# Patient Record
Sex: FEMALE | Race: OTHER | HISPANIC OR LATINO | Employment: OTHER | URBAN - METROPOLITAN AREA
[De-identification: names, ages, dates, MRNs, and addresses within clinical notes are randomized per-mention and may not be internally consistent; named-entity substitution may affect disease eponyms.]

---

## 2018-06-11 LAB
ALBUMIN SERPL BCP-MCNC: 4.2 G/DL (ref 3.5–5.7)
ALP SERPL-CCNC: 114 IU/L (ref 55–165)
ALT SERPL W P-5'-P-CCNC: 29 IU/L (ref 10–30)
ANION GAP SERPL CALCULATED.3IONS-SCNC: 13 MM/L
APTT PPP: 37.4 SEC (ref 24.4–37.6)
AST SERPL W P-5'-P-CCNC: 21 U/L (ref 7–26)
BASOPHILS # BLD AUTO: 0 X3/UL (ref 0–0.3)
BASOPHILS # BLD AUTO: 0.4 % (ref 0–2)
BILIRUB SERPL-MCNC: 0.3 MG/DL (ref 0.3–1)
BILIRUB UR QL STRIP: NEGATIVE
BUN SERPL-MCNC: 20 MG/DL (ref 7–25)
CALCIUM SERPL-MCNC: 9.5 MG/DL (ref 8.6–10.5)
CHLORIDE SERPL-SCNC: 107 MM/L (ref 98–107)
CLARITY UR: CLEAR
CO2 SERPL-SCNC: 23 MM/L (ref 21–31)
COLOR UR: YELLOW
CREAT SERPL-MCNC: 0.72 MG/DL (ref 0.6–1.2)
DEPRECATED RDW RBC AUTO: 15.2 %
EGFR (HISTORICAL): > 60 GFR
EGFR AFRICAN AMERICAN (HISTORICAL): > 60 GFR
EOSINOPHIL # BLD AUTO: 0.1 X3/UL (ref 0–0.5)
EOSINOPHIL NFR BLD AUTO: 1.6 % (ref 0–5)
GLUCOSE (HISTORICAL): 111 MG/DL (ref 65–99)
GLUCOSE UR STRIP-MCNC: NEGATIVE MG/DL
HCT VFR BLD AUTO: 38 % (ref 37–47)
HGB BLD-MCNC: 12.9 G/DL (ref 12–16)
HGB UR QL STRIP.AUTO: NEGATIVE
INR PPP: 1.06 (ref 0.9–1.5)
KETONES UR STRIP-MCNC: NEGATIVE MG/DL
LEUKOCYTE ESTERASE UR QL STRIP: NEGATIVE
LYMPHOCYTES # BLD AUTO: 0.8 X3/UL (ref 1.2–4.2)
LYMPHOCYTES NFR BLD AUTO: 16.5 % (ref 20.5–51.1)
MCH RBC QN AUTO: 30.8 PG (ref 26–34)
MCHC RBC AUTO-ENTMCNC: 33.9 G/DL (ref 31–37)
MCV RBC AUTO: 90.8 FL (ref 81–99)
MONOCYTES # BLD AUTO: 0.5 X3/UL (ref 0–1)
MONOCYTES NFR BLD AUTO: 9.9 % (ref 1.7–12)
NEUTROPHILS # BLD AUTO: 3.3 X3/UL (ref 1.4–6.5)
NEUTS SEG NFR BLD AUTO: 71.6 % (ref 42.2–75.2)
NITRITE UR QL STRIP: NEGATIVE
OSMOLALITY, SERUM (HISTORICAL): 281 MOSM (ref 262–291)
PH UR STRIP.AUTO: 5.5 [PH] (ref 4.5–8)
PLATELET # BLD AUTO: 209 X3/UL (ref 130–400)
PMV BLD AUTO: 9 FL
POTASSIUM SERPL-SCNC: 4 MM/L (ref 3.5–5.5)
PROT UR STRIP-MCNC: NEGATIVE MG/DL
PROTHROMBIN TIME (HISTORICAL): 12.2 SEC (ref 10.1–12.9)
RBC # BLD AUTO: 4.19 X6/UL (ref 3.9–5.2)
SODIUM SERPL-SCNC: 139 MM/L (ref 134–143)
SP GR UR STRIP.AUTO: >= 1.03 (ref 1–1.03)
TOTAL PROTEIN (HISTORICAL): 7 G/DL (ref 6.4–8.9)
UROBILINOGEN UR QL STRIP.AUTO: 0.2 EU/DL (ref 0.2–8)
WBC # BLD AUTO: 4.7 X3/UL (ref 4.8–10.8)

## 2018-07-11 RX ORDER — OMEPRAZOLE 20 MG/1
20 CAPSULE, DELAYED RELEASE ORAL DAILY
COMMUNITY
End: 2021-01-05

## 2018-07-11 RX ORDER — DOXYCYCLINE HYCLATE 100 MG/1
100 CAPSULE ORAL EVERY 12 HOURS SCHEDULED
COMMUNITY
End: 2019-10-02

## 2018-07-11 RX ORDER — ALPRAZOLAM 0.5 MG/1
TABLET ORAL
Status: ON HOLD | COMMUNITY
End: 2018-07-12

## 2018-07-11 RX ORDER — DICYCLOMINE HCL 20 MG
20 TABLET ORAL EVERY 6 HOURS
COMMUNITY
End: 2021-01-05

## 2018-07-11 RX ORDER — VENLAFAXINE 25 MG/1
25 TABLET ORAL 2 TIMES DAILY
Status: ON HOLD | COMMUNITY
End: 2018-07-12

## 2018-07-12 ENCOUNTER — HOSPITAL ENCOUNTER (OUTPATIENT)
Facility: HOSPITAL | Age: 63
Setting detail: OUTPATIENT SURGERY
Discharge: HOME | End: 2018-07-12
Attending: PODIATRIST | Admitting: PODIATRIST
Payer: COMMERCIAL

## 2018-07-12 ENCOUNTER — ANESTHESIA (OUTPATIENT)
Dept: PERIOP | Facility: HOSPITAL | Age: 63
End: 2018-07-12
Payer: COMMERCIAL

## 2018-07-12 ENCOUNTER — ANESTHESIA EVENT (OUTPATIENT)
Dept: PERIOP | Facility: HOSPITAL | Age: 63
End: 2018-07-12
Payer: COMMERCIAL

## 2018-07-12 VITALS
DIASTOLIC BLOOD PRESSURE: 67 MMHG | TEMPERATURE: 96.8 F | OXYGEN SATURATION: 98 % | WEIGHT: 172 LBS | HEART RATE: 87 BPM | BODY MASS INDEX: 34.68 KG/M2 | HEIGHT: 59 IN | RESPIRATION RATE: 13 BRPM | SYSTOLIC BLOOD PRESSURE: 131 MMHG

## 2018-07-12 DIAGNOSIS — G57.62 LESION OF LEFT PLANTAR NERVE: ICD-10-CM

## 2018-07-12 PROCEDURE — 88304 TISSUE EXAM BY PATHOLOGIST: CPT | Performed by: PATHOLOGY

## 2018-07-12 RX ORDER — LORATADINE 10 MG/1
10 TABLET ORAL DAILY
Status: DISCONTINUED | OUTPATIENT
Start: 2018-07-12 | End: 2018-07-12 | Stop reason: HOSPADM

## 2018-07-12 RX ORDER — BUPIVACAINE HYDROCHLORIDE 5 MG/ML
INJECTION, SOLUTION PERINEURAL AS NEEDED
Status: DISCONTINUED | OUTPATIENT
Start: 2018-07-12 | End: 2018-07-12 | Stop reason: HOSPADM

## 2018-07-12 RX ORDER — FENTANYL CITRATE 50 UG/ML
INJECTION, SOLUTION INTRAMUSCULAR; INTRAVENOUS AS NEEDED
Status: DISCONTINUED | OUTPATIENT
Start: 2018-07-12 | End: 2018-07-12 | Stop reason: SURG

## 2018-07-12 RX ORDER — DEXAMETHASONE SODIUM PHOSPHATE 4 MG/ML
INJECTION, SOLUTION INTRA-ARTICULAR; INTRALESIONAL; INTRAMUSCULAR; INTRAVENOUS; SOFT TISSUE AS NEEDED
Status: DISCONTINUED | OUTPATIENT
Start: 2018-07-12 | End: 2018-07-12 | Stop reason: HOSPADM

## 2018-07-12 RX ORDER — OXYCODONE HYDROCHLORIDE AND ACETAMINOPHEN 5; 325 MG/1; MG/1
2 TABLET ORAL EVERY 6 HOURS PRN
Status: DISCONTINUED | OUTPATIENT
Start: 2018-07-12 | End: 2018-07-12 | Stop reason: HOSPADM

## 2018-07-12 RX ORDER — PROPOFOL 10 MG/ML
INJECTION, EMULSION INTRAVENOUS AS NEEDED
Status: DISCONTINUED | OUTPATIENT
Start: 2018-07-12 | End: 2018-07-12 | Stop reason: SURG

## 2018-07-12 RX ORDER — SIMETHICONE 80 MG
TABLET,CHEWABLE ORAL
COMMUNITY
End: 2019-10-02

## 2018-07-12 RX ORDER — DICYCLOMINE HCL 20 MG
20 TABLET ORAL EVERY 6 HOURS
Status: DISCONTINUED | OUTPATIENT
Start: 2018-07-12 | End: 2018-07-12 | Stop reason: HOSPADM

## 2018-07-12 RX ORDER — FLUTICASONE PROPIONATE 50 MCG
1 SPRAY, SUSPENSION (ML) NASAL DAILY
COMMUNITY
End: 2019-10-02

## 2018-07-12 RX ORDER — LIDOCAINE HYDROCHLORIDE 10 MG/ML
INJECTION, SOLUTION INFILTRATION; PERINEURAL AS NEEDED
Status: DISCONTINUED | OUTPATIENT
Start: 2018-07-12 | End: 2018-07-12 | Stop reason: HOSPADM

## 2018-07-12 RX ORDER — DOXYCYCLINE 100 MG/1
100 CAPSULE ORAL
COMMUNITY
End: 2019-10-02

## 2018-07-12 RX ORDER — OMEPRAZOLE 20 MG/1
20 CAPSULE, DELAYED RELEASE ORAL DAILY
Status: DISCONTINUED | OUTPATIENT
Start: 2018-07-12 | End: 2018-07-12 | Stop reason: HOSPADM

## 2018-07-12 RX ORDER — KETOROLAC TROMETHAMINE 30 MG/ML
INJECTION, SOLUTION INTRAMUSCULAR; INTRAVENOUS AS NEEDED
Status: DISCONTINUED | OUTPATIENT
Start: 2018-07-12 | End: 2018-07-12 | Stop reason: SURG

## 2018-07-12 RX ORDER — SIMETHICONE 80 MG
80 TABLET,CHEWABLE ORAL ONCE
Status: DISCONTINUED | OUTPATIENT
Start: 2018-07-12 | End: 2018-07-12 | Stop reason: HOSPADM

## 2018-07-12 RX ORDER — FLUTICASONE PROPIONATE 50 MCG
1 SPRAY, SUSPENSION (ML) NASAL DAILY
Status: DISCONTINUED | OUTPATIENT
Start: 2018-07-12 | End: 2018-07-12 | Stop reason: HOSPADM

## 2018-07-12 RX ORDER — LORATADINE 10 MG/1
10 TABLET ORAL
COMMUNITY
Start: 2016-09-28 | End: 2018-08-27 | Stop reason: SDUPTHER

## 2018-07-12 RX ORDER — MIDAZOLAM HYDROCHLORIDE 1 MG/ML
INJECTION INTRAMUSCULAR; INTRAVENOUS AS NEEDED
Status: DISCONTINUED | OUTPATIENT
Start: 2018-07-12 | End: 2018-07-12 | Stop reason: SURG

## 2018-07-12 RX ORDER — DOXYCYCLINE HYCLATE 100 MG/1
100 CAPSULE ORAL EVERY 12 HOURS SCHEDULED
Status: DISCONTINUED | OUTPATIENT
Start: 2018-07-12 | End: 2018-07-12 | Stop reason: HOSPADM

## 2018-07-12 RX ORDER — SODIUM CHLORIDE, SODIUM LACTATE, POTASSIUM CHLORIDE, CALCIUM CHLORIDE 600; 310; 30; 20 MG/100ML; MG/100ML; MG/100ML; MG/100ML
125 INJECTION, SOLUTION INTRAVENOUS CONTINUOUS
Status: DISCONTINUED | OUTPATIENT
Start: 2018-07-12 | End: 2018-07-12

## 2018-07-12 RX ORDER — DOXYCYCLINE 100 MG/1
100 CAPSULE ORAL EVERY 12 HOURS SCHEDULED
Status: DISCONTINUED | OUTPATIENT
Start: 2018-07-12 | End: 2018-07-12 | Stop reason: HOSPADM

## 2018-07-12 RX ADMIN — SODIUM CHLORIDE, POTASSIUM CHLORIDE, SODIUM LACTATE AND CALCIUM CHLORIDE 125 ML/HR: 600; 310; 30; 20 INJECTION, SOLUTION INTRAVENOUS at 12:25

## 2018-07-12 RX ADMIN — MIDAZOLAM HYDROCHLORIDE 1 MG: 1 INJECTION, SOLUTION INTRAMUSCULAR; INTRAVENOUS at 13:20

## 2018-07-12 RX ADMIN — FENTANYL CITRATE 25 MCG: 50 INJECTION INTRAMUSCULAR; INTRAVENOUS at 13:40

## 2018-07-12 RX ADMIN — KETOROLAC TROMETHAMINE 30 MG: 30 INJECTION, SOLUTION INTRAMUSCULAR at 14:09

## 2018-07-12 RX ADMIN — MIDAZOLAM HYDROCHLORIDE 1 MG: 1 INJECTION, SOLUTION INTRAMUSCULAR; INTRAVENOUS at 13:17

## 2018-07-12 RX ADMIN — PROPOFOL 50 MG: 10 INJECTION, EMULSION INTRAVENOUS at 13:25

## 2018-07-12 RX ADMIN — LIDOCAINE HYDROCHLORIDE 100 MG: 20 INJECTION, SOLUTION INTRAVENOUS at 13:20

## 2018-07-12 RX ADMIN — FENTANYL CITRATE 50 MCG: 50 INJECTION INTRAMUSCULAR; INTRAVENOUS at 13:24

## 2018-07-12 NOTE — ANESTHESIA POSTPROCEDURE EVALUATION
Post-Op Assessment Note      CV Status:  Stable    Mental Status:  Alert    Hydration Status:  Stable    PONV Controlled:  None    Airway Patency:  Patent    Post Op Vitals Reviewed: Yes          Staff: Anesthesiologist           BP (P) 136/65 (07/12/18 1415)    Temp (!) (P) 96 8 °F (36 °C) (07/12/18 1415)    Pulse (P) 80 (07/12/18 1415)   Resp (P) 12 (07/12/18 1415)    SpO2 (P) 100 % (07/12/18 1415)

## 2018-07-12 NOTE — OP NOTE
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]    Procedure(s) (LRB):  FOOT NEUROMA EXCISION (Left)    Specimen(s):  ID Type Source Tests Collected by Time Destination   1 : soft tissue neuroma Tissue Foot, Left TISSUE EXAM Lucinda Colbert [facility] 2018 1343        Estimated Blood Loss:   Minimal    Drains:       Anesthesia Type:   IV Sedation with Anesthesia    Operative Indications:  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]      Operative Findings:  Tight tissue in interspaces, some scar tissue noted around the nerve  Complications: Third toe with very slow vascular return  Pale on patient taken to Recovery  Ordered foot to hang down and warm blankets to lower leg  Procedure and Technique:    Patient taken the operative eye car placed on table in supine position well padded pneumatic ankle tourniquet was placed about the patient's left ankle  Patient was positively identified in the planned operative, site planned procedure, & patient identity  Patient given IV sedation with the patient to be sufficiently sedated a approximately 13-14 cc of a 50 50 mix of lidocaine and Marcaine were injected about the planned operative site  A well-padded pneumatichad been placed prior to this, the foot was prepped and draped draped in the usual aseptic manner tourniquet was inflated to 250 mm of mercury after exsanguination using an Esmarch  Attention was directed to the dorsal 3rd MPJ region where a curvilinear incision was made carefully deepened using care tract vital and neurovascular structures      In the deeper tissues no other significant scar tissue in the area the intermetatarsal ligament was transected into the 2nd interspace region some neuroma tissue was removed from this area was not have any gross sizes had been anticipated from the MRI findings  Attention was then directed to the 3rd interspace through the same incision where identical findings were made and completed  A arisen flushed well with copious amounts of saline mixed bacitracin closure was made using 3 0 nylon on the skin  After closure 4mgs per ML Decadron   1cc total was injected into the area  Wound was then dressed with Betadine-soaked Adaptic gauze roll gauze Coban tourniquet was deflated is noted slow of capillary refill was noted to the 3rd and 4th digits after hanging the patient's foot the 4th came back to a nice pink color  The 3rd toe is still fairly pale upon discharge to the PACU with this will be monitored and ordered with patient having her foot down and a warm blanket placed about the lower extremity     I was present for the entire procedure    Patient Disposition:  PACU     SIGNATURE: Deepak Stuart DPM  DATE: July 12, 2018  TIME: 2:16 PM

## 2018-07-12 NOTE — DISCHARGE INSTRUCTIONS
Zaldivar Neuroma   WHAT YOU NEED TO KNOW:   Zaldivar neuroma is when tissue thickens around one of the nerves in your foot  It usually occurs in the ball of your foot, between your third and fourth toes  DISCHARGE INSTRUCTIONS:   Medicines: You may need any of the following:  · NSAIDs  help decrease swelling and pain  This medicine is available with or without a doctor's order  NSAIDs can cause stomach bleeding or kidney problems in certain people  If you take blood thinner medicine, always ask your healthcare provider if NSAIDs are safe for you  Always read the medicine label and follow directions  · Take your medicine as directed  Contact your healthcare provider if you think your medicine is not helping or if you have side effects  Tell him or her if you are allergic to any medicine  Keep a list of the medicines, vitamins, and herbs you take  Include the amounts, and when and why you take them  Bring the list or the pill bottles to follow-up visits  Carry your medicine list with you in case of an emergency  Wear flat shoes with a wide toe box: This will decrease the pressure on the front of your foot  Wear orthotics, arch supports, or foot pads: These help relieve pressure and cushion the ball of your foot  You may need a medical shoe insert ordered by your healthcare provider  Do an ice massage:  Do an ice massage for 20 minutes, twice a day to decrease pain and swelling  Freeze a paper or foam cup filled with water and roll it under your foot  Follow up with your healthcare provider as directed:  Write down your questions so you remember to ask them during your visits  Contact your healthcare provider if:   · Your symptoms spread to your toes  · Your symptoms do not improve after treatment  · You have questions or concerns about your condition or care    © 2017 [employer] Information is for End User's use only and may not be sold, redistributed or otherwise used for commercial purposes  All illustrations and images included in CareNotes® are the copyrighted property of A [employer] M [employer] Inc  or [employer]  The above information is an  only  It is not intended as medical advice for individual conditions or treatments  Talk to your doctor, nurse or pharmacist before following any medical regimen to see if it is safe and effective for you  Hydrocodone/Acetaminophen (By mouth)   Acetaminophen (x-nglk-s-MIN-oh-fen), Hydrocodone Bitartrate (sag-nhol-FMD-done bye-TAR-trate)  Treats pain  This medicine contains a narcotic pain reliever  Brand Name(s): Hycet, Lorcet, Lorcet HD, Lorcet Plus, Lortab 10/325, Lortab 5/325, Lortab 7 5/325, Lortab Elixir, Norco, Verdrocet, Vicodin, Vicodin ES, Vicodin HP, Xodol, Xodol 5/300   There may be other brand names for this medicine  When This Medicine Should Not Be Used: This medicine is not right for everyone  Do not use it if you had an allergic reaction to acetaminophen, hydrocodone, or other narcotic medicines, or stomach or bowel blockage (including paralytic ileus)  How to Use This Medicine:   Capsule, Liquid, Tablet  · Your doctor will tell you how much medicine to use  Do not use more than directed  · An overdose can be dangerous  Follow directions carefully so you do not get too much medicine at one time  · Oral liquid: Measure the oral liquid medicine with a marked measuring spoon, oral syringe, or medicine cup  · Drink plenty of liquids to help avoid constipation  · This medicine should come with a Medication Guide  Ask your pharmacist for a copy if you do not have one  · Missed dose: Take a dose as soon as you remember  If it is almost time for your next dose, wait until then and take a regular dose  Do not take extra medicine to make up for a missed dose  · Store the medicine in a closed container at room temperature, away from heat, moisture, and direct light   Flush any unused Norco® tablets down the toilet  Drugs and Foods to Avoid:   Ask your doctor or pharmacist before using any other medicine, including over-the-counter medicines, vitamins, and herbal products  · Do not use this medicine if you are using or have used an MAO inhibitor within the past 14 days  · Some medicines can affect how hydrocodone/acetaminophen works  Tell your doctor if you are using any of the following:   ¨ Carbamazepine, erythromycin, ketoconazole, mirtazapine, phenytoin, rifampin, ritonavir, tramadol, trazodone  ¨ Diuretic (water pill)  ¨ Medicine to treat depression or mental health problems  ¨ Medicine to treat migraine headaches  ¨ Phenothiazine medicine  · Tell your doctor if you use anything else that makes you sleepy  Some examples are allergy medicine, narcotic pain medicine, and alcohol  Tell your doctor if you are using buprenorphine, butorphanol, nalbuphine, pentazocine, or a muscle relaxer  · Do not drink alcohol while you are using this medicine  Acetaminophen can damage your liver, and your risk is higher if you also drink alcohol  Warnings While Using This Medicine:   · Tell your doctor if you are pregnant or breastfeeding, or if you have kidney disease, liver disease, lung or breathing problems, gallbladder or pancreas problems, an underactive thyroid, Troup disease, prostate problems, trouble urinating, stomach problems, or a history of head injury or brain tumor, seizures, alcohol or drug addiction  · This medicine may cause the following problems:   ¨ High risk of overdose, which can lead to death  ¨ Respiratory depression (serious breathing problem that can be life-threatening)  ¨ Liver problems  ¨ Serious skin reactions  ¨ Serotonin syndrome (when used with certain medicines)  · This medicine can be habit-forming  Do not use more than your prescribed dose  Call your doctor if you think your medicine is not working  · This medicine may make you dizzy or drowsy   Do not drive or doing anything else that could be dangerous until you know how this medicine affects you  · This medicine contains acetaminophen  Read the labels of all other medicines you are using to see if they also contain acetaminophen, or ask your doctor or pharmacist  Mitra Gruber not use more than 4 grams (4,000 milligrams) total of acetaminophen in one day  · Tell any doctor or dentist who treats you that you are using this medicine  This medicine may affect certain medical test results  · This medicine may cause constipation, especially with long-term use  Ask your doctor if you should use a laxative to prevent and treat constipation  · This medicine could cause infertility  Talk with your doctor before using this medicine if you plan to have children  · Keep all medicine out of the reach of children  Never share your medicine with anyone  Possible Side Effects While Using This Medicine:   Call your doctor right away if you notice any of these side effects:  · Allergic reaction: Itching or hives, swelling in your face or hands, swelling or tingling in your mouth or throat, chest tightness, trouble breathing  · Anxiety, restlessness, fast heartbeat, fever, sweating, muscle spasms, twitching, diarrhea, seeing or hearing things that are not there  · Blistering, peeling, red skin rash  · Blue lips, fingernails, or skin  · Dark urine or pale stools, loss of appetite, nausea or vomiting, stomach pain, yellow skin or eyes  · Extreme weakness, shallow breathing, slow heartbeat, sweating, seizures, cold or clammy skin  · Lightheadedness, dizziness, fainting  If you notice these less serious side effects, talk with your doctor:   · Constipation, nausea, vomiting  · Tiredness or sleepiness  If you notice other side effects that you think are caused by this medicine, tell your doctor  Call your doctor for medical advice about side effects   You may report side effects to FDA at 8-346-FDA-5929  © 2017 [employer] Information is for End User's use only and may not be sold, redistributed or otherwise used for commercial purposes  The above information is an  only  It is not intended as medical advice for individual conditions or treatments  Talk to your doctor, nurse or pharmacist before following any medical regimen to see if it is safe and effective for you

## 2018-07-12 NOTE — H&P
H&P Exam - [facility] Room 61 y o  female MRN: [id number]    Unit/Bed#:  Encounter: [id number]    Assessment:  Neuroma L second and third interspaces     Plan:  Outpatient Surgery to remove Neuroma    History of Present Illness   Pain with swollen feeling in her Left foot x 2 years refractory to conservative treatment    Review of Systems   Constitutional: Negative for appetite change, chills and fever  Respiratory: Positive for shortness of breath  Genitourinary: Positive for urgency  Musculoskeletal: Positive for arthralgias and neck pain  Allergic/Immunologic: Negative  Neurological: Positive for headaches  Hematological: Negative  Psychiatric/Behavioral: Negative  Historical Information   Past Medical History:   Diagnosis Date    Anxiety     Breathing difficulty     Cancer (HCC)     cervical    GERD (gastroesophageal reflux disease)     Irritable bowel syndrome     Shortness of breath      Past Surgical History:   Procedure Laterality Date    HYSTERECTOMY      TUBAL LIGATION       Social History   History   Alcohol Use No     History   Drug Use No     History   Smoking Status    Former Smoker    Packs/day: 3 00    Years: 25 00    Quit date: 7/11/2003   Smokeless Tobacco    Never Used     Family History: non-contributory    Meds/Allergies   all medications and allergies reviewed  No Known Allergies    Objective   First Vitals:        Current Vitals:        No intake or output data in the 24 hours ending 07/12/18 0658    Invasive Devices          No matching active lines, drains, or airways          Physical Exam   Constitutional: She is oriented to person, place, and time  She appears well-developed  HENT:   Head: Normocephalic  Eyes: Pupils are equal, round, and reactive to light  Cardiovascular: Intact distal pulses  Musculoskeletal: Normal range of motion  She exhibits tenderness  Feet:    Neurological: She is alert and oriented to person, place, and time  Skin: Skin is warm and dry  Lab Results: Reviewed all pertinant  Imaging: No clear Neuroma on MRI  EKG, Pathology, and Other Studies: N/A    Code Status: No Order  Advance Directive and Living Will:      Power of :    POLST:      Counseling / Coordination of Care:   None and Total floor / unit time spent today 30 minutes

## 2018-07-12 NOTE — ANESTHESIA PREPROCEDURE EVALUATION
Review of Systems/Medical History  Patient summary reviewed  Chart reviewed  No history of anesthetic complications     Cardiovascular  Negative cardio ROS    Pulmonary  Smoker ex-smoker  , Shortness of breath,   Comment: No SOB now     GI/Hepatic    GERD ,             Endo/Other    Obesity    GYN       Hematology   Musculoskeletal       Neurology   Psychology   Anxiety,              Physical Exam    Airway    Mallampati score: IV  TM Distance: >3 FB  Neck ROM: full     Dental       Cardiovascular  Comment: Negative ROS, Rhythm: regular, Rate: normal, Cardiovascular exam normal    Pulmonary  Pulmonary exam normal Breath sounds clear to auscultation,     Other Findings        Anesthesia Plan  ASA Score- 2     Anesthesia Type- IV sedation with anesthesia with ASA Monitors  Additional Monitors:   Airway Plan:         Plan Factors-    Induction- intravenous  Postoperative Plan-     Informed Consent- Anesthetic plan and risks discussed with patient  I personally reviewed this patient with the CRNA  Discussed and agreed on the Anesthesia Plan with the CRNA  Georgette Ormond

## 2018-08-14 DIAGNOSIS — C53.0 MALIGNANT NEOPLASM OF ENDOCERVIX (HCC): Primary | ICD-10-CM

## 2018-08-14 DIAGNOSIS — R32 URINARY INCONTINENCE, UNSPECIFIED TYPE: ICD-10-CM

## 2018-08-27 DIAGNOSIS — T78.40XS ALLERGIC STATE, SEQUELA: Primary | ICD-10-CM

## 2018-08-27 RX ORDER — LORATADINE 10 MG/1
10 TABLET ORAL DAILY
Qty: 30 TABLET | Refills: 3 | Status: SHIPPED | OUTPATIENT
Start: 2018-08-27 | End: 2019-01-25 | Stop reason: SDUPTHER

## 2018-10-23 ENCOUNTER — IMMUNIZATION (OUTPATIENT)
Dept: FAMILY MEDICINE CLINIC | Facility: CLINIC | Age: 63
End: 2018-10-23
Payer: COMMERCIAL

## 2018-10-23 DIAGNOSIS — Z23 NEEDS FLU SHOT: Primary | ICD-10-CM

## 2018-10-23 PROCEDURE — 90471 IMMUNIZATION ADMIN: CPT

## 2018-10-23 PROCEDURE — 90682 RIV4 VACC RECOMBINANT DNA IM: CPT

## 2019-01-25 DIAGNOSIS — T78.40XS ALLERGIC STATE, SEQUELA: ICD-10-CM

## 2019-01-25 NOTE — TELEPHONE ENCOUNTER
Patient called requesting a refill of medication  Can you please send to pharmacy on file?  Thank you      Requesting:patient called moved and updated info and requested a refill loratadine 10 mg 1 tab daily called to new pharmacy [facility], Thank You

## 2019-01-28 RX ORDER — LORATADINE 10 MG/1
TABLET ORAL
Qty: 30 TABLET | Refills: 1 | Status: SHIPPED | OUTPATIENT
Start: 2019-01-28 | End: 2019-03-24 | Stop reason: SDUPTHER

## 2019-03-24 DIAGNOSIS — T78.40XS ALLERGIC STATE, SEQUELA: ICD-10-CM

## 2019-03-25 RX ORDER — LORATADINE 10 MG/1
TABLET ORAL
Qty: 30 TABLET | Refills: 1 | Status: SHIPPED | OUTPATIENT
Start: 2019-03-25 | End: 2019-05-18 | Stop reason: SDUPTHER

## 2019-05-18 DIAGNOSIS — T78.40XS ALLERGIC STATE, SEQUELA: ICD-10-CM

## 2019-05-20 RX ORDER — LORATADINE 10 MG/1
TABLET ORAL
Qty: 30 TABLET | Refills: 1 | Status: SHIPPED | OUTPATIENT
Start: 2019-05-20 | End: 2019-07-14 | Stop reason: SDUPTHER

## 2019-07-14 DIAGNOSIS — T78.40XS ALLERGIC STATE, SEQUELA: ICD-10-CM

## 2019-07-16 RX ORDER — LORATADINE 10 MG/1
TABLET ORAL
Qty: 30 TABLET | Refills: 2 | Status: SHIPPED | OUTPATIENT
Start: 2019-07-16 | End: 2019-11-01 | Stop reason: SDUPTHER

## 2019-10-02 ENCOUNTER — OFFICE VISIT (OUTPATIENT)
Dept: INTERNAL MEDICINE CLINIC | Facility: CLINIC | Age: 64
End: 2019-10-02
Payer: COMMERCIAL

## 2019-10-02 VITALS
OXYGEN SATURATION: 96 % | HEART RATE: 101 BPM | SYSTOLIC BLOOD PRESSURE: 122 MMHG | BODY MASS INDEX: 36.53 KG/M2 | TEMPERATURE: 98.4 F | HEIGHT: 59 IN | DIASTOLIC BLOOD PRESSURE: 84 MMHG | WEIGHT: 181.2 LBS | RESPIRATION RATE: 18 BRPM

## 2019-10-02 DIAGNOSIS — Z13.6 SCREENING FOR CARDIOVASCULAR CONDITION: ICD-10-CM

## 2019-10-02 DIAGNOSIS — M79.644 PAIN OF RIGHT THUMB: ICD-10-CM

## 2019-10-02 DIAGNOSIS — Z78.0 POSTMENOPAUSAL: ICD-10-CM

## 2019-10-02 DIAGNOSIS — C53.0 MALIGNANT NEOPLASM OF ENDOCERVIX (HCC): Primary | ICD-10-CM

## 2019-10-02 DIAGNOSIS — F32.A ANXIETY AND DEPRESSION: ICD-10-CM

## 2019-10-02 DIAGNOSIS — Z12.39 SCREENING FOR BREAST CANCER: ICD-10-CM

## 2019-10-02 DIAGNOSIS — Z13.29 SCREENING FOR THYROID DISORDER: ICD-10-CM

## 2019-10-02 DIAGNOSIS — F41.9 ANXIETY AND DEPRESSION: ICD-10-CM

## 2019-10-02 DIAGNOSIS — Z11.59 NEED FOR HEPATITIS C SCREENING TEST: ICD-10-CM

## 2019-10-02 DIAGNOSIS — G57.62 MORTON'S NEUROMA, LEFT: ICD-10-CM

## 2019-10-02 PROBLEM — Z12.11 SCREENING FOR COLON CANCER: Status: ACTIVE | Noted: 2019-10-02

## 2019-10-02 PROCEDURE — 99204 OFFICE O/P NEW MOD 45 MIN: CPT | Performed by: NURSE PRACTITIONER

## 2019-10-02 RX ORDER — ESCITALOPRAM OXALATE 10 MG/1
10 TABLET ORAL DAILY
Qty: 30 TABLET | Refills: 5 | Status: SHIPPED | OUTPATIENT
Start: 2019-10-02 | End: 2021-01-05

## 2019-10-02 NOTE — PATIENT INSTRUCTIONS

## 2019-10-02 NOTE — PROGRESS NOTES
Assessment/Plan: Will give script for fasting labs to have done  Will give script for mammogram and bone density  Will start on Lexapro 10 mg daily for her anxiety and depression  She does contract for safety today  Will give referral to Podiatry and GYN  She has Stage IB1 Squamous Cell Cervical cancer  External radiation began on 5/16 and ended 6/16  She underwent original surgery on 3/29/17  She was seeing [facility]  Last CT impression 2/18 did showNo significant change since previous PET/CT given differences in technique, with no evidence of recurrent or metastatic disease within the chest abdomen or pelvis  Stable subcentimeter pulmonary nodules bilaterally as described  Right hepatic lobe hemangiomas unchanged  Left colonic diverticulosis without acute diverticulitis  Other findings as above  She was given a referral to GYN closer due to her ride issues  Will obtain imaging of her right thumb and will possibly send to Ortho  She is taking Aleve for the pain  Will obtain colonoscopy report  Will bring her back in one month to see how she is tolerating her Lexapro she was advised if any issues or concerns to call the office  No problem-specific Assessment & Plan notes found for this encounter           Problem List Items Addressed This Visit        Nervous and Auditory    Zaldivar's neuroma, left    Relevant Orders    Ambulatory referral to Podiatry    Comprehensive metabolic panel    CBC and differential       Genitourinary    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[facility] [address] ) - Primary    Relevant Orders    Comprehensive metabolic panel    CBC and differential    Ambulatory referral to Obstetrics / Gynecology       Other    Screening for breast cancer    Relevant Orders    Mammo screening bilateral w 3d & cad    BMI 36 0-36 9,adult    Relevant Orders    Comprehensive metabolic panel    CBC and differential    Screening for thyroid disorder    Relevant Orders    TSH, 3rd generation with Free T4 reflex    Screening for cardiovascular condition    Relevant Orders    Lipid panel    Anxiety and depression    Relevant Medications    escitalopram (LEXAPRO) 10 mg tablet    Need for hepatitis C screening test    Relevant Orders    Hepatitis C antibody    Pain of right thumb    Relevant Orders    XR thumb right first digit-min 2v      Other Visit Diagnoses     Postmenopausal        Relevant Orders    DXA bone density spine hip and pelvis            Subjective:      Patient ID: Nelli Holly is a 59 y o  female  Larayne Breath is here today to establish care as a new patient  She was a previous patient of Dr Tri Velasquez  She was living in [place] and did loose her job and all her belongings  She was staying with her sister but she did kick her out due to an argument and now is living in an apartment in [place] [address]  She states the apartment is very outdated and dirty and is trying to find somewhere else to live  She states she is looking for a job and the nursing home did not want to take her due to her bad credit  She does have past history of cervical cancer and did have a hyster done  She did receive chemo and radiation for this  She was to follow up with them in 3 months but does not have a vehicle to get to appointments and is using the bus  She states she did have surgery on her left foot for mortons neuroma and states her symptoms are much worse  She would like a new referral to a different Podiatrist  She also is having right thumb pain and is not sure if she injured this opening and closing her windows in her apartment  She is having some anxiety and depression  She would like to start on medication for this  She denies any chest pain, SOB, or palpitations  She did have a colonoscopy done and will need a bone density and mammogram  She was a former smoker  She will get the flu vaccine at the clinic due to it being free  She also is having excess sweating and is not sure what this is from  This has happened her whole life but is getting worse  She also does have brown discoloration on her face and this did happen after chemo and radiation  She states she did see Derm and they did give her cream but it did not help  She offers no other issues  The following portions of the patient's history were reviewed and updated as appropriate:   She  has a past medical history of Anxiety, Anxiety and depression (10/2/2019), Breathing difficulty, Cancer ([address] ), GERD (gastroesophageal reflux disease), Irritable bowel syndrome, and Shortness of breath  She   Patient Active Problem List    Diagnosis Date Noted    Screening for breast cancer 10/02/2019    BMI 36 0-36 9,adult 10/02/2019    Screening for colon cancer 10/02/2019    Screening for thyroid disorder 10/02/2019    Screening for cardiovascular condition 10/02/2019    Anxiety and depression 10/02/2019    Need for hepatitis C screening test 10/02/2019    Pain of right thumb 10/02/2019    Zaldivar's neuroma, left 07/12/2018    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[facility] [address] ) 04/11/2017    Atypical glandular cells of undetermined significance of cervix 12/07/2016    Menopausal and perimenopausal disorder 10/28/2016     She  has a past surgical history that includes Hysterectomy; Tubal ligation; and pr excis interdigital neuroma,ea (Left, 7/12/2018)  Her family history includes Alcohol abuse in her father; Cancer in her mother  She  reports that she quit smoking about 16 years ago  She has a 75 00 pack-year smoking history  She has never used smokeless tobacco  She reports that she does not drink alcohol or use drugs    Current Outpatient Medications   Medication Sig Dispense Refill    loratadine (CLARITIN) 10 mg tablet TAKE 1 TABLET DAILY 30 tablet 2    omeprazole (PriLOSEC) 20 mg delayed release capsule Take 20 mg by mouth daily      dicyclomine (BENTYL) 20 mg tablet Take 20 mg by mouth every 6 (six) hours      escitalopram (LEXAPRO) 10 mg tablet Take 1 tablet (10 mg total) by mouth daily 30 tablet 5     No current facility-administered medications for this visit  Current Outpatient Medications on File Prior to Visit   Medication Sig    loratadine (CLARITIN) 10 mg tablet TAKE 1 TABLET DAILY    omeprazole (PriLOSEC) 20 mg delayed release capsule Take 20 mg by mouth daily    dicyclomine (BENTYL) 20 mg tablet Take 20 mg by mouth every 6 (six) hours    [DISCONTINUED] doxycycline hyclate (VIBRAMYCIN) 100 mg capsule Take 100 mg by mouth every 12 (twelve) hours    [DISCONTINUED] doxycycline monohydrate (MONODOX) 100 mg capsule Take 100 mg by mouth    [DISCONTINUED] fluticasone (FLONASE) 50 mcg/act nasal spray 1 spray into each nostril daily    [DISCONTINUED] simethicone (GAS-X) 80 mg chewable tablet Chew     No current facility-administered medications on file prior to visit  She is allergic to dust mite extract and pollen extract       Review of Systems   Constitutional: Negative  HENT: Negative  Eyes: Negative  Respiratory: Negative  Cardiovascular: Negative  Gastrointestinal: Negative  Endocrine: Negative  Genitourinary: Negative  Musculoskeletal: Positive for arthralgias and myalgias  Skin: Negative  Allergic/Immunologic: Negative  Neurological: Negative  Hematological: Negative  Psychiatric/Behavioral: Negative  Objective:      /84 (BP Location: Left arm, Patient Position: Sitting, Cuff Size: Adult)   Pulse 101   Temp 98 4 °F (36 9 °C) (Temporal)   Resp 18   Ht 4' 11" (1 499 m)   Wt 82 2 kg (181 lb 3 2 oz)   LMP  (LMP Unknown)   SpO2 96%   BMI 36 60 kg/m²          Physical Exam   Constitutional: She is oriented to person, place, and time  She appears well-developed and well-nourished  HENT:   Head: Normocephalic and atraumatic  Right Ear: External ear normal    Left Ear: External ear normal    Nose: Nose normal    Mouth/Throat: Oropharynx is clear and moist    Eyes: Pupils are equal, round, and reactive to light   Conjunctivae and EOM are normal    Neck: Normal range of motion  Neck supple  Cardiovascular: Normal rate, regular rhythm, normal heart sounds and intact distal pulses  Pulmonary/Chest: Effort normal and breath sounds normal    Abdominal: Soft  Bowel sounds are normal    Musculoskeletal: Normal range of motion  She exhibits tenderness  To right thumb with flexion and extension   Neurological: She is alert and oriented to person, place, and time  Skin: Skin is warm and dry  Capillary refill takes less than 2 seconds  Brown discoloration noted to forehead and cheeks   Psychiatric: She has a normal mood and affect  Her behavior is normal  Judgment and thought content normal    Vitals reviewed  BMI Counseling: Body mass index is 36 6 kg/m²  The BMI is above normal  Nutrition recommendations include reducing portion sizes, decreasing overall calorie intake, 3-5 servings of fruits/vegetables daily, reducing fast food intake, consuming healthier snacks, decreasing soda and/or juice intake, moderation in carbohydrate intake, increasing intake of lean protein, reducing intake of saturated fat and trans fat and reducing intake of cholesterol

## 2019-10-03 ENCOUNTER — APPOINTMENT (OUTPATIENT)
Dept: RADIOLOGY | Facility: CLINIC | Age: 64
End: 2019-10-03
Payer: COMMERCIAL

## 2019-10-03 DIAGNOSIS — M79.644 PAIN OF RIGHT THUMB: ICD-10-CM

## 2019-10-03 PROCEDURE — 73140 X-RAY EXAM OF FINGER(S): CPT

## 2019-10-07 ENCOUNTER — TELEPHONE (OUTPATIENT)
Dept: INTERNAL MEDICINE CLINIC | Facility: CLINIC | Age: 64
End: 2019-10-07

## 2019-10-07 NOTE — TELEPHONE ENCOUNTER
Just called and left message for patient to return call regarding xray results  (Fractures, refer to otho  )

## 2019-10-08 ENCOUNTER — OFFICE VISIT (OUTPATIENT)
Dept: OBGYN CLINIC | Facility: CLINIC | Age: 64
End: 2019-10-08
Payer: COMMERCIAL

## 2019-10-08 VITALS
BODY MASS INDEX: 40.04 KG/M2 | WEIGHT: 178 LBS | HEART RATE: 95 BPM | DIASTOLIC BLOOD PRESSURE: 70 MMHG | HEIGHT: 56 IN | SYSTOLIC BLOOD PRESSURE: 120 MMHG

## 2019-10-08 DIAGNOSIS — M65.311 TRIGGER THUMB OF RIGHT HAND: Primary | ICD-10-CM

## 2019-10-08 PROCEDURE — 20600 DRAIN/INJ JOINT/BURSA W/O US: CPT | Performed by: ORTHOPAEDIC SURGERY

## 2019-10-08 PROCEDURE — 99203 OFFICE O/P NEW LOW 30 MIN: CPT | Performed by: ORTHOPAEDIC SURGERY

## 2019-10-08 RX ORDER — LIDOCAINE HYDROCHLORIDE 10 MG/ML
0.5 INJECTION, SOLUTION INFILTRATION; PERINEURAL
Status: COMPLETED | OUTPATIENT
Start: 2019-10-08 | End: 2019-10-08

## 2019-10-08 RX ORDER — BETAMETHASONE SODIUM PHOSPHATE AND BETAMETHASONE ACETATE 3; 3 MG/ML; MG/ML
6 INJECTION, SUSPENSION INTRA-ARTICULAR; INTRALESIONAL; INTRAMUSCULAR; SOFT TISSUE
Status: COMPLETED | OUTPATIENT
Start: 2019-10-08 | End: 2019-10-08

## 2019-10-08 RX ADMIN — BETAMETHASONE SODIUM PHOSPHATE AND BETAMETHASONE ACETATE 6 MG: 3; 3 INJECTION, SUSPENSION INTRA-ARTICULAR; INTRALESIONAL; INTRAMUSCULAR; SOFT TISSUE at 14:11

## 2019-10-08 RX ADMIN — LIDOCAINE HYDROCHLORIDE 0.5 ML: 10 INJECTION, SOLUTION INFILTRATION; PERINEURAL at 14:11

## 2019-10-08 NOTE — ASSESSMENT & PLAN NOTE
42-year-old female with a right trigger thumb and some IP joint arthritis  I think a trigger thumb is the main issue  I recommended a cortisone injection today both for diagnostic as well as therapeutic reasons  She will follow up in the future if she fails to have good relief with this  She was given a brace to wear to help minimize aggravation

## 2019-10-08 NOTE — PROGRESS NOTES
Assessment:     1  Trigger thumb of right hand          Plan:     Problem List Items Addressed This Visit        Musculoskeletal and Integument    Trigger thumb of right hand - Primary     27-year-old female with a right trigger thumb and some IP joint arthritis  I think a trigger thumb is the main issue  I recommended a cortisone injection today both for diagnostic as well as therapeutic reasons  She will follow up in the future if she fails to have good relief with this  She was given a brace to wear to help minimize aggravation  Relevant Orders    Brace           Patient ID: Boo Rutherford is a 59 y o  female  Chief Complaint:  Right thumb pain    HPI:  27-year-old female with a month history of pain in her right thumb  She is right-hand dominant  She has no specific injury  She states windows in her apartment a very hard open and wonders if this is what was aggravating it  She knew she noticed it, a month ago, click with bending of the thumb  Now she is unable to bend it all the way down  She notes a lump on the dorsal aspect of her IP joint but complains of a deep bruised feeling in the thenar area  She denies any numbness or tingling  She has tried taking Aleve with no help  She is not currently working  She is wondering what can be done for her thumb        Allergy:  Allergies   Allergen Reactions    Dust Mite Extract     Pollen Extract Sneezing       Medications:  all current active meds have been reviewed    Past Medical History:  Past Medical History:   Diagnosis Date    Anxiety     Anxiety and depression 10/2/2019    Breathing difficulty     Cancer ([address] )     cervical    GERD (gastroesophageal reflux disease)     Irritable bowel syndrome     Shortness of breath     Thumb injury 10/2019       Past Surgical History:  Past Surgical History:   Procedure Laterality Date    HYSTERECTOMY      [place] EXCIS INTERDIGITAL NEUROMA,EA Left 7/12/2018    Procedure: FOOT NEUROMA EXCISION; Surgeon: Aldair Shabazz DPM;  Location: [facility] MAIN OR;  Service: Podiatry    TUBAL LIGATION         Family History:  Family History   Problem Relation Age of Onset    Cancer Mother     Alcohol abuse Father        Social History:  Social History     Substance and Sexual Activity   Alcohol Use No     Social History     Substance and Sexual Activity   Drug Use No     Social History     Tobacco Use   Smoking Status Former Smoker    Packs/day: 3 00    Years: 25 00    Pack years: 75 00    Last attempt to quit: 2003    Years since quittin 2   Smokeless Tobacco Never Used           ROS:  Review of Systems   Constitutional: Negative  HENT: Negative  Eyes: Negative  Respiratory: Negative  Cardiovascular: Negative  Gastrointestinal: Negative  Endocrine: Negative  Genitourinary: Negative  Musculoskeletal: Positive for arthralgias  Skin: Negative  Allergic/Immunologic: Negative  Neurological: Negative  Hematological: Negative  Psychiatric/Behavioral: Negative  Objective:  BP Readings from Last 1 Encounters:   10/08/19 120/70      Wt Readings from Last 1 Encounters:   10/08/19 80 7 kg (178 lb)        BMI:   Estimated body mass index is 39 91 kg/m² as calculated from the following:    Height as of this encounter: 4' 8" (1 422 m)  Weight as of this encounter: 80 7 kg (178 lb)  EXAM:   Physical Exam   Constitutional: She appears well-developed and well-nourished  No distress  HENT:   Head: Normocephalic and atraumatic  Eyes: Right eye exhibits no discharge  Left eye exhibits no discharge  Neck: Normal range of motion  Neck supple  No tracheal deviation present  Cardiovascular: Normal rate and regular rhythm  Pulmonary/Chest: Effort normal and breath sounds normal  No respiratory distress  She exhibits no tenderness  Abdominal: Soft  She exhibits no distension  There is no tenderness  Neurological: She is alert  Skin: Skin is warm and dry   She is not diaphoretic  No erythema  Psychiatric: She has a normal mood and affect  Her behavior is normal    Vitals reviewed  Right Hand Exam     Other   Sensation: normal  Pulse: present    Comments:  Patient has full range of motion of her fingers with no swelling aside from the thumb  The thumb she has tenderness and a nodule over the A1 pulley  She also has a lesser degree of tenderness and a bony bump on the dorsal aspect of her IP joint  IP flexion actively is 10° and passively is to 30° and is exquisitely painful  MCP has good range of motion  Stable to varus and valgus stress  Brisk cap refill of the thumb  No erythema or drainage  Left Hand Exam   Left hand exam is normal     Tenderness   The patient is experiencing no tenderness  Range of Motion   The patient has normal left wrist ROM  Muscle Strength   The patient has normal left wrist strength  Tests   Phalens Sign: negative  Tinel's sign (median nerve): negative    Other   Erythema: absent  Sensation: normal  Pulse: present              Radiographs:  I have personally reviewed pertinent films in PACS and my interpretation is Old volar avulsion fracture off the distal phalanx of the thumb, dorsal osteophytes of the IP joint      Small joint arthrocentesis  Date/Time: 10/8/2019 2:11 PM  Consent given by: patient  Timeout: Immediately prior to procedure a time out was called to verify the correct patient, procedure, equipment, support staff and site/side marked as required   Supporting Documentation  Indications: pain and joint swelling   Procedure Details  Location: thumb (flexor tendon) -   Preparation: Patient was prepped and draped in the usual sterile fashion  Needle size: 22 G  Approach: volar  Medications administered: 6 mg betamethasone acetate-betamethasone sodium phosphate 6 (3-3) mg/mL; 0 5 mL lidocaine 1 %    Patient tolerance: patient tolerated the procedure well with no immediate complications  Dressing:  Sterile dressing applied

## 2019-10-08 NOTE — TELEPHONE ENCOUNTER
Patient returned my call late yesterday and I told her about the xray results and told her that she must call the orthopedics  Patient discussed how she didn't think that it was anything so I stressed that she should definitely call orthopedics  I called patient again this morning to check if she called ortho but had to leave a message  I checked patient's appointments and it shows that she has an appointment with ortho today at 2p m

## 2019-11-01 DIAGNOSIS — T78.40XS ALLERGIC STATE, SEQUELA: ICD-10-CM

## 2019-11-01 RX ORDER — LORATADINE 10 MG/1
10 TABLET ORAL DAILY
Qty: 30 TABLET | Refills: 2 | Status: SHIPPED | OUTPATIENT
Start: 2019-11-01 | End: 2020-01-27

## 2019-11-01 NOTE — TELEPHONE ENCOUNTER
Received a call from [facility] requesting a refill on claritin  Verified sig and pharmacy  Aware of 48 hr fill policy  Please advise

## 2019-11-25 ENCOUNTER — APPOINTMENT (EMERGENCY)
Dept: RADIOLOGY | Facility: HOSPITAL | Age: 64
End: 2019-11-25
Payer: COMMERCIAL

## 2019-11-25 ENCOUNTER — HOSPITAL ENCOUNTER (EMERGENCY)
Facility: HOSPITAL | Age: 64
Discharge: HOME | End: 2019-11-25
Attending: EMERGENCY MEDICINE | Admitting: EMERGENCY MEDICINE
Payer: COMMERCIAL

## 2019-11-25 VITALS
WEIGHT: 180 LBS | HEART RATE: 88 BPM | SYSTOLIC BLOOD PRESSURE: 138 MMHG | HEIGHT: 59 IN | RESPIRATION RATE: 20 BRPM | DIASTOLIC BLOOD PRESSURE: 72 MMHG | TEMPERATURE: 97.5 F | OXYGEN SATURATION: 98 % | BODY MASS INDEX: 36.29 KG/M2
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
## 2019-11-25 DIAGNOSIS — M62.838 MUSCLE SPASM: ICD-10-CM

## 2019-11-25 PROCEDURE — 72040 X-RAY EXAM NECK SPINE 2-3 VW: CPT

## 2019-11-25 PROCEDURE — 73030 X-RAY EXAM OF SHOULDER: CPT

## 2019-11-25 PROCEDURE — 71101 X-RAY EXAM UNILAT RIBS/CHEST: CPT

## 2019-11-25 PROCEDURE — 73080 X-RAY EXAM OF ELBOW: CPT

## 2019-11-25 PROCEDURE — 73110 X-RAY EXAM OF WRIST: CPT

## 2019-11-25 PROCEDURE — 99283 EMERGENCY DEPT VISIT LOW MDM: CPT

## 2019-11-25 PROCEDURE — 99284 EMERGENCY DEPT VISIT MOD MDM: CPT | Performed by: EMERGENCY MEDICINE

## 2019-11-25 NOTE — ED NOTES
Pt states she tripped over the uneven sidewalk and put her right arm out to prevent hitting her face off the ground  Presents today with c/o pain in right neck (muscle area), right shoulder area, right elbow, right wrist and right ribs since fall  No respiratory distress noted at time of triage        Keiko Kathleen RN  11/25/19 4522

## 2019-11-25 NOTE — DISCHARGE INSTRUCTIONS
Return to er with uncontrolled pain, weakness, new or changing symptoms  See pcp this week for follow up  Use motrin OTC for pain control

## 2019-11-25 NOTE — ED PROVIDER NOTES
History  Chief Complaint   Patient presents with    Rib Pain     right side; fell forward yesterday and threw arm out to stop herself from hitting her face on the ground    Neck Pain     right side    Arm Pain     right side     Sp fall, she states she tripped over curb, landed forward with right arm breaking fall  Co wrist pain, lateral neck pain  She did not hit head  Fall occurred several hours ago  She has no focal neuro complaints  She is not anticoagulated  No loc, cp, sob, abd pain before or after event  Prior to Admission Medications   Prescriptions Last Dose Informant Patient Reported? Taking?   dicyclomine (BENTYL) 20 mg tablet Not Taking at Unknown time  Yes No   Sig: Take 20 mg by mouth every 6 (six) hours   escitalopram (LEXAPRO) 10 mg tablet Not Taking at Unknown time  No No   Sig: Take 1 tablet (10 mg total) by mouth daily   Patient not taking: Reported on 11/25/2019   loratadine (CLARITIN) 10 mg tablet 11/25/2019 at Unknown time  No Yes   Sig: Take 1 tablet (10 mg total) by mouth daily   omeprazole (PriLOSEC) 20 mg delayed release capsule 11/25/2019 at Unknown time  Yes Yes   Sig: Take 20 mg by mouth daily      Facility-Administered Medications: None       Past Medical History:   Diagnosis Date    Anxiety     Anxiety and depression 10/2/2019    Breathing difficulty     Cancer ([address] )     cervical    GERD (gastroesophageal reflux disease)     Irritable bowel syndrome     Shortness of breath     Thumb injury 10/2019       Past Surgical History:   Procedure Laterality Date    HYSTERECTOMY      [place] EXCIS INTERDIGITAL NEUROMA,EA Left 7/12/2018    Procedure: FOOT NEUROMA EXCISION;  Surgeon: Orville Hicks DPM;  Location: [address] [place] [address] OR;  Service: Podiatry    TUBAL LIGATION         Family History   Problem Relation Age of Onset    Cancer Mother     Alcohol abuse Father      I have reviewed and agree with the history as documented      Social History     Tobacco Use    Smoking status: Former Smoker     Packs/day: 3 00     Years: 25 00     Pack years: 75 00     Last attempt to quit: 2003     Years since quittin 3    Smokeless tobacco: Never Used   Substance Use Topics    Alcohol use: Yes     Comment: rarely    Drug use: No        Review of Systems   All other systems reviewed and are negative  Physical Exam  Physical Exam   Constitutional: She is oriented to person, place, and time  She appears well-developed and well-nourished  No distress  HENT:   Right Ear: External ear normal    Left Ear: External ear normal    Eyes: Pupils are equal, round, and reactive to light  Conjunctivae and EOM are normal  Right eye exhibits no discharge  Left eye exhibits no discharge  Neck: Normal range of motion  Neck supple  No JVD present  No tracheal deviation present  No thyromegaly present  Cardiovascular: Normal rate, regular rhythm, normal heart sounds and intact distal pulses  Exam reveals no gallop and no friction rub  No murmur heard  Pulmonary/Chest: Effort normal  No stridor  No respiratory distress  She has no wheezes  She has no rales  She exhibits no tenderness  Abdominal: She exhibits no distension and no mass  There is no tenderness  There is no rebound and no guarding  No hernia  Musculoskeletal: Normal range of motion  She exhibits tenderness  She exhibits no edema or deformity  The wrist wrist is tender throughout, no swelling or open wounds  The elbow is not tender to palp, I can supinate or pronate wo pain  Shoulder with FROM  Well perfused ext  No spinal tenderness  She does have some spasm and tenderness to the right trap which is in spasm but she has no true spinal tenderness  Lymphadenopathy:     She has no cervical adenopathy  Neurological: She is alert and oriented to person, place, and time  Skin: Skin is warm  Capillary refill takes less than 2 seconds  She is not diaphoretic  Psychiatric: She has a normal mood and affect         Vital Signs  ED Triage Vitals [11/25/19 1449]   Temperature Pulse Respirations Blood Pressure SpO2   97 5 °F (36 4 °C) 100 16 142/79 98 %      Temp Source Heart Rate Source Patient Position - Orthostatic VS BP Location FiO2 (%)   Temporal Monitor Sitting Left arm --      Pain Score       8           Vitals:    11/25/19 1449 11/25/19 1701   BP: 142/79 138/72   Pulse: 100 88   Patient Position - Orthostatic VS: Sitting          Visual Acuity      ED Medications  Medications - No data to display    Diagnostic Studies  Results Reviewed     None                 XR spine cervical 2 or 3 vw injury   Final Result by Ilya Shipley MD (11/25 1618)   Degenerative change as noted without acute osseous abnormality identified  Workstation performed: [id number]         XR shoulder 2+ views RIGHT   Final Result by Ilya Shipley MD (11/25 1617)      No acute osseous abnormality  Workstation performed: [id number]         XR elbow 3+ vw RIGHT   Final Result by Ilya Shipley MD (11/25 1617)      No acute osseous abnormality  Workstation performed: [id number]         XR wrist 3+ views RIGHT   Final Result by Paty Monique MD (11/25 1622)      No acute osseous abnormality  Workstation performed: [id number]         XR ribs with pa chest min 3 views RIGHT   Final Result by Esther Hodge MD (11/25 1622)      No active cardiopulmonary disease  No evidence of rib fractures  Workstation performed: [id number]                    Procedures  Procedures       ED Course                               MDM    Disposition  Final diagnoses:   Fall, initial encounter   Muscle spasm     Time reflects when diagnosis was documented in both MDM as applicable and the Disposition within this note     Time User Action Codes Description Comment    11/25/2019  4:56 PM Sari Ricci Add [W19  [id number]] NUTN, initial encounter     11/25/2019  4:56 PM Sari Ricci Add [U88 [id number]] Muscle spasm       ED Disposition ED Disposition Condition Date/Time Comment    Discharge Stable Mon Nov 25, 2019  4:56 PM Sandra Baldwin discharge to home/self care  Follow-up Information     Follow up With Specialties Details Why Agueda George [address], [phone]los Nurse Practitioner Schedule an appointment as soon as possible for a visit in 3 days  [address]  [phone]            Discharge Medication List as of 11/25/2019  4:57 PM      CONTINUE these medications which have NOT CHANGED    Details   loratadine (CLARITIN) 10 mg tablet Take 1 tablet (10 mg total) by mouth daily, Starting Fri 11/1/2019, Normal      omeprazole (PriLOSEC) 20 mg delayed release capsule Take 20 mg by mouth daily, Historical Med      dicyclomine (BENTYL) 20 mg tablet Take 20 mg by mouth every 6 (six) hours, Historical Med      escitalopram (LEXAPRO) 10 mg tablet Take 1 tablet (10 mg total) by mouth daily, Starting Wed 10/2/2019, Normal           No discharge procedures on file      ED Provider  Electronically Signed by           Jake Nogueira MD  11/26/19 1114

## 2020-01-26 DIAGNOSIS — T78.40XS ALLERGIC STATE, SEQUELA: ICD-10-CM

## 2020-01-27 RX ORDER — LORATADINE 10 MG/1
TABLET ORAL
Qty: 30 TABLET | Refills: 0 | Status: SHIPPED | OUTPATIENT
Start: 2020-01-27 | End: 2020-02-20

## 2020-02-19 ENCOUNTER — APPOINTMENT (OUTPATIENT)
Dept: LAB | Facility: CLINIC | Age: 65
End: 2020-02-19
Payer: COMMERCIAL

## 2020-02-19 ENCOUNTER — TELEPHONE (OUTPATIENT)
Dept: SLEEP CENTER | Facility: CLINIC | Age: 65
End: 2020-02-19

## 2020-02-19 ENCOUNTER — OFFICE VISIT (OUTPATIENT)
Dept: INTERNAL MEDICINE CLINIC | Facility: CLINIC | Age: 65
End: 2020-02-19
Payer: COMMERCIAL

## 2020-02-19 VITALS
BODY MASS INDEX: 36.49 KG/M2 | SYSTOLIC BLOOD PRESSURE: 124 MMHG | WEIGHT: 181 LBS | HEIGHT: 59 IN | HEART RATE: 118 BPM | TEMPERATURE: 97 F | DIASTOLIC BLOOD PRESSURE: 80 MMHG | OXYGEN SATURATION: 97 %

## 2020-02-19 DIAGNOSIS — Z12.39 SCREENING FOR BREAST CANCER: ICD-10-CM

## 2020-02-19 DIAGNOSIS — Z13.29 SCREENING FOR THYROID DISORDER: ICD-10-CM

## 2020-02-19 DIAGNOSIS — C53.0 MALIGNANT NEOPLASM OF ENDOCERVIX (HCC): ICD-10-CM

## 2020-02-19 DIAGNOSIS — Z13.6 SCREENING FOR CARDIOVASCULAR CONDITION: ICD-10-CM

## 2020-02-19 DIAGNOSIS — Z11.59 NEED FOR HEPATITIS C SCREENING TEST: ICD-10-CM

## 2020-02-19 DIAGNOSIS — G57.62 MORTON'S NEUROMA, LEFT: ICD-10-CM

## 2020-02-19 DIAGNOSIS — H93.12 TINNITUS OF LEFT EAR: Primary | ICD-10-CM

## 2020-02-19 DIAGNOSIS — H92.02 PAIN IN LEFT EAR: ICD-10-CM

## 2020-02-19 PROBLEM — Z12.11 SCREENING FOR COLON CANCER: Status: RESOLVED | Noted: 2019-10-02 | Resolved: 2020-02-19

## 2020-02-19 LAB
ALBUMIN SERPL BCP-MCNC: 3.5 G/DL (ref 3.5–5)
ALP SERPL-CCNC: 166 U/L (ref 46–116)
ALT SERPL W P-5'-P-CCNC: 29 U/L (ref 12–78)
ANION GAP SERPL CALCULATED.3IONS-SCNC: 7 MMOL/L (ref 4–13)
AST SERPL W P-5'-P-CCNC: 18 U/L (ref 5–45)
BASOPHILS # BLD AUTO: 0.03 THOUSANDS/ΜL (ref 0–0.1)
BASOPHILS NFR BLD AUTO: 1 % (ref 0–1)
BILIRUB SERPL-MCNC: 0.26 MG/DL (ref 0.2–1)
BUN SERPL-MCNC: 11 MG/DL (ref 5–25)
CALCIUM SERPL-MCNC: 9.4 MG/DL (ref 8.3–10.1)
CHLORIDE SERPL-SCNC: 109 MMOL/L (ref 100–108)
CHOLEST SERPL-MCNC: 207 MG/DL (ref 50–200)
CO2 SERPL-SCNC: 24 MMOL/L (ref 21–32)
CREAT SERPL-MCNC: 0.8 MG/DL (ref 0.6–1.3)
EOSINOPHIL # BLD AUTO: 0.13 THOUSAND/ΜL (ref 0–0.61)
EOSINOPHIL NFR BLD AUTO: 2 % (ref 0–6)
ERYTHROCYTE [DISTWIDTH] IN BLOOD BY AUTOMATED COUNT: 13.3 % (ref 11.6–15.1)
GFR SERPL CREATININE-BSD FRML MDRD: 78 ML/MIN/1.73SQ M
GLUCOSE P FAST SERPL-MCNC: 124 MG/DL (ref 65–99)
HCT VFR BLD AUTO: 40.7 % (ref 34.8–46.1)
HCV AB SER QL: NORMAL
HDLC SERPL-MCNC: 37 MG/DL
HGB BLD-MCNC: 13 G/DL (ref 11.5–15.4)
IMM GRANULOCYTES # BLD AUTO: 0.01 THOUSAND/UL (ref 0–0.2)
IMM GRANULOCYTES NFR BLD AUTO: 0 % (ref 0–2)
LDLC SERPL CALC-MCNC: 137 MG/DL (ref 0–100)
LYMPHOCYTES # BLD AUTO: 0.96 THOUSANDS/ΜL (ref 0.6–4.47)
LYMPHOCYTES NFR BLD AUTO: 17 % (ref 14–44)
MCH RBC QN AUTO: 29 PG (ref 26.8–34.3)
MCHC RBC AUTO-ENTMCNC: 31.9 G/DL (ref 31.4–37.4)
MCV RBC AUTO: 91 FL (ref 82–98)
MONOCYTES # BLD AUTO: 0.55 THOUSAND/ΜL (ref 0.17–1.22)
MONOCYTES NFR BLD AUTO: 10 % (ref 4–12)
NEUTROPHILS # BLD AUTO: 3.92 THOUSANDS/ΜL (ref 1.85–7.62)
NEUTS SEG NFR BLD AUTO: 70 % (ref 43–75)
NONHDLC SERPL-MCNC: 170 MG/DL
NRBC BLD AUTO-RTO: 0 /100 WBCS
PLATELET # BLD AUTO: 233 THOUSANDS/UL (ref 149–390)
PMV BLD AUTO: 10.6 FL (ref 8.9–12.7)
POTASSIUM SERPL-SCNC: 3.9 MMOL/L (ref 3.5–5.3)
PROT SERPL-MCNC: 7.8 G/DL (ref 6.4–8.2)
RBC # BLD AUTO: 4.48 MILLION/UL (ref 3.81–5.12)
SODIUM SERPL-SCNC: 140 MMOL/L (ref 136–145)
TRIGL SERPL-MCNC: 164 MG/DL
TSH SERPL DL<=0.05 MIU/L-ACNC: 1.46 UIU/ML (ref 0.36–3.74)
WBC # BLD AUTO: 5.6 THOUSAND/UL (ref 4.31–10.16)

## 2020-02-19 PROCEDURE — 36415 COLL VENOUS BLD VENIPUNCTURE: CPT

## 2020-02-19 PROCEDURE — 99213 OFFICE O/P EST LOW 20 MIN: CPT | Performed by: NURSE PRACTITIONER

## 2020-02-19 PROCEDURE — 85025 COMPLETE CBC W/AUTO DIFF WBC: CPT

## 2020-02-19 PROCEDURE — 86803 HEPATITIS C AB TEST: CPT

## 2020-02-19 PROCEDURE — 84443 ASSAY THYROID STIM HORMONE: CPT

## 2020-02-19 PROCEDURE — 3008F BODY MASS INDEX DOCD: CPT | Performed by: NURSE PRACTITIONER

## 2020-02-19 PROCEDURE — 80053 COMPREHEN METABOLIC PANEL: CPT

## 2020-02-19 PROCEDURE — 80061 LIPID PANEL: CPT

## 2020-02-19 PROCEDURE — 1036F TOBACCO NON-USER: CPT | Performed by: NURSE PRACTITIONER

## 2020-02-19 RX ORDER — PREDNISONE 10 MG/1
TABLET ORAL
Qty: 18 TABLET | Refills: 0 | Status: SHIPPED | OUTPATIENT
Start: 2020-02-19 | End: 2021-01-05

## 2020-02-19 NOTE — TELEPHONE ENCOUNTER
I called and left message for the patient to call the office back regarding an appointment with ENT

## 2020-02-19 NOTE — PROGRESS NOTES
Assessment/Plan: Will refer to ENT regarding her continue tinnitus and ear pain  Will give her a short course of Prednisone for her pain  Will give script for mammogram  She is following back up with Ortho for her left thumb  She did have lab work done will notify her once this is back  No problem-specific Assessment & Plan notes found for this encounter  Problem List Items Addressed This Visit        Other    Tinnitus of left ear - Primary    Relevant Medications    predniSONE 10 mg tablet    Other Relevant Orders    Ambulatory Referral to Otolaryngology    Pain in left ear    Relevant Medications    predniSONE 10 mg tablet      Other Visit Diagnoses     Screening for breast cancer        Relevant Orders    Mammo screening bilateral w 3d & cad            Subjective:      Patient ID: Kayy Berger is a 59 y o  female  Reatha Round is here today for an acute visit  She states for months her left ear has been bothering her on and off  She states the pain comes and goes and thinks the issue started when she was working and someone slammed the phone on her  She does have ringing on and off  She does have TMJ and does work a nigh guard at night  She offers no other issues  The following portions of the patient's history were reviewed and updated as appropriate:   She  has a past medical history of Anxiety, Anxiety and depression (10/2/2019), Breathing difficulty, Cancer ([address] ), GERD (gastroesophageal reflux disease), Irritable bowel syndrome, Shortness of breath, Thumb injury (10/2019), and Tinnitus of left ear (2/19/2020)    She   Patient Active Problem List    Diagnosis Date Noted    Tinnitus of left ear 02/19/2020    Pain in left ear 02/19/2020    Trigger thumb of right hand 10/08/2019    BMI 36 0-36 9,adult 10/02/2019    Anxiety and depression 10/02/2019    Pain of right thumb 10/02/2019    Zaldivar's neuroma, left 07/12/2018    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[address] ) 04/11/2017    Atypical glandular cells of undetermined significance of cervix 12/07/2016    Menopausal and perimenopausal disorder 10/28/2016     She  has a past surgical history that includes Hysterectomy; Tubal ligation; and pr excis interdigital neuroma,ea (Left, 7/12/2018)  Her family history includes Alcohol abuse in her father; Cancer in her mother  She  reports that she quit smoking about 16 years ago  She has a 75 00 pack-year smoking history  She has never used smokeless tobacco  She reports that she drinks alcohol  She reports that she does not use drugs  Current Outpatient Medications   Medication Sig Dispense Refill    dicyclomine (BENTYL) 20 mg tablet Take 20 mg by mouth every 6 (six) hours      escitalopram (LEXAPRO) 10 mg tablet Take 1 tablet (10 mg total) by mouth daily (Patient not taking: Reported on 11/25/2019) 30 tablet 5    loratadine (CLARITIN) 10 mg tablet TAKE 1 TABLET BY MOUTH EVERY DAY 30 tablet 0    omeprazole (PriLOSEC) 20 mg delayed release capsule Take 20 mg by mouth daily      predniSONE 10 mg tablet 30 mg by mouth daily for 3 days, then 20 mg by mouth daily for 3 days, then 10 mg by mouth daily for 3 days, then stop 18 tablet 0     No current facility-administered medications for this visit  Current Outpatient Medications on File Prior to Visit   Medication Sig    dicyclomine (BENTYL) 20 mg tablet Take 20 mg by mouth every 6 (six) hours    escitalopram (LEXAPRO) 10 mg tablet Take 1 tablet (10 mg total) by mouth daily (Patient not taking: Reported on 11/25/2019)    loratadine (CLARITIN) 10 mg tablet TAKE 1 TABLET BY MOUTH EVERY DAY    omeprazole (PriLOSEC) 20 mg delayed release capsule Take 20 mg by mouth daily     No current facility-administered medications on file prior to visit  She is allergic to dust mite extract and pollen extract       Review of Systems   Constitutional: Negative  HENT: Positive for ear pain and tinnitus  Eyes: Negative  Respiratory: Negative      Cardiovascular: Negative  Gastrointestinal: Negative  Endocrine: Negative  Genitourinary: Negative  Musculoskeletal: Negative  Skin: Negative  Allergic/Immunologic: Negative  Neurological: Negative  Hematological: Negative  Psychiatric/Behavioral: Negative  Objective:      /80 (BP Location: Left arm, Patient Position: Sitting, Cuff Size: Large)   Pulse (!) 118   Temp (!) 97 °F (36 1 °C)   Ht 4' 11" (1 499 m)   Wt 82 1 kg (181 lb)   LMP  (LMP Unknown)   SpO2 97%   BMI 36 56 kg/m²          Physical Exam   Constitutional: She is oriented to person, place, and time  She appears well-developed and well-nourished  HENT:   Head: Normocephalic and atraumatic  Right Ear: External ear normal    Nose: Nose normal    Mouth/Throat: Oropharynx is clear and moist    Mild effusion noted to left ear   Eyes: Pupils are equal, round, and reactive to light  Conjunctivae and EOM are normal    Neck: Normal range of motion  Neck supple  Cardiovascular: Normal rate, regular rhythm, normal heart sounds and intact distal pulses  Pulmonary/Chest: Effort normal and breath sounds normal    Abdominal: Soft  Bowel sounds are normal    Musculoskeletal: Normal range of motion  Neurological: She is alert and oriented to person, place, and time  Skin: Skin is warm and dry  Capillary refill takes less than 2 seconds  Psychiatric: She has a normal mood and affect  Her behavior is normal  Judgment and thought content normal    Vitals reviewed

## 2020-02-19 NOTE — PATIENT INSTRUCTIONS
Tinnitus   WHAT YOU NEED TO KNOW:   What is tinnitus? Tinnitus is when you hear ringing, clicking, buzzing, or hissing in one or both ears  You may also hear whistling, chirping, or pulsing  It may be soft or loud, and at a low or high pitch  Tinnitus that lasts longer than 6 months is considered chronic  What causes or increases my risk for tinnitus? Tinnitus may be caused by problems with your hearing system, including the parts of your brain that sort out sounds  Tinnitus may also be caused by a health condition, such as Ménière disease  The following may increase your risk:  · Age older than 60 years    · Exposure to loud noise    · Hearing loss or abnormal bone structure in the ear    · Ear and sinus infections, or wax buildup    · Hormone changes in women    · Diseases of the heart and blood vessels, or brain tumors    · Certain medicines, such as aspirin, NSAIDs, methotrexate, and erythromycin    · Anxiety, sleep problems, or depression  How is tinnitus diagnosed? Your healthcare provider will ask about your symptoms and examine your ears, jaw, and neck  Tell him if you have tinnitus all the time or if it comes and goes  He may ask if anything makes it worse, such as stress or anxiety  You may need any of the following tests:  · A hearing test  may show problems with your ear  Your eardrum and middle ear may also be examined and tested  · An ultrasound, CT, MRI, or MRA  may show the cause of your tinnitus  You may be given contrast liquid to help the parts of your ear show up better in the pictures  Tell the healthcare provider if you have ever had an allergic reaction to contrast liquid  Do not enter the MRI room with anything metal  Metal can cause serious injury  Tell the healthcare provider if you have any metal in or on your body  How is tinnitus treated? You may not need treatment  Your symptoms may only appear when you are anxious or stressed   Your healthcare provider may stop certain medicines that may be causing your tinnitus  You may also need medicines to help decrease your symptoms  The following can help treat or manage tinnitus:  · Counseling  can help you learn ways to relax, decrease stress, and make your tinnitus less noticeable  · Cognitive behavioral therapy  helps you understand your condition  Your therapist will help you learn to cope with tinnitus  You may also learn new ways to relax and retrain your behavior to decrease your symptoms  · Sound therapy,  such as white noise machines, may help cover your tinnitus with a pleasant sound  Sound therapy devices can help you fall asleep or help you relax  These devices can be worn in your ear or placed next to your bed at night  · Hearing aids or cochlear implants  may help if you have hearing loss  · Surgery  may be needed if your tinnitus is caused by abnormal blood vessels or a mass  · Do not smoke  Nicotine decreases blood flow to your ear and can make your tinnitus worse  Do not use e-cigarettes or smokeless tobacco in place of cigarettes or to help you quit  They still contain nicotine  Ask your healthcare provider for information if you currently smoke and need help quitting  · Decrease how much alcohol and caffeine you drink  Alcohol and caffeine can make your tinnitus worse  How can I help prevent tinnitus? · Avoid exposure to loud noise, such as loud music or power tools  Occasional exposure can still cause tinnitus  Move away from the noise or turn down the volume  · Wear ear protection  when you are exposed to loud noises  Good ear protection includes ear plugs or headphones that reduce noise  Call 911 if:   · You feel like hurting yourself or others because of the constant noise  When should I contact my healthcare provider? · You have headaches  · You are tired and have trouble concentrating or remembering things      · You have more anxiety or stress than usual     · You have deep sadness or depression  · You have trouble falling asleep or staying asleep  · Your symptoms do not go away or they get worse  · You have questions or concerns about your condition or care  CARE AGREEMENT:   You have the right to help plan your care  Learn about your health condition and how it may be treated  Discuss treatment options with your caregivers to decide what care you want to receive  You always have the right to refuse treatment  The above information is an  only  It is not intended as medical advice for individual conditions or treatments  Talk to your doctor, nurse or pharmacist before following any medical regimen to see if it is safe and effective for you  © 2017 [facility] Information is for End User's use only and may not be sold, redistributed or otherwise used for commercial purposes  All illustrations and images included in CareNotes® are the copyrighted property of [employer] D [employer]  or [employer]

## 2020-02-20 DIAGNOSIS — T78.40XS ALLERGIC STATE, SEQUELA: ICD-10-CM

## 2020-02-20 RX ORDER — LORATADINE 10 MG/1
TABLET ORAL
Qty: 30 TABLET | Refills: 0 | Status: SHIPPED | OUTPATIENT
Start: 2020-02-20 | End: 2020-03-20

## 2020-02-21 DIAGNOSIS — E78.2 MIXED HYPERLIPIDEMIA: Primary | ICD-10-CM

## 2020-02-21 RX ORDER — ATORVASTATIN CALCIUM 10 MG/1
10 TABLET, FILM COATED ORAL DAILY
Qty: 30 TABLET | Refills: 3 | Status: SHIPPED | OUTPATIENT
Start: 2020-02-21 | End: 2021-01-05

## 2020-02-25 ENCOUNTER — HOSPITAL ENCOUNTER (OUTPATIENT)
Dept: MAMMOGRAPHY | Facility: HOSPITAL | Age: 65
Discharge: HOME | End: 2020-02-25
Payer: COMMERCIAL

## 2020-02-25 ENCOUNTER — HOSPITAL ENCOUNTER (OUTPATIENT)
Dept: BONE DENSITY | Facility: HOSPITAL | Age: 65
Discharge: HOME | End: 2020-02-25
Payer: COMMERCIAL

## 2020-02-25 VITALS — WEIGHT: 181 LBS | HEIGHT: 59 IN | BODY MASS INDEX: 36.49 KG/M2

## 2020-02-25 DIAGNOSIS — Z12.39 SCREENING FOR BREAST CANCER: ICD-10-CM

## 2020-02-25 DIAGNOSIS — Z78.0 POSTMENOPAUSAL: ICD-10-CM

## 2020-02-25 PROCEDURE — 77080 DXA BONE DENSITY AXIAL: CPT

## 2020-02-25 PROCEDURE — 77063 BREAST TOMOSYNTHESIS BI: CPT

## 2020-02-25 PROCEDURE — 77067 SCR MAMMO BI INCL CAD: CPT

## 2020-03-17 ENCOUNTER — OFFICE VISIT (OUTPATIENT)
Dept: OBGYN CLINIC | Facility: CLINIC | Age: 65
End: 2020-03-17
Payer: COMMERCIAL

## 2020-03-17 VITALS
TEMPERATURE: 97.7 F | HEIGHT: 59 IN | WEIGHT: 179.4 LBS | BODY MASS INDEX: 36.16 KG/M2 | SYSTOLIC BLOOD PRESSURE: 124 MMHG | DIASTOLIC BLOOD PRESSURE: 70 MMHG

## 2020-03-17 DIAGNOSIS — M65.311 TRIGGER THUMB OF RIGHT HAND: Primary | ICD-10-CM

## 2020-03-17 DIAGNOSIS — M19.039 WRIST ARTHRITIS: ICD-10-CM

## 2020-03-17 PROCEDURE — 20600 DRAIN/INJ JOINT/BURSA W/O US: CPT | Performed by: ORTHOPAEDIC SURGERY

## 2020-03-17 PROCEDURE — 1036F TOBACCO NON-USER: CPT | Performed by: ORTHOPAEDIC SURGERY

## 2020-03-17 PROCEDURE — 3008F BODY MASS INDEX DOCD: CPT | Performed by: ORTHOPAEDIC SURGERY

## 2020-03-17 PROCEDURE — 99213 OFFICE O/P EST LOW 20 MIN: CPT | Performed by: ORTHOPAEDIC SURGERY

## 2020-03-17 RX ORDER — BETAMETHASONE SODIUM PHOSPHATE AND BETAMETHASONE ACETATE 3; 3 MG/ML; MG/ML
6 INJECTION, SUSPENSION INTRA-ARTICULAR; INTRALESIONAL; INTRAMUSCULAR; SOFT TISSUE
Status: COMPLETED | OUTPATIENT
Start: 2020-03-17 | End: 2020-03-17

## 2020-03-17 RX ORDER — LIDOCAINE HYDROCHLORIDE 10 MG/ML
0.5 INJECTION, SOLUTION INFILTRATION; PERINEURAL
Status: COMPLETED | OUTPATIENT
Start: 2020-03-17 | End: 2020-03-17

## 2020-03-17 RX ADMIN — LIDOCAINE HYDROCHLORIDE 0.5 ML: 10 INJECTION, SOLUTION INFILTRATION; PERINEURAL at 10:52

## 2020-03-17 RX ADMIN — BETAMETHASONE SODIUM PHOSPHATE AND BETAMETHASONE ACETATE 6 MG: 3; 3 INJECTION, SUSPENSION INTRA-ARTICULAR; INTRALESIONAL; INTRAMUSCULAR; SOFT TISSUE at 10:52

## 2020-03-17 NOTE — PROGRESS NOTES
Assessment:     1  Trigger thumb of right hand    2  Wrist arthritis          Plan:     Problem List Items Addressed This Visit        Musculoskeletal and Integument    Trigger thumb of right hand - Primary     59-year-old female with right trigger thumb  We discussed the diagnosis  We discussed surgical intervention  She elected to proceed with another injection today  She will follow up in the future as needed  She was given a brace to help with her wrist arthritis  Wrist arthritis    Relevant Orders    Cock Up Wrist Splint           Patient ID: Joan Hinojosa is a 59 y o  female  Chief Complaint:  Right thumb pain    HPI:  59-year-old female with a right trigger thumb  Five months ago she received an injection  After about six weeks she had relief of the triggering but then it wore off several weeks later  She is also complaining of pain in her wrist in her hand  The wrist is achy and radiates  She denies any numbness or tingling          Allergy:  Allergies   Allergen Reactions    Dust Mite Extract     Pollen Extract Sneezing       Medications:  all current active meds have been reviewed    Past Medical History:  Past Medical History:   Diagnosis Date    Anxiety     Anxiety and depression 10/2/2019    Breathing difficulty     Cancer ([address] )     cervical    GERD (gastroesophageal reflux disease)     Irritable bowel syndrome     Shortness of breath     Thumb injury 10/2019    Tinnitus of left ear 2/19/2020    Wrist arthritis 3/17/2020       Past Surgical History:  Past Surgical History:   Procedure Laterality Date    HYSTERECTOMY      MA EXCIS INTERDIGITAL Dominic Russellil Left 7/12/2018    Procedure: FOOT NEUROMA EXCISION;  Surgeon: Tobi Rueda DPM;  Location: [facility] MAIN OR;  Service: Podiatry    TUBAL LIGATION         Family History:  Family History   Problem Relation Age of Onset    Stomach cancer Mother     Alcohol abuse Father     No Known Problems Sister     No Known Problems Daughter     No Known Problems Maternal Grandmother     No Known Problems Maternal Grandfather     No Known Problems Paternal Grandmother     No Known Problems Paternal Grandfather     No Known Problems Sister     No Known Problems Sister     No Known Problems Sister     No Known Problems Sister     No Known Problems Daughter     No Known Problems Daughter     No Known Problems Maternal Aunt     No Known Problems Maternal Aunt     No Known Problems Paternal Aunt     No Known Problems Paternal Aunt     No Known Problems Paternal Aunt     No Known Problems Paternal Aunt     No Known Problems Paternal Aunt     No Known Problems Paternal Aunt     No Known Problems Paternal Aunt     No Known Problems Paternal Aunt     No Known Problems Paternal Aunt     No Known Problems Paternal Aunt        Social History:  Social History     Substance and Sexual Activity   Alcohol Use Yes    Comment: rarely     Social History     Substance and Sexual Activity   Drug Use No     Social History     Tobacco Use   Smoking Status Former Smoker    Packs/day: 3 00    Years: 25 00    Pack years: 75 00    Last attempt to quit: 2003    Years since quittin 6   Smokeless Tobacco Never Used           ROS:  Review of Systems   Constitutional: Negative  HENT: Negative  Eyes: Negative  Respiratory: Negative  Cardiovascular: Negative  Gastrointestinal: Positive for abdominal pain  Endocrine: Positive for polydipsia and polyuria  Genitourinary: Negative  Musculoskeletal: Positive for arthralgias  Skin: Negative  Allergic/Immunologic: Negative  Neurological: Negative  Hematological: Negative  Psychiatric/Behavioral: The patient is nervous/anxious          Objective:  BP Readings from Last 1 Encounters:   20 124/70      Wt Readings from Last 1 Encounters:   20 81 4 kg (179 lb 6 4 oz)        BMI:   Estimated body mass index is 36 23 kg/m² as calculated from the following:    Height as of this encounter: 4' 11" (1 499 m)  Weight as of this encounter: 81 4 kg (179 lb 6 4 oz)  EXAM:   Physical Exam       Right Hand Exam     Other   Sensation: normal  Pulse: present    Comments:  Patient has full range of motion of her fingers with no swelling aside from the thumb  The thumb she has tenderness and a nodule over the A1 pulley  Stable to varus and valgus stress  Brisk cap refill of the thumb  No erythema or drainage  Painful with thumb flexion  Unable to flex the thumb enough for it to actually trigger  Full active range of motion of the wrist              Radiographs:  I have personally reviewed pertinent films in PACS and my interpretation is Old volar avulsion fracture off the distal phalanx of the thumb, dorsal osteophytes of the IP joint      Small joint arthrocentesis  Date/Time: 3/17/2020 10:52 AM  Consent given by: patient  Timeout: Immediately prior to procedure a time out was called to verify the correct patient, procedure, equipment, support staff and site/side marked as required   Supporting Documentation  Indications: pain and joint swelling   Procedure Details  Location: thumb -   Preparation: Patient was prepped and draped in the usual sterile fashion  Needle size: 25 G  Approach: volar  Medications administered: 6 mg betamethasone acetate-betamethasone sodium phosphate 6 (3-3) mg/mL; 0 5 mL lidocaine 1 %    Patient tolerance: patient tolerated the procedure well with no immediate complications  Dressing:  Sterile dressing applied

## 2020-03-17 NOTE — ASSESSMENT & PLAN NOTE
77-year-old female with right trigger thumb  We discussed the diagnosis  We discussed surgical intervention  She elected to proceed with another injection today  She will follow up in the future as needed  She was given a brace to help with her wrist arthritis

## 2020-03-20 DIAGNOSIS — T78.40XS ALLERGIC STATE, SEQUELA: ICD-10-CM

## 2020-03-20 RX ORDER — LORATADINE 10 MG/1
TABLET ORAL
Qty: 30 TABLET | Refills: 0 | Status: SHIPPED | OUTPATIENT
Start: 2020-03-20 | End: 2021-01-05

## 2021-01-05 ENCOUNTER — OFFICE VISIT (OUTPATIENT)
Dept: INTERNAL MEDICINE CLINIC | Facility: CLINIC | Age: 66
End: 2021-01-05
Payer: MEDICARE

## 2021-01-05 VITALS
OXYGEN SATURATION: 100 % | HEART RATE: 100 BPM | WEIGHT: 144.1 LBS | TEMPERATURE: 96.2 F | HEIGHT: 59 IN | SYSTOLIC BLOOD PRESSURE: 124 MMHG | DIASTOLIC BLOOD PRESSURE: 80 MMHG | BODY MASS INDEX: 29.05 KG/M2

## 2021-01-05 DIAGNOSIS — Z00.00 MEDICARE ANNUAL WELLNESS VISIT, INITIAL: Primary | ICD-10-CM

## 2021-01-05 DIAGNOSIS — R10.84 GENERALIZED ABDOMINAL PAIN: ICD-10-CM

## 2021-01-05 DIAGNOSIS — M25.551 RIGHT HIP PAIN: ICD-10-CM

## 2021-01-05 DIAGNOSIS — R11.0 NAUSEA: ICD-10-CM

## 2021-01-05 PROBLEM — M79.644 PAIN OF RIGHT THUMB: Status: RESOLVED | Noted: 2019-10-02 | Resolved: 2021-01-05

## 2021-01-05 PROBLEM — H92.02 PAIN IN LEFT EAR: Status: RESOLVED | Noted: 2020-02-19 | Resolved: 2021-01-05

## 2021-01-05 PROCEDURE — G0402 INITIAL PREVENTIVE EXAM: HCPCS | Performed by: NURSE PRACTITIONER

## 2021-01-05 PROCEDURE — 99213 OFFICE O/P EST LOW 20 MIN: CPT | Performed by: NURSE PRACTITIONER

## 2021-01-05 RX ORDER — MULTIVITAMIN
1 CAPSULE ORAL DAILY
COMMUNITY
End: 2021-05-28

## 2021-01-05 RX ORDER — BIOTIN 10000 MCG
CAPSULE ORAL
COMMUNITY
End: 2021-05-28

## 2021-01-05 NOTE — PATIENT INSTRUCTIONS
Medicare Preventive Visit Patient Instructions  Thank you for completing your Welcome to Medicare Visit or Medicare Annual Wellness Visit today  Your next wellness visit will be due in one year (1/5/2022)  The screening/preventive services that you may require over the next 5-10 years are detailed below  Some tests may not apply to you based off risk factors and/or age  Screening tests ordered at today's visit but not completed yet may show as past due  Also, please note that scanned in results may not display below  Preventive Screenings:  Service Recommendations Previous Testing/Comments   Colorectal Cancer Screening  * Colonoscopy    * Fecal Occult Blood Test (FOBT)/Fecal Immunochemical Test (FIT)  * Fecal DNA/Cologuard Test  * Flexible Sigmoidoscopy Age: 54-65 years old   Colonoscopy: every 10 years (may be performed more frequently if at higher risk)  OR  FOBT/FIT: every 1 year  OR  Cologuard: every 3 years  OR  Sigmoidoscopy: every 5 years  Screening may be recommended earlier than age 48 if at higher risk for colorectal cancer  Also, an individualized decision between you and your healthcare provider will decide whether screening between the ages of 74-80 would be appropriate  Colonoscopy: 05/11/2018  FOBT/FIT: Not on file  Cologuard: Not on file  Sigmoidoscopy: Not on file    Screening Current     Breast Cancer Screening Age: 36 years old  Frequency: every 1-2 years  Not required if history of left and right mastectomy Mammogram: 02/25/2020    Screening Current   Cervical Cancer Screening Between the ages of 21-29, pap smear recommended once every 3 years  Between the ages of 33-67, can perform pap smear with HPV co-testing every 5 years     Recommendations may differ for women with a history of total hysterectomy, cervical cancer, or abnormal pap smears in past  Pap Smear: 05/21/2018    History Cervical Cancer   Hepatitis C Screening Once for adults born between 1945 and 1965  More frequently in patients at high risk for Hepatitis C Hep C Antibody: 02/19/2020    Screening Current   Diabetes Screening 1-2 times per year if you're at risk for diabetes or have pre-diabetes Fasting glucose: 124 mg/dL   A1C: No results in last 5 years    Screening Current   Cholesterol Screening Once every 5 years if you don't have a lipid disorder  May order more often based on risk factors  Lipid panel: 02/19/2020    Screening Not Indicated  History Lipid Disorder     Other Preventive Screenings Covered by Medicare:  1  Abdominal Aortic Aneurysm (AAA) Screening: covered once if your at risk  You're considered to be at risk if you have a family history of AAA  2  Lung Cancer Screening: covers low dose CT scan once per year if you meet all of the following conditions: (1) Age 50-69; (2) No signs or symptoms of lung cancer; (3) Current smoker or have quit smoking within the last 15 years; (4) You have a tobacco smoking history of at least 30 pack years (packs per day multiplied by number of years you smoked); (5) You get a written order from a healthcare provider  3  Glaucoma Screening: covered annually if you're considered high risk: (1) You have diabetes OR (2) Family history of glaucoma OR (3)  aged 48 and older OR (3)  American aged 72 and older  3  Osteoporosis Screening: covered every 2 years if you meet one of the following conditions: (1) You're estrogen deficient and at risk for osteoporosis based off medical history and other findings; (2) Have a vertebral abnormality; (3) On glucocorticoid therapy for more than 3 months; (4) Have primary hyperparathyroidism; (5) On osteoporosis medications and need to assess response to drug therapy  · Last bone density test (DXA Scan): 02/25/2020   5  HIV Screening: covered annually if you're between the age of 15-65  Also covered annually if you are younger than 13 and older than 72 with risk factors for HIV infection   For pregnant patients, it is covered up to 3 times per pregnancy  Immunizations:  Immunization Recommendations   Influenza Vaccine Annual influenza vaccination during flu season is recommended for all persons aged >= 6 months who do not have contraindications   Pneumococcal Vaccine (Prevnar and Pneumovax)  * Prevnar = PCV13  * Pneumovax = PPSV23   Adults 25-60 years old: 1-3 doses may be recommended based on certain risk factors  Adults 72 years old: Prevnar (PCV13) vaccine recommended followed by Pneumovax (PPSV23) vaccine  If already received PPSV23 since turning 65, then PCV13 recommended at least one year after PPSV23 dose  Hepatitis B Vaccine 3 dose series if at intermediate or high risk (ex: diabetes, end stage renal disease, liver disease)   Tetanus (Td) Vaccine - COST NOT COVERED BY MEDICARE PART B Following completion of primary series, a booster dose should be given every 10 years to maintain immunity against tetanus  Td may also be given as tetanus wound prophylaxis  Tdap Vaccine - COST NOT COVERED BY MEDICARE PART B Recommended at least once for all adults  For pregnant patients, recommended with each pregnancy  Shingles Vaccine (Shingrix) - COST NOT COVERED BY MEDICARE PART B  2 shot series recommended in those aged 48 and above     Health Maintenance Due:      Topic Date Due    HIV Screening  02/05/2021 (Originally 4/26/1970)    Cervical Cancer Screening  05/21/2021    MAMMOGRAM  02/25/2022    Colorectal Cancer Screening  05/11/2028    Hepatitis C Screening  Completed     Immunizations Due:      Topic Date Due    DTaP,Tdap,and Td Vaccines (1 - Tdap) 04/26/1976    Pneumococcal Vaccine: 65+ Years (1 of 1 - PPSV23) 04/26/2020    Influenza Vaccine (1) 09/01/2020     Advance Directives   What are advance directives? Advance directives are legal documents that state your wishes and plans for medical care  These plans are made ahead of time in case you lose your ability to make decisions for yourself   Advance directives can apply to any medical decision, such as the treatments you want, and if you want to donate organs  What are the types of advance directives? There are many types of advance directives, and each state has rules about how to use them  You may choose a combination of any of the following:  · Living will: This is a written record of the treatment you want  You can also choose which treatments you do not want, which to limit, and which to stop at a certain time  This includes surgery, medicine, IV fluid, and tube feedings  · Durable power of  for healthcare [facility]): This is a written record that states who you want to make healthcare choices for you when you are unable to make them for yourself  This person, called a proxy, is usually a family member or a friend  You may choose more than 1 proxy  · Do not resuscitate (DNR) order:  A DNR order is used in case your heart stops beating or you stop breathing  It is a request not to have certain forms of treatment, such as CPR  A DNR order may be included in other types of advance directives  · Medical directive: This covers the care that you want if you are in a coma, near death, or unable to make decisions for yourself  You can list the treatments you want for each condition  Treatment may include pain medicine, surgery, blood transfusions, dialysis, IV or tube feedings, and a ventilator (breathing machine)  · Values history: This document has questions about your views, beliefs, and how you feel and think about life  This information can help others choose the care that you would choose  Why are advance directives important? An advance directive helps you control your care  Although spoken wishes may be used, it is better to have your wishes written down  Spoken wishes can be misunderstood, or not followed  Treatments may be given even if you do not want them   An advance directive may make it easier for your family to make difficult choices about your care    Weight Management   Why it is important to manage your weight:  Being overweight increases your risk of health conditions such as heart disease, high blood pressure, type 2 diabetes, and certain types of cancer  It can also increase your risk for osteoarthritis, sleep apnea, and other respiratory problems  Aim for a slow, steady weight loss  Even a small amount of weight loss can lower your risk of health problems  How to lose weight safely:  A safe and healthy way to lose weight is to eat fewer calories and get regular exercise  You can lose up about 1 pound a week by decreasing the number of calories you eat by 500 calories each day  Healthy meal plan for weight management:  A healthy meal plan includes a variety of foods, contains fewer calories, and helps you stay healthy  A healthy meal plan includes the following:  · Eat whole-grain foods more often  A healthy meal plan should contain fiber  Fiber is the part of grains, fruits, and vegetables that is not broken down by your body  Whole-grain foods are healthy and provide extra fiber in your diet  Some examples of whole-grain foods are whole-wheat breads and pastas, oatmeal, brown rice, and bulgur  · Eat a variety of vegetables every day  Include dark, leafy greens such as spinach, kale, sheree greens, and mustard greens  Eat yellow and orange vegetables such as carrots, sweet potatoes, and winter squash  · Eat a variety of fruits every day  Choose fresh or canned fruit (canned in its own juice or light syrup) instead of juice  Fruit juice has very little or no fiber  · Eat low-fat dairy foods  Drink fat-free (skim) milk or 1% milk  Eat fat-free yogurt and low-fat cottage cheese  Try low-fat cheeses such as mozzarella and other reduced-fat cheeses  · Choose meat and other protein foods that are low in fat  Choose beans or other legumes such as split peas or lentils   Choose fish, skinless poultry (chicken or turkey), or lean cuts of red meat (beef or pork)  Before you cook meat or poultry, cut off any visible fat  · Use less fat and oil  Try baking foods instead of frying them  Add less fat, such as margarine, sour cream, regular salad dressing and mayonnaise to foods  Eat fewer high-fat foods  Some examples of high-fat foods include french fries, doughnuts, ice cream, and cakes  · Eat fewer sweets  Limit foods and drinks that are high in sugar  This includes candy, cookies, regular soda, and sweetened drinks  Exercise:  Exercise at least 30 minutes per day on most days of the week  Some examples of exercise include walking, biking, dancing, and swimming  You can also fit in more physical activity by taking the stairs instead of the elevator or parking farther away from stores  Ask your healthcare provider about the best exercise plan for you  © Copyright [employer] 2018 Information is for End User's use only and may not be sold, redistributed or otherwise used for commercial purposes  All illustrations and images included in CareNotes® are the copyrighted property of A D A M , Inc  or [address] [place] [address]    Low Fat Diet   AMBULATORY CARE:   A low-fat diet  is an eating plan that is low in total fat, unhealthy fat, and cholesterol  You may need to follow a low-fat diet if you have trouble digesting or absorbing fat  You may also need to follow this diet if you have high cholesterol  You can also lower your cholesterol by increasing the amount of fiber in your diet  Soluble fiber is a type of fiber that helps to decrease cholesterol levels  Different types of fat in food:   · Limit unhealthy fats  A diet that is high in cholesterol, saturated fat, and trans fat may cause unhealthy cholesterol levels  Unhealthy cholesterol levels increase your risk of heart disease  ? Cholesterol:  Limit intake of cholesterol to less than 200 mg per day  Cholesterol is found in meat, eggs, and dairy      ? Saturated fat:  Limit saturated fat to less than 7% of your total daily calories  Ask your dietitian how many calories you need each day  Saturated fat is found in butter, cheese, ice cream, whole milk, and palm oil  Saturated fat is also found in meat, such as beef, pork, chicken skin, and processed meats  Processed meats include sausage, hot dogs, and bologna  ? Trans fat:  Avoid trans fat as much as possible  Trans fat is used in fried and baked foods  Foods that say trans fat free on the label may still have up to 0 5 grams of trans fat per serving  · Include healthy fats  Replace foods that are high in saturated and trans fat with foods high in healthy fats  This may help to decrease high cholesterol levels  ? Monounsaturated fats: These are found in avocados, nuts, and vegetable oils, such as olive, canola, and sunflower oil  ? Polyunsaturated fats: These can be found in vegetable oils, such as soybean or corn oil  Omega-3 fats can help to decrease the risk of heart disease  Omega-3 fats are found in fish, such as salmon, herring, trout, and tuna  Omega-3 fats can also be found in plant foods, such as walnuts, flaxseed, soybeans, and canola oil  Foods to limit or avoid:   · Grains:      ? Snacks that are made with partially hydrogenated oils, such as chips, regular crackers, and butter-flavored popcorn    ? High-fat baked goods, such as biscuits, croissants, doughnuts, pies, cookies, and pastries    · Dairy:      ? Whole milk, 2% milk, and yogurt and ice cream made with whole milk    ? Half and half creamer, heavy cream, and whipping cream    ? Cheese, cream cheese, and sour cream    · Meats and proteins:      ? High-fat cuts of meat (T-bone steak, regular hamburger, and ribs)    ? Fried meat, poultry (turkey and chicken), and fish    ? Poultry (chicken and turkey) with skin    ? Cold cuts (salami or bologna), hot dogs, carver, and sausage    ?  Whole eggs and egg yolks    · Vegetables and fruits with added fat:      ? Heloise Dates vegetables or vegetables in butter or high-fat sauces, such as cream or cheese sauces    ? Fried fruit or fruit served with butter or cream    · Fats:      ? Butter, stick margarine, and shortening    ? Coconut, palm oil, and palm kernel oil    Foods to include:   · Grains:      ? Whole-grain breads, cereals, pasta, and brown rice    ? Low-fat crackers and pretzels    · Vegetables and fruits:      ? Fresh, frozen, or canned vegetables (no salt or low-sodium)    ? Fresh, frozen, dried, or canned fruit (canned in light syrup or fruit juice)    ? Avocado    · Low-fat dairy products:      ? Nonfat (skim) or 1% milk    ? Nonfat or low-fat cheese, yogurt, and cottage cheese    · Meats and proteins:      ? Chicken or turkey with no skin    ? Baked or broiled fish    ? Lean beef and pork (loin, round, extra lean hamburger)    ? Beans and peas, unsalted nuts, soy products    ? Egg whites and substitutes    ? Seeds and nuts    · Fats:      ? Unsaturated oil, such as canola, olive, peanut, soybean, or sunflower oil    ? Soft or liquid margarine and vegetable oil spread    ? Low-fat salad dressing    Other ways to decrease fat:   · Read food labels before you buy foods  Choose foods that have less than 30% of calories from fat  Choose low-fat or fat-free dairy products  Remember that fat free does not mean calorie free  These foods still contain calories, and too many calories can lead to weight gain  · Trim fat from meat and avoid fried food  Trim all visible fat from meat before you cook it  Remove the skin from poultry  Do not fonseca meat, fish, or poultry  Bake, roast, boil, or broil these foods instead  Avoid fried foods  Eat a baked potato instead of [place] fries  Steam vegetables instead of sautéing them in butter  · Add less fat to foods  Use imitation carver bits on salads and baked potatoes instead of regular carver bits  Use fat-free or low-fat salad dressings instead of regular dressings   Use low-fat or nonfat butter-flavored topping instead of regular butter or margarine on popcorn and other foods  Ways to decrease fat in recipes:  Replace high-fat ingredients with low-fat or nonfat ones  This may cause baked goods to be drier than usual  You may need to use nonfat cooking spray on pans to prevent food from sticking  You also may need to change the amount of other ingredients, such as water, in the recipe  Try the following:  · Use low-fat or light margarine instead of regular margarine or shortening  · Use lean ground turkey breast or chicken, or lean ground beef (less than 5% fat) instead of hamburger  · Add 1 teaspoon of canola oil to 8 ounces of skim milk instead of using cream or half and half  · Use grated zucchini, carrots, or apples in breads instead of coconut  · Use blenderized, low-fat cottage cheese, plain tofu, or low-fat ricotta cheese instead of cream cheese  · Use 1 egg white and 1 teaspoon of canola oil, or use ¼ cup (2 ounces) of fat-free egg substitute instead of a whole egg  · Replace half of the oil that is called for in a recipe with applesauce when you bake  Use 3 tablespoons of cocoa powder and 1 tablespoon of canola oil instead of a square of baking chocolate  How to increase fiber:  Eat enough high-fiber foods to get 20 to 30 grams of fiber every day  Slowly increase your fiber intake to avoid stomach cramps, gas, and other problems  · Eat 3 ounces of whole-grain foods each day  An ounce is about 1 slice of bread  Eat whole-grain breads, such as whole-wheat bread  Whole wheat, whole-wheat flour, or other whole grains should be listed as the first ingredient on the food label  Replace white flour with whole-grain flour or use half of each in recipes  Whole-grain flour is heavier than white flour, so you may have to add more yeast or baking powder  · Eat a high-fiber cereal for breakfast   Oatmeal is a good source of soluble fiber   Look for cereals that have bran or fiber in the name  Choose whole-grain products, such as brown rice, barley, and whole-wheat pasta  · Eat more beans, peas, and lentils  For example, add beans to soups or salads  Eat at least 5 cups of fruits and vegetables each day  Eat fruits and vegetables with the peel because the peel is high in fiber  © Copyright [address] Information is for End User's use only and may not be sold, redistributed or otherwise used for commercial purposes  All illustrations and images included in CareNotes® are the copyrighted property of [employer]  or [address] [place] [address]  The above information is an  only  It is not intended as medical advice for individual conditions or treatments  Talk to your doctor, nurse or pharmacist before following any medical regimen to see if it is safe and effective for you  Heart Healthy Diet   AMBULATORY CARE:   A heart healthy diet  is an eating plan low in unhealthy fats and sodium (salt)  The plan is high in healthy fats and fiber  A heart healthy diet helps improve your cholesterol levels and lowers your risk for heart disease and stroke  A dietitian will teach you how to read and understand food labels  Heart healthy diet guidelines to follow:   · Choose foods that contain healthy fats  ? Unsaturated fats  include monounsaturated and polyunsaturated fats  Unsaturated fat is found in foods such as soybean, canola, olive, corn, and safflower oils  It is also found in soft tub margarine that is made with liquid vegetable oil  ? Omega-3 fat  is found in certain fish, such as salmon, tuna, and trout, and in walnuts and flaxseed  Eat fish high in omega-3 fats at least 2 times a week  · Get 20 to 30 grams of fiber each day  Fruits, vegetables, whole-grain foods, and legumes (cooked beans) are good sources of fiber  · Limit or do not have unhealthy fats  ?  Cholesterol  is found in animal foods, such as eggs and lobster, and in dairy products made from whole milk  Limit cholesterol to less than 200 mg each day  ? Saturated fat  is found in meats, such as carver and hamburger  It is also found in chicken or turkey skin, whole milk, and butter  Limit saturated fat to less than 7% of your total daily calories  ? Trans fat  is found in packaged foods, such as potato chips and cookies  It is also in hard margarine, some fried foods, and shortening  Do not eat foods that contain trans fats  · Limit sodium as directed  You may be told to limit sodium to 2,000 to 2,300 mg each day  Choose low-sodium or no-salt-added foods  Add little or no salt to food you prepare  Use herbs and spices in place of salt  Include the following in your heart healthy plan:  Ask your dietitian or healthcare provider how many servings to have from each of the following food groups:  · Grains:      ? Whole-wheat breads, cereals, and pastas, and brown rice    ? Low-fat, low-sodium crackers and chips    · Vegetables:      ? Broccoli, green beans, green peas, and spinach    ? Collards, kale, and lima beans    ? Carrots, sweet potatoes, tomatoes, and peppers    ? Canned vegetables with no salt added    · Fruits:      ? Bananas, peaches, pears, and pineapple    ? Grapes, raisins, and dates    ? Oranges, tangerines, grapefruit, orange juice, and grapefruit juice    ? Apricots, mangoes, melons, and papaya    ? Raspberries and strawberries    ? Canned fruit with no added sugar    · Low-fat dairy:      ? Nonfat (skim) milk, 1% milk, and low-fat almond, cashew, or soy milks fortified with calcium    ? Low-fat cheese, regular or frozen yogurt, and cottage cheese    · Meats and proteins:      ? Lean cuts of beef and pork (loin, leg, round), skinless chicken and turkey    ? Legumes, soy products, egg whites, or nuts    Limit or do not include the following in your heart healthy plan:   · Unhealthy fats and oils:      ?  Whole or 2% milk, cream cheese, sour cream, or cheese    ? High-fat cuts of beef (T-bone steaks, ribs), chicken or turkey with skin, and organ meats such as liver    ? Butter, stick margarine, shortening, and cooking oils such as coconut or palm oil    · Foods and liquids high in sodium:      ? Packaged foods, such as frozen dinners, cookies, macaroni and cheese, and cereals with more than 300 mg of sodium per serving    ? Vegetables with added sodium, such as instant potatoes, vegetables with added sauces, or regular canned vegetables    ? Cured or smoked meats, such as hot dogs, carver, and sausage    ? High-sodium ketchup, barbecue sauce, salad dressing, pickles, olives, soy sauce, or miso    · Foods and liquids high in sugar:      ? Candy, cake, cookies, pies, or doughnuts    ? Soft drinks (soda), sports drinks, or sweetened tea    ? Canned or dry mixes for cakes, soups, sauces, or gravies    Other healthy heart guidelines:   · Do not smoke  Nicotine and other chemicals in cigarettes and cigars can cause lung and heart damage  Ask your healthcare provider for information if you currently smoke and need help to quit  E-cigarettes or smokeless tobacco still contain nicotine  Talk to your healthcare provider before you use these products  · Limit or do not drink alcohol as directed  Alcohol can damage your heart and raise your blood pressure  Your healthcare provider may give you specific daily and weekly limits  The general recommended limit is 1 drink a day for women 21 or older and for men 72 or older  Do not have more than 3 drinks in a day or 7 in a week  The recommended limit is 2 drinks a day for men 24to 59years of age  Do not have more than 4 drinks in a day or 14 in a week  A drink of alcohol is 12 ounces of beer, 5 ounces of wine, or 1½ ounces of liquor  · Exercise regularly  Exercise can help you maintain a healthy weight and improve your blood pressure and cholesterol levels   Regular exercise can also decrease your risk for heart problems  Ask your healthcare provider about the best exercise plan for you  Do not start an exercise program without asking your healthcare provider  Follow up with your doctor or cardiologist as directed:  Write down your questions so you remember to ask them during your visits  © Copyright [address] Information is for End User's use only and may not be sold, redistributed or otherwise used for commercial purposes  All illustrations and images included in CareNotes® are the copyrighted property of [employer] M [employer] Inc  or [facility] Bartholomew   The above information is an  only  It is not intended as medical advice for individual conditions or treatments  Talk to your doctor, nurse or pharmacist before following any medical regimen to see if it is safe and effective for you

## 2021-01-05 NOTE — PROGRESS NOTES
Assessment and Plan:     Problem List Items Addressed This Visit        Other    Medicare annual wellness visit, initial - Primary    Relevant Orders    Comprehensive metabolic panel    CBC and differential    BMI 29 0-29 9,adult    Relevant Orders    Comprehensive metabolic panel    CBC and differential    Generalized abdominal pain    Relevant Orders    CT abdomen pelvis wo contrast    Comprehensive metabolic panel    CBC and differential    Nausea    Relevant Orders    CT abdomen pelvis wo contrast    Comprehensive metabolic panel    CBC and differential    Right hip pain    Relevant Orders    XR hip/pelv 2-3 vws right if performed    Comprehensive metabolic panel    CBC and differential           Preventive health issues were discussed with patient, and age appropriate screening tests were ordered as noted in patient's After Visit Summary  Personalized health advice and appropriate referrals for health education or preventive services given if needed, as noted in patient's After Visit Summary       History of Present Illness:     Patient presents for Medicare Annual Wellness visit    Patient Care Team:  Michi Lubni as PCP - General (Family Medicine)  Anand Todd MD as PCP - [address] [place] [address] (RTE)     Problem List:     Patient Active Problem List   Diagnosis    Zaldivar's neuroma, left    Atypical glandular cells of undetermined significance of cervix    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[facility] [place] [address] )    Menopausal and perimenopausal disorder    BMI 36 0-36 9,adult    Anxiety and depression    Trigger thumb of right hand    Tinnitus of left ear    Wrist arthritis    Medicare annual wellness visit, initial    BMI 29 0-29 9,adult    Generalized abdominal pain    Nausea    Right hip pain      Past Medical and Surgical History:     Past Medical History:   Diagnosis Date    Anxiety     Anxiety and depression 10/2/2019    Breathing difficulty     Cancer ([facility] [place] [address] )     cervical    GERD (gastroesophageal reflux disease)     Irritable bowel syndrome     Shortness of breath     Thumb injury 10/2019    Tinnitus of left ear 2/19/2020    Wrist arthritis 3/17/2020     Past Surgical History:   Procedure Laterality Date    HYSTERECTOMY      ID EXCIS INTERDIGITAL NEUROMA,EA Left 7/12/2018    Procedure: FOOT NEUROMA EXCISION;  Surgeon: Lisa Palomares DPM;  Location: [address] [place] [address] MAIN OR;  Service: Podiatry    TUBAL LIGATION        Family History:     Family History   Problem Relation Age of Onset    Stomach cancer Mother     Alcohol abuse Father     No Known Problems Sister     No Known Problems Daughter     No Known Problems Maternal Grandmother     No Known Problems Maternal Grandfather     No Known Problems Paternal Grandmother     No Known Problems Paternal Grandfather     No Known Problems Sister     No Known Problems Sister     No Known Problems Sister     No Known Problems Sister     No Known Problems Daughter     No Known Problems Daughter     No Known Problems Maternal Aunt     No Known Problems Maternal Aunt     No Known Problems Paternal Aunt     No Known Problems Paternal Aunt     No Known Problems Paternal Aunt     No Known Problems Paternal Aunt     No Known Problems Paternal Aunt     No Known Problems Paternal Aunt     No Known Problems Paternal Aunt     No Known Problems Paternal Aunt     No Known Problems Paternal Aunt     No Known Problems Paternal Aunt       Social History:        Social History     Socioeconomic History    Marital status:      Spouse name: None    Number of children: None    Years of education: None    Highest education level: None   Occupational History    None   Social Needs    Financial resource strain: None    Food insecurity     Worry: None     Inability: None    Transportation needs     Medical: None     Non-medical: None   Tobacco Use    Smoking status: Former Smoker     Packs/day: 3 00     Years: 25 00     Pack years: 75 00     Quit date: 2003     Years since quittin 5    Smokeless tobacco: Never Used   Substance and Sexual Activity    Alcohol use: Yes     Comment: rarely    Drug use: No    Sexual activity: Yes     Partners: Male   Lifestyle    Physical activity     Days per week: None     Minutes per session: None    Stress: None   Relationships    Social connections     Talks on phone: None     Gets together: None     Attends Mandaen service: None     Active member of club or organization: None     Attends meetings of clubs or organizations: None     Relationship status: None    Intimate partner violence     Fear of current or ex partner: None     Emotionally abused: None     Physically abused: None     Forced sexual activity: None   Other Topics Concern    None   Social History Narrative    None      Medications and Allergies:     Current Outpatient Medications   Medication Sig Dispense Refill    Biotin 10 MG CAPS Take by mouth      Multiple Vitamin (multivitamin) capsule Take 1 capsule by mouth daily       No current facility-administered medications for this visit        Allergies   Allergen Reactions    Dust Mite Extract     Pollen Extract Sneezing      Immunizations:     Immunization History   Administered Date(s) Administered    INFLUENZA 10/11/2019    Influenza, recombinant, quadrivalent,injectable, preservative free 10/23/2018    Zoster 03/10/2017      Health Maintenance:         Topic Date Due    HIV Screening  2021 (Originally 1970)    Cervical Cancer Screening  2021    MAMMOGRAM  2022    Colorectal Cancer Screening  2028    Hepatitis C Screening  Completed         Topic Date Due    DTaP,Tdap,and Td Vaccines (1 - Tdap) 1976    Pneumococcal Vaccine: 65+ Years (1 of 1 - PPSV23) 2020    Influenza Vaccine (1) 2020      Medicare Health Risk Assessment:     /80 (BP Location: Left arm, Patient Position: Sitting, Cuff Size: Adult) Pulse 100   Temp (!) 96 2 °F (35 7 °C) (Temporal)   Ht 4' 11" (1 499 m)   Wt 65 4 kg (144 lb 1 6 oz)   LMP  (LMP Unknown)   SpO2 100%   BMI 29 10 kg/m²      Fran Weeks is here for her Welcome to Medicare visit  Health Risk Assessment:   Patient rates overall health as good  Patient feels that their physical health rating is same  Eyesight was rated as same  Hearing was rated as same  Patient feels that their emotional and mental health rating is same  Pain experienced in the last 7 days has been some  Patient's pain rating has been 3/10  Patient states that she has experienced no weight loss or gain in last 6 months  Depression Screening:   PHQ-2 Score: 2  PHQ-9 Score: 6      Fall Risk Screening: In the past year, patient has experienced: no history of falling in past year      Urinary Incontinence Screening:   Patient has not leaked urine accidently in the last six months  Home Safety:  Patient does not have trouble with stairs inside or outside of their home  Patient has working smoke alarms and has working carbon monoxide detector  Home safety hazards include: none  Nutrition:   Current diet is Regular  Medications:   Patient is currently taking over-the-counter supplements  OTC medications include: see medication list  Patient is able to manage medications  Activities of Daily Living (ADLs)/Instrumental Activities of Daily Living (IADLs):   Walk and transfer into and out of bed and chair?: Yes  Dress and groom yourself?: Yes    Bathe or shower yourself?: Yes    Feed yourself? Yes  Do your laundry/housekeeping?: Yes  Manage your money, pay your bills and track your expenses?: Yes  Make your own meals?: Yes    Do your own shopping?: Yes    Previous Hospitalizations:   Any hospitalizations or ED visits within the last 12 months?: No      Advance Care Planning:   Living will: No    Durable POA for healthcare:  Yes    Advanced directive: No    Advanced directive counseling given: Yes    Five wishes given: No    Patient declined ACP directive: No    End of Life Decisions reviewed with patient: Yes    Provider agrees with end of life decisions: Yes      PREVENTIVE SCREENINGS      Cardiovascular Screening:    General: History Lipid Disorder, Risks and Benefits Discussed and Screening Current      Diabetes Screening:     General: Screening Current      Colorectal Cancer Screening:     General: Screening Current      Breast Cancer Screening:     General: Screening Current      Cervical Cancer Screening:    General: History Cervical Cancer      Osteoporosis Screening:    General: Risks and Benefits Discussed and Screening Current      Abdominal Aortic Aneurysm (AAA) Screening:        General: Screening Not Indicated      Lung Cancer Screening:     General: Screening Not Indicated      Hepatitis C Screening:    General: Screening Current      [address] [facility]

## 2021-01-05 NOTE — PROGRESS NOTES
Assessment/Plan: Will order labs on patient and will obtain Imaging of the abdomen and XR of the right hip  I did not feel any abnormality on exam  Did have colonoscopy done in 2018  Did advise if symptoms worsen to go to ER  Will notify once imaging is back and labs  No problem-specific Assessment & Plan notes found for this encounter  Problem List Items Addressed This Visit        Other    Medicare annual wellness visit, initial - Primary    Relevant Orders    Comprehensive metabolic panel    CBC and differential    BMI 29 0-29 9,adult    Relevant Orders    Comprehensive metabolic panel    CBC and differential    Generalized abdominal pain    Relevant Orders    CT abdomen pelvis wo contrast    Comprehensive metabolic panel    CBC and differential    Nausea    Relevant Orders    CT abdomen pelvis wo contrast    Comprehensive metabolic panel    CBC and differential    Right hip pain    Relevant Orders    XR hip/pelv 2-3 vws right if performed    Comprehensive metabolic panel    CBC and differential            Subjective:      Patient ID: Kiko Cobb is a 72 y o  female  Jessica Tolentino is for an acute visit and medicare wellness  She states starting around Thanksgiving she started with abdominal pain around her belly button radiating to her right hip and groin  She is having nausea and at times indigestion  She states she does have some constipation at times  She states she has been exercising and doing aerobics  She states she feels hungry but then feels full  She states she is concerned due to her mother passing away from stomach cancer  She states she does have some indigestion but does not like to take anything for it  She states the pain comes and goes and is not constant  She offers no other issues        The following portions of the patient's history were reviewed and updated as appropriate:   She  has a past medical history of Anxiety, Anxiety and depression (10/2/2019), Breathing difficulty, Cancer [facility]), GERD (gastroesophageal reflux disease), Irritable bowel syndrome, Shortness of breath, Thumb injury (10/2019), Tinnitus of left ear (2/19/2020), and 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)  She   Patient Active Problem List    Diagnosis Date Noted    Medicare annual wellness visit, initial 01/05/2021    BMI 29 0-29 9,adult 01/05/2021    Generalized abdominal pain 01/05/2021    Nausea 01/05/2021    Right hip pain 01/05/2021    Wrist arthritis 03/17/2020    Tinnitus of left ear 02/19/2020    Trigger thumb of right hand 10/08/2019    BMI 36 0-36 9,adult 10/02/2019    Anxiety and depression 10/02/2019    Zaldivar's neuroma, left 07/12/2018    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[facility] [address] ) 04/11/2017    Atypical glandular cells of undetermined significance of cervix 12/07/2016    Menopausal and perimenopausal disorder 10/28/2016     She  has a past surgical history that includes Hysterectomy; Tubal ligation; and pr excis interdigital neuroma,ea (Left, 7/12/2018)  Her family history includes Alcohol abuse in her father; No Known Problems in her daughter, daughter, daughter, maternal aunt, maternal aunt, maternal grandfather, maternal grandmother, paternal aunt, paternal aunt, paternal aunt, paternal aunt, paternal aunt, paternal aunt, paternal aunt, paternal aunt, paternal aunt, paternal aunt, paternal grandfather, paternal grandmother, sister, sister, sister, sister, and sister; Stomach cancer in her mother  She  reports that she quit smoking about 17 years ago  She has a 75 00 pack-year smoking history  She has never used smokeless tobacco  She reports current alcohol use  She reports that she does not use drugs  Current Outpatient Medications   Medication Sig Dispense Refill    Biotin 10 MG CAPS Take by mouth      Multiple Vitamin (multivitamin) capsule Take 1 capsule by mouth daily       No current facility-administered medications for this visit        Current Outpatient Medications on File Prior to Visit Medication Sig    Biotin 10 MG CAPS Take by mouth    Multiple Vitamin (multivitamin) capsule Take 1 capsule by mouth daily    [DISCONTINUED] atorvastatin (LIPITOR) 10 mg tablet Take 1 tablet (10 mg total) by mouth daily (Patient not taking: Reported on 1/5/2021)    [DISCONTINUED] dicyclomine (BENTYL) 20 mg tablet Take 20 mg by mouth every 6 (six) hours    [DISCONTINUED] escitalopram (LEXAPRO) 10 mg tablet Take 1 tablet (10 mg total) by mouth daily (Patient not taking: Reported on 11/25/2019)    [DISCONTINUED] loratadine (CLARITIN) 10 mg tablet TAKE 1 TABLET BY MOUTH EVERY DAY (Patient not taking: Reported on 1/5/2021)    [DISCONTINUED] omeprazole (PriLOSEC) 20 mg delayed release capsule Take 20 mg by mouth daily    [DISCONTINUED] predniSONE 10 mg tablet 30 mg by mouth daily for 3 days, then 20 mg by mouth daily for 3 days, then 10 mg by mouth daily for 3 days, then stop (Patient not taking: Reported on 3/17/2020)     No current facility-administered medications on file prior to visit  She is allergic to dust mite extract and pollen extract       Review of Systems   Constitutional: Negative  HENT: Negative  Eyes: Negative  Respiratory: Negative  Cardiovascular: Negative  Gastrointestinal: Positive for abdominal pain, constipation and nausea  Endocrine: Negative  Genitourinary: Negative  Musculoskeletal: Positive for arthralgias and myalgias  Skin: Negative  Allergic/Immunologic: Negative  Neurological: Negative  Hematological: Negative  Psychiatric/Behavioral: Negative  Objective:      /80 (BP Location: Left arm, Patient Position: Sitting, Cuff Size: Adult)   Pulse 100   Temp (!) 96 2 °F (35 7 °C) (Temporal)   Ht 4' 11" (1 499 m)   Wt 65 4 kg (144 lb 1 6 oz)   LMP  (LMP Unknown)   SpO2 100%   BMI 29 10 kg/m²          Physical Exam  Vitals signs reviewed  Constitutional:       Appearance: She is well-developed     Cardiovascular:      Rate and Rhythm: Normal rate and regular rhythm  Pulmonary:      Effort: Pulmonary effort is normal       Breath sounds: Normal breath sounds  Abdominal:      General: Abdomen is flat  Bowel sounds are normal       Palpations: Abdomen is soft  Tenderness: There is abdominal tenderness  Skin:     General: Skin is warm and dry  Capillary Refill: Capillary refill takes less than 2 seconds  Neurological:      General: No focal deficit present  Mental Status: She is alert and oriented to person, place, and time  Mental status is at baseline  Psychiatric:         Mood and Affect: Mood normal          Behavior: Behavior normal          Thought Content: Thought content normal          Judgment: Judgment normal          BMI Counseling: Body mass index is 29 1 kg/m²  The BMI is above normal  Nutrition recommendations include reducing portion sizes, decreasing overall calorie intake, 3-5 servings of fruits/vegetables daily, reducing fast food intake, consuming healthier snacks, decreasing soda and/or juice intake, moderation in carbohydrate intake, increasing intake of lean protein, reducing intake of saturated fat and trans fat and reducing intake of cholesterol

## 2021-01-13 ENCOUNTER — APPOINTMENT (OUTPATIENT)
Dept: LAB | Facility: HOSPITAL | Age: 66
End: 2021-01-13
Payer: MEDICARE

## 2021-01-13 ENCOUNTER — TRANSCRIBE ORDERS (OUTPATIENT)
Dept: LAB | Facility: HOSPITAL | Age: 66
End: 2021-01-13

## 2021-01-13 ENCOUNTER — HOSPITAL ENCOUNTER (OUTPATIENT)
Dept: RADIOLOGY | Facility: HOSPITAL | Age: 66
Discharge: HOME | End: 2021-01-13
Payer: MEDICARE

## 2021-01-13 ENCOUNTER — HOSPITAL ENCOUNTER (OUTPATIENT)
Dept: CT IMAGING | Facility: HOSPITAL | Age: 66
Discharge: HOME | End: 2021-01-13
Payer: MEDICARE

## 2021-01-13 DIAGNOSIS — R10.84 GENERALIZED ABDOMINAL PAIN: ICD-10-CM

## 2021-01-13 DIAGNOSIS — Z00.00 MEDICARE ANNUAL WELLNESS VISIT, INITIAL: ICD-10-CM

## 2021-01-13 DIAGNOSIS — R11.0 NAUSEA: ICD-10-CM

## 2021-01-13 DIAGNOSIS — M25.551 RIGHT HIP PAIN: ICD-10-CM

## 2021-01-13 LAB
ALBUMIN SERPL BCP-MCNC: 3.9 G/DL (ref 3.5–5.7)
ALP SERPL-CCNC: 107 U/L (ref 55–165)
ALT SERPL W P-5'-P-CCNC: 14 U/L (ref 7–52)
ANION GAP SERPL CALCULATED.3IONS-SCNC: 8 MMOL/L (ref 4–13)
AST SERPL W P-5'-P-CCNC: 17 U/L (ref 13–39)
BASOPHILS # BLD AUTO: 0 THOUSANDS/ΜL (ref 0–0.1)
BASOPHILS NFR BLD AUTO: 0 % (ref 0–2)
BILIRUB SERPL-MCNC: 0.2 MG/DL (ref 0.2–1)
BUN SERPL-MCNC: 16 MG/DL (ref 7–25)
CALCIUM SERPL-MCNC: 9.9 MG/DL (ref 8.6–10.5)
CHLORIDE SERPL-SCNC: 101 MMOL/L (ref 98–107)
CO2 SERPL-SCNC: 28 MMOL/L (ref 21–31)
CREAT SERPL-MCNC: 0.92 MG/DL (ref 0.6–1.2)
EOSINOPHIL # BLD AUTO: 0.1 THOUSAND/ΜL (ref 0–0.61)
EOSINOPHIL NFR BLD AUTO: 2 % (ref 0–5)
ERYTHROCYTE [DISTWIDTH] IN BLOOD BY AUTOMATED COUNT: 14.4 % (ref 11.5–14.5)
GFR SERPL CREATININE-BSD FRML MDRD: 66 ML/MIN/1.73SQ M
GLUCOSE P FAST SERPL-MCNC: 97 MG/DL (ref 65–99)
HCT VFR BLD AUTO: 35.5 % (ref 42–47)
HGB BLD-MCNC: 11.7 G/DL (ref 12–16)
LYMPHOCYTES # BLD AUTO: 1.1 THOUSANDS/ΜL (ref 0.6–4.47)
LYMPHOCYTES NFR BLD AUTO: 15 % (ref 21–51)
MCH RBC QN AUTO: 28.2 PG (ref 26–34)
MCHC RBC AUTO-ENTMCNC: 33.1 G/DL (ref 31–37)
MCV RBC AUTO: 85 FL (ref 81–99)
MONOCYTES # BLD AUTO: 0.5 THOUSAND/ΜL (ref 0.17–1.22)
MONOCYTES NFR BLD AUTO: 7 % (ref 2–12)
NEUTROPHILS # BLD AUTO: 5.4 THOUSANDS/ΜL (ref 1.4–6.5)
NEUTS SEG NFR BLD AUTO: 76 % (ref 42–75)
PLATELET # BLD AUTO: 247 THOUSANDS/UL (ref 149–390)
PMV BLD AUTO: 8.4 FL (ref 8.6–11.7)
POTASSIUM SERPL-SCNC: 3.9 MMOL/L (ref 3.5–5.5)
PROT SERPL-MCNC: 7.9 G/DL (ref 6.4–8.9)
RBC # BLD AUTO: 4.16 MILLION/UL (ref 3.9–5.2)
SODIUM SERPL-SCNC: 137 MMOL/L (ref 134–143)
WBC # BLD AUTO: 7.2 THOUSAND/UL (ref 4.8–10.8)

## 2021-01-13 PROCEDURE — 73502 X-RAY EXAM HIP UNI 2-3 VIEWS: CPT

## 2021-01-13 PROCEDURE — 36415 COLL VENOUS BLD VENIPUNCTURE: CPT

## 2021-01-13 PROCEDURE — 74176 CT ABD & PELVIS W/O CONTRAST: CPT

## 2021-01-13 PROCEDURE — 85025 COMPLETE CBC W/AUTO DIFF WBC: CPT

## 2021-01-13 PROCEDURE — G1004 CDSM NDSC: HCPCS

## 2021-01-13 PROCEDURE — 80053 COMPREHEN METABOLIC PANEL: CPT

## 2021-01-18 ENCOUNTER — TELEPHONE (OUTPATIENT)
Dept: SURGICAL ONCOLOGY | Facility: CLINIC | Age: 66
End: 2021-01-18

## 2021-01-18 ENCOUNTER — TELEPHONE (OUTPATIENT)
Dept: UROLOGY | Facility: MEDICAL CENTER | Age: 66
End: 2021-01-18

## 2021-01-18 ENCOUNTER — TELEPHONE (OUTPATIENT)
Dept: INTERNAL MEDICINE CLINIC | Facility: CLINIC | Age: 66
End: 2021-01-18

## 2021-01-18 DIAGNOSIS — N13.30 HYDRONEPHROSIS, UNSPECIFIED HYDRONEPHROSIS TYPE: Primary | ICD-10-CM

## 2021-01-18 DIAGNOSIS — R59.0 ABDOMINAL LYMPHADENOPATHY: ICD-10-CM

## 2021-01-18 NOTE — TELEPHONE ENCOUNTER
Did call urology, and they stated that it has to go to their clinical review, and they will get her in sooner than the normal apt  Did also call [facility] and they will call her to schedule because they are scheduling out as well  They will call her  Did call Daisy Jc and let her know  She was very tearful and concerned  Did let her know that we would be with her to help where we can

## 2021-01-18 NOTE — TELEPHONE ENCOUNTER
New Patient Encounter    New Patient Intake Form   Patient Details:  Sandra Baldwin  1955  [id number]    Background Information:  [address] starts by opening a telephone encounter and gathering the following information   Who is calling to schedule? If not self, relationship to patient? provider   Referring Provider Kalpesh Moya   What is the diagnosis? lymphadenopathy within the retroperitoneum     History of cervical ca   Is this diagnosis confirmed? Yes   When was the diagnosis? 1/18   Is there a confirmed diagnosis from a biopsy/tissue reviewed by pathology? na   Were outside slides requested? No   Is patient aware of diagnosis? Yes   Is there a personal history and what kind? na   Is there a family history and what kind? na   Reason for visit? New Diagnosis   Have you had any imaging or labs done? If so: when, where? yes  [facility]   Are records in [facility]? yes   If patient has a prior history of breast cancer were old records obtained? NA   Was the patient told to bring a disk? no   Does the patient smoke or Vape? no   If yes, how many packs or cartridges per day? na   Scheduling Information:   Preferred [place]:  [place]     Are there any dates/time the patient cannot be seen? Pt has transportation issues- takes bus or has friend drive  Will need few days notice of appt  Miscellaneous: Pt is a prior patient of Dr Tony Ford while at [facility]  After completing the above information, please route to Financial Counselor and the appropriate Nurse Navigator for review

## 2021-01-18 NOTE — TELEPHONE ENCOUNTER
Complaint/Diagnosis:Hydronephrosis    Insurance:[facility]    History of Cancer:no    Previous urologist:no    Outside testing[facility]where[facility]    If yes,what kind:[facility]    Records requested/where:[facility]    Preferred location:[place]

## 2021-01-18 NOTE — TELEPHONE ENCOUNTER
Pt's pcp office called to schedule npt sooner than later,please review records/CT Scan and contact pt directly   [phone]

## 2021-01-19 NOTE — TELEPHONE ENCOUNTER
Pt returned call  Discussed with patient the reason she needs to see gyn onc  Discussed scheduling appointment, pt does have transportation issues and does not know if she will be able to get a ride to [place] or [place]  Will discuss with MASON Rodriguez  Cervical ca diagnosis was 3/4 years ago at Dr Isaiah Hayden office in [facility] pass  Phone [phone]      1/22 unavailable

## 2021-01-19 NOTE — TELEPHONE ENCOUNTER
Called and left VM for pt to call back and confirm appointment tentatively scheduled for 1/22/21 in [place] with beka

## 2021-01-19 NOTE — PROGRESS NOTES
Assessment and plan:       1  Moderate right hydronephrosis  -right ureter mildly dilated long-term down and difficult to see more distally, cause obstruction was uncertain  -creatinine 0 92 (01/13/2021)  -will schedule for cytso/retrograde pyelogram/right stent replacement  -discussed with patient if we are unable to insert a stent we would recommend a percutaneous nephrostomy tube to help drain her kidney    2  History of cervical cancer 2017 status post hysterectomy  -hemoglobin 11 7/hematocrit 33 5  -scheduled to see gyn Onc on 01/26/2021     3  Urinary incontinence  -referral to uro Gyn 05/2018, did not go because she lost her living arrangements with her sister  -stress urinary incontinence  -recommended kegel exercises  -can refer to pelvic floor physical therapy in the future     JACE Aparicio    History of Present Illness     Tutu Chen is a 72 y o  new patient with a history of cervical cancer status post hysterectomy  Who recently presented to her PCP with a complaint of generalized abdominal pain with nausea at that time a CT of the abdomen and pelvis without contrast, CBC, CMP was ordered as well as an x-ray of the right hip and pelvis due to right hip pain  The CT scan of the abdomen and pelvis completed on 01/13/2021 revealed right-sided hydronephrosis which is at least moderate in severity  The ureter was mildly dilated is long-term down  It was somewhat difficult to see more distally  And the precise cause of the obstruction was uncertain  The CT scan also revealed  lymphadenopathy within the retroperitoneum which was new compared to a prior study  Was also noted that there was ill-defined hazy opacity in the retroperitoneum surrounding the enlarged lymph nodes  It was indeterminate if the result was infectious, inflammatory or metastatic in nature  There lymph nodes measuring > 2 cm near the aorta  Estefania was referred to Urology and Medical Oncology    Her intake was completed by medical oncology and she was subsequently referred to gyn Oncology  Patient does have transportation issues and Oncology is setting her up with [facility]  She had CT scan from 2018 through [facility] which revealed no evidence of recurrent or metastatic disease within the chest abdomen or pelvis  She had stable subcentimeter pulmonary nodules bilaterally and right hepatic lobe hemangioma is unchanged  No retroperitoneal or mesenteric adenopathy at that time  Her kidneys were unremarkable her bladder was unremarkable  PET scan 2017 at [facility] with no evidence of metabolically active tumor  She c/o discomfort in the right flank area  She was walking up the steps before  and had sudden onset of right hip pain and was unable to walk up the steps  She reports that the pain is now intermittent  Lives alone and does not drive, she has no car, lives out of boxes in "a dump in [place]"  Reports her children live in [place]  And she does not talk to her sister who she formally lived with  Very tearful in the office today  She has a history of a 40 lb intentional weight loss due to exercise and dietary changes  She reports being a former heavy smoker at 3 packs per day but she was unable to quantify for how many years  She quit approximately  10-15 years ago  She states her mother  of stomach cancer and does not want to suffer the way she did but she is not ready to die    Laboratory     Lab Results   Component Value Date    BUN 16 2021    CREATININE 0 92 2021       No components found for: GFR    Lab Results   Component Value Date    CALCIUM 9 9 2021     2018    K 3 9 2021    CO2 28 2021     2021       Lab Results   Component Value Date    WBC 7 20 2021    HGB 11 7 (L) 2021    HCT 35 5 (L) 2021    MCV 85 2021     2021       No results found for: PSA    No results found for this or any previous visit (from the past 1 hour(s))  @RESULT(URINEMICROSCOPIC)@    @RESULT(URINECULTURE)@    Radiology     CT ABDOMEN AND PELVIS WITHOUT IV CONTRAST 1/13/21     INDICATION:   R10 84: Generalized abdominal pain  R11 0: Nausea  Right-sided abdominal pain  Constipation  History of cervical cancer 2-4 years ago with hysterectomy and chemotherapy and radiation therapy     COMPARISON:  4/10/2017     TECHNIQUE:  CT examination of the abdomen and pelvis was performed without intravenous contrast   Axial, sagittal, and coronal 2D reformatted images were created from the source data and submitted for interpretation       Radiation dose length product (DLP) for this visit:  304 1 mGy-cm   This examination, like all CT scans performed in the [facility], was performed utilizing techniques to minimize radiation dose exposure, including the use of iterative   reconstruction and automated exposure control       Enteric contrast was not administered       FINDINGS:     ABDOMEN     LOWER CHEST:  No clinically significant abnormality identified in the visualized lower chest      LIVER/BILIARY TREE:  There is faint visualization of a low-density lesion in the lateral aspect of the right hepatic lobe which corresponds to a previously seen peripherally enhanced lesion  Again, likely a benign cavernous hemangioma  The low-density   lesion today measures smaller than on the prior study although they may not be able to see the full extent of the lesion without contrast   A tiny hypodense focus in the right lobe on image 30 is stable from prior and is probably a cyst     GALLBLADDER:  I suspect there is a gallstone  No evidence of cholecystitis      SPLEEN:  Unremarkable      PANCREAS:  Unremarkable      ADRENAL GLANDS:  Unremarkable      KIDNEYS/URETERS:  Left kidney is unremarkable  There is right-sided hydronephrosis which is at least moderate in severity    The ureter is mildly dilated at least FPC down  It is somewhat difficult to see more distally  The precise cause for the   obstruction is uncertain      STOMACH AND BOWEL:  There is mild colonic diverticulosis without diverticulitis      APPENDIX:  A normal appendix was visualized      ABDOMINOPELVIC CAVITY:  There is lymphadenopathy within the retroperitoneum which is new compared with the prior study  Surrounding the enlarged lymph nodes there is ill-defined hazy opacity in the retroperitoneum  The findings are nonspecific and   could potentially be infectious or inflammatory in etiology although I would be concerned about metastatic disease in a patient with a prior history of cervical cancer  The lack of contrast somewhat limits assessment  There are probably lymph nodes   measuring greater than 2 cm diameter near the aorta      VESSELS:  Limited assessment without contrast   At least mild atherosclerotic disease of aorta is noted      PELVIS     REPRODUCTIVE ORGANS:  Surgical changes of prior hysterectomy  No definitive evidence of localized pelvic mass      URINARY BLADDER:  The bladder is mostly empty  No stones are seen      ABDOMINAL WALL/INGUINAL REGIONS:  Unremarkable      OSSEOUS STRUCTURES:  No acute fracture or destructive osseous lesion      IMPRESSION:     There is moderate amount of ill-defined retroperitoneal lymphadenopathy which is concerning for possible metastatic disease in a patient with a prior history of cervical cancer  Other etiologies are not excluded    Infectious or inflammatory etiologies   are potentially a consideration as would be lymphoma      There is moderate right hydronephrosis and proximal hydroureter without any obvious cause for obstruction based on this unenhanced exam      A repeat study with intravenous contrast during nephrographic and delayed phases may be helpful for further workup of findings      Pre-existing liver lesion seems smaller than on the prior study, probably benign cavernous hemangioma      Mild colonic diverticulosis without diverticulitis     Hysterectomy  No pelvic mass identified  Review of Systems     Review of Systems   Constitutional: Negative for activity change, appetite change, chills, fatigue, fever and unexpected weight change  Hair loss x 1 year, started taking iron but became constipated   HENT: Negative for facial swelling  Eyes: Negative for discharge  Respiratory: Negative  Negative for cough and shortness of breath  Cardiovascular: Negative for chest pain and leg swelling  Gastrointestinal: Positive for constipation  Negative for abdominal distention, abdominal pain, diarrhea, nausea and vomiting  Endocrine: Negative  Genitourinary: Positive for flank pain, frequency and urgency  Negative for decreased urine volume, difficulty urinating, dysuria, enuresis and hematuria  Urinates small frequent amounts, feels like she does not empty her bladder when she voids  Has right flank and groin pain  Reports stress incontinence since hysterectomy  Has some urgency   Musculoskeletal: Positive for back pain  Negative for myalgias  Chronic low back pain, right hip pain that radiates down right leg   Skin: Negative for pallor and rash  Allergic/Immunologic: Negative  Negative for immunocompromised state  Neurological: Negative for facial asymmetry and speech difficulty  Hematological: Bruises/bleeds easily  Psychiatric/Behavioral: Negative for agitation and confusion  The patient is nervous/anxious  Allergies     Allergies   Allergen Reactions    Dust Mite Extract     Pollen Extract Sneezing       Physical Exam     Physical Exam  Constitutional:       General: She is not in acute distress  Appearance: Normal appearance  She is not ill-appearing, toxic-appearing or diaphoretic  HENT:      Head: Normocephalic and atraumatic  Nose: No congestion        Mouth/Throat:      Mouth: Mucous membranes are moist       Pharynx: Oropharynx is clear  Eyes:      General: No scleral icterus  Pupils: Pupils are equal, round, and reactive to light  Neck:      Musculoskeletal: Normal range of motion and neck supple  Cardiovascular:      Rate and Rhythm: Normal rate and regular rhythm  Pulses: Normal pulses  Heart sounds: Normal heart sounds  Pulmonary:      Effort: Pulmonary effort is normal  No respiratory distress  Breath sounds: Normal breath sounds  No wheezing, rhonchi or rales  Abdominal:      General: Abdomen is flat  Bowel sounds are normal  There is no distension  Palpations: Abdomen is soft  Tenderness: There is no abdominal tenderness  There is right CVA tenderness  There is no left CVA tenderness, guarding or rebound  Musculoskeletal:         General: No swelling or tenderness  Right lower leg: No edema  Left lower leg: No edema  Skin:     General: Skin is warm and dry  Coloration: Skin is not jaundiced or pale  Findings: No rash  Neurological:      General: No focal deficit present  Mental Status: She is alert and oriented to person, place, and time  Motor: No weakness  Gait: Gait normal    Psychiatric:         Mood and Affect: Mood normal          Behavior: Behavior normal       Comments: Tearful  Has no family support  Children live in [address] [place] [address]   She no longer talks to her sister who is local         Vital Signs     Vitals:    01/22/21 1348   BP: 128/80   Pulse: 80   Weight: 64 3 kg (141 lb 12 1 oz)       Current Medications       Current Outpatient Medications:     Biotin 10 MG CAPS, Take by mouth, Disp: , Rfl:     Multiple Vitamin (multivitamin) capsule, Take 1 capsule by mouth daily, Disp: , Rfl:     Active Problems     Patient Active Problem List   Diagnosis    Zaldivar's neuroma, left    Atypical glandular cells of undetermined significance of cervix    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[facility] [address] )    Menopausal and perimenopausal disorder    BMI 36 0-36 9,adult    Anxiety and depression    Trigger thumb of right hand    Tinnitus of left ear    Wrist arthritis    Medicare annual wellness visit, initial    BMI 29 0-29 9,adult    Generalized abdominal pain    Nausea    Right hip pain       Past Medical History     Past Medical History:   Diagnosis Date    Anxiety     Anxiety and depression 10/2/2019    Breathing difficulty     Cancer (HCC)     cervical    GERD (gastroesophageal reflux disease)     Irritable bowel syndrome     Shortness of breath     Thumb injury 10/2019    Tinnitus of left ear 2/19/2020    Wrist arthritis 3/17/2020       Surgical History     Past Surgical History:   Procedure Laterality Date    HYSTERECTOMY      VA EXCIS INTERDIGITAL NEUROMA,EA Left 7/12/2018    Procedure: FOOT NEUROMA EXCISION;  Surgeon: Allan Mayen DPM;  Location: [address] [place] [address] MAIN OR;  Service: Podiatry    TUBAL LIGATION         Family History     Family History   Problem Relation Age of Onset   Marbin Shafer Stomach cancer Mother     Alcohol abuse Father     No Known Problems Sister     No Known Problems Daughter     No Known Problems Maternal Grandmother     No Known Problems Maternal Grandfather     No Known Problems Paternal Grandmother     No Known Problems Paternal Grandfather     No Known Problems Sister     No Known Problems Sister     No Known Problems Sister     No Known Problems Sister     No Known Problems Daughter     No Known Problems Daughter     No Known Problems Maternal Aunt     No Known Problems Maternal Aunt     No Known Problems Paternal Aunt     No Known Problems Paternal Aunt     No Known Problems Paternal Aunt     No Known Problems Paternal Aunt     No Known Problems Paternal Aunt     No Known Problems Paternal Aunt     No Known Problems Paternal Aunt     No Known Problems Paternal Aunt     No Known Problems Paternal Aunt     No Known Problems Paternal Aunt        Social History Social History     Social History     Tobacco Use   Smoking Status Former Smoker    Packs/day: 3 00    Years: 25 00    Pack years: 75 00    Quit date: 2003    Years since quittin 5   Smokeless Tobacco Never Used       Past Surgical History:   Procedure Laterality Date    HYSTERECTOMY      AL EXCIS INTERDIGITAL NEUROMA,EA Left 2018    Procedure: FOOT NEUROMA EXCISION;  Surgeon: Sol Pruett DPM;  Location: [facility] MAIN OR;  Service: Podiatry    TUBAL LIGATION         Please note :  Voice dictation software has been used to create this document  There may be inadvertent transcription errors      [id number] [address]

## 2021-01-22 ENCOUNTER — OFFICE VISIT (OUTPATIENT)
Dept: UROLOGY | Facility: CLINIC | Age: 66
End: 2021-01-22
Payer: MEDICARE

## 2021-01-22 VITALS
WEIGHT: 141.76 LBS | HEART RATE: 80 BPM | BODY MASS INDEX: 28.63 KG/M2 | SYSTOLIC BLOOD PRESSURE: 128 MMHG | DIASTOLIC BLOOD PRESSURE: 80 MMHG

## 2021-01-22 DIAGNOSIS — N13.30 HYDRONEPHROSIS, UNSPECIFIED HYDRONEPHROSIS TYPE: Primary | ICD-10-CM

## 2021-01-22 PROCEDURE — 99204 OFFICE O/P NEW MOD 45 MIN: CPT | Performed by: NURSE PRACTITIONER

## 2021-01-23 NOTE — H&P
Assessment and plan:       1  Moderate right hydronephrosis  -right ureter mildly dilated prison down and difficult to see more distally, cause obstruction was uncertain  -creatinine 0 92 (01/13/2021)  -will schedule for cytso/retrograde pyelogram/right stent replacement    2  History of cervical cancer 2017 status post hysterectomy  -hemoglobin 11 7/hematocrit 33 5  -scheduled to see gyn Onc on 01/26/2021     3  Urinary incontinence  -referral to uro Gyn 05/2018, did not go because she lost her living arrangements with her sister  -stress urinary incontinence  -recommended kegel exercises  -can refer to pelvic floor physical therapy in the future     JACE Tuttle    History of Present Illness     Angela Land is a 72 y o  new patient with a history of cervical cancer status post hysterectomy  Who recently presented to her PCP with a complaint of generalized abdominal pain with nausea at that time a CT of the abdomen and pelvis without contrast, CBC, CMP was ordered as well as an x-ray of the right hip and pelvis due to right hip pain  The CT scan of the abdomen and pelvis that revealed right-sided hydronephrosis which is at least moderate in severity  The ureter was mildly dilated is prison down  It was somewhat difficult to see more distally  And the precise cause of the obstruction was uncertain  The CT scan also revealed  lymphadenopathy within the retroperitoneum which was new compared to a prior study  Was also noted that there was ill-defined hazy opacity in the retroperitoneum surrounding the enlarged lymph nodes  It was indeterminate if the result was infectious, inflammatory or metastatic in nature  There lymph nodes measuring > 2 cm near the aorta  Aram Schrader was referred to Urology and Medical Oncology  Her intake was completed by medical oncology and she was subsequently referred to gyn Oncology    Patient does have transportation issues and Oncology is setting her up with [facility]  She had CT scan from 2018 through [facility] which revealed no evidence of recurrent or metastatic disease within the chest abdomen or pelvis  She had stable subcentimeter pulmonary nodules bilaterally and right hepatic lobe hemangioma is unchanged  No retroperitoneal or mesenteric adenopathy at that time  Her kidneys were unremarkable her bladder was unremarkable  PET scan 2017 at [facility] with no evidence of metabolically active tumor  She c/o discomfort in the right flank area  She was walking up the steps before thanksgi and had sudden onset of right hip pain and was unable to walk up the steps  She reports that the pain is now intermittent  Lives alone and does not drive, she has no car, lives out of boxes in "a dump in [place]"  Reports her children live in [place]  And she does not talk to her sister who she formally lived with  Very tearful in the office today  She has a history of a 40 lb intentional weight loss due to exercise and dietary changes  She reports being a former heavy smoker at 3 packs per day but she was unable to quantify for how many years  She quit approximately  10-15 years ago  She states her mother  of stomach cancer and does not want to suffer the way she did but she is not ready to die  Laboratory     Lab Results   Component Value Date    BUN 16 2021    CREATININE 0 92 2021       No components found for: GFR    Lab Results   Component Value Date    CALCIUM 9 9 2021     2018    K 3 9 2021    CO2 28 2021     2021       Lab Results   Component Value Date    WBC 7 20 2021    HGB 11 7 (L) 2021    HCT 35 5 (L) 2021    MCV 85 2021     2021       No results found for: PSA    No results found for this or any previous visit (from the past 1 hour(s))      @RESULT(URINEMICROSCOPIC)@    @RESULT(URINECULTURE)@    Radiology CT ABDOMEN AND PELVIS WITHOUT IV CONTRAST 1/13/21     INDICATION:   R10 84: Generalized abdominal pain  R11 0: Nausea  Right-sided abdominal pain  Constipation  History of cervical cancer 2-4 years ago with hysterectomy and chemotherapy and radiation therapy     COMPARISON:  4/10/2017     TECHNIQUE:  CT examination of the abdomen and pelvis was performed without intravenous contrast   Axial, sagittal, and coronal 2D reformatted images were created from the source data and submitted for interpretation       Radiation dose length product (DLP) for this visit:  304 1 mGy-cm   This examination, like all CT scans performed in the [facility], was performed utilizing techniques to minimize radiation dose exposure, including the use of iterative   reconstruction and automated exposure control       Enteric contrast was not administered       FINDINGS:     ABDOMEN     LOWER CHEST:  No clinically significant abnormality identified in the visualized lower chest      LIVER/BILIARY TREE:  There is faint visualization of a low-density lesion in the lateral aspect of the right hepatic lobe which corresponds to a previously seen peripherally enhanced lesion  Again, likely a benign cavernous hemangioma  The low-density   lesion today measures smaller than on the prior study although they may not be able to see the full extent of the lesion without contrast   A tiny hypodense focus in the right lobe on image 30 is stable from prior and is probably a cyst     GALLBLADDER:  I suspect there is a gallstone  No evidence of cholecystitis      SPLEEN:  Unremarkable      PANCREAS:  Unremarkable      ADRENAL GLANDS:  Unremarkable      KIDNEYS/URETERS:  Left kidney is unremarkable  There is right-sided hydronephrosis which is at least moderate in severity  The ureter is mildly dilated at least prison down  It is somewhat difficult to see more distally    The precise cause for the   obstruction is uncertain      STOMACH AND BOWEL:  There is mild colonic diverticulosis without diverticulitis      APPENDIX:  A normal appendix was visualized      ABDOMINOPELVIC CAVITY:  There is lymphadenopathy within the retroperitoneum which is new compared with the prior study  Surrounding the enlarged lymph nodes there is ill-defined hazy opacity in the retroperitoneum  The findings are nonspecific and   could potentially be infectious or inflammatory in etiology although I would be concerned about metastatic disease in a patient with a prior history of cervical cancer  The lack of contrast somewhat limits assessment  There are probably lymph nodes   measuring greater than 2 cm diameter near the aorta      VESSELS:  Limited assessment without contrast   At least mild atherosclerotic disease of aorta is noted      PELVIS     REPRODUCTIVE ORGANS:  Surgical changes of prior hysterectomy  No definitive evidence of localized pelvic mass      URINARY BLADDER:  The bladder is mostly empty  No stones are seen      ABDOMINAL WALL/INGUINAL REGIONS:  Unremarkable      OSSEOUS STRUCTURES:  No acute fracture or destructive osseous lesion      IMPRESSION:     There is moderate amount of ill-defined retroperitoneal lymphadenopathy which is concerning for possible metastatic disease in a patient with a prior history of cervical cancer  Other etiologies are not excluded  Infectious or inflammatory etiologies   are potentially a consideration as would be lymphoma      There is moderate right hydronephrosis and proximal hydroureter without any obvious cause for obstruction based on this unenhanced exam      A repeat study with intravenous contrast during nephrographic and delayed phases may be helpful for further workup of findings      Pre-existing liver lesion seems smaller than on the prior study, probably benign cavernous hemangioma      Mild colonic diverticulosis without diverticulitis     Hysterectomy    No pelvic mass identified  Review of Systems     Review of Systems   Constitutional: Negative for activity change, appetite change, chills, fatigue, fever and unexpected weight change  Hair loss x 1 year, started taking iron but became constipated   HENT: Negative for facial swelling  Eyes: Negative for discharge  Respiratory: Negative  Negative for cough and shortness of breath  Cardiovascular: Negative for chest pain and leg swelling  Gastrointestinal: Positive for constipation  Negative for abdominal distention, abdominal pain, diarrhea, nausea and vomiting  Endocrine: Negative  Genitourinary: Positive for flank pain, frequency and urgency  Negative for decreased urine volume, difficulty urinating, dysuria, enuresis and hematuria  Urinates small frequent amounts, feels like she does not empty her bladder when she voids  Has right flank and groin pain  Reports stress incontinence since hysterectomy  Has some urgency   Musculoskeletal: Positive for back pain  Negative for myalgias  Chronic low back pain, right hip pain that radiates down right leg   Skin: Negative for pallor and rash  Allergic/Immunologic: Negative  Negative for immunocompromised state  Neurological: Negative for facial asymmetry and speech difficulty  Hematological: Bruises/bleeds easily  Psychiatric/Behavioral: Negative for agitation and confusion  The patient is nervous/anxious  Allergies     Allergies   Allergen Reactions    Dust Mite Extract     Pollen Extract Sneezing       Physical Exam     Physical Exam  Constitutional:       General: She is not in acute distress  Appearance: Normal appearance  She is not ill-appearing, toxic-appearing or diaphoretic  HENT:      Head: Normocephalic and atraumatic  Nose: No congestion  Mouth/Throat:      Mouth: Mucous membranes are moist       Pharynx: Oropharynx is clear  Eyes:      General: No scleral icterus       Pupils: Pupils are equal, round, and reactive to light  Neck:      Musculoskeletal: Normal range of motion and neck supple  Cardiovascular:      Rate and Rhythm: Normal rate and regular rhythm  Pulses: Normal pulses  Heart sounds: Normal heart sounds  Pulmonary:      Effort: Pulmonary effort is normal  No respiratory distress  Breath sounds: Normal breath sounds  No wheezing, rhonchi or rales  Abdominal:      General: Abdomen is flat  Bowel sounds are normal  There is no distension  Palpations: Abdomen is soft  Tenderness: There is no abdominal tenderness  There is right CVA tenderness  There is no left CVA tenderness, guarding or rebound  Musculoskeletal:         General: No swelling or tenderness  Right lower leg: No edema  Left lower leg: No edema  Skin:     General: Skin is warm and dry  Coloration: Skin is not jaundiced or pale  Findings: No rash  Neurological:      General: No focal deficit present  Mental Status: She is alert and oriented to person, place, and time  Motor: No weakness  Gait: Gait normal    Psychiatric:         Mood and Affect: Mood normal          Behavior: Behavior normal       Comments: Tearful  Has no family support  Children live in [place]   She no longer talks to her sister who is local         Vital Signs     Vitals:    01/22/21 1348   BP: 128/80   Pulse: 80   Weight: 64 3 kg (141 lb 12 1 oz)       Current Medications       Current Outpatient Medications:     Biotin 10 MG CAPS, Take by mouth, Disp: , Rfl:     Multiple Vitamin (multivitamin) capsule, Take 1 capsule by mouth daily, Disp: , Rfl:     Active Problems     Patient Active Problem List   Diagnosis    Zaldivar's neuroma, left    Atypical glandular cells of undetermined significance of cervix    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[facility] [address] )    Menopausal and perimenopausal disorder    BMI 36 0-36 9,adult    Anxiety and depression    Trigger thumb of right hand    Tinnitus of left ear    Wrist arthritis    Medicare annual wellness visit, initial    BMI 29 0-29 9,adult    Generalized abdominal pain    Nausea    Right hip pain       Past Medical History     Past Medical History:   Diagnosis Date    Anxiety     Anxiety and depression 10/2/2019    Breathing difficulty     Cancer ([address] )     cervical    GERD (gastroesophageal reflux disease)     Irritable bowel syndrome     Shortness of breath     Thumb injury 10/2019    Tinnitus of left ear 2/19/2020    Wrist arthritis 3/17/2020       Surgical History     Past Surgical History:   Procedure Laterality Date    HYSTERECTOMY      OH EXCIS INTERDIGITAL NEUROMA,EA Left 7/12/2018    Procedure: FOOT NEUROMA EXCISION;  Surgeon: Tobi Rueda DPM;  Location: [address] [place] [address] MAIN OR;  Service: Podiatry    TUBAL LIGATION         Family History     Family History   Problem Relation Age of Onset   James Gerardo Stomach cancer Mother     Alcohol abuse Father     No Known Problems Sister     No Known Problems Daughter     No Known Problems Maternal Grandmother     No Known Problems Maternal Grandfather     No Known Problems Paternal Grandmother     No Known Problems Paternal Grandfather     No Known Problems Sister     No Known Problems Sister     No Known Problems Sister     No Known Problems Sister     No Known Problems Daughter     No Known Problems Daughter     No Known Problems Maternal Aunt     No Known Problems Maternal Aunt     No Known Problems Paternal Aunt     No Known Problems Paternal Aunt     No Known Problems Paternal Aunt     No Known Problems Paternal Aunt     No Known Problems Paternal Aunt     No Known Problems Paternal Aunt     No Known Problems Paternal Aunt     No Known Problems Paternal Aunt     No Known Problems Paternal Aunt     No Known Problems Paternal Aunt        Social History     Social History     Social History     Tobacco Use   Smoking Status Former Smoker    Packs/day: 3 00    Years: 25 00    Pack years: 75 00    Quit date: 2003    Years since quittin 5   Smokeless Tobacco Never Used       Past Surgical History:   Procedure Laterality Date    HYSTERECTOMY      CO EXCIS INTERDIGITAL NEUROMA,EA Left 2018    Procedure: FOOT NEUROMA EXCISION;  Surgeon: Lisa Palomares DPM;  Location: [address] [place] [address];  Service: Podiatry    TUBAL LIGATION         Please note :  Voice dictation software has been used to create this document  There may be inadvertent transcription errors      [id number] [id number] Wendy Castelan

## 2021-01-29 ENCOUNTER — TELEPHONE (OUTPATIENT)
Dept: UROLOGY | Facility: MEDICAL CENTER | Age: 66
End: 2021-01-29

## 2021-02-01 ENCOUNTER — TELEPHONE (OUTPATIENT)
Dept: GYNECOLOGIC ONCOLOGY | Facility: CLINIC | Age: 66
End: 2021-02-01

## 2021-02-04 ENCOUNTER — TELEPHONE (OUTPATIENT)
Dept: UROLOGY | Facility: AMBULATORY SURGERY CENTER | Age: 66
End: 2021-02-04

## 2021-02-04 ENCOUNTER — PREP FOR PROCEDURE (OUTPATIENT)
Dept: INTERVENTIONAL RADIOLOGY/VASCULAR | Facility: CLINIC | Age: 66
End: 2021-02-04

## 2021-02-04 ENCOUNTER — TELEPHONE (OUTPATIENT)
Dept: UROLOGY | Facility: CLINIC | Age: 66
End: 2021-02-04

## 2021-02-04 ENCOUNTER — CONSULT (OUTPATIENT)
Dept: GYNECOLOGIC ONCOLOGY | Facility: CLINIC | Age: 66
End: 2021-02-04
Payer: MEDICARE

## 2021-02-04 VITALS
RESPIRATION RATE: 18 BRPM | HEART RATE: 104 BPM | HEIGHT: 59 IN | WEIGHT: 139 LBS | DIASTOLIC BLOOD PRESSURE: 72 MMHG | SYSTOLIC BLOOD PRESSURE: 128 MMHG | BODY MASS INDEX: 28.02 KG/M2 | TEMPERATURE: 97.8 F

## 2021-02-04 DIAGNOSIS — R59.1 LYMPHADENOPATHY: ICD-10-CM

## 2021-02-04 DIAGNOSIS — R59.1 LYMPHADENOPATHY: Primary | ICD-10-CM

## 2021-02-04 DIAGNOSIS — R59.0 ABDOMINAL LYMPHADENOPATHY: ICD-10-CM

## 2021-02-04 DIAGNOSIS — C53.0 MALIGNANT NEOPLASM OF ENDOCERVIX (HCC): Primary | ICD-10-CM

## 2021-02-04 DIAGNOSIS — N13.39 OTHER HYDRONEPHROSIS: ICD-10-CM

## 2021-02-04 PROCEDURE — 99205 OFFICE O/P NEW HI 60 MIN: CPT | Performed by: OBSTETRICS & GYNECOLOGY

## 2021-02-04 RX ORDER — SODIUM CHLORIDE 9 MG/ML
75 INJECTION, SOLUTION INTRAVENOUS CONTINUOUS
Status: CANCELLED | OUTPATIENT
Start: 2021-02-04

## 2021-02-04 NOTE — LETTER
February 4, 2021     [address]  [facility], [place] [address]    Patient: Ami Cordero   YOB: 1955   Date of Visit: 2/4/2021       Dear Dr Alana Reed: Thank you for referring Ami Cordero to me for evaluation  Below are my notes for this consultation  If you have questions, please do not hesitate to call me  I look forward to following your patient along with you  Sincerely,        Isis Jane MD        CC: DO Isis Cueto MD  2/4/2021 12:06 PM  Sign when Signing Visit  Assessment/Plan:    Problem List Items Addressed This Visit        Immune and Lymphatic    Lymphadenopathy    Relevant Orders    Ambulatory referral to Urology    NM pet ct tumor imaging whole body    Ambulatory referral to Interventional Radiology       Genitourinary    Malignant neoplasm of endocervix [facility]) - Primary       Patient with history of known stage I B2 cervical cancer identified incidentally at the time of simple hysterectomy for high-grade cervical dysplasia  She did receive postoperative adjuvant chemo radiotherapy at [facility] treatment completed in June of 2017  Unfortunately, she has had issues with transportation which has precluded her from benefiting from subsequent adequate surveillance  Now, evaluation of right pelvic/abdominal pain demonstrates retroperitoneal periaortic lymphadenopathy with right hydronephrosis all concerning for the possibility of tumor recurrence  I have discussed findings with patient and shared my concern for tumor recurrence  I have recommended immediate urologic intervention by means of retrograde ureteral stent versus percutaneous nephrostomy tube to preserve renal function  Additionally, she would also benefit from PET-CT and attempt at interventional radiology percutaneous biopsy of retroperitoneal lymph nodes for diagnostic confirmation    Will request pathology from [facility] to be reviewed and then submit sample from either that biopsy or IR biopsy for molecular tumor testing  I will connect with patient via telemedicine visit shortly after IR biopsy to discuss results and plan of care  Patient has verbalized understanding  All questions were answered to her satisfaction  Relevant Orders    Ambulatory referral to Urology    NM pet ct tumor imaging whole body    Ambulatory referral to Interventional Radiology    Other hydronephrosis    Relevant Orders    Ambulatory referral to Urology      Other Visit Diagnoses     Abdominal lymphadenopathy        Relevant Orders    Ambulatory referral to Urology    NM pet ct tumor imaging whole body    Ambulatory referral to Interventional Radiology              CHIEF COMPLAINT:     Here for consultation for newly diagnosed retroperitoneal lymphadenopathy, hydronephrosis, known history of cervical cancer, concern for recurrence  Subjective:     Problem:  Cancer Staging  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[facility] [address] )  Staging form: Cervix Uteri, AJCC 7th Edition  - Clinical stage from 3/29/2017: FIGO Stage IB2, calculated as Stage Unknown (T1b2, NX, M0) - Signed by Derick Ramachandran MD on 2/4/2021      Previous therapy:  Oncology History   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[facility] [address] )   12/2016 Initial Diagnosis    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 ([facility])       3/29/2017 -  Cancer Staged    Staging form: Cervix Uteri, AJCC 7th Edition  - Clinical stage from 3/29/2017: FIGO Stage IB2, calculated as Stage Unknown (T1b2, NX, M0) - Signed by Derick Ramachandran MD on 2/4/2021  Staged by: Managing physician  Specimen type: Excision  Histopathologic type: Squamous cell carcinoma, NOS  Histologic grade (G): G3  Lymph-vascular invasion (LVI): LVI present/identified, NOS  Residual tumor (R): R0 - None  Pelvic jace status: Negative  Pelvic jace method of assessment: PET  Para-aortic status: Negative  Para-aortic jace method of assessment: PET  Stage used in treatment planning: Yes  National guidelines used in treatment planning: Yes  Type of national guideline used in treatment planning: NCCN       3/29/2017 Surgery    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)  FInal path with incidental 5 cm grade 3 invasive cervical SCCA, +LVI, invasion 9/10 mm cervical stroma, no lymphadenectomy, margins negative ([facility])  Patient ID: Danitza Martin is a 72 y o  female  HPI    70-year-old healthy postmenopausal  female with known history of cervical dysplasia  In December of 2016 evaluation of abnormal Pap smear included endometrial biopsy and endocervical curettage which demonstrated high-grade dysplasia/ RADHA 3  On those grounds, she was taken to the operating room for simple robotic hysterectomy bilateral salpingo-oophorectomy  This was performed at [facility]   Unfortunately, surgical specimen demonstrated a 5 cm endocervical squamous cell carcinoma, grade 3, invading 9/10 mm of the cervical stroma with evidence of lymphovascular invasion  Margins of resection were close but negative  Patient went on to receive radiotherapy with adjuvant radiosensitizing cisplatin from made through June of 2017  Her treatment course was somewhat affected by personal [place] issues which precluded her from benefiting from last 2 cycles of planned cisplatin  Patient does not recall receiving vaginal brachytherapy at that time  She was in her usual state of health until approximately 2 months ago  At that time, started experiencing right hip/pelvic pain  Due to persistence, this was evaluated by CT scan which unfortunately has demonstrated retroperitoneal periaortic lymphadenopathy and right hydronephrosis  Given her history, she was referred to us for evaluation and management  Review of Systems    As per HPI  Some constipation  Denies vaginal bleeding, drainage or discharge     Reports decreased appetite and mixed weight loss  Twelve point review of systems otherwise noncontributory  Current Outpatient Medications   Medication Sig Dispense Refill    Biotin 10 MG CAPS Take by mouth      Multiple Vitamin (multivitamin) capsule Take 1 capsule by mouth daily       No current facility-administered medications for this visit          Allergies   Allergen Reactions    Dust Mite Extract     Pollen Extract Sneezing       Past Medical History:   Diagnosis Date    Anxiety     Anxiety and depression 10/2/2019    Breathing difficulty     Cancer ([address] )     cervical    GERD (gastroesophageal reflux disease)     Irritable bowel syndrome     Shortness of breath     Thumb injury 10/2019    Tinnitus of left ear 2020    Wrist arthritis 3/17/2020       Past Surgical History:   Procedure Laterality Date    HYSTERECTOMY      DC EXCIS INTERDIGITAL NEUROMA,EA Left 2018    Procedure: FOOT NEUROMA EXCISION;  Surgeon: Evens Campo DPM;  Location: [address] [place] [address] MAIN OR;  Service: Podiatry    TUBAL LIGATION         OB History        6    Para   4    Term   4            AB   2    Living   4       SAB   2    TAB        Ectopic        Multiple        Live Births   4                 Family History   Problem Relation Age of Onset   [facility] Stomach cancer Mother     Alcohol abuse Father     No Known Problems Sister     No Known Problems Daughter     No Known Problems Maternal Grandmother     No Known Problems Maternal Grandfather     No Known Problems Paternal Grandmother     No Known Problems Paternal Grandfather     No Known Problems Sister     No Known Problems Sister     No Known Problems Sister     No Known Problems Sister     No Known Problems Daughter     No Known Problems Daughter     No Known Problems Maternal Aunt     No Known Problems Maternal Aunt     No Known Problems Paternal Aunt     No Known Problems Paternal Aunt     No Known Problems Paternal Aunt     No Known Problems Paternal Aunt     No Known Problems Paternal Aunt     No Known Problems Paternal Aunt     No Known Problems Paternal Aunt     No Known Problems Paternal Aunt     No Known Problems Paternal Aunt     No Known Problems Paternal Aunt        {Common ambulatory SmartLinks:[id number]}      Objective:    Blood pressure 128/72, pulse 104, temperature 97 8 °F (36 6 °C), temperature source Temporal, resp  rate 18, height 4' 11" (1 499 m), weight 63 kg (139 lb), not currently breastfeeding  Body mass index is 28 07 kg/m²  Physical Exam  Vitals signs reviewed  Exam conducted with a chaperone present  Constitutional:       General: She is not in acute distress  Appearance: Normal appearance  Eyes:      General: No scleral icterus  Right eye: No discharge  Left eye: No discharge  Conjunctiva/sclera: Conjunctivae normal    Neck:      Musculoskeletal: Neck supple  No neck rigidity  Cardiovascular:      Rate and Rhythm: Normal rate and regular rhythm  Heart sounds: Normal heart sounds  No murmur  Pulmonary:      Effort: Pulmonary effort is normal  No respiratory distress  Breath sounds: Normal breath sounds  No stridor  No wheezing  Abdominal:      General: There is no distension  Palpations: Abdomen is soft  Tenderness: There is no abdominal tenderness  There is no right CVA tenderness  Genitourinary:     Comments: Normal external female genitalia  Normal Bartholin's and Marcellus's glands  Normal urethral meatus and no evidence of urethral discharge or masses  Bladder without fullness mass or tenderness  Vagina without lesion or discharge  No significant pelvic organ prolapse noted  Cervix and uterus are surgically absent  Bimanual exam Smooth induration of the right pelvic sidewall which is quite tender to palpation  No discrete rectovaginal septum or parametrial nodularity appreciated on rectovaginal examination  Anus without fissure of lesion      Lymphadenopathy:      Cervical: Cervical adenopathy (  Questionable left supraclavicular fullness suggestive of possible lymphadenopathy?) present  1/13/2021 Study Result    CT ABDOMEN AND PELVIS WITHOUT IV CONTRAST     INDICATION:   R10 84: Generalized abdominal pain  R11 0: Nausea  Right-sided abdominal pain  Constipation  History of cervical cancer 2-4 years ago with hysterectomy and chemotherapy and radiation therapy     COMPARISON:  4/10/2017     TECHNIQUE:  CT examination of the abdomen and pelvis was performed without intravenous contrast   Axial, sagittal, and coronal 2D reformatted images were created from the source data and submitted for interpretation       Radiation dose length product (DLP) for this visit:  304 1 mGy-cm   This examination, like all CT scans performed in the [facility], was performed utilizing techniques to minimize radiation dose exposure, including the use of iterative   reconstruction and automated exposure control       Enteric contrast was not administered       FINDINGS:     ABDOMEN     LOWER CHEST:  No clinically significant abnormality identified in the visualized lower chest      LIVER/BILIARY TREE:  There is faint visualization of a low-density lesion in the lateral aspect of the right hepatic lobe which corresponds to a previously seen peripherally enhanced lesion  Again, likely a benign cavernous hemangioma  The low-density   lesion today measures smaller than on the prior study although they may not be able to see the full extent of the lesion without contrast   A tiny hypodense focus in the right lobe on image 30 is stable from prior and is probably a cyst     GALLBLADDER:  I suspect there is a gallstone  No evidence of cholecystitis      SPLEEN:  Unremarkable      PANCREAS:  Unremarkable      ADRENAL GLANDS:  Unremarkable      KIDNEYS/URETERS:  Left kidney is unremarkable  There is right-sided hydronephrosis which is at least moderate in severity    The ureter is mildly dilated at least USP down  It is somewhat difficult to see more distally  The precise cause for the   obstruction is uncertain      STOMACH AND BOWEL:  There is mild colonic diverticulosis without diverticulitis      APPENDIX:  A normal appendix was visualized      ABDOMINOPELVIC CAVITY:  There is lymphadenopathy within the retroperitoneum which is new compared with the prior study  Surrounding the enlarged lymph nodes there is ill-defined hazy opacity in the retroperitoneum  The findings are nonspecific and   could potentially be infectious or inflammatory in etiology although I would be concerned about metastatic disease in a patient with a prior history of cervical cancer  The lack of contrast somewhat limits assessment  There are probably lymph nodes   measuring greater than 2 cm diameter near the aorta      VESSELS:  Limited assessment without contrast   At least mild atherosclerotic disease of aorta is noted      PELVIS     REPRODUCTIVE ORGANS:  Surgical changes of prior hysterectomy  No definitive evidence of localized pelvic mass      URINARY BLADDER:  The bladder is mostly empty  No stones are seen      ABDOMINAL WALL/INGUINAL REGIONS:  Unremarkable      OSSEOUS STRUCTURES:  No acute fracture or destructive osseous lesion      IMPRESSION:     There is moderate amount of ill-defined retroperitoneal lymphadenopathy which is concerning for possible metastatic disease in a patient with a prior history of cervical cancer  Other etiologies are not excluded    Infectious or inflammatory etiologies   are potentially a consideration as would be lymphoma      There is moderate right hydronephrosis and proximal hydroureter without any obvious cause for obstruction based on this unenhanced exam      A repeat study with intravenous contrast during nephrographic and delayed phases may be helpful for further workup of findings      Pre-existing liver lesion seems smaller than on the prior study, probably benign cavernous hemangioma      Mild colonic diverticulosis without diverticulitis     Hysterectomy  No pelvic mass identified            I personally discussed this study with MORIAH Elias on 1/18/2021 at 3:13 PM                     Workstation performed: [id number]      I have personally reviewed CT scan images  There is evidence of periaortic retroperitoneal fullness consistent with lymphadenopathy as described by radiologist's as well  There is evidence of right hydronephrosis with proximal hydroureter  No discrete peritoneal nodularity  No ascites  No omental caking noted      No results found for:   Lab Results   Component Value Date    WBC 7 20 01/13/2021    HGB 11 7 (L) 01/13/2021    HCT 35 5 (L) 01/13/2021    MCV 85 01/13/2021     01/13/2021     Lab Results   Component Value Date     06/11/2018    K 3 9 01/13/2021     01/13/2021    CO2 28 01/13/2021    ANIONGAP 13 0 06/11/2018    BUN 16 01/13/2021    CREATININE 0 92 01/13/2021    GLUF 97 01/13/2021    CALCIUM 9 9 01/13/2021    AST 17 01/13/2021    ALT 14 01/13/2021    ALKPHOS 107 01/13/2021    PROT 7 0 06/11/2018    BILITOT 0 3 06/11/2018    EGFR 66 01/13/2021     MD Anam, [address] [facility]  2/4/2021  12:06 PM

## 2021-02-04 NOTE — LETTER
February 4, 2021     [address], [address]  [facility] [address]    Patient: Joan Hinojosa   YOB: 1955   Date of Visit: 2/4/2021       Dear Dr Priya Cox: Thank you for referring Joan Hinojosa to me for evaluation  Below are my notes for this consultation  If you have questions, please do not hesitate to call me  I look forward to following your patient along with you  Sincerely,        She Gaviria MD        CC: DO She Miller MD  2/4/2021 12:08 PM  Incomplete  Assessment/Plan:    Problem List Items Addressed This Visit        Immune and Lymphatic    Lymphadenopathy    Relevant Orders    Ambulatory referral to Urology    NM pet ct tumor imaging whole body    Ambulatory referral to Interventional Radiology       Genitourinary    Malignant neoplasm of endocervix [facility]) - Primary       Patient with history of known stage I B2 cervical cancer identified incidentally at the time of simple hysterectomy for high-grade cervical dysplasia  She did receive postoperative adjuvant chemo radiotherapy at [facility] [address] treatment completed in June of 2017  Unfortunately, she has had issues with transportation which has precluded her from benefiting from subsequent adequate surveillance  Now, evaluation of right pelvic/abdominal pain demonstrates retroperitoneal periaortic lymphadenopathy with right hydronephrosis all concerning for the possibility of tumor recurrence  I have discussed findings with patient and shared my concern for tumor recurrence  I have recommended immediate urologic intervention by means of retrograde ureteral stent versus percutaneous nephrostomy tube to preserve renal function  Additionally, she would also benefit from PET-CT and attempt at interventional radiology percutaneous biopsy of retroperitoneal lymph nodes for diagnostic confirmation    Will request pathology from [facility] to be reviewed and then submit sample from either that biopsy or IR biopsy for molecular tumor testing  I will connect with patient via telemedicine visit shortly after IR biopsy to discuss results and plan of care  Patient has verbalized understanding  All questions were answered to her satisfaction  Relevant Orders    Ambulatory referral to Urology    NM pet ct tumor imaging whole body    Ambulatory referral to Interventional Radiology    Other hydronephrosis    Relevant Orders    Ambulatory referral to Urology      Other Visit Diagnoses     Abdominal lymphadenopathy        Relevant Orders    Ambulatory referral to Urology    NM pet ct tumor imaging whole body    Ambulatory referral to Interventional Radiology              CHIEF COMPLAINT:     Here for consultation for newly diagnosed retroperitoneal lymphadenopathy, hydronephrosis, known history of cervical cancer, concern for recurrence  Subjective:     Problem:  Cancer Staging  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[facility] [address] )  Staging form: Cervix Uteri, AJCC 7th Edition  - Clinical stage from 3/29/2017: FIGO Stage IB2, calculated as Stage Unknown (T1b2, NX, M0) - Signed by Narayan Palomares MD on 2/4/2021      Previous therapy:  Oncology History   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[facility] [address] )   12/2016 Initial Diagnosis    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 ([facility])       3/29/2017 -  Cancer Staged    Staging form: Cervix Uteri, AJCC 7th Edition  - Clinical stage from 3/29/2017: FIGO Stage IB2, calculated as Stage Unknown (T1b2, NX, M0) - Signed by Narayan Palomares MD on 2/4/2021  Staged by: Managing physician  Specimen type: Excision  Histopathologic type: Squamous cell carcinoma, NOS  Histologic grade (G): G3  Lymph-vascular invasion (LVI): LVI present/identified, NOS  Residual tumor (R): R0 - None  Pelvic jace status: Negative  Pelvic jace method of assessment: PET  Para-aortic status: Negative  Para-aortic jace method of assessment: PET  Stage used in treatment planning: Yes  National guidelines used in treatment planning: Yes  Type of national guideline used in treatment planning: NCCN       3/29/2017 Surgery    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)  FInal path with incidental 5 cm grade 3 invasive cervical SCCA, +LVI, invasion 9/10 mm cervical stroma, no lymphadenectomy, margins negative ([facility])  Patient ID: Michelle Benítez is a 72 y o  female  HPI    68-year-old healthy postmenopausal  female with known history of cervical dysplasia  In December of 2016 evaluation of abnormal Pap smear included endometrial biopsy and endocervical curettage which demonstrated high-grade dysplasia/ RADHA 3  On those grounds, she was taken to the operating room for simple robotic hysterectomy bilateral salpingo-oophorectomy  This was performed at [facility]   Unfortunately, surgical specimen demonstrated a 5 cm endocervical squamous cell carcinoma, grade 3, invading 9/10 mm of the cervical stroma with evidence of lymphovascular invasion  Margins of resection were close but negative  Patient went on to receive radiotherapy with adjuvant radiosensitizing cisplatin from made through June of 2017  Her treatment course was somewhat affected by personal Duayne Mantle issues which precluded her from benefiting from last 2 cycles of planned cisplatin  Patient does not recall receiving vaginal brachytherapy at that time  She was in her usual state of health until approximately 2 months ago  At that time, started experiencing right hip/pelvic pain  Due to persistence, this was evaluated by CT scan which unfortunately has demonstrated retroperitoneal periaortic lymphadenopathy and right hydronephrosis  Given her history, she was referred to us for evaluation and management  Review of Systems    As per HPI  Some constipation  Denies vaginal bleeding, drainage or discharge     Reports decreased appetite and mixed weight loss   Twelve point review of systems otherwise noncontributory  Current Outpatient Medications   Medication Sig Dispense Refill    Biotin 10 MG CAPS Take by mouth      Multiple Vitamin (multivitamin) capsule Take 1 capsule by mouth daily       No current facility-administered medications for this visit          Allergies   Allergen Reactions    Dust Mite Extract     Pollen Extract Sneezing       Past Medical History:   Diagnosis Date    Anxiety     Anxiety and depression 10/2/2019    Breathing difficulty     Cancer ([address] )     cervical    GERD (gastroesophageal reflux disease)     Irritable bowel syndrome     Shortness of breath     Thumb injury 10/2019    Tinnitus of left ear 2020    Wrist arthritis 3/17/2020       Past Surgical History:   Procedure Laterality Date    HYSTERECTOMY      PA EXCIS INTERDIGITAL NEUROMA,EA Left 2018    Procedure: FOOT NEUROMA EXCISION;  Surgeon: Glenn Weeks DPM;  Location: [address] [place] [address] MAIN OR;  Service: Podiatry    TUBAL LIGATION         OB History        6    Para   4    Term   4            AB   2    Living   4       SAB   2    TAB        Ectopic        Multiple        Live Births   4                 Family History   Problem Relation Age of Onset   Rhonda Laureano Stomach cancer Mother     Alcohol abuse Father     No Known Problems Sister     No Known Problems Daughter     No Known Problems Maternal Grandmother     No Known Problems Maternal Grandfather     No Known Problems Paternal Grandmother     No Known Problems Paternal Grandfather     No Known Problems Sister     No Known Problems Sister     No Known Problems Sister     No Known Problems Sister     No Known Problems Daughter     No Known Problems Daughter     No Known Problems Maternal Aunt     No Known Problems Maternal Aunt     No Known Problems Paternal Aunt     No Known Problems Paternal Aunt     No Known Problems Paternal Aunt     No Known Problems Paternal Aunt     No Known Problems Paternal Aunt     No Known Problems Paternal Aunt     No Known Problems Paternal Aunt     No Known Problems Paternal Aunt     No Known Problems Paternal Aunt     No Known Problems Paternal Aunt        The following portions of the patient's history were reviewed and updated as appropriate: allergies, current medications, past family history, past medical history, past social history, past surgical history and problem list       Objective:    Blood pressure 128/72, pulse 104, temperature 97 8 °F (36 6 °C), temperature source Temporal, resp  rate 18, height 4' 11" (1 499 m), weight 63 kg (139 lb), not currently breastfeeding  Body mass index is 28 07 kg/m²  Physical Exam  Vitals signs reviewed  Exam conducted with a chaperone present  Constitutional:       General: She is not in acute distress  Appearance: Normal appearance  Eyes:      General: No scleral icterus  Right eye: No discharge  Left eye: No discharge  Conjunctiva/sclera: Conjunctivae normal    Neck:      Musculoskeletal: Neck supple  No neck rigidity  Cardiovascular:      Rate and Rhythm: Normal rate and regular rhythm  Heart sounds: Normal heart sounds  No murmur  Pulmonary:      Effort: Pulmonary effort is normal  No respiratory distress  Breath sounds: Normal breath sounds  No stridor  No wheezing  Abdominal:      General: There is no distension  Palpations: Abdomen is soft  Tenderness: There is no abdominal tenderness  There is no right CVA tenderness  Genitourinary:     Comments: Normal external female genitalia  Normal Bartholin's and [place]'s glands  Normal urethral meatus and no evidence of urethral discharge or masses  Bladder without fullness mass or tenderness  Vagina without lesion or discharge  No significant pelvic organ prolapse noted  Cervix and uterus are surgically absent    Bimanual exam Smooth induration of the right pelvic sidewall which is quite tender to palpation  No discrete rectovaginal septum or parametrial nodularity appreciated on rectovaginal examination  Anus without fissure of lesion  Lymphadenopathy:      Cervical: Cervical adenopathy (  Questionable left supraclavicular fullness suggestive of possible lymphadenopathy?) present  1/13/2021 Study Result    CT ABDOMEN AND PELVIS WITHOUT IV CONTRAST     INDICATION:   R10 84: Generalized abdominal pain  R11 0: Nausea  Right-sided abdominal pain  Constipation  History of cervical cancer 2-4 years ago with hysterectomy and chemotherapy and radiation therapy     COMPARISON:  4/10/2017     TECHNIQUE:  CT examination of the abdomen and pelvis was performed without intravenous contrast   Axial, sagittal, and coronal 2D reformatted images were created from the source data and submitted for interpretation       Radiation dose length product (DLP) for this visit:  304 1 mGy-cm   This examination, like all CT scans performed in the [facility], was performed utilizing techniques to minimize radiation dose exposure, including the use of iterative   reconstruction and automated exposure control       Enteric contrast was not administered       FINDINGS:     ABDOMEN     LOWER CHEST:  No clinically significant abnormality identified in the visualized lower chest      LIVER/BILIARY TREE:  There is faint visualization of a low-density lesion in the lateral aspect of the right hepatic lobe which corresponds to a previously seen peripherally enhanced lesion  Again, likely a benign cavernous hemangioma  The low-density   lesion today measures smaller than on the prior study although they may not be able to see the full extent of the lesion without contrast   A tiny hypodense focus in the right lobe on image 30 is stable from prior and is probably a cyst     GALLBLADDER:  I suspect there is a gallstone    No evidence of cholecystitis      SPLEEN:  Unremarkable      PANCREAS: Unremarkable      ADRENAL GLANDS:  Unremarkable      KIDNEYS/URETERS:  Left kidney is unremarkable  There is right-sided hydronephrosis which is at least moderate in severity  The ureter is mildly dilated at least snf down  It is somewhat difficult to see more distally  The precise cause for the   obstruction is uncertain      STOMACH AND BOWEL:  There is mild colonic diverticulosis without diverticulitis      APPENDIX:  A normal appendix was visualized      ABDOMINOPELVIC CAVITY:  There is lymphadenopathy within the retroperitoneum which is new compared with the prior study  Surrounding the enlarged lymph nodes there is ill-defined hazy opacity in the retroperitoneum  The findings are nonspecific and   could potentially be infectious or inflammatory in etiology although I would be concerned about metastatic disease in a patient with a prior history of cervical cancer  The lack of contrast somewhat limits assessment  There are probably lymph nodes   measuring greater than 2 cm diameter near the aorta      VESSELS:  Limited assessment without contrast   At least mild atherosclerotic disease of aorta is noted      PELVIS     REPRODUCTIVE ORGANS:  Surgical changes of prior hysterectomy  No definitive evidence of localized pelvic mass      URINARY BLADDER:  The bladder is mostly empty  No stones are seen      ABDOMINAL WALL/INGUINAL REGIONS:  Unremarkable      OSSEOUS STRUCTURES:  No acute fracture or destructive osseous lesion      IMPRESSION:     There is moderate amount of ill-defined retroperitoneal lymphadenopathy which is concerning for possible metastatic disease in a patient with a prior history of cervical cancer  Other etiologies are not excluded    Infectious or inflammatory etiologies   are potentially a consideration as would be lymphoma      There is moderate right hydronephrosis and proximal hydroureter without any obvious cause for obstruction based on this unenhanced exam      A repeat study with intravenous contrast during nephrographic and delayed phases may be helpful for further workup of findings      Pre-existing liver lesion seems smaller than on the prior study, probably benign cavernous hemangioma      Mild colonic diverticulosis without diverticulitis     Hysterectomy  No pelvic mass identified            I personally discussed this study with SATHYA/ Avtarnikia Curt on 1/18/2021 at 3:13 PM                     Workstation performed: [id number]      I have personally reviewed CT scan images  There is evidence of periaortic retroperitoneal fullness consistent with lymphadenopathy as described by radiologist's as well  There is evidence of right hydronephrosis with proximal hydroureter  No discrete peritoneal nodularity  No ascites  No omental caking noted      No results found for:   Lab Results   Component Value Date    WBC 7 20 01/13/2021    HGB 11 7 (L) 01/13/2021    HCT 35 5 (L) 01/13/2021    MCV 85 01/13/2021     01/13/2021     Lab Results   Component Value Date     06/11/2018    K 3 9 01/13/2021     01/13/2021    CO2 28 01/13/2021    ANIONGAP 13 0 06/11/2018    BUN 16 01/13/2021    CREATININE 0 92 01/13/2021    GLUF 97 01/13/2021    CALCIUM 9 9 01/13/2021    AST 17 01/13/2021    ALT 14 01/13/2021    ALKPHOS 107 01/13/2021    PROT 7 0 06/11/2018    BILITOT 0 3 06/11/2018    EGFR 66 01/13/2021     Jen Ferguson MD, ROSS Cobian  2/4/2021  12:06 PM          Jen Ferguson MD  2/4/2021 12:06 PM  Sign when Signing Visit  Assessment/Plan:    Problem List Items Addressed This Visit        Immune and Lymphatic    Lymphadenopathy    Relevant Orders    Ambulatory referral to Urology    NM pet ct tumor imaging whole body    Ambulatory referral to Interventional Radiology       Genitourinary    Malignant neoplasm of endocervix [facility]) - Primary       Patient with history of known stage I B2 cervical cancer identified incidentally at the time of simple hysterectomy for high-grade cervical dysplasia  She did receive postoperative adjuvant chemo radiotherapy at [facility] treatment completed in June of 2017  Unfortunately, she has had issues with transportation which has precluded her from benefiting from subsequent adequate surveillance  Now, evaluation of right pelvic/abdominal pain demonstrates retroperitoneal periaortic lymphadenopathy with right hydronephrosis all concerning for the possibility of tumor recurrence  I have discussed findings with patient and shared my concern for tumor recurrence  I have recommended immediate urologic intervention by means of retrograde ureteral stent versus percutaneous nephrostomy tube to preserve renal function  Additionally, she would also benefit from PET-CT and attempt at interventional radiology percutaneous biopsy of retroperitoneal lymph nodes for diagnostic confirmation  Will request pathology from [facility] to be reviewed and then submit sample from either that biopsy or IR biopsy for molecular tumor testing  I will connect with patient via telemedicine visit shortly after IR biopsy to discuss results and plan of care  Patient has verbalized understanding  All questions were answered to her satisfaction  Relevant Orders    Ambulatory referral to Urology    NM pet ct tumor imaging whole body    Ambulatory referral to Interventional Radiology    Other hydronephrosis    Relevant Orders    Ambulatory referral to Urology      Other Visit Diagnoses     Abdominal lymphadenopathy        Relevant Orders    Ambulatory referral to Urology    NM pet ct tumor imaging whole body    Ambulatory referral to Interventional Radiology              CHIEF COMPLAINT:     Here for consultation for newly diagnosed retroperitoneal lymphadenopathy, hydronephrosis, known history of cervical cancer, concern for recurrence      Subjective:     Problem:  Cancer Staging  Malignant neoplasm of endocervix [facility])  Staging form: Cervix Uteri, AJCC 7th Edition  - Clinical stage from 3/29/2017: FIGO Stage IB2, calculated as Stage Unknown (T1b2, NX, M0) - Signed by Wild Philip MD on 2/4/2021      Previous therapy:  Oncology History   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[address] )   12/2016 Initial Diagnosis    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)  3/29/2017 -  Cancer Staged    Staging form: Cervix Uteri, AJCC 7th Edition  - Clinical stage from 3/29/2017: FIGO Stage IB2, calculated as Stage Unknown (T1b2, NX, M0) - Signed by Wild Philip MD on 2/4/2021  Staged by: Managing physician  Specimen type: Excision  Histopathologic type: Squamous cell carcinoma, NOS  Histologic grade (G): G3  Lymph-vascular invasion (LVI): LVI present/identified, NOS  Residual tumor (R): R0 - None  Pelvic jace status: Negative  Pelvic jace method of assessment: PET  Para-aortic status: Negative  Para-aortic jace method of assessment: PET  Stage used in treatment planning: Yes  National guidelines used in treatment planning: Yes  Type of national guideline used in treatment planning: NCCN       3/29/2017 Surgery    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)  FInal path with incidental 5 cm grade 3 invasive cervical SCCA, +LVI, invasion 9/10 mm cervical stroma, no lymphadenectomy, margins negative ([facility])  Patient ID: Brandy Blanca is a 72 y o  female  HPI    40-year-old healthy postmenopausal  female with known history of cervical dysplasia  In December of 2016 evaluation of abnormal Pap smear included endometrial biopsy and endocervical curettage which demonstrated high-grade dysplasia/ RADHA 3  On those grounds, she was taken to the operating room for simple robotic hysterectomy bilateral salpingo-oophorectomy    This was performed at [facility]   Unfortunately, surgical specimen demonstrated a 5 cm endocervical squamous cell carcinoma, grade 3, invading 9/10 mm of the cervical stroma with evidence of lymphovascular invasion  Margins of resection were close but negative  Patient went on to receive radiotherapy with adjuvant radiosensitizing cisplatin from made through June of 2017  Her treatment course was somewhat affected by personal Bonner Bernheim issues which precluded her from benefiting from last 2 cycles of planned cisplatin  Patient does not recall receiving vaginal brachytherapy at that time  She was in her usual state of health until approximately 2 months ago  At that time, started experiencing right hip/pelvic pain  Due to persistence, this was evaluated by CT scan which unfortunately has demonstrated retroperitoneal periaortic lymphadenopathy and right hydronephrosis  Given her history, she was referred to us for evaluation and management  Review of Systems    As per HPI  Some constipation  Denies vaginal bleeding, drainage or discharge  Reports decreased appetite and mixed weight loss  Twelve point review of systems otherwise noncontributory  Current Outpatient Medications   Medication Sig Dispense Refill    Biotin 10 MG CAPS Take by mouth      Multiple Vitamin (multivitamin) capsule Take 1 capsule by mouth daily       No current facility-administered medications for this visit          Allergies   Allergen Reactions    Dust Mite Extract     Pollen Extract Sneezing       Past Medical History:   Diagnosis Date    Anxiety     Anxiety and depression 10/2/2019    Breathing difficulty     Cancer ([address] )     cervical    GERD (gastroesophageal reflux disease)     Irritable bowel syndrome     Shortness of breath     Thumb injury 10/2019    Tinnitus of left ear 2/19/2020    Wrist arthritis 3/17/2020       Past Surgical History:   Procedure Laterality Date    HYSTERECTOMY      MS EXCIS INTERDIGITAL NEUROMA,EA Left 7/12/2018    Procedure: FOOT NEUROMA EXCISION;  Surgeon: Tia Curtis DPM;  Location: [address] [place] [address] OR;  Service: Podiatry    TUBAL LIGATION         OB History        6    Para   4    Term   4            AB   2    Living   4       SAB   2    TAB        Ectopic        Multiple        Live Births   4                 Family History   Problem Relation Age of Onset   Karenjerald [place] Stomach cancer Mother     Alcohol abuse Father     No Known Problems Sister     No Known Problems Daughter     No Known Problems Maternal Grandmother     No Known Problems Maternal Grandfather     No Known Problems Paternal Grandmother     No Known Problems Paternal Grandfather     No Known Problems Sister     No Known Problems Sister     No Known Problems Sister     No Known Problems Sister     No Known Problems Daughter     No Known Problems Daughter     No Known Problems Maternal Aunt     No Known Problems Maternal Aunt     No Known Problems Paternal Aunt     No Known Problems Paternal Aunt     No Known Problems Paternal Aunt     No Known Problems Paternal Aunt     No Known Problems Paternal Aunt     No Known Problems Paternal Aunt     No Known Problems Paternal Aunt     No Known Problems Paternal Aunt     No Known Problems Paternal Aunt     No Known Problems Paternal Aunt        {Common ambulatory SmartLinks:44379}      Objective:    Blood pressure 128/72, pulse 104, temperature 97 8 °F (36 6 °C), temperature source Temporal, resp  rate 18, height 4' 11" (1 499 m), weight 63 kg (139 lb), not currently breastfeeding  Body mass index is 28 07 kg/m²  Physical Exam  Vitals signs reviewed  Exam conducted with a chaperone present  Constitutional:       General: She is not in acute distress  Appearance: Normal appearance  Eyes:      General: No scleral icterus  Right eye: No discharge  Left eye: No discharge  Conjunctiva/sclera: Conjunctivae normal    Neck:      Musculoskeletal: Neck supple  No neck rigidity  Cardiovascular:      Rate and Rhythm: Normal rate and regular rhythm  Heart sounds: Normal heart sounds  No murmur  Pulmonary:      Effort: Pulmonary effort is normal  No respiratory distress  Breath sounds: Normal breath sounds  No stridor  No wheezing  Abdominal:      General: There is no distension  Palpations: Abdomen is soft  Tenderness: There is no abdominal tenderness  There is no right CVA tenderness  Genitourinary:     Comments: Normal external female genitalia  Normal Bartholin's and [place]'s glands  Normal urethral meatus and no evidence of urethral discharge or masses  Bladder without fullness mass or tenderness  Vagina without lesion or discharge  No significant pelvic organ prolapse noted  Cervix and uterus are surgically absent  Bimanual exam Smooth induration of the right pelvic sidewall which is quite tender to palpation  No discrete rectovaginal septum or parametrial nodularity appreciated on rectovaginal examination  Anus without fissure of lesion  Lymphadenopathy:      Cervical: Cervical adenopathy (  Questionable left supraclavicular fullness suggestive of possible lymphadenopathy?) present  1/13/2021 Study Result    CT ABDOMEN AND PELVIS WITHOUT IV CONTRAST     INDICATION:   R10 84: Generalized abdominal pain  R11 0: Nausea  Right-sided abdominal pain  Constipation  History of cervical cancer 2-4 years ago with hysterectomy and chemotherapy and radiation therapy     COMPARISON:  4/10/2017     TECHNIQUE:  CT examination of the abdomen and pelvis was performed without intravenous contrast   Axial, sagittal, and coronal 2D reformatted images were created from the source data and submitted for interpretation       Radiation dose length product (DLP) for this visit:  304 1 mGy-cm   This examination, like all CT scans performed in the [facility], was performed utilizing techniques to minimize radiation dose exposure, including the use of iterative   reconstruction and automated exposure control       Enteric contrast was not administered     FINDINGS:     ABDOMEN     LOWER CHEST:  No clinically significant abnormality identified in the visualized lower chest      LIVER/BILIARY TREE:  There is faint visualization of a low-density lesion in the lateral aspect of the right hepatic lobe which corresponds to a previously seen peripherally enhanced lesion  Again, likely a benign cavernous hemangioma  The low-density   lesion today measures smaller than on the prior study although they may not be able to see the full extent of the lesion without contrast   A tiny hypodense focus in the right lobe on image 30 is stable from prior and is probably a cyst     GALLBLADDER:  I suspect there is a gallstone  No evidence of cholecystitis      SPLEEN:  Unremarkable      PANCREAS:  Unremarkable      ADRENAL GLANDS:  Unremarkable      KIDNEYS/URETERS:  Left kidney is unremarkable  There is right-sided hydronephrosis which is at least moderate in severity  The ureter is mildly dilated at least MCC down  It is somewhat difficult to see more distally  The precise cause for the   obstruction is uncertain      STOMACH AND BOWEL:  There is mild colonic diverticulosis without diverticulitis      APPENDIX:  A normal appendix was visualized      ABDOMINOPELVIC CAVITY:  There is lymphadenopathy within the retroperitoneum which is new compared with the prior study  Surrounding the enlarged lymph nodes there is ill-defined hazy opacity in the retroperitoneum  The findings are nonspecific and   could potentially be infectious or inflammatory in etiology although I would be concerned about metastatic disease in a patient with a prior history of cervical cancer  The lack of contrast somewhat limits assessment    There are probably lymph nodes   measuring greater than 2 cm diameter near the aorta      VESSELS:  Limited assessment without contrast   At least mild atherosclerotic disease of aorta is noted      PELVIS     REPRODUCTIVE ORGANS:  Surgical changes of prior hysterectomy  No definitive evidence of localized pelvic mass      URINARY BLADDER:  The bladder is mostly empty  No stones are seen      ABDOMINAL WALL/INGUINAL REGIONS:  Unremarkable      OSSEOUS STRUCTURES:  No acute fracture or destructive osseous lesion      IMPRESSION:     There is moderate amount of ill-defined retroperitoneal lymphadenopathy which is concerning for possible metastatic disease in a patient with a prior history of cervical cancer  Other etiologies are not excluded  Infectious or inflammatory etiologies   are potentially a consideration as would be lymphoma      There is moderate right hydronephrosis and proximal hydroureter without any obvious cause for obstruction based on this unenhanced exam      A repeat study with intravenous contrast during nephrographic and delayed phases may be helpful for further workup of findings      Pre-existing liver lesion seems smaller than on the prior study, probably benign cavernous hemangioma      Mild colonic diverticulosis without diverticulitis     Hysterectomy  No pelvic mass identified            I personally discussed this study with MORIAH Elias on 1/18/2021 at 3:13 PM                     Workstation performed: [id number]      I have personally reviewed CT scan images  There is evidence of periaortic retroperitoneal fullness consistent with lymphadenopathy as described by radiologist's as well  There is evidence of right hydronephrosis with proximal hydroureter  No discrete peritoneal nodularity  No ascites  No omental caking noted      No results found for:   Lab Results   Component Value Date    WBC 7 20 01/13/2021    HGB 11 7 (L) 01/13/2021    HCT 35 5 (L) 01/13/2021    MCV 85 01/13/2021     01/13/2021     Lab Results   Component Value Date     06/11/2018    K 3 9 01/13/2021     01/13/2021    CO2 28 01/13/2021    ANIONGAP 13 0 06/11/2018    BUN 16 01/13/2021    CREATININE 0 92 01/13/2021    GLUF 97 01/13/2021 CALCIUM 9 9 01/13/2021    AST 17 01/13/2021    ALT 14 01/13/2021    ALKPHOS 107 01/13/2021    PROT 7 0 06/11/2018    BILITOT 0 3 06/11/2018    EGFR 66 01/13/2021     Adán Cerna MD, [facility], FACS  2/4/2021  12:06 PM

## 2021-02-04 NOTE — ASSESSMENT & PLAN NOTE
Patient with history of known stage I B2 cervical cancer identified incidentally at the time of simple hysterectomy for high-grade cervical dysplasia  She did receive postoperative adjuvant chemo radiotherapy at [facility] treatment completed in June of 2017  Unfortunately, she has had issues with transportation which has precluded her from benefiting from subsequent adequate surveillance  Now, evaluation of right pelvic/abdominal pain demonstrates retroperitoneal periaortic lymphadenopathy with right hydronephrosis all concerning for the possibility of tumor recurrence  I have discussed findings with patient and shared my concern for tumor recurrence  I have recommended immediate urologic intervention by means of retrograde ureteral stent versus percutaneous nephrostomy tube to preserve renal function  Additionally, she would also benefit from PET-CT and attempt at interventional radiology percutaneous biopsy of retroperitoneal lymph nodes for diagnostic confirmation  Will request pathology from [facility] to be reviewed and then submit sample from either that biopsy or IR biopsy for molecular tumor testing  I will connect with patient via telemedicine visit shortly after IR biopsy to discuss results and plan of care  Patient has verbalized understanding  All questions were answered to her satisfaction

## 2021-02-04 NOTE — LETTER
February 4, 2021     [address], [address]  [facility] [address]    Patient: Sun Owen   YOB: 1955   Date of Visit: 2/4/2021       Dear Dr Tani Durant: Thank you for referring Sun Owen to me for evaluation  Below are my notes for this consultation  If you have questions, please do not hesitate to call me  I look forward to following your patient along with you  Sincerely,        Jazmin Wong MD        CC: DO Jazmin Fuentes MD  2/4/2021 12:06 PM  Sign when Signing Visit  Assessment/Plan:    Problem List Items Addressed This Visit        Immune and Lymphatic    Lymphadenopathy    Relevant Orders    Ambulatory referral to Urology    NM pet ct tumor imaging whole body    Ambulatory referral to Interventional Radiology       Genitourinary    Malignant neoplasm of endocervix [facility]) - Primary       Patient with history of known stage I B2 cervical cancer identified incidentally at the time of simple hysterectomy for high-grade cervical dysplasia  She did receive postoperative adjuvant chemo radiotherapy at [facility] treatment completed in June of 2017  Unfortunately, she has had issues with transportation which has precluded her from benefiting from subsequent adequate surveillance  Now, evaluation of right pelvic/abdominal pain demonstrates retroperitoneal periaortic lymphadenopathy with right hydronephrosis all concerning for the possibility of tumor recurrence  I have discussed findings with patient and shared my concern for tumor recurrence  I have recommended immediate urologic intervention by means of retrograde ureteral stent versus percutaneous nephrostomy tube to preserve renal function  Additionally, she would also benefit from PET-CT and attempt at interventional radiology percutaneous biopsy of retroperitoneal lymph nodes for diagnostic confirmation    Will request pathology from [facility] to be reviewed and then submit sample from either that biopsy or IR biopsy for molecular tumor testing  I will connect with patient via telemedicine visit shortly after IR biopsy to discuss results and plan of care  Patient has verbalized understanding  All questions were answered to her satisfaction  Relevant Orders    Ambulatory referral to Urology    NM pet ct tumor imaging whole body    Ambulatory referral to Interventional Radiology    Other hydronephrosis    Relevant Orders    Ambulatory referral to Urology      Other Visit Diagnoses     Abdominal lymphadenopathy        Relevant Orders    Ambulatory referral to Urology    NM pet ct tumor imaging whole body    Ambulatory referral to Interventional Radiology              CHIEF COMPLAINT:     Here for consultation for newly diagnosed retroperitoneal lymphadenopathy, hydronephrosis, known history of cervical cancer, concern for recurrence  Subjective:     Problem:  Cancer Staging  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[facility] [address] )  Staging form: Cervix Uteri, AJCC 7th Edition  - Clinical stage from 3/29/2017: FIGO Stage IB2, calculated as Stage Unknown (T1b2, NX, M0) - Signed by Susan Hopper MD on 2/4/2021      Previous therapy:  Oncology History   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 )   12/2016 Initial Diagnosis    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 ([facility])       3/29/2017 -  Cancer Staged    Staging form: Cervix Uteri, AJCC 7th Edition  - Clinical stage from 3/29/2017: FIGO Stage IB2, calculated as Stage Unknown (T1b2, NX, M0) - Signed by Susan Hopper MD on 2/4/2021  Staged by: Managing physician  Specimen type: Excision  Histopathologic type: Squamous cell carcinoma, NOS  Histologic grade (G): G3  Lymph-vascular invasion (LVI): LVI present/identified, NOS  Residual tumor (R): R0 - None  Pelvic jace status: Negative  Pelvic jace method of assessment: PET  Para-aortic status: Negative  Para-aortic jaec method of assessment: PET  Stage used in treatment planning: Yes  National guidelines used in treatment planning: Yes  Type of national guideline used in treatment planning: NCCN       3/29/2017 Surgery    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)  FInal path with incidental 5 cm grade 3 invasive cervical SCCA, +LVI, invasion 9/10 mm cervical stroma, no lymphadenectomy, margins negative ([facility])  Patient ID: Ann-Marie Butcher is a 72 y o  female  HPI    80-year-old healthy postmenopausal  female with known history of cervical dysplasia  In December of 2016 evaluation of abnormal Pap smear included endometrial biopsy and endocervical curettage which demonstrated high-grade dysplasia/ RADHA 3  On those grounds, she was taken to the operating room for simple robotic hysterectomy bilateral salpingo-oophorectomy  This was performed at [facility]   Unfortunately, surgical specimen demonstrated a 5 cm endocervical squamous cell carcinoma, grade 3, invading 9/10 mm of the cervical stroma with evidence of lymphovascular invasion  Margins of resection were close but negative  Patient went on to receive radiotherapy with adjuvant radiosensitizing cisplatin from made through June of 2017  Her treatment course was somewhat affected by personal Krish Nailer issues which precluded her from benefiting from last 2 cycles of planned cisplatin  Patient does not recall receiving vaginal brachytherapy at that time  She was in her usual state of health until approximately 2 months ago  At that time, started experiencing right hip/pelvic pain  Due to persistence, this was evaluated by CT scan which unfortunately has demonstrated retroperitoneal periaortic lymphadenopathy and right hydronephrosis  Given her history, she was referred to us for evaluation and management  Review of Systems    As per HPI  Some constipation  Denies vaginal bleeding, drainage or discharge     Reports decreased appetite and mixed weight loss  Twelve point review of systems otherwise noncontributory  Current Outpatient Medications   Medication Sig Dispense Refill    Biotin 10 MG CAPS Take by mouth      Multiple Vitamin (multivitamin) capsule Take 1 capsule by mouth daily       No current facility-administered medications for this visit          Allergies   Allergen Reactions    Dust Mite Extract     Pollen Extract Sneezing       Past Medical History:   Diagnosis Date    Anxiety     Anxiety and depression 10/2/2019    Breathing difficulty     Cancer ([address] )     cervical    GERD (gastroesophageal reflux disease)     Irritable bowel syndrome     Shortness of breath     Thumb injury 10/2019    Tinnitus of left ear 2020    Wrist arthritis 3/17/2020       Past Surgical History:   Procedure Laterality Date    HYSTERECTOMY      NE EXCIS INTERDIGITAL NEUROMA,EA Left 2018    Procedure: FOOT NEUROMA EXCISION;  Surgeon: Kathy Garcia DPM;  Location: [address] [place] [address] MAIN OR;  Service: Podiatry    TUBAL LIGATION         OB History        6    Para   4    Term   4            AB   2    Living   4       SAB   2    TAB        Ectopic        Multiple        Live Births   4                 Family History   Problem Relation Age of Onset   [facility] Stomach cancer Mother     Alcohol abuse Father     No Known Problems Sister     No Known Problems Daughter     No Known Problems Maternal Grandmother     No Known Problems Maternal Grandfather     No Known Problems Paternal Grandmother     No Known Problems Paternal Grandfather     No Known Problems Sister     No Known Problems Sister     No Known Problems Sister     No Known Problems Sister     No Known Problems Daughter     No Known Problems Daughter     No Known Problems Maternal Aunt     No Known Problems Maternal Aunt     No Known Problems Paternal Aunt     No Known Problems Paternal Aunt     No Known Problems Paternal Aunt     No Known Problems Paternal Aunt     No Known Problems Paternal Aunt     No Known Problems Paternal Aunt     No Known Problems Paternal Aunt     No Known Problems Paternal Aunt     No Known Problems Paternal Aunt     No Known Problems Paternal Aunt        {Common ambulatory SmartLinks:56363}      Objective:    Blood pressure 128/72, pulse 104, temperature 97 8 °F (36 6 °C), temperature source Temporal, resp  rate 18, height 4' 11" (1 499 m), weight 63 kg (139 lb), not currently breastfeeding  Body mass index is 28 07 kg/m²  Physical Exam  Vitals signs reviewed  Exam conducted with a chaperone present  Constitutional:       General: She is not in acute distress  Appearance: Normal appearance  Eyes:      General: No scleral icterus  Right eye: No discharge  Left eye: No discharge  Conjunctiva/sclera: Conjunctivae normal    Neck:      Musculoskeletal: Neck supple  No neck rigidity  Cardiovascular:      Rate and Rhythm: Normal rate and regular rhythm  Heart sounds: Normal heart sounds  No murmur  Pulmonary:      Effort: Pulmonary effort is normal  No respiratory distress  Breath sounds: Normal breath sounds  No stridor  No wheezing  Abdominal:      General: There is no distension  Palpations: Abdomen is soft  Tenderness: There is no abdominal tenderness  There is no right CVA tenderness  Genitourinary:     Comments: Normal external female genitalia  Normal Bartholin's and Gillespie's glands  Normal urethral meatus and no evidence of urethral discharge or masses  Bladder without fullness mass or tenderness  Vagina without lesion or discharge  No significant pelvic organ prolapse noted  Cervix and uterus are surgically absent  Bimanual exam Smooth induration of the right pelvic sidewall which is quite tender to palpation  No discrete rectovaginal septum or parametrial nodularity appreciated on rectovaginal examination  Anus without fissure of lesion      Lymphadenopathy:      Cervical: Cervical adenopathy (  Questionable left supraclavicular fullness suggestive of possible lymphadenopathy?) present  1/13/2021 Study Result    CT ABDOMEN AND PELVIS WITHOUT IV CONTRAST     INDICATION:   R10 84: Generalized abdominal pain  R11 0: Nausea  Right-sided abdominal pain  Constipation  History of cervical cancer 2-4 years ago with hysterectomy and chemotherapy and radiation therapy     COMPARISON:  4/10/2017     TECHNIQUE:  CT examination of the abdomen and pelvis was performed without intravenous contrast   Axial, sagittal, and coronal 2D reformatted images were created from the source data and submitted for interpretation       Radiation dose length product (DLP) for this visit:  304 1 mGy-cm   This examination, like all CT scans performed in the [facility], was performed utilizing techniques to minimize radiation dose exposure, including the use of iterative   reconstruction and automated exposure control       Enteric contrast was not administered       FINDINGS:     ABDOMEN     LOWER CHEST:  No clinically significant abnormality identified in the visualized lower chest      LIVER/BILIARY TREE:  There is faint visualization of a low-density lesion in the lateral aspect of the right hepatic lobe which corresponds to a previously seen peripherally enhanced lesion  Again, likely a benign cavernous hemangioma  The low-density   lesion today measures smaller than on the prior study although they may not be able to see the full extent of the lesion without contrast   A tiny hypodense focus in the right lobe on image 30 is stable from prior and is probably a cyst     GALLBLADDER:  I suspect there is a gallstone  No evidence of cholecystitis      SPLEEN:  Unremarkable      PANCREAS:  Unremarkable      ADRENAL GLANDS:  Unremarkable      KIDNEYS/URETERS:  Left kidney is unremarkable  There is right-sided hydronephrosis which is at least moderate in severity    The ureter is mildly dilated at least prison down  It is somewhat difficult to see more distally  The precise cause for the   obstruction is uncertain      STOMACH AND BOWEL:  There is mild colonic diverticulosis without diverticulitis      APPENDIX:  A normal appendix was visualized      ABDOMINOPELVIC CAVITY:  There is lymphadenopathy within the retroperitoneum which is new compared with the prior study  Surrounding the enlarged lymph nodes there is ill-defined hazy opacity in the retroperitoneum  The findings are nonspecific and   could potentially be infectious or inflammatory in etiology although I would be concerned about metastatic disease in a patient with a prior history of cervical cancer  The lack of contrast somewhat limits assessment  There are probably lymph nodes   measuring greater than 2 cm diameter near the aorta      VESSELS:  Limited assessment without contrast   At least mild atherosclerotic disease of aorta is noted      PELVIS     REPRODUCTIVE ORGANS:  Surgical changes of prior hysterectomy  No definitive evidence of localized pelvic mass      URINARY BLADDER:  The bladder is mostly empty  No stones are seen      ABDOMINAL WALL/INGUINAL REGIONS:  Unremarkable      OSSEOUS STRUCTURES:  No acute fracture or destructive osseous lesion      IMPRESSION:     There is moderate amount of ill-defined retroperitoneal lymphadenopathy which is concerning for possible metastatic disease in a patient with a prior history of cervical cancer  Other etiologies are not excluded    Infectious or inflammatory etiologies   are potentially a consideration as would be lymphoma      There is moderate right hydronephrosis and proximal hydroureter without any obvious cause for obstruction based on this unenhanced exam      A repeat study with intravenous contrast during nephrographic and delayed phases may be helpful for further workup of findings      Pre-existing liver lesion seems smaller than on the prior study, probably benign cavernous hemangioma      Mild colonic diverticulosis without diverticulitis     Hysterectomy  No pelvic mass identified            I personally discussed this study with MORIAH Elias on 1/18/2021 at 3:13 PM                     Workstation performed: [id number]      I have personally reviewed CT scan images  There is evidence of periaortic retroperitoneal fullness consistent with lymphadenopathy as described by radiologist's as well  There is evidence of right hydronephrosis with proximal hydroureter  No discrete peritoneal nodularity  No ascites  No omental caking noted      No results found for:   Lab Results   Component Value Date    WBC 7 20 01/13/2021    HGB 11 7 (L) 01/13/2021    HCT 35 5 (L) 01/13/2021    MCV 85 01/13/2021     01/13/2021     Lab Results   Component Value Date     06/11/2018    K 3 9 01/13/2021     01/13/2021    CO2 28 01/13/2021    ANIONGAP 13 0 06/11/2018    BUN 16 01/13/2021    CREATININE 0 92 01/13/2021    GLUF 97 01/13/2021    CALCIUM 9 9 01/13/2021    AST 17 01/13/2021    ALT 14 01/13/2021    ALKPHOS 107 01/13/2021    PROT 7 0 06/11/2018    BILITOT 0 3 06/11/2018    EGFR 66 01/13/2021     Derick Ramachandran MD, [place], FACS  2/4/2021  12:06 PM

## 2021-02-04 NOTE — TELEPHONE ENCOUNTER
Blayne Fernandez called back because she would like to schedule the surgery  She asked that you please give her a call back

## 2021-02-04 NOTE — TELEPHONE ENCOUNTER
[employer] from [facility] [employer] called on behalf of patient  She said that patient has a ton of questions about the stent surgery and would like an appointment with Dr Lou Bhakta to discuss   Please advise patient

## 2021-02-04 NOTE — PROGRESS NOTES
Assessment/Plan:    Problem List Items Addressed This Visit        Immune and Lymphatic    Lymphadenopathy    Relevant Orders    Ambulatory referral to Urology    NM pet ct tumor imaging whole body    Ambulatory referral to Interventional Radiology       Genitourinary    Malignant neoplasm of endocervix [facility]) - Primary       Patient with history of known stage I B2 cervical cancer identified incidentally at the time of simple hysterectomy for high-grade cervical dysplasia  She did receive postoperative adjuvant chemo radiotherapy at [address] treatment completed in June of 2017  Unfortunately, she has had issues with transportation which has precluded her from benefiting from subsequent adequate surveillance  Now, evaluation of right pelvic/abdominal pain demonstrates retroperitoneal periaortic lymphadenopathy with right hydronephrosis all concerning for the possibility of tumor recurrence  I have discussed findings with patient and shared my concern for tumor recurrence  I have recommended immediate urologic intervention by means of retrograde ureteral stent versus percutaneous nephrostomy tube to preserve renal function  Additionally, she would also benefit from PET-CT and attempt at interventional radiology percutaneous biopsy of retroperitoneal lymph nodes for diagnostic confirmation  Will request pathology from [facility] to be reviewed and then submit sample from either that biopsy or IR biopsy for molecular tumor testing  I will connect with patient via telemedicine visit shortly after IR biopsy to discuss results and plan of care  Patient has verbalized understanding  All questions were answered to her satisfaction           Relevant Orders    Ambulatory referral to Urology    NM pet ct tumor imaging whole body    Ambulatory referral to Interventional Radiology    Other hydronephrosis    Relevant Orders    Ambulatory referral to Urology      Other Visit Diagnoses Abdominal lymphadenopathy        Relevant Orders    Ambulatory referral to Urology    NM pet ct tumor imaging whole body    Ambulatory referral to Interventional Radiology              CHIEF COMPLAINT:     Here for consultation for newly diagnosed retroperitoneal lymphadenopathy, hydronephrosis, known history of cervical cancer, concern for recurrence  Subjective:     Problem:  Cancer Staging  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[facility] )  Staging form: Cervix Uteri, AJCC 7th Edition  - Clinical stage from 3/29/2017: FIGO Stage IB2, calculated as Stage Unknown (T1b2, NX, M0) - Signed by Emelyn Moya MD on 2/4/2021      Previous therapy:  Oncology History   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[facility] )   12/2016 Initial Diagnosis    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 ([facility])  3/29/2017 -  Cancer Staged    Staging form: Cervix Uteri, AJCC 7th Edition  - Clinical stage from 3/29/2017: FIGO Stage IB2, calculated as Stage Unknown (T1b2, NX, M0) - Signed by Emelyn Moya MD on 2/4/2021  Staged by: Managing physician  Specimen type: Excision  Histopathologic type: Squamous cell carcinoma, NOS  Histologic grade (G): G3  Lymph-vascular invasion (LVI): LVI present/identified, NOS  Residual tumor (R): R0 - None  Pelvic jace status: Negative  Pelvic jace method of assessment: PET  Para-aortic status: Negative  Para-aortic jace method of assessment: PET  Stage used in treatment planning: Yes  National guidelines used in treatment planning: Yes  Type of national guideline used in treatment planning: NCCN       3/29/2017 Surgery    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)  FInal path with incidental 5 cm grade 3 invasive cervical SCCA, +LVI, invasion 9/10 mm cervical stroma, no lymphadenectomy, margins negative ([facility])  Patient ID: Ann-Marie Butcher is a 72 y o  female  HPI    27-year-old healthy postmenopausal  female with known history of cervical dysplasia    In December of 2016 evaluation of abnormal Pap smear included endometrial biopsy and endocervical curettage which demonstrated high-grade dysplasia/ RADHA 3  On those grounds, she was taken to the operating room for simple robotic hysterectomy bilateral salpingo-oophorectomy  This was performed at [facility]]   Unfortunately, surgical specimen demonstrated a 5 cm endocervical squamous cell carcinoma, grade 3, invading 9/10 mm of the cervical stroma with evidence of lymphovascular invasion  Margins of resection were close but negative  Patient went on to receive radiotherapy with adjuvant radiosensitizing cisplatin from made through June of 2017  Her treatment course was somewhat affected by personal Shayna Bolus issues which precluded her from benefiting from last 2 cycles of planned cisplatin  Patient does not recall receiving vaginal brachytherapy at that time  She was in her usual state of health until approximately 2 months ago  At that time, started experiencing right hip/pelvic pain  Due to persistence, this was evaluated by CT scan which unfortunately has demonstrated retroperitoneal periaortic lymphadenopathy and right hydronephrosis  Given her history, she was referred to us for evaluation and management  Review of Systems    As per HPI  Some constipation  Denies vaginal bleeding, drainage or discharge  Reports decreased appetite and mixed weight loss  Twelve point review of systems otherwise noncontributory  Current Outpatient Medications   Medication Sig Dispense Refill    Biotin 10 MG CAPS Take by mouth      Multiple Vitamin (multivitamin) capsule Take 1 capsule by mouth daily       No current facility-administered medications for this visit          Allergies   Allergen Reactions    Dust Mite Extract     Pollen Extract Sneezing       Past Medical History:   Diagnosis Date    Anxiety     Anxiety and depression 10/2/2019    Breathing difficulty     Cancer ([facility] )     cervical    GERD (gastroesophageal reflux disease)     Irritable bowel syndrome     Shortness of breath     Thumb injury 10/2019    Tinnitus of left ear 2020    Wrist arthritis 3/17/2020       Past Surgical History:   Procedure Laterality Date    HYSTERECTOMY      GA EXCIS INTERDIGITAL NEUROMA,EA Left 2018    Procedure: FOOT NEUROMA EXCISION;  Surgeon: Omid Medina DPM;  Location: [address] [place], [place] [address] MAIN OR;  Service: Podiatry    TUBAL LIGATION         OB History        6    Para   4    Term   4            AB   2    Living   4       SAB   2    TAB        Ectopic        Multiple        Live Births   4                 Family History   Problem Relation Age of Onset    Stomach cancer Mother     Alcohol abuse Father     No Known Problems Sister     No Known Problems Daughter     No Known Problems Maternal Grandmother     No Known Problems Maternal Grandfather     No Known Problems Paternal Grandmother     No Known Problems Paternal Grandfather     No Known Problems Sister     No Known Problems Sister     No Known Problems Sister     No Known Problems Sister     No Known Problems Daughter     No Known Problems Daughter     No Known Problems Maternal Aunt     No Known Problems Maternal Aunt     No Known Problems Paternal Aunt     No Known Problems Paternal Aunt     No Known Problems Paternal Aunt     No Known Problems Paternal Aunt     No Known Problems Paternal Aunt     No Known Problems Paternal Aunt     No Known Problems Paternal Aunt     No Known Problems Paternal Aunt     No Known Problems Paternal Aunt     No Known Problems Paternal Aunt        The following portions of the patient's history were reviewed and updated as appropriate: allergies, current medications, past family history, past medical history, past social history, past surgical history and problem list       Objective:    Blood pressure 128/72, pulse 104, temperature 97 8 °F (36 6 °C), temperature source Temporal, resp  rate 18, height 4' 11" (1 499 m), weight 63 kg (139 lb), not currently breastfeeding  Body mass index is 28 07 kg/m²  Physical Exam  Vitals signs reviewed  Exam conducted with a chaperone present  Constitutional:       General: She is not in acute distress  Appearance: Normal appearance  Eyes:      General: No scleral icterus  Right eye: No discharge  Left eye: No discharge  Conjunctiva/sclera: Conjunctivae normal    Neck:      Musculoskeletal: Neck supple  No neck rigidity  Cardiovascular:      Rate and Rhythm: Normal rate and regular rhythm  Heart sounds: Normal heart sounds  No murmur  Pulmonary:      Effort: Pulmonary effort is normal  No respiratory distress  Breath sounds: Normal breath sounds  No stridor  No wheezing  Abdominal:      General: There is no distension  Palpations: Abdomen is soft  Tenderness: There is no abdominal tenderness  There is no right CVA tenderness  Genitourinary:     Comments: Normal external female genitalia  Normal Bartholin's and Colchester's glands  Normal urethral meatus and no evidence of urethral discharge or masses  Bladder without fullness mass or tenderness  Vagina without lesion or discharge  No significant pelvic organ prolapse noted  Cervix and uterus are surgically absent  Bimanual exam Smooth induration of the right pelvic sidewall which is quite tender to palpation  No discrete rectovaginal septum or parametrial nodularity appreciated on rectovaginal examination  Anus without fissure of lesion  Lymphadenopathy:      Cervical: Cervical adenopathy (  Questionable left supraclavicular fullness suggestive of possible lymphadenopathy?) present  1/13/2021 Study Result    CT ABDOMEN AND PELVIS WITHOUT IV CONTRAST     INDICATION:   R10 84: Generalized abdominal pain  R11 0: Nausea  Right-sided abdominal pain  Constipation    History of cervical cancer 2-4 years ago with hysterectomy and chemotherapy and radiation therapy     COMPARISON:  4/10/2017     TECHNIQUE:  CT examination of the abdomen and pelvis was performed without intravenous contrast   Axial, sagittal, and coronal 2D reformatted images were created from the source data and submitted for interpretation       Radiation dose length product (DLP) for this visit:  304 1 mGy-cm   This examination, like all CT scans performed in the [facility], was performed utilizing techniques to minimize radiation dose exposure, including the use of iterative   reconstruction and automated exposure control       Enteric contrast was not administered       FINDINGS:     ABDOMEN     LOWER CHEST:  No clinically significant abnormality identified in the visualized lower chest      LIVER/BILIARY TREE:  There is faint visualization of a low-density lesion in the lateral aspect of the right hepatic lobe which corresponds to a previously seen peripherally enhanced lesion  Again, likely a benign cavernous hemangioma  The low-density   lesion today measures smaller than on the prior study although they may not be able to see the full extent of the lesion without contrast   A tiny hypodense focus in the right lobe on image 30 is stable from prior and is probably a cyst     GALLBLADDER:  I suspect there is a gallstone  No evidence of cholecystitis      SPLEEN:  Unremarkable      PANCREAS:  Unremarkable      ADRENAL GLANDS:  Unremarkable      KIDNEYS/URETERS:  Left kidney is unremarkable  There is right-sided hydronephrosis which is at least moderate in severity  The ureter is mildly dilated at least skilled nursing down  It is somewhat difficult to see more distally    The precise cause for the   obstruction is uncertain      STOMACH AND BOWEL:  There is mild colonic diverticulosis without diverticulitis      APPENDIX:  A normal appendix was visualized      ABDOMINOPELVIC CAVITY:  There is lymphadenopathy within the retroperitoneum which is new compared with the prior study  Surrounding the enlarged lymph nodes there is ill-defined hazy opacity in the retroperitoneum  The findings are nonspecific and   could potentially be infectious or inflammatory in etiology although I would be concerned about metastatic disease in a patient with a prior history of cervical cancer  The lack of contrast somewhat limits assessment  There are probably lymph nodes   measuring greater than 2 cm diameter near the aorta      VESSELS:  Limited assessment without contrast   At least mild atherosclerotic disease of aorta is noted      PELVIS     REPRODUCTIVE ORGANS:  Surgical changes of prior hysterectomy  No definitive evidence of localized pelvic mass      URINARY BLADDER:  The bladder is mostly empty  No stones are seen      ABDOMINAL WALL/INGUINAL REGIONS:  Unremarkable      OSSEOUS STRUCTURES:  No acute fracture or destructive osseous lesion      IMPRESSION:     There is moderate amount of ill-defined retroperitoneal lymphadenopathy which is concerning for possible metastatic disease in a patient with a prior history of cervical cancer  Other etiologies are not excluded  Infectious or inflammatory etiologies   are potentially a consideration as would be lymphoma      There is moderate right hydronephrosis and proximal hydroureter without any obvious cause for obstruction based on this unenhanced exam      A repeat study with intravenous contrast during nephrographic and delayed phases may be helpful for further workup of findings      Pre-existing liver lesion seems smaller than on the prior study, probably benign cavernous hemangioma      Mild colonic diverticulosis without diverticulitis     Hysterectomy  No pelvic mass identified            I personally discussed this study with MORIAH Elias on 1/18/2021 at 3:13 PM                     Workstation performed: [id number]      I have personally reviewed CT scan images    There is evidence of periaortic retroperitoneal fullness consistent with lymphadenopathy as described by radiologist's as well  There is evidence of right hydronephrosis with proximal hydroureter  No discrete peritoneal nodularity  No ascites  No omental caking noted      No results found for:   Lab Results   Component Value Date    WBC 7 20 01/13/2021    HGB 11 7 (L) 01/13/2021    HCT 35 5 (L) 01/13/2021    MCV 85 01/13/2021     01/13/2021     Lab Results   Component Value Date     06/11/2018    K 3 9 01/13/2021     01/13/2021    CO2 28 01/13/2021    ANIONGAP 13 0 06/11/2018    BUN 16 01/13/2021    CREATININE 0 92 01/13/2021    GLUF 97 01/13/2021    CALCIUM 9 9 01/13/2021    AST 17 01/13/2021    ALT 14 01/13/2021    ALKPHOS 107 01/13/2021    PROT 7 0 06/11/2018    BILITOT 0 3 06/11/2018    EGFR 66 01/13/2021     Ta Demarco MD, [address] [facility], FACS  2/4/2021  12:06 PM

## 2021-02-05 ENCOUNTER — TELEPHONE (OUTPATIENT)
Dept: INTERVENTIONAL RADIOLOGY/VASCULAR | Facility: HOSPITAL | Age: 66
End: 2021-02-05

## 2021-02-05 NOTE — PROGRESS NOTES
Spoke with patient to set up lymph node biopsy  Appointment was made for 2/12/21 @ the [facility]  Plan to arrive for 0800, have a ride to and from, and NPO after midnight the night prior  Answered all questions, consult complete

## 2021-02-05 NOTE — TELEPHONE ENCOUNTER
I called patient back with surgery date for 3/17 at [place]  Patient declined  She said she needs to speak with her oncologist to see is she can wait till then for her surgery

## 2021-02-05 NOTE — TELEPHONE ENCOUNTER
I called patient and scheduled her with Dr Sultana Biggs for 2/11 at [facility] as a fast track per email  She is aware the hospital will call her the day prior  NPO after midnight  Patient does not have transportation  I am emailing [facility] [employer]  for her and I will call her back on Monday once I find out details for her transportation

## 2021-02-09 NOTE — PRE-PROCEDURE INSTRUCTIONS
Pre-Surgery Instructions:   Medication Instructions    Biotin 10 MG CAPS Instructed patient per Anesthesia Guidelines   Multiple Vitamin (multivitamin) capsule Instructed patient per Anesthesia Guidelines  Pre procedure instructions reviewed verbalizes understanding  NPO after MN  Bathing reviewed

## 2021-02-10 ENCOUNTER — ANESTHESIA EVENT (OUTPATIENT)
Dept: PERIOP | Facility: HOSPITAL | Age: 66
End: 2021-02-10
Payer: MEDICARE

## 2021-02-10 ENCOUNTER — TELEPHONE (OUTPATIENT)
Dept: GYNECOLOGIC ONCOLOGY | Facility: CLINIC | Age: 66
End: 2021-02-10

## 2021-02-10 NOTE — TELEPHONE ENCOUNTER
Phone call to patient to schedule her for a virtual visit with Dr Kathleene Meckel for around 2/18  Left message to return my call

## 2021-02-11 ENCOUNTER — HOSPITAL ENCOUNTER (OUTPATIENT)
Facility: HOSPITAL | Age: 66
Setting detail: OUTPATIENT SURGERY
Discharge: HOME | End: 2021-02-11
Attending: UROLOGY | Admitting: UROLOGY
Payer: MEDICARE

## 2021-02-11 ENCOUNTER — ANESTHESIA (OUTPATIENT)
Dept: PERIOP | Facility: HOSPITAL | Age: 66
End: 2021-02-11
Payer: MEDICARE

## 2021-02-11 ENCOUNTER — APPOINTMENT (OUTPATIENT)
Dept: RADIOLOGY | Facility: HOSPITAL | Age: 66
End: 2021-02-11
Payer: MEDICARE

## 2021-02-11 VITALS
BODY MASS INDEX: 27.42 KG/M2 | HEIGHT: 59 IN | WEIGHT: 136 LBS | HEART RATE: 90 BPM | OXYGEN SATURATION: 100 % | TEMPERATURE: 97.8 F | SYSTOLIC BLOOD PRESSURE: 126 MMHG | DIASTOLIC BLOOD PRESSURE: 63 MMHG | RESPIRATION RATE: 16 BRPM

## 2021-02-11 VITALS — HEART RATE: 96 BPM

## 2021-02-11 DIAGNOSIS — N13.39 OTHER HYDRONEPHROSIS: ICD-10-CM

## 2021-02-11 DIAGNOSIS — N13.5 URETERAL STRICTURE, RIGHT: Primary | ICD-10-CM

## 2021-02-11 DIAGNOSIS — N13.30 HYDRONEPHROSIS, UNSPECIFIED HYDRONEPHROSIS TYPE: ICD-10-CM

## 2021-02-11 PROCEDURE — 52332 CYSTOSCOPY AND TREATMENT: CPT | Performed by: UROLOGY

## 2021-02-11 PROCEDURE — 74420 UROGRAPHY RTRGR +-KUB: CPT

## 2021-02-11 PROCEDURE — NC001 PR NO CHARGE: Performed by: UROLOGY

## 2021-02-11 PROCEDURE — C2617 STENT, NON-COR, TEM W/O DEL: HCPCS | Performed by: UROLOGY

## 2021-02-11 PROCEDURE — C1769 GUIDE WIRE: HCPCS | Performed by: UROLOGY

## 2021-02-11 DEVICE — INLAY OPTIMA URETERAL STENT W/O GUIDEWIRE
Type: IMPLANTABLE DEVICE | Site: URETER | Status: NON-FUNCTIONAL
Brand: BARD® INLAY OPTIMA® URETERAL STENT
Removed: 2021-06-03

## 2021-02-11 RX ORDER — PROPOFOL 10 MG/ML
INJECTION, EMULSION INTRAVENOUS AS NEEDED
Status: DISCONTINUED | OUTPATIENT
Start: 2021-02-11 | End: 2021-02-11

## 2021-02-11 RX ORDER — DEXAMETHASONE SODIUM PHOSPHATE 10 MG/ML
INJECTION, SOLUTION INTRAMUSCULAR; INTRAVENOUS AS NEEDED
Status: DISCONTINUED | OUTPATIENT
Start: 2021-02-11 | End: 2021-02-11

## 2021-02-11 RX ORDER — SODIUM CHLORIDE, SODIUM LACTATE, POTASSIUM CHLORIDE, CALCIUM CHLORIDE 600; 310; 30; 20 MG/100ML; MG/100ML; MG/100ML; MG/100ML
INJECTION, SOLUTION INTRAVENOUS CONTINUOUS PRN
Status: DISCONTINUED | OUTPATIENT
Start: 2021-02-11 | End: 2021-02-11

## 2021-02-11 RX ORDER — FENTANYL CITRATE 50 UG/ML
INJECTION, SOLUTION INTRAMUSCULAR; INTRAVENOUS AS NEEDED
Status: DISCONTINUED | OUTPATIENT
Start: 2021-02-11 | End: 2021-02-11

## 2021-02-11 RX ORDER — MAGNESIUM HYDROXIDE 1200 MG/15ML
LIQUID ORAL AS NEEDED
Status: DISCONTINUED | OUTPATIENT
Start: 2021-02-11 | End: 2021-02-11 | Stop reason: HOSPADM

## 2021-02-11 RX ORDER — HYDROCODONE BITARTRATE AND ACETAMINOPHEN 5; 325 MG/1; MG/1
1 TABLET ORAL EVERY 6 HOURS PRN
Status: DISCONTINUED | OUTPATIENT
Start: 2021-02-11 | End: 2021-02-11 | Stop reason: HOSPADM

## 2021-02-11 RX ORDER — OXYBUTYNIN CHLORIDE 5 MG/1
5 TABLET ORAL 3 TIMES DAILY PRN
Qty: 20 TABLET | Refills: 0 | Status: SHIPPED | OUTPATIENT
Start: 2021-02-11 | End: 2021-05-20

## 2021-02-11 RX ORDER — FENTANYL CITRATE/PF 50 MCG/ML
50 SYRINGE (ML) INJECTION
Status: DISCONTINUED | OUTPATIENT
Start: 2021-02-11 | End: 2021-02-11 | Stop reason: HOSPADM

## 2021-02-11 RX ORDER — SODIUM CHLORIDE, SODIUM LACTATE, POTASSIUM CHLORIDE, CALCIUM CHLORIDE 600; 310; 30; 20 MG/100ML; MG/100ML; MG/100ML; MG/100ML
20 INJECTION, SOLUTION INTRAVENOUS CONTINUOUS
Status: DISCONTINUED | OUTPATIENT
Start: 2021-02-11 | End: 2021-02-11 | Stop reason: HOSPADM

## 2021-02-11 RX ORDER — ONDANSETRON 2 MG/ML
INJECTION INTRAMUSCULAR; INTRAVENOUS AS NEEDED
Status: DISCONTINUED | OUTPATIENT
Start: 2021-02-11 | End: 2021-02-11

## 2021-02-11 RX ORDER — PHENAZOPYRIDINE HYDROCHLORIDE 100 MG/1
200 TABLET, FILM COATED ORAL ONCE
Status: DISCONTINUED | OUTPATIENT
Start: 2021-02-11 | End: 2021-02-11 | Stop reason: HOSPADM

## 2021-02-11 RX ORDER — OXYBUTYNIN CHLORIDE 5 MG/1
5 TABLET, EXTENDED RELEASE ORAL DAILY
Status: DISCONTINUED | OUTPATIENT
Start: 2021-02-11 | End: 2021-02-11 | Stop reason: HOSPADM

## 2021-02-11 RX ORDER — HYDROCODONE BITARTRATE AND ACETAMINOPHEN 5; 325 MG/1; MG/1
1-2 TABLET ORAL EVERY 4 HOURS PRN
Qty: 20 TABLET | Refills: 0 | Status: SHIPPED | OUTPATIENT
Start: 2021-02-11 | End: 2021-02-21

## 2021-02-11 RX ORDER — MIDAZOLAM HYDROCHLORIDE 2 MG/2ML
INJECTION, SOLUTION INTRAMUSCULAR; INTRAVENOUS AS NEEDED
Status: DISCONTINUED | OUTPATIENT
Start: 2021-02-11 | End: 2021-02-11

## 2021-02-11 RX ORDER — CEFAZOLIN SODIUM 1 G/50ML
SOLUTION INTRAVENOUS AS NEEDED
Status: DISCONTINUED | OUTPATIENT
Start: 2021-02-11 | End: 2021-02-11

## 2021-02-11 RX ORDER — LIDOCAINE HYDROCHLORIDE 10 MG/ML
INJECTION, SOLUTION EPIDURAL; INFILTRATION; INTRACAUDAL; PERINEURAL AS NEEDED
Status: DISCONTINUED | OUTPATIENT
Start: 2021-02-11 | End: 2021-02-11

## 2021-02-11 RX ORDER — DIPHENHYDRAMINE HYDROCHLORIDE 50 MG/ML
12.5 INJECTION INTRAMUSCULAR; INTRAVENOUS ONCE AS NEEDED
Status: DISCONTINUED | OUTPATIENT
Start: 2021-02-11 | End: 2021-02-11 | Stop reason: HOSPADM

## 2021-02-11 RX ORDER — CEPHALEXIN 500 MG/1
500 CAPSULE ORAL EVERY 6 HOURS SCHEDULED
Qty: 12 CAPSULE | Refills: 0 | Status: SHIPPED | OUTPATIENT
Start: 2021-02-11 | End: 2021-02-14

## 2021-02-11 RX ORDER — PHENAZOPYRIDINE HYDROCHLORIDE 200 MG/1
200 TABLET, FILM COATED ORAL 3 TIMES DAILY PRN
Qty: 30 TABLET | Refills: 0 | Status: SHIPPED | OUTPATIENT
Start: 2021-02-11 | End: 2021-06-03 | Stop reason: HOSPADM

## 2021-02-11 RX ORDER — ONDANSETRON 2 MG/ML
4 INJECTION INTRAMUSCULAR; INTRAVENOUS ONCE AS NEEDED
Status: DISCONTINUED | OUTPATIENT
Start: 2021-02-11 | End: 2021-02-11 | Stop reason: HOSPADM

## 2021-02-11 RX ADMIN — SODIUM CHLORIDE, SODIUM LACTATE, POTASSIUM CHLORIDE, AND CALCIUM CHLORIDE: .6; .31; .03; .02 INJECTION, SOLUTION INTRAVENOUS at 08:40

## 2021-02-11 RX ADMIN — LIDOCAINE HYDROCHLORIDE 50 MG: 10 INJECTION, SOLUTION EPIDURAL; INFILTRATION; INTRACAUDAL; PERINEURAL at 08:47

## 2021-02-11 RX ADMIN — PHENYLEPHRINE HYDROCHLORIDE 200 MCG: 10 INJECTION INTRAVENOUS at 08:48

## 2021-02-11 RX ADMIN — CEFAZOLIN SODIUM 1000 MG: 1 SOLUTION INTRAVENOUS at 08:51

## 2021-02-11 RX ADMIN — PROPOFOL 190 MG: 10 INJECTION, EMULSION INTRAVENOUS at 08:47

## 2021-02-11 RX ADMIN — MIDAZOLAM 2 MG: 1 INJECTION INTRAMUSCULAR; INTRAVENOUS at 08:37

## 2021-02-11 RX ADMIN — FENTANYL CITRATE 50 MCG: 50 INJECTION INTRAMUSCULAR; INTRAVENOUS at 08:47

## 2021-02-11 RX ADMIN — ONDANSETRON 4 MG: 2 INJECTION INTRAMUSCULAR; INTRAVENOUS at 08:54

## 2021-02-11 RX ADMIN — PHENYLEPHRINE HYDROCHLORIDE 100 MCG: 10 INJECTION INTRAVENOUS at 08:55

## 2021-02-11 RX ADMIN — DEXAMETHASONE SODIUM PHOSPHATE 4 MG: 10 INJECTION, SOLUTION INTRAMUSCULAR; INTRAVENOUS at 08:54

## 2021-02-11 RX ADMIN — FENTANYL CITRATE 50 MCG: 50 INJECTION INTRAMUSCULAR; INTRAVENOUS at 08:54

## 2021-02-11 NOTE — ANESTHESIA POSTPROCEDURE EVALUATION
Post-Op Assessment Note    CV Status:  Stable  Pain Score: 0    Pain management: adequate     Mental Status:  Sleepy   Hydration Status:  Euvolemic   PONV Controlled:  Controlled   Airway Patency:  Patent      Post Op Vitals Reviewed: Yes      Staff: CRNA         No complications documented      BP   138/74   Temp   97 3   Pulse  91   Resp   18   SpO2   99

## 2021-02-11 NOTE — INTERVAL H&P NOTE
H&P reviewed  After examining the patient I find no changes in the patients condition since the H&P had been written      Vitals:    02/11/21 0800   BP: 108/70   Pulse: 103   Resp: 18   Temp: 97 9 °F (36 6 °C)   SpO2: 98%

## 2021-02-11 NOTE — OP NOTE
OPERATIVE REPORT  PATIENT NAME: Lisa Cespedes    :  1955  MRN: [id number]  Pt Location: [facility] CYSTO ROOM 01    SURGERY DATE: 2021    Surgeon(s) and Role:     * Mac Fowler MD - Primary    Preop Diagnosis:  Right hydronephrosis due to ureteral stricture from extrinsic compression from lymphadenopathy from recurrent cervical cancer    Post-Op Diagnosis Codes:  Same    Procedure(s) (LRB):  CYSTOSCOPY RETROGRADE PYELOGRAM WITH INSERTION STENT URETERAL (Right)    Specimen(s):  * No specimens in log *    Estimated Blood Loss:   Minimal    Drains:  * No LDAs found *    Anesthesia Type:   Choice    Operative Indications:  Right hydronephrosis in the presence of lymphadenopathy and right flank and hip pain    Operative Findings:  4 cm stricture mid ureter, 1 cm stricture more proximal with hydronephrosis and hydronephrotic drip after stent placed  Complications:   None    Procedure and Technique:  45-year-old woman with recurrent cervical cancer with right hydronephrosis and right hip pain mainly but some flank pain  She has been seeing Dr Sary Bai of Gynecologic Oncology who requested evaluation of right hydronephrosis  She has some flank pain, but mostly hip pain and has been having some problems with right femoral nerve issues to include the general femoral nerve with weakness of proximal leg flexion  I reviewed her CT scan which shows no stones but right hydronephrosis that has a dilated ureter that terminates in a cluster of lymph nodes in the mid to proximal ureter  I explained the need to drain the kidney because of the obstruction from the lymph node with the stent and I explained the procedure and the risks of bleeding infection and damage to the ureter  She gives informed consent  I told her I am unsure of the length of time she will need the stent  The patient was brought to the operating room and identified properly    LMA was induced, the patient was prepped and draped in dorsal lithotomy position usual fashion  A time-out was performed  Cystoscopy was carried out with a 22 Gambian cystoscopy sheath and 30 degree lens  Cursory vaginal exam revealed usual atrophic changes  Urethra was normal without stricture  The bladder was smooth not trabeculated there are no stones tumors or other lesions  The orifices were orthotopic and intact  I first performed right retrograde pyelography with a tiger tail catheter and 50% Conray  The ureter was normal up until the mid ureter where the ureter narrowed for approximately 3-4 cm, became dilated again and then narrowed for 1 cm until it opened up into the UPJ  Proximally to this there was hydronephrosis  The open-ended tiger tail catheter was advanced all the way up without difficulty into the kidney where I noticed rapid drainage of urine  I placed a wire through the tiger tail catheter then removed the tiger tail catheter and placed a 6 Gambian by 26 cm stent over the wire without difficulty  Coils were established in the renal pelvis and bladder  The contrast was noted to be rushing out of the distal end of the stent  Bladder was drained     I was present for the entire procedure and A qualified resident physician was not available    Patient Disposition:  PACU  and extubated and stable    SIGNATURE: Andre Feliciano MD  DATE: February 11, 2021  TIME: 9:19 AM

## 2021-02-11 NOTE — ANESTHESIA PREPROCEDURE EVALUATION
Procedure:  CYSTOSCOPY RETROGRADE PYELOGRAM WITH INSERTION STENT URETERAL (Right Ureter)    Relevant Problems   /RENAL   (+) Other hydronephrosis      GYN   (+) 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HCC)      MUSCULOSKELETAL   (+) Wrist arthritis      NEURO/PSYCH   (+) Anxiety and depression        Physical Exam    Airway    Mallampati score: I         Dental   No notable dental hx     Cardiovascular  Rhythm: regular, Rate: normal,     Pulmonary  Pulmonary exam normal     Other Findings        Anesthesia Plan  ASA Score- 2     Anesthesia Type- general with ASA Monitors  Additional Monitors:   Airway Plan: ETT  Plan Factors-Exercise tolerance (METS): >4 METS  Chart reviewed  Patient summary reviewed  Patient is not a current smoker  Patient not instructed to abstain from smoking on day of procedure  Patient did not smoke on day of surgery  Induction- intravenous and rapid sequence induction  Postoperative Plan-   Planned trial extubation    Informed Consent- Anesthetic plan and risks discussed with patient  I personally reviewed this patient with the CRNA  Discussed and agreed on the Anesthesia Plan with the CRNA  Mark Peguero

## 2021-02-11 NOTE — DISCHARGE INSTRUCTIONS
The operation went well  Injecting contrast I saw the ureter narrow at the midlevel of the ureter and closer to the kidney  There were therefore 2 areas of narrowing of the ureter, most likely due lymph nodes outside of the ureter  The kidney was obstructed and when I placed the catheter up there I obtained a rush of urine  Therefore the stent is necessary to drain your kidney properly  I am not sure of how long you will need the stent  We will see how you to treatment from Gynecologic Oncology and hopefully we can remove the stent at some point in the next 3-6 months if the lymph node enlargement decreases and see if the ureter drains properly  If it does not, a stent may be a permanent necessity  In these situations there is no easy operation to relieve the stricture  Your stent is your friend because it is draining your kidney  However it may cause some aggravation at least at first   You can feel pain in the kidney when you urinate and you can feel a stabbing sensation in your side  Many people forget the stent is in there  We need to change it at least every 6 months and I have made an appointment for you to see us in 3 months to see how things are going  You can expect to have burning urgency frequency and blood in the urine  The stent will cause bleeding periodically so to see blood in the urine is fairly normal   Call for fever greater than 101 5°, severe pain not relieved by pain medications, or inability to urinate  Expect to have some discomfort as this is normal   Take over-the-counter remedies to avoid constipation  No driving or operating machinery if taking narcotics  I have prescribed hydrocodone-acetaminophen for pain for the postoperative time  After that, if you persistent having pain from the stent we will have to discuss other options  Hopefully that will not be the case

## 2021-02-11 NOTE — H&P
Office Visit    1/22/2021  [facility] Urology [address] [facility], [address] [place] [address]  Urology  Hydronephrosis, unspecified hydronephrosis type  Dx  Hydronephrosis ; Referred by David Lara, [facility]  Reason for Visit   H&P-updated by me 2/11/2021  Explained procedure, risks, and reason for right ureteral stent- has right hydro due to cervical CA lymphadenopathy  Length of time stent needed to be determined, patient understands and wishes to proceed, risk explained consent obtained  Cosigned by: Honorio Valdez MD at 1/25/2021 10:25 AM   Attestation signed by Honorio Valdez MD at 1/25/2021 10:25 AM   I was the supervising/collaborating physician for this visit     I agree with the provider's documentation and plan              Assessment and plan:         1  Moderate right hydronephrosis  -right ureter mildly dilated assisted down and difficult to see more distally, cause obstruction was uncertain  -creatinine 0 92 (01/13/2021)  -will schedule for cytso/retrograde pyelogram/right stent replacement     2  History of cervical cancer 2017 status post hysterectomy  -hemoglobin 11 7/hematocrit 33 5  -scheduled to see gyn Onc on 01/26/2021      3  Urinary incontinence  -referral to uro Gyn 05/2018, did not go because she lost her living arrangements with her sister  -stress urinary incontinence  -recommended kegel exercises  -can refer to pelvic floor physical therapy in the future     JACE Mathis     History of Present Illness      Hiram Khalil is a 72 y o  new patient with a history of cervical cancer status post hysterectomy  Who recently presented to her PCP with a complaint of generalized abdominal pain with nausea at that time a CT of the abdomen and pelvis without contrast, CBC, CMP was ordered as well as an x-ray of the right hip and pelvis due to right hip pain  The CT scan of the abdomen and pelvis that revealed right-sided hydronephrosis which is at least moderate in severity    The ureter was mildly dilated is intermediate down  It was somewhat difficult to see more distally  And the precise cause of the obstruction was uncertain  The CT scan also revealed  lymphadenopathy within the retroperitoneum which was new compared to a prior study  Was also noted that there was ill-defined hazy opacity in the retroperitoneum surrounding the enlarged lymph nodes  It was indeterminate if the result was infectious, inflammatory or metastatic in nature  There lymph nodes measuring > 2 cm near the aorta  Marvin Concepcion was referred to Urology and Medical Oncology  Her intake was completed by medical oncology and she was subsequently referred to gyn Oncology  Patient does have transportation issues and Oncology is setting her up with [facility]  She had CT scan from 02/12/2018 through [facility] which revealed no evidence of recurrent or metastatic disease within the chest abdomen or pelvis  She had stable subcentimeter pulmonary nodules bilaterally and right hepatic lobe hemangioma is unchanged  No retroperitoneal or mesenteric adenopathy at that time  Her kidneys were unremarkable her bladder was unremarkable  PET scan 05/2017 at [facility] with no evidence of metabolically active tumor  She c/o discomfort in the right flank area  She was walking up the steps before thanksgiving and had sudden onset of right hip pain and was unable to walk up the steps  She reports that the pain is now intermittent  Lives alone and does not drive, she has no car, lives out of boxes in "a dump in [place]"  Reports her children live in [place]  And she does not talk to her sister who she formally lived with  Very tearful in the office today  She has a history of a 40 lb intentional weight loss due to exercise and dietary changes  She reports being a former heavy smoker at 3 packs per day but she was unable to quantify for how many years  She quit approximately  10-15 years ago    She states her mother  of stomach cancer and does not want to suffer the way she did but she is not ready to die  Laboratory            Lab Results   Component Value Date     BUN 16 2021     CREATININE 0 92 2021         No components found for: GFR           Lab Results   Component Value Date     CALCIUM 9 9 2021      2018     K 3 9 2021     CO2 28 2021      2021               Lab Results   Component Value Date     WBC 7 20 2021     HGB 11 7 (L) 2021     HCT 35 5 (L) 2021     MCV 85 2021      2021         No results found for: PSA     No results found for this or any previous visit (from the past 1 hour(s))      @RESULT(URINEMICROSCOPIC)@     @RESULT(URINECULTURE)@     Radiology      CT ABDOMEN AND PELVIS WITHOUT IV CONTRAST 21     INDICATION:   Z06 84: Generalized abdominal pain  R11 0: Nausea   Right-sided abdominal pain   Constipation   History of cervical cancer 2-4 years ago with hysterectomy and chemotherapy and radiation therapy     COMPARISON:  4/10/2017     TECHNIQUE:  CT examination of the abdomen and pelvis was performed without intravenous contrast   Axial, sagittal, and coronal 2D reformatted images were created from the source data and submitted for interpretation       Radiation dose length product (DLP) for this visit:  304 1 mGy-cm    This examination, like all CT scans performed in the [facility], was performed utilizing techniques to minimize radiation dose exposure, including the use of iterative   reconstruction and automated exposure control       Enteric contrast was not administered       FINDINGS:     ABDOMEN     LOWER CHEST:  No clinically significant abnormality identified in the visualized lower chest      LIVER/BILIARY TREE:  There is faint visualization of a low-density lesion in the lateral aspect of the right hepatic lobe which corresponds to a previously seen peripherally enhanced lesion   Again, likely a benign cavernous hemangioma   The low-density   lesion today measures smaller than on the prior study although they may not be able to see the full extent of the lesion without contrast   A tiny hypodense focus in the right lobe on image 30 is stable from prior and is probably a cyst     GALLBLADDER:  I suspect there is a gallstone   No evidence of cholecystitis      SPLEEN:  Unremarkable      PANCREAS:  Unremarkable      ADRENAL GLANDS:  Unremarkable      KIDNEYS/URETERS:  Left kidney is unremarkable   There is right-sided hydronephrosis which is at least moderate in severity   The ureter is mildly dilated at least alf down   It is somewhat difficult to see more distally   The precise cause for the   obstruction is uncertain      STOMACH AND BOWEL:  There is mild colonic diverticulosis without diverticulitis      APPENDIX:  A normal appendix was visualized      ABDOMINOPELVIC CAVITY:  There is lymphadenopathy within the retroperitoneum which is new compared with the prior study   Surrounding the enlarged lymph nodes there is ill-defined hazy opacity in the retroperitoneum   The findings are nonspecific and   could potentially be infectious or inflammatory in etiology although I would be concerned about metastatic disease in a patient with a prior history of cervical cancer   The lack of contrast somewhat limits assessment   There are probably lymph nodes   measuring greater than 2 cm diameter near the aorta      VESSELS:  Limited assessment without contrast   At least mild atherosclerotic disease of aorta is noted      PELVIS     REPRODUCTIVE ORGANS:  Surgical changes of prior hysterectomy   No definitive evidence of localized pelvic mass      URINARY BLADDER:  The bladder is mostly empty   No stones are seen      ABDOMINAL WALL/INGUINAL REGIONS:  Unremarkable      OSSEOUS STRUCTURES:  No acute fracture or destructive osseous lesion      IMPRESSION:     There is moderate amount of ill-defined retroperitoneal lymphadenopathy which is concerning for possible metastatic disease in a patient with a prior history of cervical cancer   Other etiologies are not excluded   Infectious or inflammatory etiologies   are potentially a consideration as would be lymphoma      There is moderate right hydronephrosis and proximal hydroureter without any obvious cause for obstruction based on this unenhanced exam      A repeat study with intravenous contrast during nephrographic and delayed phases may be helpful for further workup of findings      Pre-existing liver lesion seems smaller than on the prior study, probably benign cavernous hemangioma      Mild colonic diverticulosis without diverticulitis     Hysterectomy   No pelvic mass identified      Review of Systems      Review of Systems   Constitutional: Negative for activity change, appetite change, chills, fatigue, fever and unexpected weight change  Hair loss x 1 year, started taking iron but became constipated   HENT: Negative for facial swelling  Eyes: Negative for discharge  Respiratory: Negative  Negative for cough and shortness of breath  Cardiovascular: Negative for chest pain and leg swelling  Gastrointestinal: Positive for constipation  Negative for abdominal distention, abdominal pain, diarrhea, nausea and vomiting  Endocrine: Negative  Genitourinary: Positive for flank pain, frequency and urgency  Negative for decreased urine volume, difficulty urinating, dysuria, enuresis and hematuria  Urinates small frequent amounts, feels like she does not empty her bladder when she voids  Has right flank and groin pain  Reports stress incontinence since hysterectomy  Has some urgency   Musculoskeletal: Positive for back pain  Negative for myalgias  Chronic low back pain, right hip pain that radiates down right leg   Skin: Negative for pallor and rash  Allergic/Immunologic: Negative    Negative for immunocompromised state  Neurological: Negative for facial asymmetry and speech difficulty  Hematological: Bruises/bleeds easily  Psychiatric/Behavioral: Negative for agitation and confusion  The patient is nervous/anxious        Allergies           Allergies   Allergen Reactions    Dust Mite Extract      Pollen Extract Sneezing         Physical Exam      Physical Exam  Constitutional:       General: She is not in acute distress  Appearance: Normal appearance  She is not ill-appearing, toxic-appearing or diaphoretic  HENT:      Head: Normocephalic and atraumatic  Nose: No congestion  Mouth/Throat:      Mouth: Mucous membranes are moist       Pharynx: Oropharynx is clear  Eyes:      General: No scleral icterus  Pupils: Pupils are equal, round, and reactive to light  Neck:      Musculoskeletal: Normal range of motion and neck supple  Cardiovascular:      Rate and Rhythm: Normal rate and regular rhythm  Pulses: Normal pulses  Heart sounds: Normal heart sounds  Pulmonary:      Effort: Pulmonary effort is normal  No respiratory distress  Breath sounds: Normal breath sounds  No wheezing, rhonchi or rales  Abdominal:      General: Abdomen is flat  Bowel sounds are normal  There is no distension  Palpations: Abdomen is soft  Tenderness: There is no abdominal tenderness  There is right CVA tenderness  There is no left CVA tenderness, guarding or rebound  Musculoskeletal:         General: No swelling or tenderness  Right lower leg: No edema  Left lower leg: No edema  Skin:     General: Skin is warm and dry  Coloration: Skin is not jaundiced or pale  Findings: No rash  Neurological:      General: No focal deficit present  Mental Status: She is alert and oriented to person, place, and time  Motor: No weakness        Gait: Gait normal    Psychiatric:         Mood and Affect: Mood normal          Behavior: Behavior normal  Comments: Shay  Has no family support  Children live in [place]   She no longer talks to her sister who is local            Vital Signs          Vitals:     01/22/21 1348   BP: 128/80   Pulse: 80   Weight: 64 3 kg (141 lb 12 1 oz)         Current Medications         Current Outpatient Medications:     Biotin 10 MG CAPS, Take by mouth, Disp: , Rfl:     Multiple Vitamin (multivitamin) capsule, Take 1 capsule by mouth daily, Disp: , Rfl:      Active Problems      Patient Active Problem List   Diagnosis    Zaldivar's neuroma, left    Atypical glandular cells of undetermined significance of cervix    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HCC)    Menopausal and perimenopausal disorder    BMI 36 0-36 9,adult    Anxiety and depression    Trigger thumb of right hand    Tinnitus of left ear    Wrist arthritis    Medicare annual wellness visit, initial    BMI 29 0-29 9,adult    Generalized abdominal pain    Nausea    Right hip pain         Past Medical History           Past Medical History:   Diagnosis Date    Anxiety      Anxiety and depression 10/2/2019    Breathing difficulty      Cancer (HCC)       cervical    GERD (gastroesophageal reflux disease)      Irritable bowel syndrome      Shortness of breath      Thumb injury 10/2019    Tinnitus of left ear 2/19/2020    Wrist arthritis 3/17/2020         Surgical History            Past Surgical History:   Procedure Laterality Date    HYSTERECTOMY        [place] EXCIS INTERDIGITAL NEUROMA,EA Left 7/12/2018     Procedure: FOOT NEUROMA EXCISION;  Surgeon: Lisa Palomares DPM;  Location: [facility] MAIN OR;  Service: Podiatry    TUBAL LIGATION             Family History            Family History   Problem Relation Age of Onset    Stomach cancer Mother      Alcohol abuse Father      No Known Problems Sister      No Known Problems Daughter      No Known Problems Maternal Grandmother      No Known Problems Maternal Grandfather      No Known Problems Paternal Grandmother      No Known Problems Paternal Grandfather      No Known Problems Sister      No Known Problems Sister      No Known Problems Sister      No Known Problems Sister      No Known Problems Daughter      No Known Problems Daughter      No Known Problems Maternal Aunt      No Known Problems Maternal Aunt      No Known Problems Paternal Aunt      No Known Problems Paternal Aunt      No Known Problems Paternal Aunt      No Known Problems Paternal Aunt      No Known Problems Paternal Aunt      No Known Problems Paternal Aunt      No Known Problems Paternal Aunt      No Known Problems Paternal Aunt      No Known Problems Paternal Aunt      No Known Problems Paternal Aunt           Social History      Social History      Social History           Tobacco Use   Smoking Status Former Smoker    Packs/day: 3 00    Years: 25 00    Pack years: 75 00    Quit date: 2003    Years since quittin 5   Smokeless Tobacco Never Used         Past Surgical History:   Procedure Laterality Date    HYSTERECTOMY        [place] EXCIS INTERDIGITAL NEUROMA,EA Left 2018     Procedure: FOOT NEUROMA EXCISION;  Surgeon: Marylu Garza DPM;  Location: [address] [place] [address] OR;  Service: Podiatry    TUBAL LIGATION             Please note :  Voice dictation software has been used to create this document   There may be inadvertent transcription errors      JACE Dejesus         Progress Notes    Cosigned by: Yuli Currie MD at 2021 10:25 AM   Attestation signed by Yuli Currie MD at 2021 10:25 AM   I was the supervising/collaborating physician for this visit     I agree with the provider's documentation and plan  Expand All Collapse All       Assessment and plan:         1   Moderate right hydronephrosis  -right ureter mildly dilated jail down and difficult to see more distally, cause obstruction was uncertain  -creatinine 0 92 (2021)  -will schedule for cytso/retrograde pyelogram/right stent replacement  -discussed with patient if we are unable to insert a stent we would recommend a percutaneous nephrostomy tube to help drain her kidney     2  History of cervical cancer 2017 status post hysterectomy  -hemoglobin 11 7/hematocrit 33 5  -scheduled to see gyn Onc on 01/26/2021      3  Urinary incontinence  -referral to uro Gyn 05/2018, did not go because she lost her living arrangements with her sister  -stress urinary incontinence  -recommended kegel exercises  -can refer to pelvic floor physical therapy in the future     Christine JACE Delcid     History of Present Illness      Jessika Alcala is a 72 y o  new patient with a history of cervical cancer status post hysterectomy  Who recently presented to her PCP with a complaint of generalized abdominal pain with nausea at that time a CT of the abdomen and pelvis without contrast, CBC, CMP was ordered as well as an x-ray of the right hip and pelvis due to right hip pain  The CT scan of the abdomen and pelvis completed on 01/13/2021 revealed right-sided hydronephrosis which is at least moderate in severity  The ureter was mildly dilated is jail down  It was somewhat difficult to see more distally  And the precise cause of the obstruction was uncertain  The CT scan also revealed  lymphadenopathy within the retroperitoneum which was new compared to a prior study  Was also noted that there was ill-defined hazy opacity in the retroperitoneum surrounding the enlarged lymph nodes  It was indeterminate if the result was infectious, inflammatory or metastatic in nature  There lymph nodes measuring > 2 cm near the aorta  Estefania was referred to Urology and Medical Oncology  Her intake was completed by medical oncology and she was subsequently referred to gyn Oncology  Patient does have transportation issues and Oncology is setting her up with [facility]      She had CT scan from 02/12/2018 through [facility] which revealed no evidence of recurrent or metastatic disease within the chest abdomen or pelvis  She had stable subcentimeter pulmonary nodules bilaterally and right hepatic lobe hemangioma is unchanged  No retroperitoneal or mesenteric adenopathy at that time  Her kidneys were unremarkable her bladder was unremarkable  PET scan 2017 at [facility] with no evidence of metabolically active tumor  She c/o discomfort in the right flank area  She was walking up the steps before  and had sudden onset of right hip pain and was unable to walk up the steps  She reports that the pain is now intermittent  Lives alone and does not drive, she has no car, lives out of boxes in "a dump in [place]"  Reports her children live in [place]  And she does not talk to her sister who she formally lived with  Very tearful in the office today  She has a history of a 40 lb intentional weight loss due to exercise and dietary changes  She reports being a former heavy smoker at 3 packs per day but she was unable to quantify for how many years  She quit approximately  10-15 years ago  She states her mother  of stomach cancer and does not want to suffer the way she did but she is not ready to die    Laboratory            Lab Results   Component Value Date     BUN 16 2021     CREATININE 0 92 2021         No components found for: GFR           Lab Results   Component Value Date     CALCIUM 9 9 2021      2018     K 3 9 2021     CO2 28 2021      2021               Lab Results   Component Value Date     WBC 7 20 2021     HGB 11 7 (L) 2021     HCT 35 5 (L) 2021     MCV 85 2021      2021         No results found for: PSA     Recent Results   No results found for this or any previous visit (from the past 1 hour(s))         @RESULT(URINEMICROSCOPIC)@     @RESULT(URINECULTURE)@     Radiology      CT ABDOMEN AND PELVIS WITHOUT IV CONTRAST 1/13/21     INDICATION:   Y44 99: Generalized abdominal pain  R11 0: Nausea   Right-sided abdominal pain   Constipation   History of cervical cancer 2-4 years ago with hysterectomy and chemotherapy and radiation therapy     COMPARISON:  4/10/2017     TECHNIQUE:  CT examination of the abdomen and pelvis was performed without intravenous contrast   Axial, sagittal, and coronal 2D reformatted images were created from the source data and submitted for interpretation       Radiation dose length product (DLP) for this visit:  304 1 mGy-cm    This examination, like all CT scans performed in the [facility], was performed utilizing techniques to minimize radiation dose exposure, including the use of iterative   reconstruction and automated exposure control       Enteric contrast was not administered       FINDINGS:     ABDOMEN     LOWER CHEST:  No clinically significant abnormality identified in the visualized lower chest      LIVER/BILIARY TREE:  There is faint visualization of a low-density lesion in the lateral aspect of the right hepatic lobe which corresponds to a previously seen peripherally enhanced lesion   Again, likely a benign cavernous hemangioma   The low-density   lesion today measures smaller than on the prior study although they may not be able to see the full extent of the lesion without contrast   A tiny hypodense focus in the right lobe on image 30 is stable from prior and is probably a cyst     GALLBLADDER:  I suspect there is a gallstone   No evidence of cholecystitis      SPLEEN:  Unremarkable      PANCREAS:  Unremarkable      ADRENAL GLANDS:  Unremarkable      KIDNEYS/URETERS:  Left kidney is unremarkable  Waqas Charm is right-sided hydronephrosis which is at least moderate in severity   The ureter is mildly dilated at least FCI down   It is somewhat difficult to see more distally   The precise cause for the   obstruction is uncertain      STOMACH AND BOWEL: Waqas Charm is mild colonic diverticulosis without diverticulitis      APPENDIX:  A normal appendix was visualized      ABDOMINOPELVIC CAVITY:  There is lymphadenopathy within the retroperitoneum which is new compared with the prior study   Surrounding the enlarged lymph nodes there is ill-defined hazy opacity in the retroperitoneum   The findings are nonspecific and   could potentially be infectious or inflammatory in etiology although I would be concerned about metastatic disease in a patient with a prior history of cervical cancer   The lack of contrast somewhat limits assessment   There are probably lymph nodes   measuring greater than 2 cm diameter near the aorta      VESSELS:  Limited assessment without contrast   At least mild atherosclerotic disease of aorta is noted      PELVIS     REPRODUCTIVE ORGANS:  Surgical changes of prior hysterectomy   No definitive evidence of localized pelvic mass      URINARY BLADDER:  The bladder is mostly empty   No stones are seen      ABDOMINAL WALL/INGUINAL REGIONS:  Unremarkable      OSSEOUS STRUCTURES:  No acute fracture or destructive osseous lesion      IMPRESSION:     There is moderate amount of ill-defined retroperitoneal lymphadenopathy which is concerning for possible metastatic disease in a patient with a prior history of cervical cancer   Other etiologies are not excluded   Infectious or inflammatory etiologies   are potentially a consideration as would be lymphoma      There is moderate right hydronephrosis and proximal hydroureter without any obvious cause for obstruction based on this unenhanced exam      A repeat study with intravenous contrast during nephrographic and delayed phases may be helpful for further workup of findings      Pre-existing liver lesion seems smaller than on the prior study, probably benign cavernous hemangioma      Mild colonic diverticulosis without diverticulitis     Hysterectomy   No pelvic mass identified      Review of Systems      Review of Systems Constitutional: Negative for activity change, appetite change, chills, fatigue, fever and unexpected weight change  Hair loss x 1 year, started taking iron but became constipated   HENT: Negative for facial swelling  Eyes: Negative for discharge  Respiratory: Negative  Negative for cough and shortness of breath  Cardiovascular: Negative for chest pain and leg swelling  Gastrointestinal: Positive for constipation  Negative for abdominal distention, abdominal pain, diarrhea, nausea and vomiting  Endocrine: Negative  Genitourinary: Positive for flank pain, frequency and urgency  Negative for decreased urine volume, difficulty urinating, dysuria, enuresis and hematuria  Urinates small frequent amounts, feels like she does not empty her bladder when she voids  Has right flank and groin pain  Reports stress incontinence since hysterectomy  Has some urgency   Musculoskeletal: Positive for back pain  Negative for myalgias  Chronic low back pain, right hip pain that radiates down right leg   Skin: Negative for pallor and rash  Allergic/Immunologic: Negative  Negative for immunocompromised state  Neurological: Negative for facial asymmetry and speech difficulty  Hematological: Bruises/bleeds easily  Psychiatric/Behavioral: Negative for agitation and confusion  The patient is nervous/anxious        Allergies           Allergies   Allergen Reactions    Dust Mite Extract      Pollen Extract Sneezing         Physical Exam      Physical Exam  Constitutional:       General: She is not in acute distress  Appearance: Normal appearance  She is not ill-appearing, toxic-appearing or diaphoretic  HENT:      Head: Normocephalic and atraumatic  Nose: No congestion  Mouth/Throat:      Mouth: Mucous membranes are moist       Pharynx: Oropharynx is clear  Eyes:      General: No scleral icterus  Pupils: Pupils are equal, round, and reactive to light     Neck: Musculoskeletal: Normal range of motion and neck supple  Cardiovascular:      Rate and Rhythm: Normal rate and regular rhythm  Pulses: Normal pulses  Heart sounds: Normal heart sounds  Pulmonary:      Effort: Pulmonary effort is normal  No respiratory distress  Breath sounds: Normal breath sounds  No wheezing, rhonchi or rales  Abdominal:      General: Abdomen is flat  Bowel sounds are normal  There is no distension  Palpations: Abdomen is soft  Tenderness: There is no abdominal tenderness  There is right CVA tenderness  There is no left CVA tenderness, guarding or rebound  Musculoskeletal:         General: No swelling or tenderness  Right lower leg: No edema  Left lower leg: No edema  Skin:     General: Skin is warm and dry  Coloration: Skin is not jaundiced or pale  Findings: No rash  Neurological:      General: No focal deficit present  Mental Status: She is alert and oriented to person, place, and time  Motor: No weakness  Gait: Gait normal    Psychiatric:         Mood and Affect: Mood normal          Behavior: Behavior normal       Comments: Tearful  Has no family support  Children live in [place]   She no longer talks to her sister who is local            Vital Signs      Vitals       Vitals:     01/22/21 1348   BP: 128/80   Pulse: 80   Weight: 64 3 kg (141 lb 12 1 oz)           Current Medications         Current Outpatient Medications:     Biotin 10 MG CAPS, Take by mouth, Disp: , Rfl:     Multiple Vitamin (multivitamin) capsule, Take 1 capsule by mouth daily, Disp: , Rfl:      Active Problems          Patient Active Problem List   Diagnosis    Zaldivar's neuroma, left    Atypical glandular cells of undetermined significance of cervix    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HCC)    Menopausal and perimenopausal disorder    BMI 36 0-36 9,adult    Anxiety and depression    Trigger thumb of right hand    Tinnitus of left ear    Wrist arthritis    Medicare annual wellness visit, initial    BMI 29 0-29 9,adult    Generalized abdominal pain    Nausea    Right hip pain         Past Medical History      Medical History        Past Medical History:   Diagnosis Date    Anxiety      Anxiety and depression 10/2/2019    Breathing difficulty      Cancer ([address] )       cervical    GERD (gastroesophageal reflux disease)      Irritable bowel syndrome      Shortness of breath      Thumb injury 10/2019    Tinnitus of left ear 2/19/2020    Wrist arthritis 3/17/2020           Surgical History      Surgical History         Past Surgical History:   Procedure Laterality Date    HYSTERECTOMY        DC EXCIS INTERDIGITAL NEUROMA,EA Left 7/12/2018     Procedure: FOOT NEUROMA EXCISION;  Surgeon: Lilian Bailey DPM;  Location: [facility] MAIN OR;  Service: Podiatry    TUBAL LIGATION               Family History            Family History   Problem Relation Age of Onset    Stomach cancer Mother      Alcohol abuse Father      No Known Problems Sister      No Known Problems Daughter      No Known Problems Maternal Grandmother      No Known Problems Maternal Grandfather      No Known Problems Paternal Grandmother      No Known Problems Paternal Grandfather      No Known Problems Sister      No Known Problems Sister      No Known Problems Sister      No Known Problems Sister      No Known Problems Daughter      No Known Problems Daughter      No Known Problems Maternal Aunt      No Known Problems Maternal Aunt      No Known Problems Paternal Aunt      No Known Problems Paternal Aunt      No Known Problems Paternal Aunt      No Known Problems Paternal Aunt      No Known Problems Paternal Aunt      No Known Problems Paternal Aunt      No Known Problems Paternal Aunt      No Known Problems Paternal Aunt      No Known Problems Paternal Aunt      No Known Problems Paternal Aunt           Social History      Social History    Social History           Tobacco Use   Smoking Status Former Smoker    Packs/day: 3 00    Years: 25 00    Pack years: 75 00    Quit date: 2003    Years since quittin 5   Smokeless Tobacco Never Used         Surgical History         Past Surgical History:   Procedure Laterality Date    HYSTERECTOMY        MO EXCIS INTERDIGITAL NEUROMA,EA Left 2018     Procedure: FOOT NEUROMA EXCISION;  Surgeon: Saran Black DPM;  Location: [address] [place] [address] OR;  Service: Podiatry    TUBAL LIGATION               Please note :  Voice dictation software has been used to create this document  Vince Gavel may be inadvertent transcription errors      JACE Aparicio      Instructions     After Visit Summary (Automatic SnapShot taken 2021)  Additional Documentation    Vitals:    /80   Pulse 80   Wt 64 3 kg (141 lb 12 1 oz)   LMP  (LMP Unknown)   BMI 28 63 kg/m²   BSA 1 59 m²      More Vitals   Flowsheets:    Covid Symptom Screening      Encounter Info:    Billing Info,   History,   Allergies,   Detailed Report      Communications       Provider Notes sent to [facility], [address]  Orders Placed     Case request operating room: [address] [place] [address] Once  Medication Changes       None     Medication List   Visit Diagnoses       Hydronephrosis, unspecified hydronephrosis type     Problem List

## 2021-02-12 ENCOUNTER — TELEPHONE (OUTPATIENT)
Dept: INTERVENTIONAL RADIOLOGY/VASCULAR | Facility: HOSPITAL | Age: 66
End: 2021-02-12

## 2021-02-12 NOTE — PROGRESS NOTES
Spoke with patient to set up lymph node biopsy  Appointment was made for 2/19/21 @ the [facility]  Plan to arrive for 0800, have a ride to and from, and NPO after midnight the night prior  Answered all questions, consult complete

## 2021-02-17 ENCOUNTER — HOSPITAL ENCOUNTER (OUTPATIENT)
Dept: RADIOLOGY | Age: 66
Discharge: HOME | End: 2021-02-17
Payer: MEDICARE

## 2021-02-17 DIAGNOSIS — D48.7 NEOPLASM OF UNCERTAIN BEHAVIOR OF LYMPH NODE: ICD-10-CM

## 2021-02-17 DIAGNOSIS — C53.0 MALIGNANT NEOPLASM OF ENDOCERVIX (HCC): ICD-10-CM

## 2021-02-17 LAB — GLUCOSE SERPL-MCNC: 101 MG/DL (ref 65–140)

## 2021-02-17 PROCEDURE — A9552 F18 FDG: HCPCS

## 2021-02-17 PROCEDURE — G1004 CDSM NDSC: HCPCS

## 2021-02-17 PROCEDURE — 78815 PET IMAGE W/CT SKULL-THIGH: CPT

## 2021-02-17 PROCEDURE — 82948 REAGENT STRIP/BLOOD GLUCOSE: CPT

## 2021-02-19 ENCOUNTER — HOSPITAL ENCOUNTER (OUTPATIENT)
Dept: INTERVENTIONAL RADIOLOGY/VASCULAR | Facility: HOSPITAL | Age: 66
Discharge: HOME | End: 2021-02-19
Admitting: RADIOLOGY
Payer: MEDICARE

## 2021-02-19 VITALS
HEART RATE: 92 BPM | OXYGEN SATURATION: 94 % | DIASTOLIC BLOOD PRESSURE: 62 MMHG | RESPIRATION RATE: 18 BRPM | TEMPERATURE: 97.6 F | SYSTOLIC BLOOD PRESSURE: 118 MMHG

## 2021-02-19 DIAGNOSIS — R59.1 LYMPHADENOPATHY: ICD-10-CM

## 2021-02-19 LAB
ANION GAP SERPL CALCULATED.3IONS-SCNC: 8 MMOL/L (ref 4–13)
BUN SERPL-MCNC: 20 MG/DL (ref 5–25)
CALCIUM SERPL-MCNC: 9.6 MG/DL (ref 8.3–10.1)
CHLORIDE SERPL-SCNC: 103 MMOL/L (ref 100–108)
CO2 SERPL-SCNC: 26 MMOL/L (ref 21–32)
CREAT SERPL-MCNC: 1.25 MG/DL (ref 0.6–1.3)
ERYTHROCYTE [DISTWIDTH] IN BLOOD BY AUTOMATED COUNT: 13.6 % (ref 11.6–15.1)
GFR SERPL CREATININE-BSD FRML MDRD: 45 ML/MIN/1.73SQ M
GLUCOSE P FAST SERPL-MCNC: 117 MG/DL (ref 65–99)
GLUCOSE SERPL-MCNC: 117 MG/DL (ref 65–140)
HCT VFR BLD AUTO: 33 % (ref 34.8–46.1)
HGB BLD-MCNC: 10.2 G/DL (ref 11.5–15.4)
INR PPP: 1.11 (ref 0.84–1.19)
MCH RBC QN AUTO: 27.2 PG (ref 26.8–34.3)
MCHC RBC AUTO-ENTMCNC: 30.9 G/DL (ref 31.4–37.4)
MCV RBC AUTO: 88 FL (ref 82–98)
PLATELET # BLD AUTO: 247 THOUSANDS/UL (ref 149–390)
PMV BLD AUTO: 9.6 FL (ref 8.9–12.7)
POTASSIUM SERPL-SCNC: 4 MMOL/L (ref 3.5–5.3)
PROTHROMBIN TIME: 14.1 SECONDS (ref 11.6–14.5)
RBC # BLD AUTO: 3.75 MILLION/UL (ref 3.81–5.12)
SODIUM SERPL-SCNC: 137 MMOL/L (ref 136–145)
WBC # BLD AUTO: 7.45 THOUSAND/UL (ref 4.31–10.16)

## 2021-02-19 PROCEDURE — 88341 IMHCHEM/IMCYTCHM EA ADD ANTB: CPT | Performed by: PATHOLOGY

## 2021-02-19 PROCEDURE — 76942 ECHO GUIDE FOR BIOPSY: CPT | Performed by: RADIOLOGY

## 2021-02-19 PROCEDURE — 88342 IMHCHEM/IMCYTCHM 1ST ANTB: CPT | Performed by: PATHOLOGY

## 2021-02-19 PROCEDURE — 38505 NEEDLE BIOPSY LYMPH NODES: CPT

## 2021-02-19 PROCEDURE — 88305 TISSUE EXAM BY PATHOLOGIST: CPT | Performed by: PATHOLOGY

## 2021-02-19 PROCEDURE — 99152 MOD SED SAME PHYS/QHP 5/>YRS: CPT

## 2021-02-19 PROCEDURE — 80048 BASIC METABOLIC PNL TOTAL CA: CPT | Performed by: RADIOLOGY

## 2021-02-19 PROCEDURE — 85610 PROTHROMBIN TIME: CPT | Performed by: RADIOLOGY

## 2021-02-19 PROCEDURE — 99152 MOD SED SAME PHYS/QHP 5/>YRS: CPT | Performed by: RADIOLOGY

## 2021-02-19 PROCEDURE — 76942 ECHO GUIDE FOR BIOPSY: CPT

## 2021-02-19 PROCEDURE — 38505 NEEDLE BIOPSY LYMPH NODES: CPT | Performed by: RADIOLOGY

## 2021-02-19 PROCEDURE — 85027 COMPLETE CBC AUTOMATED: CPT | Performed by: RADIOLOGY

## 2021-02-19 RX ORDER — FENTANYL CITRATE 50 UG/ML
INJECTION, SOLUTION INTRAMUSCULAR; INTRAVENOUS CODE/TRAUMA/SEDATION MEDICATION
Status: COMPLETED | OUTPATIENT
Start: 2021-02-19 | End: 2021-02-19

## 2021-02-19 RX ORDER — LIDOCAINE HYDROCHLORIDE 10 MG/ML
INJECTION, SOLUTION EPIDURAL; INFILTRATION; INTRACAUDAL; PERINEURAL CODE/TRAUMA/SEDATION MEDICATION
Status: COMPLETED | OUTPATIENT
Start: 2021-02-19 | End: 2021-02-19

## 2021-02-19 RX ORDER — MIDAZOLAM HYDROCHLORIDE 2 MG/2ML
INJECTION, SOLUTION INTRAMUSCULAR; INTRAVENOUS CODE/TRAUMA/SEDATION MEDICATION
Status: COMPLETED | OUTPATIENT
Start: 2021-02-19 | End: 2021-02-19

## 2021-02-19 RX ORDER — SODIUM CHLORIDE 9 MG/ML
75 INJECTION, SOLUTION INTRAVENOUS CONTINUOUS
Status: DISCONTINUED | OUTPATIENT
Start: 2021-02-19 | End: 2021-02-23 | Stop reason: HOSPADM

## 2021-02-19 RX ADMIN — FENTANYL CITRATE 50 MCG: 50 INJECTION, SOLUTION INTRAMUSCULAR; INTRAVENOUS at 09:00

## 2021-02-19 RX ADMIN — MIDAZOLAM HYDROCHLORIDE 1 MG: 1 INJECTION, SOLUTION INTRAMUSCULAR; INTRAVENOUS at 09:00

## 2021-02-19 RX ADMIN — LIDOCAINE HYDROCHLORIDE 10 ML: 10 INJECTION, SOLUTION EPIDURAL; INFILTRATION; INTRACAUDAL; PERINEURAL at 09:07

## 2021-02-19 RX ADMIN — FENTANYL CITRATE 50 MCG: 50 INJECTION, SOLUTION INTRAMUSCULAR; INTRAVENOUS at 09:14

## 2021-02-19 RX ADMIN — SODIUM CHLORIDE 75 ML/HR: 0.9 INJECTION, SOLUTION INTRAVENOUS at 08:41

## 2021-02-19 NOTE — PROCEDURES
Interventional Radiology Procedure Note    PATIENT NAME: Lulu Higuera  : 1955  MRN: [id number]     Pre-op Diagnosis:   1  Lymphadenopathy      2  Cervical cancer      Post-op Diagnosis:   1  Lymphadenopathy      2  Same    Procedure:  Ultrasound-guided biopsy of left axillary lymph nodes    Surgeon:   Lynne Harman MD  Assistants:     No qualified resident was available, Resident is only observing    Estimated Blood Loss: Minimal     Findings:  2 mobile lymph nodes  Core biopsy obtained in sent formalin      Specimens:  Core biopsy x2    Complications:  None     Anesthesia: conscious sedation and local    Lynne Harman MD     Date: 2021  Time: 9:26 AM

## 2021-02-19 NOTE — DISCHARGE INSTRUCTIONS
[address]  Interventional Radiology  Dr Chaudhry[address]: [phone] (M-F 7:30am - 4:00pm)  Off hours or no answer: [phone] (Ask for IR on call)           [address] after your procedure:    1  Limit your activities for 24 hours after your biopsy  2  No driving day of biopsy  3  Return to your normal diet  Small sips of flat soda will help with mild nausea  4  Remove band-aid or dressing 24 hours after procedure  Contact Interventional Radiology at [phone] [place] PATIENTS: Contact Interventional Radiology at [phone]) Richellewendi Alvarado PATIENTS: Contact Interventional Radiology at [phone]) if:    1  Difficulty breathing, nausea or vomiting  2  Chills or fever above 101 degrees F      3  Pain at biopsy site not relieved by medication  4  Develop any redness, swelling, heat, unusual drainage, heavy bruising or bleeding from biopsy site

## 2021-02-19 NOTE — PROGRESS NOTES
Interventional Radiology  History and Physical 2021     Lulu Higuera   1955   [id number]    Assessment/Plan:  Percutaneous biopsy of axillary nodes    Problem List Items Addressed This Visit        Immune and Lymphatic    Lymphadenopathy    Relevant Orders    IR biopsy lymph node             Subjective:     Patient ID: Lulu Higuera is a 72 y o  female  History of Present Illness  Cervical cancer  PET scan shows lymphadenopathy throughout    Easiest target is probably the axillary nodes    Review of Systems      Past Medical History:   Diagnosis Date    Anxiety     Anxiety and depression 10/2/2019    Breathing difficulty     Cancer ([address] )     cervical    GERD (gastroesophageal reflux disease)     Irritable bowel syndrome     Shortness of breath     Thumb injury 10/2019    Tinnitus of left ear 2020    Wrist arthritis 3/17/2020        Past Surgical History:   Procedure Laterality Date    FL RETROGRADE PYELOGRAM  2021    FOOT SURGERY      HYSTERECTOMY      IA CYSTOURETHROSCOPY,URETER CATHETER Right 2021    Procedure: CYSTOSCOPY RETROGRADE PYELOGRAM WITH INSERTION STENT URETERAL;  Surgeon: Andre Feliciano MD;  Location:  MAIN OR;  Service: Urology    IA EXCIS INTERDIGITAL [place] Left 2018    Procedure: FOOT NEUROMA EXCISION;  Surgeon: Aldair Shabazz DPM;  Location: [address] [place] [address] MAIN OR;  Service: Podiatry    TUBAL LIGATION          Social History     Tobacco Use   Smoking Status Former Smoker    Packs/day: 3 00    Years: 25 00    Pack years: 75 00    Quit date: 2003    Years since quittin 6   Smokeless Tobacco Never Used        Social History     Substance and Sexual Activity   Alcohol Use Yes    Comment:   holidays        Social History     Substance and Sexual Activity   Drug Use No        Allergies   Allergen Reactions    Dust Mite Extract     Pollen Extract Sneezing       Current Outpatient Medications   Medication Sig Dispense Refill    Biotin 10 MG CAPS Take by mouth      HYDROcodone-acetaminophen (NORCO) 5-325 mg per tablet Take 1-2 tablets by mouth every 4 (four) hours as needed for pain for up to 10 daysMax Daily Amount: 12 tablets 20 tablet 0    Multiple Vitamin (multivitamin) capsule Take 1 capsule by mouth daily      oxybutynin (DITROPAN) 5 mg tablet Take 1 tablet (5 mg total) by mouth 3 (three) times a day as needed (bladder spasm) 20 tablet 0    phenazopyridine (PYRIDIUM) 200 mg tablet Take 1 tablet (200 mg total) by mouth 3 (three) times a day as needed for bladder spasms 30 tablet 0     Current Facility-Administered Medications   Medication Dose Route Frequency Provider Last Rate Last Admin    sodium chloride 0 9 % infusion  75 mL/hr Intravenous Continuous Merlin Krueger MD 75 mL/hr at 02/19/21 0841 75 mL/hr at 02/19/21 0841          Objective:    Vitals:    02/19/21 0900 02/19/21 0905 02/19/21 0910 02/19/21 0915   BP: 127/60 126/62 112/58 114/56   Pulse: 88 103 98 100   Resp: 18 18 18 18   Temp:       TempSrc:       SpO2: 97% 91% 92% 93%        Physical Exam      Regular rate and rhythm  Normal respiratory excursion  Axillary nodes are not palpable  No results found for: BNP   Lab Results   Component Value Date    WBC 7 45 02/19/2021    HGB 10 2 (L) 02/19/2021    HCT 33 0 (L) 02/19/2021    MCV 88 02/19/2021     02/19/2021     Lab Results   Component Value Date    INR 1 11 02/19/2021    INR 1 06 06/11/2018    PROTIME 14 1 02/19/2021     Lab Results   Component Value Date    PTT 37 4 06/11/2018         I have personally reviewed pertinent imaging and laboratory results  Code Status: No Order  Advance Directive and Living Will:      Power of :    POLST:      This text is generated with voice recognition software  There may be translation, syntax,  or grammatical errors  If you have any questions, please contact the dictating provider

## 2021-02-23 ENCOUNTER — TELEPHONE (OUTPATIENT)
Dept: GYNECOLOGIC ONCOLOGY | Facility: CLINIC | Age: 66
End: 2021-02-23

## 2021-02-23 NOTE — TELEPHONE ENCOUNTER
Application submitted to [facility] for genetic testing on patient's tumor specimen from 2/19/21

## 2021-02-24 ENCOUNTER — TELEPHONE (OUTPATIENT)
Dept: INTERNAL MEDICINE CLINIC | Facility: CLINIC | Age: 66
End: 2021-02-24

## 2021-02-24 DIAGNOSIS — E87.6 HYPOKALEMIA: ICD-10-CM

## 2021-02-24 DIAGNOSIS — C53.0 MALIGNANT NEOPLASM OF ENDOCERVIX (HCC): Primary | ICD-10-CM

## 2021-02-24 PROBLEM — R11.0 NAUSEA: Status: RESOLVED | Noted: 2021-01-05 | Resolved: 2021-02-24

## 2021-02-24 PROBLEM — R59.1 LYMPHADENOPATHY: Status: RESOLVED | Noted: 2021-02-04 | Resolved: 2021-02-24

## 2021-02-24 PROBLEM — Z13.9 ENCOUNTER FOR SCREENING INVOLVING SOCIAL DETERMINANTS OF HEALTH (SDOH): Status: ACTIVE | Noted: 2021-02-24

## 2021-02-24 PROBLEM — R10.84 GENERALIZED ABDOMINAL PAIN: Status: RESOLVED | Noted: 2021-01-05 | Resolved: 2021-02-24

## 2021-02-24 PROBLEM — Z00.00 MEDICARE ANNUAL WELLNESS VISIT, INITIAL: Status: RESOLVED | Noted: 2021-01-05 | Resolved: 2021-02-24

## 2021-02-24 NOTE — TELEPHONE ENCOUNTER
Isidro Arnold asked me to call patient and ask how she is doing  I did call and speak to the patient  She was extremely upset crying and I tried to console her but just listened to her  She had doubts about going to [facility] for chemo because of how she was treated day of biopsy  I told her that my  goes to [facility] infusion for his chemo and that the ladies there are wonderful  That made patient feel at ease about going there

## 2021-02-25 ENCOUNTER — DOCUMENTATION (OUTPATIENT)
Dept: HEMATOLOGY ONCOLOGY | Facility: CLINIC | Age: 66
End: 2021-02-25

## 2021-02-25 ENCOUNTER — TELEPHONE (OUTPATIENT)
Dept: GYNECOLOGIC ONCOLOGY | Facility: CLINIC | Age: 66
End: 2021-02-25

## 2021-02-25 DIAGNOSIS — C53.0 MALIGNANT NEOPLASM OF ENDOCERVIX (HCC): Primary | ICD-10-CM

## 2021-02-25 DIAGNOSIS — Z78.9 NEED FOR FOLLOW-UP BY SOCIAL WORKER: Primary | ICD-10-CM

## 2021-02-25 RX ORDER — LORAZEPAM 1 MG/1
1 TABLET ORAL EVERY 6 HOURS PRN
Qty: 36 TABLET | Refills: 1 | Status: SHIPPED | OUTPATIENT
Start: 2021-02-25 | End: 2021-03-03 | Stop reason: SDUPTHER

## 2021-02-25 RX ORDER — SODIUM CHLORIDE 9 MG/ML
20 INJECTION, SOLUTION INTRAVENOUS ONCE
Status: CANCELLED | OUTPATIENT
Start: 2021-03-02

## 2021-02-25 RX ORDER — ONDANSETRON HYDROCHLORIDE 8 MG/1
8 TABLET, FILM COATED ORAL EVERY 8 HOURS PRN
Qty: 30 TABLET | Refills: 1 | Status: SHIPPED | OUTPATIENT
Start: 2021-02-25 | End: 2021-03-23 | Stop reason: SDUPTHER

## 2021-02-25 RX ORDER — PALONOSETRON 0.05 MG/ML
0.25 INJECTION, SOLUTION INTRAVENOUS ONCE
Status: CANCELLED | OUTPATIENT
Start: 2021-03-02

## 2021-02-25 NOTE — PROGRESS NOTES
Pt called me back & she sd that she only has ssi  & medicare only she doesn't qualify for the BCCPT program due to being over 65   she gave me her income info & I told her I will look around for funding & I will call her back  She sd that she gets minutes on 03/01/21  Clare Trent emailed minesh

## 2021-02-25 NOTE — PROGRESS NOTES
recvd message from minesh that pt is at her neighbor paul Clarence & I  can call [phone] to talk to the pt the pt has no more minutes for her phone & has no way to talk to me   Called that # bt got her voicemail left a message for pt to jim me back

## 2021-02-25 NOTE — PROGRESS NOTES
recvd email that pt has some concerns about her bills  Called pt to see aout the concerns but the phone clicks & then we get disconnected    tried it 3 times & each time the same thing happened

## 2021-02-25 NOTE — TELEPHONE ENCOUNTER
Difficulty in reaching patient by phone  Attempts made x4 to disucss scheduling chemo  Line disconnects  Emails sent x2, including [employer] paperwork  Will f/u with emergency contact at the end of the day if no response

## 2021-02-26 ENCOUNTER — HOSPITAL ENCOUNTER (OUTPATIENT)
Dept: INFUSION CENTER | Facility: HOSPITAL | Age: 66
Discharge: HOME | End: 2021-02-26

## 2021-02-26 DIAGNOSIS — E86.0 DEHYDRATION: Primary | ICD-10-CM

## 2021-03-01 ENCOUNTER — PATIENT OUTREACH (OUTPATIENT)
Dept: CASE MANAGEMENT | Facility: HOSPITAL | Age: 66
End: 2021-03-01

## 2021-03-01 DIAGNOSIS — E86.0 DEHYDRATION: Primary | ICD-10-CM

## 2021-03-01 NOTE — PROGRESS NOTES
Oncology LSW reached out to PT after receiving an email from her gyn onc provider  LSW proceeded to introduce herself and explained her role within the [facility]  PT explained that, when COVID hit, she had it in her mind that she would become more active and lose weight  However, she knew something was wrong when she started experiencing significant pain and started losing her hair  PT tearfully explained that she was aware of her prognosis and was doing her best to understand that treatment was to help alleviate some of her pain, but not to cure her cancer  LSW engaged in active listening and provided PT with emotional support  LSW explained that she had spoken with infusion nursing staff this AM and there was question as to when PT would be getting her labs completed prior to treatment  PT explained that she was told that she could get her labs completed same day as her treatment  LSW explained that she would relay this information to infusion nursing staff so that they were aware  PT further stated that she had no means of transportation to get her labs completed today  PT then expressed her regret for getting testy with her gyn onc provider, as she was frustrated with the scheduling process and stated that she would like to apologize to her if she could  LSW normalized PT's feeling and explained that the changing of appointments could make anyone frustrated  PT explains that she was most frustrated with the fact that she could have "started treatment two weeks sooner" if it was resolved from the beginning  PT explained that she was also frustrated with herself for running out of minutes on her phone, as this has never happened to her before  PT reported that she did not have to pay for her minutes, they were allotted to her on a monthly basis  PT explained that she would be more mindful of utilizing her minutes moving forward       LSW explained that she would be there to emotionally support PT throughout the duration of her treatment  LSW further stated that she would frequently check in with PT to address any needs that she may have and to provide emotional support  PT was agreeable with this plan and thanked MEENA for her time

## 2021-03-02 ENCOUNTER — HOSPITAL ENCOUNTER (OUTPATIENT)
Dept: INFUSION CENTER | Facility: HOSPITAL | Age: 66
Discharge: HOME | End: 2021-03-02
Payer: MEDICARE

## 2021-03-02 ENCOUNTER — TELEPHONE (OUTPATIENT)
Dept: INTERVENTIONAL RADIOLOGY/VASCULAR | Facility: HOSPITAL | Age: 66
End: 2021-03-02

## 2021-03-02 VITALS
SYSTOLIC BLOOD PRESSURE: 144 MMHG | BODY MASS INDEX: 27.56 KG/M2 | TEMPERATURE: 97.9 F | DIASTOLIC BLOOD PRESSURE: 66 MMHG | WEIGHT: 136.69 LBS | HEART RATE: 97 BPM | OXYGEN SATURATION: 98 % | HEIGHT: 59 IN

## 2021-03-02 DIAGNOSIS — C53.0 MALIGNANT NEOPLASM OF ENDOCERVIX (HCC): Primary | ICD-10-CM

## 2021-03-02 PROBLEM — E87.6 HYPOKALEMIA: Status: ACTIVE | Noted: 2021-03-02

## 2021-03-02 LAB
ALBUMIN SERPL BCP-MCNC: 3.7 G/DL (ref 3.5–5.7)
ALP SERPL-CCNC: 91 U/L (ref 55–165)
ALT SERPL W P-5'-P-CCNC: 10 U/L (ref 7–52)
ANION GAP SERPL CALCULATED.3IONS-SCNC: 13 MMOL/L (ref 4–13)
AST SERPL W P-5'-P-CCNC: 13 U/L (ref 13–39)
BASOPHILS # BLD AUTO: 0 THOUSANDS/ΜL (ref 0–0.1)
BASOPHILS NFR BLD AUTO: 0 % (ref 0–2)
BILIRUB SERPL-MCNC: 0.3 MG/DL (ref 0.2–1)
BUN SERPL-MCNC: 20 MG/DL (ref 7–25)
CALCIUM SERPL-MCNC: 9.4 MG/DL (ref 8.6–10.5)
CHLORIDE SERPL-SCNC: 102 MMOL/L (ref 98–107)
CO2 SERPL-SCNC: 23 MMOL/L (ref 21–31)
CREAT SERPL-MCNC: 1.16 MG/DL (ref 0.6–1.2)
EOSINOPHIL # BLD AUTO: 0.1 THOUSAND/ΜL (ref 0–0.61)
EOSINOPHIL NFR BLD AUTO: 2 % (ref 0–5)
ERYTHROCYTE [DISTWIDTH] IN BLOOD BY AUTOMATED COUNT: 15.6 % (ref 11.5–14.5)
GFR SERPL CREATININE-BSD FRML MDRD: 50 ML/MIN/1.73SQ M
GLUCOSE SERPL-MCNC: 125 MG/DL (ref 65–99)
HCT VFR BLD AUTO: 29.2 % (ref 42–47)
HGB BLD-MCNC: 9.4 G/DL (ref 12–16)
LYMPHOCYTES # BLD AUTO: 0.9 THOUSANDS/ΜL (ref 0.6–4.47)
LYMPHOCYTES NFR BLD AUTO: 13 % (ref 21–51)
MAGNESIUM SERPL-MCNC: 1.7 MG/DL (ref 1.9–2.7)
MCH RBC QN AUTO: 27 PG (ref 26–34)
MCHC RBC AUTO-ENTMCNC: 32.3 G/DL (ref 31–37)
MCV RBC AUTO: 84 FL (ref 81–99)
MONOCYTES # BLD AUTO: 0.6 THOUSAND/ΜL (ref 0.17–1.22)
MONOCYTES NFR BLD AUTO: 9 % (ref 2–12)
NEUTROPHILS # BLD AUTO: 5.3 THOUSANDS/ΜL (ref 1.4–6.5)
NEUTS SEG NFR BLD AUTO: 76 % (ref 42–75)
PLATELET # BLD AUTO: 265 THOUSANDS/UL (ref 149–390)
PMV BLD AUTO: 8.4 FL (ref 8.6–11.7)
POTASSIUM SERPL-SCNC: 3.1 MMOL/L (ref 3.5–5.5)
PROT SERPL-MCNC: 7.5 G/DL (ref 6.4–8.9)
RBC # BLD AUTO: 3.5 MILLION/UL (ref 3.9–5.2)
SODIUM SERPL-SCNC: 138 MMOL/L (ref 134–143)
WBC # BLD AUTO: 7 THOUSAND/UL (ref 4.8–10.8)

## 2021-03-02 PROCEDURE — 96413 CHEMO IV INFUSION 1 HR: CPT

## 2021-03-02 PROCEDURE — 80053 COMPREHEN METABOLIC PANEL: CPT | Performed by: OBSTETRICS & GYNECOLOGY

## 2021-03-02 PROCEDURE — 85025 COMPLETE CBC W/AUTO DIFF WBC: CPT | Performed by: OBSTETRICS & GYNECOLOGY

## 2021-03-02 PROCEDURE — 96415 CHEMO IV INFUSION ADDL HR: CPT

## 2021-03-02 PROCEDURE — 96417 CHEMO IV INFUS EACH ADDL SEQ: CPT

## 2021-03-02 PROCEDURE — 96367 TX/PROPH/DG ADDL SEQ IV INF: CPT

## 2021-03-02 PROCEDURE — 84156 ASSAY OF PROTEIN URINE: CPT

## 2021-03-02 PROCEDURE — 82570 ASSAY OF URINE CREATININE: CPT

## 2021-03-02 PROCEDURE — 96375 TX/PRO/DX INJ NEW DRUG ADDON: CPT

## 2021-03-02 PROCEDURE — 83735 ASSAY OF MAGNESIUM: CPT | Performed by: OBSTETRICS & GYNECOLOGY

## 2021-03-02 RX ORDER — PALONOSETRON 0.05 MG/ML
0.25 INJECTION, SOLUTION INTRAVENOUS ONCE
Status: COMPLETED | OUTPATIENT
Start: 2021-03-02 | End: 2021-03-02

## 2021-03-02 RX ORDER — SODIUM CHLORIDE 9 MG/ML
20 INJECTION, SOLUTION INTRAVENOUS ONCE
Status: COMPLETED | OUTPATIENT
Start: 2021-03-02 | End: 2021-03-02

## 2021-03-02 RX ORDER — POTASSIUM CHLORIDE 20 MEQ/1
40 TABLET, EXTENDED RELEASE ORAL ONCE
Status: CANCELLED
Start: 2021-03-02

## 2021-03-02 RX ADMIN — DIPHENHYDRAMINE HYDROCHLORIDE 25 MG: 50 INJECTION INTRAMUSCULAR; INTRAVENOUS at 10:50

## 2021-03-02 RX ADMIN — PACLITAXEL 211.8 MG: 6 INJECTION, SOLUTION INTRAVENOUS at 12:14

## 2021-03-02 RX ADMIN — DEXAMETHASONE SODIUM PHOSPHATE 20 MG: 10 INJECTION, SOLUTION INTRAMUSCULAR; INTRAVENOUS at 10:30

## 2021-03-02 RX ADMIN — SODIUM CHLORIDE 20 ML/HR: 0.9 INJECTION, SOLUTION INTRAVENOUS at 10:20

## 2021-03-02 RX ADMIN — CARBOPLATIN 331 MG: 10 INJECTION, SOLUTION INTRAVENOUS at 15:05

## 2021-03-02 RX ADMIN — PALONOSETRON HYDROCHLORIDE 0.25 MG: 0.05 INJECTION, SOLUTION INTRAVENOUS at 11:34

## 2021-03-02 RX ADMIN — FAMOTIDINE 20 MG: 10 INJECTION INTRAVENOUS at 11:11

## 2021-03-02 RX ADMIN — FOSAPREPITANT 150 MG: 150 INJECTION, POWDER, LYOPHILIZED, FOR SOLUTION INTRAVENOUS at 11:36

## 2021-03-02 NOTE — PROGRESS NOTES
Taxol/carbo given without incident  No S/S of adverse reaction  Pt offers no complaints on D/C  AVS given  Upcoming appts reviewed

## 2021-03-02 NOTE — PROGRESS NOTES
pts labs were drawn on arrival for her first chemo treatment  K 3 1 Mag 1 7  Shayna Snow made aware  OK to proceed with chemo today

## 2021-03-03 DIAGNOSIS — C53.0 MALIGNANT NEOPLASM OF ENDOCERVIX (HCC): ICD-10-CM

## 2021-03-03 LAB
CREAT UR-MCNC: 65.3 MG/DL
PROT UR-MCNC: 19 MG/DL
PROT/CREAT UR: 0.29 MG/G{CREAT} (ref 0–0.1)

## 2021-03-03 RX ORDER — LORAZEPAM 1 MG/1
1 TABLET ORAL EVERY 6 HOURS PRN
Qty: 36 TABLET | Refills: 1 | Status: SHIPPED | OUTPATIENT
Start: 2021-03-03 | End: 2021-04-08 | Stop reason: SDUPTHER

## 2021-03-03 NOTE — TELEPHONE ENCOUNTER
Patient called wanting to know if its ok to take ensure supplements  She states she spoke to her pcp and they wanted for her to check with urology due to her cancer treatment        Patient can be reached at [phone]

## 2021-03-03 NOTE — TELEPHONE ENCOUNTER
Called and spoke with patient  Informed patient that it is fine from a urology standpoint to take the ensure supplements  Told patient to reach out to gyn onc to get there input  Patient verbalized understanding

## 2021-03-04 ENCOUNTER — TELEPHONE (OUTPATIENT)
Dept: INTERVENTIONAL RADIOLOGY/VASCULAR | Facility: HOSPITAL | Age: 66
End: 2021-03-04

## 2021-03-04 NOTE — PROGRESS NOTES
Spoke with patient to set up port placement  Appointment was made for 3/9/21 @ the [facility]  Plan to arrive for 0830, have a ride to and from, and NPO after midnight  Will set up [facility] for transport, answered all questions, consult complete

## 2021-03-05 ENCOUNTER — HOSPITAL ENCOUNTER (OUTPATIENT)
Dept: INFUSION CENTER | Facility: HOSPITAL | Age: 66
Discharge: HOME | End: 2021-03-05
Payer: MEDICARE

## 2021-03-05 ENCOUNTER — DOCUMENTATION (OUTPATIENT)
Dept: HEMATOLOGY ONCOLOGY | Facility: CLINIC | Age: 66
End: 2021-03-05

## 2021-03-05 DIAGNOSIS — E86.0 DEHYDRATION: Primary | ICD-10-CM

## 2021-03-05 LAB
ALBUMIN SERPL BCP-MCNC: 4 G/DL (ref 3.5–5.7)
ALP SERPL-CCNC: 97 U/L (ref 55–165)
ALT SERPL W P-5'-P-CCNC: 13 U/L (ref 7–52)
ANION GAP SERPL CALCULATED.3IONS-SCNC: 10 MMOL/L (ref 4–13)
AST SERPL W P-5'-P-CCNC: 21 U/L (ref 13–39)
BASOPHILS # BLD AUTO: 0 THOUSANDS/ΜL (ref 0–0.1)
BASOPHILS NFR BLD AUTO: 0 % (ref 0–2)
BILIRUB SERPL-MCNC: 0.4 MG/DL (ref 0.2–1)
BUN SERPL-MCNC: 22 MG/DL (ref 7–25)
CALCIUM SERPL-MCNC: 9.9 MG/DL (ref 8.6–10.5)
CHLORIDE SERPL-SCNC: 100 MMOL/L (ref 98–107)
CO2 SERPL-SCNC: 29 MMOL/L (ref 21–31)
CREAT SERPL-MCNC: 1.23 MG/DL (ref 0.6–1.2)
EOSINOPHIL # BLD AUTO: 0.1 THOUSAND/ΜL (ref 0–0.61)
EOSINOPHIL NFR BLD AUTO: 1 % (ref 0–5)
ERYTHROCYTE [DISTWIDTH] IN BLOOD BY AUTOMATED COUNT: 15.2 % (ref 11.5–14.5)
GFR SERPL CREATININE-BSD FRML MDRD: 46 ML/MIN/1.73SQ M
GLUCOSE SERPL-MCNC: 117 MG/DL (ref 65–99)
HCT VFR BLD AUTO: 34.7 % (ref 42–47)
HGB BLD-MCNC: 11.2 G/DL (ref 12–16)
LYMPHOCYTES # BLD AUTO: 0.7 THOUSANDS/ΜL (ref 0.6–4.47)
LYMPHOCYTES NFR BLD AUTO: 9 % (ref 21–51)
MCH RBC QN AUTO: 26.8 PG (ref 26–34)
MCHC RBC AUTO-ENTMCNC: 32.2 G/DL (ref 31–37)
MCV RBC AUTO: 83 FL (ref 81–99)
MONOCYTES # BLD AUTO: 0.1 THOUSAND/ΜL (ref 0.17–1.22)
MONOCYTES NFR BLD AUTO: 1 % (ref 2–12)
NEUTROPHILS # BLD AUTO: 6.5 THOUSANDS/ΜL (ref 1.4–6.5)
NEUTS SEG NFR BLD AUTO: 88 % (ref 42–75)
PLATELET # BLD AUTO: 249 THOUSANDS/UL (ref 149–390)
PMV BLD AUTO: 8.5 FL (ref 8.6–11.7)
POTASSIUM SERPL-SCNC: 3.4 MMOL/L (ref 3.5–5.5)
PROT SERPL-MCNC: 7.9 G/DL (ref 6.4–8.9)
RBC # BLD AUTO: 4.17 MILLION/UL (ref 3.9–5.2)
SODIUM SERPL-SCNC: 139 MMOL/L (ref 134–143)
WBC # BLD AUTO: 7.4 THOUSAND/UL (ref 4.8–10.8)

## 2021-03-05 PROCEDURE — 85025 COMPLETE CBC W/AUTO DIFF WBC: CPT | Performed by: OBSTETRICS & GYNECOLOGY

## 2021-03-05 PROCEDURE — 80053 COMPREHEN METABOLIC PANEL: CPT | Performed by: OBSTETRICS & GYNECOLOGY

## 2021-03-05 PROCEDURE — 96361 HYDRATE IV INFUSION ADD-ON: CPT

## 2021-03-05 PROCEDURE — 96360 HYDRATION IV INFUSION INIT: CPT

## 2021-03-05 RX ADMIN — SODIUM CHLORIDE 1000 ML: 0.9 INJECTION, SOLUTION INTRAVENOUS at 12:32

## 2021-03-05 NOTE — PROGRESS NOTES
Pt had 1 liter of fluid over 90 minutes  C/o nausea, pain, and is anxious  Repeat labs noted from today  Tc to [facility]  confirm no oral K supplements needed  Peripheral iv discontinued jelco intact  Discharged ambulatory with Ashley ortega

## 2021-03-05 NOTE — PROGRESS NOTES
Called [facility] & spoke to anupama she sd that pt is approved for $2500 effective 03/05/21-03/05/22 w/lookback to 09/06/20  sd once the giovanna exhausts then the pt has to wait until 03/05/22  MV#[id number]  She will fax the juan miguel for the claims along w/the eob & ub[employer]   Called pt back to tell her this info but got her voicemail left her a message to call me back

## 2021-03-05 NOTE — PLAN OF CARE
Problem: INFECTION - ADULT  Goal: Absence or prevention of progression during hospitalization  Description: INTERVENTIONS:  - Assess and monitor for signs and symptoms of infection  - Monitor lab/diagnostic results  - Monitor all insertion sites, i e  indwelling lines, tubes, and drains  - Monitor endotracheal if appropriate and nasal secretions for changes in amount and color  - [place] appropriate cooling/warming therapies per order  - Administer medications as ordered  - Instruct and encourage patient and family to use good hand hygiene technique  - Identify and instruct in appropriate isolation precautions for identified infection/condition  Outcome: Progressing  Goal: Absence of fever/infection during neutropenic period  Description: INTERVENTIONS:  - Monitor WBC    Outcome: Progressing

## 2021-03-09 ENCOUNTER — HOSPITAL ENCOUNTER (OUTPATIENT)
Dept: INTERVENTIONAL RADIOLOGY/VASCULAR | Facility: HOSPITAL | Age: 66
Discharge: HOME | End: 2021-03-09
Admitting: RADIOLOGY
Payer: MEDICARE

## 2021-03-09 VITALS
TEMPERATURE: 96.7 F | RESPIRATION RATE: 16 BRPM | HEIGHT: 59 IN | SYSTOLIC BLOOD PRESSURE: 120 MMHG | DIASTOLIC BLOOD PRESSURE: 80 MMHG | WEIGHT: 136 LBS | OXYGEN SATURATION: 99 % | HEART RATE: 77 BPM | BODY MASS INDEX: 27.42 KG/M2

## 2021-03-09 DIAGNOSIS — C53.0 MALIGNANT NEOPLASM OF ENDOCERVIX (HCC): ICD-10-CM

## 2021-03-09 DIAGNOSIS — C53.0 MALIGNANT NEOPLASM OF ENDOCERVIX (HCC): Primary | ICD-10-CM

## 2021-03-09 PROCEDURE — 36561 INSERT TUNNELED CV CATH: CPT

## 2021-03-09 PROCEDURE — 99152 MOD SED SAME PHYS/QHP 5/>YRS: CPT

## 2021-03-09 PROCEDURE — 99153 MOD SED SAME PHYS/QHP EA: CPT

## 2021-03-09 PROCEDURE — 99152 MOD SED SAME PHYS/QHP 5/>YRS: CPT | Performed by: RADIOLOGY

## 2021-03-09 PROCEDURE — C1788 PORT, INDWELLING, IMP: HCPCS

## 2021-03-09 PROCEDURE — 36561 INSERT TUNNELED CV CATH: CPT | Performed by: RADIOLOGY

## 2021-03-09 PROCEDURE — 76937 US GUIDE VASCULAR ACCESS: CPT

## 2021-03-09 PROCEDURE — 76937 US GUIDE VASCULAR ACCESS: CPT | Performed by: RADIOLOGY

## 2021-03-09 PROCEDURE — 77001 FLUOROGUIDE FOR VEIN DEVICE: CPT | Performed by: RADIOLOGY

## 2021-03-09 PROCEDURE — 77001 FLUOROGUIDE FOR VEIN DEVICE: CPT

## 2021-03-09 RX ORDER — LIDOCAINE HYDROCHLORIDE 10 MG/ML
INJECTION, SOLUTION EPIDURAL; INFILTRATION; INTRACAUDAL; PERINEURAL CODE/TRAUMA/SEDATION MEDICATION
Status: COMPLETED | OUTPATIENT
Start: 2021-03-09 | End: 2021-03-09

## 2021-03-09 RX ORDER — HEPARIN SODIUM (PORCINE) LOCK FLUSH IV SOLN 100 UNIT/ML 100 UNIT/ML
SOLUTION INTRAVENOUS CODE/TRAUMA/SEDATION MEDICATION
Status: COMPLETED | OUTPATIENT
Start: 2021-03-09 | End: 2021-03-09

## 2021-03-09 RX ORDER — LIDOCAINE HYDROCHLORIDE AND EPINEPHRINE 10; 10 MG/ML; UG/ML
INJECTION, SOLUTION INFILTRATION; PERINEURAL CODE/TRAUMA/SEDATION MEDICATION
Status: COMPLETED | OUTPATIENT
Start: 2021-03-09 | End: 2021-03-09

## 2021-03-09 RX ORDER — FENTANYL CITRATE 50 UG/ML
INJECTION, SOLUTION INTRAMUSCULAR; INTRAVENOUS CODE/TRAUMA/SEDATION MEDICATION
Status: COMPLETED | OUTPATIENT
Start: 2021-03-09 | End: 2021-03-09

## 2021-03-09 RX ORDER — CEFAZOLIN SODIUM 1 G/50ML
1000 SOLUTION INTRAVENOUS ONCE
Status: COMPLETED | OUTPATIENT
Start: 2021-03-09 | End: 2021-03-09

## 2021-03-09 RX ORDER — MIDAZOLAM HYDROCHLORIDE 2 MG/2ML
INJECTION, SOLUTION INTRAMUSCULAR; INTRAVENOUS CODE/TRAUMA/SEDATION MEDICATION
Status: COMPLETED | OUTPATIENT
Start: 2021-03-09 | End: 2021-03-09

## 2021-03-09 RX ORDER — ONDANSETRON 2 MG/ML
INJECTION INTRAMUSCULAR; INTRAVENOUS CODE/TRAUMA/SEDATION MEDICATION
Status: COMPLETED | OUTPATIENT
Start: 2021-03-09 | End: 2021-03-09

## 2021-03-09 RX ORDER — SODIUM CHLORIDE, SODIUM LACTATE, POTASSIUM CHLORIDE, CALCIUM CHLORIDE 600; 310; 30; 20 MG/100ML; MG/100ML; MG/100ML; MG/100ML
75 INJECTION, SOLUTION INTRAVENOUS CONTINUOUS
Status: DISCONTINUED | OUTPATIENT
Start: 2021-03-09 | End: 2021-03-13 | Stop reason: HOSPADM

## 2021-03-09 RX ADMIN — Medication 500 UNITS: at 10:19

## 2021-03-09 RX ADMIN — LIDOCAINE HYDROCHLORIDE 10 ML: 10 INJECTION, SOLUTION EPIDURAL; INFILTRATION; INTRACAUDAL; PERINEURAL at 09:45

## 2021-03-09 RX ADMIN — MIDAZOLAM HYDROCHLORIDE 1 MG: 1 INJECTION, SOLUTION INTRAMUSCULAR; INTRAVENOUS at 09:22

## 2021-03-09 RX ADMIN — FENTANYL CITRATE 50 MCG: 50 INJECTION, SOLUTION INTRAMUSCULAR; INTRAVENOUS at 09:22

## 2021-03-09 RX ADMIN — FENTANYL CITRATE 50 MCG: 50 INJECTION, SOLUTION INTRAMUSCULAR; INTRAVENOUS at 09:31

## 2021-03-09 RX ADMIN — LIDOCAINE HYDROCHLORIDE,EPINEPHRINE BITARTRATE 10 ML: 10; .01 INJECTION, SOLUTION INFILTRATION; PERINEURAL at 09:47

## 2021-03-09 RX ADMIN — CEFAZOLIN SODIUM 1000 MG: 1 SOLUTION INTRAVENOUS at 08:43

## 2021-03-09 RX ADMIN — SODIUM CHLORIDE, SODIUM LACTATE, POTASSIUM CHLORIDE, AND CALCIUM CHLORIDE 75 ML/HR: .6; .31; .03; .02 INJECTION, SOLUTION INTRAVENOUS at 08:34

## 2021-03-09 RX ADMIN — MIDAZOLAM HYDROCHLORIDE 1 MG: 1 INJECTION, SOLUTION INTRAMUSCULAR; INTRAVENOUS at 09:31

## 2021-03-09 RX ADMIN — MIDAZOLAM HYDROCHLORIDE 1 MG: 1 INJECTION, SOLUTION INTRAMUSCULAR; INTRAVENOUS at 09:44

## 2021-03-09 RX ADMIN — ONDANSETRON HYDROCHLORIDE 4 MG: 2 INJECTION, SOLUTION INTRAVENOUS at 09:17

## 2021-03-09 RX ADMIN — FENTANYL CITRATE 50 MCG: 50 INJECTION, SOLUTION INTRAMUSCULAR; INTRAVENOUS at 09:44

## 2021-03-09 NOTE — DISCHARGE INSTRUCTIONS
[facility]  Interventional Radiology    [address]: [phone] (M-F 7:30am - 4:00pm)  Off hours or no answer: [phone] (Ask for IR on call)        Implanted Venous Access Port     WHAT YOU NEED TO KNOW:   An implanted venous access port is a device used to give treatments and take blood  It may also be called a central venous access device (CVAD)  The port is a small container that is placed under your skin, usually in your upper chest  The port is attached to a catheter that enters a large vein  DISCHARGE INSTRUCTIONS:   Resume your normal diet  Small sips of flat soda will help with mild nausea  Prevent an infection:   · Wash your hands often  Use soap and water  Clean your hands before and after you care for your port  Remind everyone who cares for your port to wash their hands  · Check your skin for infection every day  Look for redness, swelling, or fluid oozing from the port site  Care for your port:   1  You may shower beginning 48 hours after procedure  2  Change dressing if it becomes wet  3  Remove dressing after 24 hours  Leave glue in place  4  It is normal for some bruising to occur  5  Use Tylenol for pain  6  Limit use of arm on the side that your port was placed  Lift nothing heavier than 5 pounds for 1 week, and then gradually increase activity as tolerated  7  DO NOT apply ointment, lotion or cream to port site until incision is healed  Allow glue to fall off  DO NOT attempt to peel glue from skin even it it begins to flake  8, After the port incision is healed you may swim, bathe  Notify the Interventional Radiologist if you have any of the followin  Fever above 101 F    2  Increased redness or swelling after 1st day  3  Increased pain after 1st day  4  Any sign of infection (drainage from port site, skin separation, hot to touch)  5  Persistent nausea or vomiting      Contact Interventional Radiology at [phone] [facility] PATIENTS: Contact Interventional Radiology at [phone]) ([address]: Contact Interventional Radiology at [phone])

## 2021-03-09 NOTE — PROCEDURES
Interventional Radiology Procedure Note    PATIENT NAME: Joan Hinojosa  : 1955  MRN: [id number]     Pre-op Diagnosis:   1  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[address] )      2  To begin chemotherapy    Post-op Diagnosis:   1  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[address] )      2  Same    Procedure: Port placement using sonographic and fluoroscopic guidance    Surgeon:   Brigid Hernandez MD  Assistants:     No qualified resident was available, Resident is only observing    Estimated Blood Loss: < 5cc    Findings: Tip location right atrium   Ready to use    Specimens: None     Complications:  None     Anesthesia: Local and monitored intravenous conscious sedation      Brigid Hernandez MD     Date: 3/9/2021  Time: 11:38 AM

## 2021-03-09 NOTE — PROGRESS NOTES
Interventional Radiology  History and Physical 3/9/2021     India Abbott   1955   [id number]    Assessment/Plan:  Ultrasound guidance for venous access  Fluoroscopic guidance for tip placement  Right chest wall port    Problem List Items Addressed This Visit        Genitourinary    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[address] )    Relevant Orders    IR port placement             Subjective:     Patient ID: India Abbott is a 72 y o  female      History of Present Illness  Metastatic cervical cancer    Review of Systems      Past Medical History:   Diagnosis Date    Anxiety     Anxiety and depression 10/2/2019    Breathing difficulty     Cancer (HCC)     cervical    GERD (gastroesophageal reflux disease)     Irritable bowel syndrome     Shortness of breath     Thumb injury 10/2019    Tinnitus of left ear 2020    Wrist arthritis 3/17/2020        Past Surgical History:   Procedure Laterality Date    FL RETROGRADE PYELOGRAM  2021    FOOT SURGERY      HYSTERECTOMY      IR BIOPSY LYMPH NODE  2021    [place] CYSTOURETHROSCOPY,URETER CATHETER Right 2021    Procedure: CYSTOSCOPY RETROGRADE PYELOGRAM WITH INSERTION STENT URETERAL;  Surgeon: Lluvia Lagunas MD;  [facility];  Service: Urology    [place] EXCIS INTERDIGITAL [place] Left 2018    Procedure: FOOT NEUROMA EXCISION;  Surgeon: Viji Grajeda DPM;  Location: [facility] MAIN OR;  Service: Podiatry    TUBAL LIGATION          Social History     Tobacco Use   Smoking Status Former Smoker    Packs/day: 3 00    Years: 25 00    Pack years: 75 00    Quit date: 2003    Years since quittin 6   Smokeless Tobacco Never Used        Social History     Substance and Sexual Activity   Alcohol Use Yes    Comment:   holidays        Social History     Substance and Sexual Activity   Drug Use No        Allergies   Allergen Reactions    Dust Mite Extract     Pollen Extract Sneezing       Current Outpatient Medications   Medication Sig Dispense Refill    Biotin 10 MG CAPS Take by mouth      LORazepam (ATIVAN) 1 mg tablet Take 1 tablet (1 mg total) by mouth every 6 (six) hours as needed for anxiety (or nausea) 36 tablet 1    Multiple Vitamin (multivitamin) capsule Take 1 capsule by mouth daily      ondansetron (ZOFRAN) 8 mg tablet Take 1 tablet (8 mg total) by mouth every 8 (eight) hours as needed for nausea or vomiting 30 tablet 1    oxybutynin (DITROPAN) 5 mg tablet Take 1 tablet (5 mg total) by mouth 3 (three) times a day as needed (bladder spasm) 20 tablet 0    phenazopyridine (PYRIDIUM) 200 mg tablet Take 1 tablet (200 mg total) by mouth 3 (three) times a day as needed for bladder spasms 30 tablet 0     Current Facility-Administered Medications   Medication Dose Route Frequency Provider Last Rate Last Admin    lactated ringers infusion  75 mL/hr Intravenous Continuous Yanira Salinas MD 75 mL/hr at 03/09/21 0834 75 mL/hr at 03/09/21 0834    ondansetron (ZOFRAN) injection   Intravenous Code/Trauma/Sedation [facility] Yanira Salinas MD   4 mg at 03/09/21 0917          Objective:    Vitals:    03/09/21 0820   BP: 112/73   Resp: 16   Temp: (!) 96 7 °F (35 9 °C)   TempSrc: Tympanic   SpO2: 99%   Weight: 61 7 kg (136 lb)   Height: 4' 11" (1 499 m)        Physical Exam      The skin over the right chest is clean, dry, and intact  No jugular venous distension, venous collaterals, pacers or dialysis catheters  Regular rate and rhythm  Normal respiratory excursion  No results found for: BNP   Lab Results   Component Value Date    WBC 7 40 03/05/2021    HGB 11 2 (L) 03/05/2021    HCT 34 7 (L) 03/05/2021    MCV 83 03/05/2021     03/05/2021     Lab Results   Component Value Date    INR 1 11 02/19/2021    INR 1 06 06/11/2018    PROTIME 14 1 02/19/2021     Lab Results   Component Value Date    PTT 37 4 06/11/2018         I have personally reviewed pertinent imaging and laboratory results       Code Status: No Order  Advance Directive and Living Will:      Power of :    POLST:      This text is generated with voice recognition software  There may be translation, syntax,  or grammatical errors  If you have any questions, please contact the dictating provider

## 2021-03-10 ENCOUNTER — TELEPHONE (OUTPATIENT)
Dept: NUTRITION | Facility: CLINIC | Age: 66
End: 2021-03-10

## 2021-03-10 ENCOUNTER — PATIENT OUTREACH (OUTPATIENT)
Dept: CASE MANAGEMENT | Facility: HOSPITAL | Age: 66
End: 2021-03-10

## 2021-03-10 NOTE — TELEPHONE ENCOUNTER
Contacted Cristina to set up nutrition consultation after receiving notification by Go Alas  on 3/10/21 that pt has triggered for oncology nutrition care (reason for referral: pt struggling to eat)  No answer  Left voice message with the reason for today's call and this RDs contact information asking that PHYSICIAN'S [facility] call back as able/desired  Oncology Diagnosis & Treatments:  Oncology History   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[address] )   12/2016 Initial Diagnosis    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 [facility])  3/29/2017 -  Cancer Staged    Staging form: Cervix Uteri, AJCC 7th Edition  - Clinical stage from 3/29/2017: FIGO Stage IB2, calculated as Stage Unknown (T1b2, NX, M0) - Signed by Ta Demarco MD on 2/4/2021  Staged by: Managing physician  Specimen type: Excision  Histopathologic type: Squamous cell carcinoma, NOS  Histologic grade (G): G3  Lymph-vascular invasion (LVI): LVI present/identified, NOS  Residual tumor (R): R0 - None  Pelvic jace status: Negative  Pelvic jace method of assessment: PET  Para-aortic status: Negative  Para-aortic jace method of assessment: PET  Stage used in treatment planning: Yes  National guidelines used in treatment planning: Yes  Type of national guideline used in treatment planning: NCCN       3/29/2017 Surgery    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)  FInal path with incidental 5 cm grade 3 invasive cervical SCCA, +LVI, invasion 9/10 mm cervical stroma, no lymphadenectomy, margins negative ([facility])       3/2/2021 -  Chemotherapy    palonosetron (ALOXI) injection 0 25 mg, 0 25 mg, Intravenous, Once, 1 of 4 cycles  Administration: 0 25 mg (3/2/2021)  fosaprepitant (EMEND) 150 mg in sodium chloride 0 9 % 250 mL IVPB, 150 mg, Intravenous, Once, 1 of 4 cycles  Administration: 150 mg (3/2/2021)  CARBOplatin (PARAPLATIN) 331 mg in sodium chloride 0 9 % 250 mL IVPB, 331 mg, Intravenous, Once, 1 of 4 cycles  Administration: 331 mg (3/2/2021)  bevacizumab (AVASTIN) 925 mg in sodium chloride 0 9 % 100 mL IVPB, 15 mg/kg, Intravenous, Once, 0 of 3 cycles  PACLitaxel (TAXOL) 211 8 mg in sodium chloride 0 9 % 500 mL chemo IVPB, 135 mg/m2 = 211 8 mg (77 1 % of original dose 175 mg/m2), Intravenous, Once, 1 of 4 cycles  Dose modification: 135 mg/m2 (original dose 175 mg/m2, Cycle 1, Reason: Other (See Comments))  Administration: 211 8 mg (3/2/2021)

## 2021-03-10 NOTE — PROGRESS NOTES
Oncology LSW outreached PT to see how she was progressing through treatment  PT explained that she has her "good days and bad days " PT reported that last night was the first time that she ate a decent meal in about three weeks  PT reported that she had requested a higher dosage of nausea medication from her doctor but was denied this requested  PT explained that she was frustrated because she had no appetite and could not find many things that she could eat without feeling nauseated  LSW explained that she could reach out to the oncology dietician to see if they would be able to complete a consult for PT  PT stated that this would be "much appreciated " PT continued, reporting that she was doing her best to make the most of her her "good days " PT reported that her bathtub had a pretty significant leak that was fixed and that she was hoping that she felt well enough to do some cleaning around the house  LSW and PT then discussed the importance of listening to our bodies and responding to them accordingly (ie if you feel tired, rest)  PT was receptive to this idea  Emotional support provided  LSW explained that she would reach out to PT in the near future to see how treatment was going and to address any needs that may arise  PT was agreeable with this plan  LSW sent in-basket to Gracie Irizarry (oncology dietician for [facility]) requesting that PT be outreached for consultation   Kale Winters reported that she would be reaching out to PT

## 2021-03-12 ENCOUNTER — HOSPITAL ENCOUNTER (OUTPATIENT)
Dept: INFUSION CENTER | Facility: HOSPITAL | Age: 66
Discharge: HOME | End: 2021-03-12
Payer: MEDICARE

## 2021-03-12 ENCOUNTER — DOCUMENTATION (OUTPATIENT)
Dept: GYNECOLOGIC ONCOLOGY | Facility: CLINIC | Age: 66
End: 2021-03-12

## 2021-03-12 VITALS
OXYGEN SATURATION: 99 % | TEMPERATURE: 98.7 F | DIASTOLIC BLOOD PRESSURE: 65 MMHG | SYSTOLIC BLOOD PRESSURE: 140 MMHG | HEART RATE: 108 BPM | RESPIRATION RATE: 16 BRPM

## 2021-03-12 DIAGNOSIS — C53.0 MALIGNANT NEOPLASM OF ENDOCERVIX (HCC): Primary | ICD-10-CM

## 2021-03-12 DIAGNOSIS — E86.0 DEHYDRATION: Primary | ICD-10-CM

## 2021-03-12 PROCEDURE — 96360 HYDRATION IV INFUSION INIT: CPT

## 2021-03-12 RX ORDER — LIDOCAINE AND PRILOCAINE 25; 25 MG/G; MG/G
CREAM TOPICAL AS NEEDED
Qty: 30 G | Refills: 0 | Status: SHIPPED | OUTPATIENT
Start: 2021-03-12 | End: 2021-07-07

## 2021-03-12 RX ADMIN — SODIUM CHLORIDE 1000 ML: 0.9 INJECTION, SOLUTION INTRAVENOUS at 14:24

## 2021-03-12 NOTE — PROGRESS NOTES
1517 Patient states her allergies are starting and questioning what she can take and states she has used claritin in the past  Right chest portacath site tender to touch from recent insertion  Started peripheral IV today  Will ask for emla prescription  Patient states she feels like she has to burp but can't and other times feels like a ball in the stomach  Call placed to [address] and instructed patient can take claritin, emla script will be sent to pharmacy, and patient can try pepcid or gas-x  Information given to patient and she verbalized understanding of same

## 2021-03-12 NOTE — PATIENT INSTRUCTIONS
Per Noemy Bradford, JACE  May take claritin for allergies  Emla prescription will be sent to pharmacy  May take pepcid or gas-x

## 2021-03-12 NOTE — PROGRESS NOTES
Case was presented at the multidisciplinary Gynecologic Tumor Conference on 3/12/2021 and the consensus recommendation was: systemic chemotherapy with carbo/taxol/avastin

## 2021-03-17 ENCOUNTER — TELEPHONE (OUTPATIENT)
Dept: NUTRITION | Facility: CLINIC | Age: 66
End: 2021-03-17

## 2021-03-17 NOTE — TELEPHONE ENCOUNTER
Second attempt to contact Antonio Woodward to establish oncology nutrition care, no answer, left voice message with reason for call and RD contact info asking that Antonio Woodward call back as able/desired

## 2021-03-19 ENCOUNTER — HOSPITAL ENCOUNTER (OUTPATIENT)
Dept: INFUSION CENTER | Facility: HOSPITAL | Age: 66
Discharge: HOME | End: 2021-03-19
Payer: MEDICARE

## 2021-03-19 ENCOUNTER — PATIENT OUTREACH (OUTPATIENT)
Dept: CASE MANAGEMENT | Facility: HOSPITAL | Age: 66
End: 2021-03-19

## 2021-03-19 VITALS
RESPIRATION RATE: 16 BRPM | SYSTOLIC BLOOD PRESSURE: 120 MMHG | HEART RATE: 99 BPM | TEMPERATURE: 98.3 F | DIASTOLIC BLOOD PRESSURE: 56 MMHG

## 2021-03-19 DIAGNOSIS — C53.0 MALIGNANT NEOPLASM OF ENDOCERVIX (HCC): ICD-10-CM

## 2021-03-19 DIAGNOSIS — E86.0 DEHYDRATION: Primary | ICD-10-CM

## 2021-03-19 LAB
ALBUMIN SERPL BCP-MCNC: 3.1 G/DL (ref 3.5–5)
ALP SERPL-CCNC: 139 U/L (ref 46–116)
ALT SERPL W P-5'-P-CCNC: 19 U/L (ref 12–78)
ANION GAP SERPL CALCULATED.3IONS-SCNC: 9 MMOL/L (ref 4–13)
AST SERPL W P-5'-P-CCNC: 16 U/L (ref 5–45)
BASOPHILS # BLD AUTO: 0.01 THOUSANDS/ΜL (ref 0–0.1)
BASOPHILS NFR BLD AUTO: 0 % (ref 0–1)
BILIRUB SERPL-MCNC: 0.2 MG/DL (ref 0.2–1)
BUN SERPL-MCNC: 21 MG/DL (ref 5–25)
CALCIUM ALBUM COR SERPL-MCNC: 9.9 MG/DL (ref 8.3–10.1)
CALCIUM SERPL-MCNC: 9.2 MG/DL (ref 8.3–10.1)
CHLORIDE SERPL-SCNC: 105 MMOL/L (ref 100–108)
CO2 SERPL-SCNC: 27 MMOL/L (ref 21–32)
CREAT SERPL-MCNC: 1.19 MG/DL (ref 0.6–1.3)
CREAT UR-MCNC: 45.4 MG/DL
EOSINOPHIL # BLD AUTO: 0.07 THOUSAND/ΜL (ref 0–0.61)
EOSINOPHIL NFR BLD AUTO: 2 % (ref 0–6)
ERYTHROCYTE [DISTWIDTH] IN BLOOD BY AUTOMATED COUNT: 14.6 % (ref 11.6–15.1)
GFR SERPL CREATININE-BSD FRML MDRD: 48 ML/MIN/1.73SQ M
GLUCOSE SERPL-MCNC: 121 MG/DL (ref 65–140)
HCT VFR BLD AUTO: 31.7 % (ref 34.8–46.1)
HGB BLD-MCNC: 9.8 G/DL (ref 11.5–15.4)
IMM GRANULOCYTES # BLD AUTO: 0.01 THOUSAND/UL (ref 0–0.2)
IMM GRANULOCYTES NFR BLD AUTO: 0 % (ref 0–2)
LYMPHOCYTES # BLD AUTO: 1.03 THOUSANDS/ΜL (ref 0.6–4.47)
LYMPHOCYTES NFR BLD AUTO: 23 % (ref 14–44)
MAGNESIUM SERPL-MCNC: 1.8 MG/DL (ref 1.6–2.6)
MCH RBC QN AUTO: 27 PG (ref 26.8–34.3)
MCHC RBC AUTO-ENTMCNC: 30.9 G/DL (ref 31.4–37.4)
MCV RBC AUTO: 87 FL (ref 82–98)
MONOCYTES # BLD AUTO: 0.44 THOUSAND/ΜL (ref 0.17–1.22)
MONOCYTES NFR BLD AUTO: 10 % (ref 4–12)
NEUTROPHILS # BLD AUTO: 2.92 THOUSANDS/ΜL (ref 1.85–7.62)
NEUTS SEG NFR BLD AUTO: 65 % (ref 43–75)
NRBC BLD AUTO-RTO: 0 /100 WBCS
PLATELET # BLD AUTO: 204 THOUSANDS/UL (ref 149–390)
PMV BLD AUTO: 8.9 FL (ref 8.9–12.7)
POTASSIUM SERPL-SCNC: 4 MMOL/L (ref 3.5–5.3)
PROT SERPL-MCNC: 7.7 G/DL (ref 6.4–8.2)
PROT UR-MCNC: 17 MG/DL
PROT/CREAT UR: 0.37 MG/G{CREAT} (ref 0–0.1)
RBC # BLD AUTO: 3.63 MILLION/UL (ref 3.81–5.12)
SODIUM SERPL-SCNC: 141 MMOL/L (ref 136–145)
WBC # BLD AUTO: 4.48 THOUSAND/UL (ref 4.31–10.16)

## 2021-03-19 PROCEDURE — 80053 COMPREHEN METABOLIC PANEL: CPT | Performed by: OBSTETRICS & GYNECOLOGY

## 2021-03-19 PROCEDURE — 83735 ASSAY OF MAGNESIUM: CPT | Performed by: OBSTETRICS & GYNECOLOGY

## 2021-03-19 PROCEDURE — 96360 HYDRATION IV INFUSION INIT: CPT

## 2021-03-19 PROCEDURE — 85025 COMPLETE CBC W/AUTO DIFF WBC: CPT | Performed by: OBSTETRICS & GYNECOLOGY

## 2021-03-19 PROCEDURE — 82570 ASSAY OF URINE CREATININE: CPT

## 2021-03-19 PROCEDURE — 84156 ASSAY OF PROTEIN URINE: CPT

## 2021-03-19 RX ADMIN — SODIUM CHLORIDE 1000 ML: 0.9 INJECTION, SOLUTION INTRAVENOUS at 11:25

## 2021-03-19 NOTE — PROGRESS NOTES
Oncology LSW attempted to outreach PT to provide support and to assess for areas of need  LSW left detailed voicemail requesting return call at PT's earliest convenience

## 2021-03-22 RX ORDER — PALONOSETRON 0.05 MG/ML
0.25 INJECTION, SOLUTION INTRAVENOUS ONCE
Status: CANCELLED | OUTPATIENT
Start: 2021-03-24

## 2021-03-22 RX ORDER — SODIUM CHLORIDE 9 MG/ML
20 INJECTION, SOLUTION INTRAVENOUS ONCE
Status: CANCELLED | OUTPATIENT
Start: 2021-03-24

## 2021-03-23 ENCOUNTER — TELEMEDICINE (OUTPATIENT)
Dept: GYNECOLOGIC ONCOLOGY | Facility: CLINIC | Age: 66
End: 2021-03-23
Payer: MEDICARE

## 2021-03-23 DIAGNOSIS — N13.39 OTHER HYDRONEPHROSIS: ICD-10-CM

## 2021-03-23 DIAGNOSIS — C53.0 MALIGNANT NEOPLASM OF ENDOCERVIX (HCC): ICD-10-CM

## 2021-03-23 DIAGNOSIS — C53.0 MALIGNANT NEOPLASM OF ENDOCERVIX (HCC): Primary | ICD-10-CM

## 2021-03-23 DIAGNOSIS — E86.0 DEHYDRATION: ICD-10-CM

## 2021-03-23 PROCEDURE — 99442 PR PHYS/QHP TELEPHONE EVALUATION 11-20 MIN: CPT | Performed by: NURSE PRACTITIONER

## 2021-03-23 PROCEDURE — 1124F ACP DISCUSS-NO DSCNMKR DOCD: CPT | Performed by: NURSE PRACTITIONER

## 2021-03-23 NOTE — ASSESSMENT & PLAN NOTE
49-year-old with recurrent stage IB2 cervical cancer, now with widespread metastatic disease  She received one cycle of Taxol/Carboplatin/Avastin with good tolerance  She is feeling well and has no acute concerns  Her PS is 0  Patient will return to the office as per chemotherapy calendar  Labs reviewed and orders signed for treatment tomorrow

## 2021-03-23 NOTE — PROGRESS NOTES
Virtual Brief Visit    Assessment/Plan:    Problem List Items Addressed This Visit        Genitourinary    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[facility] [address] ) - Primary     70-year-old with recurrent stage IB2 cervical cancer, now with widespread metastatic disease  She received one cycle of Taxol/Carboplatin/Avastin with good tolerance  She is feeling well and has no acute concerns  Her PS is 0  Patient will return to the office as per chemotherapy calendar  Labs reviewed and orders signed for treatment tomorrow  Other hydronephrosis     Kidney function currently appropriate, Cr 1 19  Other    Dehydration     Receiving weekly hydration  Tolerating this well  Reason for visit is   Chief Complaint   Patient presents with    Virtual Brief Visit        Encounter provider JACE Liriano    Provider located at [address]  [phone]    Recent Visits  No visits were found meeting these conditions  Showing recent visits within past 7 days and meeting all other requirements     Today's Visits  Date Type Provider Dept   03/23/21 Telemedicine Eldon Lennon, [address]  today's visits and meeting all other requirements     Future Appointments  No visits were found meeting these conditions  Showing future appointments within next 150 days and meeting all other requirements       Oncology History   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[facility] [address] )   12/2016 Initial Diagnosis    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 [facility])       3/29/2017 -  Cancer Staged    Staging form: Cervix Uteri, AJCC 7th Edition  - Clinical stage from 3/29/2017: FIGO Stage IB2, calculated as Stage Unknown (T1b2, NX, M0) - Signed by Nicko Becker MD on 2/4/2021  Staged by: Managing physician  Specimen type: Excision  Histopathologic type: Squamous cell carcinoma, NOS  Histologic grade (G): G3  Lymph-vascular invasion (LVI): LVI present/identified, NOS  Residual tumor (R): R0 - None  Pelvic jace status: Negative  Pelvic jace method of assessment: PET  Para-aortic status: Negative  Para-aortic jace method of assessment: PET  Stage used in treatment planning: Yes  National guidelines used in treatment planning: Yes  Type of national guideline used in treatment planning: NCCN       3/29/2017 Surgery    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)  FInal path with incidental 5 cm grade 3 invasive cervical SCCA, +LVI, invasion 9/10 mm cervical stroma, no lymphadenectomy, margins negative ([facility])  2/19/2021 Progression    A  Lymph Node, Left axillary, Biopsy:  - Metastatic squamous cell carcinoma (p16 positive) involving lymph node, compatible with patient's cervical primary  3/2/2021 -  Chemotherapy    Carboplatin AUC 5, Taxol 135 milligram/m2 and Avastin at 15 milligram/kilogram all to be administer on day 1 of a 21 day cycle            After connecting through telephone, the patient was identified by name and date of birth  Marisa Hampton was informed that this is a telemedicine visit and that the visit is being conducted through telephone  Patient has no video capabilities  My office door was closed  No one else was in the room  She acknowledged consent and understanding of privacy and security of the platform  The patient has agreed to participate and understands she can discontinue the visit at any time  Patient is aware this is a billable service  Subjective    Marisa Hampton is a 72 y o  female     Patient tolerated her first cycle of treatment without difficulty  Denies nausea/vomiting, constipation/diarrhea, abdominal pain, pelvic pain, changes in bladder function, and vaginal bleeding/discharge  Endorses a normal appetite and is without SOB, CP, and bloating  She is ambulatory and independent at home          Past Medical History:   Diagnosis Date    Anxiety s/w nurse- pt on multiple hemmorhoidal cream regimens and prep h is not formulary- recommended to d/c and use the order set  Anxiety and depression 10/2/2019    Breathing difficulty     Cancer ([facility] [address] )     cervical    GERD (gastroesophageal reflux disease)     Irritable bowel syndrome     Shortness of breath     Thumb injury 10/2019    Tinnitus of left ear 2/19/2020    Wrist arthritis 3/17/2020       Past Surgical History:   Procedure Laterality Date    FL RETROGRADE PYELOGRAM  2/11/2021    FOOT SURGERY      HYSTERECTOMY      IR BIOPSY LYMPH NODE  2/19/2021    IR PORT PLACEMENT  3/9/2021    [place] CYSTOURETHROSCOPY,URETER CATHETER Right 2/11/2021    Procedure: CYSTOSCOPY RETROGRADE PYELOGRAM WITH INSERTION STENT URETERAL;  Surgeon: Jayesh Strickland MD;  Location:  MAIN OR;  Service: Urology    [place] EXCIS INTERDIGITAL [place] Left 7/12/2018    Procedure: FOOT NEUROMA EXCISION;  Surgeon: Lindsey Edwards DPM;  Location: [facility] MAIN OR;  Service: Podiatry    TUBAL LIGATION         Current Outpatient Medications   Medication Sig Dispense Refill    Biotin 10 MG CAPS Take by mouth      lidocaine-prilocaine (EMLA) cream Apply topically as needed for mild pain 30 g 0    LORazepam (ATIVAN) 1 mg tablet Take 1 tablet (1 mg total) by mouth every 6 (six) hours as needed for anxiety (or nausea) 36 tablet 1    Multiple Vitamin (multivitamin) capsule Take 1 capsule by mouth daily      ondansetron (ZOFRAN) 8 mg tablet Take 1 tablet (8 mg total) by mouth every 8 (eight) hours as needed for nausea or vomiting 30 tablet 1    oxybutynin (DITROPAN) 5 mg tablet Take 1 tablet (5 mg total) by mouth 3 (three) times a day as needed (bladder spasm) 20 tablet 0    phenazopyridine (PYRIDIUM) 200 mg tablet Take 1 tablet (200 mg total) by mouth 3 (three) times a day as needed for bladder spasms 30 tablet 0     No current facility-administered medications for this visit           Allergies   Allergen Reactions    Dust Mite Extract     Pollen Extract Sneezing       Review of Systems   Constitutional: Negative for activity change, appetite change, chills, fatigue, fever and unexpected weight change  HENT: Negative for mouth sores  Eyes: Negative  Respiratory: Negative for cough, chest tightness, shortness of breath and wheezing  Cardiovascular: Negative for chest pain, palpitations and leg swelling  Gastrointestinal: Negative for abdominal distention, abdominal pain, anal bleeding, blood in stool, constipation, diarrhea, nausea and vomiting  Endocrine: Negative  Genitourinary: Negative for difficulty urinating, dysuria, flank pain, frequency, hematuria, pelvic pain, urgency, vaginal bleeding, vaginal discharge and vaginal pain  Musculoskeletal: Negative for arthralgias and myalgias  Skin: Negative for color change, pallor and rash  Neurological: Negative for dizziness, weakness, numbness and headaches  Hematological: Negative  Psychiatric/Behavioral: The patient is not nervous/anxious  There were no vitals filed for this visit  I spent 15 minutes directly with the patient during this visit    VIRTUAL VISIT DISCLAIMER    Donovan Almaraz acknowledges that she has consented to an online visit or consultation  She understands that the online visit is based solely on information provided by her, and that, in the absence of a face-to-face physical evaluation by the physician, the diagnosis she receives is both limited and provisional in terms of accuracy and completeness  This is not intended to replace a full medical face-to-face evaluation by the physician  Donovan Almaraz understands and accepts these terms

## 2021-03-24 ENCOUNTER — PATIENT OUTREACH (OUTPATIENT)
Dept: CASE MANAGEMENT | Facility: HOSPITAL | Age: 66
End: 2021-03-24

## 2021-03-24 ENCOUNTER — HOSPITAL ENCOUNTER (OUTPATIENT)
Dept: INFUSION CENTER | Facility: HOSPITAL | Age: 66
Discharge: HOME | End: 2021-03-24
Payer: MEDICARE

## 2021-03-24 VITALS
OXYGEN SATURATION: 95 % | BODY MASS INDEX: 27.16 KG/M2 | TEMPERATURE: 97 F | DIASTOLIC BLOOD PRESSURE: 75 MMHG | WEIGHT: 134.7 LBS | HEIGHT: 59 IN | RESPIRATION RATE: 16 BRPM | SYSTOLIC BLOOD PRESSURE: 149 MMHG | HEART RATE: 97 BPM

## 2021-03-24 DIAGNOSIS — R11.0 CHEMOTHERAPY-INDUCED NAUSEA: Primary | ICD-10-CM

## 2021-03-24 DIAGNOSIS — E87.6 HYPOKALEMIA: ICD-10-CM

## 2021-03-24 DIAGNOSIS — C53.0 MALIGNANT NEOPLASM OF ENDOCERVIX (HCC): Primary | ICD-10-CM

## 2021-03-24 DIAGNOSIS — T45.1X5A CHEMOTHERAPY-INDUCED NAUSEA: Primary | ICD-10-CM

## 2021-03-24 PROCEDURE — 96375 TX/PRO/DX INJ NEW DRUG ADDON: CPT

## 2021-03-24 PROCEDURE — 96367 TX/PROPH/DG ADDL SEQ IV INF: CPT

## 2021-03-24 PROCEDURE — 96413 CHEMO IV INFUSION 1 HR: CPT

## 2021-03-24 PROCEDURE — 96415 CHEMO IV INFUSION ADDL HR: CPT

## 2021-03-24 PROCEDURE — 96417 CHEMO IV INFUS EACH ADDL SEQ: CPT

## 2021-03-24 RX ORDER — PROMETHAZINE HYDROCHLORIDE 12.5 MG/1
12.5 TABLET ORAL EVERY 6 HOURS PRN
Qty: 30 TABLET | Refills: 0 | Status: SHIPPED | OUTPATIENT
Start: 2021-03-24 | End: 2021-04-13

## 2021-03-24 RX ORDER — PALONOSETRON 0.05 MG/ML
0.25 INJECTION, SOLUTION INTRAVENOUS ONCE
Status: COMPLETED | OUTPATIENT
Start: 2021-03-24 | End: 2021-03-24

## 2021-03-24 RX ORDER — ONDANSETRON HYDROCHLORIDE 8 MG/1
8 TABLET, FILM COATED ORAL EVERY 8 HOURS PRN
Qty: 30 TABLET | Refills: 0 | Status: SHIPPED | OUTPATIENT
Start: 2021-03-24 | End: 2021-04-19 | Stop reason: SDUPTHER

## 2021-03-24 RX ORDER — SODIUM CHLORIDE 9 MG/ML
20 INJECTION, SOLUTION INTRAVENOUS ONCE
Status: COMPLETED | OUTPATIENT
Start: 2021-03-24 | End: 2021-03-24

## 2021-03-24 RX ADMIN — SODIUM CHLORIDE 150 MG: 0.9 INJECTION, SOLUTION INTRAVENOUS at 09:52

## 2021-03-24 RX ADMIN — PALONOSETRON 0.25 MG: 0.05 INJECTION, SOLUTION INTRAVENOUS at 08:52

## 2021-03-24 RX ADMIN — DIPHENHYDRAMINE HYDROCHLORIDE 25 MG: 50 INJECTION, SOLUTION INTRAMUSCULAR; INTRAVENOUS at 08:54

## 2021-03-24 RX ADMIN — CARBOPLATIN 326 MG: 10 INJECTION, SOLUTION INTRAVENOUS at 13:48

## 2021-03-24 RX ADMIN — FAMOTIDINE 20 MG: 10 INJECTION INTRAVENOUS at 09:24

## 2021-03-24 RX ADMIN — SODIUM CHLORIDE 20 ML/HR: 0.9 INJECTION, SOLUTION INTRAVENOUS at 08:25

## 2021-03-24 RX ADMIN — DEXAMETHASONE SODIUM PHOSPHATE 20 MG: 10 INJECTION, SOLUTION INTRAMUSCULAR; INTRAVENOUS at 08:22

## 2021-03-24 RX ADMIN — BEVACIZUMAB 900 MG: 400 INJECTION, SOLUTION INTRAVENOUS at 14:59

## 2021-03-24 RX ADMIN — PACLITAXEL 211.8 MG: 6 INJECTION, SOLUTION, CONCENTRATE INTRAVENOUS at 10:37

## 2021-03-24 NOTE — PROGRESS NOTES
Patient complaining of being very nauseated, per patient she wishes to have something else added to her home regime for nausea, reached out to [facility] via teams  Awaiting msg back at this time

## 2021-03-24 NOTE — PROGRESS NOTES
Oncology LSW met with PT chairside in the infusion center to provide support and assess for areas of need  PT reported that she was "doing alright," as compared to to "a few weeks ago " PT reported that she felt so weak that she was unable to get up off of the couch and "open a can of soup " When asked about having food in the home, PT reported that she gets a ride to her local [employer] from her neighbor who regularly checks in on her  PT reported that she has 4 children, all of whom live in [place]  PT explained that only one of her children was aware of her diagnosis  PT explained that she had not spoken with her other children in some time, reporting that they had only spoken with her previously due to her supporting them financially  PT tearfully reported that she was upset by the fact that her children that she doesn't speak to would live with the guilt of not getting along with her towards the end of her life, as PT had done with her own mother at the time of her passing  PT explained that she was leaving everything to her daughter that she is on speaking terms with  When asked if PT had this formally in writing, PT reported that she did not  LSW explained that she could reach out to the [facility] [employer] to make a referral for legal assistance, as they help with both POA and will services  PT was agreeable with LSW's outreach  LSW and PT then engaged in light conversation, discussing PT's pet bird and her recent bathroom renovation  LSW reiterated that she would reach out to AAA to make a  referral  LSW reported that once the referral was placed, AAA would reach out to PT directly moving forward  PT was receptive to and understanding of this idea  LSW outreached the [facility] [employer] to make a referral for PT for legal assistance  LSW left detailed voicemail requesting return call  LSW spoken with Adrianne Edmonds at the [facility] [employer]  LSW completed referral for legal services   PT will be receiving call from [address] [place] [address] office to coordinate completing POA, living will, and will documentation

## 2021-03-26 ENCOUNTER — DOCUMENTATION (OUTPATIENT)
Dept: HEMATOLOGY ONCOLOGY | Facility: CLINIC | Age: 66
End: 2021-03-26

## 2021-03-26 ENCOUNTER — HOSPITAL ENCOUNTER (OUTPATIENT)
Dept: INFUSION CENTER | Facility: HOSPITAL | Age: 66
Discharge: HOME | End: 2021-03-26
Payer: MEDICARE

## 2021-03-26 VITALS
OXYGEN SATURATION: 97 % | SYSTOLIC BLOOD PRESSURE: 137 MMHG | TEMPERATURE: 96.6 F | DIASTOLIC BLOOD PRESSURE: 65 MMHG | HEART RATE: 81 BPM | RESPIRATION RATE: 16 BRPM

## 2021-03-26 DIAGNOSIS — E86.0 DEHYDRATION: Primary | ICD-10-CM

## 2021-03-26 PROCEDURE — 96360 HYDRATION IV INFUSION INIT: CPT

## 2021-03-26 RX ADMIN — SODIUM CHLORIDE 1000 ML: 0.9 INJECTION, SOLUTION INTRAVENOUS at 12:14

## 2021-03-26 NOTE — PLAN OF CARE
Problem: INFECTION - ADULT  Goal: Absence or prevention of progression during hospitalization  Description: INTERVENTIONS:  - Assess and monitor for signs and symptoms of infection  - Monitor lab/diagnostic results  - Monitor all insertion sites, i e  indwelling lines, tubes, and drains  - Monitor endotracheal if appropriate and nasal secretions for changes in amount and color  - Preston appropriate cooling/warming therapies per order  - Administer medications as ordered  - Instruct and encourage patient and family to use good hand hygiene technique  - Identify and instruct in appropriate isolation precautions for identified infection/condition  Outcome: Progressing  Goal: Absence of fever/infection during neutropenic period  Description: INTERVENTIONS:  - Monitor WBC    Outcome: Progressing     Problem: DISCHARGE PLANNING  Goal: Discharge to home or other facility with appropriate resources  Description: INTERVENTIONS:  - Identify barriers to discharge w/patient and caregiver  - Arrange for needed discharge resources and transportation as appropriate  - Identify discharge learning needs (meds, wound care, etc )  - Arrange for interpretive services to assist at discharge as needed  - Refer to Case Management Department for coordinating discharge planning if the patient needs post-hospital services based on physician/advanced practitioner order or complex needs related to functional status, cognitive ability, or social support system  Outcome: Progressing     Problem: Knowledge Deficit  Goal: Patient/family/caregiver demonstrates understanding of disease process, treatment plan, medications, and discharge instructions  Description: Complete learning assessment and assess knowledge base    Interventions:  - Provide teaching at level of understanding  - Provide teaching via preferred learning methods  Outcome: Progressing

## 2021-03-26 NOTE — PROGRESS NOTES
Faxed the following to [facility] for payment  Acct# [de-identified] [facility] 03/02/21 [phone] [id number]

## 2021-03-30 ENCOUNTER — PATIENT OUTREACH (OUTPATIENT)
Dept: CASE MANAGEMENT | Facility: HOSPITAL | Age: 66
End: 2021-03-30

## 2021-03-30 NOTE — PROGRESS NOTES
Oncology LSW outreached PT to provide support and assess for areas of need  PT reported that she was feeling "so weak" for the past few days  PT further explained that she was having a hard time standing/walking because her legs were so weak in addition to a "shaky feeling " LSW explained that she would reach out to PT's provider to make them aware of these symptoms  PT was agreeable with this plan  LSW further engaged in active listening at this time and provided emotional support  PT explained that she had reached out to the  working with the [facility] and was scheduled to meet with him next Tuesday, 4/6, at 1130 to complete POA, living will, and will documentation  PT also explained that she had been sent giovanna documentation to complete and return via fax for treatment funding, but had been unable to locate a fax machine to send back said paperwork  LSW explained that she could bring the paperwork with her to her appointment at the infusion center this week and request that it be faxed for her  PT was agreeable with and appreciative of this plan

## 2021-03-31 DIAGNOSIS — R30.0 DYSURIA: Primary | ICD-10-CM

## 2021-04-01 ENCOUNTER — HOSPITAL ENCOUNTER (OUTPATIENT)
Dept: INFUSION CENTER | Facility: HOSPITAL | Age: 66
Discharge: HOME | End: 2021-04-01

## 2021-04-02 ENCOUNTER — HOSPITAL ENCOUNTER (OUTPATIENT)
Dept: INFUSION CENTER | Facility: HOSPITAL | Age: 66
Discharge: HOME | End: 2021-04-02

## 2021-04-02 ENCOUNTER — HOSPITAL ENCOUNTER (OUTPATIENT)
Dept: INFUSION CENTER | Facility: HOSPITAL | Age: 66
Discharge: HOME | End: 2021-04-02
Payer: MEDICARE

## 2021-04-02 VITALS
OXYGEN SATURATION: 99 % | SYSTOLIC BLOOD PRESSURE: 130 MMHG | HEART RATE: 97 BPM | TEMPERATURE: 97.5 F | RESPIRATION RATE: 18 BRPM | DIASTOLIC BLOOD PRESSURE: 61 MMHG

## 2021-04-02 DIAGNOSIS — E86.0 DEHYDRATION: Primary | ICD-10-CM

## 2021-04-02 LAB
BACTERIA UR QL AUTO: ABNORMAL /HPF
BILIRUB UR QL STRIP: NEGATIVE
CLARITY UR: ABNORMAL
COLOR UR: YELLOW
GLUCOSE UR STRIP-MCNC: NEGATIVE MG/DL
HGB UR QL STRIP.AUTO: ABNORMAL
KETONES UR STRIP-MCNC: NEGATIVE MG/DL
LEUKOCYTE ESTERASE UR QL STRIP: ABNORMAL
MUCOUS THREADS UR QL AUTO: ABNORMAL
NITRITE UR QL STRIP: NEGATIVE
NON-SQ EPI CELLS URNS QL MICRO: ABNORMAL /HPF
PH UR STRIP.AUTO: 5.5 [PH]
PROT UR STRIP-MCNC: ABNORMAL MG/DL
RBC #/AREA URNS AUTO: ABNORMAL /HPF
SP GR UR STRIP.AUTO: 1.02 (ref 1–1.03)
UROBILINOGEN UR QL STRIP.AUTO: 0.2 E.U./DL
WBC #/AREA URNS AUTO: ABNORMAL /HPF

## 2021-04-02 PROCEDURE — 81001 URINALYSIS AUTO W/SCOPE: CPT | Performed by: NURSE PRACTITIONER

## 2021-04-02 PROCEDURE — 96360 HYDRATION IV INFUSION INIT: CPT

## 2021-04-02 RX ADMIN — SODIUM CHLORIDE 1000 ML: 0.9 INJECTION, SOLUTION INTRAVENOUS at 10:10

## 2021-04-02 NOTE — PROGRESS NOTES
Tolerated hydration well  Discharged in stable condition  Instructions from Kathya Leavitt placed on patient's after summary visit and reviewed with patient

## 2021-04-02 NOTE — PLAN OF CARE
Problem: INFECTION - ADULT  Goal: Absence or prevention of progression during hospitalization  Description: INTERVENTIONS:  - Assess and monitor for signs and symptoms of infection  - Monitor lab/diagnostic results  - Monitor all insertion sites, i e  indwelling lines, tubes, and drains  - Monitor endotracheal if appropriate and nasal secretions for changes in amount and color  - [place] appropriate cooling/warming therapies per order  - Administer medications as ordered  - Instruct and encourage patient and family to use good hand hygiene technique  - Identify and instruct in appropriate isolation precautions for identified infection/condition  Outcome: Progressing  Goal: Absence of fever/infection during neutropenic period  Description: INTERVENTIONS:  - Monitor WBC    Outcome: Progressing     Problem: DISCHARGE PLANNING  Goal: Discharge to home or other facility with appropriate resources  Description: INTERVENTIONS:  - Identify barriers to discharge w/patient and caregiver  - Arrange for needed discharge resources and transportation as appropriate  - Identify discharge learning needs (meds, wound care, etc )  - Arrange for interpretive services to assist at discharge as needed  - Refer to Case Management Department for coordinating discharge planning if the patient needs post-hospital services based on physician/advanced practitioner order or complex needs related to functional status, cognitive ability, or social support system  Outcome: Progressing     Problem: Knowledge Deficit  Goal: Patient/family/caregiver demonstrates understanding of disease process, treatment plan, medications, and discharge instructions  Description: Complete learning assessment and assess knowledge base    Interventions:  - Provide teaching at level of understanding  - Provide teaching via preferred learning methods  Outcome: Progressing

## 2021-04-08 ENCOUNTER — TELEMEDICINE (OUTPATIENT)
Dept: GYNECOLOGIC ONCOLOGY | Facility: CLINIC | Age: 66
End: 2021-04-08
Payer: MEDICARE

## 2021-04-08 DIAGNOSIS — Z00.00 HEALTHCARE MAINTENANCE: ICD-10-CM

## 2021-04-08 DIAGNOSIS — C53.0 MALIGNANT NEOPLASM OF ENDOCERVIX (HCC): Primary | ICD-10-CM

## 2021-04-08 DIAGNOSIS — C77.3 METASTATIC CANCER TO AXILLARY LYMPH NODES (HCC): ICD-10-CM

## 2021-04-08 PROCEDURE — 99442 PR PHYS/QHP TELEPHONE EVALUATION 11-20 MIN: CPT | Performed by: NURSE PRACTITIONER

## 2021-04-08 RX ORDER — LORAZEPAM 1 MG/1
1 TABLET ORAL EVERY 6 HOURS PRN
Qty: 36 TABLET | Refills: 1 | Status: SHIPPED | OUTPATIENT
Start: 2021-04-08 | End: 2021-05-04 | Stop reason: SDUPTHER

## 2021-04-08 NOTE — PROGRESS NOTES
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 ([facility] [address] )    Relevant Orders    CT chest abdomen pelvis w contrast       Genitourinary    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[facility] [address] ) - Primary     51-year-old with recurrent stage IB2 cervical cancer, now with widespread metastatic disease  She is tolerating treatment well  She is getting weekly hydration and her kidney function has been adequate  She is fatigue and has passing nausea but otherwise has no acute concerns  Her PS is 0  Patient will return to the office as per chemotherapy calendar  Labs to be collected on Friday and will be reviewed prior to treatment next week  CT to be performed prior to next OV  Relevant Medications    LORazepam (ATIVAN) 1 mg tablet    Other Relevant Orders    CT chest abdomen pelvis w contrast       Other    Healthcare maintenance     D/w patient her eligibility for the COVID vaccine given age and co-morbidities  She declines, stating, "I don't want to go through any more pain or suffering " Reminded the patient that javid the COVID virus would likely have severe consequences of "pain and suffering" given current medical issues, which she understands  She will think about vaccination for the future  Reason for visit is pre-chemotherapy evaluation  Chief Complaint   Patient presents with    Virtual Brief Visit        Encounter provider JACE Hammonds    Provider located at [address] [address] [phone]    Recent Visits  No visits were found meeting these conditions     Showing recent visits within past 7 days and meeting all other requirements     Today's Visits  Date Type Provider Dept   04/08/21 Telemedicine Ji Dawkins, [address] [place] [address] today's visits and meeting all other requirements     Future Appointments  No visits were found meeting these conditions  Showing future appointments within next 150 days and meeting all other requirements        Oncology History   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[address] )   12/2016 Initial Diagnosis    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)  3/29/2017 -  Cancer Staged    Staging form: Cervix Uteri, AJCC 7th Edition  - Clinical stage from 3/29/2017: FIGO Stage IB2, calculated as Stage Unknown (T1b2, NX, M0) - Signed by Jerrell Jimenez MD on 2/4/2021  Staged by: Managing physician  Specimen type: Excision  Histopathologic type: Squamous cell carcinoma, NOS  Histologic grade (G): G3  Lymph-vascular invasion (LVI): LVI present/identified, NOS  Residual tumor (R): R0 - None  Pelvic jace status: Negative  Pelvic jace method of assessment: PET  Para-aortic status: Negative  Para-aortic jace method of assessment: PET  Stage used in treatment planning: Yes  National guidelines used in treatment planning: Yes  Type of national guideline used in treatment planning: NCCN       3/29/2017 Surgery    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)  FInal path with incidental 5 cm grade 3 invasive cervical SCCA, +LVI, invasion 9/10 mm cervical stroma, no lymphadenectomy, margins negative ([facility])  2/19/2021 Progression    A  Lymph Node, Left axillary, Biopsy:  - Metastatic squamous cell carcinoma (p16 positive) involving lymph node, compatible with patient's cervical primary  3/2/2021 -  Chemotherapy    Carboplatin AUC 5, Taxol 135 milligram/m2 and Avastin at 15 milligram/kilogram all to be administer on day 1 of a 21 day cycle           After connecting through telephone, the patient was identified by name and date of birth  Wesley Alvarenga was informed that this is a telemedicine visit and that the visit is being conducted through telephone   Patient does not have video visit capabilities (no computer or smart phone)  My office door was closed  No one else was in the room  She acknowledged consent and understanding of privacy and security of the platform  The patient has agreed to participate and understands she can discontinue the visit at any time  Patient is aware this is a billable service  Subjective    Omer Stapleton is a 72 y o  female     Morral Round has no new concerns since her last visit  She denies persistent nausea or vomiting  Her appetite is appropriate  Normal bowel and bladder function  She denies abdominal or pelvic pain  She is without vaginal bleeding or discharge  She is ambulatory            Past Medical History:   Diagnosis Date    Anxiety     Anxiety and depression 10/2/2019    Breathing difficulty     Cancer ([address] )     cervical    GERD (gastroesophageal reflux disease)     Irritable bowel syndrome     Shortness of breath     Thumb injury 10/2019    Tinnitus of left ear 2/19/2020    Wrist arthritis 3/17/2020       Past Surgical History:   Procedure Laterality Date    FL RETROGRADE PYELOGRAM  2/11/2021    FOOT SURGERY      HYSTERECTOMY      IR BIOPSY LYMPH NODE  2/19/2021    IR PORT PLACEMENT  3/9/2021    NV CYSTOURETHROSCOPY,URETER CATHETER Right 2/11/2021    Procedure: CYSTOSCOPY RETROGRADE PYELOGRAM WITH INSERTION STENT URETERAL;  Surgeon: Chun Montalvo MD;  Location:  MAIN OR;  Service: Urology    NV EXCIS INTERDIGITAL [place] Left 7/12/2018    Procedure: FOOT NEUROMA EXCISION;  Surgeon: Maame Harrington DPM;  Location: [facility] MAIN OR;  Service: Podiatry    TUBAL LIGATION         Current Outpatient Medications   Medication Sig Dispense Refill    Biotin 10 MG CAPS Take by mouth      lidocaine-prilocaine (EMLA) cream Apply topically as needed for mild pain 30 g 0    LORazepam (ATIVAN) 1 mg tablet Take 1 tablet (1 mg total) by mouth every 6 (six) hours as needed for anxiety (or nausea) 36 tablet 1    Multiple Vitamin (multivitamin) capsule Take 1 capsule by mouth daily      ondansetron (ZOFRAN) 8 mg tablet Take 1 tablet (8 mg total) by mouth every 8 (eight) hours as needed for nausea or vomiting 30 tablet 0    oxybutynin (DITROPAN) 5 mg tablet Take 1 tablet (5 mg total) by mouth 3 (three) times a day as needed (bladder spasm) 20 tablet 0    phenazopyridine (PYRIDIUM) 200 mg tablet Take 1 tablet (200 mg total) by mouth 3 (three) times a day as needed for bladder spasms 30 tablet 0    promethazine (PHENERGAN) 12 5 MG tablet Take 1 tablet (12 5 mg total) by mouth every 6 (six) hours as needed for nausea or vomiting 30 tablet 0     No current facility-administered medications for this visit  Allergies   Allergen Reactions    Dust Mite Extract     Pollen Extract Sneezing       Review of Systems   Constitutional: Positive for fatigue  Negative for activity change, appetite change, chills, fever and unexpected weight change  HENT: Negative for mouth sores  Eyes: Negative  Respiratory: Negative for cough, chest tightness, shortness of breath and wheezing  Cardiovascular: Negative for chest pain, palpitations and leg swelling  Gastrointestinal: Negative for abdominal distention, abdominal pain, anal bleeding, blood in stool, constipation, diarrhea, nausea and vomiting  Endocrine: Negative  Genitourinary: Negative for difficulty urinating, dysuria, flank pain, frequency, hematuria, pelvic pain, urgency, vaginal bleeding, vaginal discharge and vaginal pain  Musculoskeletal: Negative for arthralgias and myalgias  Skin: Negative for color change, pallor and rash  Neurological: Negative for dizziness, weakness, numbness and headaches  Hematological: Negative  Psychiatric/Behavioral: The patient is not nervous/anxious  There were no vitals filed for this visit        I spent 15 minutes directly with the patient during this visit    VIRTUAL VISIT DISCLAIMER    Yolanda Robbins acknowledges that she has consented to an online visit or consultation  She understands that the online visit is based solely on information provided by her, and that, in the absence of a face-to-face physical evaluation by the physician, the diagnosis she receives is both limited and provisional in terms of accuracy and completeness  This is not intended to replace a full medical face-to-face evaluation by the physician  Lori Lerma understands and accepts these terms

## 2021-04-08 NOTE — ASSESSMENT & PLAN NOTE
D/w patient her eligibility for the COVID vaccine given age and co-morbidities  She declines, stating, "I don't want to go through any more pain or suffering " Reminded the patient that javid the COVID virus would likely have severe consequences of "pain and suffering" given current medical issues, which she understands  She will think about vaccination for the future

## 2021-04-08 NOTE — ASSESSMENT & PLAN NOTE
71-year-old with recurrent stage IB2 cervical cancer, now with widespread metastatic disease  She is tolerating treatment well  She is getting weekly hydration and her kidney function has been adequate  She is fatigue and has passing nausea but otherwise has no acute concerns  Her PS is 0  Patient will return to the office as per chemotherapy calendar  Labs to be collected on Friday and will be reviewed prior to treatment next week  CT to be performed prior to next OV

## 2021-04-09 ENCOUNTER — PATIENT OUTREACH (OUTPATIENT)
Dept: CASE MANAGEMENT | Facility: HOSPITAL | Age: 66
End: 2021-04-09

## 2021-04-09 ENCOUNTER — HOSPITAL ENCOUNTER (OUTPATIENT)
Dept: INFUSION CENTER | Facility: HOSPITAL | Age: 66
Discharge: HOME | End: 2021-04-09
Payer: MEDICARE

## 2021-04-09 ENCOUNTER — TELEPHONE (OUTPATIENT)
Dept: NUTRITION | Facility: CLINIC | Age: 66
End: 2021-04-09

## 2021-04-09 VITALS
RESPIRATION RATE: 18 BRPM | TEMPERATURE: 97.9 F | DIASTOLIC BLOOD PRESSURE: 70 MMHG | HEART RATE: 98 BPM | SYSTOLIC BLOOD PRESSURE: 147 MMHG | OXYGEN SATURATION: 100 %

## 2021-04-09 DIAGNOSIS — E86.0 DEHYDRATION: Primary | ICD-10-CM

## 2021-04-09 PROCEDURE — 96360 HYDRATION IV INFUSION INIT: CPT

## 2021-04-09 RX ADMIN — SODIUM CHLORIDE 1000 ML: 0.9 INJECTION, SOLUTION INTRAVENOUS at 10:40

## 2021-04-09 NOTE — PROGRESS NOTES
Oncology LSW outreached PT to provide support and assess for areas of need  PT reported that she was doing "a bit better" than the last time that the pair had spoken  PT reported that, at the time of LSW's call, she was waiting for her ride to pick her up and take her to her hydration appointment a [facility] [employer]  When asked if she was feeling shaky like she had been when they last spoke, PT explained that those feelings have seemed to subside and that Bernard Tian, PT's oncology provider, had reached out and explained that those feelings were typical with oncology treatment  PT further reported that she had met with the  appointed to her through the [address] [place] [address] on [address] and that her will documents were just about in order  PT explained that she was waiting for a call back from the  to go back into his office for the final signatures needed on said documents  PT reported that she was "getting things together little by little "     PT proceeded to explain to LSW that she had received a bill in the mail for $2000 that she was expected to pay in 10 days  PT reported that she had no way of paying this bill within that timeframe and asked if there was anything that could be done  LSW reported that she would elevate PT's concern to the [employer] to see if they would be able to address the issue  PT was receptive and agreeable  PT also asked that she be again connected with Kirill Vargas, oncology dietician  PT explained that she had received two messages from her, but had seemed to have lost her number  LSW explained that she would let Huy Dues know that PT was again interested in speaking with her  LSW explained that she would follow up with PT in the near future, but, as always, if anything were to pop up prior to her next outreach to not hesitate to reach out  PT was agreeable with this plan and thanked LSW for her time  Emotional support provided

## 2021-04-09 NOTE — PLAN OF CARE
Problem: INFECTION - ADULT  Goal: Absence or prevention of progression during hospitalization  Description: INTERVENTIONS:  - Assess and monitor for signs and symptoms of infection  - Monitor lab/diagnostic results  - Monitor all insertion sites, i e  indwelling lines, tubes, and drains  - Monitor endotracheal if appropriate and nasal secretions for changes in amount and color  - [place] appropriate cooling/warming therapies per order  - Administer medications as ordered  - Instruct and encourage patient and family to use good hand hygiene technique  - Identify and instruct in appropriate isolation precautions for identified infection/condition  Outcome: Progressing  Goal: Absence of fever/infection during neutropenic period  Description: INTERVENTIONS:  - Monitor WBC    Outcome: Progressing     Problem: DISCHARGE PLANNING  Goal: Discharge to home or other facility with appropriate resources  Description: INTERVENTIONS:  - Identify barriers to discharge w/patient and caregiver  - Arrange for needed discharge resources and transportation as appropriate  - Identify discharge learning needs (meds, wound care, etc )  - Arrange for interpretive services to assist at discharge as needed  - Refer to Case Management Department for coordinating discharge planning if the patient needs post-hospital services based on physician/advanced practitioner order or complex needs related to functional status, cognitive ability, or social support system  Outcome: Progressing     Problem: Knowledge Deficit  Goal: Patient/family/caregiver demonstrates understanding of disease process, treatment plan, medications, and discharge instructions  Description: Complete learning assessment and assess knowledge base    Interventions:  - Provide teaching at level of understanding  - Provide teaching via preferred learning methods  Outcome: Progressing

## 2021-04-09 NOTE — TELEPHONE ENCOUNTER
Renee Cook to discuss her nutrition after receiving notification by Dotty Gates on 4/9/21 that pt is appropriate for oncology nutrition care (reason for referral: pt interested in getting in touch with dietitian)  No answer, LVM with RD contact info asking that she call back as able/desired

## 2021-04-13 DIAGNOSIS — T45.1X5A CHEMOTHERAPY-INDUCED NAUSEA: ICD-10-CM

## 2021-04-13 DIAGNOSIS — R11.0 CHEMOTHERAPY-INDUCED NAUSEA: ICD-10-CM

## 2021-04-13 RX ORDER — PALONOSETRON 0.05 MG/ML
0.25 INJECTION, SOLUTION INTRAVENOUS ONCE
Status: CANCELLED | OUTPATIENT
Start: 2021-04-14

## 2021-04-13 RX ORDER — SODIUM CHLORIDE 9 MG/ML
20 INJECTION, SOLUTION INTRAVENOUS ONCE
Status: CANCELLED | OUTPATIENT
Start: 2021-04-14

## 2021-04-13 RX ORDER — PROMETHAZINE HYDROCHLORIDE 12.5 MG/1
12.5 TABLET ORAL EVERY 6 HOURS PRN
Qty: 30 TABLET | Refills: 0 | Status: SHIPPED | OUTPATIENT
Start: 2021-04-13 | End: 2021-10-11 | Stop reason: ALTCHOICE

## 2021-04-14 ENCOUNTER — HOSPITAL ENCOUNTER (OUTPATIENT)
Dept: INFUSION CENTER | Facility: HOSPITAL | Age: 66
Discharge: HOME | End: 2021-04-14
Payer: MEDICARE

## 2021-04-14 VITALS
BODY MASS INDEX: 27.87 KG/M2 | TEMPERATURE: 97.6 F | WEIGHT: 138.23 LBS | DIASTOLIC BLOOD PRESSURE: 79 MMHG | RESPIRATION RATE: 16 BRPM | HEART RATE: 85 BPM | HEIGHT: 59 IN | OXYGEN SATURATION: 97 % | SYSTOLIC BLOOD PRESSURE: 178 MMHG

## 2021-04-14 DIAGNOSIS — E87.6 HYPOKALEMIA: ICD-10-CM

## 2021-04-14 DIAGNOSIS — C53.0 MALIGNANT NEOPLASM OF ENDOCERVIX (HCC): Primary | ICD-10-CM

## 2021-04-14 LAB
CREAT UR-MCNC: 29.4 MG/DL
PROT UR-MCNC: 8 MG/DL
PROT/CREAT UR: 0.27 MG/G{CREAT} (ref 0–0.1)

## 2021-04-14 PROCEDURE — 96375 TX/PRO/DX INJ NEW DRUG ADDON: CPT

## 2021-04-14 PROCEDURE — 96417 CHEMO IV INFUS EACH ADDL SEQ: CPT

## 2021-04-14 PROCEDURE — 96415 CHEMO IV INFUSION ADDL HR: CPT

## 2021-04-14 PROCEDURE — 82570 ASSAY OF URINE CREATININE: CPT

## 2021-04-14 PROCEDURE — 84156 ASSAY OF PROTEIN URINE: CPT

## 2021-04-14 PROCEDURE — 96413 CHEMO IV INFUSION 1 HR: CPT

## 2021-04-14 PROCEDURE — 96367 TX/PROPH/DG ADDL SEQ IV INF: CPT

## 2021-04-14 RX ORDER — SODIUM CHLORIDE 9 MG/ML
20 INJECTION, SOLUTION INTRAVENOUS ONCE
Status: COMPLETED | OUTPATIENT
Start: 2021-04-14 | End: 2021-04-14

## 2021-04-14 RX ORDER — PALONOSETRON 0.05 MG/ML
0.25 INJECTION, SOLUTION INTRAVENOUS ONCE
Status: COMPLETED | OUTPATIENT
Start: 2021-04-14 | End: 2021-04-14

## 2021-04-14 RX ADMIN — PACLITAXEL 210.6 MG: 6 INJECTION, SOLUTION, CONCENTRATE INTRAVENOUS at 10:35

## 2021-04-14 RX ADMIN — PALONOSETRON 0.25 MG: 0.05 INJECTION, SOLUTION INTRAVENOUS at 09:19

## 2021-04-14 RX ADMIN — BEVACIZUMAB 900 MG: 400 INJECTION, SOLUTION INTRAVENOUS at 14:43

## 2021-04-14 RX ADMIN — SODIUM CHLORIDE 20 ML/HR: 0.9 INJECTION, SOLUTION INTRAVENOUS at 08:20

## 2021-04-14 RX ADMIN — DIPHENHYDRAMINE HYDROCHLORIDE 25 MG: 50 INJECTION, SOLUTION INTRAMUSCULAR; INTRAVENOUS at 08:52

## 2021-04-14 RX ADMIN — FOSAPREPITANT 150 MG: 150 INJECTION, POWDER, LYOPHILIZED, FOR SOLUTION INTRAVENOUS at 09:51

## 2021-04-14 RX ADMIN — FAMOTIDINE 20 MG: 10 INJECTION INTRAVENOUS at 09:22

## 2021-04-14 RX ADMIN — DEXAMETHASONE SODIUM PHOSPHATE 20 MG: 10 INJECTION, SOLUTION INTRAMUSCULAR; INTRAVENOUS at 08:22

## 2021-04-14 RX ADMIN — CARBOPLATIN 350 MG: 10 INJECTION, SOLUTION INTRAVENOUS at 13:36

## 2021-04-14 NOTE — PLAN OF CARE
Problem: INFECTION - ADULT  Goal: Absence or prevention of progression during hospitalization  Description: INTERVENTIONS:  - Assess and monitor for signs and symptoms of infection  - Monitor lab/diagnostic results  - Monitor all insertion sites, i e  indwelling lines, tubes, and drains  - Monitor endotracheal if appropriate and nasal secretions for changes in amount and color  - [place] appropriate cooling/warming therapies per order  - Administer medications as ordered  - Instruct and encourage patient and family to use good hand hygiene technique  - Identify and instruct in appropriate isolation precautions for identified infection/condition  Outcome: Progressing     Problem: Knowledge Deficit  Goal: Patient/family/caregiver demonstrates understanding of disease process, treatment plan, medications, and discharge instructions  Description: Complete learning assessment and assess knowledge base    Interventions:  - Provide teaching at level of understanding  - Provide teaching via preferred learning methods  Outcome: Progressing

## 2021-04-16 ENCOUNTER — HOSPITAL ENCOUNTER (OUTPATIENT)
Dept: INFUSION CENTER | Facility: HOSPITAL | Age: 66
Discharge: HOME | End: 2021-04-16
Payer: MEDICARE

## 2021-04-16 VITALS
SYSTOLIC BLOOD PRESSURE: 166 MMHG | HEART RATE: 75 BPM | RESPIRATION RATE: 16 BRPM | DIASTOLIC BLOOD PRESSURE: 73 MMHG | TEMPERATURE: 97.1 F

## 2021-04-16 DIAGNOSIS — E86.0 DEHYDRATION: Primary | ICD-10-CM

## 2021-04-16 PROCEDURE — 96360 HYDRATION IV INFUSION INIT: CPT

## 2021-04-16 RX ADMIN — SODIUM CHLORIDE 1000 ML: 0.9 INJECTION, SOLUTION INTRAVENOUS at 12:28

## 2021-04-16 NOTE — PLAN OF CARE
Problem: INFECTION - ADULT  Goal: Absence or prevention of progression during hospitalization  Description: INTERVENTIONS:  - Assess and monitor for signs and symptoms of infection  - Monitor lab/diagnostic results  - Monitor all insertion sites, i e  indwelling lines, tubes, and drains  - Monitor endotracheal if appropriate and nasal secretions for changes in amount and color  - [place] appropriate cooling/warming therapies per order  - Administer medications as ordered  - Instruct and encourage patient and family to use good hand hygiene technique  - Identify and instruct in appropriate isolation precautions for identified infection/condition  Outcome: Progressing     Problem: Knowledge Deficit  Goal: Patient/family/caregiver demonstrates understanding of disease process, treatment plan, medications, and discharge instructions  Description: Complete learning assessment and assess knowledge base    Interventions:  - Provide teaching at level of understanding  - Provide teaching via preferred learning methods  Outcome: Progressing     Problem: DISCHARGE PLANNING  Goal: Discharge to home or other facility with appropriate resources  Description: INTERVENTIONS:  - Identify barriers to discharge w/patient and caregiver  - Arrange for needed discharge resources and transportation as appropriate  - Identify discharge learning needs (meds, wound care, etc )  - Arrange for interpretive services to assist at discharge as needed  - Refer to Case Management Department for coordinating discharge planning if the patient needs post-hospital services based on physician/advanced practitioner order or complex needs related to functional status, cognitive ability, or social support system  Outcome: Progressing

## 2021-04-19 DIAGNOSIS — C53.0 MALIGNANT NEOPLASM OF ENDOCERVIX (HCC): ICD-10-CM

## 2021-04-20 RX ORDER — ONDANSETRON HYDROCHLORIDE 8 MG/1
8 TABLET, FILM COATED ORAL EVERY 8 HOURS PRN
Qty: 30 TABLET | Refills: 0 | Status: SHIPPED | OUTPATIENT
Start: 2021-04-20 | End: 2021-05-04 | Stop reason: SDUPTHER

## 2021-04-23 ENCOUNTER — PATIENT OUTREACH (OUTPATIENT)
Dept: CASE MANAGEMENT | Facility: HOSPITAL | Age: 66
End: 2021-04-23

## 2021-04-23 ENCOUNTER — HOSPITAL ENCOUNTER (OUTPATIENT)
Dept: INFUSION CENTER | Facility: HOSPITAL | Age: 66
Discharge: HOME | End: 2021-04-23
Payer: MEDICARE

## 2021-04-23 ENCOUNTER — DOCUMENTATION (OUTPATIENT)
Dept: HEMATOLOGY ONCOLOGY | Facility: CLINIC | Age: 66
End: 2021-04-23

## 2021-04-23 VITALS
SYSTOLIC BLOOD PRESSURE: 124 MMHG | OXYGEN SATURATION: 100 % | HEART RATE: 103 BPM | TEMPERATURE: 97.5 F | DIASTOLIC BLOOD PRESSURE: 71 MMHG | RESPIRATION RATE: 18 BRPM

## 2021-04-23 DIAGNOSIS — E86.0 DEHYDRATION: Primary | ICD-10-CM

## 2021-04-23 PROCEDURE — 96360 HYDRATION IV INFUSION INIT: CPT

## 2021-04-23 PROCEDURE — 96361 HYDRATE IV INFUSION ADD-ON: CPT

## 2021-04-23 RX ADMIN — SODIUM CHLORIDE 1000 ML: 0.9 INJECTION, SOLUTION INTRAVENOUS at 08:51

## 2021-04-23 NOTE — PROGRESS NOTES
moise email from [facility] that pt has some questions about her bills called the pt & she sd she will get the bills together   Told her that I will get back to her Monday & go  Over what claims will be submitted to the foundation & she sd she ill do that emailed [facility]

## 2021-04-23 NOTE — PROGRESS NOTES
Oncology LSW outreached PT to provide support and assess for areas of need  PT reported that she was "feeling much better" than the last time she and LSW spoke  PT reported that she did have one area of concern that she was hoping that LSW could help address  PT explained that she was continuing to incur bills for treatment and was unsure what of that amount was covered by the giovanna she was allotted  PT explained that he total bills, up to today, were roughly $2000  PT reported that she found the billing aspect of treatment "so confusing " LSW explained that she would reach out to the [employer] and request that PT be outreach for more clarity on the matter  PT was appreciative and agreeable  PT then reported that she was feeling "rather down" because she had just rehomed her  [address]  PT reported that she knew this was the best thing for him, as she was having a hard time caring for him now  PT stated, "I know that he is going to outlive me, so this is the best thing for him " PT explained that her daughter had taken him to a non-profit rescue in [place] where he will be with other rescued and rehomed birds  PT reported that she would also be able to continue to check on her bird while he was still there, as her daughter's boyfriend was friends with one of the employees  PT then stated she was looking forward to May when her daughter was going to come pick her up and take her back to [facility] in [place]  PT explained that she loved the beach and hopes that she can get back there again before she passes  LSW engaged in active listening at this time and provided emotional support

## 2021-04-23 NOTE — PLAN OF CARE
Problem: DISCHARGE PLANNING  Goal: Discharge to home or other facility with appropriate resources  Description: INTERVENTIONS:  - Identify barriers to discharge w/patient and caregiver  - Arrange for needed discharge resources and transportation as appropriate  - Identify discharge learning needs (meds, wound care, etc )  - Arrange for interpretive services to assist at discharge as needed  - Refer to Case Management Department for coordinating discharge planning if the patient needs post-hospital services based on physician/advanced practitioner order or complex needs related to functional status, cognitive ability, or social support system  Outcome: Progressing     Problem: INFECTION - ADULT  Goal: Absence or prevention of progression during hospitalization  Description: INTERVENTIONS:  - Assess and monitor for signs and symptoms of infection  - Monitor lab/diagnostic results  - Monitor all insertion sites, i e  indwelling lines, tubes, and drains  - Monitor endotracheal if appropriate and nasal secretions for changes in amount and color  - [place] appropriate cooling/warming therapies per order  - Administer medications as ordered  - Instruct and encourage patient and family to use good hand hygiene technique  - Identify and instruct in appropriate isolation precautions for identified infection/condition  Outcome: Progressing     Problem: Knowledge Deficit  Goal: Patient/family/caregiver demonstrates understanding of disease process, treatment plan, medications, and discharge instructions  Description: Complete learning assessment and assess knowledge base    Interventions:  - Provide teaching at level of understanding  - Provide teaching via preferred learning methods  Outcome: Progressing

## 2021-04-28 ENCOUNTER — HOSPITAL ENCOUNTER (OUTPATIENT)
Dept: CT IMAGING | Facility: HOSPITAL | Age: 66
Discharge: HOME | End: 2021-04-28
Payer: MEDICARE

## 2021-04-28 DIAGNOSIS — C77.3 METASTATIC CANCER TO AXILLARY LYMPH NODES (HCC): ICD-10-CM

## 2021-04-28 DIAGNOSIS — C53.0 MALIGNANT NEOPLASM OF ENDOCERVIX (HCC): ICD-10-CM

## 2021-04-28 PROCEDURE — 74177 CT ABD & PELVIS W/CONTRAST: CPT

## 2021-04-28 PROCEDURE — G1004 CDSM NDSC: HCPCS

## 2021-04-28 PROCEDURE — 71260 CT THORAX DX C+: CPT

## 2021-04-28 RX ADMIN — IOHEXOL 100 ML: 350 INJECTION, SOLUTION INTRAVENOUS at 09:14

## 2021-04-30 ENCOUNTER — TELEPHONE (OUTPATIENT)
Dept: GYNECOLOGIC ONCOLOGY | Facility: CLINIC | Age: 66
End: 2021-04-30

## 2021-04-30 ENCOUNTER — HOSPITAL ENCOUNTER (OUTPATIENT)
Dept: INFUSION CENTER | Facility: HOSPITAL | Age: 66
Discharge: HOME | End: 2021-04-30
Payer: MEDICARE

## 2021-04-30 VITALS
RESPIRATION RATE: 16 BRPM | SYSTOLIC BLOOD PRESSURE: 142 MMHG | OXYGEN SATURATION: 99 % | DIASTOLIC BLOOD PRESSURE: 73 MMHG | TEMPERATURE: 97.7 F | HEART RATE: 84 BPM

## 2021-04-30 DIAGNOSIS — E86.0 DEHYDRATION: Primary | ICD-10-CM

## 2021-04-30 LAB
CREAT UR-MCNC: 35.5 MG/DL
PROT UR-MCNC: 21 MG/DL
PROT/CREAT UR: 0.59 MG/G{CREAT} (ref 0–0.1)

## 2021-04-30 PROCEDURE — 82570 ASSAY OF URINE CREATININE: CPT

## 2021-04-30 PROCEDURE — 96361 HYDRATE IV INFUSION ADD-ON: CPT

## 2021-04-30 PROCEDURE — 96360 HYDRATION IV INFUSION INIT: CPT

## 2021-04-30 PROCEDURE — 84156 ASSAY OF PROTEIN URINE: CPT

## 2021-04-30 RX ADMIN — SODIUM CHLORIDE 1000 ML: 0.9 INJECTION, SOLUTION INTRAVENOUS at 10:53

## 2021-04-30 NOTE — TELEPHONE ENCOUNTER
[facility] Radiology department called stating there are significant findings of patient's CT scan of the chest, abdomen and pelvis that was on 4/28/21

## 2021-04-30 NOTE — PLAN OF CARE
Problem: DISCHARGE PLANNING  Goal: Discharge to home or other facility with appropriate resources  Description: INTERVENTIONS:  - Identify barriers to discharge w/patient and caregiver  - Arrange for needed discharge resources and transportation as appropriate  - Identify discharge learning needs (meds, wound care, etc )  - Arrange for interpretive services to assist at discharge as needed  - Refer to Case Management Department for coordinating discharge planning if the patient needs post-hospital services based on physician/advanced practitioner order or complex needs related to functional status, cognitive ability, or social support system  Outcome: Progressing     Problem: INFECTION - ADULT  Goal: Absence or prevention of progression during hospitalization  Description: INTERVENTIONS:  - Assess and monitor for signs and symptoms of infection  - Monitor lab/diagnostic results  - Monitor all insertion sites, i e  indwelling lines, tubes, and drains  - Monitor endotracheal if appropriate and nasal secretions for changes in amount and color  - [place] appropriate cooling/warming therapies per order  - Administer medications as ordered  - Instruct and encourage patient and family to use good hand hygiene technique  - Identify and instruct in appropriate isolation precautions for identified infection/condition  Outcome: Progressing     Problem: Knowledge Deficit  Goal: Patient/family/caregiver demonstrates understanding of disease process, treatment plan, medications, and discharge instructions  Description: Complete learning assessment and assess knowledge base    Interventions:  - Provide teaching at level of understanding  - Provide teaching via preferred learning methods  Outcome: Progressing

## 2021-05-04 ENCOUNTER — TELEPHONE (OUTPATIENT)
Dept: UROLOGY | Facility: MEDICAL CENTER | Age: 66
End: 2021-05-04

## 2021-05-04 ENCOUNTER — OFFICE VISIT (OUTPATIENT)
Dept: GYNECOLOGIC ONCOLOGY | Facility: CLINIC | Age: 66
End: 2021-05-04
Payer: MEDICARE

## 2021-05-04 VITALS
HEART RATE: 78 BPM | TEMPERATURE: 97.8 F | DIASTOLIC BLOOD PRESSURE: 76 MMHG | SYSTOLIC BLOOD PRESSURE: 122 MMHG | RESPIRATION RATE: 18 BRPM | WEIGHT: 140 LBS | BODY MASS INDEX: 28.22 KG/M2 | HEIGHT: 59 IN

## 2021-05-04 DIAGNOSIS — N13.39 OTHER HYDRONEPHROSIS: ICD-10-CM

## 2021-05-04 DIAGNOSIS — C53.0 MALIGNANT NEOPLASM OF ENDOCERVIX (HCC): Primary | ICD-10-CM

## 2021-05-04 DIAGNOSIS — C53.0 MALIGNANT NEOPLASM OF ENDOCERVIX (HCC): ICD-10-CM

## 2021-05-04 DIAGNOSIS — C77.3 METASTATIC CANCER TO AXILLARY LYMPH NODES (HCC): Primary | ICD-10-CM

## 2021-05-04 DIAGNOSIS — T45.1X5A CHEMOTHERAPY-INDUCED NAUSEA: ICD-10-CM

## 2021-05-04 DIAGNOSIS — E87.6 HYPOKALEMIA: ICD-10-CM

## 2021-05-04 DIAGNOSIS — R11.0 CHEMOTHERAPY-INDUCED NAUSEA: ICD-10-CM

## 2021-05-04 PROCEDURE — 99215 OFFICE O/P EST HI 40 MIN: CPT | Performed by: OBSTETRICS & GYNECOLOGY

## 2021-05-04 RX ORDER — LORAZEPAM 1 MG/1
1 TABLET ORAL EVERY 6 HOURS PRN
Qty: 36 TABLET | Refills: 1 | Status: SHIPPED | OUTPATIENT
Start: 2021-05-04 | End: 2021-08-18

## 2021-05-04 RX ORDER — PALONOSETRON 0.05 MG/ML
0.25 INJECTION, SOLUTION INTRAVENOUS ONCE
Status: CANCELLED | OUTPATIENT
Start: 2021-05-05

## 2021-05-04 RX ORDER — ONDANSETRON HYDROCHLORIDE 8 MG/1
8 TABLET, FILM COATED ORAL EVERY 8 HOURS PRN
Qty: 30 TABLET | Refills: 1 | Status: SHIPPED | OUTPATIENT
Start: 2021-05-04 | End: 2021-11-05

## 2021-05-04 RX ORDER — SODIUM CHLORIDE 9 MG/ML
20 INJECTION, SOLUTION INTRAVENOUS ONCE
Status: CANCELLED | OUTPATIENT
Start: 2021-05-05

## 2021-05-04 NOTE — TELEPHONE ENCOUNTER
----- Message from Dayo Prakash MD sent at 5/4/2021 11:14 AM EDT -----   Please make sure she sees 1 of us to set her up for cystoscopy and right ureteral stent exchange in the operating room  ----- Message -----  From: Mateus Sol MD  Sent: 5/4/2021  10:48 AM EDT  To: Dayo Prakash MD, JACE Cabrales

## 2021-05-04 NOTE — LETTER
May 4, 2021     [address], [address]  [facility] [address]    Patient: Royer Aguilar   YOB: 1955   Date of Visit: 5/4/2021       Dear Dr Fidel Garcia: Thank you for referring Royer Aguilar to me for evaluation  Below are my notes for this consultation  If you have questions, please do not hesitate to call me  I look forward to following your patient along with you  Sincerely,        Genny Espinal MD        CC: MD Genny Gabriel MD  5/4/2021 10:46 AM  Sign when Signing Visit  Assessment/Plan:    Problem List Items Addressed This Visit        Immune and Lymphatic    RESOLVED: 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 ([address] ) - Primary       Genitourinary    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[address] )       After 3 cycles of chemotherapy with carboplatin, Taxol and Avastin CT scan demonstrates near complete resolution of all measurable disease  Patient is tolerating treatment well  I have recommended to proceed with 3 additional cycles of chemotherapy then, will repeat CT scan of the chest, abdomen and pelvis to assess response  In the absence of measurable disease, she will be transition to maintenance therapy with single agent Avastin  Will continue to check her laboratory parameters and clinical status prior to each infusion  Relevant Medications    ondansetron (ZOFRAN) 8 mg tablet    LORazepam (ATIVAN) 1 mg tablet    Other hydronephrosis       Stent in place  No residual hydronephrosis and most recent CT scan  Right kidney is smaller than the left with delayed nephrogram / decreased uptake suggesting some degree of chronic injury from prior obstruction  Patient had her stent placed in February  She has no follow-up scheduled with Urology  Will refer her back to Dr Sharon Cutler for scheduling of cystoscopy, retrograde pyelogram and stent exchange           Relevant Orders    Ambulatory referral to Urology            CHIEF COMPLAINT:     Evaluation of response after 3 cycles of chemotherapy for recurrent metastatic cervical cancer  Problem:  Cancer Staging  Malignant neoplasm of endocervix [facility])  Staging form: Cervix Uteri, AJCC 7th Edition  - Clinical stage from 3/29/2017: FIGO Stage IB2, calculated as Stage Unknown (T1b2, NX, M0) - Signed by Maud Carrel, MD on 2/4/2021        Previous therapy:  Oncology History   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[address] )   12/2016 Initial Diagnosis    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 ([facility])  3/29/2017 -  Cancer Staged    Staging form: Cervix Uteri, AJCC 7th Edition  - Clinical stage from 3/29/2017: FIGO Stage IB2, calculated as Stage Unknown (T1b2, NX, M0) - Signed by Maud Carrel, MD on 2/4/2021  Staged by: Managing physician  Specimen type: Excision  Histopathologic type: Squamous cell carcinoma, NOS  Histologic grade (G): G3  Lymph-vascular invasion (LVI): LVI present/identified, NOS  Residual tumor (R): R0 - None  Pelvic jace status: Negative  Pelvic jace method of assessment: PET  Para-aortic status: Negative  Para-aortic jace method of assessment: PET  Stage used in treatment planning: Yes  National guidelines used in treatment planning: Yes  Type of national guideline used in treatment planning: NCCN       3/29/2017 Surgery    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)  FInal path with incidental 5 cm grade 3 invasive cervical SCCA, +LVI, invasion 9/10 mm cervical stroma, no lymphadenectomy, margins negative ([facility])  2/19/2021 Progression    A  Lymph Node, Left axillary, Biopsy:  - Metastatic squamous cell carcinoma (p16 positive) involving lymph node, compatible with patient's cervical primary         3/2/2021 -  Chemotherapy    Carboplatin AUC 5, Taxol 135 milligram/m2 and Avastin at 15 milligram/kilogram all to be administer on day 1 of a 21 day cycle           Patient ID: Kortney Langford is a 77 y o  female  HPI    Patient with stage I B2 cervical cancer originally diagnosed in 2017  Now with metastatic disease with retroperitoneal lymphadenopathy, obstructive uropathy, etc   She has now indwelling right ureteral stent in place  She is now status post 3 cycles of carboplatin, Taxol and Avastin which she has tolerated well  She states her oval or performance status has improved  Other than 1st week after chemotherapy she is able to carry out all activities of daily living with minimal to no limitations  Her laboratory parameters have been monitored during treatment and she is overall doing well  She denies vaginal bleeding, drainage or discharge  Ongoing alopecia  No neuropathy  CT scan prior to today's visit shows near complete response  The following portions of the patient's history were reviewed and updated as appropriate: allergies, current medications, past family history, past medical history, past social history, past surgical history and problem list     Review of Systems    As per HPI  Twelve point review of systems otherwise noncontributory    Current Outpatient Medications   Medication Sig Dispense Refill    Biotin 10 MG CAPS Take by mouth      lidocaine-prilocaine (EMLA) cream Apply topically as needed for mild pain 30 g 0    LORazepam (ATIVAN) 1 mg tablet Take 1 tablet (1 mg total) by mouth every 6 (six) hours as needed for anxiety (or nausea) 36 tablet 1    Multiple Vitamin (multivitamin) capsule Take 1 capsule by mouth daily      ondansetron (ZOFRAN) 8 mg tablet Take 1 tablet (8 mg total) by mouth every 8 (eight) hours as needed for nausea or vomiting 30 tablet 1    oxybutynin (DITROPAN) 5 mg tablet Take 1 tablet (5 mg total) by mouth 3 (three) times a day as needed (bladder spasm) 20 tablet 0    phenazopyridine (PYRIDIUM) 200 mg tablet Take 1 tablet (200 mg total) by mouth 3 (three) times a day as needed for bladder spasms 30 tablet 0    promethazine (PHENERGAN) 12 5 MG tablet TAKE 1 TABLET (12 5 MG TOTAL) BY MOUTH EVERY 6 (SIX) HOURS AS NEEDED FOR NAUSEA OR VOMITING 30 tablet 0     No current facility-administered medications for this visit  Objective:    Blood pressure 122/76, pulse 78, temperature 97 8 °F (36 6 °C), temperature source Tympanic, resp  rate 18, height 4' 11" (1 499 m), weight 63 5 kg (140 lb), not currently breastfeeding  Body mass index is 28 28 kg/m²  Body surface area is 1 58 meters squared  Physical Exam  Vitals signs reviewed  Constitutional:       General: She is not in acute distress  Appearance: Normal appearance  She is normal weight  She is not ill-appearing  Comments: Universal alopecia   Eyes:      General: No scleral icterus  Right eye: No discharge  Left eye: No discharge  Conjunctiva/sclera: Conjunctivae normal    Neck:      Musculoskeletal: Neck supple  No neck rigidity or muscular tenderness  Comments: Right-sided Port-A-Cath in place, healthy  Cardiovascular:      Rate and Rhythm: Normal rate and regular rhythm  Heart sounds: Normal heart sounds  No murmur  Pulmonary:      Effort: Pulmonary effort is normal  No respiratory distress  Breath sounds: Normal breath sounds  No stridor  No wheezing  Abdominal:      General: Abdomen is flat  There is no distension  Palpations: Abdomen is soft  There is no mass  Tenderness: There is no abdominal tenderness  There is no guarding  Musculoskeletal:      Right lower leg: No edema  Left lower leg: No edema  Lymphadenopathy:      Cervical: No cervical adenopathy  Neurological:      Mental Status: She is alert           No results found for:   Lab Results   Component Value Date     06/11/2018    K 3 9 04/30/2021     (H) 04/30/2021    CO2 25 04/30/2021    ANIONGAP 13 0 06/11/2018    BUN 11 04/30/2021    CREATININE 1 01 04/30/2021    GLUF 117 (H) 02/19/2021    CALCIUM 8 9 04/30/2021    CORRECTEDCA 9 7 04/30/2021    AST 12 04/30/2021    ALT 17 04/30/2021    ALKPHOS 119 (H) 04/30/2021    PROT 7 0 06/11/2018    BILITOT 0 3 06/11/2018    EGFR 58 04/30/2021     Lab Results   Component Value Date    WBC 2 61 (L) 04/30/2021    HGB 8 6 (L) 04/30/2021    HCT 28 1 (L) 04/30/2021    MCV 94 04/30/2021     04/30/2021     Lab Results   Component Value Date    NEUTROABS 1 47 (L) 04/30/2021           CT scan chest, abdomen and pelvis April 28, 2021: I have personally reviewed interpreted images  There tiny indeterminate bilateral pulmonary nodules of questionable significance  No pleural effusions  No mediastinal lymphadenopathy  Axillary lymphadenopathy has resolved  Port-A-Cath in place in good position  Abdomen and pelvis demonstrated incidental right hepatic lobe hypervascular lesion suggestive of a hemangioma  No evidence of lesions concerning for hepatic metastatic disease  No significant retroperitoneal lymphadenopathy  No ascites  No peritoneal carcinomatosis  Indwelling right ureteral stent noted  Right kidney appears smaller it and somewhat atrophic  No hydronephrosis  No pelvic or abdominal masses noted  No groin lymphadenopathy      Deni Giron MD, [address] [facility] [address]  5/4/2021  10:46 AM

## 2021-05-04 NOTE — PROGRESS NOTES
Assessment/Plan:    Problem List Items Addressed This Visit        Immune and Lymphatic    RESOLVED: 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 ([facility] [address] ) - Primary       Genitourinary    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[facility] [address] )       After 3 cycles of chemotherapy with carboplatin, Taxol and Avastin CT scan demonstrates near complete resolution of all measurable disease  Patient is tolerating treatment well  I have recommended to proceed with 3 additional cycles of chemotherapy then, will repeat CT scan of the chest, abdomen and pelvis to assess response  In the absence of measurable disease, she will be transition to maintenance therapy with single agent Avastin  Will continue to check her laboratory parameters and clinical status prior to each infusion  Relevant Medications    ondansetron (ZOFRAN) 8 mg tablet    LORazepam (ATIVAN) 1 mg tablet    Other hydronephrosis       Stent in place  No residual hydronephrosis and most recent CT scan  Right kidney is smaller than the left with delayed nephrogram / decreased uptake suggesting some degree of chronic injury from prior obstruction  Patient had her stent placed in February  She has no follow-up scheduled with Urology  Will refer her back to Dr Clay Maldonado for scheduling of cystoscopy, retrograde pyelogram and stent exchange  Relevant Orders    Ambulatory referral to Urology       Other    Chemotherapy-induced nausea      This responds well to Zofran  Ondansetron will be refilled  Patient encouraged to take around the clock 1st few days after chemotherapy  CHIEF COMPLAINT:     Evaluation of response after 3 cycles of chemotherapy for recurrent metastatic cervical cancer      Problem:  Cancer Staging  Malignant neoplasm of endocervix [facility])  Staging form: Cervix Uteri, AJCC 7th Edition  - Clinical stage from 3/29/2017: FIGO Stage IB2, calculated as Stage Unknown (T1b2, NX, M0) - Signed by Ariel Galvin MD on 2/4/2021        Previous therapy:  Oncology History   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[address] )   12/2016 Initial Diagnosis    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 [facility])  3/29/2017 -  Cancer Staged    Staging form: Cervix Uteri, AJCC 7th Edition  - Clinical stage from 3/29/2017: FIGO Stage IB2, calculated as Stage Unknown (T1b2, NX, M0) - Signed by Markie Faria MD on 2/4/2021  Staged by: Managing physician  Specimen type: Excision  Histopathologic type: Squamous cell carcinoma, NOS  Histologic grade (G): G3  Lymph-vascular invasion (LVI): LVI present/identified, NOS  Residual tumor (R): R0 - None  Pelvic jace status: Negative  Pelvic jace method of assessment: PET  Para-aortic status: Negative  Para-aortic jace method of assessment: PET  Stage used in treatment planning: Yes  National guidelines used in treatment planning: Yes  Type of national guideline used in treatment planning: NCCN       3/29/2017 Surgery    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)  FInal path with incidental 5 cm grade 3 invasive cervical SCCA, +LVI, invasion 9/10 mm cervical stroma, no lymphadenectomy, margins negative ([facility])  2/19/2021 Progression    A  Lymph Node, Left axillary, Biopsy:  - Metastatic squamous cell carcinoma (p16 positive) involving lymph node, compatible with patient's cervical primary  3/2/2021 -  Chemotherapy    Carboplatin AUC 5, Taxol 135 milligram/m2 and Avastin at 15 milligram/kilogram all to be administer on day 1 of a 21 day cycle           Patient ID: Aram Schmitt is a 77 y o  female  HPI    Patient with stage I B2 cervical cancer originally diagnosed in 2017  Now with metastatic disease with retroperitoneal lymphadenopathy, obstructive uropathy, etc   She has now indwelling right ureteral stent in place  She is now status post 3 cycles of carboplatin, Taxol and Avastin which she has tolerated well  She states her oval or performance status has improved  Other than 1st week after chemotherapy she is able to carry out all activities of daily living with minimal to no limitations  Her laboratory parameters have been monitored during treatment and she is overall doing well  She denies vaginal bleeding, drainage or discharge  Ongoing alopecia  No neuropathy  CT scan prior to today's visit shows near complete response  Complaints of chemotherapy associated nausea which is responsive to ondansetron  The following portions of the patient's history were reviewed and updated as appropriate: allergies, current medications, past family history, past medical history, past social history, past surgical history and problem list     Review of Systems    As per HPI  Twelve point review of systems otherwise noncontributory  Current Outpatient Medications   Medication Sig Dispense Refill    Biotin 10 MG CAPS Take by mouth      lidocaine-prilocaine (EMLA) cream Apply topically as needed for mild pain 30 g 0    LORazepam (ATIVAN) 1 mg tablet Take 1 tablet (1 mg total) by mouth every 6 (six) hours as needed for anxiety (or nausea) 36 tablet 1    Multiple Vitamin (multivitamin) capsule Take 1 capsule by mouth daily      ondansetron (ZOFRAN) 8 mg tablet Take 1 tablet (8 mg total) by mouth every 8 (eight) hours as needed for nausea or vomiting 30 tablet 1    oxybutynin (DITROPAN) 5 mg tablet Take 1 tablet (5 mg total) by mouth 3 (three) times a day as needed (bladder spasm) 20 tablet 0    phenazopyridine (PYRIDIUM) 200 mg tablet Take 1 tablet (200 mg total) by mouth 3 (three) times a day as needed for bladder spasms 30 tablet 0    promethazine (PHENERGAN) 12 5 MG tablet TAKE 1 TABLET (12 5 MG TOTAL) BY MOUTH EVERY 6 (SIX) HOURS AS NEEDED FOR NAUSEA OR VOMITING 30 tablet 0     No current facility-administered medications for this visit  Objective:    Blood pressure 122/76, pulse 78, temperature 97 8 °F (36 6 °C), temperature source Tympanic, resp   rate 18, height 4' 11" (1 499 m), weight 63 5 kg (140 lb), not currently breastfeeding  Body mass index is 28 28 kg/m²  Body surface area is 1 58 meters squared  Physical Exam  Vitals signs reviewed  Constitutional:       General: She is not in acute distress  Appearance: Normal appearance  She is normal weight  She is not ill-appearing  Comments: Universal alopecia   Eyes:      General: No scleral icterus  Right eye: No discharge  Left eye: No discharge  Conjunctiva/sclera: Conjunctivae normal    Neck:      Musculoskeletal: Neck supple  No neck rigidity or muscular tenderness  Comments: Right-sided Port-A-Cath in place, healthy  Cardiovascular:      Rate and Rhythm: Normal rate and regular rhythm  Heart sounds: Normal heart sounds  No murmur  Pulmonary:      Effort: Pulmonary effort is normal  No respiratory distress  Breath sounds: Normal breath sounds  No stridor  No wheezing  Abdominal:      General: Abdomen is flat  There is no distension  Palpations: Abdomen is soft  There is no mass  Tenderness: There is no abdominal tenderness  There is no guarding  Musculoskeletal:      Right lower leg: No edema  Left lower leg: No edema  Lymphadenopathy:      Cervical: No cervical adenopathy  Neurological:      Mental Status: She is alert           No results found for:   Lab Results   Component Value Date     06/11/2018    K 3 9 04/30/2021     (H) 04/30/2021    CO2 25 04/30/2021    ANIONGAP 13 0 06/11/2018    BUN 11 04/30/2021    CREATININE 1 01 04/30/2021    GLUF 117 (H) 02/19/2021    CALCIUM 8 9 04/30/2021    CORRECTEDCA 9 7 04/30/2021    AST 12 04/30/2021    ALT 17 04/30/2021    ALKPHOS 119 (H) 04/30/2021    PROT 7 0 06/11/2018    BILITOT 0 3 06/11/2018    EGFR 58 04/30/2021     Lab Results   Component Value Date    WBC 2 61 (L) 04/30/2021    HGB 8 6 (L) 04/30/2021    HCT 28 1 (L) 04/30/2021    MCV 94 04/30/2021     04/30/2021     Lab Results   Component Value Date    NEUTROABS 1 47 (L) 04/30/2021           CT scan chest, abdomen and pelvis April 28, 2021: I have personally reviewed interpreted images  There tiny indeterminate bilateral pulmonary nodules of questionable significance  No pleural effusions  No mediastinal lymphadenopathy  Axillary lymphadenopathy has resolved  Port-A-Cath in place in good position  Abdomen and pelvis demonstrated incidental right hepatic lobe hypervascular lesion suggestive of a hemangioma  No evidence of lesions concerning for hepatic metastatic disease  No significant retroperitoneal lymphadenopathy  No ascites  No peritoneal carcinomatosis  Indwelling right ureteral stent noted  Right kidney appears smaller it and somewhat atrophic  No hydronephrosis  No pelvic or abdominal masses noted  No groin lymphadenopathy      Murtaza Holland MD, [address] [address]  5/4/2021  10:47 AM

## 2021-05-04 NOTE — PATIENT INSTRUCTIONS
Follow-up per calendar  Will schedule you with follow-up for urology/stent exchange  Contact us if you have any new symptoms

## 2021-05-04 NOTE — ASSESSMENT & PLAN NOTE
Stent in place  No residual hydronephrosis and most recent CT scan  Right kidney is smaller than the left with delayed nephrogram / decreased uptake suggesting some degree of chronic injury from prior obstruction  Patient had her stent placed in February  She has no follow-up scheduled with Urology  Will refer her back to Dr Stephania Vela for scheduling of cystoscopy, retrograde pyelogram and stent exchange

## 2021-05-04 NOTE — ASSESSMENT & PLAN NOTE
After 3 cycles of chemotherapy with carboplatin, Taxol and Avastin CT scan demonstrates near complete resolution of all measurable disease  Patient is tolerating treatment well  I have recommended to proceed with 3 additional cycles of chemotherapy then, will repeat CT scan of the chest, abdomen and pelvis to assess response  In the absence of measurable disease, she will be transition to maintenance therapy with single agent Avastin  Will continue to check her laboratory parameters and clinical status prior to each infusion

## 2021-05-04 NOTE — ASSESSMENT & PLAN NOTE
This responds well to Zofran  Ondansetron will be refilled  Patient encouraged to take around the clock 1st few days after chemotherapy

## 2021-05-05 ENCOUNTER — PATIENT OUTREACH (OUTPATIENT)
Dept: CASE MANAGEMENT | Facility: HOSPITAL | Age: 66
End: 2021-05-05

## 2021-05-05 ENCOUNTER — HOSPITAL ENCOUNTER (OUTPATIENT)
Dept: INFUSION CENTER | Facility: HOSPITAL | Age: 66
Discharge: HOME | End: 2021-05-05
Payer: MEDICARE

## 2021-05-05 VITALS
HEIGHT: 59 IN | HEART RATE: 87 BPM | SYSTOLIC BLOOD PRESSURE: 150 MMHG | TEMPERATURE: 97.8 F | RESPIRATION RATE: 16 BRPM | DIASTOLIC BLOOD PRESSURE: 71 MMHG | WEIGHT: 138.67 LBS | OXYGEN SATURATION: 98 % | BODY MASS INDEX: 27.96 KG/M2

## 2021-05-05 DIAGNOSIS — E86.0 DEHYDRATION: Primary | ICD-10-CM

## 2021-05-05 DIAGNOSIS — C53.0 MALIGNANT NEOPLASM OF ENDOCERVIX (HCC): Primary | ICD-10-CM

## 2021-05-05 DIAGNOSIS — E87.6 HYPOKALEMIA: ICD-10-CM

## 2021-05-05 PROCEDURE — 96417 CHEMO IV INFUS EACH ADDL SEQ: CPT

## 2021-05-05 PROCEDURE — 96375 TX/PRO/DX INJ NEW DRUG ADDON: CPT

## 2021-05-05 PROCEDURE — 96415 CHEMO IV INFUSION ADDL HR: CPT

## 2021-05-05 PROCEDURE — 96367 TX/PROPH/DG ADDL SEQ IV INF: CPT

## 2021-05-05 PROCEDURE — 96413 CHEMO IV INFUSION 1 HR: CPT

## 2021-05-05 RX ORDER — PALONOSETRON 0.05 MG/ML
0.25 INJECTION, SOLUTION INTRAVENOUS ONCE
Status: COMPLETED | OUTPATIENT
Start: 2021-05-05 | End: 2021-05-05

## 2021-05-05 RX ORDER — SODIUM CHLORIDE 9 MG/ML
20 INJECTION, SOLUTION INTRAVENOUS ONCE
Status: COMPLETED | OUTPATIENT
Start: 2021-05-05 | End: 2021-05-05

## 2021-05-05 RX ADMIN — DEXAMETHASONE SODIUM PHOSPHATE 20 MG: 10 INJECTION, SOLUTION INTRAMUSCULAR; INTRAVENOUS at 08:19

## 2021-05-05 RX ADMIN — PALONOSETRON 0.25 MG: 0.05 INJECTION, SOLUTION INTRAVENOUS at 08:15

## 2021-05-05 RX ADMIN — FAMOTIDINE 20 MG: 10 INJECTION INTRAVENOUS at 09:19

## 2021-05-05 RX ADMIN — DIPHENHYDRAMINE HYDROCHLORIDE 25 MG: 50 INJECTION, SOLUTION INTRAMUSCULAR; INTRAVENOUS at 08:51

## 2021-05-05 RX ADMIN — SODIUM CHLORIDE 20 ML/HR: 0.9 INJECTION, SOLUTION INTRAVENOUS at 08:15

## 2021-05-05 RX ADMIN — CARBOPLATIN 350 MG: 10 INJECTION, SOLUTION INTRAVENOUS at 13:38

## 2021-05-05 RX ADMIN — PACLITAXEL 210.6 MG: 6 INJECTION, SOLUTION, CONCENTRATE INTRAVENOUS at 10:34

## 2021-05-05 RX ADMIN — FOSAPREPITANT 150 MG: 150 INJECTION, POWDER, LYOPHILIZED, FOR SOLUTION INTRAVENOUS at 09:52

## 2021-05-05 RX ADMIN — BEVACIZUMAB 900 MG: 400 INJECTION, SOLUTION INTRAVENOUS at 14:43

## 2021-05-05 NOTE — PROGRESS NOTES
Oncology LSW met with PT chairside in the [facility] center to provide support and assess for areas of need  PT reported that she had received "positive news" at her follow up oncology appointment yesterday and was feeling "hopeful, but not too hopeful " PT proceeded to ask LSW if she would be able to look into a particular bill that she had with the [employer] team, as she was still unsure as to what part of the $2000 giovanna was allotted to said bill  LSW explained that she would reach out and request clarity on the matter for PT  PT was agreeable with this plan  PT further explained that she was having a difficult time managing her hospital bills as the currently stand, to which LSW explained that she would reach out to the [address] and request that they mail her a hardship application  PT agreeable and appreciative  PT also asked if it would be possible for [facility] to transport PT to an upcoming urology appointment that was recommended by her oncologist  Wilberto Jacob explained that she would reach out to [place] to see if this would be a possibility, especially because the appointment was recommended by her oncologist  PT agreeable  LSW proceeded to provide PT with short-term chairside counseling services, during which PT and LSW discussed a variety of topics that were guided by the direction of PT  PT receptive to the emotional support provided  LSW spoke with Wai with [employer]  Wai reported that, due to the time of PT's urology appointment and it being out of network, [facility] would be unable to transport  LSW shared this information with PT  PT reported that she would reach out to her neighbors to see if she could secure transportation       LSW emailed the [address] and requested that a hardship application be mailed to PT      LSW emailed Veronica Chacon with the [employer] and requested more information pertaining to PT's current treatment bills and their relation to the gran that PT had received

## 2021-05-06 ENCOUNTER — TELEPHONE (OUTPATIENT)
Dept: NUTRITION | Facility: CLINIC | Age: 66
End: 2021-05-06

## 2021-05-06 ENCOUNTER — PATIENT OUTREACH (OUTPATIENT)
Dept: CASE MANAGEMENT | Facility: HOSPITAL | Age: 66
End: 2021-05-06

## 2021-05-06 NOTE — TELEPHONE ENCOUNTER
Outpatient Oncology Nutrition Education  Type of Consult:  Nutrition Education  Care Location: Telephone Call    Reason for referral: Michellenataliya Sanford on 4/9/21 (reason for referral: pt interested in getting in touch with dietitian)  Oncology Diagnosis & Treatments:   Oncology History   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[facility] [address] )   12/2016 Initial Diagnosis    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 [facility])  3/29/2017 -  Cancer Staged    Staging form: Cervix Uteri, AJCC 7th Edition  - Clinical stage from 3/29/2017: FIGO Stage IB2, calculated as Stage Unknown (T1b2, NX, M0) - Signed by Mahsa Slater MD on 2/4/2021  Staged by: Managing physician  Specimen type: Excision  Histopathologic type: Squamous cell carcinoma, NOS  Histologic grade (G): G3  Lymph-vascular invasion (LVI): LVI present/identified, NOS  Residual tumor (R): R0 - None  Pelvic jace status: Negative  Pelvic jace method of assessment: PET  Para-aortic status: Negative  Para-aortic jace method of assessment: PET  Stage used in treatment planning: Yes  National guidelines used in treatment planning: Yes  Type of national guideline used in treatment planning: NCCN       3/29/2017 Surgery    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)  FInal path with incidental 5 cm grade 3 invasive cervical SCCA, +LVI, invasion 9/10 mm cervical stroma, no lymphadenectomy, margins negative ([facility])  2/19/2021 Progression    A  Lymph Node, Left axillary, Biopsy:  - Metastatic squamous cell carcinoma (p16 positive) involving lymph node, compatible with patient's cervical primary         3/2/2021 -  Chemotherapy    Carboplatin AUC 5, Taxol 135 milligram/m2 and Avastin at 15 milligram/kilogram all to be administer on day 1 of a 21 day cycle       Past Medical & Surgical Hx:   Patient Active Problem List   Diagnosis    Zaldivar's neuroma, left    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[facility] [address] )    Anxiety and depression    Trigger thumb of right hand    Tinnitus of left ear    Wrist arthritis    BMI 29 0-29 9,adult    Right hip pain    Other hydronephrosis    Encounter for screening involving social determinants of health (SDoH)    Dehydration    Hypokalemia    Healthcare maintenance    Chemotherapy-induced nausea     Past Medical History:   Diagnosis Date    Anxiety     Anxiety and depression 10/2/2019    Breathing difficulty     Cancer ([address] )     cervical    GERD (gastroesophageal reflux disease)     Irritable bowel syndrome     Shortness of breath     Thumb injury 10/2019    Tinnitus of left ear 2/19/2020    Wrist arthritis 3/17/2020     Past Surgical History:   Procedure Laterality Date    FL RETROGRADE PYELOGRAM  2/11/2021    FOOT SURGERY      HYSTERECTOMY      IR BIOPSY LYMPH NODE  2/19/2021    IR PORT PLACEMENT  3/9/2021    MS CYSTOURETHROSCOPY,URETER CATHETER Right 2/11/2021    Procedure: CYSTOSCOPY RETROGRADE PYELOGRAM WITH INSERTION STENT URETERAL;  Surgeon: Fco Junior MD;  Location:  MAIN OR;  Service: Urology    MS EXCIS INTERDIGITAL [place] Left 7/12/2018    Procedure: FOOT NEUROMA EXCISION;  Surgeon: Lowanda Hodgkin, DPM;  Location: [address] [place] [address] MAIN OR;  Service: Podiatry    TUBAL LIGATION         Review of Medications:     Current Outpatient Medications:     Biotin 10 MG CAPS, Take by mouth, Disp: , Rfl:     lidocaine-prilocaine (EMLA) cream, Apply topically as needed for mild pain, Disp: 30 g, Rfl: 0    LORazepam (ATIVAN) 1 mg tablet, Take 1 tablet (1 mg total) by mouth every 6 (six) hours as needed for anxiety (or nausea), Disp: 36 tablet, Rfl: 1    Multiple Vitamin (multivitamin) capsule, Take 1 capsule by mouth daily, Disp: , Rfl:     ondansetron (ZOFRAN) 8 mg tablet, Take 1 tablet (8 mg total) by mouth every 8 (eight) hours as needed for nausea or vomiting, Disp: 30 tablet, Rfl: 1    oxybutynin (DITROPAN) 5 mg tablet, Take 1 tablet (5 mg total) by mouth 3 (three) times a day as needed (bladder spasm), Disp: 20 tablet, Rfl: 0    phenazopyridine (PYRIDIUM) 200 mg tablet, Take 1 tablet (200 mg total) by mouth 3 (three) times a day as needed for bladder spasms, Disp: 30 tablet, Rfl: 0    promethazine (PHENERGAN) 12 5 MG tablet, TAKE 1 TABLET (12 5 MG TOTAL) BY MOUTH EVERY 6 (SIX) HOURS AS NEEDED FOR NAUSEA OR VOMITING, Disp: 30 tablet, Rfl: 0  No current facility-administered medications for this visit  Most Recent Lab Results:   Lab Results   Component Value Date    WBC 2 61 (L) 04/30/2021    TRIG 164 (H) 02/19/2020    HDL 37 (L) 02/19/2020    LDLCALC 137 (H) 02/19/2020    ALT 17 04/30/2021    AST 12 04/30/2021     06/11/2018    K 3 9 04/30/2021    K 3 9 04/23/2021    BUN 11 04/30/2021    BUN 16 04/23/2021    CREATININE 1 01 04/30/2021    CREATININE 0 93 04/23/2021    CALCIUM 8 9 04/30/2021    MG 1 7 04/30/2021       Anthropometric Measurements:   Ht Readings from Last 1 Encounters:   05/05/21 4' 11 02" (1 499 m)     -Weight History: Wt Readings from Last 15 Encounters:   05/05/21 62 9 kg (138 lb 10 7 oz)   05/04/21 63 5 kg (140 lb)   04/14/21 62 7 kg (138 lb 3 7 oz)   03/24/21 61 1 kg (134 lb 11 2 oz)   03/09/21 61 7 kg (136 lb)   03/02/21 62 kg (136 lb 11 oz)   02/11/21 61 7 kg (136 lb)   02/04/21 63 kg (139 lb)   01/22/21 64 3 kg (141 lb 12 1 oz)   01/05/21 65 4 kg (144 lb 1 6 oz)   03/17/20 81 4 kg (179 lb 6 4 oz)   02/25/20 82 1 kg (181 lb)   02/19/20 82 1 kg (181 lb)   11/25/19 81 6 kg (180 lb)   10/08/19 80 7 kg (178 lb)     Estimated body mass index is 27 99 kg/m² as calculated from the following:    Height as of 5/5/21: 4' 11 02" (1 499 m)  Weight as of 5/5/21: 62 9 kg (138 lb 10 7 oz)  Discussion/Summary:  Received call from Dee today for nutrition education regarding poor po intake  Dee is undergoing cancer treatment for metastatic cervical cancer  She reports a decreased appetite in the few days following her chemo treatments  She also has been experiencing taste changes  Reviewed the importance of wt management throughout the tx process and the role of a high kcal/ high protein diet in managing wt and overall health  Discussed ways to increase calorie and protein intake, suggestions include: eating smaller/more frequent meals, including high calorie foods in diet (cheese, whole milk, peanut butter), including high protein foods in diet (eggs, chicken, fish, beans/legumes, nuts/nut butters), eating when feeling most hungry, sipping on calorie containing beverages, utilizing oral nutrition supplements, and keeping non perishable foods nearby to snack on  Reviewed nutrition therapy for taste changes, suggestions include: focus on foods that you like and avoid foods that are unappealing, use plastic utensils, paulo foods that taste salty, add salt/lemon/vinegar to overly sweet foods, marinate meats in sweet or tart fruit juices, try meat alternatives such as beans/lentils/soy based foods if meats taste bad, season foods with herbs and spices, eat cold or room temperature foods as hot foods tend to have stronger smell/taste, prepare foods ahead of time so fewer odors a present while eating, keep mouth clean  Suggested that she experiment with the F A S S  Concept - add extra Fat, Acidity (as long as you do not have mouth or throat pain), Salt, and Sweetness to foods to help improve flavor  Raul Rehman is appreciative of suggestions, she also states that she has Eating Hints Book which has been helpful for navigating some of her sx  Materials Provided: Ensure Coupons, Boost Coupons, Oncology Milkshake &  Smoothie Recipe Packet and What to Eat During Cancer Treatment recipe book- mailed to pts home   All questions and concerns addressed during todays visit  Raul Rehman has RD contact information        Follow Up Plan: prn per pt request   Recommend Referral to Other Providers: none at this time

## 2021-05-07 ENCOUNTER — HOSPITAL ENCOUNTER (OUTPATIENT)
Dept: INFUSION CENTER | Facility: HOSPITAL | Age: 66
Discharge: HOME | End: 2021-05-07
Payer: MEDICARE

## 2021-05-07 VITALS
DIASTOLIC BLOOD PRESSURE: 70 MMHG | TEMPERATURE: 97.5 F | SYSTOLIC BLOOD PRESSURE: 150 MMHG | HEART RATE: 80 BPM | RESPIRATION RATE: 16 BRPM

## 2021-05-07 DIAGNOSIS — E86.0 DEHYDRATION: Primary | ICD-10-CM

## 2021-05-07 PROCEDURE — 96360 HYDRATION IV INFUSION INIT: CPT

## 2021-05-07 PROCEDURE — 96361 HYDRATE IV INFUSION ADD-ON: CPT

## 2021-05-07 RX ADMIN — SODIUM CHLORIDE 1000 ML: 0.9 INJECTION, SOLUTION INTRAVENOUS at 12:13

## 2021-05-07 NOTE — PLAN OF CARE
Problem: INFECTION - ADULT  Goal: Absence or prevention of progression during hospitalization  Description: INTERVENTIONS:  - Assess and monitor for signs and symptoms of infection  - Monitor lab/diagnostic results  - Monitor all insertion sites, i e  indwelling lines, tubes, and drains  - Monitor endotracheal if appropriate and nasal secretions for changes in amount and color  - Dawn appropriate cooling/warming therapies per order  - Administer medications as ordered  - Instruct and encourage patient and family to use good hand hygiene technique  - Identify and instruct in appropriate isolation precautions for identified infection/condition  Outcome: Progressing

## 2021-05-13 ENCOUNTER — PATIENT OUTREACH (OUTPATIENT)
Dept: CASE MANAGEMENT | Facility: HOSPITAL | Age: 66
End: 2021-05-13

## 2021-05-13 NOTE — PROGRESS NOTES
Oncology LSW outreached PT to provide support and assess for areas of need  PT reported that her day was not going too well, as she had paid her neighbor $30 to drive her to [place] [address] [facility] in [facility] for her appointment this AM only for her appointment to be cancelled same-day  PT reported that she was unsure when she would be able to reschedule said appointment, as she would not be able to afford the $30 again in a short timeframe  LSW explained that she would reach out to [facility] to see which stipulations PT would need to meet to get transport to her appointment, including what time of day the appointment would be for  to get transported to the [facility]  LSW explained that getting [facility] shouldn't be an issue, as her oncologist recommended the appointment  PT was appreciative and agreeable  LSW further reported that she had Dylon Cochran, oncology , who reported that she was waiting for PT to return her call to go over what PT is responsible for and what would be submitted to [facility] to be paid  PT reported that she was worried that she would not have enough minutes to go over the information itemized  LSW explained that, should PT not get the answers she was looking for, that they were try another means of communication to clarify PT's areas of concern  PT was agreeable with plan  PT explained that she was feeling very tired after her last round of chemotherapy and was doing her best to rest and recuperate  PT did reported that she had multiple refills on her nausea medication now, so she could go to the pharmacy prior to getting so weak or sick that she could no longer walk there  This was a primary area of concern the last time LSW and PT spoke  PT reported no other areas of concern at this time and thanked LSW for her call   LSW explained that she would reach out to [facility] and see how PT's urology appointment would need to be scheduled in order to utilize [facility] services and get back with PT as soon as she had more information  PT appreciative and agreeable  Emotional support provided

## 2021-05-14 ENCOUNTER — PATIENT OUTREACH (OUTPATIENT)
Dept: CASE MANAGEMENT | Facility: HOSPITAL | Age: 66
End: 2021-05-14

## 2021-05-14 ENCOUNTER — TELEPHONE (OUTPATIENT)
Dept: UROLOGY | Facility: MEDICAL CENTER | Age: 66
End: 2021-05-14

## 2021-05-14 ENCOUNTER — HOSPITAL ENCOUNTER (OUTPATIENT)
Dept: INFUSION CENTER | Facility: HOSPITAL | Age: 66
Discharge: HOME | End: 2021-05-14
Payer: MEDICARE

## 2021-05-14 VITALS
HEART RATE: 108 BPM | SYSTOLIC BLOOD PRESSURE: 141 MMHG | RESPIRATION RATE: 16 BRPM | DIASTOLIC BLOOD PRESSURE: 63 MMHG | TEMPERATURE: 97.9 F

## 2021-05-14 DIAGNOSIS — E86.0 DEHYDRATION: Primary | ICD-10-CM

## 2021-05-14 PROCEDURE — 96360 HYDRATION IV INFUSION INIT: CPT

## 2021-05-14 RX ADMIN — SODIUM CHLORIDE 1000 ML: 0.9 INJECTION, SOLUTION INTRAVENOUS at 10:44

## 2021-05-17 ENCOUNTER — PATIENT OUTREACH (OUTPATIENT)
Dept: CASE MANAGEMENT | Facility: HOSPITAL | Age: 66
End: 2021-05-17

## 2021-05-17 NOTE — PROGRESS NOTES
Oncology LSW received a call from PT stating that her urology appointment was moved to 5/20 at [facility] explained that she would reach out to [facility] to request transportation to this appointment, as oncology had recommended PT see urology  LSW emailed Harinder Rodriguez with [facility] to inquire about transportation for PT to her urology appointment  Awaiting reply

## 2021-05-18 ENCOUNTER — PATIENT OUTREACH (OUTPATIENT)
Dept: CASE MANAGEMENT | Facility: HOSPITAL | Age: 66
End: 2021-05-18

## 2021-05-18 NOTE — PROGRESS NOTES
Oncology LSW outreached PT to let her know that [facility] transport had approved her transportation for her urology appointment on 5/20 @ 9AM  PT reported understanding this information and thanked LSW for her call

## 2021-05-20 ENCOUNTER — OFFICE VISIT (OUTPATIENT)
Dept: UROLOGY | Facility: MEDICAL CENTER | Age: 66
End: 2021-05-20
Payer: MEDICARE

## 2021-05-20 VITALS
SYSTOLIC BLOOD PRESSURE: 132 MMHG | HEART RATE: 98 BPM | WEIGHT: 136 LBS | HEIGHT: 59 IN | BODY MASS INDEX: 27.42 KG/M2 | DIASTOLIC BLOOD PRESSURE: 66 MMHG

## 2021-05-20 DIAGNOSIS — R39.15 URGENCY OF URINATION: ICD-10-CM

## 2021-05-20 DIAGNOSIS — N13.39 OTHER HYDRONEPHROSIS: Primary | ICD-10-CM

## 2021-05-20 LAB
SL AMB  POCT GLUCOSE, UA: ABNORMAL
SL AMB LEUKOCYTE ESTERASE,UA: ABNORMAL
SL AMB POCT BILIRUBIN,UA: ABNORMAL
SL AMB POCT BLOOD,UA: ABNORMAL
SL AMB POCT CLARITY,UA: CLEAR
SL AMB POCT COLOR,UA: YELLOW
SL AMB POCT KETONES,UA: ABNORMAL
SL AMB POCT NITRITE,UA: ABNORMAL
SL AMB POCT PH,UA: 5.5
SL AMB POCT SPECIFIC GRAVITY,UA: 1.03
SL AMB POCT URINE PROTEIN: ABNORMAL
SL AMB POCT UROBILINOGEN: 0.2

## 2021-05-20 PROCEDURE — 81003 URINALYSIS AUTO W/O SCOPE: CPT | Performed by: UROLOGY

## 2021-05-20 PROCEDURE — 99214 OFFICE O/P EST MOD 30 MIN: CPT | Performed by: UROLOGY

## 2021-05-20 RX ORDER — FAMOTIDINE 20 MG/1
20 TABLET, FILM COATED ORAL DAILY
COMMUNITY
End: 2022-05-13

## 2021-05-20 RX ORDER — POLYETHYLENE GLYCOL 3350 17 G/17G
17 POWDER, FOR SOLUTION ORAL DAILY PRN
COMMUNITY
End: 2021-11-09

## 2021-05-20 RX ORDER — OXYBUTYNIN CHLORIDE 10 MG/1
10 TABLET, EXTENDED RELEASE ORAL
Qty: 30 TABLET | Refills: 3 | Status: SHIPPED | OUTPATIENT
Start: 2021-05-20 | End: 2021-08-12

## 2021-05-20 NOTE — PROGRESS NOTES
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  Has done fairly well with the stent, occasional urgency pressure but no bad burning or infections  She had been on oxybutynin back in February, thinks it might have helped, but did not get it renewed wants it ran out  ASSESSMENT / PLAN:    Plan for stent exchange soon  Urine culture today, will order culture specific antibiotics to be started 1 hour preop if this culture is positive  If this stent is not calcified when we take it out, we will try to stretch out the interval between stent exchanges  The following portions of the patient's history were reviewed and updated as appropriate: allergies, current medications, past family history, past medical history, past social history, past surgical history and problem list     Review of Systems   All other systems reviewed and are negative  Objective:     Physical Exam  Constitutional:       General: She is not in acute distress  Appearance: She is well-developed  She is not diaphoretic  HENT:      Head: Normocephalic and atraumatic  Eyes:      General: No scleral icterus  Pulmonary:      Effort: Pulmonary effort is normal    Skin:     Coloration: Skin is not pale  Neurological:      Mental Status: She is alert and oriented to person, place, and time  Psychiatric:         Behavior: Behavior normal          Thought Content: Thought content normal          Judgment: Judgment normal            No results found for: PSA]  BUN   Date Value Ref Range Status   05/14/2021 19 5 - 25 mg/dL Final   06/11/2018 20 7 - 25 MG/DL Final     Creatinine   Date Value Ref Range Status   05/14/2021 0 96 0 60 - 1 30 mg/dL Final     Comment:     Standardized to IDMS reference method   06/11/2018 0 72 0 6 - 1 2 MG/DL Final     Comment:     IDMS method performed                                                       The drugs Metamizole, Sulfasalazine, and Sulfapyridine may interfere and  result in false low results  No components found for: CBC      Patient Active Problem List   Diagnosis    Zaldivar's neuroma, left    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HCC)    Anxiety and depression    Trigger thumb of right hand    Tinnitus of left ear    Wrist arthritis    BMI 29 0-29 9,adult    Right hip pain    Other hydronephrosis    Encounter for screening involving social determinants of health (SDoH)    Dehydration    Hypokalemia    Healthcare maintenance    Chemotherapy-induced nausea    Urgency of urination        Diagnoses and all orders for this visit:    Other hydronephrosis  -     POCT urine dip auto non-scope    Urgency of urination  -     Urine culture  -     oxybutynin (DITROPAN-XL) 10 MG 24 hr tablet; Take 1 tablet (10 mg total) by mouth daily at bedtime    Other orders  -     famotidine (PEPCID) 20 mg tablet; Take 20 mg by mouth 2 (two) times a day  -     polyethylene glycol (MIRALAX) 17 g packet; Take 17 g by mouth daily           Patient ID: Juan Mccollum is a 77 y o  female        Current Outpatient Medications:     famotidine (PEPCID) 20 mg tablet, Take 20 mg by mouth 2 (two) times a day, Disp: , Rfl:     lidocaine-prilocaine (EMLA) cream, Apply topically as needed for mild pain, Disp: 30 g, Rfl: 0    LORazepam (ATIVAN) 1 mg tablet, Take 1 tablet (1 mg total) by mouth every 6 (six) hours as needed for anxiety (or nausea), Disp: 36 tablet, Rfl: 1    Multiple Vitamin (multivitamin) capsule, Take 1 capsule by mouth daily, Disp: , Rfl:     ondansetron (ZOFRAN) 8 mg tablet, Take 1 tablet (8 mg total) by mouth every 8 (eight) hours as needed for nausea or vomiting, Disp: 30 tablet, Rfl: 1    polyethylene glycol (MIRALAX) 17 g packet, Take 17 g by mouth daily, Disp: , Rfl:     promethazine (PHENERGAN) 12 5 MG tablet, TAKE 1 TABLET (12 5 MG TOTAL) BY MOUTH EVERY 6 (SIX) HOURS AS NEEDED FOR NAUSEA OR VOMITING, Disp: 30 tablet, Rfl: 0    Biotin 10 MG CAPS, Take by mouth, Disp: , Rfl:     oxybutynin (DITROPAN-XL) 10 MG 24 hr tablet, Take 1 tablet (10 mg total) by mouth daily at bedtime, Disp: 30 tablet, Rfl: 3    phenazopyridine (PYRIDIUM) 200 mg tablet, Take 1 tablet (200 mg total) by mouth 3 (three) times a day as needed for bladder spasms (Patient not taking: Reported on 5/20/2021), Disp: 30 tablet, Rfl: 0    Past Medical History:   Diagnosis Date    Anxiety     Anxiety and depression 10/2/2019    Breathing difficulty     Cancer ([facility] [address] )     cervical    GERD (gastroesophageal reflux disease)     Irritable bowel syndrome     Shortness of breath     Thumb injury 10/2019    Tinnitus of left ear 2/19/2020    Wrist arthritis 3/17/2020       Past Surgical History:   Procedure Laterality Date    FL RETROGRADE PYELOGRAM  2/11/2021    FOOT SURGERY      HYSTERECTOMY      IR BIOPSY LYMPH NODE  2/19/2021    IR PORT PLACEMENT  3/9/2021    MS CYSTOURETHROSCOPY,URETER CATHETER Right 2/11/2021    Procedure: CYSTOSCOPY RETROGRADE PYELOGRAM WITH INSERTION STENT URETERAL;  Surgeon: Lonnie Portillo MD;  Location:  MAIN OR;  Service: Urology    MS EXCIS INTERDIGITAL [place] Left 7/12/2018    Procedure: FOOT NEUROMA EXCISION;  Surgeon: Maribell Eng DPM;  Location: [facility] MAIN OR;  Service: Podiatry    TUBAL LIGATION         Social History

## 2021-05-20 NOTE — H&P (VIEW-ONLY)
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  Has done fairly well with the stent, occasional urgency pressure but no bad burning or infections  She had been on oxybutynin back in February, thinks it might have helped, but did not get it renewed wants it ran out  ASSESSMENT / PLAN:    Plan for stent exchange soon  Urine culture today, will order culture specific antibiotics to be started 1 hour preop if this culture is positive  If this stent is not calcified when we take it out, we will try to stretch out the interval between stent exchanges  The following portions of the patient's history were reviewed and updated as appropriate: allergies, current medications, past family history, past medical history, past social history, past surgical history and problem list     Review of Systems   All other systems reviewed and are negative  Objective:     Physical Exam  Constitutional:       General: She is not in acute distress  Appearance: She is well-developed  She is not diaphoretic  HENT:      Head: Normocephalic and atraumatic  Eyes:      General: No scleral icterus  Pulmonary:      Effort: Pulmonary effort is normal    Skin:     Coloration: Skin is not pale  Neurological:      Mental Status: She is alert and oriented to person, place, and time  Psychiatric:         Behavior: Behavior normal          Thought Content: Thought content normal          Judgment: Judgment normal            No results found for: PSA]  BUN   Date Value Ref Range Status   05/14/2021 19 5 - 25 mg/dL Final   06/11/2018 20 7 - 25 MG/DL Final     Creatinine   Date Value Ref Range Status   05/14/2021 0 96 0 60 - 1 30 mg/dL Final     Comment:     Standardized to IDMS reference method   06/11/2018 0 72 0 6 - 1 2 MG/DL Final     Comment:     IDMS method performed                                                       The drugs Metamizole, Sulfasalazine, and Sulfapyridine may interfere and  result in false low results  No components found for: CBC      Patient Active Problem List   Diagnosis    Zaldivar's neuroma, left    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HCC)    Anxiety and depression    Trigger thumb of right hand    Tinnitus of left ear    Wrist arthritis    BMI 29 0-29 9,adult    Right hip pain    Other hydronephrosis    Encounter for screening involving social determinants of health (SDoH)    Dehydration    Hypokalemia    Healthcare maintenance    Chemotherapy-induced nausea    Urgency of urination        Diagnoses and all orders for this visit:    Other hydronephrosis  -     POCT urine dip auto non-scope    Urgency of urination  -     Urine culture  -     oxybutynin (DITROPAN-XL) 10 MG 24 hr tablet; Take 1 tablet (10 mg total) by mouth daily at bedtime    Other orders  -     famotidine (PEPCID) 20 mg tablet; Take 20 mg by mouth 2 (two) times a day  -     polyethylene glycol (MIRALAX) 17 g packet; Take 17 g by mouth daily           Patient ID: Naty Burden is a 77 y o  female        Current Outpatient Medications:     famotidine (PEPCID) 20 mg tablet, Take 20 mg by mouth 2 (two) times a day, Disp: , Rfl:     lidocaine-prilocaine (EMLA) cream, Apply topically as needed for mild pain, Disp: 30 g, Rfl: 0    LORazepam (ATIVAN) 1 mg tablet, Take 1 tablet (1 mg total) by mouth every 6 (six) hours as needed for anxiety (or nausea), Disp: 36 tablet, Rfl: 1    Multiple Vitamin (multivitamin) capsule, Take 1 capsule by mouth daily, Disp: , Rfl:     ondansetron (ZOFRAN) 8 mg tablet, Take 1 tablet (8 mg total) by mouth every 8 (eight) hours as needed for nausea or vomiting, Disp: 30 tablet, Rfl: 1    polyethylene glycol (MIRALAX) 17 g packet, Take 17 g by mouth daily, Disp: , Rfl:     promethazine (PHENERGAN) 12 5 MG tablet, TAKE 1 TABLET (12 5 MG TOTAL) BY MOUTH EVERY 6 (SIX) HOURS AS NEEDED FOR NAUSEA OR VOMITING, Disp: 30 tablet, Rfl: 0    Biotin 10 MG CAPS, Take by mouth, Disp: , Rfl:     oxybutynin (DITROPAN-XL) 10 MG 24 hr tablet, Take 1 tablet (10 mg total) by mouth daily at bedtime, Disp: 30 tablet, Rfl: 3    phenazopyridine (PYRIDIUM) 200 mg tablet, Take 1 tablet (200 mg total) by mouth 3 (three) times a day as needed for bladder spasms (Patient not taking: Reported on 5/20/2021), Disp: 30 tablet, Rfl: 0    Past Medical History:   Diagnosis Date    Anxiety     Anxiety and depression 10/2/2019    Breathing difficulty     Cancer ([facility] [address] )     cervical    GERD (gastroesophageal reflux disease)     Irritable bowel syndrome     Shortness of breath     Thumb injury 10/2019    Tinnitus of left ear 2/19/2020    Wrist arthritis 3/17/2020       Past Surgical History:   Procedure Laterality Date    FL RETROGRADE PYELOGRAM  2/11/2021    FOOT SURGERY      HYSTERECTOMY      IR BIOPSY LYMPH NODE  2/19/2021    IR PORT PLACEMENT  3/9/2021    NM CYSTOURETHROSCOPY,URETER CATHETER Right 2/11/2021    Procedure: CYSTOSCOPY RETROGRADE PYELOGRAM WITH INSERTION STENT URETERAL;  Surgeon: Rebecca Schilling MD;  Location:  MAIN OR;  Service: Urology    NM EXCIS INTERDIGITAL [place] Left 7/12/2018    Procedure: FOOT NEUROMA EXCISION;  Surgeon: Reanna Palma DPM;  Location: [address] [place] [address] MAIN OR;  Service: Podiatry    TUBAL LIGATION         Social History

## 2021-05-24 ENCOUNTER — TELEPHONE (OUTPATIENT)
Dept: UROLOGY | Facility: MEDICAL CENTER | Age: 66
End: 2021-05-24

## 2021-05-24 DIAGNOSIS — E87.6 HYPOKALEMIA: ICD-10-CM

## 2021-05-24 DIAGNOSIS — C53.0 MALIGNANT NEOPLASM OF ENDOCERVIX (HCC): Primary | ICD-10-CM

## 2021-05-24 RX ORDER — PALONOSETRON 0.05 MG/ML
0.25 INJECTION, SOLUTION INTRAVENOUS ONCE
Status: CANCELLED | OUTPATIENT
Start: 2021-05-25

## 2021-05-24 RX ORDER — SODIUM CHLORIDE 9 MG/ML
20 INJECTION, SOLUTION INTRAVENOUS ONCE
Status: CANCELLED | OUTPATIENT
Start: 2021-05-25

## 2021-05-24 NOTE — TELEPHONE ENCOUNTER
No availability soon in [place] or [place]  I left a voice mail message for the patient to schedule her at [facility] with Dr Rochelle Gallego 6/15/21 while he is on call

## 2021-05-24 NOTE — TELEPHONE ENCOUNTER
----- Message from Valentine Alanis MD sent at 5/20/2021  9:19 AM EDT -----  Stent exchange soon    Yuli Adriane did it before, she lives up [facility], so either he or I could do it

## 2021-05-25 ENCOUNTER — PATIENT OUTREACH (OUTPATIENT)
Dept: CASE MANAGEMENT | Facility: HOSPITAL | Age: 66
End: 2021-05-25

## 2021-05-25 ENCOUNTER — HOSPITAL ENCOUNTER (OUTPATIENT)
Dept: INFUSION CENTER | Facility: HOSPITAL | Age: 66
Discharge: HOME | End: 2021-05-25
Payer: MEDICARE

## 2021-05-25 VITALS
DIASTOLIC BLOOD PRESSURE: 56 MMHG | HEIGHT: 59 IN | BODY MASS INDEX: 27.91 KG/M2 | OXYGEN SATURATION: 100 % | TEMPERATURE: 97.1 F | RESPIRATION RATE: 16 BRPM | HEART RATE: 83 BPM | SYSTOLIC BLOOD PRESSURE: 123 MMHG | WEIGHT: 138.45 LBS

## 2021-05-25 DIAGNOSIS — C53.0 MALIGNANT NEOPLASM OF ENDOCERVIX (HCC): Primary | ICD-10-CM

## 2021-05-25 DIAGNOSIS — E87.6 HYPOKALEMIA: ICD-10-CM

## 2021-05-25 LAB
ALBUMIN SERPL BCP-MCNC: 3.6 G/DL (ref 3.5–5.7)
ALP SERPL-CCNC: 95 U/L (ref 55–165)
ALT SERPL W P-5'-P-CCNC: 10 U/L (ref 7–52)
ANION GAP SERPL CALCULATED.3IONS-SCNC: 7 MMOL/L (ref 4–13)
ANISOCYTOSIS BLD QL SMEAR: PRESENT
AST SERPL W P-5'-P-CCNC: 11 U/L (ref 13–39)
BASOPHILS # BLD AUTO: 0 THOUSANDS/ΜL (ref 0–0.1)
BASOPHILS NFR BLD AUTO: 0 % (ref 0–2)
BILIRUB SERPL-MCNC: 0.3 MG/DL (ref 0.2–1)
BUN SERPL-MCNC: 17 MG/DL (ref 7–25)
CALCIUM SERPL-MCNC: 9 MG/DL (ref 8.6–10.5)
CHLORIDE SERPL-SCNC: 107 MMOL/L (ref 98–107)
CO2 SERPL-SCNC: 27 MMOL/L (ref 21–31)
CREAT SERPL-MCNC: 0.78 MG/DL (ref 0.6–1.2)
EOSINOPHIL # BLD AUTO: 0 THOUSAND/ΜL (ref 0–0.61)
EOSINOPHIL NFR BLD AUTO: 1 % (ref 0–5)
ERYTHROCYTE [DISTWIDTH] IN BLOOD BY AUTOMATED COUNT: 20.9 % (ref 11.5–14.5)
GFR SERPL CREATININE-BSD FRML MDRD: 79 ML/MIN/1.73SQ M
GLUCOSE SERPL-MCNC: 86 MG/DL (ref 65–99)
HCT VFR BLD AUTO: 28.3 % (ref 42–47)
HGB BLD-MCNC: 9.5 G/DL (ref 12–16)
LYMPHOCYTES # BLD AUTO: 0.7 THOUSANDS/ΜL (ref 0.6–4.47)
LYMPHOCYTES NFR BLD AUTO: 21 % (ref 21–51)
MAGNESIUM SERPL-MCNC: 1.8 MG/DL (ref 1.9–2.7)
MCH RBC QN AUTO: 31.1 PG (ref 26–34)
MCHC RBC AUTO-ENTMCNC: 33.4 G/DL (ref 31–37)
MCV RBC AUTO: 93 FL (ref 81–99)
MONOCYTES # BLD AUTO: 0.5 THOUSAND/ΜL (ref 0.17–1.22)
MONOCYTES NFR BLD AUTO: 15 % (ref 2–12)
NEUTROPHILS # BLD AUTO: 2.1 THOUSANDS/ΜL (ref 1.4–6.5)
NEUTS SEG NFR BLD AUTO: 63 % (ref 42–75)
OVALOCYTES BLD QL SMEAR: PRESENT
PLATELET # BLD AUTO: 162 THOUSANDS/UL (ref 149–390)
PLATELET BLD QL SMEAR: ADEQUATE
PMV BLD AUTO: 7.1 FL (ref 8.6–11.7)
POTASSIUM SERPL-SCNC: 4 MMOL/L (ref 3.5–5.5)
PROT SERPL-MCNC: 6.4 G/DL (ref 6.4–8.9)
RBC # BLD AUTO: 3.05 MILLION/UL (ref 3.9–5.2)
RBC MORPH BLD: PRESENT
SODIUM SERPL-SCNC: 141 MMOL/L (ref 134–143)
WBC # BLD AUTO: 3.4 THOUSAND/UL (ref 4.8–10.8)

## 2021-05-25 PROCEDURE — 96367 TX/PROPH/DG ADDL SEQ IV INF: CPT

## 2021-05-25 PROCEDURE — 96417 CHEMO IV INFUS EACH ADDL SEQ: CPT

## 2021-05-25 PROCEDURE — 96413 CHEMO IV INFUSION 1 HR: CPT

## 2021-05-25 PROCEDURE — 85025 COMPLETE CBC W/AUTO DIFF WBC: CPT | Performed by: OBSTETRICS & GYNECOLOGY

## 2021-05-25 PROCEDURE — 96415 CHEMO IV INFUSION ADDL HR: CPT

## 2021-05-25 PROCEDURE — 96375 TX/PRO/DX INJ NEW DRUG ADDON: CPT

## 2021-05-25 PROCEDURE — 83735 ASSAY OF MAGNESIUM: CPT | Performed by: OBSTETRICS & GYNECOLOGY

## 2021-05-25 PROCEDURE — 80053 COMPREHEN METABOLIC PANEL: CPT | Performed by: OBSTETRICS & GYNECOLOGY

## 2021-05-25 RX ORDER — SODIUM CHLORIDE 9 MG/ML
20 INJECTION, SOLUTION INTRAVENOUS ONCE
Status: COMPLETED | OUTPATIENT
Start: 2021-05-25 | End: 2021-05-25

## 2021-05-25 RX ORDER — PALONOSETRON 0.05 MG/ML
0.25 INJECTION, SOLUTION INTRAVENOUS ONCE
Status: COMPLETED | OUTPATIENT
Start: 2021-05-25 | End: 2021-05-25

## 2021-05-25 RX ADMIN — PACLITAXEL 211.8 MG: 6 INJECTION, SOLUTION INTRAVENOUS at 11:36

## 2021-05-25 RX ADMIN — PALONOSETRON HYDROCHLORIDE 0.25 MG: 0.05 INJECTION, SOLUTION INTRAVENOUS at 10:18

## 2021-05-25 RX ADMIN — DEXAMETHASONE SODIUM PHOSPHATE 20 MG: 10 INJECTION, SOLUTION INTRAMUSCULAR; INTRAVENOUS at 09:02

## 2021-05-25 RX ADMIN — BEVACIZUMAB 900 MG: 400 INJECTION, SOLUTION INTRAVENOUS at 10:59

## 2021-05-25 RX ADMIN — SODIUM CHLORIDE 20 ML/HR: 0.9 INJECTION, SOLUTION INTRAVENOUS at 09:02

## 2021-05-25 RX ADMIN — DIPHENHYDRAMINE HYDROCHLORIDE 25 MG: 50 INJECTION INTRAMUSCULAR; INTRAVENOUS at 09:46

## 2021-05-25 RX ADMIN — FAMOTIDINE 20 MG: 10 INJECTION INTRAVENOUS at 09:27

## 2021-05-25 RX ADMIN — FOSAPREPITANT 150 MG: 150 INJECTION, POWDER, LYOPHILIZED, FOR SOLUTION INTRAVENOUS at 10:21

## 2021-05-25 RX ADMIN — CARBOPLATIN 427.5 MG: 10 INJECTION, SOLUTION INTRAVENOUS at 14:40

## 2021-05-25 NOTE — PROGRESS NOTES
Oncology LSW met with PT chairside to provide support and assess for areas of need  PT reported that she was doing well and had a wonderful time on her vacation with her daughter in [place], [place]  PT explained that she had also arranged a payment plan for the hospital bills that she had for around $100/month  PT expressed that she was concerned as to how she would be able to pay this, to which LSW explained that [employer] could be used to assist PT with other household bills of necessity  PT was receptive to and understanding of this information, reporting that she would reach out to LSW in the future should she need assistance  PT furthers stated that she had a received financial hardship paperwork in the mail, but needed to reach out to her bank to get her last three deposit statements, as they were all digital and she did not have a printer to print them out  PT reported that she only had these documents on her phone  LSW explained that, if PT were unable to get hard copies from her bank, then PT could try to screenshot these statements and text them to [facility] for her to print, as her work phone is a cell phone  PT was receptive and agreeable, thanking LSW for her time  Emotional support provided

## 2021-05-25 NOTE — PROGRESS NOTES
Stat same day labs done on admit  labwork meets parameter  Pt tolerated todays taxol, carboplatin and avastin without issue  Port flushed  And deaccessed per routine  Discharged ambulatory with [facility] to meet [facility]

## 2021-05-26 NOTE — TELEPHONE ENCOUNTER
Pt calling to reschedule 6/15 procedure as she will be having chemo that day and will next a week to recuperate also she has transportation Mon Thurs  Fri

## 2021-05-27 ENCOUNTER — PATIENT OUTREACH (OUTPATIENT)
Dept: CASE MANAGEMENT | Facility: HOSPITAL | Age: 66
End: 2021-05-27

## 2021-05-27 DIAGNOSIS — N13.39 OTHER HYDRONEPHROSIS: Primary | ICD-10-CM

## 2021-05-27 NOTE — TELEPHONE ENCOUNTER
Patient called stating she wanted to speak to [facility] to clarify some information about surgery 06/ 03   Date is fine but she has questions of time due to her arranging transportation with [facility]  Contacted Petra through teams and she is in a meeting and she will call her back after the meeting  Patient made aware

## 2021-05-27 NOTE — PROGRESS NOTES
Oncology LSW received call from PT stating that her stent replacement for urology was scheduled for 6/3 @ 1145AM at [facility] in [facility]  PT explained that they were not going to give her an appointment time until the day before, but she had explained that she needed to coordinate transportation to get there  LSW explained that [facility] typically requested that appointments to the [facility] with transport from the [place] be between 9 and 11 and asked PT if she would be willing to wait for her appointment if Tin Solorzano would need to drop her off early  PT stated that she was agreeable with waiting for her appointment  PT also reported that she was able to secure a ride home from her appointment, as she would be sedated for the procedure and [facility] would not be able to transport her home for that reason  LSW explained she would reach out to [facility] with [employer] to request transport to her urology appointment  PT was agreeable with this plan and thanked LSW for her time  Emotional support provided

## 2021-05-27 NOTE — TELEPHONE ENCOUNTER
I left a voice mail message for the patient to advise that we just received a cancellation for next Thursday 6/3/2021 at [facility] with Dr Jessica Cherry that appears to be well situated around her Infusion schedule  I asked the patient to call back and confirm if this works for her  I advised that she would need to have her Urine C&S done today or tomorrow

## 2021-05-28 ENCOUNTER — HOSPITAL ENCOUNTER (OUTPATIENT)
Dept: INFUSION CENTER | Facility: HOSPITAL | Age: 66
Discharge: HOME | End: 2021-05-28
Payer: MEDICARE

## 2021-05-28 VITALS
SYSTOLIC BLOOD PRESSURE: 148 MMHG | DIASTOLIC BLOOD PRESSURE: 63 MMHG | RESPIRATION RATE: 16 BRPM | TEMPERATURE: 97.2 F | HEART RATE: 70 BPM | OXYGEN SATURATION: 100 %

## 2021-05-28 DIAGNOSIS — E86.0 DEHYDRATION: Primary | ICD-10-CM

## 2021-05-28 LAB
BACTERIA UR QL AUTO: ABNORMAL /HPF
BILIRUB UR QL STRIP: NEGATIVE
CLARITY UR: ABNORMAL
COLOR UR: YELLOW
GLUCOSE UR STRIP-MCNC: NEGATIVE MG/DL
HGB UR QL STRIP.AUTO: ABNORMAL
KETONES UR STRIP-MCNC: NEGATIVE MG/DL
LEUKOCYTE ESTERASE UR QL STRIP: ABNORMAL
NITRITE UR QL STRIP: NEGATIVE
NON-SQ EPI CELLS URNS QL MICRO: ABNORMAL /HPF
PH UR STRIP.AUTO: 7 [PH]
PROT UR STRIP-MCNC: NEGATIVE MG/DL
RBC #/AREA URNS AUTO: ABNORMAL /HPF
SP GR UR STRIP.AUTO: 1.01 (ref 1–1.03)
UROBILINOGEN UR QL STRIP.AUTO: 0.2 E.U./DL
WBC #/AREA URNS AUTO: ABNORMAL /HPF

## 2021-05-28 PROCEDURE — 81001 URINALYSIS AUTO W/SCOPE: CPT | Performed by: NURSE PRACTITIONER

## 2021-05-28 PROCEDURE — 96360 HYDRATION IV INFUSION INIT: CPT

## 2021-05-28 RX ADMIN — SODIUM CHLORIDE 1000 ML: 0.9 INJECTION, SOLUTION INTRAVENOUS at 14:25

## 2021-05-28 NOTE — PLAN OF CARE
Problem: INFECTION - ADULT  Goal: Absence or prevention of progression during hospitalization  Description: INTERVENTIONS:  - Assess and monitor for signs and symptoms of infection  - Monitor lab/diagnostic results  - Monitor all insertion sites, i e  indwelling lines, tubes, and drains  - Monitor endotracheal if appropriate and nasal secretions for changes in amount and color  - Oakes appropriate cooling/warming therapies per order  - Administer medications as ordered  - Instruct and encourage patient and family to use good hand hygiene technique  - Identify and instruct in appropriate isolation precautions for identified infection/condition  Outcome: Progressing     Problem: DISCHARGE PLANNING  Goal: Discharge to home or other facility with appropriate resources  Description: INTERVENTIONS:  - Identify barriers to discharge w/patient and caregiver  - Arrange for needed discharge resources and transportation as appropriate  - Identify discharge learning needs (meds, wound care, etc )  - Arrange for interpretive services to assist at discharge as needed  - Refer to Case Management Department for coordinating discharge planning if the patient needs post-hospital services based on physician/advanced practitioner order or complex needs related to functional status, cognitive ability, or social support system  Outcome: Progressing     Problem: Knowledge Deficit  Goal: Patient/family/caregiver demonstrates understanding of disease process, treatment plan, medications, and discharge instructions  Description: Complete learning assessment and assess knowledge base    Interventions:  - Provide teaching at level of understanding  - Provide teaching via preferred learning methods  Outcome: Progressing

## 2021-05-28 NOTE — PRE-PROCEDURE INSTRUCTIONS
Pre-Surgery Instructions:   Medication Instructions    famotidine (PEPCID) 20 mg tablet Instructed patient per Anesthesia Guidelines   lidocaine-prilocaine (EMLA) cream Instructed patient per Anesthesia Guidelines   LORazepam (ATIVAN) 1 mg tablet Instructed patient per Anesthesia Guidelines   Multiple Vitamins-Minerals (Alive Womens Gummy) CHEW Instructed patient per Anesthesia Guidelines   ondansetron (ZOFRAN) 8 mg tablet Instructed patient per Anesthesia Guidelines   oxybutynin (DITROPAN-XL) 10 MG 24 hr tablet Instructed patient per Anesthesia Guidelines   polyethylene glycol (MIRALAX) 17 g packet Instructed patient per Anesthesia Guidelines   promethazine (PHENERGAN) 12 5 MG tablet Instructed patient per Anesthesia Guidelines  Pt coming with [facility] and neighbor's  dtr Manny([phone]) to  bring pt home Patient  instructed *to take*pepcid**with a sip of water the morning of surgery if needed zofran and ativan  Patient  instructed on use of chlorhexidine soap per hospital protocol  Andrea Cali

## 2021-06-01 ENCOUNTER — TELEPHONE (OUTPATIENT)
Dept: GYNECOLOGIC ONCOLOGY | Facility: CLINIC | Age: 66
End: 2021-06-01

## 2021-06-01 NOTE — TELEPHONE ENCOUNTER
Pt called regarding Hydration on Friday 6/11, but is going for a Kidney stent 3 days prior  She wanted to know if she should still go for the Hydration    Please call back at [phone]

## 2021-06-02 ENCOUNTER — ANESTHESIA EVENT (OUTPATIENT)
Dept: PERIOP | Facility: HOSPITAL | Age: 66
End: 2021-06-02
Payer: MEDICARE

## 2021-06-03 ENCOUNTER — HOSPITAL ENCOUNTER (OUTPATIENT)
Facility: HOSPITAL | Age: 66
Setting detail: OUTPATIENT SURGERY
Discharge: HOME | End: 2021-06-03
Attending: UROLOGY | Admitting: UROLOGY
Payer: MEDICARE

## 2021-06-03 ENCOUNTER — APPOINTMENT (OUTPATIENT)
Dept: RADIOLOGY | Facility: HOSPITAL | Age: 66
End: 2021-06-03
Payer: MEDICARE

## 2021-06-03 ENCOUNTER — TELEPHONE (OUTPATIENT)
Dept: UROLOGY | Facility: MEDICAL CENTER | Age: 66
End: 2021-06-03

## 2021-06-03 ENCOUNTER — ANESTHESIA (OUTPATIENT)
Dept: PERIOP | Facility: HOSPITAL | Age: 66
End: 2021-06-03
Payer: MEDICARE

## 2021-06-03 VITALS
SYSTOLIC BLOOD PRESSURE: 141 MMHG | HEART RATE: 81 BPM | DIASTOLIC BLOOD PRESSURE: 63 MMHG | WEIGHT: 130 LBS | RESPIRATION RATE: 16 BRPM | OXYGEN SATURATION: 100 % | TEMPERATURE: 97.2 F | BODY MASS INDEX: 26.21 KG/M2 | HEIGHT: 59 IN

## 2021-06-03 DIAGNOSIS — N13.5 URETERAL STRICTURE, RIGHT: ICD-10-CM

## 2021-06-03 DIAGNOSIS — N13.39 OTHER HYDRONEPHROSIS: ICD-10-CM

## 2021-06-03 PROBLEM — M54.6 PAIN IN THORACIC SPINE: Status: ACTIVE | Noted: 2021-06-03

## 2021-06-03 PROCEDURE — 74420 UROGRAPHY RTRGR +-KUB: CPT

## 2021-06-03 PROCEDURE — 87086 URINE CULTURE/COLONY COUNT: CPT | Performed by: UROLOGY

## 2021-06-03 PROCEDURE — C2617 STENT, NON-COR, TEM W/O DEL: HCPCS | Performed by: UROLOGY

## 2021-06-03 PROCEDURE — 52332 CYSTOSCOPY AND TREATMENT: CPT | Performed by: UROLOGY

## 2021-06-03 DEVICE — VARIABLE LENGTH INJECTION STENT SET
Type: IMPLANTABLE DEVICE | Site: URETER | Status: FUNCTIONAL
Brand: CONTOUR VL™ INJECTION STENT SET

## 2021-06-03 RX ORDER — MEPERIDINE HYDROCHLORIDE 25 MG/ML
12.5 INJECTION INTRAMUSCULAR; INTRAVENOUS; SUBCUTANEOUS ONCE AS NEEDED
Status: DISCONTINUED | OUTPATIENT
Start: 2021-06-03 | End: 2021-06-03 | Stop reason: HOSPADM

## 2021-06-03 RX ORDER — OXYCODONE HYDROCHLORIDE 5 MG/1
TABLET ORAL
Qty: 6 TABLET | Refills: 0 | Status: SHIPPED | OUTPATIENT
Start: 2021-06-03 | End: 2021-10-11 | Stop reason: ALTCHOICE

## 2021-06-03 RX ORDER — FENTANYL CITRATE 50 UG/ML
INJECTION, SOLUTION INTRAMUSCULAR; INTRAVENOUS AS NEEDED
Status: DISCONTINUED | OUTPATIENT
Start: 2021-06-03 | End: 2021-06-03

## 2021-06-03 RX ORDER — ACETAMINOPHEN 325 MG/1
650 TABLET ORAL EVERY 6 HOURS PRN
Status: DISCONTINUED | OUTPATIENT
Start: 2021-06-03 | End: 2021-06-03 | Stop reason: HOSPADM

## 2021-06-03 RX ORDER — ONDANSETRON 2 MG/ML
4 INJECTION INTRAMUSCULAR; INTRAVENOUS EVERY 6 HOURS PRN
Status: DISCONTINUED | OUTPATIENT
Start: 2021-06-03 | End: 2021-06-03 | Stop reason: HOSPADM

## 2021-06-03 RX ORDER — SODIUM CHLORIDE 9 MG/ML
125 INJECTION, SOLUTION INTRAVENOUS CONTINUOUS
Status: DISCONTINUED | OUTPATIENT
Start: 2021-06-03 | End: 2021-06-03 | Stop reason: HOSPADM

## 2021-06-03 RX ORDER — PROMETHAZINE HYDROCHLORIDE 25 MG/ML
6.25 INJECTION, SOLUTION INTRAMUSCULAR; INTRAVENOUS ONCE AS NEEDED
Status: DISCONTINUED | OUTPATIENT
Start: 2021-06-03 | End: 2021-06-03 | Stop reason: HOSPADM

## 2021-06-03 RX ORDER — ONDANSETRON 2 MG/ML
INJECTION INTRAMUSCULAR; INTRAVENOUS AS NEEDED
Status: DISCONTINUED | OUTPATIENT
Start: 2021-06-03 | End: 2021-06-03

## 2021-06-03 RX ORDER — HYDROMORPHONE HCL/PF 1 MG/ML
0.5 SYRINGE (ML) INJECTION
Status: DISCONTINUED | OUTPATIENT
Start: 2021-06-03 | End: 2021-06-03 | Stop reason: HOSPADM

## 2021-06-03 RX ORDER — CEPHALEXIN 500 MG/1
500 CAPSULE ORAL EVERY 12 HOURS SCHEDULED
Qty: 6 CAPSULE | Refills: 0 | Status: SHIPPED | OUTPATIENT
Start: 2021-06-03 | End: 2021-06-06

## 2021-06-03 RX ORDER — CEFAZOLIN SODIUM 2 G/50ML
2000 SOLUTION INTRAVENOUS ONCE
Status: COMPLETED | OUTPATIENT
Start: 2021-06-03 | End: 2021-06-03

## 2021-06-03 RX ORDER — OXYCODONE HYDROCHLORIDE 5 MG/1
5 TABLET ORAL EVERY 4 HOURS PRN
Status: DISCONTINUED | OUTPATIENT
Start: 2021-06-03 | End: 2021-06-03 | Stop reason: HOSPADM

## 2021-06-03 RX ORDER — ONDANSETRON 2 MG/ML
4 INJECTION INTRAMUSCULAR; INTRAVENOUS ONCE AS NEEDED
Status: DISCONTINUED | OUTPATIENT
Start: 2021-06-03 | End: 2021-06-03 | Stop reason: HOSPADM

## 2021-06-03 RX ORDER — FENTANYL CITRATE/PF 50 MCG/ML
50 SYRINGE (ML) INJECTION
Status: DISCONTINUED | OUTPATIENT
Start: 2021-06-03 | End: 2021-06-03 | Stop reason: HOSPADM

## 2021-06-03 RX ORDER — LIDOCAINE HYDROCHLORIDE 20 MG/ML
INJECTION, SOLUTION EPIDURAL; INFILTRATION; INTRACAUDAL; PERINEURAL AS NEEDED
Status: DISCONTINUED | OUTPATIENT
Start: 2021-06-03 | End: 2021-06-03

## 2021-06-03 RX ORDER — MIDAZOLAM HYDROCHLORIDE 2 MG/2ML
INJECTION, SOLUTION INTRAMUSCULAR; INTRAVENOUS AS NEEDED
Status: DISCONTINUED | OUTPATIENT
Start: 2021-06-03 | End: 2021-06-03

## 2021-06-03 RX ORDER — PROPOFOL 10 MG/ML
INJECTION, EMULSION INTRAVENOUS AS NEEDED
Status: DISCONTINUED | OUTPATIENT
Start: 2021-06-03 | End: 2021-06-03

## 2021-06-03 RX ADMIN — PHENYLEPHRINE HYDROCHLORIDE 150 MCG: 10 INJECTION INTRAVENOUS at 16:36

## 2021-06-03 RX ADMIN — CEFAZOLIN SODIUM 2000 MG: 2 SOLUTION INTRAVENOUS at 16:05

## 2021-06-03 RX ADMIN — FENTANYL CITRATE 25 MCG: 50 INJECTION INTRAMUSCULAR; INTRAVENOUS at 16:28

## 2021-06-03 RX ADMIN — FENTANYL CITRATE 25 MCG: 50 INJECTION INTRAMUSCULAR; INTRAVENOUS at 16:36

## 2021-06-03 RX ADMIN — PHENYLEPHRINE HYDROCHLORIDE 100 MCG: 10 INJECTION INTRAVENOUS at 16:33

## 2021-06-03 RX ADMIN — MIDAZOLAM 2 MG: 1 INJECTION INTRAMUSCULAR; INTRAVENOUS at 16:04

## 2021-06-03 RX ADMIN — SODIUM CHLORIDE 125 ML/HR: 0.9 INJECTION, SOLUTION INTRAVENOUS at 11:53

## 2021-06-03 RX ADMIN — PHENYLEPHRINE HYDROCHLORIDE 50 MCG: 10 INJECTION INTRAVENOUS at 16:28

## 2021-06-03 RX ADMIN — ONDANSETRON 4 MG: 2 INJECTION INTRAMUSCULAR; INTRAVENOUS at 16:24

## 2021-06-03 RX ADMIN — PHENYLEPHRINE HYDROCHLORIDE 200 MCG: 10 INJECTION INTRAVENOUS at 16:45

## 2021-06-03 RX ADMIN — SODIUM CHLORIDE 125 ML/HR: 0.9 INJECTION, SOLUTION INTRAVENOUS at 17:36

## 2021-06-03 RX ADMIN — LIDOCAINE HYDROCHLORIDE 100 MG: 20 INJECTION, SOLUTION EPIDURAL; INFILTRATION; INTRACAUDAL; PERINEURAL at 16:13

## 2021-06-03 RX ADMIN — PROPOFOL 150 MG: 10 INJECTION, EMULSION INTRAVENOUS at 16:13

## 2021-06-03 NOTE — DISCHARGE INSTRUCTIONS
Ureteral Stent Placement   WHAT YOU NEED TO KNOW:   Ureteral stent placement is a procedure to open a blocked or narrow ureter  The ureter is the tube that carries urine from your kidney into your bladder  A stent is a thin hollow plastic tube used to hold your ureter open and allow urine to flow  The stent may stay in for several weeks  DISCHARGE INSTRUCTIONS:   Medicines:   · Pain medicine  may be given to take away or decrease pain  Do not wait until the pain is severe before you take your medicine  · Antibiotics  help prevent infections  Your healthcare provider may prescribe these for you while your stent remains in  · Take your medicine as directed  Contact your healthcare provider if you think your medicine is not helping or if you have side effects  Tell him or her if you are allergic to any medicine  Keep a list of the medicines, vitamins, and herbs you take  Include the amounts, and when and why you take them  Bring the list or the pill bottles to follow-up visits  Carry your medicine list with you in case of an emergency  Follow up with your urologist as directed: You will need regular follow-up visits with your urologist as long as the stent remains in  He will check to make sure the stent is working properly  He may do urine cultures to check for infection  Write down your questions so you remember to ask them during your visits  Self-care:   · Drink liquids  as directed  Ask your healthcare provider how much liquid to drink each day and which liquids are best for you  Fluids such as cranberry or apple juice may be especially helpful to prevent urinary infections  · Return to normal activities  the day after your stent placement or as directed by your healthcare provider  · You may take a shower  the day after your stent placement if your healthcare provider says it is okay  Contact your healthcare provider or urologist if:   · You have a fever or chills      · You feel like you need to urinate often  · You have pain when you urinate or pain around your bladder or kidney  · You see blood clots in your urine or it looks cloudy  · You have questions or concerns about your condition or care  Seek care immediately or call 911 if:   · You urinate little or not at all  · You have severe pain in your abdomen  © 2017 [employer] Information is for End User's use only and may not be sold, redistributed or otherwise used for commercial purposes  All illustrations and images included in CareNotes® are the copyrighted property of [employer]  or [employer] Lu[employer]  The above information is an  only  It is not intended as medical advice for individual conditions or treatments  Talk to your doctor, nurse or pharmacist before following any medical regimen to see if it is safe and effective for you

## 2021-06-03 NOTE — ANESTHESIA POSTPROCEDURE EVALUATION
Post-Op Assessment Note    CV Status:  Stable  Pain Score: 0    Pain management: adequate     Mental Status:  Alert and awake   Hydration Status:  Euvolemic   PONV Controlled:  Controlled   Airway Patency:  Patent   Two or more mitigation strategies used for obstructive sleep apnea   Post Op Vitals Reviewed: Yes      Staff: Anesthesiologist, CRNA   Reason for prolonged intubation > 24 hours:  Na      No complications documented      BP      Temp     Pulse     Resp      SpO2

## 2021-06-03 NOTE — ANESTHESIA PREPROCEDURE EVALUATION
Procedure:  EXCHANGE STENT (Right Ureter)  CYSTO W/RETROGRADE (Right Bladder)    Relevant Problems   CARDIO   (+) Pain in thoracic spine      /RENAL   (+) Other hydronephrosis      GYN   (+) 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HCC)      MUSCULOSKELETAL   (+) Pain in thoracic spine   (+) Wrist arthritis      NEURO/PSYCH   (+) Anxiety and depression        Physical Exam    Airway    Mallampati score: I  TM Distance: >3 FB  Neck ROM: full     Dental   No notable dental hx     Cardiovascular  Rhythm: regular, Rate: normal, Cardiovascular exam normal    Pulmonary  Pulmonary exam normal Breath sounds clear to auscultation,     Other Findings        Anesthesia Plan  ASA Score- 2     Anesthesia Type- general with ASA Monitors  Additional Monitors:   Airway Plan:     Comment: Had upper thoracic/neck pain post-last  procedure  Hx of "fused T2-T3", per patient          Plan Factors-Exercise tolerance (METS): >4 METS  Chart reviewed  Patient summary reviewed  Patient is not a current smoker  Patient not instructed to abstain from smoking on day of procedure  Patient did not smoke on day of surgery  Induction- intravenous and rapid sequence induction  Postoperative Plan-   Planned trial extubation    Informed Consent- Anesthetic plan and risks discussed with patient

## 2021-06-03 NOTE — TELEPHONE ENCOUNTER
Patient underwent stent change today June 3rd  I think her stent can be changed in approximately 4-6 months  Please keep her on the stent list call to arrange a follow-up appointment in approximately 4 months with the physician assistant

## 2021-06-03 NOTE — OP NOTE
OPERATIVE REPORT  PATIENT NAME: Murtaza Gonzalez    :  1955  MRN: [id number]  Pt Location: [facility] OR ROOM 03    SURGERY DATE: 6/3/2021    Surgeon(s) and Role:     * Alfonzo Gagnon MD - Primary    Preop Diagnosis:  Other hydronephrosis [N13 39]    Post-Op Diagnosis Codes:     * Other hydronephrosis [N13 39]    Procedure(s) (LRB):  EXCHANGE STENT (Right)  CYSTO W/RETROGRADE (Right)    Specimen(s):  ID Type Source Tests Collected by Time Destination   A :  Urine Urine, Cystoscopic URINE CULTURE Alfonzo Gagnon MD 6/3/2021 1635        Estimated Blood Loss:   Minimal    Drains:  Ureteral Drain/Stent Right ureter 6 Fr  (Active)   Number of days: 0       Anesthesia Type:   General/LMA    Operative Indications: Other hydronephrosis [N13 39]      Operative Findings:  Normal appearing bladder and orifices  The patient's stent was not encrusted  Successful exchange of the right ureteral stent  A 6 Ecuadorean contour stent was employed and seen to be well coiled in the renal pelvis and in the bladder at the conclusion the procedure  Complications:   None    Procedure and Technique:  The patient was brought to the operating room properly identified  General anesthesia was administered  She was placed in lithotomy position prepped draped usual sterile fashion  Compression boots were employed  Intravenous antibiotic was administered  An appropriate time-out was performed  Cystourethroscopy was performed with 20 Ecuadorean cystoscope with findings as above  The patient's indwelling stent was grasped with a grasper and externalized  The guidewire was passed up the stent and into the area of the kidney under fluoroscopic guidance  The stent was removed  The guidewire was then backloaded through the cystoscope  A 6 Ecuadorean contour stent was then passed over the guidewire and pushed into the area the renal pelvis with the pusher    ithe renal pelvis  With withdrawal the guidewire nice coils developed in the area the renal pelvis  The guidewire was completely withdrawn and a retrograde  injection performed which revealed good stent positioning and no significant hydronephrosis  The stent was detached from the pusher leaving a nice coil in the bladder  The bladder was drained and cystoscope removed  Patient tolerated procedure well  She was awakened from anesthesia and taken to the recovery in satisfactory condition    I was present for the entire procedure    Patient Disposition:  PACU     SIGNATURE: Jenna Bates MD  DATE: Candace 3, 2021  TIME: 4:44 PM

## 2021-06-03 NOTE — INTERVAL H&P NOTE
H&P reviewed  After examining the patient I find no changes in the patients condition since the H&P had been written  Vitals:    06/03/21 1108   BP: 139/64   Pulse: 90   Resp: 20   Temp: (!) 97 4 °F (36 3 °C)   SpO2: 100%   Procedure reviewed with patient in the holding unit  Risks discussed  Consent signed  Laterality marked- Right

## 2021-06-04 ENCOUNTER — TELEPHONE (OUTPATIENT)
Dept: HEMATOLOGY ONCOLOGY | Facility: CLINIC | Age: 66
End: 2021-06-04

## 2021-06-04 ENCOUNTER — HOSPITAL ENCOUNTER (OUTPATIENT)
Dept: INFUSION CENTER | Facility: HOSPITAL | Age: 66
Discharge: HOME | End: 2021-06-04
Payer: MEDICARE

## 2021-06-04 DIAGNOSIS — E86.0 DEHYDRATION: Primary | ICD-10-CM

## 2021-06-04 PROCEDURE — 96360 HYDRATION IV INFUSION INIT: CPT

## 2021-06-04 RX ADMIN — SODIUM CHLORIDE 1000 ML: 0.9 INJECTION, SOLUTION INTRAVENOUS at 12:20

## 2021-06-04 NOTE — TELEPHONE ENCOUNTER
Post Op Note    Tayler Khoury is a 77 y o  female s/p EXCHANGE STENT (Right Ureter)       CYSTO W/RETROGRADE (Right Bladder) performed 6-3-2021  Tayler Khoury is a patient of Dr Veronica Villaseñor and is seen at the [facility] office  Call placed to patient  LMOM for her to contact the office for follow up appointment and to assess her symptoms post-op  Office number was provided for patient to call back

## 2021-06-04 NOTE — PLAN OF CARE
Problem: Potential for Falls  Goal: Patient will remain free of falls  Description: INTERVENTIONS:  - Assess patient frequently for physical needs  -  Identify cognitive and physical deficits and behaviors that affect risk of falls    -  [place] fall precautions as indicated by assessment   - Educate patient/family on patient safety including physical limitations  - Instruct patient to call for assistance with activity based on assessment  - Modify environment to reduce risk of injury  - Consider OT/PT consult to assist with strengthening/mobility  Outcome: Progressing

## 2021-06-05 LAB — BACTERIA UR CULT: NORMAL

## 2021-06-07 ENCOUNTER — TELEPHONE (OUTPATIENT)
Dept: SURGICAL ONCOLOGY | Facility: CLINIC | Age: 66
End: 2021-06-07

## 2021-06-07 ENCOUNTER — PATIENT OUTREACH (OUTPATIENT)
Dept: CASE MANAGEMENT | Facility: HOSPITAL | Age: 66
End: 2021-06-07

## 2021-06-07 NOTE — TELEPHONE ENCOUNTER
Called patient - she is scheduled to see AP to do H&P and schedule stent exchange on 10/08/21 @ 11:00

## 2021-06-07 NOTE — TELEPHONE ENCOUNTER
Patient received a call on 6/4 regarding her WBC being low  She was told if she noticed any changes or had any concerns to call our office  Patient recently noticed a rash on the right side of her face  She said it was shortly followed by blisters, itchiness, and a Headache

## 2021-06-07 NOTE — PROGRESS NOTES
Oncology LSW outreached PT to provide support and assess for areas of need  PT reported that she had gotten rash that was similar to shingles on her face and had reached out to [facility] for guidance on the matter  PT reported that she was awaiting a return call for further instruction  PT further reported that she had had "quite the time" with her stent replacement last week  PT reported that she had gotten there around 3 hours early, as that is when [facility] was able to transport her  PT reported that she had no issues with this  PT then explained that it was told to her that her procedure would be around 1145AM  PT stated that she was not taken back for the procedure until 330PM  PT reported, however, that everyone was "very nice" throughout her time at that appointment  PT reported that she had received a call from urology to schedule a stent replancement for October  PT reported that she was told that the replacement would be every six months, not every four months  PT explained that she was frustrated with this and was going to speak with urology about the possibility of changing the stent every six months as previously discussed  PT reported she was unsure if she could financial handle the change being every four months  LSW engaged in active listening at this time and provided PT with emotional support  LSW stated that she was familiar with a giovanna through the [employer] that could be assistance to PT that she could apply for  PT was appreciative and agreeable  PT then stated that her daughter and main support person was moving to [facility]  PT reported that she was sad that her daughter was moving from [place], but knew that her being sick should not hold her daughter back from living her life  LSW again engaged in active listening and provided emotional support  LSW reiterated that she would reach out to the Oncology  to apply for compassion funding on behalf of PT   PT understanding and agreeable  LSW submitted proposal for compassion funding on behalf of PT that was approved by the [employer]  LSW relayed this information to PT and asked that she bring invoices for monthly bills with her to her infusion appointment on Friday so that she and LSW could go through them together  PT was agreeable with this plan and thanked LSW for her time  Emotional support provided

## 2021-06-10 ENCOUNTER — TELEMEDICINE (OUTPATIENT)
Dept: GYNECOLOGIC ONCOLOGY | Facility: CLINIC | Age: 66
End: 2021-06-10
Payer: MEDICARE

## 2021-06-10 DIAGNOSIS — C77.3 METASTATIC CANCER TO AXILLARY LYMPH NODES (HCC): ICD-10-CM

## 2021-06-10 DIAGNOSIS — C53.0 MALIGNANT NEOPLASM OF ENDOCERVIX (HCC): Primary | ICD-10-CM

## 2021-06-10 DIAGNOSIS — T45.1X5A CHEMOTHERAPY INDUCED NEUTROPENIA (HCC): ICD-10-CM

## 2021-06-10 DIAGNOSIS — N13.39 OTHER HYDRONEPHROSIS: ICD-10-CM

## 2021-06-10 DIAGNOSIS — D70.1 CHEMOTHERAPY INDUCED NEUTROPENIA (HCC): ICD-10-CM

## 2021-06-10 DIAGNOSIS — D69.6 THROMBOCYTOPENIA (HCC): ICD-10-CM

## 2021-06-10 PROCEDURE — 99442 PR PHYS/QHP TELEPHONE EVALUATION 11-20 MIN: CPT | Performed by: NURSE PRACTITIONER

## 2021-06-10 NOTE — PROGRESS NOTES
Virtual Brief Visit    Assessment/Plan:    Problem List Items Addressed This Visit        Genitourinary    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[facility] [address] ) - Primary     70-year-old with recurrent stage IB2 cervical cancer, originally diagnosed in 2017, now with retroperitoneal lymphadenopathy, obstructive uropathy, and axillary mets  She is tolerating treatment well with the exception of fatigue  She is getting weekly hydration and her kidney function has been adequate  Her PS is 0  Patient will proceed with chemotherapy next week provided her metabolic and hematologic parameters are met  She will RTO with a pre-visit CT scan after cycle 6  Relevant Orders    CT chest abdomen pelvis w contrast    Other hydronephrosis     Right-sided ureteral stent in place  Last exchange 6/3/2021  Cr normal at 0 83  Relevant Orders    CT chest abdomen pelvis w contrast       Other    Chemotherapy induced neutropenia (HCC)     ANC 0 47 last week  Infection precautions were reviewed at the time with the patient  She has remained afebrile  Fatigued, but no other concerns  Will repeat labs tomorrow  Thrombocytopenia (HCC)     Plt 80,000 on 6/4  Will re-evaluate on tomorrow's labs             Other Visit Diagnoses     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 ([facility] [address] )        Relevant Orders    CT chest abdomen pelvis w contrast                Reason for visit is pre-chemotherapy visit  Chief Complaint   Patient presents with    Virtual Brief Visit        Encounter provider JACE Rain    Provider located at [address] [address] [phone]    Recent Visits  Date Type Provider Dept   06/07/21 Telephone Yaa Rain recent visits within past 7 days and meeting all other requirements     Today's Visits  Date Type Provider Dept   06/10/21 Telemedicine JACE Rain [facility] today's visits and meeting all other requirements     Future Appointments  No visits were found meeting these conditions  Showing future appointments within next 150 days and meeting all other requirements       Oncology History   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[address] )   12/2016 Initial Diagnosis    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 [facility])  3/29/2017 -  Cancer Staged    Staging form: Cervix Uteri, AJCC 7th Edition  - Clinical stage from 3/29/2017: FIGO Stage IB2, calculated as Stage Unknown (T1b2, NX, M0) - Signed by Gucci Bautista MD on 2/4/2021  Staged by: Managing physician  Specimen type: Excision  Histopathologic type: Squamous cell carcinoma, NOS  Histologic grade (G): G3  Lymph-vascular invasion (LVI): LVI present/identified, NOS  Residual tumor (R): R0 - None  Pelvic jace status: Negative  Pelvic jace method of assessment: PET  Para-aortic status: Negative  Para-aortic jace method of assessment: PET  Stage used in treatment planning: Yes  National guidelines used in treatment planning: Yes  Type of national guideline used in treatment planning: NCCN       3/29/2017 Surgery    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)  FInal path with incidental 5 cm grade 3 invasive cervical SCCA, +LVI, invasion 9/10 mm cervical stroma, no lymphadenectomy, margins negative ([facility])  2/19/2021 Progression    A  Lymph Node, Left axillary, Biopsy:  - Metastatic squamous cell carcinoma (p16 positive) involving lymph node, compatible with patient's cervical primary  3/2/2021 -  Chemotherapy    Carboplatin AUC 5, Taxol 135 milligram/m2 and Avastin at 15 milligram/kilogram all to be administer on day 1 of a 21 day cycle           After connecting through telephone, the patient was identified by name and date of birth   Herbieus Hopkins was informed that this is a telemedicine visit and that the visit is being conducted through telephone  Patient does not have a smart phone, computer, or other video capabilities  My office door was closed  No one else was in the room  She acknowledged consent and understanding of privacy and security of the platform  The patient has agreed to participate and understands she can discontinue the visit at any time  Patient is aware this is a billable service  Subjective    Jacey Cole is a 77 y o  female     Patient is feeling fatigued  She has no new concerns since her last visit  She has remained afebrile  No bleeding  No discharge  She has a low appetite but is eating and drinking  No abdominal or pelvic pain  Her bowels and bladder are functioning normally  No n/t  She is ambulatory         Past Medical History:   Diagnosis Date    Acne     Anxiety     Anxiety and depression 10/2/2019    Arthritis     Breathing difficulty     pt does not recall any breathing problem but did have neck pain after procedure"    Cancer [facility])     cervical/ and "lymph nodes"    Colon polyp     GERD (gastroesophageal reflux disease)     not now    History of radiation therapy     Irritable bowel syndrome     Joint pain following chemotherapy     and stiffness    Liver disease     "cyst on liver"    Lung nodule     "left"    Muscle weakness     after chemo"    Neck pain     "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"has had neck pain since childhood"    Port-A-Cath in place     right chest//for chemotherapy q 3 weeks and also receives IV hydration    Shortness of breath     "started with cancer, when too much exertion like cleaning/guera doing stairs"    Thumb injury 10/2019    right    Thyroid nodule     Tinnitus of left ear 2/19/2020    Urinary problem     Wrist arthritis 3/17/2020       Past Surgical History:   Procedure Laterality Date    COLONOSCOPY      CYSTOSCOPY W/ RETROGRADES Right 6/3/2021    Procedure: Rosey Shadi W/RETROGRADE;  Surgeon: Mariela Estrada MD;  Location: [facility];  Service: Urology    FL RETROGRADE PYELOGRAM  2/11/2021    FL RETROGRADE PYELOGRAM  6/3/2021    FOOT SURGERY      HYSTERECTOMY      IR BIOPSY LYMPH NODE  2/19/2021    IR PORT PLACEMENT  3/9/2021    OH CYSTOURETHROSCOPY,URETER CATHETER Right 2/11/2021    Procedure: CYSTOSCOPY RETROGRADE PYELOGRAM WITH INSERTION STENT URETERAL;  Surgeon: Toni Coleman MD;  [facility];  Service: Urology    OH EXCIS INTERDIGITAL Jairo Bird Left 7/12/2018    Procedure: FOOT NEUROMA EXCISION;  Surgeon: Zac Gamino DPM;  Location: [address] MAIN OR;  Service: [address] Right 6/3/2021    Procedure: EXCHANGE STENT;  Surgeon: Betina Christine MD;  [facility]: [facility];  Service: Urology    TUBAL LIGATION         Current Outpatient Medications   Medication Sig Dispense Refill    famotidine (PEPCID) 20 mg tablet Take 20 mg by mouth daily       lidocaine-prilocaine (EMLA) cream Apply topically as needed for mild pain 30 g 0    LORazepam (ATIVAN) 1 mg tablet Take 1 tablet (1 mg total) by mouth every 6 (six) hours as needed for anxiety (or nausea) 36 tablet 1    Multiple Vitamins-Minerals (Alive Womens Gummy) CHEW Chew      ondansetron (ZOFRAN) 8 mg tablet Take 1 tablet (8 mg total) by mouth every 8 (eight) hours as needed for nausea or vomiting 30 tablet 1    oxybutynin (DITROPAN-XL) 10 MG 24 hr tablet Take 1 tablet (10 mg total) by mouth daily at bedtime 30 tablet 3    oxyCODONE (Roxicodone) 5 mg immediate release tablet Take 1-2 tablets every 6 hours prn severe pain 6 tablet 0    polyethylene glycol (MIRALAX) 17 g packet Take 17 g by mouth daily as needed       promethazine (PHENERGAN) 12 5 MG tablet TAKE 1 TABLET (12 5 MG TOTAL) BY MOUTH EVERY 6 (SIX) HOURS AS NEEDED FOR NAUSEA OR VOMITING 30 tablet 0     No current facility-administered medications for this visit           Allergies   Allergen Reactions    Medical Tape Itching     And redness from adhesive    And "skin raised up" especially longer on skin    Aspirin GI Intolerance    Codeine GI Intolerance    Dust Mite Extract     Pollen Extract Sneezing       Review of Systems   Constitutional: Positive for fatigue  Negative for activity change, appetite change, chills, fever and unexpected weight change  HENT: Negative for mouth sores  Eyes: Negative  Respiratory: Negative for cough, chest tightness, shortness of breath and wheezing  Cardiovascular: Negative for chest pain, palpitations and leg swelling  Gastrointestinal: Negative for abdominal distention, abdominal pain, anal bleeding, blood in stool, constipation, diarrhea, nausea and vomiting  Endocrine: Negative  Genitourinary: Negative for difficulty urinating, dysuria, flank pain, frequency, hematuria, pelvic pain, urgency, vaginal bleeding, vaginal discharge and vaginal pain  Musculoskeletal: Negative for arthralgias and myalgias  Skin: Negative for color change, pallor and rash  Neurological: Negative for dizziness, weakness, numbness and headaches  Hematological: Negative  Psychiatric/Behavioral: The patient is not nervous/anxious  There were no vitals filed for this visit  I spent 15 minutes directly with the patient during this visit    VIRTUAL VISIT DISCLAIMER    Jaspal Gasca acknowledges that she has consented to an online visit or consultation  She understands that the online visit is based solely on information provided by her, and that, in the absence of a face-to-face physical evaluation by the physician, the diagnosis she receives is both limited and provisional in terms of accuracy and completeness  This is not intended to replace a full medical face-to-face evaluation by the physician  Jaspal Gasca understands and accepts these terms

## 2021-06-10 NOTE — ASSESSMENT & PLAN NOTE
60-year-old with recurrent stage IB2 cervical cancer, originally diagnosed in 2017, now with retroperitoneal lymphadenopathy, obstructive uropathy, and axillary mets  She is tolerating treatment well with the exception of fatigue  She is getting weekly hydration and her kidney function has been adequate  Her PS is 0  Patient will proceed with chemotherapy next week provided her metabolic and hematologic parameters are met  She will RTO with a pre-visit CT scan after cycle 6

## 2021-06-10 NOTE — ASSESSMENT & PLAN NOTE
ANC 0 47 last week  Infection precautions were reviewed at the time with the patient  She has remained afebrile  Fatigued, but no other concerns  Will repeat labs tomorrow

## 2021-06-11 ENCOUNTER — HOSPITAL ENCOUNTER (OUTPATIENT)
Dept: INFUSION CENTER | Facility: HOSPITAL | Age: 66
Discharge: HOME | End: 2021-06-11
Payer: MEDICARE

## 2021-06-11 ENCOUNTER — PATIENT OUTREACH (OUTPATIENT)
Dept: CASE MANAGEMENT | Facility: HOSPITAL | Age: 66
End: 2021-06-11

## 2021-06-11 VITALS
DIASTOLIC BLOOD PRESSURE: 61 MMHG | OXYGEN SATURATION: 98 % | SYSTOLIC BLOOD PRESSURE: 128 MMHG | RESPIRATION RATE: 17 BRPM | TEMPERATURE: 97.6 F | HEART RATE: 89 BPM

## 2021-06-11 DIAGNOSIS — E86.0 DEHYDRATION: Primary | ICD-10-CM

## 2021-06-11 DIAGNOSIS — R53.83 LOW ENERGY: Primary | ICD-10-CM

## 2021-06-11 LAB
CREAT UR-MCNC: 93.1 MG/DL
PROT UR-MCNC: 19 MG/DL
PROT/CREAT UR: 0.2 MG/G{CREAT} (ref 0–0.1)

## 2021-06-11 PROCEDURE — 96360 HYDRATION IV INFUSION INIT: CPT

## 2021-06-11 PROCEDURE — 82570 ASSAY OF URINE CREATININE: CPT

## 2021-06-11 PROCEDURE — 84156 ASSAY OF PROTEIN URINE: CPT

## 2021-06-11 RX ORDER — DEXAMETHASONE 2 MG/1
2 TABLET ORAL 2 TIMES DAILY WITH MEALS
Qty: 10 TABLET | Refills: 0 | Status: SHIPPED | OUTPATIENT
Start: 2021-06-11 | End: 2021-06-16

## 2021-06-11 RX ADMIN — SODIUM CHLORIDE 1000 ML: 0.9 INJECTION, SOLUTION INTRAVENOUS at 11:50

## 2021-06-11 NOTE — PROGRESS NOTES
Oncology LSW met with PT to go over bills to submit for funding through the   PT reported that she had paid most of her bills for the month, but did have an outstanding eye doctor's appointment bill and a [facility] bill for over $3500  LSW explained that she could not guarantee that these bills would be approved, but she would submit them for review regardless  LSW also provided PT with an application for [employer] [address] suggested that PT complete that as well for further assistance  PT was agreeable with that plan and thanked LSW for her time  Emotional support provided  LSW submitted copies of PT's bills to Donis Marie for review for funding  Ho Lagunas reported that neither of the bills submitted were eligible for funding, but encouraged LSW to keep PT in mind for funding in the future  Ho Lagunas also suggested that PT complete a félix care application to help with her outstanding [address]  LSW spoke with PT who reported that she does not recall completing a félix care application for [facility]   LSW outreached [employer], [employer], and requested that an application be sent to PT

## 2021-06-14 ENCOUNTER — TELEPHONE (OUTPATIENT)
Dept: SURGICAL ONCOLOGY | Facility: CLINIC | Age: 66
End: 2021-06-14

## 2021-06-14 RX ORDER — SODIUM CHLORIDE 9 MG/ML
20 INJECTION, SOLUTION INTRAVENOUS ONCE
Status: CANCELLED | OUTPATIENT
Start: 2021-06-16

## 2021-06-14 RX ORDER — PALONOSETRON 0.05 MG/ML
0.25 INJECTION, SOLUTION INTRAVENOUS ONCE
Status: CANCELLED | OUTPATIENT
Start: 2021-06-16

## 2021-06-14 NOTE — TELEPHONE ENCOUNTER
Patient called to have [address] [place] [address] Dr CRAWFORD set up for CT appt on 7/1/21, called and confirmed with [facility] transportation and verified with [facility] patient will need to be at CT appt for 9AM due to the omnipaque  [facility] is set for patient and confirmed with patient

## 2021-06-14 NOTE — TELEPHONE ENCOUNTER
Call placed to Pt and LM on VM that an Appointment was scheduled for her in our [facility] as she requested  Appointment is schedule the same day she had before

## 2021-06-14 NOTE — TELEPHONE ENCOUNTER
Patient called in asking if her H&P can be at the [facility] office because of transportation  Patient is currently schedule 10/8 at [facility]

## 2021-06-16 ENCOUNTER — HOSPITAL ENCOUNTER (OUTPATIENT)
Dept: INFUSION CENTER | Facility: HOSPITAL | Age: 66
Discharge: HOME | End: 2021-06-16
Payer: MEDICARE

## 2021-06-16 VITALS
BODY MASS INDEX: 27.11 KG/M2 | OXYGEN SATURATION: 97 % | DIASTOLIC BLOOD PRESSURE: 54 MMHG | HEART RATE: 65 BPM | RESPIRATION RATE: 16 BRPM | SYSTOLIC BLOOD PRESSURE: 111 MMHG | TEMPERATURE: 97.6 F | WEIGHT: 134.48 LBS | HEIGHT: 59 IN

## 2021-06-16 DIAGNOSIS — C53.0 MALIGNANT NEOPLASM OF ENDOCERVIX (HCC): Primary | ICD-10-CM

## 2021-06-16 DIAGNOSIS — E87.6 HYPOKALEMIA: ICD-10-CM

## 2021-06-16 PROCEDURE — 96415 CHEMO IV INFUSION ADDL HR: CPT

## 2021-06-16 PROCEDURE — 96367 TX/PROPH/DG ADDL SEQ IV INF: CPT

## 2021-06-16 PROCEDURE — 96413 CHEMO IV INFUSION 1 HR: CPT

## 2021-06-16 PROCEDURE — 96417 CHEMO IV INFUS EACH ADDL SEQ: CPT

## 2021-06-16 PROCEDURE — 96375 TX/PRO/DX INJ NEW DRUG ADDON: CPT

## 2021-06-16 RX ORDER — SODIUM CHLORIDE 9 MG/ML
20 INJECTION, SOLUTION INTRAVENOUS ONCE
Status: COMPLETED | OUTPATIENT
Start: 2021-06-16 | End: 2021-06-16

## 2021-06-16 RX ORDER — PALONOSETRON 0.05 MG/ML
0.25 INJECTION, SOLUTION INTRAVENOUS ONCE
Status: COMPLETED | OUTPATIENT
Start: 2021-06-16 | End: 2021-06-16

## 2021-06-16 RX ADMIN — BEVACIZUMAB 900 MG: 400 INJECTION, SOLUTION INTRAVENOUS at 15:29

## 2021-06-16 RX ADMIN — FOSAPREPITANT 150 MG: 150 INJECTION, POWDER, LYOPHILIZED, FOR SOLUTION INTRAVENOUS at 10:20

## 2021-06-16 RX ADMIN — SODIUM CHLORIDE 20 ML/HR: 0.9 INJECTION, SOLUTION INTRAVENOUS at 08:36

## 2021-06-16 RX ADMIN — DEXAMETHASONE SODIUM PHOSPHATE 20 MG: 10 INJECTION, SOLUTION INTRAMUSCULAR; INTRAVENOUS at 08:38

## 2021-06-16 RX ADMIN — CARBOPLATIN 400 MG: 10 INJECTION, SOLUTION INTRAVENOUS at 14:18

## 2021-06-16 RX ADMIN — DIPHENHYDRAMINE HYDROCHLORIDE 25 MG: 50 INJECTION, SOLUTION INTRAMUSCULAR; INTRAVENOUS at 09:16

## 2021-06-16 RX ADMIN — PALONOSETRON 0.25 MG: 0.05 INJECTION, SOLUTION INTRAVENOUS at 09:10

## 2021-06-16 RX ADMIN — FAMOTIDINE 20 MG: 10 INJECTION INTRAVENOUS at 09:50

## 2021-06-16 RX ADMIN — PACLITAXEL 211.8 MG: 6 INJECTION, SOLUTION, CONCENTRATE INTRAVENOUS at 11:05

## 2021-06-16 NOTE — PLAN OF CARE
Problem: INFECTION - ADULT  Goal: Absence of fever/infection during neutropenic period  Description: INTERVENTIONS:  - Monitor WBC    Outcome: Progressing     Problem: DISCHARGE PLANNING  Goal: Discharge to home or other facility with appropriate resources  Description: INTERVENTIONS:  - Identify barriers to discharge w/patient and caregiver  - Arrange for needed discharge resources and transportation as appropriate  - Identify discharge learning needs (meds, wound care, etc )  - Arrange for interpretive services to assist at discharge as needed  - Refer to Case Management Department for coordinating discharge planning if the patient needs post-hospital services based on physician/advanced practitioner order or complex needs related to functional status, cognitive ability, or social support system  Outcome: Progressing     Problem: Knowledge Deficit  Goal: Patient/family/caregiver demonstrates understanding of disease process, treatment plan, medications, and discharge instructions  Description: Complete learning assessment and assess knowledge base    Interventions:  - Provide teaching at level of understanding  - Provide teaching via preferred learning methods  Outcome: Progressing

## 2021-06-16 NOTE — PROGRESS NOTES
Reached out to [place] for patient about voice horseness  [place] does not feel it is chemo related  Patient instructed to reach out to PCP

## 2021-06-18 ENCOUNTER — HOSPITAL ENCOUNTER (OUTPATIENT)
Dept: INFUSION CENTER | Facility: HOSPITAL | Age: 66
Discharge: HOME | End: 2021-06-18
Payer: MEDICARE

## 2021-06-18 VITALS — TEMPERATURE: 97.5 F | HEART RATE: 58 BPM | OXYGEN SATURATION: 99 % | RESPIRATION RATE: 16 BRPM

## 2021-06-18 DIAGNOSIS — E86.0 DEHYDRATION: Primary | ICD-10-CM

## 2021-06-18 LAB
ALBUMIN SERPL BCP-MCNC: 3.1 G/DL (ref 3.5–5)
ALP SERPL-CCNC: 100 U/L (ref 46–116)
ALT SERPL W P-5'-P-CCNC: 18 U/L (ref 12–78)
ANION GAP SERPL CALCULATED.3IONS-SCNC: 11 MMOL/L (ref 4–13)
AST SERPL W P-5'-P-CCNC: 14 U/L (ref 5–45)
BASOPHILS # BLD AUTO: 0 THOUSANDS/ΜL (ref 0–0.1)
BASOPHILS NFR BLD AUTO: 0 % (ref 0–1)
BILIRUB SERPL-MCNC: 0.22 MG/DL (ref 0.2–1)
BUN SERPL-MCNC: 27 MG/DL (ref 5–25)
CALCIUM ALBUM COR SERPL-MCNC: 9.6 MG/DL (ref 8.3–10.1)
CALCIUM SERPL-MCNC: 8.9 MG/DL (ref 8.3–10.1)
CHLORIDE SERPL-SCNC: 105 MMOL/L (ref 100–108)
CO2 SERPL-SCNC: 26 MMOL/L (ref 21–32)
CREAT SERPL-MCNC: 0.91 MG/DL (ref 0.6–1.3)
EOSINOPHIL # BLD AUTO: 0 THOUSAND/ΜL (ref 0–0.61)
EOSINOPHIL NFR BLD AUTO: 0 % (ref 0–6)
ERYTHROCYTE [DISTWIDTH] IN BLOOD BY AUTOMATED COUNT: 17.1 % (ref 11.6–15.1)
GFR SERPL CREATININE-BSD FRML MDRD: 66 ML/MIN/1.73SQ M
GLUCOSE SERPL-MCNC: 185 MG/DL (ref 65–140)
HCT VFR BLD AUTO: 28.9 % (ref 34.8–46.1)
HGB BLD-MCNC: 9.3 G/DL (ref 11.5–15.4)
IMM GRANULOCYTES # BLD AUTO: 0.02 THOUSAND/UL (ref 0–0.2)
IMM GRANULOCYTES NFR BLD AUTO: 0 % (ref 0–2)
LYMPHOCYTES # BLD AUTO: 0.58 THOUSANDS/ΜL (ref 0.6–4.47)
LYMPHOCYTES NFR BLD AUTO: 10 % (ref 14–44)
MCH RBC QN AUTO: 31.7 PG (ref 26.8–34.3)
MCHC RBC AUTO-ENTMCNC: 32.2 G/DL (ref 31.4–37.4)
MCV RBC AUTO: 99 FL (ref 82–98)
MONOCYTES # BLD AUTO: 0.26 THOUSAND/ΜL (ref 0.17–1.22)
MONOCYTES NFR BLD AUTO: 5 % (ref 4–12)
NEUTROPHILS # BLD AUTO: 4.83 THOUSANDS/ΜL (ref 1.85–7.62)
NEUTS SEG NFR BLD AUTO: 85 % (ref 43–75)
NRBC BLD AUTO-RTO: 0 /100 WBCS
PLATELET # BLD AUTO: 118 THOUSANDS/UL (ref 149–390)
PMV BLD AUTO: 10.1 FL (ref 8.9–12.7)
POTASSIUM SERPL-SCNC: 3.7 MMOL/L (ref 3.5–5.3)
PROT SERPL-MCNC: 6.8 G/DL (ref 6.4–8.2)
RBC # BLD AUTO: 2.93 MILLION/UL (ref 3.81–5.12)
SODIUM SERPL-SCNC: 142 MMOL/L (ref 136–145)
WBC # BLD AUTO: 5.69 THOUSAND/UL (ref 4.31–10.16)

## 2021-06-18 PROCEDURE — 96360 HYDRATION IV INFUSION INIT: CPT

## 2021-06-18 PROCEDURE — 85025 COMPLETE CBC W/AUTO DIFF WBC: CPT | Performed by: OBSTETRICS & GYNECOLOGY

## 2021-06-18 PROCEDURE — 80053 COMPREHEN METABOLIC PANEL: CPT | Performed by: OBSTETRICS & GYNECOLOGY

## 2021-06-18 RX ADMIN — SODIUM CHLORIDE 1000 ML: 0.9 INJECTION, SOLUTION INTRAVENOUS at 11:55

## 2021-06-18 NOTE — PLAN OF CARE
Problem: DISCHARGE PLANNING  Goal: Discharge to home or other facility with appropriate resources  Description: INTERVENTIONS:  - Identify barriers to discharge w/patient and caregiver  - Arrange for needed discharge resources and transportation as appropriate  - Identify discharge learning needs (meds, wound care, etc )  - Arrange for interpretive services to assist at discharge as needed  - Refer to Case Management Department for coordinating discharge planning if the patient needs post-hospital services based on physician/advanced practitioner order or complex needs related to functional status, cognitive ability, or social support system  Outcome: Progressing     Problem: Knowledge Deficit  Goal: Patient/family/caregiver demonstrates understanding of disease process, treatment plan, medications, and discharge instructions  Description: Complete learning assessment and assess knowledge base    Interventions:  - Provide teaching at level of understanding  - Provide teaching via preferred learning methods  Outcome: Progressing     Problem: INFECTION - ADULT  Goal: Absence of fever/infection during neutropenic period  Description: INTERVENTIONS:  - Monitor WBC    Outcome: Progressing

## 2021-06-21 ENCOUNTER — PATIENT OUTREACH (OUTPATIENT)
Dept: CASE MANAGEMENT | Facility: HOSPITAL | Age: 66
End: 2021-06-21

## 2021-06-21 NOTE — PROGRESS NOTES
Oncology LSW outreached PT to provide support and assess for areas of need  LSW left PT detailed voicemail requesting return call at PT's earliest convenience

## 2021-06-23 ENCOUNTER — TELEMEDICINE (OUTPATIENT)
Dept: INTERNAL MEDICINE CLINIC | Facility: CLINIC | Age: 66
End: 2021-06-23
Payer: MEDICARE

## 2021-06-23 DIAGNOSIS — C53.0 MALIGNANT NEOPLASM OF ENDOCERVIX (HCC): Primary | ICD-10-CM

## 2021-06-23 DIAGNOSIS — E07.9 THYROID LESION: ICD-10-CM

## 2021-06-23 DIAGNOSIS — K59.00 CONSTIPATION, UNSPECIFIED CONSTIPATION TYPE: ICD-10-CM

## 2021-06-23 PROCEDURE — 99442 PR PHYS/QHP TELEPHONE EVALUATION 11-20 MIN: CPT | Performed by: NURSE PRACTITIONER

## 2021-06-23 RX ORDER — GLYCERIN PEDIATRIC
1 SUPPOSITORY, RECTAL RECTAL DAILY PRN
Qty: 25 SUPPOSITORY | Refills: 0 | Status: SHIPPED | OUTPATIENT
Start: 2021-06-23 | End: 2021-11-05

## 2021-06-23 NOTE — PROGRESS NOTES
Virtual Brief Visit    Assessment/Plan: Did recommend going to ER due to fecal impaction but patient is deferring  Will call Glycerin SUPP in for her  Will refer to ENT regarding CT imaging showing Indeterminate left thyroid lesion for which thyroid ultrasound and possible FNA is suggested  (Unless this has been worked up elsewhere and is a known finding which is benign)  Will follow back up with patient later in week  Problem List Items Addressed This Visit        Endocrine    Thyroid lesion    Relevant Orders    Ambulatory Referral to Otolaryngology       Genitourinary    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[facility] [address] ) - Primary       Other    Constipation    Relevant Medications    Glycerin, Laxative, (Glycerin, Adult,) 2 1 g SUPP                Reason for visit is   Chief Complaint   Patient presents with    Virtual Regular Visit     constipation bad and losing voice    Virtual Brief Visit        Encounter provider Beverly Alexander, [facility]    Provider located at [address]  [address] [facility] [address] [address]    Recent Visits  No visits were found meeting these conditions  Showing recent visits within past 7 days and meeting all other requirements  Today's Visits  Date Type Provider Dept   06/23/21 Telemedicine JACE [facility]   Showing today's visits and meeting all other requirements  Future Appointments  No visits were found meeting these conditions  Showing future appointments within next 150 days and meeting all other requirements       After connecting through telephone, the patient was identified by name and date of birth  Tani Pickering was informed that this is a telemedicine visit and that the visit is being conducted through telephone  My office door was closed  No one else was in the room  She acknowledged consent and understanding of privacy and security of the platform   The patient has agreed to participate and understands she can discontinue the visit at any time  Patient is aware this is a billable service  Subjective    Rachna Walker is a 77 y o  female patient  Cherry Haque is for an acute visit  She has a history with recurrent stage IB2 cervical cancer, originally diagnosed in 2017, now with retroperitoneal lymphadenopathy, obstructive uropathy, and axillary mets  She is having loss of her voice and was told to get this checked  She states she is having horrible constipation and is having some rectal bleeding  She states she is hurting as well  She has not moved her bowels in one week and was having vomiting  She states she is not eating much and states her emesis was mainly clear  She states she is very weak and is not eating much  She states she can not get to the ER today and is having issues with paying medical bills  She also states she has a rash to her face since going to [facility] and is treating it with fungal cream  She offers no other issues         Past Medical History:   Diagnosis Date    Acne     Anxiety     Anxiety and depression 10/2/2019    Arthritis     Breathing difficulty     pt does not recall any breathing problem but did have neck pain after procedure"    Cancer [facility])     cervical/ and "lymph nodes"    Colon polyp     GERD (gastroesophageal reflux disease)     not now    History of radiation therapy     Irritable bowel syndrome     Joint pain following chemotherapy     and stiffness    Liver disease     "cyst on liver"    Lung nodule     "left"    Muscle weakness     after chemo"    Neck pain     "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"has had neck pain since childhood"    Port-A-Cath in place     right chest//for chemotherapy q 3 weeks and also receives IV hydration    Shortness of breath     "started with cancer, when too much exertion like cleaning/guera doing stairs"    Thumb injury 10/2019    right    Thyroid nodule     Tinnitus of left ear 2/19/2020    Urinary problem     Wrist arthritis 3/17/2020       Past Surgical History:   Procedure Laterality Date    COLONOSCOPY      CYSTOSCOPY W/ RETROGRADES Right 6/3/2021    Procedure: Delonte Fredis W/RETROGRADE;  Surgeon: Annmarie Dia MD;  [facility];  Service: Urology    [address]  2/11/2021    FL RETROGRADE PYELOGRAM  6/3/2021    FOOT SURGERY      HYSTERECTOMY      IR BIOPSY LYMPH NODE  2/19/2021    IR PORT PLACEMENT  3/9/2021    ME CYSTOURETHROSCOPY,URETER CATHETER Right 2/11/2021    Procedure: CYSTOSCOPY RETROGRADE PYELOGRAM WITH INSERTION STENT URETERAL;  Surgeon: Shanda Griffiths MD;  Location:  MAIN OR;  Service: Urology    ME EXCIS INTERDIGITAL Yanelis Bruno Left 7/12/2018    Procedure: FOOT NEUROMA EXCISION;  Surgeon: Norberto Le DPM;  Location: [facility] MAIN OR;  Service: [place] [id number] Right 6/3/2021    Procedure: EXCHANGE STENT;  Surgeon: Annmarie Dia MD;  [facility];  Service: Urology    TUBAL LIGATION         Current Outpatient Medications   Medication Sig Dispense Refill    famotidine (PEPCID) 20 mg tablet Take 20 mg by mouth daily       lidocaine-prilocaine (EMLA) cream Apply topically as needed for mild pain 30 g 0    LORazepam (ATIVAN) 1 mg tablet Take 1 tablet (1 mg total) by mouth every 6 (six) hours as needed for anxiety (or nausea) 36 tablet 1    Multiple Vitamins-Minerals (Alive Womens Gummy) CHEW Chew      ondansetron (ZOFRAN) 8 mg tablet Take 1 tablet (8 mg total) by mouth every 8 (eight) hours as needed for nausea or vomiting 30 tablet 1    oxybutynin (DITROPAN-XL) 10 MG 24 hr tablet Take 1 tablet (10 mg total) by mouth daily at bedtime 30 tablet 3    oxyCODONE (Roxicodone) 5 mg immediate release tablet Take 1-2 tablets every 6 hours prn severe pain 6 tablet 0    polyethylene glycol (MIRALAX) 17 g packet Take 17 g by mouth daily as needed       promethazine (PHENERGAN) 12 5 MG tablet TAKE 1 TABLET (12 5 MG TOTAL) BY MOUTH EVERY 6 (SIX) HOURS AS NEEDED FOR NAUSEA OR VOMITING 30 tablet 0    Glycerin, Laxative, (Glycerin, Adult,) 2 1 g SUPP Insert 1 suppository into the rectum daily as needed (constipation) 25 suppository 0     No current facility-administered medications for this visit  Allergies   Allergen Reactions    Medical Tape Itching     And redness from adhesive    And "skin raised up" especially longer on skin    Aspirin GI Intolerance    Codeine GI Intolerance    Dust Mite Extract     Pollen Extract Sneezing       Review of Systems   Constitutional: Positive for fatigue  Gastrointestinal: Positive for constipation and vomiting  Neurological: Positive for weakness  All other systems reviewed and are negative  There were no vitals filed for this visit  I spent 15 minutes directly with the patient during this visit    VIRTUAL VISIT DISCLAIMER    Yuni Garrett acknowledges that she has consented to an online visit or consultation  She understands that the online visit is based solely on information provided by her, and that, in the absence of a face-to-face physical evaluation by the physician, the diagnosis she receives is both limited and provisional in terms of accuracy and completeness  This is not intended to replace a full medical face-to-face evaluation by the physician  Yuni Garrett understands and accepts these terms

## 2021-06-24 ENCOUNTER — TELEPHONE (OUTPATIENT)
Dept: INTERNAL MEDICINE CLINIC | Facility: CLINIC | Age: 66
End: 2021-06-24

## 2021-06-24 NOTE — TELEPHONE ENCOUNTER
Received a call from [facility] stating that she was able to have a bm this morning

## 2021-06-25 ENCOUNTER — HOSPITAL ENCOUNTER (OUTPATIENT)
Dept: INFUSION CENTER | Facility: HOSPITAL | Age: 66
Discharge: HOME | End: 2021-06-25
Payer: MEDICARE

## 2021-06-25 VITALS
SYSTOLIC BLOOD PRESSURE: 134 MMHG | OXYGEN SATURATION: 97 % | HEART RATE: 92 BPM | DIASTOLIC BLOOD PRESSURE: 67 MMHG | RESPIRATION RATE: 16 BRPM | TEMPERATURE: 98.5 F

## 2021-06-25 DIAGNOSIS — E86.0 DEHYDRATION: Primary | ICD-10-CM

## 2021-06-25 PROCEDURE — 96360 HYDRATION IV INFUSION INIT: CPT

## 2021-06-25 RX ADMIN — SODIUM CHLORIDE 1000 ML: 0.9 INJECTION, SOLUTION INTRAVENOUS at 11:36

## 2021-07-01 ENCOUNTER — HOSPITAL ENCOUNTER (OUTPATIENT)
Dept: CT IMAGING | Facility: HOSPITAL | Age: 66
Discharge: HOME | End: 2021-07-01
Payer: MEDICARE

## 2021-07-01 ENCOUNTER — PATIENT OUTREACH (OUTPATIENT)
Dept: CASE MANAGEMENT | Facility: HOSPITAL | Age: 66
End: 2021-07-01

## 2021-07-01 DIAGNOSIS — C53.0 MALIGNANT NEOPLASM OF ENDOCERVIX (HCC): ICD-10-CM

## 2021-07-01 DIAGNOSIS — N13.39 OTHER HYDRONEPHROSIS: ICD-10-CM

## 2021-07-01 DIAGNOSIS — C77.3 METASTATIC CANCER TO AXILLARY LYMPH NODES (HCC): ICD-10-CM

## 2021-07-01 PROCEDURE — 71260 CT THORAX DX C+: CPT

## 2021-07-01 PROCEDURE — 74177 CT ABD & PELVIS W/CONTRAST: CPT

## 2021-07-01 PROCEDURE — G1004 CDSM NDSC: HCPCS

## 2021-07-01 RX ADMIN — IOHEXOL 100 ML: 350 INJECTION, SOLUTION INTRAVENOUS at 10:56

## 2021-07-01 NOTE — PROGRESS NOTES
Oncology LSW attempted to outreach PT to provide support and assess for areas of need  LSW left PT detailed voicemail requesting return call at PT's earliest convenience

## 2021-07-02 ENCOUNTER — HOSPITAL ENCOUNTER (OUTPATIENT)
Dept: INFUSION CENTER | Facility: HOSPITAL | Age: 66
Discharge: HOME | End: 2021-07-02
Payer: MEDICARE

## 2021-07-02 VITALS
SYSTOLIC BLOOD PRESSURE: 138 MMHG | HEART RATE: 66 BPM | OXYGEN SATURATION: 100 % | RESPIRATION RATE: 16 BRPM | DIASTOLIC BLOOD PRESSURE: 65 MMHG

## 2021-07-02 DIAGNOSIS — E86.0 DEHYDRATION: Primary | ICD-10-CM

## 2021-07-02 DIAGNOSIS — C53.0 MALIGNANT NEOPLASM OF ENDOCERVIX (HCC): ICD-10-CM

## 2021-07-02 LAB
ALBUMIN SERPL BCP-MCNC: 3.2 G/DL (ref 3.5–5)
ALP SERPL-CCNC: 103 U/L (ref 46–116)
ALT SERPL W P-5'-P-CCNC: 26 U/L (ref 12–78)
ANION GAP SERPL CALCULATED.3IONS-SCNC: 5 MMOL/L (ref 4–13)
AST SERPL W P-5'-P-CCNC: 14 U/L (ref 5–45)
BASOPHILS # BLD AUTO: 0.01 THOUSANDS/ΜL (ref 0–0.1)
BASOPHILS NFR BLD AUTO: 1 % (ref 0–1)
BILIRUB SERPL-MCNC: 0.15 MG/DL (ref 0.2–1)
BUN SERPL-MCNC: 17 MG/DL (ref 5–25)
CALCIUM ALBUM COR SERPL-MCNC: 9.9 MG/DL (ref 8.3–10.1)
CALCIUM SERPL-MCNC: 9.3 MG/DL (ref 8.3–10.1)
CHLORIDE SERPL-SCNC: 110 MMOL/L (ref 100–108)
CO2 SERPL-SCNC: 27 MMOL/L (ref 21–32)
CREAT SERPL-MCNC: 0.89 MG/DL (ref 0.6–1.3)
CREAT UR-MCNC: 24.9 MG/DL
EOSINOPHIL # BLD AUTO: 0.02 THOUSAND/ΜL (ref 0–0.61)
EOSINOPHIL NFR BLD AUTO: 1 % (ref 0–6)
ERYTHROCYTE [DISTWIDTH] IN BLOOD BY AUTOMATED COUNT: 16.2 % (ref 11.6–15.1)
GFR SERPL CREATININE-BSD FRML MDRD: 68 ML/MIN/1.73SQ M
GLUCOSE SERPL-MCNC: 99 MG/DL (ref 65–140)
HCT VFR BLD AUTO: 28.1 % (ref 34.8–46.1)
HGB BLD-MCNC: 9 G/DL (ref 11.5–15.4)
IMM GRANULOCYTES # BLD AUTO: 0.01 THOUSAND/UL (ref 0–0.2)
IMM GRANULOCYTES NFR BLD AUTO: 1 % (ref 0–2)
LYMPHOCYTES # BLD AUTO: 0.76 THOUSANDS/ΜL (ref 0.6–4.47)
LYMPHOCYTES NFR BLD AUTO: 34 % (ref 14–44)
MAGNESIUM SERPL-MCNC: 2 MG/DL (ref 1.6–2.6)
MCH RBC QN AUTO: 32.7 PG (ref 26.8–34.3)
MCHC RBC AUTO-ENTMCNC: 32 G/DL (ref 31.4–37.4)
MCV RBC AUTO: 102 FL (ref 82–98)
MONOCYTES # BLD AUTO: 0.26 THOUSAND/ΜL (ref 0.17–1.22)
MONOCYTES NFR BLD AUTO: 12 % (ref 4–12)
NEUTROPHILS # BLD AUTO: 1.16 THOUSANDS/ΜL (ref 1.85–7.62)
NEUTS SEG NFR BLD AUTO: 51 % (ref 43–75)
NRBC BLD AUTO-RTO: 0 /100 WBCS
PLATELET # BLD AUTO: 99 THOUSANDS/UL (ref 149–390)
PMV BLD AUTO: 9.8 FL (ref 8.9–12.7)
POTASSIUM SERPL-SCNC: 4.4 MMOL/L (ref 3.5–5.3)
PROT SERPL-MCNC: 6.8 G/DL (ref 6.4–8.2)
PROT UR-MCNC: 9 MG/DL
PROT/CREAT UR: 0.36 MG/G{CREAT} (ref 0–0.1)
RBC # BLD AUTO: 2.75 MILLION/UL (ref 3.81–5.12)
SODIUM SERPL-SCNC: 142 MMOL/L (ref 136–145)
WBC # BLD AUTO: 2.22 THOUSAND/UL (ref 4.31–10.16)

## 2021-07-02 PROCEDURE — 83735 ASSAY OF MAGNESIUM: CPT | Performed by: OBSTETRICS & GYNECOLOGY

## 2021-07-02 PROCEDURE — 82570 ASSAY OF URINE CREATININE: CPT

## 2021-07-02 PROCEDURE — 84156 ASSAY OF PROTEIN URINE: CPT

## 2021-07-02 PROCEDURE — 85025 COMPLETE CBC W/AUTO DIFF WBC: CPT | Performed by: OBSTETRICS & GYNECOLOGY

## 2021-07-02 PROCEDURE — 80053 COMPREHEN METABOLIC PANEL: CPT | Performed by: OBSTETRICS & GYNECOLOGY

## 2021-07-02 PROCEDURE — 96360 HYDRATION IV INFUSION INIT: CPT

## 2021-07-02 RX ADMIN — SODIUM CHLORIDE 1000 ML: 0.9 INJECTION, SOLUTION INTRAVENOUS at 12:15

## 2021-07-06 ENCOUNTER — OFFICE VISIT (OUTPATIENT)
Dept: GYNECOLOGIC ONCOLOGY | Facility: CLINIC | Age: 66
End: 2021-07-06
Payer: MEDICARE

## 2021-07-06 VITALS
HEIGHT: 59 IN | HEART RATE: 66 BPM | SYSTOLIC BLOOD PRESSURE: 116 MMHG | RESPIRATION RATE: 16 BRPM | BODY MASS INDEX: 26.97 KG/M2 | WEIGHT: 133.8 LBS | DIASTOLIC BLOOD PRESSURE: 72 MMHG

## 2021-07-06 DIAGNOSIS — C53.0 MALIGNANT NEOPLASM OF ENDOCERVIX (HCC): Primary | ICD-10-CM

## 2021-07-06 DIAGNOSIS — R06.00 DYSPNEA ON EXERTION: ICD-10-CM

## 2021-07-06 DIAGNOSIS — N13.39 OTHER HYDRONEPHROSIS: ICD-10-CM

## 2021-07-06 PROBLEM — R06.09 DYSPNEA ON EXERTION: Status: ACTIVE | Noted: 2021-07-06

## 2021-07-06 PROCEDURE — 99214 OFFICE O/P EST MOD 30 MIN: CPT | Performed by: OBSTETRICS & GYNECOLOGY

## 2021-07-06 NOTE — PROGRESS NOTES
Assessment/Plan:    Problem List Items Addressed This Visit        Genitourinary    Malignant neoplasm of endocervix [facility]) - Primary       Patient with recurrent widely metastatic cervical cancer  Now after 6 cycles of carboplatin, Taxol and Avastin she has had a dramatic objective response  Overall she feels well  She is somewhat fatigued and complains of dyspnea for which I will request Pulmonary evaluation as there was no evidence of significant tumor burden and lungs, pulmonary embolism or other contributing factors  There are no stigmata of heart failure  Currently, she has minimal if any measurable disease  Indeterminate pulmonary nodules will be followed  At this time, I have recommended to discontinue carboplatin and Taxol  Will continue with maintenance Avastin  She will receive single agent Avastin every 3 weeks  We plan to repeat CT scan in 3-4 months to evaluate response  Patient understands incurable nature of her disease  Once /when her disease progresses which we hope will be in the distant future she would be an excellent candidate for pembrolizumab therapy as she has a PD-L1 score of 20%  Other    Dyspnea on exertion      Patient has dyspnea on exertion which before she attributed to her anemia and cancer  Now, there are no identifiable contributing factors from the oncologic standpoint  I will request Pulmonary evaluation  Relevant Orders    Ambulatory referral to Pulmonology            CHIEF COMPLAINT:    evaluation after 6 cycles of chemotherapy for recurrent cervical cancer      Problem:  Cancer Staging  Malignant neoplasm of endocervix [facility])  Staging form: Cervix Uteri, AJCC 7th Edition  - Clinical stage from 3/29/2017: FIGO Stage IB2, calculated as Stage Unknown (T1b2, NX, M0) - Signed by Mohini Christopher MD on 2/4/2021        Previous therapy:  Oncology History   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[facility] [address] )   12/2016 Initial Diagnosis    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 [facility])  3/29/2017 -  Cancer Staged    Staging form: Cervix Uteri, AJCC 7th Edition  - Clinical stage from 3/29/2017: FIGO Stage IB2, calculated as Stage Unknown (T1b2, NX, M0) - Signed by Raheel Sutton MD on 2/4/2021  Staged by: Managing physician  Specimen type: Excision  Histopathologic type: Squamous cell carcinoma, NOS  Histologic grade (G): G3  Lymph-vascular invasion (LVI): LVI present/identified, NOS  Residual tumor (R): R0 - None  Pelvic jace status: Negative  Pelvic jace method of assessment: PET  Para-aortic status: Negative  Para-aortic jace method of assessment: PET  Stage used in treatment planning: Yes  National guidelines used in treatment planning: Yes  Type of national guideline used in treatment planning: NCCN       3/29/2017 Surgery    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)  FInal path with incidental 5 cm grade 3 invasive cervical SCCA, +LVI, invasion 9/10 mm cervical stroma, no lymphadenectomy, margins negative ([facility])  2/19/2021 Progression    A  Lymph Node, Left axillary, Biopsy:  - Metastatic squamous cell carcinoma (p16 positive) involving lymph node, compatible with patient's cervical primary  3/2/2021 -  Chemotherapy    Carboplatin AUC 5, Taxol 135 milligram/m2 and Avastin at 15 milligram/kilogram all to be administer on day 1 of a 21 day cycle           Patient ID: William Chung is a 77 y o  female  HPI    Patient with recurrent cervical cancer causing hydronephrosis, pelvic pain, etc   This was managed initially with retrograde ureteral stent which remains in place  She then started in March chemotherapy with carboplatin, Taxol and Avastin  She has completed 6 cycles  CT scan demonstrates excellent objective response  Overall, she feels well but reports worsening dyspnea on exertion  She has fatigue  Denies vaginal bleeding, drainage or discharge  Normal appetite    She has appointment to follow up with Urology in the near future  Alopecia is present as expected  Denies neuropathy  The following portions of the patient's history were reviewed and updated as appropriate: allergies, current medications, past family history, past medical history, past social history, past surgical history and problem list     Review of Systems    As per HPI  Twelve point review of systems otherwise unremarkable  Current Outpatient Medications   Medication Sig Dispense Refill    famotidine (PEPCID) 20 mg tablet Take 20 mg by mouth daily       Glycerin, Laxative, (Glycerin, Adult,) 2 1 g SUPP Insert 1 suppository into the rectum daily as needed (constipation) 25 suppository 0    lidocaine-prilocaine (EMLA) cream Apply topically as needed for mild pain 30 g 0    LORazepam (ATIVAN) 1 mg tablet Take 1 tablet (1 mg total) by mouth every 6 (six) hours as needed for anxiety (or nausea) 36 tablet 1    Multiple Vitamins-Minerals (Alive Womens Gummy) CHEW Chew      ondansetron (ZOFRAN) 8 mg tablet Take 1 tablet (8 mg total) by mouth every 8 (eight) hours as needed for nausea or vomiting 30 tablet 1    oxybutynin (DITROPAN-XL) 10 MG 24 hr tablet Take 1 tablet (10 mg total) by mouth daily at bedtime 30 tablet 3    polyethylene glycol (MIRALAX) 17 g packet Take 17 g by mouth daily as needed       promethazine (PHENERGAN) 12 5 MG tablet TAKE 1 TABLET (12 5 MG TOTAL) BY MOUTH EVERY 6 (SIX) HOURS AS NEEDED FOR NAUSEA OR VOMITING 30 tablet 0    oxyCODONE (Roxicodone) 5 mg immediate release tablet Take 1-2 tablets every 6 hours prn severe pain (Patient not taking: Reported on 7/6/2021) 6 tablet 0     No current facility-administered medications for this visit  Objective:    Blood pressure 116/72, pulse 66, resp  rate 16, height 4' 11" (1 499 m), weight 60 7 kg (133 lb 12 8 oz), not currently breastfeeding  Body mass index is 27 02 kg/m²  Body surface area is 1 55 meters squared  Physical Exam  Vitals reviewed   Exam conducted with a chaperone present  Constitutional:       Appearance: Normal appearance  She is not toxic-appearing or diaphoretic  Eyes:      General: No scleral icterus  Right eye: No discharge  Left eye: No discharge  Conjunctiva/sclera: Conjunctivae normal    Cardiovascular:      Rate and Rhythm: Normal rate and regular rhythm  Heart sounds: Normal heart sounds  No murmur heard  Pulmonary:      Effort: Pulmonary effort is normal  No respiratory distress  Breath sounds: Normal breath sounds  No wheezing  Abdominal:      General: Abdomen is flat  There is no distension  Palpations: There is no mass  Tenderness: There is no abdominal tenderness  Musculoskeletal:      Right lower leg: No edema  Left lower leg: No edema  Neurological:      Mental Status: She is alert          Gutierrez Conrad MD, Loreta Cobian  7/6/2021  9:52 AM

## 2021-07-06 NOTE — ASSESSMENT & PLAN NOTE
Patient has dyspnea on exertion which before she attributed to her anemia and cancer  Now, there are no identifiable contributing factors from the oncologic standpoint  I will request Pulmonary evaluation

## 2021-07-06 NOTE — PATIENT INSTRUCTIONS
Give follow-up with Urology  Keep appointment with Pulmonary Medicine  Contact us if you have any new symptoms  Will contact you and provide you with new calendar for single agent Avastin treatment

## 2021-07-06 NOTE — ASSESSMENT & PLAN NOTE
Due to extrinsic compression by pelvic lymphadenopathy  Patient has indwelling ureteral stent in place  Kidney is partially atrophic  Patient has follow-up scheduled with Urology  Defer to them stent exchange with retrograde pyelogram and consideration for stent removal/ observation    This would require close follow-up as she is at risk for needing permanent stent due to prior radiotherapy, fibrosis, etc

## 2021-07-06 NOTE — ASSESSMENT & PLAN NOTE
Patient with recurrent widely metastatic cervical cancer  Now after 6 cycles of carboplatin, Taxol and Avastin she has had a dramatic objective response  Overall she feels well  She is somewhat fatigued and complains of dyspnea for which I will request Pulmonary evaluation as there was no evidence of significant tumor burden and lungs, pulmonary embolism or other contributing factors  There are no stigmata of heart failure  Currently, she has minimal if any measurable disease  Indeterminate pulmonary nodules will be followed  At this time, I have recommended to discontinue carboplatin and Taxol  Will continue with maintenance Avastin  She will receive single agent Avastin every 3 weeks  We plan to repeat CT scan in 3-4 months to evaluate response  Patient understands incurable nature of her disease  Once /when her disease progresses which we hope will be in the distant future she would be an excellent candidate for pembrolizumab therapy as she has a PD-L1 score of 20%

## 2021-07-07 ENCOUNTER — HOSPITAL ENCOUNTER (OUTPATIENT)
Dept: INFUSION CENTER | Facility: HOSPITAL | Age: 66
Discharge: HOME | End: 2021-07-07
Payer: MEDICARE

## 2021-07-07 VITALS
DIASTOLIC BLOOD PRESSURE: 66 MMHG | TEMPERATURE: 97 F | OXYGEN SATURATION: 98 % | SYSTOLIC BLOOD PRESSURE: 139 MMHG | WEIGHT: 132.72 LBS | BODY MASS INDEX: 26.76 KG/M2 | RESPIRATION RATE: 16 BRPM | HEIGHT: 59 IN | HEART RATE: 72 BPM

## 2021-07-07 DIAGNOSIS — E87.6 HYPOKALEMIA: ICD-10-CM

## 2021-07-07 DIAGNOSIS — C53.0 MALIGNANT NEOPLASM OF ENDOCERVIX (HCC): Primary | ICD-10-CM

## 2021-07-07 DIAGNOSIS — C53.0 MALIGNANT NEOPLASM OF ENDOCERVIX (HCC): ICD-10-CM

## 2021-07-07 PROCEDURE — 96413 CHEMO IV INFUSION 1 HR: CPT

## 2021-07-07 RX ORDER — LIDOCAINE AND PRILOCAINE 25; 25 MG/G; MG/G
CREAM TOPICAL AS NEEDED
Qty: 30 G | Refills: 0 | Status: SHIPPED | OUTPATIENT
Start: 2021-07-07

## 2021-07-07 RX ORDER — SODIUM CHLORIDE 9 MG/ML
20 INJECTION, SOLUTION INTRAVENOUS ONCE
Status: COMPLETED | OUTPATIENT
Start: 2021-07-07 | End: 2021-07-07

## 2021-07-07 RX ADMIN — SODIUM CHLORIDE 20 ML/HR: 0.9 INJECTION, SOLUTION INTRAVENOUS at 08:33

## 2021-07-07 RX ADMIN — BEVACIZUMAB 900 MG: 400 INJECTION, SOLUTION INTRAVENOUS at 08:58

## 2021-07-13 NOTE — TELEPHONE ENCOUNTER
I agree with the previous recommendations to hydrate well with water and monitor her urine  She should take the oxybutynin for possible stent colic  Her symptoms are improving over the next couple of days she should give the office a call back  She should continue to hydrate well with water  If she has any fevers or discomfort with urinating we can do a urinalysis and culture for her     For symptoms continue we can do a KUB to ensure the stent is in his place

## 2021-07-13 NOTE — TELEPHONE ENCOUNTER
Returning a call to Patient   Seen in our([place]) office   Diag:Other hydronephrosis / urination urgency  S/p /ED visit: 6/3/21EXCHANGE STENT (Right Ureter) CYSTO W/RETROGRADE (Right Bladder  Last office visit: 5/20/19  Next appt:10/08/21 [facility]      Symptoms as verbalized by patient  Since tuesday evening  Patient was walking a lot yesterday  Has to lift water buckets due to plumbing issues  She noticed pain increase  When she had blood in urine it was fruit punch in color  With rest and hydration the urine cleared this morning  Right leg pain which started after the procedure  Pain: in pelvis 10/10 yesterday  4/10  Nausea/Vomiting: + nausea slight/ also on Chemo   Still on Avastin infusion  Fever/Chills: none  Bowel Movements: + today  Urine color: clear yellow  Frequency/Urgencey: yes   Stream: it is okay sometime good and sometime reduced  Previous urologic issues:  Sexually active:  Medication: Oxybutynin 10mg         Encouraged hydration 40-60oz of water daily  Avoiding Bladder irritants such as coffee,tea,soda,carbonated beverages  Limiting your alcohol intake  Avoiding constipation  Reducing cigarette smoking  Requested patient monitor/contact our office with fever above 101 0, chills, decreased urination or unable to urinate, blood or clots in the Urine  Health Call after hours protocol reviewed  Ed precautions reviewed   Patient verbilized understanding  Instructed patient to contact her pcp to evaluate the leg pain  Will send to our providers for further recommendations

## 2021-07-13 NOTE — TELEPHONE ENCOUNTER
Called and reviewed NP note with pt she had no additional questions and will call back if symptoms do not improve

## 2021-07-13 NOTE — TELEPHONE ENCOUNTER
Patient called in stating she started having blood in her urine for about a week   Patient can be reached at [phone]

## 2021-07-15 ENCOUNTER — PATIENT OUTREACH (OUTPATIENT)
Dept: CASE MANAGEMENT | Facility: HOSPITAL | Age: 66
End: 2021-07-15

## 2021-07-15 NOTE — PROGRESS NOTES
Oncology LSW attempted to outreach PT to provide support and assess for areas of need  LSW left PT detailed voicemail requesting return call at PT's earliest convenience  Oncology LSW received return call from PT  PT reported that she had been feeling "very down" following her last infusion treatment  PT explained that she had spoken with her oncology provider who reported to her that it was not unusual for her to feel progressively worse  PT reported that her next treatments were much shorter than the ones she had been given previously  PT further explained that she was given "good news" at her most recent doctors appointment, which she was extremely grateful for  PT then stated that she had found blood in her urine not too long after this appointment  PT explained that she did follow up with urology and was told to continue to monitor her urine and get back to them if it continued  PT reported that she was frustrated with her landlord and the way that she addressed PT and her neighbors' needs around the homes  LSW engaged in active listening at this time and provided PT with emotional support  PT receptive to support provided  LSW and PT then engaged in light conversation through the duration of their conversation  LSW encouraged to to outreach her in the future should any questions or areas of need arise  PT was agreeable with this plan and thanked LSW for her call

## 2021-07-28 ENCOUNTER — HOSPITAL ENCOUNTER (OUTPATIENT)
Dept: INFUSION CENTER | Facility: HOSPITAL | Age: 66
Discharge: HOME | End: 2021-07-28

## 2021-07-29 ENCOUNTER — PATIENT OUTREACH (OUTPATIENT)
Dept: CASE MANAGEMENT | Facility: HOSPITAL | Age: 66
End: 2021-07-29

## 2021-07-29 ENCOUNTER — APPOINTMENT (OUTPATIENT)
Dept: LAB | Facility: CLINIC | Age: 66
End: 2021-07-29
Payer: MEDICARE

## 2021-07-29 ENCOUNTER — OFFICE VISIT (OUTPATIENT)
Dept: INTERNAL MEDICINE CLINIC | Facility: CLINIC | Age: 66
End: 2021-07-29
Payer: MEDICARE

## 2021-07-29 DIAGNOSIS — L01.00 IMPETIGO: Primary | ICD-10-CM

## 2021-07-29 DIAGNOSIS — E86.0 DEHYDRATION: ICD-10-CM

## 2021-07-29 LAB
ALBUMIN SERPL BCP-MCNC: 3.6 G/DL (ref 3.5–5)
ALP SERPL-CCNC: 104 U/L (ref 46–116)
ALT SERPL W P-5'-P-CCNC: 19 U/L (ref 12–78)
ANION GAP SERPL CALCULATED.3IONS-SCNC: 10 MMOL/L (ref 4–13)
AST SERPL W P-5'-P-CCNC: 15 U/L (ref 5–45)
BASOPHILS # BLD AUTO: 0.02 THOUSANDS/ΜL (ref 0–0.1)
BASOPHILS NFR BLD AUTO: 1 % (ref 0–1)
BILIRUB SERPL-MCNC: 0.27 MG/DL (ref 0.2–1)
BUN SERPL-MCNC: 17 MG/DL (ref 5–25)
CALCIUM SERPL-MCNC: 9.3 MG/DL (ref 8.3–10.1)
CHLORIDE SERPL-SCNC: 109 MMOL/L (ref 100–108)
CO2 SERPL-SCNC: 23 MMOL/L (ref 21–32)
CREAT SERPL-MCNC: 0.89 MG/DL (ref 0.6–1.3)
EOSINOPHIL # BLD AUTO: 0.05 THOUSAND/ΜL (ref 0–0.61)
EOSINOPHIL NFR BLD AUTO: 1 % (ref 0–6)
ERYTHROCYTE [DISTWIDTH] IN BLOOD BY AUTOMATED COUNT: 16.1 % (ref 11.6–15.1)
GFR SERPL CREATININE-BSD FRML MDRD: 68 ML/MIN/1.73SQ M
GLUCOSE P FAST SERPL-MCNC: 92 MG/DL (ref 65–99)
HCT VFR BLD AUTO: 36.1 % (ref 34.8–46.1)
HGB BLD-MCNC: 11.4 G/DL (ref 11.5–15.4)
IMM GRANULOCYTES # BLD AUTO: 0.01 THOUSAND/UL (ref 0–0.2)
IMM GRANULOCYTES NFR BLD AUTO: 0 % (ref 0–2)
LYMPHOCYTES # BLD AUTO: 0.9 THOUSANDS/ΜL (ref 0.6–4.47)
LYMPHOCYTES NFR BLD AUTO: 25 % (ref 14–44)
MAGNESIUM SERPL-MCNC: 1.9 MG/DL (ref 1.6–2.6)
MCH RBC QN AUTO: 33 PG (ref 26.8–34.3)
MCHC RBC AUTO-ENTMCNC: 31.6 G/DL (ref 31.4–37.4)
MCV RBC AUTO: 105 FL (ref 82–98)
MONOCYTES # BLD AUTO: 0.51 THOUSAND/ΜL (ref 0.17–1.22)
MONOCYTES NFR BLD AUTO: 14 % (ref 4–12)
NEUTROPHILS # BLD AUTO: 2.19 THOUSANDS/ΜL (ref 1.85–7.62)
NEUTS SEG NFR BLD AUTO: 59 % (ref 43–75)
NRBC BLD AUTO-RTO: 0 /100 WBCS
PLATELET # BLD AUTO: 192 THOUSANDS/UL (ref 149–390)
PMV BLD AUTO: 11.1 FL (ref 8.9–12.7)
POTASSIUM SERPL-SCNC: 3.8 MMOL/L (ref 3.5–5.3)
PROT SERPL-MCNC: 7.4 G/DL (ref 6.4–8.2)
RBC # BLD AUTO: 3.45 MILLION/UL (ref 3.81–5.12)
SODIUM SERPL-SCNC: 142 MMOL/L (ref 136–145)
WBC # BLD AUTO: 3.68 THOUSAND/UL (ref 4.31–10.16)

## 2021-07-29 PROCEDURE — 99213 OFFICE O/P EST LOW 20 MIN: CPT | Performed by: FAMILY MEDICINE

## 2021-07-29 PROCEDURE — 83735 ASSAY OF MAGNESIUM: CPT

## 2021-07-29 PROCEDURE — 80053 COMPREHEN METABOLIC PANEL: CPT

## 2021-07-29 PROCEDURE — 36415 COLL VENOUS BLD VENIPUNCTURE: CPT

## 2021-07-29 PROCEDURE — 85025 COMPLETE CBC W/AUTO DIFF WBC: CPT

## 2021-07-29 RX ORDER — TRIAMCINOLONE ACETONIDE 0.25 MG/G
CREAM TOPICAL 2 TIMES DAILY
Qty: 30 G | Refills: 0 | Status: SHIPPED | OUTPATIENT
Start: 2021-07-29 | End: 2021-11-05

## 2021-07-29 RX ORDER — AMOXICILLIN 500 MG/1
500 TABLET, FILM COATED ORAL 3 TIMES DAILY
Qty: 21 TABLET | Refills: 0 | Status: SHIPPED | OUTPATIENT
Start: 2021-07-29 | End: 2021-08-05

## 2021-07-30 ENCOUNTER — HOSPITAL ENCOUNTER (OUTPATIENT)
Dept: INFUSION CENTER | Facility: HOSPITAL | Age: 66
Discharge: HOME | End: 2021-07-30
Payer: MEDICARE

## 2021-07-30 VITALS
HEART RATE: 67 BPM | RESPIRATION RATE: 16 BRPM | TEMPERATURE: 95.9 F | SYSTOLIC BLOOD PRESSURE: 132 MMHG | DIASTOLIC BLOOD PRESSURE: 61 MMHG | HEIGHT: 59 IN | OXYGEN SATURATION: 97 % | WEIGHT: 133.6 LBS | BODY MASS INDEX: 26.93 KG/M2

## 2021-07-30 DIAGNOSIS — C53.0 MALIGNANT NEOPLASM OF ENDOCERVIX (HCC): Primary | ICD-10-CM

## 2021-07-30 DIAGNOSIS — E87.6 HYPOKALEMIA: ICD-10-CM

## 2021-07-30 LAB
CREAT UR-MCNC: 42.5 MG/DL
PROT UR-MCNC: 49 MG/DL
PROT/CREAT UR: 1.15 MG/G{CREAT} (ref 0–0.1)

## 2021-07-30 PROCEDURE — 84156 ASSAY OF PROTEIN URINE: CPT

## 2021-07-30 PROCEDURE — 96413 CHEMO IV INFUSION 1 HR: CPT

## 2021-07-30 PROCEDURE — 82570 ASSAY OF URINE CREATININE: CPT

## 2021-07-30 RX ORDER — SODIUM CHLORIDE 9 MG/ML
20 INJECTION, SOLUTION INTRAVENOUS ONCE
Status: COMPLETED | OUTPATIENT
Start: 2021-07-30 | End: 2021-07-30

## 2021-07-30 RX ADMIN — BEVACIZUMAB 900 MG: 400 INJECTION, SOLUTION INTRAVENOUS at 09:13

## 2021-07-30 RX ADMIN — SODIUM CHLORIDE 20 ML/HR: 0.9 INJECTION, SOLUTION INTRAVENOUS at 08:41

## 2021-07-30 NOTE — PROGRESS NOTES
Assessment/Plan:    No problem-specific Assessment & Plan notes found for this encounter  Diagnoses and all orders for this visit:    Impetigo  -     amoxicillin (AMOXIL) 500 MG tablet; Take 1 tablet (500 mg total) by mouth 3 (three) times a day for 7 days  -     triamcinolone (KENALOG) 0 025 % cream; Apply topically 2 (two) times a day         orders recommendations as noted above  Reviewed previous pictures of the rash at the beginning  There was some vesicles and some scabbing noted  Doubt this was shingles but it is still possible  Amoxicillin as noted above orally and Bactroban topically  Watch for any worsening  Advised her to contact the office next week to give us an update on how she is doing  Subjective:      Patient ID: Sarahy Villela is a 77 y o  female  She presents for acute visit  She developed a rash on her right cheek at the time of of procedure at the hospital   She had to wear mask for over 8 hours  She subsequently developed an area of scabbing and vesicles  Painful and itchy  Only 1 significant patch developed  She was too weak from the chemotherapy at that point to come in to be checked  She is concerned because the area has not resolved completely  Still is sore and slightly itchy  Still with some scabbing noted at times  Denies any fevers or chills  The following portions of the patient's history were reviewed and updated as appropriate:   She  has a past medical history of Acne, Anxiety, Anxiety and depression (10/2/2019), Arthritis, Breathing difficulty, Cancer ([facility] [address] ), Colon polyp, GERD (gastroesophageal reflux disease), History of radiation therapy, Irritable bowel syndrome, Joint pain following chemotherapy, Liver disease, Lung nodule, Muscle weakness, Neck pain, Port-A-Cath in place, Shortness of breath, Thumb injury (10/2019), Thyroid nodule, Tinnitus of left ear (2/19/2020), Urinary problem, and 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)    She   Patient Active Problem List    Diagnosis Date Noted    Impetigo 07/29/2021    Dyspnea on exertion 07/06/2021    Thyroid lesion 06/23/2021    Constipation 06/23/2021    Chemotherapy induced neutropenia (HCC) 06/10/2021    Thrombocytopenia ([facility] [address] ) 06/10/2021    Pain in thoracic spine 06/03/2021    Urgency of urination 05/20/2021    Chemotherapy-induced nausea 05/04/2021    Healthcare maintenance 04/08/2021    Hypokalemia 03/02/2021    Dehydration 02/26/2021    Encounter for screening involving social determinants of health (SDoH) 02/24/2021    Other hydronephrosis 02/04/2021    BMI 29 0-29 9,adult 01/05/2021    Right hip pain 01/05/2021    Wrist arthritis 03/17/2020    Tinnitus of left ear 02/19/2020    Trigger thumb of right hand 10/08/2019    Anxiety and depression 10/02/2019    Zaldivar's neuroma, left 07/12/2018    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 ) 04/11/2017     She  has a past surgical history that includes Hysterectomy; Tubal ligation; pr excis interdigital neuroma,ea (Left, 7/12/2018); Foot surgery; [place] retrograde pyelogram (2/11/2021); pr cystourethroscopy,ureter catheter (Right, 2/11/2021); IR biopsy lymph node (2/19/2021); IR port placement (3/9/2021); Colonoscopy; pr remove & replace indwell ureteral stent trurthrl (Right, 6/3/2021); Cystoscopy (Right, 6/3/2021); and FL retrograde pyelogram (6/3/2021)  Her family history includes Alcohol abuse in her father; No Known Problems in her daughter, daughter, daughter, maternal aunt, maternal aunt, maternal grandfather, maternal grandmother, paternal aunt, paternal aunt, paternal aunt, paternal aunt, paternal aunt, paternal aunt, paternal aunt, paternal aunt, paternal aunt, paternal aunt, paternal grandfather, paternal grandmother, sister, sister, sister, sister, and sister; Stomach cancer in her mother  She  reports that she quit smoking about 18 years ago  She has a 75 00 pack-year smoking history   She has never used smokeless tobacco  She reports current alcohol use  She reports that she does not use drugs  Current Outpatient Medications   Medication Sig Dispense Refill    amoxicillin (AMOXIL) 500 MG tablet Take 1 tablet (500 mg total) by mouth 3 (three) times a day for 7 days 21 tablet 0    famotidine (PEPCID) 20 mg tablet Take 20 mg by mouth daily       Glycerin, Laxative, (Glycerin, Adult,) 2 1 g SUPP Insert 1 suppository into the rectum daily as needed (constipation) 25 suppository 0    lidocaine-prilocaine (EMLA) cream APPLY TOPICALLY AS NEEDED FOR MILD PAIN 30 g 0    LORazepam (ATIVAN) 1 mg tablet Take 1 tablet (1 mg total) by mouth every 6 (six) hours as needed for anxiety (or nausea) 36 tablet 1    Multiple Vitamins-Minerals (Alive Womens Gummy) CHEW Chew      ondansetron (ZOFRAN) 8 mg tablet Take 1 tablet (8 mg total) by mouth every 8 (eight) hours as needed for nausea or vomiting 30 tablet 1    oxybutynin (DITROPAN-XL) 10 MG 24 hr tablet Take 1 tablet (10 mg total) by mouth daily at bedtime 30 tablet 3    oxyCODONE (Roxicodone) 5 mg immediate release tablet Take 1-2 tablets every 6 hours prn severe pain (Patient not taking: Reported on 7/6/2021) 6 tablet 0    polyethylene glycol (MIRALAX) 17 g packet Take 17 g by mouth daily as needed       promethazine (PHENERGAN) 12 5 MG tablet TAKE 1 TABLET (12 5 MG TOTAL) BY MOUTH EVERY 6 (SIX) HOURS AS NEEDED FOR NAUSEA OR VOMITING 30 tablet 0    psyllium (METAMUCIL) 0 52 g capsule Take 750 mg by mouth daily      triamcinolone (KENALOG) 0 025 % cream Apply topically 2 (two) times a day 30 g 0     No current facility-administered medications for this visit       Current Outpatient Medications on File Prior to Visit   Medication Sig    famotidine (PEPCID) 20 mg tablet Take 20 mg by mouth daily     Glycerin, Laxative, (Glycerin, Adult,) 2 1 g SUPP Insert 1 suppository into the rectum daily as needed (constipation)    lidocaine-prilocaine (EMLA) cream APPLY TOPICALLY AS NEEDED FOR MILD PAIN    LORazepam (ATIVAN) 1 mg tablet Take 1 tablet (1 mg total) by mouth every 6 (six) hours as needed for anxiety (or nausea)    Multiple Vitamins-Minerals (Alive Womens Gummy) CHEW Chew    ondansetron (ZOFRAN) 8 mg tablet Take 1 tablet (8 mg total) by mouth every 8 (eight) hours as needed for nausea or vomiting    oxybutynin (DITROPAN-XL) 10 MG 24 hr tablet Take 1 tablet (10 mg total) by mouth daily at bedtime    oxyCODONE (Roxicodone) 5 mg immediate release tablet Take 1-2 tablets every 6 hours prn severe pain (Patient not taking: Reported on 7/6/2021)    polyethylene glycol (MIRALAX) 17 g packet Take 17 g by mouth daily as needed     promethazine (PHENERGAN) 12 5 MG tablet TAKE 1 TABLET (12 5 MG TOTAL) BY MOUTH EVERY 6 (SIX) HOURS AS NEEDED FOR NAUSEA OR VOMITING    psyllium (METAMUCIL) 0 52 g capsule Take 750 mg by mouth daily     No current facility-administered medications on file prior to visit  She is allergic to medical tape, aspirin, codeine, dust mite extract, and pollen extract       Review of Systems   Constitutional: Positive for activity change and appetite change  Negative for chills, fatigue and fever  Skin: Positive for rash  Objective:      LMP  (LMP Unknown)          Physical Exam  Vitals and nursing note reviewed  Constitutional:       Appearance: She is well-developed and well-groomed  Skin:     Coloration: Skin is pale  Comments:   Slightly erythematous rash right cheek with some scarring noted; slight scabbing   Neurological:      Mental Status: She is alert  Psychiatric:         Behavior: Behavior is cooperative

## 2021-08-04 ENCOUNTER — DOCUMENTATION (OUTPATIENT)
Dept: HEMATOLOGY ONCOLOGY | Facility: CLINIC | Age: 66
End: 2021-08-04

## 2021-08-04 NOTE — PROGRESS NOTES
Faxed the following to [facility] for payment  Acct# [de-identified] [facility] 03/24/21 $[id number]  Acct# [de-identified] dos 04/14/21 $[id number]  Acct# [de-identified] dos 05/05/21 $[id number] 02  Acct# [de-identified] dos 05/28/21 $1129 [id number]  Acct# [de-identified] dos 06/16/21 $[id number]  Acct# [de-identified] dos 07/07/21 $1331 58

## 2021-08-12 ENCOUNTER — PATIENT OUTREACH (OUTPATIENT)
Dept: CASE MANAGEMENT | Facility: HOSPITAL | Age: 66
End: 2021-08-12

## 2021-08-12 DIAGNOSIS — R39.15 URGENCY OF URINATION: ICD-10-CM

## 2021-08-12 RX ORDER — OXYBUTYNIN CHLORIDE 10 MG/1
TABLET, EXTENDED RELEASE ORAL
Qty: 90 TABLET | Refills: 1 | Status: SHIPPED | OUTPATIENT
Start: 2021-08-12 | End: 2022-02-08

## 2021-08-12 NOTE — PROGRESS NOTES
Oncology LSW outreached PT to provide support and assess for areas of need  PT reported that she was "doing pretty good " PT explained that she was in the process of getting ivelisse of urology in [place] to transfer care from [facility] to there, as she needs to have her stent replaced near the end of the year  PT reported that she has placed several calls to this office, but has yet received a return call  PT reported that there was a prompt for an emergency call, which she was going to select should she not hear back from anyone by the beginning of next week  PT further explained that she was frustrated that she was sent to [employer] for a bill for $28  PT reported that she was most frustrated with this because she had not been sent a notice prior to being sent to [employer]  PT reported that she had sent the payment in for this bill with a strongly worded letter  LSW engaged in active listening at this time and provided PT with emotional support  PT receptive to support provided  PT stated that she had spoken with Jameson Valdivia from [employer] had signed up for their [facility] open house event at the end of August  PT reported that she typically was not the type of person to engage in group settings, but felt that this would be a good opportunity to put herself out there  LSW explained that she would follow up again with PT following the open house to see how it went and to provide PT with further support  PT was agreeable with this plan and thanked LSW for her call  Emotional support provided

## 2021-08-13 ENCOUNTER — TELEPHONE (OUTPATIENT)
Dept: UROLOGY | Facility: CLINIC | Age: 66
End: 2021-08-13

## 2021-08-13 NOTE — TELEPHONE ENCOUNTER
Pt called and wanted to pick a date for her stent exchange in November  She has an appt for H &P on 10/8/21 with Elkin Vail  She is requesting Dr Suzanna Benites and doesn't want to go to [facility]  She wanted to know if she could schedule at [facility] or [facility]  Forwarding to surgery scheduler

## 2021-08-13 NOTE — TELEPHONE ENCOUNTER
Pt l/m on v/m that she needs an appt for stent removal on Tuesday - states this is her second call in  For appt and  Would like a c/b

## 2021-08-17 ENCOUNTER — APPOINTMENT (OUTPATIENT)
Dept: LAB | Facility: CLINIC | Age: 66
End: 2021-08-17
Payer: MEDICARE

## 2021-08-17 DIAGNOSIS — E86.0 DEHYDRATION: ICD-10-CM

## 2021-08-17 LAB
ALBUMIN SERPL BCP-MCNC: 3.5 G/DL (ref 3.5–5)
ALP SERPL-CCNC: 108 U/L (ref 46–116)
ALT SERPL W P-5'-P-CCNC: 20 U/L (ref 12–78)
ANION GAP SERPL CALCULATED.3IONS-SCNC: 8 MMOL/L (ref 4–13)
AST SERPL W P-5'-P-CCNC: 15 U/L (ref 5–45)
BASOPHILS # BLD AUTO: 0.01 THOUSANDS/ΜL (ref 0–0.1)
BASOPHILS NFR BLD AUTO: 0 % (ref 0–1)
BILIRUB SERPL-MCNC: 0.31 MG/DL (ref 0.2–1)
BUN SERPL-MCNC: 16 MG/DL (ref 5–25)
CALCIUM SERPL-MCNC: 9.5 MG/DL (ref 8.3–10.1)
CHLORIDE SERPL-SCNC: 110 MMOL/L (ref 100–108)
CO2 SERPL-SCNC: 24 MMOL/L (ref 21–32)
CREAT SERPL-MCNC: 0.86 MG/DL (ref 0.6–1.3)
EOSINOPHIL # BLD AUTO: 0.13 THOUSAND/ΜL (ref 0–0.61)
EOSINOPHIL NFR BLD AUTO: 3 % (ref 0–6)
ERYTHROCYTE [DISTWIDTH] IN BLOOD BY AUTOMATED COUNT: 14.7 % (ref 11.6–15.1)
GFR SERPL CREATININE-BSD FRML MDRD: 71 ML/MIN/1.73SQ M
GLUCOSE P FAST SERPL-MCNC: 103 MG/DL (ref 65–99)
HCT VFR BLD AUTO: 35.6 % (ref 34.8–46.1)
HGB BLD-MCNC: 11.3 G/DL (ref 11.5–15.4)
IMM GRANULOCYTES # BLD AUTO: 0.01 THOUSAND/UL (ref 0–0.2)
IMM GRANULOCYTES NFR BLD AUTO: 0 % (ref 0–2)
LYMPHOCYTES # BLD AUTO: 0.94 THOUSANDS/ΜL (ref 0.6–4.47)
LYMPHOCYTES NFR BLD AUTO: 23 % (ref 14–44)
MAGNESIUM SERPL-MCNC: 2.1 MG/DL (ref 1.6–2.6)
MCH RBC QN AUTO: 32.8 PG (ref 26.8–34.3)
MCHC RBC AUTO-ENTMCNC: 31.7 G/DL (ref 31.4–37.4)
MCV RBC AUTO: 104 FL (ref 82–98)
MONOCYTES # BLD AUTO: 0.49 THOUSAND/ΜL (ref 0.17–1.22)
MONOCYTES NFR BLD AUTO: 12 % (ref 4–12)
NEUTROPHILS # BLD AUTO: 2.6 THOUSANDS/ΜL (ref 1.85–7.62)
NEUTS SEG NFR BLD AUTO: 62 % (ref 43–75)
NRBC BLD AUTO-RTO: 0 /100 WBCS
PLATELET # BLD AUTO: 177 THOUSANDS/UL (ref 149–390)
PMV BLD AUTO: 11.5 FL (ref 8.9–12.7)
POTASSIUM SERPL-SCNC: 3.9 MMOL/L (ref 3.5–5.3)
PROT SERPL-MCNC: 7.4 G/DL (ref 6.4–8.2)
RBC # BLD AUTO: 3.44 MILLION/UL (ref 3.81–5.12)
SODIUM SERPL-SCNC: 142 MMOL/L (ref 136–145)
WBC # BLD AUTO: 4.18 THOUSAND/UL (ref 4.31–10.16)

## 2021-08-17 PROCEDURE — 85025 COMPLETE CBC W/AUTO DIFF WBC: CPT

## 2021-08-17 PROCEDURE — 83735 ASSAY OF MAGNESIUM: CPT

## 2021-08-17 PROCEDURE — 36415 COLL VENOUS BLD VENIPUNCTURE: CPT

## 2021-08-17 PROCEDURE — 80053 COMPREHEN METABOLIC PANEL: CPT

## 2021-08-18 ENCOUNTER — HOSPITAL ENCOUNTER (OUTPATIENT)
Dept: INFUSION CENTER | Facility: HOSPITAL | Age: 66
Discharge: HOME | End: 2021-08-18
Payer: MEDICARE

## 2021-08-18 VITALS
TEMPERATURE: 96.8 F | HEART RATE: 78 BPM | SYSTOLIC BLOOD PRESSURE: 120 MMHG | DIASTOLIC BLOOD PRESSURE: 82 MMHG | BODY MASS INDEX: 26.71 KG/M2 | HEIGHT: 59 IN | WEIGHT: 132.5 LBS | OXYGEN SATURATION: 97 % | RESPIRATION RATE: 16 BRPM

## 2021-08-18 DIAGNOSIS — C53.0 MALIGNANT NEOPLASM OF ENDOCERVIX (HCC): ICD-10-CM

## 2021-08-18 DIAGNOSIS — E87.6 HYPOKALEMIA: ICD-10-CM

## 2021-08-18 DIAGNOSIS — C53.0 MALIGNANT NEOPLASM OF ENDOCERVIX (HCC): Primary | ICD-10-CM

## 2021-08-18 PROCEDURE — 96413 CHEMO IV INFUSION 1 HR: CPT

## 2021-08-18 RX ORDER — SODIUM CHLORIDE 9 MG/ML
20 INJECTION, SOLUTION INTRAVENOUS ONCE
Status: COMPLETED | OUTPATIENT
Start: 2021-08-18 | End: 2021-08-18

## 2021-08-18 RX ORDER — LORAZEPAM 1 MG/1
1 TABLET ORAL EVERY 6 HOURS PRN
Qty: 36 TABLET | Refills: 1 | Status: SHIPPED | OUTPATIENT
Start: 2021-08-18 | End: 2021-10-26

## 2021-08-18 RX ADMIN — SODIUM CHLORIDE 20 ML/HR: 0.9 INJECTION, SOLUTION INTRAVENOUS at 11:28

## 2021-08-18 RX ADMIN — BEVACIZUMAB 900 MG: 400 INJECTION, SOLUTION INTRAVENOUS at 11:32

## 2021-08-19 ENCOUNTER — TELEPHONE (OUTPATIENT)
Dept: UROLOGY | Facility: CLINIC | Age: 66
End: 2021-08-19

## 2021-08-25 NOTE — TELEPHONE ENCOUNTER
I spoke to patient and schedule surgery for 10/18 at [facility] with Dr Cope Forget  I am mailing her a surgery packet  She will have a urine culture prior to surgery

## 2021-08-25 NOTE — TELEPHONE ENCOUNTER
I left message for patient to call me back to schedule next stent exchange with Dr Hyacinth Batista

## 2021-08-26 PROBLEM — Z45.2 ENCOUNTER FOR VENOUS ACCESS DEVICE CARE: Status: ACTIVE | Noted: 2021-08-26

## 2021-08-27 ENCOUNTER — PATIENT OUTREACH (OUTPATIENT)
Dept: CASE MANAGEMENT | Facility: HOSPITAL | Age: 66
End: 2021-08-27

## 2021-08-27 NOTE — PROGRESS NOTES
Oncology LSW outreached PT to provide support and assess for areas  PT reported that she had gone to the [facility] yesterday with Turn to [facility]  And the [address] of the [address]  PT reported that she had felt comfortable there  PT stated that she was going to do her best to make it to several groups/events that they will be having in the future  PT reported that she had started doing chair yoga at home  PT also explained that his grandson's mother would be picking her up after infusion on 9/8 to take her to her house in [place] for a few days so that she could spend time with her grandson, as she had not seen him for a years  PT reported that she doesn't get on too well with his grandson's mother, but is doing her best to "keep the peace" with her  PT reported that she was looking forward to getting a break from her apartment, as she does not feel overly happy there  PT reports that she does not want to complain about her living situation, but it just isn't ideal for her  PT explained that she finally had plumbing repairs completed in her apartment, which is also another frustration for PT  However, PT is making the most a less than ideal situation  PT reported that she is feeling better after her chemotherapy  PT stated that she was not trying to get "overly optimistic" with her remission diagnosis, but was hopeful that it would give her "a couple more years to find my purpose here " LSW engaged in active listening at this time and provided PT with emotional support  PT receptive to support provided  LSW explained that she would reach out to PT again in a few weeks to check in and encouraged PT to reach out to her before that time should any questions or areas of concern arise  PT was agreeable with this plan and thanked LSW for her call

## 2021-09-07 ENCOUNTER — APPOINTMENT (OUTPATIENT)
Dept: LAB | Facility: CLINIC | Age: 66
End: 2021-09-07
Payer: MEDICARE

## 2021-09-07 ENCOUNTER — TELEPHONE (OUTPATIENT)
Dept: HEMATOLOGY ONCOLOGY | Facility: CLINIC | Age: 66
End: 2021-09-07

## 2021-09-07 NOTE — TELEPHONE ENCOUNTER
The patient is at the lab to have her labs drawn prior to her infusion tomorrow  There are no labs in epic  The tech stated she will draw the CBC,CMP and Magnesium that is usually done and will wait for an order to be entered

## 2021-09-08 ENCOUNTER — HOSPITAL ENCOUNTER (OUTPATIENT)
Dept: INFUSION CENTER | Facility: HOSPITAL | Age: 66
Discharge: HOME | End: 2021-09-08
Payer: MEDICARE

## 2021-09-08 VITALS
SYSTOLIC BLOOD PRESSURE: 142 MMHG | OXYGEN SATURATION: 99 % | RESPIRATION RATE: 16 BRPM | HEIGHT: 59 IN | BODY MASS INDEX: 26.67 KG/M2 | DIASTOLIC BLOOD PRESSURE: 62 MMHG | HEART RATE: 66 BPM | WEIGHT: 132.28 LBS | TEMPERATURE: 96.8 F

## 2021-09-08 DIAGNOSIS — E87.6 HYPOKALEMIA: ICD-10-CM

## 2021-09-08 DIAGNOSIS — C53.0 MALIGNANT NEOPLASM OF ENDOCERVIX (HCC): Primary | ICD-10-CM

## 2021-09-08 LAB
CREAT UR-MCNC: 91.3 MG/DL
PROT UR-MCNC: 38 MG/DL
PROT/CREAT UR: 0.42 MG/G{CREAT} (ref 0–0.1)

## 2021-09-08 PROCEDURE — 96413 CHEMO IV INFUSION 1 HR: CPT

## 2021-09-08 PROCEDURE — 82570 ASSAY OF URINE CREATININE: CPT

## 2021-09-08 PROCEDURE — 84156 ASSAY OF PROTEIN URINE: CPT

## 2021-09-08 RX ORDER — SODIUM CHLORIDE 9 MG/ML
20 INJECTION, SOLUTION INTRAVENOUS ONCE
Status: COMPLETED | OUTPATIENT
Start: 2021-09-08 | End: 2021-09-08

## 2021-09-08 RX ADMIN — BEVACIZUMAB 900 MG: 400 INJECTION, SOLUTION INTRAVENOUS at 08:55

## 2021-09-08 RX ADMIN — SODIUM CHLORIDE 20 ML/HR: 0.9 INJECTION, SOLUTION INTRAVENOUS at 08:32

## 2021-09-08 NOTE — TELEPHONE ENCOUNTER
[facility]      Subjective:      Chief Complaint   Patient presents with    Follow-up     4 wk f/u left foot fx, pt says that he foot felt fine but she kicked a chair today at school and it hurts again       HPI: Rama Gudino is a 15 y.o. female who left 2-4 metatarsal base fracture as occurred back in June. She been walking with shoes without any difficulties. No pain to palpation over the fracture sites. No past medical history on file. No past surgical history on file. Social History     Socioeconomic History    Marital status: Single     Spouse name: Not on file    Number of children: Not on file    Years of education: Not on file    Highest education level: Not on file   Occupational History    Not on file   Tobacco Use    Smoking status: Never Smoker    Smokeless tobacco: Never Used   Vaping Use    Vaping Use: Never used   Substance and Sexual Activity    Alcohol use: Never    Drug use: Never    Sexual activity: Never   Other Topics Concern    Not on file   Social History Narrative    Not on file     Social Determinants of Health     Financial Resource Strain:     Difficulty of Paying Living Expenses:    Food Insecurity:     Worried About Running Out of Food in the Last Year:     [address] in the Last Year:    Transportation Needs:     Lack of Transportation (Medical):      Lack of Transportation (Non-Medical):    Physical Activity:     Days of Exercise per Week:     Minutes of Exercise per Session:    Stress:     Feeling of Stress :    Social Connections:     Frequency of Communication with Friends and Family:     Frequency of Social Gatherings with Friends and Family:     Attends [employer] Services:     Active Member of Clubs or Organizations:     Attends Club or Organization Meetings:     Marital Status:    Intimate Partner Violence:     Fear of Current or Ex-Partner:     Emotionally Abused:     Physically Abused:     Chart was reviewed by MASON Rivera  Pt should see gyn oncology instead of medically oncology  LMOM for pt to call back and discuss  Will need to obtain previous cervical ca records  Per Sahil Rivera pt needs to be scheduled ASAP, there are openings with Dr Timothy Merlos tomorrow 1/20  Sexually Abused:      No family history on file. No Known Allergies  Current Outpatient Medications on File Prior to Visit   Medication Sig Dispense Refill    ibuprofen (ADVIL;MOTRIN) 200 MG tablet Take 200 mg by mouth every 6 hours as needed for Pain (Patient not taking: Reported on 9/8/2021)       No current facility-administered medications on file prior to visit. Objective:   Temp 97.7 °F (36.5 °C) (Temporal)   Ht 5' 3\" (1.6 m)   Wt 144 lb (65.3 kg)   LMP 09/06/2021 (Exact Date)   BMI 25.51 kg/m²       Radiographs and Laboratory Studies:   Previous diagnostic imaging studies were reviewed. Laboratory Studies:   Lab Results   Component Value Date    WBC 6.8 12/04/2012    HGB 11.3 (L) 12/04/2012    HCT 33.6 (L) 12/04/2012    MCV 82.6 12/04/2012     12/04/2012     No results found for: [address]  No results found for: CRP    Assessment and Plan:      Diagnosis Orders   1. Nondisplaced fracture of first metatarsal bone, left foot, initial encounter for closed fracture     2. Nondisplaced fracture of second metatarsal bone, left foot, initial encounter for closed fracture     3. Displaced fracture of third metatarsal bone, left foot, initial encounter for closed fracture         Left 3 and half months since metatarsal fractures 2 through 4 at the left foot. She is wearing a shoe without any pain. She no preissues with ambulation. No pain to palpation. Is when she can return to gym without any restrictions. She have any pain while she participates in to stop playing and call our office but otherwise this fracture is going to heal without any difficulties or complications. Therefore she'll need to be seen in this office for     The above plan was discussed in thorough detail with Dr. Abelardo Garcia and the patient. No orders of the defined types were placed in this encounter. No orders of the defined types were placed in this encounter. No follow-ups on file.     Trev Dial PA-C  [id number] [facility]  [phone]

## 2021-09-15 ENCOUNTER — PATIENT OUTREACH (OUTPATIENT)
Dept: CASE MANAGEMENT | Facility: HOSPITAL | Age: 66
End: 2021-09-15

## 2021-09-28 ENCOUNTER — APPOINTMENT (OUTPATIENT)
Dept: LAB | Facility: CLINIC | Age: 66
End: 2021-09-28
Payer: MEDICARE

## 2021-09-28 DIAGNOSIS — R39.89 SUSPECTED UTI: ICD-10-CM

## 2021-09-28 DIAGNOSIS — N13.39 OTHER HYDRONEPHROSIS: ICD-10-CM

## 2021-09-28 DIAGNOSIS — C53.0 MALIGNANT NEOPLASM OF ENDOCERVIX (HCC): ICD-10-CM

## 2021-09-28 LAB
CREAT UR-MCNC: 176 MG/DL
PROT UR-MCNC: 231 MG/DL
PROT/CREAT UR: 1.31 MG/G{CREAT} (ref 0–0.1)

## 2021-09-28 PROCEDURE — 84156 ASSAY OF PROTEIN URINE: CPT

## 2021-09-28 PROCEDURE — 87086 URINE CULTURE/COLONY COUNT: CPT

## 2021-09-28 PROCEDURE — 82570 ASSAY OF URINE CREATININE: CPT

## 2021-09-29 ENCOUNTER — HOSPITAL ENCOUNTER (OUTPATIENT)
Dept: INFUSION CENTER | Facility: HOSPITAL | Age: 66
Discharge: HOME | End: 2021-09-29
Payer: MEDICARE

## 2021-09-29 ENCOUNTER — TELEPHONE (OUTPATIENT)
Dept: GYNECOLOGIC ONCOLOGY | Facility: CLINIC | Age: 66
End: 2021-09-29

## 2021-09-29 ENCOUNTER — PATIENT OUTREACH (OUTPATIENT)
Dept: CASE MANAGEMENT | Facility: HOSPITAL | Age: 66
End: 2021-09-29

## 2021-09-29 VITALS
HEART RATE: 68 BPM | RESPIRATION RATE: 16 BRPM | DIASTOLIC BLOOD PRESSURE: 65 MMHG | SYSTOLIC BLOOD PRESSURE: 142 MMHG | TEMPERATURE: 97.4 F | WEIGHT: 132.94 LBS | HEIGHT: 59 IN | OXYGEN SATURATION: 97 % | BODY MASS INDEX: 26.8 KG/M2

## 2021-09-29 DIAGNOSIS — E87.6 HYPOKALEMIA: ICD-10-CM

## 2021-09-29 DIAGNOSIS — C53.0 MALIGNANT NEOPLASM OF ENDOCERVIX (HCC): Primary | ICD-10-CM

## 2021-09-29 PROCEDURE — 96413 CHEMO IV INFUSION 1 HR: CPT

## 2021-09-29 RX ORDER — SODIUM CHLORIDE 9 MG/ML
20 INJECTION, SOLUTION INTRAVENOUS ONCE
Status: COMPLETED | OUTPATIENT
Start: 2021-09-29 | End: 2021-09-29

## 2021-09-29 RX ADMIN — SODIUM CHLORIDE 20 ML/HR: 0.9 INJECTION, SOLUTION INTRAVENOUS at 08:10

## 2021-09-29 RX ADMIN — BEVACIZUMAB 900 MG: 400 INJECTION, SOLUTION INTRAVENOUS at 09:03

## 2021-09-29 NOTE — TELEPHONE ENCOUNTER
Reached out to pt, scheduled CT scan  Called pulmonary and they said they would reach out to PT  Informed PT that she would be hearing from them

## 2021-09-29 NOTE — PROGRESS NOTES
Oncology LSW outreached PT in person at the [facility] to provide support and assess for areas of need  PT apologized for not returning LSW's previous call, as she had been very busy  PT reported that she had been "feeling so depressed" that she had deep cleaned her apartment and decorated for fall  PT reported that engaging in these activities "actually made me feel a lot better "     PT further stated that she had spoken again with [facility]  PT explained that they had reached out to her to see if she would be able to increase the monthly payment that PT was making for her medical bills  PT explained that she could make, at most, $150/month payments  PT stated she was then routed to speak with a person by the name of Adraina Rodriguez who took a look at all of the bills PT had incurred (PT sent them to her via email) and deemed it appropriate to have [facility] pay her medical bills moving forward in addition to paying off her current outstanding balance  LSW congratulated PT on this feat, to which PT reported, "A great weight has really been lifted off of my shoulders "     PT reported no other questions or concerns at this time and did not indicate any significant emotional distress through the duration of their conversation  LSW encouraged PT to outreach her at any point with any further questions or concerns, otherwise she would be in touch  PT was agreeable with this plan and thanked LSW for her time  Emotional support offered

## 2021-09-30 LAB — BACTERIA UR CULT: NORMAL

## 2021-10-08 ENCOUNTER — TELEPHONE (OUTPATIENT)
Dept: GYNECOLOGIC ONCOLOGY | Facility: CLINIC | Age: 66
End: 2021-10-08

## 2021-10-14 ENCOUNTER — OFFICE VISIT (OUTPATIENT)
Dept: UROLOGY | Facility: CLINIC | Age: 66
End: 2021-10-14
Payer: MEDICARE

## 2021-10-14 ENCOUNTER — PATIENT OUTREACH (OUTPATIENT)
Dept: CASE MANAGEMENT | Facility: HOSPITAL | Age: 66
End: 2021-10-14

## 2021-10-14 VITALS
SYSTOLIC BLOOD PRESSURE: 128 MMHG | DIASTOLIC BLOOD PRESSURE: 80 MMHG | HEART RATE: 68 BPM | WEIGHT: 135 LBS | BODY MASS INDEX: 27.25 KG/M2

## 2021-10-14 DIAGNOSIS — N13.30 HYDRONEPHROSIS, UNSPECIFIED HYDRONEPHROSIS TYPE: Primary | ICD-10-CM

## 2021-10-14 DIAGNOSIS — R31.0 GROSS HEMATURIA: ICD-10-CM

## 2021-10-14 LAB
SL AMB  POCT GLUCOSE, UA: NORMAL
SL AMB LEUKOCYTE ESTERASE,UA: NORMAL
SL AMB POCT BILIRUBIN,UA: NORMAL
SL AMB POCT BLOOD,UA: NORMAL
SL AMB POCT CLARITY,UA: CLEAR
SL AMB POCT COLOR,UA: YELLOW
SL AMB POCT KETONES,UA: NORMAL
SL AMB POCT NITRITE,UA: NORMAL
SL AMB POCT PH,UA: 2
SL AMB POCT SPECIFIC GRAVITY,UA: 1.01
SL AMB POCT URINE PROTEIN: NORMAL
SL AMB POCT UROBILINOGEN: 0.2

## 2021-10-14 PROCEDURE — 81002 URINALYSIS NONAUTO W/O SCOPE: CPT | Performed by: NURSE PRACTITIONER

## 2021-10-14 PROCEDURE — 99212 OFFICE O/P EST SF 10 MIN: CPT | Performed by: NURSE PRACTITIONER

## 2021-10-14 PROCEDURE — 87086 URINE CULTURE/COLONY COUNT: CPT | Performed by: NURSE PRACTITIONER

## 2021-10-15 LAB — BACTERIA UR CULT: NORMAL

## 2021-10-18 ENCOUNTER — HOSPITAL ENCOUNTER (OUTPATIENT)
Facility: HOSPITAL | Age: 66
Setting detail: OUTPATIENT SURGERY
Discharge: HOME | End: 2021-10-18
Attending: UROLOGY | Admitting: UROLOGY
Payer: MEDICARE

## 2021-10-18 ENCOUNTER — APPOINTMENT (OUTPATIENT)
Dept: LAB | Facility: HOSPITAL | Age: 66
End: 2021-10-18
Payer: MEDICARE

## 2021-10-18 ENCOUNTER — ANESTHESIA EVENT (OUTPATIENT)
Dept: PERIOP | Facility: HOSPITAL | Age: 66
End: 2021-10-18
Payer: MEDICARE

## 2021-10-18 ENCOUNTER — APPOINTMENT (OUTPATIENT)
Dept: RADIOLOGY | Facility: HOSPITAL | Age: 66
End: 2021-10-18
Payer: MEDICARE

## 2021-10-18 ENCOUNTER — ANESTHESIA (OUTPATIENT)
Dept: PERIOP | Facility: HOSPITAL | Age: 66
End: 2021-10-18
Payer: MEDICARE

## 2021-10-18 VITALS
WEIGHT: 135 LBS | BODY MASS INDEX: 27.21 KG/M2 | HEIGHT: 59 IN | DIASTOLIC BLOOD PRESSURE: 68 MMHG | SYSTOLIC BLOOD PRESSURE: 137 MMHG | RESPIRATION RATE: 18 BRPM | OXYGEN SATURATION: 97 % | HEART RATE: 63 BPM | TEMPERATURE: 96.9 F

## 2021-10-18 DIAGNOSIS — C53.0 MALIGNANT NEOPLASM OF ENDOCERVIX (HCC): ICD-10-CM

## 2021-10-18 DIAGNOSIS — N13.1 HYDRONEPHROSIS WITH URETERAL STRICTURE, NOT ELSEWHERE CLASSIFIED: Primary | ICD-10-CM

## 2021-10-18 LAB
CREAT UR-MCNC: 14.5 MG/DL
PROT UR-MCNC: 40 MG/DL
PROT/CREAT UR: 2.76 MG/G{CREAT} (ref 0–0.1)

## 2021-10-18 PROCEDURE — 82570 ASSAY OF URINE CREATININE: CPT

## 2021-10-18 PROCEDURE — C1769 GUIDE WIRE: HCPCS | Performed by: UROLOGY

## 2021-10-18 PROCEDURE — 74018 RADEX ABDOMEN 1 VIEW: CPT

## 2021-10-18 PROCEDURE — 52332 CYSTOSCOPY AND TREATMENT: CPT | Performed by: UROLOGY

## 2021-10-18 PROCEDURE — C2617 STENT, NON-COR, TEM W/O DEL: HCPCS | Performed by: UROLOGY

## 2021-10-18 PROCEDURE — 84156 ASSAY OF PROTEIN URINE: CPT

## 2021-10-18 DEVICE — INLAY OPTIMA URETERAL STENT W/O GUIDEWIRE
Type: IMPLANTABLE DEVICE | Site: URETER | Status: NON-FUNCTIONAL
Brand: BARD® INLAY OPTIMA® URETERAL STENT
Removed: 2022-04-04

## 2021-10-18 RX ORDER — ONDANSETRON 2 MG/ML
4 INJECTION INTRAMUSCULAR; INTRAVENOUS ONCE AS NEEDED
Status: DISCONTINUED | OUTPATIENT
Start: 2021-10-18 | End: 2021-10-18 | Stop reason: HOSPADM

## 2021-10-18 RX ORDER — LIDOCAINE HYDROCHLORIDE 10 MG/ML
0.5 INJECTION, SOLUTION EPIDURAL; INFILTRATION; INTRACAUDAL; PERINEURAL ONCE AS NEEDED
Status: DISCONTINUED | OUTPATIENT
Start: 2021-10-18 | End: 2021-10-18 | Stop reason: HOSPADM

## 2021-10-18 RX ORDER — HYDROMORPHONE HCL/PF 1 MG/ML
0.25 SYRINGE (ML) INJECTION
Status: DISCONTINUED | OUTPATIENT
Start: 2021-10-18 | End: 2021-10-18 | Stop reason: HOSPADM

## 2021-10-18 RX ORDER — DEXAMETHASONE SODIUM PHOSPHATE 4 MG/ML
INJECTION, SOLUTION INTRA-ARTICULAR; INTRALESIONAL; INTRAMUSCULAR; INTRAVENOUS; SOFT TISSUE AS NEEDED
Status: DISCONTINUED | OUTPATIENT
Start: 2021-10-18 | End: 2021-10-18

## 2021-10-18 RX ORDER — HYDROCODONE BITARTRATE AND ACETAMINOPHEN 5; 325 MG/1; MG/1
1 TABLET ORAL EVERY 6 HOURS PRN
Status: DISCONTINUED | OUTPATIENT
Start: 2021-10-18 | End: 2021-10-18 | Stop reason: HOSPADM

## 2021-10-18 RX ORDER — FENTANYL CITRATE 50 UG/ML
INJECTION, SOLUTION INTRAMUSCULAR; INTRAVENOUS AS NEEDED
Status: DISCONTINUED | OUTPATIENT
Start: 2021-10-18 | End: 2021-10-18

## 2021-10-18 RX ORDER — FENTANYL CITRATE/PF 50 MCG/ML
25 SYRINGE (ML) INJECTION
Status: DISCONTINUED | OUTPATIENT
Start: 2021-10-18 | End: 2021-10-18 | Stop reason: HOSPADM

## 2021-10-18 RX ORDER — SODIUM CHLORIDE, SODIUM LACTATE, POTASSIUM CHLORIDE, CALCIUM CHLORIDE 600; 310; 30; 20 MG/100ML; MG/100ML; MG/100ML; MG/100ML
100 INJECTION, SOLUTION INTRAVENOUS CONTINUOUS
Status: DISCONTINUED | OUTPATIENT
Start: 2021-10-18 | End: 2021-10-18 | Stop reason: HOSPADM

## 2021-10-18 RX ORDER — LIDOCAINE HYDROCHLORIDE 10 MG/ML
INJECTION, SOLUTION EPIDURAL; INFILTRATION; INTRACAUDAL; PERINEURAL AS NEEDED
Status: DISCONTINUED | OUTPATIENT
Start: 2021-10-18 | End: 2021-10-18

## 2021-10-18 RX ORDER — MAGNESIUM HYDROXIDE 1200 MG/15ML
LIQUID ORAL AS NEEDED
Status: DISCONTINUED | OUTPATIENT
Start: 2021-10-18 | End: 2021-10-18 | Stop reason: HOSPADM

## 2021-10-18 RX ORDER — PHENAZOPYRIDINE HYDROCHLORIDE 200 MG/1
200 TABLET, FILM COATED ORAL 3 TIMES DAILY PRN
Qty: 30 TABLET | Refills: 0 | Status: SHIPPED | OUTPATIENT
Start: 2021-10-18 | End: 2021-11-05

## 2021-10-18 RX ORDER — PHENAZOPYRIDINE HYDROCHLORIDE 100 MG/1
200 TABLET, FILM COATED ORAL ONCE
Status: COMPLETED | OUTPATIENT
Start: 2021-10-18 | End: 2021-10-18

## 2021-10-18 RX ORDER — METOCLOPRAMIDE HYDROCHLORIDE 5 MG/ML
10 INJECTION INTRAMUSCULAR; INTRAVENOUS ONCE AS NEEDED
Status: DISCONTINUED | OUTPATIENT
Start: 2021-10-18 | End: 2021-10-18 | Stop reason: HOSPADM

## 2021-10-18 RX ORDER — CEFAZOLIN SODIUM 1 G/50ML
1000 SOLUTION INTRAVENOUS ONCE
Status: COMPLETED | OUTPATIENT
Start: 2021-10-18 | End: 2021-10-18

## 2021-10-18 RX ORDER — ONDANSETRON 2 MG/ML
INJECTION INTRAMUSCULAR; INTRAVENOUS AS NEEDED
Status: DISCONTINUED | OUTPATIENT
Start: 2021-10-18 | End: 2021-10-18

## 2021-10-18 RX ORDER — PROPOFOL 10 MG/ML
INJECTION, EMULSION INTRAVENOUS AS NEEDED
Status: DISCONTINUED | OUTPATIENT
Start: 2021-10-18 | End: 2021-10-18

## 2021-10-18 RX ORDER — OXYBUTYNIN CHLORIDE 5 MG/1
5 TABLET ORAL 3 TIMES DAILY PRN
Status: DISCONTINUED | OUTPATIENT
Start: 2021-10-18 | End: 2021-10-18 | Stop reason: HOSPADM

## 2021-10-18 RX ADMIN — FENTANYL CITRATE 25 MCG: 50 INJECTION, SOLUTION INTRAMUSCULAR; INTRAVENOUS at 09:29

## 2021-10-18 RX ADMIN — LIDOCAINE HYDROCHLORIDE 50 MG: 10 INJECTION, SOLUTION EPIDURAL; INFILTRATION; INTRACAUDAL; PERINEURAL at 09:11

## 2021-10-18 RX ADMIN — CEFAZOLIN SODIUM 1000 MG: 1 SOLUTION INTRAVENOUS at 09:06

## 2021-10-18 RX ADMIN — FENTANYL CITRATE 25 MCG: 50 INJECTION, SOLUTION INTRAMUSCULAR; INTRAVENOUS at 09:26

## 2021-10-18 RX ADMIN — ONDANSETRON HYDROCHLORIDE 4 MG: 2 INJECTION, SOLUTION INTRAVENOUS at 09:11

## 2021-10-18 RX ADMIN — SODIUM CHLORIDE, SODIUM LACTATE, POTASSIUM CHLORIDE, AND CALCIUM CHLORIDE: .6; .31; .03; .02 INJECTION, SOLUTION INTRAVENOUS at 09:07

## 2021-10-18 RX ADMIN — PHENAZOPYRIDINE 200 MG: 100 TABLET ORAL at 09:56

## 2021-10-18 RX ADMIN — PROPOFOL 200 MG: 10 INJECTION, EMULSION INTRAVENOUS at 09:11

## 2021-10-18 RX ADMIN — DEXAMETHASONE SODIUM PHOSPHATE 4 MG: 4 INJECTION, SOLUTION INTRAMUSCULAR; INTRAVENOUS at 09:11

## 2021-10-19 ENCOUNTER — PREP FOR PROCEDURE (OUTPATIENT)
Dept: UROLOGY | Facility: CLINIC | Age: 66
End: 2021-10-19

## 2021-10-19 ENCOUNTER — TELEPHONE (OUTPATIENT)
Dept: UROLOGY | Facility: CLINIC | Age: 66
End: 2021-10-19

## 2021-10-19 DIAGNOSIS — R39.89 SUSPECTED UTI: ICD-10-CM

## 2021-10-19 DIAGNOSIS — N13.30 HYDRONEPHROSIS, UNSPECIFIED HYDRONEPHROSIS TYPE: Primary | ICD-10-CM

## 2021-10-19 DIAGNOSIS — Z01.810 PREOP CARDIOVASCULAR EXAM: ICD-10-CM

## 2021-10-19 RX ORDER — SODIUM CHLORIDE 9 MG/ML
20 INJECTION, SOLUTION INTRAVENOUS ONCE
Status: CANCELLED | OUTPATIENT
Start: 2021-10-20

## 2021-10-20 ENCOUNTER — HOSPITAL ENCOUNTER (OUTPATIENT)
Dept: INFUSION CENTER | Facility: HOSPITAL | Age: 66
Discharge: HOME | End: 2021-10-20
Payer: MEDICARE

## 2021-10-20 VITALS
HEART RATE: 90 BPM | BODY MASS INDEX: 27.47 KG/M2 | HEIGHT: 59 IN | WEIGHT: 136.24 LBS | SYSTOLIC BLOOD PRESSURE: 136 MMHG | DIASTOLIC BLOOD PRESSURE: 68 MMHG | TEMPERATURE: 96.5 F | RESPIRATION RATE: 18 BRPM

## 2021-10-20 DIAGNOSIS — E87.6 HYPOKALEMIA: ICD-10-CM

## 2021-10-20 DIAGNOSIS — C53.0 MALIGNANT NEOPLASM OF ENDOCERVIX (HCC): Primary | ICD-10-CM

## 2021-10-20 PROCEDURE — 96413 CHEMO IV INFUSION 1 HR: CPT

## 2021-10-20 RX ORDER — SODIUM CHLORIDE 9 MG/ML
20 INJECTION, SOLUTION INTRAVENOUS ONCE
Status: COMPLETED | OUTPATIENT
Start: 2021-10-20 | End: 2021-10-20

## 2021-10-20 RX ADMIN — BEVACIZUMAB 900 MG: 400 INJECTION, SOLUTION INTRAVENOUS at 09:51

## 2021-10-20 RX ADMIN — SODIUM CHLORIDE 20 ML/HR: 0.9 INJECTION, SOLUTION INTRAVENOUS at 08:55

## 2021-10-25 DIAGNOSIS — C53.0 MALIGNANT NEOPLASM OF ENDOCERVIX (HCC): ICD-10-CM

## 2021-10-26 RX ORDER — LORAZEPAM 1 MG/1
1 TABLET ORAL EVERY 6 HOURS PRN
Qty: 36 TABLET | Refills: 1 | Status: SHIPPED | OUTPATIENT
Start: 2021-10-26 | End: 2022-02-18

## 2021-10-27 ENCOUNTER — PATIENT OUTREACH (OUTPATIENT)
Dept: CASE MANAGEMENT | Facility: HOSPITAL | Age: 66
End: 2021-10-27

## 2021-10-29 ENCOUNTER — HOSPITAL ENCOUNTER (OUTPATIENT)
Dept: CT IMAGING | Facility: HOSPITAL | Age: 66
Discharge: HOME | End: 2021-10-29
Payer: MEDICARE

## 2021-10-29 DIAGNOSIS — C53.0 MALIGNANT NEOPLASM OF ENDOCERVIX (HCC): ICD-10-CM

## 2021-10-29 PROCEDURE — 74177 CT ABD & PELVIS W/CONTRAST: CPT

## 2021-10-29 PROCEDURE — G1004 CDSM NDSC: HCPCS

## 2021-10-29 PROCEDURE — 71260 CT THORAX DX C+: CPT

## 2021-10-29 RX ADMIN — IOHEXOL 50 ML: 240 INJECTION, SOLUTION INTRATHECAL; INTRAVASCULAR; INTRAVENOUS; ORAL at 10:17

## 2021-10-29 RX ADMIN — IOHEXOL 100 ML: 350 INJECTION, SOLUTION INTRAVENOUS at 10:17

## 2021-11-01 ENCOUNTER — OFFICE VISIT (OUTPATIENT)
Dept: URGENT CARE | Facility: CLINIC | Age: 66
End: 2021-11-01
Payer: MEDICARE

## 2021-11-01 ENCOUNTER — TELEPHONE (OUTPATIENT)
Dept: SURGICAL ONCOLOGY | Facility: CLINIC | Age: 66
End: 2021-11-01

## 2021-11-01 VITALS
SYSTOLIC BLOOD PRESSURE: 141 MMHG | HEART RATE: 104 BPM | OXYGEN SATURATION: 97 % | TEMPERATURE: 98.3 F | WEIGHT: 136 LBS | RESPIRATION RATE: 20 BRPM | DIASTOLIC BLOOD PRESSURE: 92 MMHG | BODY MASS INDEX: 27.45 KG/M2

## 2021-11-01 DIAGNOSIS — J02.9 SORE THROAT: ICD-10-CM

## 2021-11-01 DIAGNOSIS — J02.8 ACUTE PHARYNGITIS DUE TO OTHER SPECIFIED ORGANISMS: Primary | ICD-10-CM

## 2021-11-01 LAB — S PYO AG THROAT QL: NEGATIVE

## 2021-11-01 PROCEDURE — 87880 STREP A ASSAY W/OPTIC: CPT | Performed by: NURSE PRACTITIONER

## 2021-11-01 PROCEDURE — U0005 INFEC AGEN DETEC AMPLI PROBE: HCPCS | Performed by: NURSE PRACTITIONER

## 2021-11-01 PROCEDURE — 99213 OFFICE O/P EST LOW 20 MIN: CPT | Performed by: NURSE PRACTITIONER

## 2021-11-01 PROCEDURE — G0463 HOSPITAL OUTPT CLINIC VISIT: HCPCS | Performed by: NURSE PRACTITIONER

## 2021-11-01 PROCEDURE — 87070 CULTURE OTHR SPECIMN AEROBIC: CPT | Performed by: NURSE PRACTITIONER

## 2021-11-01 PROCEDURE — U0003 INFECTIOUS AGENT DETECTION BY NUCLEIC ACID (DNA OR RNA); SEVERE ACUTE RESPIRATORY SYNDROME CORONAVIRUS 2 (SARS-COV-2) (CORONAVIRUS DISEASE [COVID-19]), AMPLIFIED PROBE TECHNIQUE, MAKING USE OF HIGH THROUGHPUT TECHNOLOGIES AS DESCRIBED BY CMS-2020-01-R: HCPCS | Performed by: NURSE PRACTITIONER

## 2021-11-02 ENCOUNTER — OFFICE VISIT (OUTPATIENT)
Dept: GYNECOLOGIC ONCOLOGY | Facility: CLINIC | Age: 66
End: 2021-11-02
Payer: MEDICARE

## 2021-11-02 VITALS
WEIGHT: 134 LBS | HEIGHT: 59 IN | HEART RATE: 98 BPM | DIASTOLIC BLOOD PRESSURE: 84 MMHG | OXYGEN SATURATION: 98 % | RESPIRATION RATE: 16 BRPM | SYSTOLIC BLOOD PRESSURE: 136 MMHG | BODY MASS INDEX: 27.01 KG/M2 | TEMPERATURE: 97 F

## 2021-11-02 DIAGNOSIS — C53.0 MALIGNANT NEOPLASM OF ENDOCERVIX (HCC): Primary | ICD-10-CM

## 2021-11-02 DIAGNOSIS — R59.1 LYMPHADENOPATHY OF HEAD AND NECK: ICD-10-CM

## 2021-11-02 DIAGNOSIS — E07.9 THYROID LESION: ICD-10-CM

## 2021-11-02 DIAGNOSIS — N13.1 HYDRONEPHROSIS WITH URETERAL STRICTURE, NOT ELSEWHERE CLASSIFIED: ICD-10-CM

## 2021-11-02 PROBLEM — M19.039 WRIST ARTHRITIS: Status: RESOLVED | Noted: 2020-03-17 | Resolved: 2021-11-02

## 2021-11-02 PROBLEM — L01.00 IMPETIGO: Status: RESOLVED | Noted: 2021-07-29 | Resolved: 2021-11-02

## 2021-11-02 PROBLEM — M65.311 TRIGGER THUMB OF RIGHT HAND: Status: RESOLVED | Noted: 2019-10-08 | Resolved: 2021-11-02

## 2021-11-02 LAB — SARS-COV-2 RNA RESP QL NAA+PROBE: NEGATIVE

## 2021-11-02 PROCEDURE — 99214 OFFICE O/P EST MOD 30 MIN: CPT | Performed by: OBSTETRICS & GYNECOLOGY

## 2021-11-03 LAB — BACTERIA THROAT CULT: NORMAL

## 2021-11-05 ENCOUNTER — OFFICE VISIT (OUTPATIENT)
Dept: PULMONOLOGY | Facility: CLINIC | Age: 66
End: 2021-11-05
Payer: MEDICARE

## 2021-11-05 VITALS
TEMPERATURE: 98.2 F | SYSTOLIC BLOOD PRESSURE: 120 MMHG | OXYGEN SATURATION: 97 % | WEIGHT: 135.6 LBS | HEIGHT: 59 IN | BODY MASS INDEX: 27.34 KG/M2 | HEART RATE: 98 BPM | DIASTOLIC BLOOD PRESSURE: 78 MMHG

## 2021-11-05 DIAGNOSIS — C53.0 MALIGNANT NEOPLASM OF ENDOCERVIX (HCC): Primary | ICD-10-CM

## 2021-11-05 DIAGNOSIS — R06.00 DYSPNEA ON EXERTION: ICD-10-CM

## 2021-11-05 PROBLEM — R91.8 MULTIPLE LUNG NODULES: Status: ACTIVE | Noted: 2021-11-05

## 2021-11-05 PROCEDURE — 99205 OFFICE O/P NEW HI 60 MIN: CPT | Performed by: INTERNAL MEDICINE

## 2021-11-05 PROCEDURE — 94618 PULMONARY STRESS TESTING: CPT | Performed by: INTERNAL MEDICINE

## 2021-11-08 DIAGNOSIS — R06.00 DYSPNEA ON EXERTION: Primary | ICD-10-CM

## 2021-11-09 ENCOUNTER — APPOINTMENT (OUTPATIENT)
Dept: LAB | Facility: CLINIC | Age: 66
End: 2021-11-09
Payer: MEDICARE

## 2021-11-09 ENCOUNTER — OFFICE VISIT (OUTPATIENT)
Dept: FAMILY MEDICINE CLINIC | Facility: CLINIC | Age: 66
End: 2021-11-09
Payer: MEDICARE

## 2021-11-09 VITALS
BODY MASS INDEX: 27.17 KG/M2 | HEART RATE: 115 BPM | HEIGHT: 59 IN | WEIGHT: 134.8 LBS | DIASTOLIC BLOOD PRESSURE: 80 MMHG | OXYGEN SATURATION: 97 % | TEMPERATURE: 99.2 F | SYSTOLIC BLOOD PRESSURE: 120 MMHG

## 2021-11-09 DIAGNOSIS — T45.1X5A CHEMOTHERAPY INDUCED NEUTROPENIA (HCC): ICD-10-CM

## 2021-11-09 DIAGNOSIS — C53.0 MALIGNANT NEOPLASM OF ENDOCERVIX (HCC): ICD-10-CM

## 2021-11-09 DIAGNOSIS — Z23 ENCOUNTER FOR IMMUNIZATION: ICD-10-CM

## 2021-11-09 DIAGNOSIS — F33.9 DEPRESSION, RECURRENT (HCC): ICD-10-CM

## 2021-11-09 DIAGNOSIS — K11.7 XEROSTOMIA: ICD-10-CM

## 2021-11-09 DIAGNOSIS — D69.6 THROMBOCYTOPENIA (HCC): ICD-10-CM

## 2021-11-09 DIAGNOSIS — D70.1 CHEMOTHERAPY INDUCED NEUTROPENIA (HCC): ICD-10-CM

## 2021-11-09 DIAGNOSIS — H90.3 SENSORINEURAL HEARING LOSS (SNHL) OF BOTH EARS: ICD-10-CM

## 2021-11-09 DIAGNOSIS — M15.0 PRIMARY GENERALIZED (OSTEO)ARTHRITIS: Primary | ICD-10-CM

## 2021-11-09 LAB
CREAT UR-MCNC: 208 MG/DL
PROT UR-MCNC: 167 MG/DL
PROT/CREAT UR: 0.8 MG/G{CREAT} (ref 0–0.1)

## 2021-11-09 PROCEDURE — G0008 ADMIN INFLUENZA VIRUS VAC: HCPCS

## 2021-11-09 PROCEDURE — 99204 OFFICE O/P NEW MOD 45 MIN: CPT | Performed by: FAMILY MEDICINE

## 2021-11-09 PROCEDURE — 84156 ASSAY OF PROTEIN URINE: CPT

## 2021-11-09 PROCEDURE — 82570 ASSAY OF URINE CREATININE: CPT

## 2021-11-09 PROCEDURE — 90662 IIV NO PRSV INCREASED AG IM: CPT

## 2021-11-10 ENCOUNTER — HOSPITAL ENCOUNTER (OUTPATIENT)
Dept: INFUSION CENTER | Facility: HOSPITAL | Age: 66
Discharge: HOME | End: 2021-11-10
Payer: MEDICARE

## 2021-11-10 VITALS
HEART RATE: 87 BPM | HEIGHT: 59 IN | BODY MASS INDEX: 27.47 KG/M2 | DIASTOLIC BLOOD PRESSURE: 60 MMHG | RESPIRATION RATE: 16 BRPM | TEMPERATURE: 97.8 F | SYSTOLIC BLOOD PRESSURE: 140 MMHG | WEIGHT: 136.24 LBS

## 2021-11-10 DIAGNOSIS — C53.0 MALIGNANT NEOPLASM OF ENDOCERVIX (HCC): Primary | ICD-10-CM

## 2021-11-10 DIAGNOSIS — E87.6 HYPOKALEMIA: ICD-10-CM

## 2021-11-10 PROCEDURE — 96413 CHEMO IV INFUSION 1 HR: CPT

## 2021-11-10 RX ORDER — SODIUM CHLORIDE 9 MG/ML
20 INJECTION, SOLUTION INTRAVENOUS ONCE
Status: COMPLETED | OUTPATIENT
Start: 2021-11-10 | End: 2021-11-10

## 2021-11-10 RX ADMIN — BEVACIZUMAB 900 MG: 400 INJECTION, SOLUTION INTRAVENOUS at 10:35

## 2021-11-10 RX ADMIN — SODIUM CHLORIDE 20 ML/HR: 0.9 INJECTION, SOLUTION INTRAVENOUS at 10:33

## 2021-11-12 ENCOUNTER — PATIENT OUTREACH (OUTPATIENT)
Dept: CASE MANAGEMENT | Facility: HOSPITAL | Age: 66
End: 2021-11-12

## 2021-11-16 ENCOUNTER — TELEPHONE (OUTPATIENT)
Dept: PULMONOLOGY | Facility: CLINIC | Age: 66
End: 2021-11-16

## 2021-11-17 ENCOUNTER — HOSPITAL ENCOUNTER (OUTPATIENT)
Dept: RADIOLOGY | Facility: HOSPITAL | Age: 66
Discharge: HOME | End: 2021-11-17
Attending: INTERNAL MEDICINE
Payer: MEDICARE

## 2021-11-17 DIAGNOSIS — C53.0 MALIGNANT NEOPLASM OF ENDOCERVIX (HCC): ICD-10-CM

## 2021-11-17 DIAGNOSIS — R06.00 DYSPNEA ON EXERTION: ICD-10-CM

## 2021-11-17 PROCEDURE — 78580 LUNG PERFUSION IMAGING: CPT

## 2021-11-17 PROCEDURE — A9540 TC99M MAA: HCPCS

## 2021-11-17 PROCEDURE — G1004 CDSM NDSC: HCPCS

## 2021-11-29 ENCOUNTER — HOSPITAL ENCOUNTER (OUTPATIENT)
Dept: PULMONOLOGY | Facility: HOSPITAL | Age: 66
Discharge: HOME | End: 2021-11-29
Attending: INTERNAL MEDICINE
Payer: MEDICARE

## 2021-11-29 DIAGNOSIS — R06.00 DYSPNEA ON EXERTION: ICD-10-CM

## 2021-11-29 DIAGNOSIS — C53.0 MALIGNANT NEOPLASM OF ENDOCERVIX (HCC): ICD-10-CM

## 2021-11-29 PROCEDURE — 94010 BREATHING CAPACITY TEST: CPT | Performed by: INTERNAL MEDICINE

## 2021-11-29 PROCEDURE — 94010 BREATHING CAPACITY TEST: CPT

## 2021-11-29 PROCEDURE — 94727 GAS DIL/WSHOT DETER LNG VOL: CPT

## 2021-11-29 PROCEDURE — 94729 DIFFUSING CAPACITY: CPT | Performed by: INTERNAL MEDICINE

## 2021-11-29 PROCEDURE — 94726 PLETHYSMOGRAPHY LUNG VOLUMES: CPT | Performed by: INTERNAL MEDICINE

## 2021-11-29 PROCEDURE — 94729 DIFFUSING CAPACITY: CPT

## 2021-11-29 PROCEDURE — 94760 N-INVAS EAR/PLS OXIMETRY 1: CPT

## 2021-11-30 ENCOUNTER — CLINICAL SUPPORT (OUTPATIENT)
Dept: FAMILY MEDICINE CLINIC | Facility: CLINIC | Age: 66
End: 2021-11-30
Payer: MEDICARE

## 2021-11-30 ENCOUNTER — APPOINTMENT (OUTPATIENT)
Dept: LAB | Facility: CLINIC | Age: 66
End: 2021-11-30
Payer: MEDICARE

## 2021-11-30 DIAGNOSIS — C53.0 MALIGNANT NEOPLASM OF ENDOCERVIX (HCC): ICD-10-CM

## 2021-11-30 DIAGNOSIS — Z23 ENCOUNTER FOR IMMUNIZATION: Primary | ICD-10-CM

## 2021-11-30 LAB
ALBUMIN SERPL BCP-MCNC: 3.3 G/DL (ref 3.5–5)
ALP SERPL-CCNC: 124 U/L (ref 46–116)
ALT SERPL W P-5'-P-CCNC: 25 U/L (ref 12–78)
ANION GAP SERPL CALCULATED.3IONS-SCNC: 5 MMOL/L (ref 4–13)
AST SERPL W P-5'-P-CCNC: 20 U/L (ref 5–45)
BASOPHILS # BLD AUTO: 0.02 THOUSANDS/ΜL (ref 0–0.1)
BASOPHILS NFR BLD AUTO: 0 % (ref 0–1)
BILIRUB SERPL-MCNC: 0.39 MG/DL (ref 0.2–1)
BUN SERPL-MCNC: 19 MG/DL (ref 5–25)
CALCIUM ALBUM COR SERPL-MCNC: 10.2 MG/DL (ref 8.3–10.1)
CALCIUM SERPL-MCNC: 9.6 MG/DL (ref 8.3–10.1)
CHLORIDE SERPL-SCNC: 109 MMOL/L (ref 100–108)
CO2 SERPL-SCNC: 24 MMOL/L (ref 21–32)
CREAT SERPL-MCNC: 0.97 MG/DL (ref 0.6–1.3)
CREAT UR-MCNC: 130 MG/DL
EOSINOPHIL # BLD AUTO: 0.15 THOUSAND/ΜL (ref 0–0.61)
EOSINOPHIL NFR BLD AUTO: 3 % (ref 0–6)
ERYTHROCYTE [DISTWIDTH] IN BLOOD BY AUTOMATED COUNT: 14 % (ref 11.6–15.1)
GFR SERPL CREATININE-BSD FRML MDRD: 61 ML/MIN/1.73SQ M
GLUCOSE P FAST SERPL-MCNC: 83 MG/DL (ref 65–99)
HCT VFR BLD AUTO: 39.8 % (ref 34.8–46.1)
HGB BLD-MCNC: 12.3 G/DL (ref 11.5–15.4)
IMM GRANULOCYTES # BLD AUTO: 0.01 THOUSAND/UL (ref 0–0.2)
IMM GRANULOCYTES NFR BLD AUTO: 0 % (ref 0–2)
LYMPHOCYTES # BLD AUTO: 1.34 THOUSANDS/ΜL (ref 0.6–4.47)
LYMPHOCYTES NFR BLD AUTO: 29 % (ref 14–44)
MAGNESIUM SERPL-MCNC: 2 MG/DL (ref 1.6–2.6)
MCH RBC QN AUTO: 30.2 PG (ref 26.8–34.3)
MCHC RBC AUTO-ENTMCNC: 30.9 G/DL (ref 31.4–37.4)
MCV RBC AUTO: 98 FL (ref 82–98)
MONOCYTES # BLD AUTO: 0.6 THOUSAND/ΜL (ref 0.17–1.22)
MONOCYTES NFR BLD AUTO: 13 % (ref 4–12)
NEUTROPHILS # BLD AUTO: 2.56 THOUSANDS/ΜL (ref 1.85–7.62)
NEUTS SEG NFR BLD AUTO: 55 % (ref 43–75)
NRBC BLD AUTO-RTO: 0 /100 WBCS
PLATELET # BLD AUTO: 188 THOUSANDS/UL (ref 149–390)
PMV BLD AUTO: 10.6 FL (ref 8.9–12.7)
POTASSIUM SERPL-SCNC: 4.3 MMOL/L (ref 3.5–5.3)
PROT SERPL-MCNC: 7.8 G/DL (ref 6.4–8.2)
PROT UR-MCNC: 98 MG/DL
PROT/CREAT UR: 0.75 MG/G{CREAT} (ref 0–0.1)
RBC # BLD AUTO: 4.07 MILLION/UL (ref 3.81–5.12)
SODIUM SERPL-SCNC: 138 MMOL/L (ref 136–145)
WBC # BLD AUTO: 4.68 THOUSAND/UL (ref 4.31–10.16)

## 2021-11-30 PROCEDURE — 80053 COMPREHEN METABOLIC PANEL: CPT

## 2021-11-30 PROCEDURE — 83735 ASSAY OF MAGNESIUM: CPT

## 2021-11-30 PROCEDURE — G0009 ADMIN PNEUMOCOCCAL VACCINE: HCPCS

## 2021-11-30 PROCEDURE — 90670 PCV13 VACCINE IM: CPT

## 2021-11-30 PROCEDURE — 84156 ASSAY OF PROTEIN URINE: CPT

## 2021-11-30 PROCEDURE — 87086 URINE CULTURE/COLONY COUNT: CPT

## 2021-11-30 PROCEDURE — 82570 ASSAY OF URINE CREATININE: CPT

## 2021-11-30 PROCEDURE — 36415 COLL VENOUS BLD VENIPUNCTURE: CPT

## 2021-11-30 PROCEDURE — 85025 COMPLETE CBC W/AUTO DIFF WBC: CPT

## 2021-12-01 ENCOUNTER — PATIENT OUTREACH (OUTPATIENT)
Dept: CASE MANAGEMENT | Facility: HOSPITAL | Age: 66
End: 2021-12-01

## 2021-12-01 ENCOUNTER — HOSPITAL ENCOUNTER (OUTPATIENT)
Dept: INFUSION CENTER | Facility: HOSPITAL | Age: 66
Discharge: HOME | End: 2021-12-01
Payer: MEDICARE

## 2021-12-01 VITALS
TEMPERATURE: 96.6 F | RESPIRATION RATE: 16 BRPM | HEIGHT: 59 IN | BODY MASS INDEX: 27.82 KG/M2 | OXYGEN SATURATION: 99 % | SYSTOLIC BLOOD PRESSURE: 138 MMHG | WEIGHT: 138.01 LBS | HEART RATE: 71 BPM | DIASTOLIC BLOOD PRESSURE: 72 MMHG

## 2021-12-01 DIAGNOSIS — E87.6 HYPOKALEMIA: ICD-10-CM

## 2021-12-01 DIAGNOSIS — C53.0 MALIGNANT NEOPLASM OF ENDOCERVIX (HCC): Primary | ICD-10-CM

## 2021-12-01 LAB — BACTERIA UR CULT: NORMAL

## 2021-12-01 PROCEDURE — 96413 CHEMO IV INFUSION 1 HR: CPT

## 2021-12-01 RX ORDER — SODIUM CHLORIDE 9 MG/ML
20 INJECTION, SOLUTION INTRAVENOUS ONCE
Status: COMPLETED | OUTPATIENT
Start: 2021-12-01 | End: 2021-12-01

## 2021-12-01 RX ADMIN — SODIUM CHLORIDE 20 ML/HR: 0.9 INJECTION, SOLUTION INTRAVENOUS at 11:00

## 2021-12-01 RX ADMIN — BEVACIZUMAB 900 MG: 400 INJECTION, SOLUTION INTRAVENOUS at 12:35

## 2021-12-16 ENCOUNTER — PATIENT OUTREACH (OUTPATIENT)
Dept: CASE MANAGEMENT | Facility: HOSPITAL | Age: 66
End: 2021-12-16

## 2021-12-20 ENCOUNTER — TELEPHONE (OUTPATIENT)
Dept: GYNECOLOGIC ONCOLOGY | Facility: CLINIC | Age: 66
End: 2021-12-20

## 2021-12-20 ENCOUNTER — APPOINTMENT (OUTPATIENT)
Dept: LAB | Facility: CLINIC | Age: 66
End: 2021-12-20
Payer: MEDICARE

## 2021-12-20 DIAGNOSIS — C53.0 MALIGNANT NEOPLASM OF ENDOCERVIX (HCC): ICD-10-CM

## 2021-12-20 LAB
ALBUMIN SERPL BCP-MCNC: 3.7 G/DL (ref 3.5–5)
ALP SERPL-CCNC: 123 U/L (ref 46–116)
ALT SERPL W P-5'-P-CCNC: 19 U/L (ref 12–78)
ANION GAP SERPL CALCULATED.3IONS-SCNC: 7 MMOL/L (ref 4–13)
AST SERPL W P-5'-P-CCNC: 19 U/L (ref 5–45)
BASOPHILS # BLD AUTO: 0.01 THOUSANDS/ΜL (ref 0–0.1)
BASOPHILS NFR BLD AUTO: 0 % (ref 0–1)
BILIRUB SERPL-MCNC: 0.29 MG/DL (ref 0.2–1)
BUN SERPL-MCNC: 25 MG/DL (ref 5–25)
CALCIUM SERPL-MCNC: 10.4 MG/DL (ref 8.3–10.1)
CHLORIDE SERPL-SCNC: 109 MMOL/L (ref 100–108)
CO2 SERPL-SCNC: 21 MMOL/L (ref 21–32)
CREAT SERPL-MCNC: 0.9 MG/DL (ref 0.6–1.3)
CREAT UR-MCNC: 267 MG/DL
EOSINOPHIL # BLD AUTO: 0.08 THOUSAND/ΜL (ref 0–0.61)
EOSINOPHIL NFR BLD AUTO: 2 % (ref 0–6)
ERYTHROCYTE [DISTWIDTH] IN BLOOD BY AUTOMATED COUNT: 14.1 % (ref 11.6–15.1)
GFR SERPL CREATININE-BSD FRML MDRD: 66 ML/MIN/1.73SQ M
GLUCOSE P FAST SERPL-MCNC: 103 MG/DL (ref 65–99)
HCT VFR BLD AUTO: 39 % (ref 34.8–46.1)
HGB BLD-MCNC: 12.3 G/DL (ref 11.5–15.4)
IMM GRANULOCYTES # BLD AUTO: 0.01 THOUSAND/UL (ref 0–0.2)
IMM GRANULOCYTES NFR BLD AUTO: 0 % (ref 0–2)
LYMPHOCYTES # BLD AUTO: 1.14 THOUSANDS/ΜL (ref 0.6–4.47)
LYMPHOCYTES NFR BLD AUTO: 24 % (ref 14–44)
MAGNESIUM SERPL-MCNC: 1.9 MG/DL (ref 1.6–2.6)
MCH RBC QN AUTO: 29.6 PG (ref 26.8–34.3)
MCHC RBC AUTO-ENTMCNC: 31.5 G/DL (ref 31.4–37.4)
MCV RBC AUTO: 94 FL (ref 82–98)
MONOCYTES # BLD AUTO: 0.46 THOUSAND/ΜL (ref 0.17–1.22)
MONOCYTES NFR BLD AUTO: 10 % (ref 4–12)
NEUTROPHILS # BLD AUTO: 3.16 THOUSANDS/ΜL (ref 1.85–7.62)
NEUTS SEG NFR BLD AUTO: 64 % (ref 43–75)
NRBC BLD AUTO-RTO: 0 /100 WBCS
PLATELET # BLD AUTO: 182 THOUSANDS/UL (ref 149–390)
PMV BLD AUTO: 10.9 FL (ref 8.9–12.7)
POTASSIUM SERPL-SCNC: 4.1 MMOL/L (ref 3.5–5.3)
PROT SERPL-MCNC: 7.8 G/DL (ref 6.4–8.2)
PROT UR-MCNC: 265 MG/DL
PROT/CREAT UR: 0.99 MG/G{CREAT} (ref 0–0.1)
RBC # BLD AUTO: 4.15 MILLION/UL (ref 3.81–5.12)
SODIUM SERPL-SCNC: 137 MMOL/L (ref 136–145)
WBC # BLD AUTO: 4.86 THOUSAND/UL (ref 4.31–10.16)

## 2021-12-20 PROCEDURE — 82570 ASSAY OF URINE CREATININE: CPT

## 2021-12-20 PROCEDURE — 36415 COLL VENOUS BLD VENIPUNCTURE: CPT

## 2021-12-20 PROCEDURE — 83735 ASSAY OF MAGNESIUM: CPT

## 2021-12-20 PROCEDURE — 85025 COMPLETE CBC W/AUTO DIFF WBC: CPT

## 2021-12-20 PROCEDURE — 84156 ASSAY OF PROTEIN URINE: CPT

## 2021-12-20 PROCEDURE — 80053 COMPREHEN METABOLIC PANEL: CPT

## 2021-12-21 ENCOUNTER — PATIENT OUTREACH (OUTPATIENT)
Dept: CASE MANAGEMENT | Facility: HOSPITAL | Age: 66
End: 2021-12-21

## 2021-12-22 ENCOUNTER — HOSPITAL ENCOUNTER (OUTPATIENT)
Dept: INFUSION CENTER | Facility: HOSPITAL | Age: 66
Discharge: HOME | End: 2021-12-22
Payer: MEDICARE

## 2021-12-22 VITALS
SYSTOLIC BLOOD PRESSURE: 139 MMHG | RESPIRATION RATE: 16 BRPM | WEIGHT: 134.7 LBS | TEMPERATURE: 96.7 F | HEART RATE: 74 BPM | HEIGHT: 59 IN | DIASTOLIC BLOOD PRESSURE: 63 MMHG | BODY MASS INDEX: 27.16 KG/M2

## 2021-12-22 DIAGNOSIS — C53.0 MALIGNANT NEOPLASM OF ENDOCERVIX (HCC): Primary | ICD-10-CM

## 2021-12-22 DIAGNOSIS — E87.6 HYPOKALEMIA: ICD-10-CM

## 2021-12-22 PROCEDURE — 96413 CHEMO IV INFUSION 1 HR: CPT

## 2021-12-22 RX ORDER — SODIUM CHLORIDE 9 MG/ML
20 INJECTION, SOLUTION INTRAVENOUS ONCE
Status: COMPLETED | OUTPATIENT
Start: 2021-12-22 | End: 2021-12-22

## 2021-12-22 RX ADMIN — BEVACIZUMAB 900 MG: 400 INJECTION, SOLUTION INTRAVENOUS at 10:45

## 2021-12-22 RX ADMIN — SODIUM CHLORIDE 20 ML/HR: 0.9 INJECTION, SOLUTION INTRAVENOUS at 10:10

## 2021-12-30 ENCOUNTER — HOSPITAL ENCOUNTER (OUTPATIENT)
Dept: MAMMOGRAPHY | Facility: HOSPITAL | Age: 66
Discharge: HOME | End: 2021-12-30
Payer: MEDICARE

## 2021-12-30 ENCOUNTER — HOSPITAL ENCOUNTER (OUTPATIENT)
Dept: ULTRASOUND IMAGING | Facility: HOSPITAL | Age: 66
Discharge: HOME | End: 2021-12-30

## 2021-12-30 VITALS — WEIGHT: 134.7 LBS | HEIGHT: 59 IN | BODY MASS INDEX: 27.16 KG/M2

## 2021-12-30 DIAGNOSIS — N64.4 BREAST PAIN, RIGHT: ICD-10-CM

## 2021-12-30 PROCEDURE — G0279 TOMOSYNTHESIS, MAMMO: HCPCS

## 2021-12-30 PROCEDURE — 77066 DX MAMMO INCL CAD BI: CPT

## 2022-01-10 ENCOUNTER — HOSPITAL ENCOUNTER (OUTPATIENT)
Dept: NON INVASIVE DIAGNOSTICS | Facility: HOSPITAL | Age: 67
Discharge: HOME | End: 2022-01-10
Attending: INTERNAL MEDICINE
Payer: MEDICARE

## 2022-01-10 VITALS
DIASTOLIC BLOOD PRESSURE: 63 MMHG | SYSTOLIC BLOOD PRESSURE: 139 MMHG | HEIGHT: 59 IN | HEART RATE: 80 BPM | BODY MASS INDEX: 27.01 KG/M2 | WEIGHT: 134 LBS

## 2022-01-10 DIAGNOSIS — C53.0 MALIGNANT NEOPLASM OF ENDOCERVIX (HCC): ICD-10-CM

## 2022-01-10 DIAGNOSIS — R06.00 DYSPNEA ON EXERTION: ICD-10-CM

## 2022-01-10 LAB
AORTIC ROOT: 2.6 CM
APICAL FOUR CHAMBER EJECTION FRACTION: 49 %
E WAVE DECELERATION TIME: 209 MS
FRACTIONAL SHORTENING: 28 % (ref 28–44)
INTERVENTRICULAR SEPTUM IN DIASTOLE (PARASTERNAL SHORT AXIS VIEW): 0.9 CM
LEFT ATRIUM AREA SYSTOLE SINGLE PLANE A4C: 10.6 CM2
LEFT ATRIUM SIZE: 2.9 CM
LEFT INTERNAL DIMENSION IN SYSTOLE: 2.3 CM (ref 2.1–4)
LEFT VENTRICULAR INTERNAL DIMENSION IN DIASTOLE: 3.2 CM (ref 3.74–5.57)
LEFT VENTRICULAR POSTERIOR WALL IN END DIASTOLE: 1.1 CM
LEFT VENTRICULAR STROKE VOLUME: 23 ML
MV E'TISSUE VEL-SEP: 8 CM/S
MV PEAK A VEL: 1 M/S
MV PEAK E VEL: 73 CM/S
PA SYSTOLIC PRESSURE: 30 MMHG
RA PRESSURE ESTIMATED: 10 MMHG
RIGHT ATRIUM AREA SYSTOLE A4C: 9 CM2
RIGHT VENTRICLE ID DIMENSION: 2 CM
SL CV LV EF: 65
SL CV PED ECHO LEFT VENTRICLE DIASTOLIC VOLUME (MOD BIPLANE) 2D: 41 ML
SL CV PED ECHO LEFT VENTRICLE SYSTOLIC VOLUME (MOD BIPLANE) 2D: 18 ML
TRICUSPID VALVE PEAK REGURGITATION VELOCITY: 2.66 M/S
TV PEAK GRADIENT: 28 MMHG
Z-SCORE OF LEFT VENTRICULAR DIMENSION IN END SYSTOLE: -3.56

## 2022-01-10 PROCEDURE — 93306 TTE W/DOPPLER COMPLETE: CPT

## 2022-01-10 PROCEDURE — 93306 TTE W/DOPPLER COMPLETE: CPT | Performed by: INTERNAL MEDICINE

## 2022-01-11 ENCOUNTER — APPOINTMENT (OUTPATIENT)
Dept: LAB | Facility: CLINIC | Age: 67
End: 2022-01-11
Payer: MEDICARE

## 2022-01-11 DIAGNOSIS — C53.0 MALIGNANT NEOPLASM OF ENDOCERVIX (HCC): ICD-10-CM

## 2022-01-11 LAB
CREAT UR-MCNC: 259 MG/DL
PROT UR-MCNC: 221 MG/DL
PROT/CREAT UR: 0.85 MG/G{CREAT} (ref 0–0.1)

## 2022-01-11 PROCEDURE — 84156 ASSAY OF PROTEIN URINE: CPT

## 2022-01-11 PROCEDURE — 82570 ASSAY OF URINE CREATININE: CPT

## 2022-01-12 ENCOUNTER — HOSPITAL ENCOUNTER (OUTPATIENT)
Dept: INFUSION CENTER | Facility: HOSPITAL | Age: 67
Discharge: HOME | End: 2022-01-12
Payer: MEDICARE

## 2022-01-12 ENCOUNTER — PATIENT OUTREACH (OUTPATIENT)
Dept: CASE MANAGEMENT | Facility: HOSPITAL | Age: 67
End: 2022-01-12

## 2022-01-12 VITALS
OXYGEN SATURATION: 100 % | HEART RATE: 70 BPM | DIASTOLIC BLOOD PRESSURE: 62 MMHG | SYSTOLIC BLOOD PRESSURE: 138 MMHG | HEIGHT: 59 IN | BODY MASS INDEX: 26.93 KG/M2 | WEIGHT: 133.6 LBS | RESPIRATION RATE: 16 BRPM | TEMPERATURE: 96.9 F

## 2022-01-12 DIAGNOSIS — C53.0 MALIGNANT NEOPLASM OF ENDOCERVIX (HCC): Primary | ICD-10-CM

## 2022-01-12 DIAGNOSIS — E87.6 HYPOKALEMIA: ICD-10-CM

## 2022-01-12 PROCEDURE — 96413 CHEMO IV INFUSION 1 HR: CPT

## 2022-01-12 RX ORDER — SODIUM CHLORIDE 9 MG/ML
20 INJECTION, SOLUTION INTRAVENOUS ONCE
Status: COMPLETED | OUTPATIENT
Start: 2022-01-12 | End: 2022-01-12

## 2022-01-12 RX ADMIN — SODIUM CHLORIDE 20 ML/HR: 0.9 INJECTION, SOLUTION INTRAVENOUS at 10:20

## 2022-01-12 RX ADMIN — BEVACIZUMAB 900 MG: 400 INJECTION, SOLUTION INTRAVENOUS at 11:36

## 2022-01-12 NOTE — PLAN OF CARE
Problem: INFECTION - ADULT  Goal: Absence or prevention of progression during hospitalization  Description: INTERVENTIONS:  - Assess and monitor for signs and symptoms of infection  - Monitor lab/diagnostic results  - Monitor all insertion sites, i e  indwelling lines, tubes, and drains  - Monitor endotracheal if appropriate and nasal secretions for changes in amount and color  - [place] appropriate cooling/warming therapies per order  - Administer medications as ordered  - Instruct and encourage patient and family to use good hand hygiene technique  - Identify and instruct in appropriate isolation precautions for identified infection/condition  Outcome: Progressing     Problem: Knowledge Deficit  Goal: Patient/family/caregiver demonstrates understanding of disease process, treatment plan, medications, and discharge instructions  Description: Complete learning assessment and assess knowledge base    Interventions:  - Provide teaching at level of understanding  - Provide teaching via preferred learning methods  Outcome: Progressing

## 2022-01-12 NOTE — PROGRESS NOTES
Oncology LSW attempted to outreach PT to provide support and assess for areas of need  LSW left PT a detailed VM requesting return call at PT's earliest convenience

## 2022-01-24 ENCOUNTER — PATIENT OUTREACH (OUTPATIENT)
Dept: CASE MANAGEMENT | Facility: HOSPITAL | Age: 67
End: 2022-01-24

## 2022-01-24 NOTE — PROGRESS NOTES
Oncology LSW outreached PT on this day to provide support and assess for areas of need  PT stated that, at the time of LSW's call, she was in the process of taking down her [place] decorations and was putting up her [place] Day ones  PT reported that she and two of her children were not on the best terms right now, but having her decorations up brought her a small semblance of happiness  PT again thanked LSW and the oncology department for the Ricky gifts that she had received  PT stated, "It's never easy having to go through the Christmas season alone and it's even harder when you wake up with no presents under your tree, so I'm so thankful for the kindness of [employer] and the gifts that I received "  PT reported that she had purchased a reading lamp for her living room with some of the gift she had received  PT stated that she still was concerned that her "cancer had come back" due to various pains she was having in her body - in her armpit and breast specifically  PT reported that she had went in, for what she was told was going to be an MRI, and ended up getting a mammogram instead  PT reported that nothing of alarm was found with this test and that she was generally upset by the fact that she did not have the more comprehensive MRI completed  PT explained that she was scheduled to be seen by pulmonary this week and would bring up her concerns at this time  PT also noted that she was scheduled to see Dr Win Valverde the beginning of next week  PT stated that she thought it to be strange that she did not need to go in for scans or testing prior to meeting with Dr Constantino Torres stated her pulmonary appt note mentioned a CT scan, but no scan appointment was listed on her schedule  LSW also noted that PT's appt note for with Dr Sarah Tiwari indicated f/u after CT scan  LSW explained that she would reach out to [facility], JACE, for clarity  PT understanding and appreciative       PT reported no other questions or areas of concern at this time and thanked LSW for her call  LSW explained that she would again outreach PT once she had more info pertaining to pre-appt procedures  Emotional support offered  LSW messaged JACE Crocker to follow up on PT's CT scan questions  Await reply

## 2022-01-26 ENCOUNTER — TELEPHONE (OUTPATIENT)
Dept: GYNECOLOGIC ONCOLOGY | Facility: CLINIC | Age: 67
End: 2022-01-26

## 2022-01-26 NOTE — TELEPHONE ENCOUNTER
Left message with patient to inform that transportation had been arranged for her appts on 2/1 and 2/2

## 2022-01-28 ENCOUNTER — TELEPHONE (OUTPATIENT)
Dept: SURGICAL ONCOLOGY | Facility: CLINIC | Age: 67
End: 2022-01-28

## 2022-01-28 NOTE — TELEPHONE ENCOUNTER
Patient called to verify if she has [facility] set up for appts on 2/1/22 and 2/2/22 with Dr Mahsa Gray and infusion, confirmed [facility] appts are arranged and the one for today patient called to check to see if pulmonary appt is cancelled, saw the appt is cancelled and rescheduled  No

## 2022-01-31 ENCOUNTER — APPOINTMENT (OUTPATIENT)
Dept: LAB | Facility: CLINIC | Age: 67
End: 2022-01-31
Payer: MEDICARE

## 2022-01-31 DIAGNOSIS — C53.0 MALIGNANT NEOPLASM OF ENDOCERVIX (HCC): ICD-10-CM

## 2022-02-01 ENCOUNTER — PATIENT MESSAGE (OUTPATIENT)
Dept: GYNECOLOGIC ONCOLOGY | Facility: CLINIC | Age: 67
End: 2022-02-01

## 2022-02-01 ENCOUNTER — TELEPHONE (OUTPATIENT)
Dept: GYNECOLOGIC ONCOLOGY | Facility: CLINIC | Age: 67
End: 2022-02-01

## 2022-02-01 NOTE — TELEPHONE ENCOUNTER
Sent message to patient via [employer];   "Good morning, I set up a PET scan for you at [facility] 2/9/22 at 11am  Due to [facility] they will only transport from your area from 9am-11am appointment times  There is a 6 hour fast for for the PET scan  Nothing to eat or drink 6 hours before  No strenuous activity 36 hours prior  We also set up appointment with [facility] for Dr Corrina Saavedra at 2/15/22 at 9:30am in [facility]   Please call or reach out via [employer] with any questions [phone] "

## 2022-02-01 NOTE — PROGRESS NOTES
[address] to obtain urine protein/creatinine ratio tomorrow and proceed without results per JACE Siddiqui

## 2022-02-02 ENCOUNTER — HOSPITAL ENCOUNTER (OUTPATIENT)
Dept: INFUSION CENTER | Facility: HOSPITAL | Age: 67
Discharge: HOME | End: 2022-02-02
Payer: MEDICARE

## 2022-02-02 VITALS
HEIGHT: 59 IN | SYSTOLIC BLOOD PRESSURE: 140 MMHG | BODY MASS INDEX: 26.8 KG/M2 | WEIGHT: 132.94 LBS | HEART RATE: 73 BPM | DIASTOLIC BLOOD PRESSURE: 65 MMHG | OXYGEN SATURATION: 97 % | RESPIRATION RATE: 16 BRPM | TEMPERATURE: 96.8 F

## 2022-02-02 DIAGNOSIS — E87.6 HYPOKALEMIA: ICD-10-CM

## 2022-02-02 DIAGNOSIS — C53.0 MALIGNANT NEOPLASM OF ENDOCERVIX (HCC): Primary | ICD-10-CM

## 2022-02-02 LAB
CREAT UR-MCNC: 48.9 MG/DL
PROT UR-MCNC: 10 MG/DL
PROT/CREAT UR: 0.2 MG/G{CREAT} (ref 0–0.1)

## 2022-02-02 PROCEDURE — 82570 ASSAY OF URINE CREATININE: CPT

## 2022-02-02 PROCEDURE — 96413 CHEMO IV INFUSION 1 HR: CPT

## 2022-02-02 PROCEDURE — 84156 ASSAY OF PROTEIN URINE: CPT

## 2022-02-02 RX ORDER — SODIUM CHLORIDE 9 MG/ML
20 INJECTION, SOLUTION INTRAVENOUS ONCE
Status: COMPLETED | OUTPATIENT
Start: 2022-02-02 | End: 2022-02-02

## 2022-02-02 RX ADMIN — SODIUM CHLORIDE 20 ML/HR: 0.9 INJECTION, SOLUTION INTRAVENOUS at 09:45

## 2022-02-02 RX ADMIN — BEVACIZUMAB 900 MG: 400 INJECTION, SOLUTION INTRAVENOUS at 10:16

## 2022-02-02 NOTE — PLAN OF CARE
Problem: INFECTION - ADULT  Goal: Absence or prevention of progression during hospitalization  Description: INTERVENTIONS:  - Assess and monitor for signs and symptoms of infection  - Monitor lab/diagnostic results  - Monitor all insertion sites, i e  indwelling lines, tubes, and drains  - Monitor endotracheal if appropriate and nasal secretions for changes in amount and color  - [place] appropriate cooling/warming therapies per order  - Administer medications as ordered  - Instruct and encourage patient and family to use good hand hygiene technique  - Identify and instruct in appropriate isolation precautions for identified infection/condition  Outcome: Progressing     Problem: Knowledge Deficit  Goal: Patient/family/caregiver demonstrates understanding of disease process, treatment plan, medications, and discharge instructions  Description: Complete learning assessment and assess knowledge base    Interventions:  - Provide teaching at level of understanding  - Provide teaching via preferred learning methods  Outcome: Progressing

## 2022-02-04 ENCOUNTER — PATIENT OUTREACH (OUTPATIENT)
Dept: CASE MANAGEMENT | Facility: HOSPITAL | Age: 67
End: 2022-02-04

## 2022-02-07 ENCOUNTER — PATIENT OUTREACH (OUTPATIENT)
Dept: CASE MANAGEMENT | Facility: HOSPITAL | Age: 67
End: 2022-02-07

## 2022-02-07 NOTE — PROGRESS NOTES
Oncology LSW attempted to outreach PT to provide support and assess for areas of need after receiving her return call VM on Friday  LSW left PT a detailed VM requesting return call at PT's earliest convenience

## 2022-02-08 ENCOUNTER — PATIENT OUTREACH (OUTPATIENT)
Dept: CASE MANAGEMENT | Facility: HOSPITAL | Age: 67
End: 2022-02-08

## 2022-02-08 DIAGNOSIS — R39.15 URGENCY OF URINATION: ICD-10-CM

## 2022-02-08 RX ORDER — OXYBUTYNIN CHLORIDE 10 MG/1
TABLET, EXTENDED RELEASE ORAL
Qty: 90 TABLET | Refills: 1 | Status: SHIPPED | OUTPATIENT
Start: 2022-02-08 | End: 2022-03-01
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
## 2022-02-09 ENCOUNTER — TELEPHONE (OUTPATIENT)
Dept: UROLOGY | Facility: CLINIC | Age: 67
End: 2022-02-09

## 2022-02-09 ENCOUNTER — HOSPITAL ENCOUNTER (OUTPATIENT)
Dept: RADIOLOGY | Age: 67
Discharge: HOME | End: 2022-02-09
Payer: MEDICARE

## 2022-02-09 DIAGNOSIS — N13.30 HYDRONEPHROSIS, UNSPECIFIED HYDRONEPHROSIS TYPE: Primary | ICD-10-CM

## 2022-02-09 DIAGNOSIS — C77.3 METASTATIC CANCER TO AXILLARY LYMPH NODES (HCC): ICD-10-CM

## 2022-02-09 DIAGNOSIS — R91.8 LUNG NODULES: ICD-10-CM

## 2022-02-09 DIAGNOSIS — R10.9 FLANK PAIN: ICD-10-CM

## 2022-02-09 DIAGNOSIS — C53.0 MALIGNANT NEOPLASM OF ENDOCERVIX (HCC): ICD-10-CM

## 2022-02-09 LAB — GLUCOSE SERPL-MCNC: 89 MG/DL (ref 65–140)

## 2022-02-09 PROCEDURE — A9552 F18 FDG: HCPCS

## 2022-02-09 PROCEDURE — 82948 REAGENT STRIP/BLOOD GLUCOSE: CPT

## 2022-02-09 PROCEDURE — 78815 PET IMAGE W/CT SKULL-THIGH: CPT

## 2022-02-09 NOTE — TELEPHONE ENCOUNTER
Please have patient go for urine testing    Will also order an ultrasound of her kidney and bladder due to her worsening of her pain to evaluate her stent

## 2022-02-09 NOTE — TELEPHONE ENCOUNTER
Called and spoke with patient  She is aware urine testing and US kidney and bladder were ordered  Patient provided with central scheduling phone number and will call to schedule US  She will also get urine testing completed   States she will reach out to [facility] as they usually transport her to the hospital  Advised patient that if she needs assistance in scheduling transport to [address], let us know after it is scheduled and we can try to assist

## 2022-02-09 NOTE — TELEPHONE ENCOUNTER
Pt called and LVM calling Cee back, she scheduled her US for the 14th @ 10am  She needs [facility] though to get to [address]  Pt also has questions about a urine sample that was added

## 2022-02-09 NOTE — TELEPHONE ENCOUNTER
Contacted [facility] and spoke to Carissa Austin  Patient was scheduled for transport from her home to [id number] on 2/14/22 for ultrasound  Transport will pick her up at 9:00 am for her 10:00am appointment  Office states they do no schedule patients return ride ahead of time, as sometimes appointments can run longer than expected  Advised to have ultrasound department contact [facility] when ultrasound is complete and they will arrange  for patient  Spoke with Kortney from Radiology at [facility] to make her aware and she states they will call for patient on 2/14 to arrange a ride home  Spoke with patient and made her aware  She states she is fine waiting if she has to, she brings her Terence Yonathan and is just thankful to get a ride  Patient states she will attempt to get her own ride to the lab sooner than Monday to leave a urine sample, but if unable to she will leave urine sample on 2/14 while at the hospital  Patient aware office will contact her with results of urine testing and ultrasound when available

## 2022-02-10 ENCOUNTER — PATIENT OUTREACH (OUTPATIENT)
Dept: CASE MANAGEMENT | Facility: HOSPITAL | Age: 67
End: 2022-02-10

## 2022-02-10 NOTE — PROGRESS NOTES
Oncology LSW received call from PT on this day  PT presented the following request and question       1  PT asked LSW if she could outreach [facility] and request that they change her primary number back to her cell phone, as she had a bit of confusion with [employer] when the  came to pick her up on Monday  PT explained that the  thought her landline number, which was primary, was actually a cell phone  PT was sincerely apologetic for the incident and stated that she wanted to make [facility] aware of the change of numbers to avoid future moments of confusion  LSW agreeable with outreach  LSW outreached [facility] and requested that PT's number changed back to the [phone] number  Brice Balbuena transport supervisor, agreeable with change and reported its completion  2  PT stated that she had received the test results from her MyChart and was feeling rather anxious about the results that her PET scan showed  PT reported that she had not an increase in size in a "mass" that she already knew existed  PT asked LSW if anyone could read PT's PET scan results and explain them to her in laymen's terms, as it was all "very confusing" for her  LSW reported that she could outreach [facility] and request that she follow up with PT when she had a free moment  PT reported that LSW's outreach was not necessary and that she herself would contact the office to make contact  LSW agreeable with this plan and encouraged PT to reach out should areas of need arise  PT appreciative and agreeable  PT again outreached LSW  [facility], during which time it was reported to her that she was "stable " PT stated that she did not believe this to be true because of the growth that was discovered via PET  PT stated that she was scheduled to see Dr Javier Babin on 2/15 @ 9AM and was hopeful that she would receive more informative summaries of results with her   PT also noted that she had received a call form urology following LSW's outreach to her provider there  PT reported that they had scheduled PT for future testing to try and establish the route cause of her pain  PT reported she will keep LSW informed  PT reported no other thoughts or areas of concern at this time and thanked LSW for her help this AM  LSW encouraged PT to reach out should any areas of need arise  PT agreeable  Emotional support offered

## 2022-02-11 ENCOUNTER — APPOINTMENT (OUTPATIENT)
Dept: LAB | Facility: CLINIC | Age: 67
End: 2022-02-11
Payer: MEDICARE

## 2022-02-11 DIAGNOSIS — R10.9 FLANK PAIN: ICD-10-CM

## 2022-02-11 DIAGNOSIS — N13.30 HYDRONEPHROSIS, UNSPECIFIED HYDRONEPHROSIS TYPE: ICD-10-CM

## 2022-02-11 LAB

## 2022-02-11 PROCEDURE — 81001 URINALYSIS AUTO W/SCOPE: CPT | Performed by: NURSE PRACTITIONER

## 2022-02-11 PROCEDURE — 87086 URINE CULTURE/COLONY COUNT: CPT

## 2022-02-12 LAB — BACTERIA UR CULT: NORMAL

## 2022-02-14 ENCOUNTER — HOSPITAL ENCOUNTER (OUTPATIENT)
Dept: ULTRASOUND IMAGING | Facility: HOSPITAL | Age: 67
Discharge: HOME | End: 2022-02-14
Payer: MEDICARE

## 2022-02-14 DIAGNOSIS — N13.30 HYDRONEPHROSIS, UNSPECIFIED HYDRONEPHROSIS TYPE: ICD-10-CM

## 2022-02-14 DIAGNOSIS — R10.9 FLANK PAIN: ICD-10-CM

## 2022-02-14 PROCEDURE — 76770 US EXAM ABDO BACK WALL COMP: CPT

## 2022-02-15 ENCOUNTER — OFFICE VISIT (OUTPATIENT)
Dept: GYNECOLOGIC ONCOLOGY | Facility: CLINIC | Age: 67
End: 2022-02-15
Payer: MEDICARE

## 2022-02-15 VITALS
BODY MASS INDEX: 26.61 KG/M2 | DIASTOLIC BLOOD PRESSURE: 70 MMHG | HEART RATE: 83 BPM | OXYGEN SATURATION: 100 % | SYSTOLIC BLOOD PRESSURE: 138 MMHG | RESPIRATION RATE: 16 BRPM | HEIGHT: 59 IN | WEIGHT: 132 LBS | TEMPERATURE: 96.5 F

## 2022-02-15 DIAGNOSIS — R59.1 LYMPHADENOPATHY OF HEAD AND NECK: ICD-10-CM

## 2022-02-15 DIAGNOSIS — N13.1 HYDRONEPHROSIS WITH URETERAL STRICTURE, NOT ELSEWHERE CLASSIFIED: ICD-10-CM

## 2022-02-15 DIAGNOSIS — C53.0 MALIGNANT NEOPLASM OF ENDOCERVIX (HCC): Primary | ICD-10-CM

## 2022-02-15 PROCEDURE — 99215 OFFICE O/P EST HI 40 MIN: CPT | Performed by: OBSTETRICS & GYNECOLOGY

## 2022-02-15 NOTE — PROGRESS NOTES
Assessment/Plan:    Problem List Items Addressed This Visit        Immune and Lymphatic    Lymphadenopathy of head and neck    Relevant Orders    Ambulatory Referral to Palliative Care       Genitourinary    Malignant neoplasm of endocervix [facility]) - Primary     Patient received last cycle of single agent bevacizumab on February 2, 2022 February 9th PET-CT demonstrates evidence of mostly stable disease but evidence of some disease progression as evidence by persistent FDG avidity in the omentum as well as new nodule  Patient continues to have some right-sided abdominal/flank pain  I have recommended referral to palliative care for evaluation and management of pain  Patient is agreeable  I have also recommended to discontinue bevacizumab and to initiate treatment with pembrolizumab given high PDL1 expression with CPS score of 20%  I discussed with patient rationale, logistics and mechanism of action of this medication  We discussed specific toxicities associated with immunotherapy  Informed consent signed, she agrees and wants to proceed  I discussed with patient rationale, logistics, common side effects and toxicities associated with chemotherapy regimen  We have discussed and she is well aware of the incurable nature of her disease  She understands all treatments are palliative  She understands toxicities include but are not limited to autoimmune rashes, endocrinopathy due to autoimmune damage of the thyroid gland, pancreas, adrenals, etcetera  We discussed the possibility of autoimmune pneumonitis, gastroenteritis, etcetera  She understands on occasion does side effects could be serious or life-threatening    We also discussed risk of fatigue, decreased appetite, nausea and vomiting, peripheral neuropathy, bone marrow suppression ( anemia, neutropenia and or thrombocytopenia) with subsequent risk of life-threatening infection or hemorrhage, kidney and or liver damage, etc   Patient verbalizes understanding, agrees and wants to proceed  She also understands pros and cons as well as risks associated with potential blood transfusions and has consented for those if those are deemed necessary during the course of treatment  Informed consent forms signed  Relevant Orders    Ambulatory Referral to Palliative Care    Hydronephrosis     Right ureteral stent in place  Follow-up per Urology  Recent renal ultrasound demonstrates no evidence of hydronephrosis and stent in place  CHIEF COMPLAINT:   Here for evaluation of response and pre chemotherapy visit, patient with recurrent stage I B2 widely metastatic cervical cancer    Problem:  Cancer Staging  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[facility] )  Staging form: Cervix Uteri, AJCC 7th Edition  - Clinical stage from 3/29/2017: FIGO Stage IB2, calculated as Stage Unknown (T1b2, NX, M0) - Signed by Shanna Durbin MD on 2/4/2021        Previous therapy:  Oncology History   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 ([facility])  3/29/2017 -  Cancer Staged    Staging form: Cervix Uteri, AJCC 7th Edition  - Clinical stage from 3/29/2017: FIGO Stage IB2, calculated as Stage Unknown (T1b2, NX, M0) - Signed by Shanna Durbin MD on 2/4/2021  Staged by: Managing physician  Specimen type: Excision  Histopathologic type: Squamous cell carcinoma, NOS  Histologic grade (G): G3  Lymph-vascular invasion (LVI): LVI present/identified, NOS  Residual tumor (R): R0 - None  Pelvic jace status: Negative  Pelvic jace method of assessment: PET  Para-aortic status: Negative  Para-aortic jace method of assessment: PET  Stage used in treatment planning: Yes  National guidelines used in treatment planning: Yes  Type of national guideline used in treatment planning: NCCN       3/29/2017 Surgery    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)   FInal path with incidental 5 cm grade 3 invasive cervical SCCA, +LVI, invasion 9/10 mm cervical stroma, no lymphadenectomy, margins negative ([facility])  2/19/2021 Progression    A  Lymph Node, Left axillary, Biopsy:  - Metastatic squamous cell carcinoma (p16 positive) involving lymph node, compatible with patient's cervical primary  3/2/2021 -  Chemotherapy    Carboplatin AUC 5, Taxol 135 milligram/m2 and Avastin at 15 milligram/kilogram all to be administer on day 1 of a 21 day cycle     7/7/2021 -  Chemotherapy    bevacizumab (AVASTIN) IVPB, 910 mg, Intravenous, Once, 11 of 16 cycles  Administration: 900 mg (7/7/2021), 900 mg (7/30/2021), 900 mg (8/18/2021), 900 mg (9/8/2021), 900 mg (9/29/2021), 900 mg (10/20/2021), 900 mg (11/10/2021), 900 mg (12/1/2021), 900 mg (12/22/2021), 900 mg (1/12/2022), 900 mg (2/2/2022)           Patient ID: Deanna Santamaria is a 77 y o  female  HPI  Patient with recurrent widely metastatic stage I B2 cervical cancer  She is status post chemotherapy with carboplatin, Taxol and Avastin and most recently single agent Avastin  Last cycle on February 2nd  She presents to discuss results of latest PET-CT scan which demonstrates evidence of some disease progression  Overall tumor burden is low  She reports significant right flank pain/discomfort which radiates to the right groin  Recent renal ultrasound demonstrates no hydronephrosis and stent in place  Denies nausea vomiting  Reports some fatigue  Denies changes in bowel or bladder function  Tolerates diet  No other complaints  The following portions of the patient's history were reviewed and updated as appropriate: allergies, current medications, past family history, past medical history, past social history, past surgical history and problem list     Review of Systems  As per HPI  Twelve point review of systems otherwise unremarkable    Current Outpatient Medications   Medication Sig Dispense Refill    Diclofenac Sodium (VOLTAREN) 1 % Apply 2 g topically 4 (four) times a day 350 g 3    famotidine (PEPCID) 20 mg tablet Take 20 mg by mouth daily        lidocaine-prilocaine (EMLA) cream APPLY TOPICALLY AS NEEDED FOR MILD PAIN 30 g 0    Multiple Vitamins-Minerals (Alive Womens Gummy) CHEW Chew      oxybutynin (DITROPAN-XL) 10 MG 24 hr tablet TAKE 1 TABLET BY MOUTH DAILY AT BEDTIME 90 tablet 1    psyllium (METAMUCIL) 0 52 g capsule Take 750 mg by mouth daily as needed       LORazepam (ATIVAN) 1 mg tablet TAKE 1 TABLET (1 MG TOTAL) BY MOUTH EVERY 6 (SIX) HOURS AS NEEDED FOR ANXIETY (OR NAUSEA) 36 tablet 1     No current facility-administered medications for this visit  Objective:    Blood pressure 138/70, pulse 83, temperature (!) 96 5 °F (35 8 °C), temperature source Temporal, resp  rate 16, height 4' 11" (1 499 m), weight 59 9 kg (132 lb), SpO2 100 %, not currently breastfeeding  Body mass index is 26 66 kg/m²  Body surface area is 1 55 meters squared  Physical Exam  Vitals reviewed  Exam conducted with a chaperone present  Constitutional:       General: She is not in acute distress  Appearance: She is normal weight  She is not toxic-appearing  Eyes:      General: No scleral icterus  Right eye: No discharge  Left eye: No discharge  Conjunctiva/sclera: Conjunctivae normal    Neck:      Vascular: Carotid bruit: al cervical adenopathy noted  Comments: Port-A-Cath in place  Cardiovascular:      Rate and Rhythm: Normal rate and regular rhythm  Heart sounds: Normal heart sounds  No murmur heard  Pulmonary:      Effort: Pulmonary effort is normal  No respiratory distress  Breath sounds: Normal breath sounds  No stridor  No wheezing  Abdominal:      General: There is no distension  Palpations: Abdomen is soft  There is no mass  Tenderness: There is no abdominal tenderness  There is no guarding  Hernia: No hernia is present     Genitourinary:     Comments: Pelvic exam deferred  Musculoskeletal:      Right lower leg: No edema  Left lower leg: No edema  Lymphadenopathy:      Cervical: Cervical adenopathy present  Neurological:      Mental Status: She is alert  No results found for:   Lab Results   Component Value Date     06/11/2018    K 4 1 01/31/2022     (H) 01/31/2022    CO2 25 01/31/2022    ANIONGAP 13 0 06/11/2018    BUN 15 01/31/2022    CREATININE 0 96 01/31/2022    GLUF 103 (H) 01/31/2022    CALCIUM 9 7 01/31/2022    CORRECTEDCA 10 2 (H) 11/30/2021    AST 18 01/31/2022    ALT 22 01/31/2022    ALKPHOS 143 (H) 01/31/2022    PROT 7 0 06/11/2018    BILITOT 0 3 06/11/2018    EGFR 61 01/31/2022     Lab Results   Component Value Date    WBC 5 59 01/31/2022    HGB 13 3 01/31/2022    HCT 41 8 01/31/2022    MCV 93 01/31/2022     01/31/2022     Lab Results   Component Value Date    NEUTROABS 3 57 01/31/2022     Renal ultrasound February 14, 2022: I have personally reviewed images  There is no evidence of right hydronephrosis  There is image consistent with proximal stent coil in the renal pelvis  PET-CT February 9, 2022: I have personally reviewed images  I agree with radiologist's impression  There is evidence of FDG uptake in the right upper quadrant as well as evidence of new nodularity all consistent with disease progression      Nava Morel MD, [facility] [address]  2/15/2022  9:03 AM

## 2022-02-15 NOTE — ASSESSMENT & PLAN NOTE
Patient received last cycle of single agent bevacizumab on February 2, 2022 February 9th PET-CT demonstrates evidence of mostly stable disease but evidence of some disease progression as evidence by persistent FDG avidity in the omentum as well as new nodule  Patient continues to have some right-sided abdominal/flank pain  I have recommended referral to palliative care for evaluation and management of pain  Patient is agreeable  I have also recommended to discontinue bevacizumab and to initiate treatment with pembrolizumab given high PDL1 expression with CPS score of 20%  I discussed with patient rationale, logistics and mechanism of action of this medication  We discussed specific toxicities associated with immunotherapy  Informed consent signed, she agrees and wants to proceed  I discussed with patient rationale, logistics, common side effects and toxicities associated with chemotherapy regimen  We have discussed and she is well aware of the incurable nature of her disease  She understands all treatments are palliative  She understands toxicities include but are not limited to autoimmune rashes, endocrinopathy due to autoimmune damage of the thyroid gland, pancreas, adrenals, etcetera  We discussed the possibility of autoimmune pneumonitis, gastroenteritis, etcetera  She understands on occasion does side effects could be serious or life-threatening  We also discussed risk of fatigue, decreased appetite, nausea and vomiting, peripheral neuropathy, bone marrow suppression ( anemia, neutropenia and or thrombocytopenia) with subsequent risk of life-threatening infection or hemorrhage, kidney and or liver damage, etc   Patient verbalizes understanding, agrees and wants to proceed  She also understands pros and cons as well as risks associated with potential blood transfusions and has consented for those if those are deemed necessary during the course of treatment    Informed consent forms signed

## 2022-02-15 NOTE — LETTER
February 15, 2022     Kamini Drum[address]   [address]  [facility] [address]    Patient: Royer Aguilar   YOB: 1955   Date of Visit: 2/15/2022       Dear Dr Myrna Weber:    Thank you for referring Royer Aguilar to me for evaluation  Below are my notes for this consultation  If you have questions, please do not hesitate to call me  I look forward to following your patient along with you  Sincerely,        Genny Espinal MD        CC: MD Genny Gabriel MD  2/15/2022  9:03 AM  Sign when Signing Visit  Assessment/Plan:    Problem List Items Addressed This Visit        Immune and Lymphatic    Lymphadenopathy of head and neck    Relevant Orders    Ambulatory Referral to Palliative Care       Genitourinary    Malignant neoplasm of endocervix [facility]) - Primary     Patient received last cycle of single agent bevacizumab on February 2, 2022 February 9th PET-CT demonstrates evidence of mostly stable disease but evidence of some disease progression as evidence by persistent FDG avidity in the omentum as well as new nodule  Patient continues to have some right-sided abdominal/flank pain  I have recommended referral to palliative care for evaluation and management of pain  Patient is agreeable  I have also recommended to discontinue bevacizumab and to initiate treatment with pembrolizumab given high PDL1 expression with CPS score of 20%  I discussed with patient rationale, logistics and mechanism of action of this medication  We discussed specific toxicities associated with immunotherapy  Informed consent signed, she agrees and wants to proceed  I discussed with patient rationale, logistics, common side effects and toxicities associated with chemotherapy regimen  We have discussed and she is well aware of the incurable nature of her disease  She understands all treatments are palliative        She understands toxicities include but are not limited to autoimmune rashes, endocrinopathy due to autoimmune damage of the thyroid gland, pancreas, adrenals, etcetera  We discussed the possibility of autoimmune pneumonitis, gastroenteritis, etcetera  She understands on occasion does side effects could be serious or life-threatening  We also discussed risk of fatigue, decreased appetite, nausea and vomiting, peripheral neuropathy, bone marrow suppression ( anemia, neutropenia and or thrombocytopenia) with subsequent risk of life-threatening infection or hemorrhage, kidney and or liver damage, etc   Patient verbalizes understanding, agrees and wants to proceed  She also understands pros and cons as well as risks associated with potential blood transfusions and has consented for those if those are deemed necessary during the course of treatment  Informed consent forms signed  Relevant Orders    Ambulatory Referral to Palliative Care    Hydronephrosis     Right ureteral stent in place  Follow-up per Urology  Recent renal ultrasound demonstrates no evidence of hydronephrosis and stent in place  CHIEF COMPLAINT:   Here for evaluation of response and pre chemotherapy visit, patient with recurrent stage I B2 widely metastatic cervical cancer    Problem:  Cancer Staging  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[facility] )  Staging form: Cervix Uteri, AJCC 7th Edition  - Clinical stage from 3/29/2017: FIGO Stage IB2, calculated as Stage Unknown (T1b2, NX, M0) - Signed by Jorge Gonzalez MD on 2/4/2021        Previous therapy:  Oncology History   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 ([facility])       3/29/2017 -  Cancer Staged    Staging form: Cervix Uteri, AJCC 7th Edition  - Clinical stage from 3/29/2017: FIGO Stage IB2, calculated as Stage Unknown (T1b2, NX, M0) - Signed by Jorge Gonzalez MD on 2/4/2021  Staged by: Managing physician  Specimen type: Excision  Histopathologic type: Squamous cell carcinoma, NOS  Histologic grade (G): G3  Lymph-vascular invasion (LVI): LVI present/identified, NOS  Residual tumor (R): R0 - None  Pelvic jace status: Negative  Pelvic jace method of assessment: PET  Para-aortic status: Negative  Para-aortic jace method of assessment: PET  Stage used in treatment planning: Yes  National guidelines used in treatment planning: Yes  Type of national guideline used in treatment planning: NCCN       3/29/2017 Surgery    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)  FInal path with incidental 5 cm grade 3 invasive cervical SCCA, +LVI, invasion 9/10 mm cervical stroma, no lymphadenectomy, margins negative ([facility])  2/19/2021 Progression    A  Lymph Node, Left axillary, Biopsy:  - Metastatic squamous cell carcinoma (p16 positive) involving lymph node, compatible with patient's cervical primary  3/2/2021 -  Chemotherapy    Carboplatin AUC 5, Taxol 135 milligram/m2 and Avastin at 15 milligram/kilogram all to be administer on day 1 of a 21 day cycle     7/7/2021 -  Chemotherapy    bevacizumab (AVASTIN) IVPB, 910 mg, Intravenous, Once, 11 of 16 cycles  Administration: 900 mg (7/7/2021), 900 mg (7/30/2021), 900 mg (8/18/2021), 900 mg (9/8/2021), 900 mg (9/29/2021), 900 mg (10/20/2021), 900 mg (11/10/2021), 900 mg (12/1/2021), 900 mg (12/22/2021), 900 mg (1/12/2022), 900 mg (2/2/2022)           Patient ID: Joey Merino is a 77 y o  female  HPI  Patient with recurrent widely metastatic stage I B2 cervical cancer  She is status post chemotherapy with carboplatin, Taxol and Avastin and most recently single agent Avastin  Last cycle on February 2nd  She presents to discuss results of latest PET-CT scan which demonstrates evidence of some disease progression  Overall tumor burden is low  She reports significant right flank pain/discomfort which radiates to the right groin    Recent renal ultrasound demonstrates no hydronephrosis and stent in place  Denies nausea vomiting  Reports some fatigue  Denies changes in bowel or bladder function  Tolerates diet  No other complaints  The following portions of the patient's history were reviewed and updated as appropriate: allergies, current medications, past family history, past medical history, past social history, past surgical history and problem list     Review of Systems  As per HPI  Twelve point review of systems otherwise unremarkable  Current Outpatient Medications   Medication Sig Dispense Refill    Diclofenac Sodium (VOLTAREN) 1 % Apply 2 g topically 4 (four) times a day 350 g 3    famotidine (PEPCID) 20 mg tablet Take 20 mg by mouth daily        lidocaine-prilocaine (EMLA) cream APPLY TOPICALLY AS NEEDED FOR MILD PAIN 30 g 0    Multiple Vitamins-Minerals (Alive Womens Gummy) CHEW Chew      oxybutynin (DITROPAN-XL) 10 MG 24 hr tablet TAKE 1 TABLET BY MOUTH DAILY AT BEDTIME 90 tablet 1    psyllium (METAMUCIL) 0 52 g capsule Take 750 mg by mouth daily as needed       LORazepam (ATIVAN) 1 mg tablet TAKE 1 TABLET (1 MG TOTAL) BY MOUTH EVERY 6 (SIX) HOURS AS NEEDED FOR ANXIETY (OR NAUSEA) 36 tablet 1     No current facility-administered medications for this visit  Objective:    Blood pressure 138/70, pulse 83, temperature (!) 96 5 °F (35 8 °C), temperature source Temporal, resp  rate 16, height 4' 11" (1 499 m), weight 59 9 kg (132 lb), SpO2 100 %, not currently breastfeeding  Body mass index is 26 66 kg/m²  Body surface area is 1 55 meters squared  Physical Exam  Vitals reviewed  Exam conducted with a chaperone present  Constitutional:       General: She is not in acute distress  Appearance: She is normal weight  She is not toxic-appearing  Eyes:      General: No scleral icterus  Right eye: No discharge  Left eye: No discharge  Conjunctiva/sclera: Conjunctivae normal    Neck:      Vascular: Carotid bruit: al cervical adenopathy noted  Comments: Port-A-Cath in place  Cardiovascular:      Rate and Rhythm: Normal rate and regular rhythm  Heart sounds: Normal heart sounds  No murmur heard  Pulmonary:      Effort: Pulmonary effort is normal  No respiratory distress  Breath sounds: Normal breath sounds  No stridor  No wheezing  Abdominal:      General: There is no distension  Palpations: Abdomen is soft  There is no mass  Tenderness: There is no abdominal tenderness  There is no guarding  Hernia: No hernia is present  Genitourinary:     Comments: Pelvic exam deferred  Musculoskeletal:      Right lower leg: No edema  Left lower leg: No edema  Lymphadenopathy:      Cervical: Cervical adenopathy present  Neurological:      Mental Status: She is alert  No results found for:   Lab Results   Component Value Date     06/11/2018    K 4 1 01/31/2022     (H) 01/31/2022    CO2 25 01/31/2022    ANIONGAP 13 0 06/11/2018    BUN 15 01/31/2022    CREATININE 0 96 01/31/2022    GLUF 103 (H) 01/31/2022    CALCIUM 9 7 01/31/2022    CORRECTEDCA 10 2 (H) 11/30/2021    AST 18 01/31/2022    ALT 22 01/31/2022    ALKPHOS 143 (H) 01/31/2022    PROT 7 0 06/11/2018    BILITOT 0 3 06/11/2018    EGFR 61 01/31/2022     Lab Results   Component Value Date    WBC 5 59 01/31/2022    HGB 13 3 01/31/2022    HCT 41 8 01/31/2022    MCV 93 01/31/2022     01/31/2022     Lab Results   Component Value Date    NEUTROABS 3 57 01/31/2022     Renal ultrasound February 14, 2022: I have personally reviewed images  There is no evidence of right hydronephrosis  There is image consistent with proximal stent coil in the renal pelvis  PET-CT February 9, 2022: I have personally reviewed images  I agree with radiologist's impression  There is evidence of FDG uptake in the right upper quadrant as well as evidence of new nodularity all consistent with disease progression      Jus Mcnulty MD, [address] [facility] 132  2/15/2022  9:03 AM

## 2022-02-15 NOTE — ASSESSMENT & PLAN NOTE
Right ureteral stent in place  Follow-up per Urology  Recent renal ultrasound demonstrates no evidence of hydronephrosis and stent in place

## 2022-02-15 NOTE — PATIENT INSTRUCTIONS
Keep appointments per infusion calendar  Keep appointment for palliative care evaluation and treatment  Keep appointments with Urology  Call me if you have any new symptoms

## 2022-02-16 ENCOUNTER — TELEPHONE (OUTPATIENT)
Dept: GYNECOLOGIC ONCOLOGY | Facility: CLINIC | Age: 67
End: 2022-02-16

## 2022-02-16 NOTE — TELEPHONE ENCOUNTER
Irish Crespo called and I reviewed dates for Keytruda  Infusions as well as follow up appointments w/Dr Son  Email sent [facility] with the dates of clinic visits requesting them to arrange transportation  New chemo calendar was sent out to patient in today's mail

## 2022-02-17 ENCOUNTER — TELEPHONE (OUTPATIENT)
Dept: UROLOGY | Facility: CLINIC | Age: 67
End: 2022-02-17

## 2022-02-17 NOTE — TELEPHONE ENCOUNTER
Called to discuss results of her ultrasound, which is normal  She continues with pain and night sweats  Her urine testing was normal  We reviewed side effects of stent  She is not interested in nephrostomy tube placement  She will continue with stent changes at this time  She is on chemo every 3 weeks per her report   She was referred to palliative care for further pain management

## 2022-02-18 ENCOUNTER — TELEPHONE (OUTPATIENT)
Dept: HEMATOLOGY ONCOLOGY | Facility: CLINIC | Age: 67
End: 2022-02-18

## 2022-02-18 ENCOUNTER — OFFICE VISIT (OUTPATIENT)
Dept: FAMILY MEDICINE CLINIC | Facility: CLINIC | Age: 67
End: 2022-02-18
Payer: MEDICARE

## 2022-02-18 VITALS
TEMPERATURE: 98.4 F | WEIGHT: 133.4 LBS | BODY MASS INDEX: 26.89 KG/M2 | HEIGHT: 59 IN | HEART RATE: 102 BPM | SYSTOLIC BLOOD PRESSURE: 162 MMHG | DIASTOLIC BLOOD PRESSURE: 90 MMHG | OXYGEN SATURATION: 97 %

## 2022-02-18 DIAGNOSIS — K59.03 DRUG INDUCED CONSTIPATION: ICD-10-CM

## 2022-02-18 DIAGNOSIS — J44.9 CHRONIC OBSTRUCTIVE PULMONARY DISEASE, UNSPECIFIED COPD TYPE (HCC): ICD-10-CM

## 2022-02-18 DIAGNOSIS — T45.1X5A CHEMOTHERAPY INDUCED NEUTROPENIA (HCC): ICD-10-CM

## 2022-02-18 DIAGNOSIS — R31.9 HEMATURIA, UNSPECIFIED TYPE: ICD-10-CM

## 2022-02-18 DIAGNOSIS — D69.6 THROMBOCYTOPENIA (HCC): ICD-10-CM

## 2022-02-18 DIAGNOSIS — C53.0 MALIGNANT NEOPLASM OF ENDOCERVIX (HCC): ICD-10-CM

## 2022-02-18 DIAGNOSIS — D70.1 CHEMOTHERAPY INDUCED NEUTROPENIA (HCC): ICD-10-CM

## 2022-02-18 DIAGNOSIS — K80.20 CALCULUS OF GALLBLADDER WITHOUT CHOLECYSTITIS WITHOUT OBSTRUCTION: Primary | ICD-10-CM

## 2022-02-18 DIAGNOSIS — F33.9 DEPRESSION, RECURRENT (HCC): ICD-10-CM

## 2022-02-18 LAB
SL AMB  POCT GLUCOSE, UA: NEGATIVE
SL AMB LEUKOCYTE ESTERASE,UA: NEGATIVE
SL AMB POCT BILIRUBIN,UA: 1
SL AMB POCT BLOOD,UA: ABNORMAL
SL AMB POCT CLARITY,UA: ABNORMAL
SL AMB POCT COLOR,UA: ABNORMAL
SL AMB POCT KETONES,UA: 5
SL AMB POCT NITRITE,UA: NEGATIVE
SL AMB POCT PH,UA: 6
SL AMB POCT SPECIFIC GRAVITY,UA: 1.03
SL AMB POCT URINE PROTEIN: 2000
SL AMB POCT UROBILINOGEN: NEGATIVE

## 2022-02-18 PROCEDURE — 87086 URINE CULTURE/COLONY COUNT: CPT | Performed by: FAMILY MEDICINE

## 2022-02-18 PROCEDURE — 99214 OFFICE O/P EST MOD 30 MIN: CPT | Performed by: FAMILY MEDICINE

## 2022-02-18 PROCEDURE — 81002 URINALYSIS NONAUTO W/O SCOPE: CPT | Performed by: FAMILY MEDICINE

## 2022-02-18 RX ORDER — OXYCODONE HYDROCHLORIDE AND ACETAMINOPHEN 5; 325 MG/1; MG/1
1 TABLET ORAL EVERY 6 HOURS PRN
Qty: 60 TABLET | Refills: 0 | Status: SHIPPED | OUTPATIENT
Start: 2022-02-18 | End: 2022-03-01 | Stop reason: SDUPTHER

## 2022-02-18 RX ORDER — NALOXEGOL OXALATE 25 MG/1
25 TABLET, FILM COATED ORAL DAILY
Qty: 30 TABLET | Refills: 2 | Status: SHIPPED | OUTPATIENT
Start: 2022-02-18 | End: 2022-03-31 | Stop reason: SDUPTHER

## 2022-02-18 NOTE — TELEPHONE ENCOUNTER
Patient calling with questions regarding the US recently completed on 2/14/2022      Please call patient [phone]

## 2022-02-18 NOTE — ASSESSMENT & PLAN NOTE
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

## 2022-02-18 NOTE — PROGRESS NOTES
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  However, I am going to order an ultrasound to evaluate for gallbladder wall thickening  She may require surgical consultation as well  Malignant neoplasm of endocervix [facility])  I reviewed the patient's last note from her oncologist as well as her PET scan  She has evidence of mesenteric and peritoneal seeding  I believe that her abdominal pain is secondary to her abdominal carcinomatosis  I think she should be treated more aggressively for her pain  He tells me that the pain can be unbearable  She goes and tries to take a hot shower when she gets the pain  I queried the [facility] prescription drug monitoring program   There were no red flags  I did see older prescriptions for lorazepam, which she no longer takes  I prescribed oxycodone 5/325  She may take 1 every 6 hours as needed for pain  I scheduled the patient a follow-up visit for 2 weeks  I advised the patient that oxycodone can cause drowsiness  She should not drive while taking this medication  I note that palliative care was consulted recently  I think they can help her with her pain  Drug induced constipation  Patient has chronic constipation  She is generally able to manage this taking Metamucil or fiber supplements  She is going to have problems when she starts taking opiates  I started her on Movantik 25 mg once a day to reverse the opiate effect on her bowels  Hematuria  Her dipstick was done in the office which was positive for blood  I think this is simply because of her stent  We discussed this    I did order a UA and CNS   I see she had a urine culture not long ago which was negative  Diagnoses and all orders for this visit:    Calculus of gallbladder without cholecystitis without obstruction  -     US right upper quadrant with liver dopplers; Future    Hematuria, unspecified type  -     POCT urine dip  -     UA (URINE) with reflex to Scope  -     Urine culture; Future  -     Urine culture    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HCC)  -     oxyCODONE-acetaminophen (Percocet) 5-325 mg per tablet; Take 1 tablet by mouth every 6 (six) hours as needed for moderate pain Max Daily Amount: 4 tablets    Drug induced constipation  -     naloxegol oxalate (Movantik) 25 MG tablet; Take 1 tablet (25 mg total) by mouth in the morning    Chronic obstructive pulmonary disease, unspecified COPD type ([address] )    Chemotherapy induced neutropenia (HCC)    Depression, recurrent ([address] )    Thrombocytopenia ([address] )    Other orders  -     NON FORMULARY; Start with a pea size amount to entire face at night, OR directions per MD  Use [employer] software on [address] to guide you daily  Subjective:      Patient ID: Kortney Langford is a 77 y o  female  Patient is a 60-year-old  female who presents to the office today with multiple complaints  She complains of headaches  She complains of pain behind her right shoulder blade  She complains of pain in the right breast area  She complains of pain in her right thigh  She tells me at times, she cannot even touch it  Mostly, she complains of pain in her abdomen  She gets bloating  Pain can radiate into her back  Her appetite is decreased and she tells me she is eating much less  She tells me she is also diaphoretic but this has been chronic  She denies any fever but admits to chills  She was concerned because on her recent PET scan, she was noted to have gallstones  She is concerned that gallstones may be the etiology of her abdominal pain    I asked her if she gets pain even if she does not need  She responds that she does get pain even if she does not eat  It does not seem to be related to food  She talked to her oncologist about what to do for the pain  They told her to take 600 mg of ibuprofen 3 times per day and alternate with acetaminophen in between  The following portions of the patient's history were reviewed and updated as appropriate: allergies, current medications, past family history, past medical history, past social history, past surgical history and problem list     Review of Systems   Constitutional: Positive for appetite change ( states decreased appetite), chills and diaphoresis ( reports diaphoresis is chronic)  Negative for fever  Respiratory: Negative for shortness of breath  Cardiovascular: Negative for chest pain, palpitations and leg swelling  Gastrointestinal: Positive for abdominal distention, abdominal pain and constipation (Reports chronic constipation)  Reports gas   Genitourinary: Positive for dysuria, frequency and hematuria  Negative for urgency  Musculoskeletal: Positive for back pain  Neurological: Positive for dizziness and headaches  Objective:      /90 (BP Location: Left arm, Patient Position: Sitting, Cuff Size: Adult)   Pulse 102   Temp 98 4 °F (36 9 °C) (Tympanic)   Ht 4' 11" (1 499 m)   Wt 60 5 kg (133 lb 6 4 oz)   LMP  (LMP Unknown)   SpO2 97%   BMI 26 94 kg/m²          Physical Exam  Vitals reviewed  Constitutional:       Comments: This is a 80-year-old  female who appears her stated age  She is in no apparent distress   HENT:      Head: Normocephalic and atraumatic  Right Ear: Tympanic membrane, ear canal and external ear normal  There is no impacted cerumen  Left Ear: Tympanic membrane, ear canal and external ear normal  There is no impacted cerumen  Mouth/Throat:      Mouth: Mucous membranes are moist       Pharynx: Oropharynx is clear   No oropharyngeal exudate or posterior oropharyngeal erythema  Eyes:      General: No scleral icterus  Right eye: No discharge  Left eye: No discharge  Conjunctiva/sclera: Conjunctivae normal       Pupils: Pupils are equal, round, and reactive to light  Cardiovascular:      Rate and Rhythm: Normal rate and regular rhythm  Heart sounds: Normal heart sounds  No murmur heard  No friction rub  No gallop  Pulmonary:      Effort: Pulmonary effort is normal  No respiratory distress  Breath sounds: Normal breath sounds  No stridor  No wheezing, rhonchi or rales  Comments: Negative for pleuritic friction rub  Abdominal:      General: Bowel sounds are normal  There is no distension  Palpations: Abdomen is soft  There is no mass  Tenderness: There is abdominal tenderness  There is no left CVA tenderness, guarding or rebound  Comments: Akhtar's sign was positive   Musculoskeletal:      Cervical back: Neck supple  Lymphadenopathy:      Cervical: No cervical adenopathy  Psychiatric:         Mood and Affect: Mood normal          Behavior: Behavior normal          Thought Content:  Thought content normal          Judgment: Judgment normal        extremities:  No calf tenderness and Homans sign was negative

## 2022-02-18 NOTE — ASSESSMENT & PLAN NOTE
I reviewed the patient's last note from her oncologist as well as her PET scan  She has evidence of mesenteric and peritoneal seeding  I believe that her abdominal pain is secondary to her abdominal carcinomatosis  I think she should be treated more aggressively for her pain  He tells me that the pain can be unbearable  She goes and tries to take a hot shower when she gets the pain  I queried the [place] prescription drug monitoring program   There were no red flags  I did see older prescriptions for lorazepam, which she no longer takes  I prescribed oxycodone 5/325  She may take 1 every 6 hours as needed for pain  I scheduled the patient a follow-up visit for 2 weeks  I advised the patient that oxycodone can cause drowsiness  She should not drive while taking this medication  I note that palliative care was consulted recently  I think they can help her with her pain

## 2022-02-18 NOTE — ASSESSMENT & PLAN NOTE
Her dipstick was done in the office which was positive for blood  I think this is simply because of her stent  We discussed this  I did order a UA and CNS  I see she had a urine culture not long ago which was negative

## 2022-02-18 NOTE — ASSESSMENT & PLAN NOTE
Patient has chronic constipation  She is generally able to manage this taking Metamucil or fiber supplements  She is going to have problems when she starts taking opiates  I started her on Movantik 25 mg once a day to reverse the opiate effect on her bowels

## 2022-02-18 NOTE — TELEPHONE ENCOUNTER
Directed patient to PCP for findings of incidental/questionable gallstones on u/s  IM Residency Progress Note   Unit/Bed#: [facility] 730-01 Encounter: [id number]  SOD Team A      Cecelia Savage 48 y o  male [id number]    Hospital Stay Days: 10      Assessment/Plan:    Principal Problem:    Subarachnoid hemorrhage ([address] )  Active Problems:    Headache    Alcohol dependence (HCC)    Nicotine dependence    Intraventricular hemorrhage (HCC)    Pseudoaneurysm (HCC)    Hypoalbuminemia    Leukocytosis    Dysphagia    Marijuana abuse    Cocaine abuse    Subarachnoid hemorrhage with intraventricular extension - serial CT head imaging showed decreasing intraventricular hemorrhage  S/p EVD  - continue keppra for seizure ppx  - follow up with outpatient neurosurgery  - Goal BP under 442 systolic, continue with amlodipine and labetalol PRN  - PT/OT  - will need placement for inpatient rehab     Alcohol abuse - No signs of withdrawal   No requirement for ativan   - discontinue serax    Hypophonia - secondary to intubation  - patient reassured this will take time to improve  - mechanical soft diet pending speech evaluation    Nicotine dependence  - nicotine replacement therapy  -  cessation    Transaminitis - elevated ALT and AST on recent CMP  Could be secondary to alcohol abuse  -  cessation of EtOH    Disposition: await placement in TBI rehabilitation      Subjective:   Patient reports feeling well today  He has no complaints  He has no tremors or anxiety  His nurse reports that he still gets imbalanced at times but this has improved and has been walking with assistance around the halls         Vitals: Temp (24hrs), Av 6 °F (37 °C), Min:98 2 °F (36 8 °C), Max:99 °F (37 2 °C)  Current: Temperature: 99 °F (37 2 °C)  Vitals:    10/21/17 1927 10/22/17 0009 10/22/17 0727 10/22/17 0748   BP:  140/91 142/96    Pulse:  81 66    Resp:  16 16    Temp:  98 9 °F (37 2 °C) 99 °F (37 2 °C)    TempSrc:  Oral Oral    SpO2: 97% 94% 94% 95%   Weight:       Height:        Body mass index is 23 59 kg/m²  I/O last 24 hours: In: 720 [P O :720]  Out: 570 [Urine:570]      Physical Exam: General appearance: alert, appears stated age and cooperative  Lungs: clear to auscultation bilaterally  Heart: regular rate and rhythm, S1, S2 normal, no murmur, click, rub or gallop  Extremities: extremities normal, atraumatic, no cyanosis or edema  Skin: Skin color, texture, turgor normal  No rashes or lesions  Neurologic: Grossly normal     Invasive Devices     Peripheral Intravenous Line            Peripheral IV 10/19/17 Right;Upper Arm 2 days          Drain            External Urinary Catheter Small 2 days                          Labs:   Recent Results (from the past 24 hour(s))   CBC    Collection Time: 10/22/17  5:15 AM   Result Value Ref Range    WBC 10 55 (H) 4 31 - 10 16 Thousand/uL    RBC 4 03 3 88 - 5 62 Million/uL    Hemoglobin 13 6 12 0 - 17 0 g/dL    Hematocrit 39 4 36 5 - 49 3 %    MCV 98 82 - 98 fL    MCH 33 7 26 8 - 34 3 pg    MCHC 34 5 31 4 - 37 4 g/dL    RDW 12 3 11 6 - 15 1 %    Platelets 964 (H) 101 - 390 Thousands/uL    MPV 10 9 8 9 - 12 7 fL   Potassium    Collection Time: 10/22/17  5:15 AM   Result Value Ref Range    Potassium 3 9 3 5 - 5 3 mmol/L       Radiology Results: I have personally reviewed pertinent reports  Other Diagnostic Testing:   I have personally reviewed pertinent reports          Active Meds:   Current Facility-Administered Medications   Medication Dose Route Frequency    acetaminophen (TYLENOL) oral suspension 650 mg  650 mg Oral Q8H PRN    amLODIPine (NORVASC) tablet 10 mg  10 mg Oral Daily    folic acid (FOLVITE) tablet 1 mg  1 mg Oral Daily    ipratropium (ATROVENT) 0 02 % inhalation solution 0 5 mg  0 5 mg Nebulization TID    labetalol (NORMODYNE) injection 10 mg  10 mg Intravenous Q4H PRN    levalbuterol (XOPENEX) inhalation solution 1 25 mg  1 25 mg Nebulization TID    levETIRAcetam (KEPPRA) tablet 500 mg  500 mg Oral Q12H [address]    nicotine (NICODERM CQ) 21 mg/24 hr TD 24 hr patch 21 mg  21 mg Transdermal Daily    ondansetron (ZOFRAN) injection 4 mg  4 mg Intravenous Q6H PRN    oxazepam (SERAX) capsule 10 mg  10 mg Oral Q12H [address]    QUEtiapine (SEROquel) tablet 25 mg  25 mg Oral HS PRN    thiamine (VITAMIN B1) tablet 100 mg  100 mg Oral Daily         VTE Pharmacologic Prophylaxis: Sequential compression device (Venodyne)   VTE Mechanical Prophylaxis: sequential compression device    Jose Méndez DO

## 2022-02-20 LAB — BACTERIA UR CULT: NORMAL

## 2022-02-21 ENCOUNTER — PATIENT OUTREACH (OUTPATIENT)
Dept: CASE MANAGEMENT | Facility: HOSPITAL | Age: 67
End: 2022-02-21

## 2022-02-21 ENCOUNTER — APPOINTMENT (OUTPATIENT)
Dept: LAB | Facility: HOSPITAL | Age: 67
End: 2022-02-21
Payer: MEDICARE

## 2022-02-21 ENCOUNTER — TELEPHONE (OUTPATIENT)
Dept: FAMILY MEDICINE CLINIC | Facility: CLINIC | Age: 67
End: 2022-02-21

## 2022-02-21 LAB
BACTERIA UR QL AUTO: ABNORMAL /HPF
BILIRUB UR QL STRIP: NEGATIVE
CLARITY UR: CLEAR
COLOR UR: YELLOW
GLUCOSE UR STRIP-MCNC: NEGATIVE MG/DL
HGB UR QL STRIP.AUTO: ABNORMAL
KETONES UR STRIP-MCNC: NEGATIVE MG/DL
LEUKOCYTE ESTERASE UR QL STRIP: NEGATIVE
NITRITE UR QL STRIP: NEGATIVE
NON-SQ EPI CELLS URNS QL MICRO: ABNORMAL /HPF
PH UR STRIP.AUTO: 6 [PH]
PROT UR STRIP-MCNC: ABNORMAL MG/DL
RBC #/AREA URNS AUTO: ABNORMAL /HPF
SP GR UR STRIP.AUTO: 1.02 (ref 1–1.03)
UROBILINOGEN UR QL STRIP.AUTO: 0.2 E.U./DL
WBC #/AREA URNS AUTO: ABNORMAL /HPF

## 2022-02-21 PROCEDURE — 81001 URINALYSIS AUTO W/SCOPE: CPT | Performed by: FAMILY MEDICINE

## 2022-02-21 NOTE — PROGRESS NOTES
Oncology LSW received VM from PT from call on Friday afternoon asking for assistance, if possible, with securing transportation to her ultrasound appointment that is scheduled for Friday, 2/25 @ 10AM  PT stated that, since this scan was not oncology related, she was unable to use [employer]  LSW outreached [place], [place] with PT's PCP's office, via [employer] explaining PT's situation and to see if they would be able to arrange Lyft transportation for her  Awaiting reply  LSW outreached PT to let her know how she was going about attempting to arrange Lyft transportation for her ultrasound appointment on Friday, 2/25  PT understanding and appreciative  Additionally, LSW outreached Carlie Sam, as she had noted Kesha Keenan was responsible for scheduling PT's ultrasound  Kesha Keenan responded with the following statement:   "Spoke with Dr Charlie Llamas office  Tamera Mcguire is going to reach out to the patient and see if she can get set up with the local transport company up here at [address]  She will also reach out to the [facility]'s  to see if she can assist her in getting transportation "     MEENA noted, per her last coverage list, SIXTO Fletcher, was the  previously covering Dr Charlie Llamas office ([address])  LSW will outreach Ashish Juan during business hours to discuss transportation coordination

## 2022-02-21 NOTE — TELEPHONE ENCOUNTER
Pt stats she is not a member with the [address] [employer] because of the cost     Pt says she will ask a neighbor for a ride to her [address] appointment

## 2022-02-22 ENCOUNTER — PATIENT OUTREACH (OUTPATIENT)
Dept: CASE MANAGEMENT | Facility: HOSPITAL | Age: 67
End: 2022-02-22

## 2022-02-22 ENCOUNTER — HOSPITAL ENCOUNTER (OUTPATIENT)
Dept: INFUSION CENTER | Facility: HOSPITAL | Age: 67
Discharge: HOME | End: 2022-02-22
Payer: MEDICARE

## 2022-02-22 VITALS
BODY MASS INDEX: 26.93 KG/M2 | RESPIRATION RATE: 16 BRPM | HEIGHT: 59 IN | TEMPERATURE: 98.2 F | SYSTOLIC BLOOD PRESSURE: 171 MMHG | HEART RATE: 100 BPM | DIASTOLIC BLOOD PRESSURE: 73 MMHG

## 2022-02-22 DIAGNOSIS — C53.0 MALIGNANT NEOPLASM OF ENDOCERVIX (HCC): Primary | ICD-10-CM

## 2022-02-22 DIAGNOSIS — E87.6 HYPOKALEMIA: ICD-10-CM

## 2022-02-22 PROCEDURE — 96413 CHEMO IV INFUSION 1 HR: CPT

## 2022-02-22 RX ORDER — SODIUM CHLORIDE 9 MG/ML
20 INJECTION, SOLUTION INTRAVENOUS ONCE
Status: COMPLETED | OUTPATIENT
Start: 2022-02-22 | End: 2022-02-22

## 2022-02-22 RX ADMIN — SODIUM CHLORIDE 20 ML/HR: 0.9 INJECTION, SOLUTION INTRAVENOUS at 12:48

## 2022-02-22 RX ADMIN — SODIUM CHLORIDE 200 MG: 9 INJECTION, SOLUTION INTRAVENOUS at 13:24

## 2022-02-22 NOTE — PROGRESS NOTES
Oncology LSW connected with GIOVANNY Pozo SW with Dr Wyatt Sepulveda office  LSW asked if it were possible to schedule PT with Lyft transport for her ultrasound scheduled by Dr Hannah Bell (2/25 @ 10AM at [facility])  Raffy Avalos explained that they typically do not utilize Lyft services at this practice location due to the lack of available drivers  Raffy Avalos suggested that PT apply for MA and then ultimately [facility] through [facility]  LSW outreached her supervisor, MEENA Segundo, to review the situation and discuss how to effectively address the issue  It was ultimately determined that it would be appropriate to reach out to [address] for transportation, particularly due to PT's diagnosis and the general proximity of her pain (investigated via ultrasound) and the part of her body most directly impacted by her cancer (endocervix)     LSW was approved for [employer] for PT for her ultrasound appointment on 2/25 @ 10AM at [address] [place] [address] shared this information with PT, who was understanding and appreciative

## 2022-02-22 NOTE — PROGRESS NOTES
Patient arrived to unit c/o pain to abdomen and right leg  Pecolia Pulse and PCP aware  Patient takes pain medication  Patient's BP also elevated and made Pecolia Pulse aware via Teams  Patient ok to treat today and patient states PCP already aware of BP elevation and is monitoring  Patient tolerated Keytruda without incident  AVS provided and patient aware of next appointment  Patient left in stable condition

## 2022-02-24 ENCOUNTER — TELEPHONE (OUTPATIENT)
Dept: OTHER | Facility: OTHER | Age: 67
End: 2022-02-24

## 2022-02-24 NOTE — TELEPHONE ENCOUNTER
Pt  Called in regarding appointment on 04/02/2022  Pt  Did not see appointment listed on her print out  She called to confirm,comfirmed appointment with patient

## 2022-02-25 ENCOUNTER — HOSPITAL ENCOUNTER (OUTPATIENT)
Dept: ULTRASOUND IMAGING | Facility: HOSPITAL | Age: 67
Discharge: HOME | End: 2022-02-25
Attending: FAMILY MEDICINE
Payer: MEDICARE

## 2022-02-25 DIAGNOSIS — K80.20 CALCULUS OF GALLBLADDER WITHOUT CHOLECYSTITIS WITHOUT OBSTRUCTION: ICD-10-CM

## 2022-02-25 PROCEDURE — 76705 ECHO EXAM OF ABDOMEN: CPT

## 2022-03-01 ENCOUNTER — CONSULT (OUTPATIENT)
Dept: PALLIATIVE MEDICINE | Facility: CLINIC | Age: 67
End: 2022-03-01
Payer: MEDICARE

## 2022-03-01 VITALS
DIASTOLIC BLOOD PRESSURE: 90 MMHG | RESPIRATION RATE: 16 BRPM | OXYGEN SATURATION: 99 % | HEART RATE: 107 BPM | BODY MASS INDEX: 26.39 KG/M2 | WEIGHT: 130.73 LBS | SYSTOLIC BLOOD PRESSURE: 130 MMHG | TEMPERATURE: 97.4 F

## 2022-03-01 DIAGNOSIS — R59.1 LYMPHADENOPATHY OF HEAD AND NECK: ICD-10-CM

## 2022-03-01 DIAGNOSIS — K59.03 THERAPEUTIC OPIOID-INDUCED CONSTIPATION (OIC): Primary | ICD-10-CM

## 2022-03-01 DIAGNOSIS — C53.0 MALIGNANT NEOPLASM OF ENDOCERVIX (HCC): ICD-10-CM

## 2022-03-01 DIAGNOSIS — T40.2X5A THERAPEUTIC OPIOID-INDUCED CONSTIPATION (OIC): Primary | ICD-10-CM

## 2022-03-01 DIAGNOSIS — K80.20 CALCULUS OF GALLBLADDER WITHOUT CHOLECYSTITIS WITHOUT OBSTRUCTION: ICD-10-CM

## 2022-03-01 PROCEDURE — 99204 OFFICE O/P NEW MOD 45 MIN: CPT | Performed by: FAMILY MEDICINE

## 2022-03-01 RX ORDER — SENNA PLUS 8.6 MG/1
1 TABLET ORAL 2 TIMES DAILY
Qty: 60 TABLET | Refills: 11 | Status: SHIPPED | OUTPATIENT
Start: 2022-03-01

## 2022-03-01 RX ORDER — OXYCODONE HYDROCHLORIDE AND ACETAMINOPHEN 5; 325 MG/1; MG/1
1 TABLET ORAL EVERY 4 HOURS PRN
Qty: 150 TABLET | Refills: 0 | Status: SHIPPED | OUTPATIENT
Start: 2022-03-01 | End: 2022-03-31 | Stop reason: SDUPTHER

## 2022-03-01 NOTE — Clinical Note
This lady likely going to follow with me long-term in [facility] area, but will be in office once more next month in [facility]  Could you reach out to her at some point and dig a little more into social hisotry, if she'll allow it? Documented everything she told me today, but didn't have time to fully understand all social factors and document them  She is committed to treatment and life-prolongation, but also feels paralyzed and lonely
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
## 2022-03-03 ENCOUNTER — OFFICE VISIT (OUTPATIENT)
Dept: FAMILY MEDICINE CLINIC | Facility: CLINIC | Age: 67
End: 2022-03-03
Payer: MEDICARE

## 2022-03-03 ENCOUNTER — TELEPHONE (OUTPATIENT)
Dept: OTHER | Facility: OTHER | Age: 67
End: 2022-03-03

## 2022-03-03 VITALS
SYSTOLIC BLOOD PRESSURE: 134 MMHG | WEIGHT: 131.1 LBS | OXYGEN SATURATION: 96 % | DIASTOLIC BLOOD PRESSURE: 80 MMHG | HEIGHT: 59 IN | BODY MASS INDEX: 26.43 KG/M2 | TEMPERATURE: 96.8 F | HEART RATE: 112 BPM

## 2022-03-03 DIAGNOSIS — M54.16 CHRONIC RIGHT-SIDED LUMBAR RADICULOPATHY: Primary | ICD-10-CM

## 2022-03-03 DIAGNOSIS — K80.20 CALCULUS OF GALLBLADDER WITHOUT CHOLECYSTITIS WITHOUT OBSTRUCTION: ICD-10-CM

## 2022-03-03 DIAGNOSIS — F11.20 CONTINUOUS OPIOID DEPENDENCE (HCC): ICD-10-CM

## 2022-03-03 DIAGNOSIS — K59.03 DRUG INDUCED CONSTIPATION: ICD-10-CM

## 2022-03-03 PROCEDURE — 99214 OFFICE O/P EST MOD 30 MIN: CPT | Performed by: FAMILY MEDICINE

## 2022-03-03 NOTE — TELEPHONE ENCOUNTER
Patient said that there is supposed to be some sort of arrangement for her transport to her appt on 3/14 and she has not heard anything  Please call patient to discuss  I was unable to find any notes on this

## 2022-03-03 NOTE — PROGRESS NOTES
Assessment/Plan:    No problem-specific Assessment & Plan notes found for this encounter  There are no diagnoses linked to this encounter  Subjective:      Patient ID: Jacqueline Snider is a 77 y o  female  This is a 55-year-old  female presents to the office today for her follow-up appointment  Since I last saw her, she did see palliative Care who was kind enough to refer her to gastroenterology  The patient did have her ultrasound done which showed gallstones  The patient tells me she is really not interested in surgery  However, she tells me if they believe it is symptomatic, she would like medication to treat her  Patient continues to experience pain in her abdomen, primarily right-sided  Again, it does not seem to be related to food  She will have pain even unrelated to food  She also complains of pain in her right thigh  She describes as sensitivity even in the skin  She is getting relief from the Percocet which palliative care renewed for her  The Movantik is helping her with her bowel movements  She is really not experiencing constipation  The following portions of the patient's history were reviewed and updated as appropriate: allergies, current medications, past family history, past medical history, past social history, past surgical history and problem list     Review of Systems   Constitutional: Negative for activity change and appetite change  Respiratory: Negative for cough and shortness of breath  Cardiovascular: Negative for chest pain, palpitations and leg swelling  Gastrointestinal: Positive for abdominal pain  Negative for abdominal distention, constipation, diarrhea and nausea           Objective:      /80 (BP Location: Left arm, Patient Position: Sitting, Cuff Size: Adult)   Pulse (!) 112   Temp (!) 96 8 °F (36 °C) (Tympanic)   Ht 4' 11" (1 499 m)   Wt 59 5 kg (131 lb 1 6 oz)   LMP  (LMP Unknown)   SpO2 96%   BMI 26 48 kg/m²          Physical Exam  Vitals reviewed  Constitutional:       Comments: Patient is a 78-year-old  female who appears her stated age  She is pleasant, cooperative, and in no apparent distress   HENT:      Head: Normocephalic and atraumatic  Right Ear: Tympanic membrane, ear canal and external ear normal       Left Ear: Tympanic membrane, ear canal and external ear normal       Mouth/Throat:      Mouth: Mucous membranes are moist       Pharynx: Oropharynx is clear  No oropharyngeal exudate or posterior oropharyngeal erythema  Eyes:      General: No scleral icterus  Right eye: No discharge  Left eye: No discharge  Conjunctiva/sclera: Conjunctivae normal       Pupils: Pupils are equal, round, and reactive to light  Cardiovascular:      Rate and Rhythm: Normal rate and regular rhythm  Heart sounds: Normal heart sounds  No murmur heard  No friction rub  No gallop  Pulmonary:      Effort: Pulmonary effort is normal  No respiratory distress  Breath sounds: Normal breath sounds  No stridor  No wheezing, rhonchi or rales  Abdominal:      General: Bowel sounds are normal  There is no distension  Palpations: Abdomen is soft  There is no mass  Tenderness: There is no abdominal tenderness  There is no guarding  Hernia: No hernia is present  Comments: No organomegaly was noted   Musculoskeletal:      Cervical back: Neck supple  Comments: No tenderness or muscle spasm present   Lymphadenopathy:      Cervical: No cervical adenopathy  Neurological:      Comments: Deep tendon reflexes were assessed in the lower extremities  There was an absent right patellar reflex  Motor strength was noted to be 5/5  There was a positive straight leg raising sign on the right  extremities:  Without cyanosis, clubbing, or edema  There was no calf tenderness and Homans sign is negative

## 2022-03-03 NOTE — PATIENT INSTRUCTIONS
Gallstones   AMBULATORY CARE:   Gallstones  are hard substances that form in your gallbladder or bile duct  Your gallbladder and bile duct are located on the right side of your abdomen, near your liver  Your gallbladder stores bile  Bile helps break down the fat that you eat  Your gallbladder also helps remove certain chemicals from your body  Common signs and symptoms of gallstones: The most common symptom is severe, constant pain in the right upper abdomen  It is usually just below the ribcage  The pain may also be felt in the right shoulder and between the shoulder blades  You may also have any of the following:  · Nausea and vomiting    · Feeling bloated    · Juan-colored bowel movements    · Dark urine    Seek care immediately if:   · You have a fever and chills  · Your skin or eyes turn yellow  · You have severe pain in your upper abdomen, just below the right ribcage  Contact your healthcare provider if:   · You have nausea and are vomiting  · Your urine is dark  · You have juan-colored bowel movements  · You have questions or concerns about your condition or care  Treatment:  You may not need treatment if you do not have signs or symptoms  Your healthcare provider may want to monitor the gallstones over time  You may need any of the following:  · Antinausea medicine  may be given to calm your stomach and to help prevent vomiting  · Prescription pain medicine  may be given  Ask your healthcare provider how to take this medicine safely  Some prescription pain medicines contain acetaminophen  Do not take other medicines that contain acetaminophen without talking to your healthcare provider  Too much acetaminophen may cause liver damage  Prescription pain medicine may cause constipation  Ask your healthcare provider how to prevent or treat constipation  · Surgery  may be needed to remove your gallbladder   This may be done after symptoms become severe or you develop health problems from the gallstones  Your healthcare provider may recommend gallbladder removal before you develop symptoms  This may be done if you are at high risk for health problems  What you can do to manage or prevent gallstones:   · Eat a variety of healthy foods  This may help you have more energy and heal faster  Healthy foods include fruits, vegetables, whole-grain breads, low-fat dairy products, beans, lean meat, and fish  Ask if you need to be on a special diet  Try to eat regular meals during the day  This will help your gallbladder empty  · Exercise as directed  Talk to your healthcare provider about the best exercise plan for you  Exercise can help you lose weight and improve your health  · Manage your weight  If you are overweight, it is important to reach a healthy weight  You will need to lose weight slowly because rapid weight loss can increase your risk for gallstones  Talk to your healthcare provider about your weight  He or she can help you create a safe weight loss plan if you need to lose weight  Follow up with your healthcare provider as directed:  Write down your questions so you remember to ask them during your visits  © Copyright [employer] 2022 Information is for End User's use only and may not be sold, redistributed or otherwise used for commercial purposes  All illustrations and images included in CareNotes® are the copyrighted property of [employer]  or [facility] Tommy Bartholomew   The above information is an  only  It is not intended as medical advice for individual conditions or treatments  Talk to your doctor, nurse or pharmacist before following any medical regimen to see if it is safe and effective for you

## 2022-03-04 ENCOUNTER — PATIENT OUTREACH (OUTPATIENT)
Dept: CASE MANAGEMENT | Facility: HOSPITAL | Age: 67
End: 2022-03-04

## 2022-03-04 NOTE — ASSESSMENT & PLAN NOTE
Patient is having pain in the right thigh  Her pain is primarily in the anterior thigh  I reviewed her PET scan which showed no bony abnormalities, consistent with metastatic disease  I am concerned about the possibility of a lumbar radiculopathy  The palliative care doctor also suggested this to the patient  I ordered an MRI of the lumbar spine  Patient will follow-up with me her results and further recommendations  For now, continue oxycodone for pain relief

## 2022-03-04 NOTE — PROGRESS NOTES
Oncology LSW received VM from PT requesting coordination of [facility] [place] for her MRI that is scheduled for 3/11 @ 10AM at [facility]  LSW emailed Kenney Brewer with [facility] and successfully scheduled requested transport  LSW outreached PT to make this information known  PT appreciative and understanding of information provided  PT did not report any further questions or areas of concern at this time, stating that she was hopeful that the MRI would provide some answers pertaining to the chronic pain she has been experiencing  LSW engaged in active listening and provided PT with emotional support  PT receptive to support provided

## 2022-03-04 NOTE — ASSESSMENT & PLAN NOTE
The patient does have gallstones  However, I suspected to gallstones are asymptomatic  I think the patient's abdominal pain is cancer related pain  She has diffuse peritoneal studding  Patient will see GI for their opinion

## 2022-03-07 NOTE — TELEPHONE ENCOUNTER
Spoke with patient and made her aware that her transportation was arranged with [employer], she will be picked up at 10:30 3/14/22 for her 11:30 appointment at the [place] office  Patient agreed

## 2022-03-08 ENCOUNTER — SOCIAL WORK (OUTPATIENT)
Dept: PALLIATIVE MEDICINE | Facility: CLINIC | Age: 67
End: 2022-03-08

## 2022-03-08 DIAGNOSIS — Z71.89 COUNSELING AND COORDINATION OF CARE: Primary | ICD-10-CM

## 2022-03-08 PROCEDURE — NC001 PR NO CHARGE
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
## 2022-03-08 NOTE — Clinical Note
She would like a transfer bench for her tub  She is also reporting a lot of pain  Could you please either add transfer bench to her note or include during next visit?  Thank you

## 2022-03-10 ENCOUNTER — CONSULT (OUTPATIENT)
Dept: GASTROENTEROLOGY | Facility: CLINIC | Age: 67
End: 2022-03-10
Payer: MEDICARE

## 2022-03-10 VITALS
DIASTOLIC BLOOD PRESSURE: 70 MMHG | TEMPERATURE: 98.3 F | BODY MASS INDEX: 27.05 KG/M2 | HEART RATE: 92 BPM | WEIGHT: 134.2 LBS | SYSTOLIC BLOOD PRESSURE: 119 MMHG | HEIGHT: 59 IN

## 2022-03-10 DIAGNOSIS — K80.20 CALCULUS OF GALLBLADDER WITHOUT CHOLECYSTITIS WITHOUT OBSTRUCTION: ICD-10-CM

## 2022-03-10 DIAGNOSIS — C53.0 MALIGNANT NEOPLASM OF ENDOCERVIX (HCC): ICD-10-CM

## 2022-03-10 DIAGNOSIS — R10.13 EPIGASTRIC PAIN: Primary | ICD-10-CM

## 2022-03-10 PROCEDURE — 1123F ACP DISCUSS/DSCN MKR DOCD: CPT | Performed by: INTERNAL MEDICINE

## 2022-03-10 PROCEDURE — 99204 OFFICE O/P NEW MOD 45 MIN: CPT | Performed by: INTERNAL MEDICINE

## 2022-03-10 NOTE — PATIENT INSTRUCTIONS
Scheduled date of EGD(as of today): 5/13/2022  Physician performing EGD: Dr Ramy Nogueira MD  Location of EGD: [address]  Instructions reviewed with patient by: Anita Ashton Covert   Clearances:  N/A    Patient expressed understanding

## 2022-03-10 NOTE — PROGRESS NOTES
[facility] - Outpatient Consultation  Naty Burden 77 y o  female MRN: [id number]  Encounter: [id number]          ASSESSMENT AND PLAN:      1  Malignant neoplasm of endocervix [facility])  -     Ambulatory Referral to Gastroenterology    2  Calculus of gallbladder without cholecystitis without obstruction  -     Ambulatory Referral to Gastroenterology  This is a 71-year-old white female with complaints of epigastric pain radiating to the back and down the leg and history of gallstones  There is no associated nausea vomiting  She also has some epigastric tenderness  The symptoms are somewhat suggestive of gallbladder disease but I think that is less likely since she has no nausea or vomiting and the pain is radiating down the right leg which is not typical for gallbladder colic  Her epigastric tenderness is more suggestive of stomach issues such as peptic ulcer disease or gastritis  Therefore I would not recommend cholecystectomy at this time  I will however proceed with an endoscopy to further evaluate her symptoms  Risks of perforation bleeding were discussed with the patient and she is agreeable to proceed with the procedure  She also has a history of a hyperplastic colon polyp  Therefore she should have a repeat colonoscopy in six years  Thank you so much for referring this patient      ______________________________________________________________________    HPI:  Betina Villavicencio is a 71-year-old white female with a history of abdominal pain and gallstones  The patient has had a couple of episodes of abdominal pain over the last few years  In between she has not had any abdominal pain  Her last episode was about a month ago  She had some epigastric pain radiating to the right side and then to the back and down the right leg  She denies any nausea or vomiting during these episodes  She continues to have this type of pain    Ultrasound revealed gallstones but no thickening of the gallbladder wall and a normal-sized common bile duct  LFTs are normal     She also has a history of a colon polyp removed four years ago  Biopsies showed that the colon polyp was hyperplastic  There is no family history of colon cancer  REVIEW OF SYSTEMS:    CONSTITUTIONAL: Denies any fever, chills, rigors, and weight loss  HEENT: No earache or tinnitus  Denies hearing loss or visual disturbances  CARDIOVASCULAR: No chest pain or palpitations  RESPIRATORY: Denies any cough, hemoptysis, shortness of breath or dyspnea on exertion  GASTROINTESTINAL: As noted in the History of Present Illness  GENITOURINARY: No problems with urination  Denies any hematuria or dysuria  NEUROLOGIC: No dizziness or vertigo, denies headaches  MUSCULOSKELETAL: Denies any muscle or joint pain  SKIN: Denies skin rashes or itching  ENDOCRINE: Denies excessive thirst  Denies intolerance to heat or cold  PSYCHOSOCIAL: Denies depression or anxiety  Denies any recent memory loss         Historical Information   Past Medical History:   Diagnosis Date    Acne     Anxiety     Anxiety and depression 10/02/2019    Arthritis     Breathing difficulty     pt does not recall any breathing problem but did have neck pain after procedure"    Cancer ([address] )     cervical/ and "lymph nodes" (GYN)    Cervical cancer (HCC)     Colon polyp     GERD (gastroesophageal reflux disease)     not now    History of radiation therapy     History of shingles     Irritable bowel syndrome     Joint pain following chemotherapy     and stiffness    Liver disease     "cyst on liver"    Lung nodule     "left"    Muscle weakness     after chemo"    Neck pain     "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"has had neck pain since childhood"    Port-A-Cath in place     right chest//for chemotherapy q 3 weeks and also receives IV hydration    Shortness of breath     "started with cancer, when too much exertion like cleaning/guera doing stairs"    Thumb injury 10/2019    right    Thyroid nodule     Tinnitus of left ear 2020    Urinary problem     Wrist arthritis 2020     Past Surgical History:   Procedure Laterality Date    COLONOSCOPY      CYSTOSCOPY W/ RETROGRADES Right 6/3/2021    Procedure: Marquita Harvey W/RETROGRADE;  Surgeon: Deanna Moses MD;  Location: [facility];  Service: Urology    CYSTOSCOPY W/ RETROGRADES Right 10/18/2021    Procedure: CYSTOSCOPY WITH ureteral stent exchange;  Surgeon: Rebecca Schilling MD;  Location: [facility];  Service: Urology    FL RETROGRADE PYELOGRAM  2021    FL RETROGRADE PYELOGRAM  6/3/2021    FOOT SURGERY      HYSTERECTOMY      IR BIOPSY LYMPH NODE  2021    IR PORT PLACEMENT  3/9/2021    MT CYSTOURETHROSCOPY,URETER CATHETER Right 2021    Procedure: CYSTOSCOPY RETROGRADE PYELOGRAM WITH INSERTION STENT URETERAL;  Surgeon: Rebecca Schilling MD;  Location:  MAIN OR;  Service: Urology    MT EXCIS INTERDIGITAL [place] Left 2018    Procedure: FOOT NEUROMA EXCISION;  Surgeon: Reanna Palma DPM;  Location: [address] [place] [address] MAIN OR;  Service: Podiatry    MT REMOVE & REPLACE INDWELL URETERAL STENT [place] Right 6/3/2021    Procedure: EXCHANGE STENT;  Surgeon: Deanna Moses MD;  Location: [facility];  Service: Urology    TUBAL LIGATION       Social History   Social History     Substance and Sexual Activity   Alcohol Use Yes    Comment:   holidays     Social History     Substance and Sexual Activity   Drug Use No     Social History     Tobacco Use   Smoking Status Former Smoker    Packs/day: 3 00    Years: 32 00    Pack years: 96 00    Types: Cigarettes    Start date:     Quit date: 2003    Years since quittin 6   Smokeless Tobacco Never Used     Family History   Problem Relation Age of Onset    Stomach cancer Mother     Alcohol abuse Father     No Known Problems Sister     No Known Problems Daughter     No Known Problems Maternal Grandmother     No Known Problems Maternal Grandfather     No Known Problems Paternal Grandmother     No Known Problems Paternal Grandfather     No Known Problems Sister     No Known Problems Sister     No Known Problems Sister     No Known Problems Sister     No Known Problems Daughter     No Known Problems Daughter     No Known Problems Maternal Aunt     No Known Problems Maternal Aunt     No Known Problems Paternal Aunt     No Known Problems Paternal Aunt     No Known Problems Paternal Aunt     No Known Problems Paternal Aunt     No Known Problems Paternal Aunt     No Known Problems Paternal Aunt     No Known Problems Paternal Aunt     No Known Problems Paternal Aunt     No Known Problems Paternal Aunt     No Known Problems Paternal Aunt        Meds/Allergies       Current Outpatient Medications:     Diclofenac Sodium (VOLTAREN) 1 %    famotidine (PEPCID) 20 mg tablet    lidocaine-prilocaine (EMLA) cream    Multiple Vitamins-Minerals (Alive Womens Gummy) CHEW    naloxegol oxalate (Movantik) 25 MG tablet    NON FORMULARY    oxyCODONE-acetaminophen (Percocet) 5-325 mg per tablet    psyllium (METAMUCIL) 0 52 g capsule    senna (SENOKOT) 8 6 MG tablet    Allergies   Allergen Reactions    Medical Tape Itching     And redness from adhesive    And "skin raised up" especially longer on skin    Aspirin GI Intolerance    Codeine GI Intolerance    Dust Mite Extract     Pollen Extract Sneezing           Objective     Blood pressure 119/70, pulse 92, temperature 98 3 °F (36 8 °C), temperature source Tympanic, height 4' 11" (1 499 m), weight 60 9 kg (134 lb 3 2 oz), not currently breastfeeding  Body mass index is 27 11 kg/m²  PHYSICAL EXAM:      General Appearance:   Alert, cooperative, no distress   HEENT:   Normocephalic, atraumatic, anicteric       Neck:  Supple, symmetrical, trachea midline   Lungs:   Clear to auscultation bilaterally; no rales, rhonchi or wheezing; respirations unlabored    Heart[de-identified]   Regular rate and rhythm; no murmur, rub, or gallop  Abdomen:   Soft, mild epigastric tenderness, non-distended; normal bowel sounds; no masses, no organomegaly    Genitalia:   Deferred    Rectal:   Deferred    Extremities:  No cyanosis, clubbing or edema    Pulses:  2+ and symmetric    Skin:  No jaundice, rashes, or lesions    Lymph nodes:  No palpable cervical lymphadenopathy        Lab Results:   No visits with results within 1 Day(s) from this visit     Latest known visit with results is:   Office Visit on 02/18/2022   Component Date Value    LEUKOCYTE ESTERASE,UA 02/18/2022 NEGATIVE     NITRITE,UA 02/18/2022 NEGATIVE     SL AMB POCT UROBILINOGEN 02/18/2022 NEGATIVE     POCT URINE PROTEIN 02/18/2022 2,000      PH,UA 02/18/2022 6     BLOOD,UA 02/18/2022 +++     SPECIFIC GRAVITY,UA 02/18/2022 1 030     KETONES,UA 02/18/2022 5     BILIRUBIN,UA 02/18/2022 1     GLUCOSE, UA 02/18/2022 NEGATIVE      COLOR,UA 02/18/2022 WILFREDO     CLARITY,UA 02/18/2022 CLOUDY     Color, UA 02/21/2022 Yellow     Clarity, UA 02/21/2022 Clear     Specific Gravity, UA 02/21/2022 1 020     pH, UA 02/21/2022 6 0     Leukocytes, UA 02/21/2022 Negative     Nitrite, UA 02/21/2022 Negative     Protein, UA 02/21/2022 30 (1+)*    Glucose, UA 02/21/2022 Negative     Ketones, UA 02/21/2022 Negative     Urobilinogen, UA 02/21/2022 0 2     Bilirubin, UA 02/21/2022 Negative     Blood, UA 02/21/2022 Large*    Urine Culture 02/18/2022 20,000-29,000 cfu/ml      RBC, UA 02/21/2022 10-20*    WBC, UA 02/21/2022 2-4     Epithelial Cells 02/21/2022 Occasional     Bacteria, UA 02/21/2022 Occasional          Radiology Results:   NM PET CT skull base to mid thigh    Result Date: 2/9/2022  Narrative: PET/CT SCAN INDICATION:  C77 3: Secondary and unspecified malignant neoplasm of axilla and upper limb lymph nodes R91 8: Other nonspecific abnormal finding of lung field C53 0: Malignant neoplasm of endocervix   , restaging for treatment management MODIFIER: PS COMPARISON: CT 10/29/2021 and priors, including PET  1 CELL TYPE:  Squamous cell carcinoma, 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, was performed utilizing techniques to minimize radiation dose exposure, including the use of iterative reconstruction and automated exposure control  Fasting serum glucose: 89 mg/dl FINDINGS: VISUALIZED BRAIN:   No acute abnormalities are seen  HEAD/NECK:   Interval resolution of hypermetabolic left cervical adenopathy compatible with response to therapy  No new FDG avid cervical adenopathy is seen  CT images: Nodular left thyroid with calcifications  CHEST: Interval resolution of hypermetabolic left axillary and subcarinal adenopathy  Scattered lung nodules are too small for accurate PET evaluation but appear stable  Example right upper lung nodule image 3/95 measures 3 mm, prior measurement 4 mm  Right middle lung nodule image 3/105 measures 4 mm, prior measurement 4 mm  Left upper nodule image 3/98 measures 3 mm, prior measurement 3 mm  Additional tiny nodular densities better visualized on prior diagnostic CT  No new FDG avid soft tissue lesions are seen  CT images: Right chest wall port  ABDOMEN/PELVIS:   There has been overall significant improvement in extensive retroperitoneal and mesenteric metastases  For example, previously described left para-aortic focus has essentially resolved, SUV measuring 1 6 in this region  Prior SUV in this region 6 8   There remains heterogeneous activity in the right upper quadrant in the peripancreatic, periportal and retrocaval regions, SUV 8 1, prior SUV 6 6, most concerning for viable tumor  Example retrocaval node image 3/132 measures approximately 2 3 x 1 cm  There is also new soft tissue density posterior to the left adrenal, with FDG activity, SUV 4 8, concerning for metastasis  There is persistent hypermetabolic soft tissue focus in the left anterior omentum, image 3/179, measuring approximately 1 6 x 1 2 cm, SUV 3 6  Prior measurement 1 3 x 0 9 cm, SUV 2 5  This is most concerning for viable metastasis  The omental lesion more  medially at this level has resolved  Mild bilateral adrenal activity appears similar, without new nodularity  This may be physiologic  CT images: Right ureteral stent  Right renal atrophy  Cholelithiasis  Colon diverticula  Hysterectomy  OSSEOUS STRUCTURES: No FDG avid lesions are seen  CT images: Stable  Impression: 1  Overall significant improvement in widespread hypermetabolic metastases, compatible with at least partial response to therapy  This includes resolved left cervical, left axillary and subcarinal adenopathy  2  There remains hypermetabolic soft tissue in the right upper quadrant and left anterior omentum, most compatible with viable tumor  There is also a new hypermetabolic lesion posterior to the left adrenal gland  Workstation performed: [id number]     US kidney and bladder with pvr    Result Date: 2/17/2022  Narrative: RENAL ULTRASOUND INDICATION:   N13 30: Unspecified hydronephrosis R10 9: Unspecified abdominal pain  COMPARISON: PET study from 2/9/2022 TECHNIQUE:   Ultrasound of the retroperitoneum was performed with a curvilinear transducer utilizing volumetric sweeps and still imaging techniques  FINDINGS: KIDNEYS: Symmetric and normal size  Right kidney:  9 cm  Left kidney:  11 9 cm  Right kidney Normal echogenicity and contour  No suspicious masses detected  No hydronephrosis  There is a stent present in the right renal collecting system extending into the bladder  No shadowing calculi  No perinephric fluid collections  Left kidney Normal echogenicity and contour  No suspicious masses detected  No hydronephrosis  No shadowing calculi  No perinephric fluid collections  URETERS: Nonvisualized  BLADDER: Normally distended  The bladder volume is 270 mL  No focal thickening or mass lesions  Bilateral ureteral jets detected  There is a negligible 31 mL post void residual  The gallbladder is partially imaged on this examination  Questionable gallstones are partially imaged also  Some stones are visible on the prior CT from February 9  Impression: Right-sided stent  No hydronephrosis  Incidental gallstones Workstation performed: [id number]     US right upper quadrant with liver dopplers    Result Date: 2/28/2022  Narrative: RIGHT UPPER QUADRANT ULTRASOUND WITH LIVER DOPPLER INDICATION:  K80 20: Calculus of gallbladder without cholecystitis without obstruction  Malignant neoplasm of cervix  COMPARISON:  Renal ultrasound 2/14/2022, CT chest, abdomen and pelvis 10/29/2021 and PET/CT 2/9/2022 TECHNIQUE:   Real-time ultrasound of the right upper quadrant was performed with a curvilinear transducer with both volumetric sweeps and still imaging techniques  FINDINGS: PANCREAS:  Heterogeneous pancreas  2 1 x 1 6 x 0 9 cm hypoechoic focus between the liver and pancreas, favored to represent peripancreatic lymph node  Exophytic pancreatic lesion is felt less  Mild FDG avidity in this area on recent PET/CT (series 1200 image 139)  AORTA AND IVC:  Visualized portions are normal for patient age  LIVER: Size:  Mildly enlarged  The liver measures 19 7 cm in the midclavicular line  Contour:  Surface contour is smooth  Parenchyma:  Echogenicity and echotexture are within normal limits  Liver lesions seen on previous diagnostic CT study are not clearly evident on this exam although were chronic in nature without suspicious findings on recent PET/CT   LIVER DOPPLER: The main portal vein and primary branch segments are patent and hepatopetal with normal spectral waveform  Hepatic veins are patent  Spectral waveforms within normal limits  Main hepatic artery appears normal size, patent with normal spectral waveform  BILIARY: The gallbladder is normal in caliber  No pericholecystic fluid  Gallbladder wall is upper normal thickness at 3 mm  Shadowing gallstones with sludge noted  No sonographic Akhtar sign  No intrahepatic biliary dilatation  CBD measures 4 0 mm  No choledocholithiasis  KIDNEY: Right kidney measures 9 7 x 3 7 x 4 9 cm  Volume 92 5 mL No hydronephrosis or shadowing calculus  Ureteral stent is partially visualized  ASCITES:   None  Impression: 1   2 1 x 1 6 x 0 9 cm hypoechoic focus between the liver and pancreas, favored to represent peripancreatic lymph node  Exophytic pancreatic lesion is felt less  Mild FDG avidity in this area on recent PET/CT  Attention on surveillance CT imaging advised  2  Mild hepatomegaly  Liver lesions seen on previous diagnostic CT study are not clearly evident on this exam   See comment  3  Unremarkable liver Doppler exam  4  Cholelithiasis   Workstation performed: [id number]

## 2022-03-11 ENCOUNTER — HOSPITAL ENCOUNTER (OUTPATIENT)
Dept: MRI IMAGING | Facility: HOSPITAL | Age: 67
Discharge: HOME | End: 2022-03-11
Attending: FAMILY MEDICINE
Payer: MEDICARE

## 2022-03-11 ENCOUNTER — TELEPHONE (OUTPATIENT)
Dept: OTHER | Facility: OTHER | Age: 67
End: 2022-03-11

## 2022-03-11 DIAGNOSIS — M54.16 CHRONIC RIGHT-SIDED LUMBAR RADICULOPATHY: ICD-10-CM

## 2022-03-11 PROCEDURE — 72148 MRI LUMBAR SPINE W/O DYE: CPT

## 2022-03-11 NOTE — TELEPHONE ENCOUNTER
Patient is looking to make sure that her ride with [employer] is set up for 3/14  Please get in touch with her to confirm  She said that a message can be left on her voicemail

## 2022-03-14 ENCOUNTER — APPOINTMENT (OUTPATIENT)
Dept: LAB | Facility: MEDICAL CENTER | Age: 67
End: 2022-03-14
Payer: MEDICARE

## 2022-03-14 ENCOUNTER — TELEPHONE (OUTPATIENT)
Dept: FAMILY MEDICINE CLINIC | Facility: CLINIC | Age: 67
End: 2022-03-14

## 2022-03-14 ENCOUNTER — PREP FOR PROCEDURE (OUTPATIENT)
Dept: UROLOGY | Facility: CLINIC | Age: 67
End: 2022-03-14

## 2022-03-14 ENCOUNTER — OFFICE VISIT (OUTPATIENT)
Dept: UROLOGY | Facility: CLINIC | Age: 67
End: 2022-03-14
Payer: MEDICARE

## 2022-03-14 VITALS
DIASTOLIC BLOOD PRESSURE: 80 MMHG | BODY MASS INDEX: 27.27 KG/M2 | SYSTOLIC BLOOD PRESSURE: 140 MMHG | HEART RATE: 88 BPM | WEIGHT: 135 LBS

## 2022-03-14 DIAGNOSIS — N13.30 HYDRONEPHROSIS, UNSPECIFIED HYDRONEPHROSIS TYPE: Primary | ICD-10-CM

## 2022-03-14 PROCEDURE — 99213 OFFICE O/P EST LOW 20 MIN: CPT | Performed by: NURSE PRACTITIONER

## 2022-03-14 NOTE — TELEPHONE ENCOUNTER
Pt asking after getting her results of MRI test she is asking "is this why my Left leg hurts to walk on?" and "what happens next?"    Please advise

## 2022-03-14 NOTE — PROGRESS NOTES
3/14/2022    No chief complaint on file  Assessment and Plan    77 y o  female manage by Dr Patricia Lopez    1  Right stent placement for stricture ureter related to retroperitoneal lymphadenopathy  · Ultrasound from 02/14/2022 reveals right sided stent and no hydronephrosis  · Urinalysis and micro ordered for preop testing    History and physical was performed for the patient's upcoming cysto, stent exchange scheduled for 04/04/2022 with Dr Patricia Lopez  All questions and concerns regarding surgery and perioperative expectations have been addressed and answered  No overall changes in her health since last visit, denies any prior complications with anesthesia  Will proceed with surgery as planned  History of Present Illness  Bernard Campbell is a 77 y o  female here for history and physical prior to upcoming surgery on 04/04/2022 with Dr Patricia Lopez  She has a history of hydronephrosis due to ureteral stricture related to retroperitoneal lymphadenopathy  She is managed with a ureteral stent      Review of Systems   Constitutional: Negative for chills and fever  HENT: Negative for ear pain and sore throat  Eyes: Negative for pain and visual disturbance  Respiratory: Negative for cough and shortness of breath  Cardiovascular: Negative for chest pain and palpitations  Gastrointestinal: Negative for abdominal pain, constipation, diarrhea, nausea and vomiting  Genitourinary: Positive for flank pain  Negative for dysuria and hematuria  Intermittent right, stent in place   Musculoskeletal: Positive for back pain  Negative for arthralgias  Right lower back pain radiating right leg, following with PCP recent MRI   Skin: Negative for color change and rash  Neurological: Negative for seizures and syncope  All other systems reviewed and are negative                Vitals  Vitals:    03/14/22 1129   BP: 140/80   Pulse: 88   Weight: 61 2 kg (135 lb)       Physical Exam  Constitutional:       General: She is not in acute distress  Appearance: Normal appearance  She is normal weight  She is not ill-appearing or toxic-appearing  HENT:      Head: Normocephalic and atraumatic  Nose: Nose normal    Eyes:      General: No scleral icterus  Conjunctiva/sclera: Conjunctivae normal    Cardiovascular:      Rate and Rhythm: Normal rate and regular rhythm  Pulses: Normal pulses  Heart sounds: Normal heart sounds  Pulmonary:      Effort: Pulmonary effort is normal  No respiratory distress  Abdominal:      General: Abdomen is flat  Palpations: Abdomen is soft  Tenderness: There is no abdominal tenderness  There is no right CVA tenderness or left CVA tenderness  Musculoskeletal:         General: Normal range of motion  Cervical back: Normal range of motion  Lumbar back: Tenderness present  Back:       Comments: Right lower back pain radiates into right leg  Skin:     General: Skin is warm and dry  Neurological:      General: No focal deficit present  Mental Status: She is alert and oriented to person, place, and time  Mental status is at baseline  Psychiatric:         Mood and Affect: Mood normal          Behavior: Behavior normal          Thought Content:  Thought content normal          Judgment: Judgment normal          Past History  Past Medical History:   Diagnosis Date    Acne     Anxiety     Anxiety and depression 10/02/2019    Arthritis     Breathing difficulty     pt does not recall any breathing problem but did have neck pain after procedure"    Cancer ([address] )     cervical/ and "lymph nodes" (GYN)    Cervical cancer (HCC)     Colon polyp     GERD (gastroesophageal reflux disease)     not now    History of radiation therapy     History of shingles     Irritable bowel syndrome     Joint pain following chemotherapy     and stiffness    Liver disease     "cyst on liver"    Lung nodule     "left"    Muscle weakness     after chemo"    Neck pain     "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"has had neck pain since childhood"    Port-A-Cath in place     right chest//for chemotherapy q 3 weeks and also receives IV hydration    Shortness of breath     "started with cancer, when too much exertion like cleaning/guera doing stairs"    Thumb injury 10/2019    right    Thyroid nodule     Tinnitus of left ear 2020    Urinary problem     Wrist arthritis 2020     Social History     Socioeconomic History    Marital status:      Spouse name: None    Number of children: None    Years of education: None    Highest education level: None   Occupational History    None   Tobacco Use    Smoking status: Former Smoker     Packs/day: 3 00     Years: 32 00     Pack years: 96 00     Types: Cigarettes     Start date:      Quit date: 2003     Years since quittin 6    Smokeless tobacco: Never Used   Vaping Use    Vaping Use: Never used   Substance and Sexual Activity    Alcohol use: Yes     Comment:   holidays    Drug use: No    Sexual activity: None     Comment: defer   Other Topics Concern    None   Social History Narrative    None     Social Determinants of Health     Financial Resource Strain: Not on file   Food Insecurity: Not on file   Transportation Needs: Not on file   Physical Activity: Not on file   Stress: Not on file   Social Connections: Not on file   Intimate Partner Violence: Not on file   Housing Stability: Not on file     Social History     Tobacco Use   Smoking Status Former Smoker    Packs/day: 3 00    Years: 32 00    Pack years: 96 00    Types: Cigarettes    Start date:     Quit date: 2003    Years since quittin 6   Smokeless Tobacco Never Used     Family History   Problem Relation Age of Onset    Stomach cancer Mother     Alcohol abuse Father     No Known Problems Sister     No Known Problems Daughter     No Known Problems Maternal Grandmother     No Known Problems Maternal Grandfather  No Known Problems Paternal Grandmother     No Known Problems Paternal Grandfather     No Known Problems Sister     No Known Problems Sister     No Known Problems Sister     No Known Problems Sister     No Known Problems Daughter     No Known Problems Daughter     No Known Problems Maternal Aunt     No Known Problems Maternal Aunt     No Known Problems Paternal Aunt     No Known Problems Paternal Aunt     No Known Problems Paternal Aunt     No Known Problems Paternal Aunt     No Known Problems Paternal Aunt     No Known Problems Paternal Aunt     No Known Problems Paternal Aunt     No Known Problems Paternal Aunt     No Known Problems Paternal Aunt     No Known Problems Paternal Aunt        The following portions of the patient's history were reviewed and updated as appropriate allergies, current medications, past medical history, past social history, past surgical history and problem list    Imaging:    RENAL ULTRASOUND     INDICATION:   N13 30: Unspecified hydronephrosis  R10 9: Unspecified abdominal pain      COMPARISON: PET study from 2/9/2022     TECHNIQUE:   Ultrasound of the retroperitoneum was performed with a curvilinear transducer utilizing volumetric sweeps and still imaging techniques       FINDINGS:     KIDNEYS:  Symmetric and normal size  Right kidney:  9 cm  Left kidney:  11 9 cm      Right kidney  Normal echogenicity and contour  No suspicious masses detected  No hydronephrosis  There is a stent present in the right renal collecting system extending into the bladder  No shadowing calculi  No perinephric fluid collections      Left kidney  Normal echogenicity and contour  No suspicious masses detected  No hydronephrosis  No shadowing calculi  No perinephric fluid collections      URETERS:  Nonvisualized      BLADDER:   Normally distended  The bladder volume is 270 mL  No focal thickening or mass lesions  Bilateral ureteral jets detected    There is a negligible 31 mL post void residual      The gallbladder is partially imaged on this examination  Questionable gallstones are partially imaged also  Some stones are visible on the prior CT from February 9      IMPRESSION:     Right-sided stent      No hydronephrosis  Results  No results found for this or any previous visit (from the past 1 hour(s))  ]  No results found for: PSA  Lab Results   Component Value Date    CALCIUM 9 4 02/21/2022     06/11/2018    K 3 5 02/21/2022    CO2 28 02/21/2022     02/21/2022    BUN 17 02/21/2022    CREATININE 0 79 02/21/2022     Lab Results   Component Value Date    WBC 5 75 02/21/2022    HGB 12 7 02/21/2022    HCT 40 4 02/21/2022    MCV 94 02/21/2022     (L) 02/21/2022       JACE Guzman Patient is a 89 y/o/f status post fall from standing height, unknown loss of consciousness, patient has ecchymosis and swelling to right eye with decreased vision baseline, patient also with epistaxis on arrival.  Patient with low pulse oximetry, which is not her baseline.  Patient arrived Alert and oriented to person, place, and time, LS diminished, equal expansion, Abd s/nt/nd, pelvis nontender, no shortening to lower extremities and able to move them without any difficulty.  Small bruise to left scapula

## 2022-03-14 NOTE — H&P
3/14/2022    No chief complaint on file  Assessment and Plan    77 y o  female manage by Dr Crescencio Mccray    1  Right stent placement for stricture ureter related to retroperitoneal lymphadenopathy  · Ultrasound from 02/14/2022 reveals right sided stent and no hydronephrosis  · Urinalysis and micro ordered for preop testing    History and physical was performed for the patient's upcoming cysto, stent exchange scheduled for 04/04/2022 with Dr Crescencio Mccray  All questions and concerns regarding surgery and perioperative expectations have been addressed and answered  No overall changes in her health since last visit, denies any prior complications with anesthesia  Will proceed with surgery as planned  History of Present Illness  Tish Sebastian is a 77 y o  female here for history and physical prior to upcoming surgery on 04/04/2022 with Dr Crescencio Mccray  She has a history of hydronephrosis due to ureteral stricture related to retroperitoneal lymphadenopathy  She is managed with a ureteral stent      Review of Systems   Constitutional: Negative for chills and fever  HENT: Negative for ear pain and sore throat  Eyes: Negative for pain and visual disturbance  Respiratory: Negative for cough and shortness of breath  Cardiovascular: Negative for chest pain and palpitations  Gastrointestinal: Negative for abdominal pain, constipation, diarrhea, nausea and vomiting  Genitourinary: Positive for flank pain  Negative for dysuria and hematuria  Intermittent right, stent in place   Musculoskeletal: Positive for back pain  Negative for arthralgias  Right lower back pain radiating right leg, following with PCP recent MRI   Skin: Negative for color change and rash  Neurological: Negative for seizures and syncope  All other systems reviewed and are negative                Vitals  Vitals:    03/14/22 1129   BP: 140/80   Pulse: 88   Weight: 61 2 kg (135 lb)       Physical Exam  Constitutional:       General: She is not in acute distress  Appearance: Normal appearance  She is normal weight  She is not ill-appearing or toxic-appearing  HENT:      Head: Normocephalic and atraumatic  Nose: Nose normal    Eyes:      General: No scleral icterus  Conjunctiva/sclera: Conjunctivae normal    Cardiovascular:      Rate and Rhythm: Normal rate and regular rhythm  Pulses: Normal pulses  Heart sounds: Normal heart sounds  Pulmonary:      Effort: Pulmonary effort is normal  No respiratory distress  Abdominal:      General: Abdomen is flat  Palpations: Abdomen is soft  Tenderness: There is no abdominal tenderness  There is no right CVA tenderness or left CVA tenderness  Musculoskeletal:         General: Normal range of motion  Cervical back: Normal range of motion  Lumbar back: Tenderness present  Back:       Comments: Right lower back pain radiates into right leg  Skin:     General: Skin is warm and dry  Neurological:      General: No focal deficit present  Mental Status: She is alert and oriented to person, place, and time  Mental status is at baseline  Psychiatric:         Mood and Affect: Mood normal          Behavior: Behavior normal          Thought Content:  Thought content normal          Judgment: Judgment normal          Past History  Past Medical History:   Diagnosis Date    Acne     Anxiety     Anxiety and depression 10/02/2019    Arthritis     Breathing difficulty     pt does not recall any breathing problem but did have neck pain after procedure"    Cancer ([address] )     cervical/ and "lymph nodes" (GYN)    Cervical cancer (HCC)     Colon polyp     GERD (gastroesophageal reflux disease)     not now    History of radiation therapy     History of shingles     Irritable bowel syndrome     Joint pain following chemotherapy     and stiffness    Liver disease     "cyst on liver"    Lung nodule     "left"    Muscle weakness     after chemo"    Neck pain     "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"has had neck pain since childhood"    Port-A-Cath in place     right chest//for chemotherapy q 3 weeks and also receives IV hydration    Shortness of breath     "started with cancer, when too much exertion like cleaning/guera doing stairs"    Thumb injury 10/2019    right    Thyroid nodule     Tinnitus of left ear 2020    Urinary problem     Wrist arthritis 2020     Social History     Socioeconomic History    Marital status:      Spouse name: None    Number of children: None    Years of education: None    Highest education level: None   Occupational History    None   Tobacco Use    Smoking status: Former Smoker     Packs/day: 3 00     Years: 32 00     Pack years: 96 00     Types: Cigarettes     Start date:      Quit date: 2003     Years since quittin 6    Smokeless tobacco: Never Used   Vaping Use    Vaping Use: Never used   Substance and Sexual Activity    Alcohol use: Yes     Comment:   holidays    Drug use: No    Sexual activity: None     Comment: defer   Other Topics Concern    None   Social History Narrative    None     Social Determinants of Health     Financial Resource Strain: Not on file   Food Insecurity: Not on file   Transportation Needs: Not on file   Physical Activity: Not on file   Stress: Not on file   Social Connections: Not on file   Intimate Partner Violence: Not on file   Housing Stability: Not on file     Social History     Tobacco Use   Smoking Status Former Smoker    Packs/day: 3 00    Years: 32 00    Pack years: 96 00    Types: Cigarettes    Start date:     Quit date: 2003    Years since quittin 6   Smokeless Tobacco Never Used     Family History   Problem Relation Age of Onset    Stomach cancer Mother     Alcohol abuse Father     No Known Problems Sister     No Known Problems Daughter     No Known Problems Maternal Grandmother     No Known Problems Maternal Grandfather  No Known Problems Paternal Grandmother     No Known Problems Paternal Grandfather     No Known Problems Sister     No Known Problems Sister     No Known Problems Sister     No Known Problems Sister     No Known Problems Daughter     No Known Problems Daughter     No Known Problems Maternal Aunt     No Known Problems Maternal Aunt     No Known Problems Paternal Aunt     No Known Problems Paternal Aunt     No Known Problems Paternal Aunt     No Known Problems Paternal Aunt     No Known Problems Paternal Aunt     No Known Problems Paternal Aunt     No Known Problems Paternal Aunt     No Known Problems Paternal Aunt     No Known Problems Paternal Aunt     No Known Problems Paternal Aunt        The following portions of the patient's history were reviewed and updated as appropriate allergies, current medications, past medical history, past social history, past surgical history and problem list    Imaging:    RENAL ULTRASOUND     INDICATION:   N13 30: Unspecified hydronephrosis  R10 9: Unspecified abdominal pain      COMPARISON: PET study from 2/9/2022     TECHNIQUE:   Ultrasound of the retroperitoneum was performed with a curvilinear transducer utilizing volumetric sweeps and still imaging techniques       FINDINGS:     KIDNEYS:  Symmetric and normal size  Right kidney:  9 cm  Left kidney:  11 9 cm      Right kidney  Normal echogenicity and contour  No suspicious masses detected  No hydronephrosis  There is a stent present in the right renal collecting system extending into the bladder  No shadowing calculi  No perinephric fluid collections      Left kidney  Normal echogenicity and contour  No suspicious masses detected  No hydronephrosis  No shadowing calculi  No perinephric fluid collections      URETERS:  Nonvisualized      BLADDER:   Normally distended  The bladder volume is 270 mL  No focal thickening or mass lesions  Bilateral ureteral jets detected    There is a negligible 31 mL post void residual      The gallbladder is partially imaged on this examination  Questionable gallstones are partially imaged also  Some stones are visible on the prior CT from February 9      IMPRESSION:     Right-sided stent      No hydronephrosis  Results  No results found for this or any previous visit (from the past 1 hour(s))  ]  No results found for: PSA  Lab Results   Component Value Date    CALCIUM 9 4 02/21/2022     06/11/2018    K 3 5 02/21/2022    CO2 28 02/21/2022     02/21/2022    BUN 17 02/21/2022    CREATININE 0 79 02/21/2022     Lab Results   Component Value Date    WBC 5 75 02/21/2022    HGB 12 7 02/21/2022    HCT 40 4 02/21/2022    MCV 94 02/21/2022     (L) 02/21/2022       JACE Taylor

## 2022-03-14 NOTE — H&P (VIEW-ONLY)
3/14/2022    No chief complaint on file  Assessment and Plan    77 y o  female manage by Dr Rachel Paul    1  Right stent placement for stricture ureter related to retroperitoneal lymphadenopathy  · Ultrasound from 02/14/2022 reveals right sided stent and no hydronephrosis  · Urinalysis and micro ordered for preop testing    History and physical was performed for the patient's upcoming cysto, stent exchange scheduled for 04/04/2022 with Dr Rachel Paul  All questions and concerns regarding surgery and perioperative expectations have been addressed and answered  No overall changes in her health since last visit, denies any prior complications with anesthesia  Will proceed with surgery as planned  History of Present Illness  Lulu Higuera is a 77 y o  female here for history and physical prior to upcoming surgery on 04/04/2022 with Dr Rachel Paul  She has a history of hydronephrosis due to ureteral stricture related to retroperitoneal lymphadenopathy  She is managed with a ureteral stent      Review of Systems   Constitutional: Negative for chills and fever  HENT: Negative for ear pain and sore throat  Eyes: Negative for pain and visual disturbance  Respiratory: Negative for cough and shortness of breath  Cardiovascular: Negative for chest pain and palpitations  Gastrointestinal: Negative for abdominal pain, constipation, diarrhea, nausea and vomiting  Genitourinary: Positive for flank pain  Negative for dysuria and hematuria  Intermittent right, stent in place   Musculoskeletal: Positive for back pain  Negative for arthralgias  Right lower back pain radiating right leg, following with PCP recent MRI   Skin: Negative for color change and rash  Neurological: Negative for seizures and syncope  All other systems reviewed and are negative                Vitals  Vitals:    03/14/22 1129   BP: 140/80   Pulse: 88   Weight: 61 2 kg (135 lb)       Physical Exam  Constitutional:       General: She is not in acute distress  Appearance: Normal appearance  She is normal weight  She is not ill-appearing or toxic-appearing  HENT:      Head: Normocephalic and atraumatic  Nose: Nose normal    Eyes:      General: No scleral icterus  Conjunctiva/sclera: Conjunctivae normal    Cardiovascular:      Rate and Rhythm: Normal rate and regular rhythm  Pulses: Normal pulses  Heart sounds: Normal heart sounds  Pulmonary:      Effort: Pulmonary effort is normal  No respiratory distress  Abdominal:      General: Abdomen is flat  Palpations: Abdomen is soft  Tenderness: There is no abdominal tenderness  There is no right CVA tenderness or left CVA tenderness  Musculoskeletal:         General: Normal range of motion  Cervical back: Normal range of motion  Lumbar back: Tenderness present  Back:       Comments: Right lower back pain radiates into right leg  Skin:     General: Skin is warm and dry  Neurological:      General: No focal deficit present  Mental Status: She is alert and oriented to person, place, and time  Mental status is at baseline  Psychiatric:         Mood and Affect: Mood normal          Behavior: Behavior normal          Thought Content:  Thought content normal          Judgment: Judgment normal          Past History  Past Medical History:   Diagnosis Date    Acne     Anxiety     Anxiety and depression 10/02/2019    Arthritis     Breathing difficulty     pt does not recall any breathing problem but did have neck pain after procedure"    Cancer ([address] )     cervical/ and "lymph nodes" (GYN)    Cervical cancer (HCC)     Colon polyp     GERD (gastroesophageal reflux disease)     not now    History of radiation therapy     History of shingles     Irritable bowel syndrome     Joint pain following chemotherapy     and stiffness    Liver disease     "cyst on liver"    Lung nodule     "left"    Muscle weakness     after chemo"    Neck pain     "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"has had neck pain since childhood"    Port-A-Cath in place     right chest//for chemotherapy q 3 weeks and also receives IV hydration    Shortness of breath     "started with cancer, when too much exertion like cleaning/guera doing stairs"    Thumb injury 10/2019    right    Thyroid nodule     Tinnitus of left ear 2020    Urinary problem     Wrist arthritis 2020     Social History     Socioeconomic History    Marital status:      Spouse name: None    Number of children: None    Years of education: None    Highest education level: None   Occupational History    None   Tobacco Use    Smoking status: Former Smoker     Packs/day: 3 00     Years: 32 00     Pack years: 96 00     Types: Cigarettes     Start date:      Quit date: 2003     Years since quittin 6    Smokeless tobacco: Never Used   Vaping Use    Vaping Use: Never used   Substance and Sexual Activity    Alcohol use: Yes     Comment:   holidays    Drug use: No    Sexual activity: None     Comment: defer   Other Topics Concern    None   Social History Narrative    None     Social Determinants of Health     Financial Resource Strain: Not on file   Food Insecurity: Not on file   Transportation Needs: Not on file   Physical Activity: Not on file   Stress: Not on file   Social Connections: Not on file   Intimate Partner Violence: Not on file   Housing Stability: Not on file     Social History     Tobacco Use   Smoking Status Former Smoker    Packs/day: 3 00    Years: 32 00    Pack years: 96 00    Types: Cigarettes    Start date:     Quit date: 2003    Years since quittin 6   Smokeless Tobacco Never Used     Family History   Problem Relation Age of Onset    Stomach cancer Mother     Alcohol abuse Father     No Known Problems Sister     No Known Problems Daughter     No Known Problems Maternal Grandmother     No Known Problems Maternal Grandfather  No Known Problems Paternal Grandmother     No Known Problems Paternal Grandfather     No Known Problems Sister     No Known Problems Sister     No Known Problems Sister     No Known Problems Sister     No Known Problems Daughter     No Known Problems Daughter     No Known Problems Maternal Aunt     No Known Problems Maternal Aunt     No Known Problems Paternal Aunt     No Known Problems Paternal Aunt     No Known Problems Paternal Aunt     No Known Problems Paternal Aunt     No Known Problems Paternal Aunt     No Known Problems Paternal Aunt     No Known Problems Paternal Aunt     No Known Problems Paternal Aunt     No Known Problems Paternal Aunt     No Known Problems Paternal Aunt        The following portions of the patient's history were reviewed and updated as appropriate allergies, current medications, past medical history, past social history, past surgical history and problem list    Imaging:    RENAL ULTRASOUND     INDICATION:   N13 30: Unspecified hydronephrosis  R10 9: Unspecified abdominal pain      COMPARISON: PET study from 2/9/2022     TECHNIQUE:   Ultrasound of the retroperitoneum was performed with a curvilinear transducer utilizing volumetric sweeps and still imaging techniques       FINDINGS:     KIDNEYS:  Symmetric and normal size  Right kidney:  9 cm  Left kidney:  11 9 cm      Right kidney  Normal echogenicity and contour  No suspicious masses detected  No hydronephrosis  There is a stent present in the right renal collecting system extending into the bladder  No shadowing calculi  No perinephric fluid collections      Left kidney  Normal echogenicity and contour  No suspicious masses detected  No hydronephrosis  No shadowing calculi  No perinephric fluid collections      URETERS:  Nonvisualized      BLADDER:   Normally distended  The bladder volume is 270 mL  No focal thickening or mass lesions  Bilateral ureteral jets detected    There is a negligible 31 mL post void residual      The gallbladder is partially imaged on this examination  Questionable gallstones are partially imaged also  Some stones are visible on the prior CT from February 9      IMPRESSION:     Right-sided stent      No hydronephrosis  Results  No results found for this or any previous visit (from the past 1 hour(s))  ]  No results found for: PSA  Lab Results   Component Value Date    CALCIUM 9 4 02/21/2022     06/11/2018    K 3 5 02/21/2022    CO2 28 02/21/2022     02/21/2022    BUN 17 02/21/2022    CREATININE 0 79 02/21/2022     Lab Results   Component Value Date    WBC 5 75 02/21/2022    HGB 12 7 02/21/2022    HCT 40 4 02/21/2022    MCV 94 02/21/2022     (L) 02/21/2022       JACE Brothers

## 2022-03-15 ENCOUNTER — TELEPHONE (OUTPATIENT)
Dept: PALLIATIVE MEDICINE | Facility: CLINIC | Age: 67
End: 2022-03-15

## 2022-03-15 NOTE — TELEPHONE ENCOUNTER
I can refer her to pain management for shots in her back  I will place the order if she is agreeable

## 2022-03-16 ENCOUNTER — HOSPITAL ENCOUNTER (OUTPATIENT)
Dept: INFUSION CENTER | Facility: HOSPITAL | Age: 67
Discharge: HOME | End: 2022-03-16
Payer: MEDICARE

## 2022-03-16 VITALS
SYSTOLIC BLOOD PRESSURE: 150 MMHG | HEART RATE: 80 BPM | TEMPERATURE: 97.8 F | RESPIRATION RATE: 16 BRPM | DIASTOLIC BLOOD PRESSURE: 65 MMHG

## 2022-03-16 DIAGNOSIS — C53.0 MALIGNANT NEOPLASM OF ENDOCERVIX (HCC): Primary | ICD-10-CM

## 2022-03-16 DIAGNOSIS — E87.6 HYPOKALEMIA: ICD-10-CM

## 2022-03-16 PROCEDURE — 96413 CHEMO IV INFUSION 1 HR: CPT

## 2022-03-16 RX ORDER — SODIUM CHLORIDE 9 MG/ML
20 INJECTION, SOLUTION INTRAVENOUS ONCE
Status: COMPLETED | OUTPATIENT
Start: 2022-03-16 | End: 2022-03-16

## 2022-03-16 RX ADMIN — SODIUM CHLORIDE 20 ML/HR: 0.9 INJECTION, SOLUTION INTRAVENOUS at 10:00

## 2022-03-16 RX ADMIN — SODIUM CHLORIDE 200 MG: 9 INJECTION, SOLUTION INTRAVENOUS at 10:32

## 2022-03-21 NOTE — TELEPHONE ENCOUNTER
Called pt back today, pt stats she received my POI message last week and will call when she is ready for a referral for pain management shots in her back

## 2022-03-22 ENCOUNTER — PATIENT OUTREACH (OUTPATIENT)
Dept: CASE MANAGEMENT | Facility: HOSPITAL | Age: 67
End: 2022-03-22

## 2022-03-22 NOTE — PROGRESS NOTES
Oncology LSW outreached PT to provide support and assess for areas of need  PT noted that she was still in an extreme amount of pain on the one side of her body  PT stated that it is believed that she has pinched nerves in her back  PT also noted that she was sure her shoulder pain was related to her gallbladder, but this was not validated by her doctors  PT explained that she had to stop attending group activities at [employer] in [place] because of the pain she experienced walking  PT stated that she had been prescribed OxyContin, which she reports is helpful when she is trying to sleep, but that she tries to not take them more than at bedtime due to their addictiveness  PT reported that she is scheduled to she palliative care relatively soon and would request to the a neurology specialist to see if any of the pain could be addressed there  PT noted that she had "referred to the Internet doctor," which did not help ease her mind at all, but was hopeful that the pain could be efficiently addressed by the most appropriate provider  PT continued, stating that she "wouldn't be in such a bad spot if the pain would go away "     PT then asked [facility] if she had any information regarding a process change when reapplying for félix care, as she was told that she was to let her current care coverage lapse and then start to incur bills  LS outreached the [address] [place] [address] [employer] for clarification, to which it was noted that this reported policy change was correct and went into effect at the beginning of 2022  LSW outreached PT to make her aware of this information, PT receptive and appreciative

## 2022-03-23 ENCOUNTER — OFFICE VISIT (OUTPATIENT)
Dept: PULMONOLOGY | Facility: CLINIC | Age: 67
End: 2022-03-23
Payer: MEDICARE

## 2022-03-23 VITALS
OXYGEN SATURATION: 98 % | BODY MASS INDEX: 27.17 KG/M2 | WEIGHT: 134.8 LBS | HEIGHT: 59 IN | SYSTOLIC BLOOD PRESSURE: 123 MMHG | DIASTOLIC BLOOD PRESSURE: 67 MMHG | HEART RATE: 80 BPM | TEMPERATURE: 96.6 F

## 2022-03-23 DIAGNOSIS — K80.20 CALCULUS OF GALLBLADDER WITHOUT CHOLECYSTITIS WITHOUT OBSTRUCTION: ICD-10-CM

## 2022-03-23 DIAGNOSIS — R06.00 DYSPNEA ON EXERTION: Primary | ICD-10-CM

## 2022-03-23 DIAGNOSIS — J44.9 CHRONIC OBSTRUCTIVE PULMONARY DISEASE, UNSPECIFIED COPD TYPE (HCC): ICD-10-CM

## 2022-03-23 DIAGNOSIS — C53.0 MALIGNANT NEOPLASM OF ENDOCERVIX (HCC): ICD-10-CM

## 2022-03-23 PROCEDURE — 99215 OFFICE O/P EST HI 40 MIN: CPT | Performed by: INTERNAL MEDICINE

## 2022-03-23 RX ORDER — TIOTROPIUM BROMIDE INHALATION SPRAY 1.56 UG/1
2 SPRAY, METERED RESPIRATORY (INHALATION) DAILY
Qty: 4 G | Refills: 5 | Status: SHIPPED | OUTPATIENT
Start: 2022-03-23

## 2022-03-23 NOTE — ASSESSMENT & PLAN NOTE
I reviewed Cristina's PFTs V/Q scan and recent PET-CT  These all are unremarkable as a source of her shortness of breath  That being said she does have some chronic phlegm sensitivity to hot humid air and postnasal drip so we started her on Veramyst 2 sprays in each nostril once daily in addition to a trial of Spiriva 1 52 puffs once a day  If she notices improvement in her phlegm and dyspnea with this inhaled therapy she will continue it and I have written her a script  She continues to get follow-up with her oncologist   I will see her back in 6 months

## 2022-03-23 NOTE — PROGRESS NOTES
Assessment/Plan:    Chronic obstructive pulmonary disease, unspecified COPD type ([facility] )  I reviewed Cristina's PFTs V/Q scan and recent PET-CT  These all are unremarkable as a source of her shortness of breath  That being said she does have some chronic phlegm sensitivity to hot humid air and postnasal drip so we started her on Veramyst 2 sprays in each nostril once daily in addition to a trial of Spiriva 1 52 puffs once a day  If she notices improvement in her phlegm and dyspnea with this inhaled therapy she will continue it and I have written her a script  She continues to get follow-up with her oncologist   I will see her back in 6 months  Diagnoses and all orders for this visit:    Dyspnea on exertion  -     fluticasone (VERAMYST) 27 5 MCG/SPRAY nasal spray; 2 sprays into each nostril daily  -     tiotropium (Spiriva Respimat) 1 25 MCG/ACT AERS inhaler; Inhale 2 puffs daily    Chronic obstructive pulmonary disease, unspecified COPD type ([facility] )    Calculus of gallbladder without cholecystitis without obstruction    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HCC)          Subjective:      Patient ID: Misti Ybarra is a 77 y o  female  Raul Rehman is here for follow-up of her breathing  Since her last visit she does feel her breathing issues are improved  That being said she is to get dyspneic in the hot shower and with heavy exertion  She has biggest complaint today is postnasal drip rhinorrhea and chest congestion  primary symptoms  Associated symptoms include chest pain, headaches, myalgias and a sore throat  Pertinent negatives include no fever  The following portions of the patient's history were reviewed and updated as appropriate: allergies, current medications, past family history, past medical history, past social history, past surgical history and problem list     Review of Systems   Constitutional: Positive for appetite change  Negative for fever     HENT: Positive for ear pain, postnasal drip, rhinorrhea, sneezing, sore throat and trouble swallowing  Respiratory: Positive for shortness of breath  Cardiovascular: Positive for chest pain  Musculoskeletal: Positive for myalgias  Neurological: Positive for headaches  Objective:      /67   Pulse 80   Temp (!) 96 6 °F (35 9 °C) (Tympanic)   Ht 4' 11" (1 499 m)   Wt 61 1 kg (134 lb 12 8 oz)   LMP  (LMP Unknown)   SpO2 98%   BMI 27 23 kg/m²          Physical Exam  Constitutional:       Appearance: She is well-developed  HENT:      Head: Normocephalic  Eyes:      Pupils: Pupils are equal, round, and reactive to light  Cardiovascular:      Rate and Rhythm: Normal rate  Heart sounds: No murmur heard  Pulmonary:      Effort: Pulmonary effort is normal  No respiratory distress  Breath sounds: Normal breath sounds  No wheezing or rales  Abdominal:      Palpations: Abdomen is soft  Musculoskeletal:         General: Normal range of motion  Cervical back: Normal range of motion and neck supple  Skin:     General: Skin is warm and dry  Neurological:      Mental Status: She is alert and oriented to person, place, and time           Answers for HPI/ROS submitted by the patient on 3/18/2022  Do you have chest tightness?: Yes  Do you have difficulty breathing?: Yes  Do you experience frequent throat clearing?: Yes  Do you have a hoarse voice?: Yes  Chronicity: recurrent  When did you first notice your symptoms?: more than 1 year ago  How often do your symptoms occur?: constantly  Since you first noticed this problem, how has it changed?: waxing and waning  Do you have shortness of breath that occurs with effort or exertion?: Yes  Do you have ear congestion?: Yes  Do you have heartburn?: Yes  Do you have fatigue?: Yes  Do you have nasal congestion?: Yes  Do you have shortness of breath when lying flat?: Yes  Do you have shortness of breath when you wake up?: No  Do you have sweats?: Yes  Have you experienced weight loss?: No  Which of the following makes your symptoms worse?: animal exposure, any activity, change in weather, climbing stairs, eating, emotional stress, exercise, exposure to fumes, exposure to smoke, lying down, minimal activity, pollen, strenuous activity  Which of the following makes your symptoms better?: cold air

## 2022-03-29 ENCOUNTER — OFFICE VISIT (OUTPATIENT)
Dept: GYNECOLOGIC ONCOLOGY | Facility: CLINIC | Age: 67
End: 2022-03-29
Payer: MEDICARE

## 2022-03-29 VITALS
HEART RATE: 96 BPM | HEIGHT: 59 IN | DIASTOLIC BLOOD PRESSURE: 76 MMHG | SYSTOLIC BLOOD PRESSURE: 118 MMHG | RESPIRATION RATE: 16 BRPM | WEIGHT: 131 LBS | BODY MASS INDEX: 26.41 KG/M2 | OXYGEN SATURATION: 100 % | TEMPERATURE: 97.5 F

## 2022-03-29 DIAGNOSIS — C53.0 MALIGNANT NEOPLASM OF ENDOCERVIX (HCC): Primary | ICD-10-CM

## 2022-03-29 DIAGNOSIS — M54.16 CHRONIC RIGHT-SIDED LUMBAR RADICULOPATHY: ICD-10-CM

## 2022-03-29 DIAGNOSIS — R59.1 LYMPHADENOPATHY OF HEAD AND NECK: ICD-10-CM

## 2022-03-29 DIAGNOSIS — N13.1 HYDRONEPHROSIS WITH URETERAL STRICTURE, NOT ELSEWHERE CLASSIFIED: ICD-10-CM

## 2022-03-29 PROCEDURE — 99214 OFFICE O/P EST MOD 30 MIN: CPT | Performed by: OBSTETRICS & GYNECOLOGY

## 2022-03-29 PROCEDURE — 81001 URINALYSIS AUTO W/SCOPE: CPT | Performed by: OBSTETRICS & GYNECOLOGY

## 2022-03-29 NOTE — ASSESSMENT & PLAN NOTE
Significant pain and neuropathy  Patient follows with palliative care  PCP aware and she has been referred for management by pain specialists

## 2022-03-29 NOTE — ASSESSMENT & PLAN NOTE
Last PET-CT with evidence of progressive metastatic disease  She does have known her recurrent multidrug resistant metastatic incurable disease  She is aware of the poor prognosis associated with her condition  At this time, she is receiving single agent pembrolizumab every 21 days  She has been cleared to receive cycle 3  Her laboratory parameters are all appropriate  There is minimally elevated amylase which has no specific significance given her likely peritoneal disease and abdominal pain  Continue to monitor labs prior to each infusion  Plan to obtain cross-sectional imaging to evaluate response after cycle 4

## 2022-03-29 NOTE — PROGRESS NOTES
Assessment/Plan:    Problem List Items Addressed This Visit        Nervous and Auditory    Chronic right-sided lumbar radiculopathy     Significant pain and neuropathy  Patient follows with palliative care  PCP aware and she has been referred for management by pain specialists  Immune and Lymphatic    Lymphadenopathy of head and neck     This is clinically improved  Left-sided neck mass has significantly decreased in size on exam   This is promising and hopefully is a sign of response to therapy  Genitourinary    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[facility] ) - Primary     Last PET-CT with evidence of progressive metastatic disease  She does have known her recurrent multidrug resistant metastatic incurable disease  She is aware of the poor prognosis associated with her condition  At this time, she is receiving single agent pembrolizumab every 21 days  She has been cleared to receive cycle 3  Her laboratory parameters are all appropriate  There is minimally elevated amylase which has no specific significance given her likely peritoneal disease and abdominal pain  Continue to monitor labs prior to each infusion  Plan to obtain cross-sectional imaging to evaluate response after cycle 4  Hydronephrosis     Stent in place  Management by Urology  CHIEF COMPLAINT:   Pre chemotherapy visit    Problem:  Cancer Staging  Malignant neoplasm of endocervix [facility])  Staging form: Cervix Uteri, AJCC 7th Edition  - Clinical stage from 3/29/2017: FIGO Stage IB2, calculated as Stage Unknown (T1b2, NX, M0) - Signed by Destiny Swan MD on 2/4/2021        Previous therapy:  Oncology History   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[facility] )   12/2016 Initial Diagnosis    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 ([facility])       3/29/2017 -  Cancer Staged    Staging form: Cervix Uteri, AJCC 7th Edition  - Clinical stage from 3/29/2017: FIGO Stage IB2, calculated as Stage Unknown (T1b2, NX, M0) - Signed by Jerrell Jimenez MD on 2/4/2021  Staged by: Managing physician  Specimen type: Excision  Histopathologic type: Squamous cell carcinoma, NOS  Histologic grade (G): G3  Lymph-vascular invasion (LVI): LVI present/identified, NOS  Residual tumor (R): R0 - None  Pelvic jace status: Negative  Pelvic jace method of assessment: PET  Para-aortic status: Negative  Para-aortic jace method of assessment: PET  Stage used in treatment planning: Yes  National guidelines used in treatment planning: Yes  Type of national guideline used in treatment planning: NCCN       3/29/2017 Surgery    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)  FInal path with incidental 5 cm grade 3 invasive cervical SCCA, +LVI, invasion 9/10 mm cervical stroma, no lymphadenectomy, margins negative ([facility])  2/19/2021 Progression    A  Lymph Node, Left axillary, Biopsy:  - Metastatic squamous cell carcinoma (p16 positive) involving lymph node, compatible with patient's cervical primary  3/2/2021 -  Chemotherapy    Carboplatin AUC 5, Taxol 135 milligram/m2 and Avastin at 15 milligram/kilogram all to be administer on day 1 of a 21 day cycle     7/7/2021 - 2/2/2022 Chemotherapy    bevacizumab (AVASTIN) IVPB, 910 mg, Intravenous, Once, 11 of 16 cycles  Administration: 900 mg (7/7/2021), 900 mg (7/30/2021), 900 mg (8/18/2021), 900 mg (9/8/2021), 900 mg (9/29/2021), 900 mg (10/20/2021), 900 mg (11/10/2021), 900 mg (12/1/2021), 900 mg (12/22/2021), 900 mg (1/12/2022), 900 mg (2/2/2022)     2/22/2022 -  Chemotherapy    pembrolizumab (KEYTRUDA) IVPB, 200 mg, Intravenous, Once, 2 of 6 cycles  Administration: 200 mg (2/22/2022), 200 mg (3/16/2022)     2/23/2022 - 2/23/2022 Chemotherapy    pembrolizumab (KEYTRUDA) IVPB, 200 mg, Intravenous, Once, 0 of 6 cycles           Patient ID: Wesley Alvarenga is a 77 y o  female  HPI  Patient with recurrent metastatic multidrug resistant cervical cancer    She has widespread metastatic disease  In late February she was switched to single agent pembrolizumab given high PD-L1 expression  She is here for consideration of cycle 3  She has chronic pain syndrome associated with her cancer diagnosis  She has neck pain associated with metastatic lymphadenopathy which has now improved  She has most notably right-sided lower back/gluteal/sciatic pain  Recent lumbosacral MRI noted  She is managed by palliative care for her cancer associated pain  She also has an indwelling right-sided ureteral stent for which she follows with Urology  Urinalysis is pending  The following portions of the patient's history were reviewed and updated as appropriate: allergies, current medications, past family history, past medical history, past social history, past surgical history and problem list     Review of Systems  As per HPI  Occasional nausea vomiting managed with oral antiemetics  Tolerates p o  Some constipation managed with  bowel regimen  Denies vaginal bleeding, drainage or discharge  Current Outpatient Medications   Medication Sig Dispense Refill    Diclofenac Sodium (VOLTAREN) 1 % Apply 2 g topically 4 (four) times a day 350 g 3    famotidine (PEPCID) 20 mg tablet Take 20 mg by mouth daily        fluticasone (VERAMYST) 27 5 MCG/SPRAY nasal spray 2 sprays into each nostril daily 9 1 mL 5    lidocaine-prilocaine (EMLA) cream APPLY TOPICALLY AS NEEDED FOR MILD PAIN 30 g 0    Multiple Vitamins-Minerals (Alive Womens Gummy) CHEW Chew      naloxegol oxalate (Movantik) 25 MG tablet Take 1 tablet (25 mg total) by mouth in the morning 30 tablet 2    NON FORMULARY Start with a pea size amount to entire face at night, OR directions per MD  Use eNurse software on [employer] to guide you daily        oxyCODONE-acetaminophen (Percocet) 5-325 mg per tablet Take 1 tablet by mouth every 4 (four) hours as needed (cancer pain; max of 5 tabs a day) Max Daily Amount: 5 tablets 150 tablet 0    psyllium (METAMUCIL) 0 52 g capsule Take 750 mg by mouth daily as needed       senna (SENOKOT) 8 6 MG tablet Take 1 tablet (8 6 mg total) by mouth 2 (two) times a day Take to prevent constipation from pain meds  Hold one dose for loose stool 60 tablet 11    tiotropium (Spiriva Respimat) 1 25 MCG/ACT AERS inhaler Inhale 2 puffs daily 4 g 5     No current facility-administered medications for this visit  Objective:    Blood pressure 118/76, pulse 96, temperature 97 5 °F (36 4 °C), temperature source Temporal, resp  rate 16, height 4' 11" (1 499 m), weight 59 4 kg (131 lb), SpO2 100 %, not currently breastfeeding  Body mass index is 26 46 kg/m²  Body surface area is 1 54 meters squared  Physical Exam  Vitals reviewed  Exam conducted with a chaperone present  Constitutional:       Appearance: She is ill-appearing  She is not toxic-appearing or diaphoretic  Eyes:      General: No scleral icterus  Right eye: No discharge  Left eye: No discharge  Conjunctiva/sclera: Conjunctivae normal    Neck:      Comments: Right-sided Port-A-Cath low lying but appears healthy  Cardiovascular:      Rate and Rhythm: Normal rate and regular rhythm  Heart sounds: Normal heart sounds  No murmur heard  Pulmonary:      Effort: Pulmonary effort is normal  No respiratory distress  Breath sounds: Normal breath sounds  No wheezing  Abdominal:      General: Abdomen is flat  There is no distension  Palpations: Abdomen is soft  There is no mass  Tenderness: There is no abdominal tenderness  There is no guarding  Hernia: No hernia is present  Genitourinary:     Comments: Deferred  Musculoskeletal:      Right lower leg: No edema  Left lower leg: No edema  Neurological:      Mental Status: She is alert         Mertha Babinski, MD, [facility]  3/29/2022  8:39 AM

## 2022-03-29 NOTE — ASSESSMENT & PLAN NOTE
This is clinically improved  Left-sided neck mass has significantly decreased in size on exam   This is promising and hopefully is a sign of response to therapy

## 2022-03-30 LAB
BACTERIA UR QL AUTO: ABNORMAL /HPF
BILIRUB UR QL STRIP: NEGATIVE
CLARITY UR: ABNORMAL
COLOR UR: ABNORMAL
GLUCOSE UR STRIP-MCNC: NEGATIVE MG/DL
HGB UR QL STRIP.AUTO: ABNORMAL
KETONES UR STRIP-MCNC: NEGATIVE MG/DL
LEUKOCYTE ESTERASE UR QL STRIP: ABNORMAL
MUCOUS THREADS UR QL AUTO: ABNORMAL
NITRITE UR QL STRIP: POSITIVE
NON-SQ EPI CELLS URNS QL MICRO: ABNORMAL /HPF
PH UR STRIP.AUTO: 5.5 [PH]
PROT UR STRIP-MCNC: ABNORMAL MG/DL
RBC #/AREA URNS AUTO: ABNORMAL /HPF
SP GR UR STRIP.AUTO: 1.02 (ref 1–1.03)
UROBILINOGEN UR STRIP-ACNC: <2 MG/DL
WBC #/AREA URNS AUTO: ABNORMAL /HPF

## 2022-03-31 ENCOUNTER — PATIENT OUTREACH (OUTPATIENT)
Dept: CASE MANAGEMENT | Facility: HOSPITAL | Age: 67
End: 2022-03-31

## 2022-03-31 ENCOUNTER — OFFICE VISIT (OUTPATIENT)
Dept: PALLIATIVE MEDICINE | Facility: CLINIC | Age: 67
End: 2022-03-31
Payer: MEDICARE

## 2022-03-31 ENCOUNTER — ANESTHESIA EVENT (OUTPATIENT)
Dept: PERIOP | Facility: HOSPITAL | Age: 67
End: 2022-03-31
Payer: MEDICARE

## 2022-03-31 ENCOUNTER — APPOINTMENT (OUTPATIENT)
Dept: LAB | Facility: HOSPITAL | Age: 67
End: 2022-03-31
Payer: MEDICARE

## 2022-03-31 ENCOUNTER — TELEPHONE (OUTPATIENT)
Dept: UROLOGY | Facility: CLINIC | Age: 67
End: 2022-03-31

## 2022-03-31 VITALS
RESPIRATION RATE: 18 BRPM | OXYGEN SATURATION: 97 % | WEIGHT: 131.84 LBS | HEART RATE: 111 BPM | BODY MASS INDEX: 26.63 KG/M2 | SYSTOLIC BLOOD PRESSURE: 120 MMHG | DIASTOLIC BLOOD PRESSURE: 72 MMHG | TEMPERATURE: 97.1 F

## 2022-03-31 DIAGNOSIS — M54.16 LUMBAR RADICULOPATHY, RIGHT: ICD-10-CM

## 2022-03-31 DIAGNOSIS — G57.11 MERALGIA PARAESTHETICA, RIGHT: Primary | ICD-10-CM

## 2022-03-31 DIAGNOSIS — K59.03 DRUG INDUCED CONSTIPATION: ICD-10-CM

## 2022-03-31 DIAGNOSIS — C53.0 MALIGNANT NEOPLASM OF ENDOCERVIX (HCC): ICD-10-CM

## 2022-03-31 DIAGNOSIS — R39.89 SUSPECTED UTI: Primary | ICD-10-CM

## 2022-03-31 PROCEDURE — 99214 OFFICE O/P EST MOD 30 MIN: CPT | Performed by: FAMILY MEDICINE

## 2022-03-31 PROCEDURE — 87077 CULTURE AEROBIC IDENTIFY: CPT

## 2022-03-31 PROCEDURE — 87186 SC STD MICRODIL/AGAR DIL: CPT

## 2022-03-31 PROCEDURE — 87086 URINE CULTURE/COLONY COUNT: CPT

## 2022-03-31 RX ORDER — GABAPENTIN 100 MG/1
CAPSULE ORAL
Qty: 90 CAPSULE | Refills: 0 | Status: SHIPPED | OUTPATIENT
Start: 2022-03-31 | End: 2022-04-28 | Stop reason: SDUPTHER

## 2022-03-31 RX ORDER — POLYETHYLENE GLYCOL 3350 17 G/17G
17 POWDER, FOR SOLUTION ORAL DAILY
Qty: 30 EACH | Refills: 0 | Status: SHIPPED | OUTPATIENT
Start: 2022-03-31

## 2022-03-31 RX ORDER — CEPHALEXIN 500 MG/1
500 CAPSULE ORAL EVERY 8 HOURS SCHEDULED
Qty: 15 CAPSULE | Refills: 0 | Status: SHIPPED | OUTPATIENT
Start: 2022-03-31 | End: 2022-04-05

## 2022-03-31 RX ORDER — OXYCODONE HYDROCHLORIDE AND ACETAMINOPHEN 5; 325 MG/1; MG/1
1 TABLET ORAL EVERY 4 HOURS PRN
Qty: 150 TABLET | Refills: 0 | Status: SHIPPED | OUTPATIENT
Start: 2022-03-31 | End: 2022-04-28 | Stop reason: SDUPTHER

## 2022-03-31 RX ORDER — NALOXEGOL OXALATE 25 MG/1
25 TABLET, FILM COATED ORAL DAILY
Qty: 30 TABLET | Refills: 2 | Status: SHIPPED | OUTPATIENT
Start: 2022-03-31 | End: 2022-07-11 | Stop reason: SDUPTHER
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
## 2022-03-31 NOTE — TELEPHONE ENCOUNTER
Called and spoke with patient  She is aware of u/a results and that Keflex was sent to her pharmacy for her to start taking  Also aware office will contact her if she would need to change antibiotics based on urine culture results  Patient verbalized understanding and confirmed procedure date of 4/4 for stent exchange with Dr Clay Maldonado at the [facility]  Patient states she utilizes [facility] transport and needs to know ahead of time what time she needs to present to the OR  Contacted OR and spoke with Carmen  Patient needs to be at the [facility] on 4/4  Call placed to [facility] and made them aware of transport  Patient aware of transport as well and that she needs to have someone present to the OR with her  She verbalized understanding

## 2022-03-31 NOTE — PRE-PROCEDURE INSTRUCTIONS
Pre-Surgery Instructions:   Medication Instructions    Diclofenac Sodium (VOLTAREN) 1 % Do not apply morning of surgery    famotidine (PEPCID) 20 mg tablet Instructed patient per Anesthesia Guidelines  Take morning of surgery with a small sip of water    gabapentin (Neurontin) 100 mg capsule Instructed patient per Anesthesia Guidelines  Take morning of surgery with a small sip of water    lidocaine-prilocaine (EMLA) cream Do not apply morning of surgery    Multiple Vitamins-Minerals (Alive Womens Gummy) CHEW Instructed patient per Anesthesia Guidelines  Stop 3/31  Do not take morning of surgery    naloxegol oxalate (Movantik) 25 MG tablet Instructed patient per Anesthesia Guidelines  Take morning of surgery with a small sip of water    NON FORMULARY Do not apply morning of surgery    oxyCODONE-acetaminophen (Percocet) 5-325 mg per tablet Instructed patient per Anesthesia Guidelines   polyethylene glycol (MIRALAX) 17 g packet Patient was instructed by Physician and understands  Do not take morning of surgery    senna (SENOKOT) 8 6 MG tablet Patient was instructed by Physician and understands   tiotropium (Spiriva Respimat) 1 25 MCG/ACT AERS inhaler Instructed patient per Anesthesia Guidelines  Use morning of surgery   Covid screening negative as per patient  Patient informed of unvaccinated policy to wear mask at hospital  She verbalized understanding    Reviewed showering and medication instructions  Patient verbalized understanding  Advised NPO after MN and ASC will call with scheduled surgical time

## 2022-03-31 NOTE — Clinical Note
Pt is noted to have both neuro issues and motor deficits that may contribute to falls  Will refer to PT, and order shower bench  If willing to use, and helped by, a cane -- I will order this as well  Dr Jose Vidal, appreciate your support of this frail lady with limited community resources  Let us know how we can work together if you have additional  ideas

## 2022-03-31 NOTE — PROGRESS NOTES
Using towel bar to assist with lifting leg over shower wall  Needs bath bench, would benefit from professionaly installed grab bars  She has no radiating lancinating sciatica thru the lower leg; symptoms inovlve the piriformis, as well as hip flexors and quad  Meera Sinning on R  Will send for PT, and order bath bench

## 2022-03-31 NOTE — PROGRESS NOTES
Oncology LSW received call from PT asking if she would be able to confirm transport with [facility] and whether or not she needed a person with her at the OR for her procedure on Monday, 4/4  LSW spoke with Kenney Brewer with [facility], who noted that PT was on the schedule to be dropped off for her procedure  Wai stated that PT would need to find her own ride home due to anesthesia being used  LSW spoke with OR staff who noted that PT would not need to have someone physically present with her during the procedure at [facility], but that she would need a "competent adult" to pick her up and take her home, again because of anesthesia  LSW shared this info with PT  PT understanding and appreciative

## 2022-04-01 ENCOUNTER — OFFICE VISIT (OUTPATIENT)
Dept: FAMILY MEDICINE CLINIC | Facility: CLINIC | Age: 67
End: 2022-04-01
Payer: MEDICARE

## 2022-04-01 ENCOUNTER — HOSPITAL ENCOUNTER (OUTPATIENT)
Dept: INFUSION CENTER | Facility: HOSPITAL | Age: 67
Discharge: HOME | End: 2022-04-01

## 2022-04-01 ENCOUNTER — TELEPHONE (OUTPATIENT)
Dept: PALLIATIVE MEDICINE | Facility: HOSPITAL | Age: 67
End: 2022-04-01

## 2022-04-01 VITALS
HEIGHT: 59 IN | TEMPERATURE: 97.9 F | BODY MASS INDEX: 26.53 KG/M2 | SYSTOLIC BLOOD PRESSURE: 126 MMHG | DIASTOLIC BLOOD PRESSURE: 82 MMHG | OXYGEN SATURATION: 98 % | WEIGHT: 131.6 LBS | HEART RATE: 82 BPM

## 2022-04-01 DIAGNOSIS — K80.20 CALCULUS OF GALLBLADDER WITHOUT CHOLECYSTITIS WITHOUT OBSTRUCTION: ICD-10-CM

## 2022-04-01 DIAGNOSIS — M54.16 CHRONIC RIGHT-SIDED LUMBAR RADICULOPATHY: ICD-10-CM

## 2022-04-01 DIAGNOSIS — M47.812 CERVICAL SPONDYLOSIS: Primary | ICD-10-CM

## 2022-04-01 DIAGNOSIS — C53.0 MALIGNANT NEOPLASM OF ENDOCERVIX (HCC): ICD-10-CM

## 2022-04-01 PROCEDURE — 99214 OFFICE O/P EST MOD 30 MIN: CPT | Performed by: FAMILY MEDICINE

## 2022-04-01 RX ORDER — CELECOXIB 200 MG/1
200 CAPSULE ORAL DAILY
Qty: 30 CAPSULE | Refills: 5 | Status: SHIPPED | OUTPATIENT
Start: 2022-04-01 | End: 2022-04-01 | Stop reason: CLARIF

## 2022-04-01 RX ORDER — TIZANIDINE HYDROCHLORIDE 4 MG/1
4 CAPSULE, GELATIN COATED ORAL
Qty: 30 CAPSULE | Refills: 3 | Status: SHIPPED | OUTPATIENT
Start: 2022-04-01 | End: 2022-04-01 | Stop reason: CLARIF

## 2022-04-01 NOTE — PROGRESS NOTES
Assessment/Plan:    Cervical spondylosis  The patient was started on Zanaflex 4 mg  She will take 1 capsule at bedtime  I advised patient to use moist heat to the affected area and rest   I did advise her that this medication should help her sleep  It will also make her mouth dry  Chronic right-sided lumbar radiculopathy  Patient has a right-sided lumbar radiculopathy  I reviewed Dr Cece Fields consult from March 31st   He has referred the patient for physical therapy  I told the patient this is exactly what I would have recommended for her  I also feel that the Zanaflex will likely help with her radicular pain as well  Calculus of gallbladder without cholecystitis without obstruction  I think the patient's abdominal pain is likely secondary to her abdominal carcinomatosis  Patient needs to notify immediately if she has postprandial abdominal pain  Malignant neoplasm of endocervix [facility])  Patient will continue routine follow-up with Dr Stevenson Flynn from palliative care  Patient has cancer related pain which is being controlled on oxycodone  Constipation is being managed with Movantik  Diagnoses and all orders for this visit:    Cervical spondylosis  -     Discontinue: celecoxib (CeleBREX) 200 mg capsule; Take 1 capsule (200 mg total) by mouth daily  -     Discontinue: TiZANidine (ZANAFLEX) 4 MG capsule; Take 1 capsule (4 mg total) by mouth daily at bedtime    Chronic right-sided lumbar radiculopathy    Calculus of gallbladder without cholecystitis without obstruction    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HCC)          Subjective:      Patient ID: Joan Hinojosa is a 79 y o  female  This patient is a 60-year-old  female who presents to the office today for her follow-up visit  She is now being followed also by palliative care  Patient's main complaint is lower back pain which radiates down her right leg  She reports she is eating well  She still gets abdominal pain and bloating  She admits to constipation when asked  She is taking pain medication which is prescribed by palliative care  The following portions of the patient's history were reviewed and updated as appropriate: allergies, current medications, past family history, past medical history, past social history, past surgical history and problem list     Review of Systems   Constitutional: Negative for activity change  Respiratory: Negative for shortness of breath  Cardiovascular: Negative for chest pain  Gastrointestinal: Positive for abdominal distention, abdominal pain and constipation  Genitourinary: Positive for dysuria and frequency  Musculoskeletal: Positive for back pain and neck pain  Neurological: Negative for weakness and numbness  Objective:      /82 (BP Location: Left arm, Patient Position: Sitting, Cuff Size: Adult)   Pulse 82   Temp 97 9 °F (36 6 °C) (Tympanic)   Ht 4' 11" (1 499 m)   Wt 59 7 kg (131 lb 9 6 oz)   LMP  (LMP Unknown)   SpO2 98%   BMI 26 58 kg/m²          Physical Exam  Vitals reviewed  Constitutional:       Comments: Patient is a pleasant 59-year-old white female who appears her stated age  She is neat in appearance  She is well nourished  She is in no apparent distress   HENT:      Head: Normocephalic and atraumatic  Right Ear: Tympanic membrane, ear canal and external ear normal  There is no impacted cerumen  Left Ear: Tympanic membrane, ear canal and external ear normal  There is no impacted cerumen  Mouth/Throat:      Mouth: Mucous membranes are moist       Pharynx: Oropharynx is clear  No oropharyngeal exudate or posterior oropharyngeal erythema  Eyes:      General: No scleral icterus  Right eye: No discharge  Left eye: No discharge  Conjunctiva/sclera: Conjunctivae normal       Pupils: Pupils are equal, round, and reactive to light  Cardiovascular:      Rate and Rhythm: Normal rate and regular rhythm        Heart sounds: Normal heart sounds  No murmur heard  No friction rub  No gallop  Pulmonary:      Effort: Pulmonary effort is normal  No respiratory distress  Breath sounds: Normal breath sounds  No stridor  No wheezing  Abdominal:      General: Bowel sounds are normal  There is no distension  Palpations: Abdomen is soft  There is no mass  Tenderness: There is abdominal tenderness (Mild diffuse tenderness noted to palpation)  There is no guarding or rebound  Comments: I did not detect any hepatosplenomegaly  There were no peritoneal signs elicited   Musculoskeletal:      Cervical back: Neck supple  Comments: There is decreased range of motion of the cervical spine  There was no paraspinal cervical muscle spasms or tenderness  There was no tenderness or muscle spasm present in the paraspinal lumbar musculature  Decreased lumbar flexion noted  No leg length discrepancies appreciated  Lymphadenopathy:      Cervical: No cervical adenopathy  Neurological:      Comments: Deep tendon reflexes are 2/4  Motor strength is 5/5  Straight leg raising sign is negative  Psychiatric:         Mood and Affect: Mood normal          Behavior: Behavior normal          Thought Content: Thought content normal          Judgment: Judgment normal        extremities:  Without cyanosis, clubbing, or edema    Homans sign is negative bilaterally unk

## 2022-04-01 NOTE — PATIENT INSTRUCTIONS
Osteoarthritis   AMBULATORY CARE:   Osteoarthritis  occurs when cartilage (tissue that cushions a joint) wears away slowly and causes the bones to rub together  Osteoarthritis (OA) is a long-term condition that often affects the hands, neck, lower back, knees, and hips  OA is also called arthrosis or degenerative joint disease  Common signs and symptoms include the following:   · Joint pain that gets worse when you move the joint     · Joint stiffness that decreases after you move the joint     · Decreased range of movement     · Hard, bony enlargement on your fingers or toes    · A grinding or cracking sound when you move your joint    Call your doctor or specialist if:   · You have severe pain  · You cannot move your joint  · You have a fever  · Your joint is red and tender  · You have questions or concerns about your condition or care  Treatment for osteoarthritis  may include any of the following:  · Acetaminophen  decreases pain and fever  It is available without a doctor's order  Ask how much to take and how often to take it  Follow directions  Read the labels of all other medicines you are using to see if they also contain acetaminophen, or ask your doctor or pharmacist  Acetaminophen can cause liver damage if not taken correctly  Do not use more than 4 grams (4,000 milligrams) total of acetaminophen in one day  · NSAIDs , such as ibuprofen, help decrease swelling, pain, and fever  This medicine is available with or without a doctor's order  NSAIDs can cause stomach bleeding or kidney problems in certain people  If you take blood thinner medicine, always ask your healthcare provider if NSAIDs are safe for you  Always read the medicine label and follow directions  · Capsaicin cream  may help decrease pain in your joint  · Prescription pain medicine  may be given  Ask your healthcare provider how to take this medicine safely  Some prescription pain medicines contain acetaminophen   Do not take other medicines that contain acetaminophen without talking to your healthcare provider  Too much acetaminophen may cause liver damage  Prescription pain medicine may cause constipation  Ask your healthcare provider how to prevent or treat constipation  · A steroid injection  may be given if your symptoms get worse  · Physical therapy  is used to teach you exercises to help improve movement and strength, and to decrease pain  A physical therapist may move an area with his or her hands  For example, he or she may move your leg in certain ways to treat osteoarthritis in your hip  · Ultrasound  may be used to treat osteoarthritis in certain areas, such as your knee  Ultrasound produces heat that can relieve pain  · Surgery  may be needed if other treatments do not work  Manage your symptoms:   · Stay active  Physical activity may reduce your pain and improve your ability to do daily activities  Avoid activities that cause pain  Ask your healthcare provider what type of exercise would be best for you  · Maintain a healthy weight  This helps decrease the strain on the joints in your back, hips, knees, ankles, and feet  Ask your healthcare provider what a healthy weight is for you  He or she can help you create a weight loss plan if you are overweight  · Use heat or ice on your joints as directed  Heat and ice help decrease pain, swelling, and muscle spasms  For heat, use a heating pad on a low setting for 20 minutes, or take a warm bath  For ice, use an ice pack, or put crushed ice in a plastic bag  Cover it with a towel before you place it on your joint  Use ice for 15 minutes every hour  · Massage the muscles around the joint  Massage helps relieve pain and stiffness  Your healthcare provider or a physical therapist can show you how to do this  If you have hip OA, another person may need to help you massage the area  · Use a cane, crutches, or a walker if directed    These help protect and relieve pressure on your ankle, knee, and hip joints  You may also be prescribed shoe inserts to decrease pressure in your joints  · Wear flat or low-heeled shoes  This will help decrease pain and reduce pressure on your ankle, knee, and hip joints  Follow up with your doctor as directed:  Write down your questions so you remember to ask them during your visits  © Copyright [employer] 2022 Information is for End User's use only and may not be sold, redistributed or otherwise used for commercial purposes  All illustrations and images included in CareNotes® are the copyrighted property of [employer] , [employer]  or [address] [place], [place] [address]everton Bartholomew   The above information is an  only  It is not intended as medical advice for individual conditions or treatments  Talk to your doctor, nurse or pharmacist before following any medical regimen to see if it is safe and effective for you

## 2022-04-02 LAB
BACTERIA UR CULT: ABNORMAL
BACTERIA UR CULT: ABNORMAL

## 2022-04-04 ENCOUNTER — APPOINTMENT (OUTPATIENT)
Dept: LAB | Facility: HOSPITAL | Age: 67
End: 2022-04-04
Payer: MEDICARE

## 2022-04-04 ENCOUNTER — HOSPITAL ENCOUNTER (OUTPATIENT)
Facility: HOSPITAL | Age: 67
Setting detail: OUTPATIENT SURGERY
Discharge: HOME | End: 2022-04-04
Attending: UROLOGY | Admitting: UROLOGY
Payer: MEDICARE

## 2022-04-04 ENCOUNTER — ANESTHESIA (OUTPATIENT)
Dept: PERIOP | Facility: HOSPITAL | Age: 67
End: 2022-04-04
Payer: MEDICARE

## 2022-04-04 ENCOUNTER — TELEPHONE (OUTPATIENT)
Dept: UROLOGY | Facility: CLINIC | Age: 67
End: 2022-04-04

## 2022-04-04 ENCOUNTER — PREP FOR PROCEDURE (OUTPATIENT)
Dept: UROLOGY | Facility: CLINIC | Age: 67
End: 2022-04-04

## 2022-04-04 ENCOUNTER — APPOINTMENT (OUTPATIENT)
Dept: RADIOLOGY | Facility: HOSPITAL | Age: 67
End: 2022-04-04
Payer: MEDICARE

## 2022-04-04 VITALS
TEMPERATURE: 96.4 F | OXYGEN SATURATION: 99 % | SYSTOLIC BLOOD PRESSURE: 118 MMHG | RESPIRATION RATE: 18 BRPM | DIASTOLIC BLOOD PRESSURE: 56 MMHG | HEART RATE: 67 BPM

## 2022-04-04 DIAGNOSIS — C53.0 MALIGNANT NEOPLASM OF ENDOCERVIX (HCC): ICD-10-CM

## 2022-04-04 DIAGNOSIS — N13.1 HYDRONEPHROSIS WITH URETERAL STRICTURE, NOT ELSEWHERE CLASSIFIED: Primary | ICD-10-CM

## 2022-04-04 DIAGNOSIS — R93.89 ABNORMAL FINDINGS ON DIAGNOSTIC IMAGING OF OTHER SPECIFIED BODY STRUCTURES: ICD-10-CM

## 2022-04-04 DIAGNOSIS — R39.89 SUSPECTED UTI: ICD-10-CM

## 2022-04-04 DIAGNOSIS — N13.30 HYDRONEPHROSIS, UNSPECIFIED HYDRONEPHROSIS TYPE: Primary | ICD-10-CM

## 2022-04-04 DIAGNOSIS — Z01.810 PREOP CARDIOVASCULAR EXAM: ICD-10-CM

## 2022-04-04 LAB
ALBUMIN SERPL BCP-MCNC: 3.5 G/DL (ref 3.5–5)
ALP SERPL-CCNC: 138 U/L (ref 46–116)
ALT SERPL W P-5'-P-CCNC: 24 U/L (ref 12–78)
AMYLASE SERPL-CCNC: 122 IU/L (ref 25–115)
ANION GAP SERPL CALCULATED.3IONS-SCNC: 9 MMOL/L (ref 4–13)
AST SERPL W P-5'-P-CCNC: 21 U/L (ref 5–45)
BASOPHILS # BLD AUTO: 0.02 THOUSANDS/ΜL (ref 0–0.1)
BASOPHILS NFR BLD AUTO: 0 % (ref 0–1)
BILIRUB SERPL-MCNC: 0.23 MG/DL (ref 0.2–1)
BUN SERPL-MCNC: 22 MG/DL (ref 5–25)
CALCIUM SERPL-MCNC: 8.6 MG/DL (ref 8.3–10.1)
CHLORIDE SERPL-SCNC: 105 MMOL/L (ref 100–108)
CO2 SERPL-SCNC: 25 MMOL/L (ref 21–32)
CREAT SERPL-MCNC: 1.01 MG/DL (ref 0.6–1.3)
EOSINOPHIL # BLD AUTO: 0.11 THOUSAND/ΜL (ref 0–0.61)
EOSINOPHIL NFR BLD AUTO: 2 % (ref 0–6)
ERYTHROCYTE [DISTWIDTH] IN BLOOD BY AUTOMATED COUNT: 14.5 % (ref 11.6–15.1)
GFR SERPL CREATININE-BSD FRML MDRD: 58 ML/MIN/1.73SQ M
GLUCOSE P FAST SERPL-MCNC: 135 MG/DL (ref 65–99)
HCT VFR BLD AUTO: 37 % (ref 34.8–46.1)
HGB BLD-MCNC: 12.4 G/DL (ref 11.5–15.4)
IMM GRANULOCYTES # BLD AUTO: 0.01 THOUSAND/UL (ref 0–0.2)
IMM GRANULOCYTES NFR BLD AUTO: 0 % (ref 0–2)
LIPASE SERPL-CCNC: 142 U/L (ref 73–393)
LYMPHOCYTES # BLD AUTO: 0.99 THOUSANDS/ΜL (ref 0.6–4.47)
LYMPHOCYTES NFR BLD AUTO: 17 % (ref 14–44)
MAGNESIUM SERPL-MCNC: 1.8 MG/DL (ref 1.6–2.6)
MCH RBC QN AUTO: 30.3 PG (ref 26.8–34.3)
MCHC RBC AUTO-ENTMCNC: 33.5 G/DL (ref 31.4–37.4)
MCV RBC AUTO: 91 FL (ref 82–98)
MONOCYTES # BLD AUTO: 0.19 THOUSAND/ΜL (ref 0.17–1.22)
MONOCYTES NFR BLD AUTO: 3 % (ref 4–12)
NEUTROPHILS # BLD AUTO: 4.42 THOUSANDS/ΜL (ref 1.85–7.62)
NEUTS SEG NFR BLD AUTO: 78 % (ref 43–75)
NRBC BLD AUTO-RTO: 0 /100 WBCS
PLATELET # BLD AUTO: 175 THOUSANDS/UL (ref 149–390)
PMV BLD AUTO: 10 FL (ref 8.9–12.7)
POTASSIUM SERPL-SCNC: 3.8 MMOL/L (ref 3.5–5.3)
PROT SERPL-MCNC: 7.4 G/DL (ref 6.4–8.2)
RBC # BLD AUTO: 4.09 MILLION/UL (ref 3.81–5.12)
SODIUM SERPL-SCNC: 139 MMOL/L (ref 136–145)
TSH SERPL DL<=0.05 MIU/L-ACNC: 1.32 UIU/ML (ref 0.45–4.5)
WBC # BLD AUTO: 5.74 THOUSAND/UL (ref 4.31–10.16)

## 2022-04-04 PROCEDURE — 80053 COMPREHEN METABOLIC PANEL: CPT

## 2022-04-04 PROCEDURE — C1769 GUIDE WIRE: HCPCS | Performed by: UROLOGY

## 2022-04-04 PROCEDURE — 84443 ASSAY THYROID STIM HORMONE: CPT

## 2022-04-04 PROCEDURE — 52332 CYSTOSCOPY AND TREATMENT: CPT | Performed by: UROLOGY

## 2022-04-04 PROCEDURE — 36415 COLL VENOUS BLD VENIPUNCTURE: CPT

## 2022-04-04 PROCEDURE — 83690 ASSAY OF LIPASE: CPT

## 2022-04-04 PROCEDURE — 82150 ASSAY OF AMYLASE: CPT

## 2022-04-04 PROCEDURE — C2617 STENT, NON-COR, TEM W/O DEL: HCPCS | Performed by: UROLOGY

## 2022-04-04 PROCEDURE — 85025 COMPLETE CBC W/AUTO DIFF WBC: CPT

## 2022-04-04 PROCEDURE — 74018 RADEX ABDOMEN 1 VIEW: CPT

## 2022-04-04 PROCEDURE — 83735 ASSAY OF MAGNESIUM: CPT

## 2022-04-04 DEVICE — INLAY OPTIMA URETERAL STENT W/O GUIDEWIRE
Type: IMPLANTABLE DEVICE | Site: URETER | Status: FUNCTIONAL
Brand: BARD® INLAY OPTIMA® URETERAL STENT

## 2022-04-04 RX ORDER — HYDROMORPHONE HCL/PF 1 MG/ML
0.5 SYRINGE (ML) INJECTION
Status: DISCONTINUED | OUTPATIENT
Start: 2022-04-04 | End: 2022-04-04 | Stop reason: HOSPADM

## 2022-04-04 RX ORDER — METOCLOPRAMIDE HYDROCHLORIDE 5 MG/ML
10 INJECTION INTRAMUSCULAR; INTRAVENOUS ONCE AS NEEDED
Status: DISCONTINUED | OUTPATIENT
Start: 2022-04-04 | End: 2022-04-04 | Stop reason: HOSPADM

## 2022-04-04 RX ORDER — ONDANSETRON 2 MG/ML
INJECTION INTRAMUSCULAR; INTRAVENOUS AS NEEDED
Status: DISCONTINUED | OUTPATIENT
Start: 2022-04-04 | End: 2022-04-04

## 2022-04-04 RX ORDER — PHENAZOPYRIDINE HYDROCHLORIDE 100 MG/1
200 TABLET, FILM COATED ORAL ONCE
Status: COMPLETED | OUTPATIENT
Start: 2022-04-04 | End: 2022-04-04

## 2022-04-04 RX ORDER — PROPOFOL 10 MG/ML
INJECTION, EMULSION INTRAVENOUS AS NEEDED
Status: DISCONTINUED | OUTPATIENT
Start: 2022-04-04 | End: 2022-04-04

## 2022-04-04 RX ORDER — SODIUM CHLORIDE, SODIUM LACTATE, POTASSIUM CHLORIDE, CALCIUM CHLORIDE 600; 310; 30; 20 MG/100ML; MG/100ML; MG/100ML; MG/100ML
125 INJECTION, SOLUTION INTRAVENOUS CONTINUOUS
Status: DISCONTINUED | OUTPATIENT
Start: 2022-04-04 | End: 2022-04-04 | Stop reason: HOSPADM

## 2022-04-04 RX ORDER — MAGNESIUM HYDROXIDE 1200 MG/15ML
LIQUID ORAL AS NEEDED
Status: DISCONTINUED | OUTPATIENT
Start: 2022-04-04 | End: 2022-04-04 | Stop reason: HOSPADM

## 2022-04-04 RX ORDER — CEFAZOLIN SODIUM 2 G/50ML
SOLUTION INTRAVENOUS AS NEEDED
Status: DISCONTINUED | OUTPATIENT
Start: 2022-04-04 | End: 2022-04-04

## 2022-04-04 RX ORDER — CEFAZOLIN SODIUM 2 G/50ML
2000 SOLUTION INTRAVENOUS ONCE
Status: DISCONTINUED | OUTPATIENT
Start: 2022-04-04 | End: 2022-04-04 | Stop reason: HOSPADM

## 2022-04-04 RX ORDER — OXYCODONE HYDROCHLORIDE 5 MG/1
5 TABLET ORAL EVERY 4 HOURS PRN
Status: DISCONTINUED | OUTPATIENT
Start: 2022-04-04 | End: 2022-04-04 | Stop reason: HOSPADM

## 2022-04-04 RX ORDER — FENTANYL CITRATE/PF 50 MCG/ML
50 SYRINGE (ML) INJECTION
Status: DISCONTINUED | OUTPATIENT
Start: 2022-04-04 | End: 2022-04-04 | Stop reason: HOSPADM

## 2022-04-04 RX ORDER — LIDOCAINE HYDROCHLORIDE 10 MG/ML
INJECTION, SOLUTION EPIDURAL; INFILTRATION; INTRACAUDAL; PERINEURAL AS NEEDED
Status: DISCONTINUED | OUTPATIENT
Start: 2022-04-04 | End: 2022-04-04

## 2022-04-04 RX ORDER — DEXAMETHASONE SODIUM PHOSPHATE 10 MG/ML
INJECTION, SOLUTION INTRAMUSCULAR; INTRAVENOUS AS NEEDED
Status: DISCONTINUED | OUTPATIENT
Start: 2022-04-04 | End: 2022-04-04

## 2022-04-04 RX ORDER — OXYBUTYNIN CHLORIDE 5 MG/1
5 TABLET ORAL 3 TIMES DAILY PRN
Status: DISCONTINUED | OUTPATIENT
Start: 2022-04-04 | End: 2022-04-04 | Stop reason: HOSPADM

## 2022-04-04 RX ADMIN — ONDANSETRON HYDROCHLORIDE 4 MG: 2 INJECTION, SOLUTION INTRAVENOUS at 08:08

## 2022-04-04 RX ADMIN — PHENAZOPYRIDINE 200 MG: 100 TABLET ORAL at 08:48

## 2022-04-04 RX ADMIN — LIDOCAINE HYDROCHLORIDE 50 MG: 10 INJECTION, SOLUTION EPIDURAL; INFILTRATION; INTRACAUDAL; PERINEURAL at 08:08

## 2022-04-04 RX ADMIN — SODIUM CHLORIDE, SODIUM LACTATE, POTASSIUM CHLORIDE, AND CALCIUM CHLORIDE 125 ML/HR: .6; .31; .03; .02 INJECTION, SOLUTION INTRAVENOUS at 07:58

## 2022-04-04 RX ADMIN — PROPOFOL 200 MG: 10 INJECTION, EMULSION INTRAVENOUS at 08:08

## 2022-04-04 RX ADMIN — DEXAMETHASONE SODIUM PHOSPHATE 4 MG: 10 INJECTION, SOLUTION INTRAMUSCULAR; INTRAVENOUS at 08:08

## 2022-04-04 RX ADMIN — CEFAZOLIN SODIUM 2000 MG: 2 SOLUTION INTRAVENOUS at 08:15

## 2022-04-04 NOTE — DISCHARGE INSTRUCTIONS
Usual discharge instructions  Expect to see blood in the urine, and have burning urgency and frequency  Call for fever greater than 101 5°, severe pain not relieved by your normal pain medications  Take over-the-counter remedies to avoid constipation  Tomorrow you may resume your normal activities  No driving today

## 2022-04-04 NOTE — OP NOTE
OPERATIVE REPORT  PATIENT NAME: Boo Rutherford    :  1955  MRN: [id number]  Pt Location: [facility] OR ROOM 01    SURGERY DATE: 2022    Surgeon(s) and Role:     Devon Martinez MD - Primary    Preop Diagnosis:  Hydronephrosis with ureteral stricture, not elsewhere classified [N13 1] right    Post-Op Diagnosis Codes: * Hydronephrosis with ureteral stricture, not elsewhere classified [N13 1] right    Procedures:  Cystoscopy, right ureteral stent exchange    Specimen(s):  * No specimens in log *    Estimated Blood Loss:   Minimal    Drains:  Ureteral Drain/Stent Right ureter 6 Fr  (Active)   Number of days: 305       Anesthesia Type:   General/LMA    Operative Indications:  Hydronephrosis with ureteral stricture, not elsewhere classified [N13 1]  Right side    Operative Findings:  Nothing overt  Stent not calcified  Complications:   None    Procedure and Technique:    79-year-old woman with recurrent cervical cancer with right hydronephrosis, found have a 4 cm stricture the mid ureter on the right side, and 1 cm stricture more proximal with hydronephrosis  She has an indwelling 6 [place] by 26 cm stent and she is here for routine stent exchange  Last stent exchange was 10/18/2021  The risks of bleeding infection damage ureter and need for additional procedures were explained she gives informed consent  The patient was brought to the operating room identified properly  LMA was induced patient was prepped and draped in dorsal lithotomy position usual fashion  A time-out was performed  Cystoscopy was carried out with a 22 [place] cystoscopy sheath and 30 degree lens  Urethra was normal without stricture  The bladder was unremarkable  There are no stones tumors or lesions  The orifices orthotopic and intact  I grasped the indwelling stent brought out to the urethral meatus under fluoroscopic guidance placed 0 035 in guidewire up the wire up into the renal pelvis    The old stent was removed intact and discarded, I placed a fresh 6 [place] by 26 cm stent over the wire and coils were established renal pelvis and bladder  No string was left attached  Bladder was drained cystoscopy sheath       I was present for the entire procedure and A qualified resident physician was not available    Patient Disposition:  PACU  and extubated and stable      SIGNATURE: Torres Egan MD  DATE: April 4, 2022  TIME: 8:30 AM

## 2022-04-04 NOTE — TELEPHONE ENCOUNTER
----- Message from Claudio Prather MD sent at 4/4/2022  8:37 AM EDT -----  Please call patient with a surgery date for 6 months for stent exchange-case request placed will need H&P before

## 2022-04-04 NOTE — TELEPHONE ENCOUNTER
Called and spoke with patient to schedule sx  Patient informed of all PAT's needed prior to sx and advised to stop any anticoagulant medication including Multi-vitamins, Fish oil, Krill oil and NSAID, Patient has also been inform that the hospital will call the day before sx with arrival time and procedure time   Patient also informed to have a  bring them to and from hospital     Sx Date: 10/27  Location: [facility]  Physician: Lizeth Rodrigez  H&P Date:  10/17  Clearance Date:   Consent: on admit  Pre-cert Added to  list on : sene    PAT's Ordered to be done on: 10/13 ucx/ ekg

## 2022-04-04 NOTE — ANESTHESIA POSTPROCEDURE EVALUATION
Post-Op Assessment Note    CV Status:  Stable  Pain Score: 0    Pain management: satisfactory to patient     Mental Status:  Arousable, sleepy and lethargic   Hydration Status:  Stable   PONV Controlled:  None   Airway Patency:  Patent      Post Op Vitals Reviewed: Yes      Staff: Anesthesiologist, CRNA         No complications documented      BP   100/55   Temp  97 3   Pulse  81   Resp   18   SpO2   100

## 2022-04-04 NOTE — INTERVAL H&P NOTE
H&P reviewed  After examining the patient I find no changes in the patients condition since the H&P had been written      Vitals:    04/04/22 0740   BP: 149/67   Pulse: 74   Resp: 18   Temp: (!) 96 1 °F (35 6 °C)   SpO2: 99%

## 2022-04-05 RX ORDER — SODIUM CHLORIDE 9 MG/ML
20 INJECTION, SOLUTION INTRAVENOUS ONCE
Status: CANCELLED | OUTPATIENT
Start: 2022-04-06

## 2022-04-06 ENCOUNTER — HOSPITAL ENCOUNTER (OUTPATIENT)
Dept: INFUSION CENTER | Facility: HOSPITAL | Age: 67
Discharge: HOME | End: 2022-04-06
Payer: MEDICARE

## 2022-04-06 VITALS
RESPIRATION RATE: 18 BRPM | OXYGEN SATURATION: 99 % | HEART RATE: 97 BPM | SYSTOLIC BLOOD PRESSURE: 142 MMHG | TEMPERATURE: 97.6 F | BODY MASS INDEX: 26.57 KG/M2 | HEIGHT: 59 IN | DIASTOLIC BLOOD PRESSURE: 62 MMHG

## 2022-04-06 DIAGNOSIS — C53.0 MALIGNANT NEOPLASM OF ENDOCERVIX (HCC): Primary | ICD-10-CM

## 2022-04-06 DIAGNOSIS — E87.6 HYPOKALEMIA: ICD-10-CM

## 2022-04-06 PROCEDURE — 96413 CHEMO IV INFUSION 1 HR: CPT

## 2022-04-06 RX ORDER — DIPHENHYDRAMINE HCL 25 MG
50 CAPSULE ORAL
COMMUNITY

## 2022-04-06 RX ORDER — SODIUM CHLORIDE 9 MG/ML
20 INJECTION, SOLUTION INTRAVENOUS ONCE
Status: COMPLETED | OUTPATIENT
Start: 2022-04-06 | End: 2022-04-06

## 2022-04-06 RX ADMIN — SODIUM CHLORIDE 200 MG: 9 INJECTION, SOLUTION INTRAVENOUS at 12:44

## 2022-04-06 RX ADMIN — SODIUM CHLORIDE 20 ML/HR: 9 INJECTION, SOLUTION INTRAVENOUS at 12:15

## 2022-04-25 ENCOUNTER — APPOINTMENT (OUTPATIENT)
Dept: LAB | Facility: HOSPITAL | Age: 67
End: 2022-04-25
Payer: MEDICARE

## 2022-04-25 ENCOUNTER — PATIENT OUTREACH (OUTPATIENT)
Dept: HEMATOLOGY ONCOLOGY | Facility: CLINIC | Age: 67
End: 2022-04-25

## 2022-04-25 DIAGNOSIS — C53.0 MALIGNANT NEOPLASM OF ENDOCERVIX (HCC): ICD-10-CM

## 2022-04-25 DIAGNOSIS — R93.89 ABNORMAL FINDINGS ON DIAGNOSTIC IMAGING OF OTHER SPECIFIED BODY STRUCTURES: ICD-10-CM

## 2022-04-25 LAB
AMYLASE SERPL-CCNC: 120 IU/L (ref 25–115)
LIPASE SERPL-CCNC: 94 U/L (ref 73–393)
TSH SERPL DL<=0.05 MIU/L-ACNC: 2.88 UIU/ML (ref 0.45–4.5)

## 2022-04-25 PROCEDURE — 82150 ASSAY OF AMYLASE: CPT

## 2022-04-25 PROCEDURE — 84443 ASSAY THYROID STIM HORMONE: CPT

## 2022-04-25 PROCEDURE — 83690 ASSAY OF LIPASE: CPT

## 2022-04-26 RX ORDER — SODIUM CHLORIDE 9 MG/ML
20 INJECTION, SOLUTION INTRAVENOUS ONCE
Status: CANCELLED | OUTPATIENT
Start: 2022-04-27

## 2022-04-27 ENCOUNTER — HOSPITAL ENCOUNTER (OUTPATIENT)
Dept: INFUSION CENTER | Facility: HOSPITAL | Age: 67
Discharge: HOME | End: 2022-04-27
Payer: MEDICARE

## 2022-04-27 VITALS
SYSTOLIC BLOOD PRESSURE: 140 MMHG | DIASTOLIC BLOOD PRESSURE: 62 MMHG | HEART RATE: 96 BPM | RESPIRATION RATE: 16 BRPM | TEMPERATURE: 97.8 F

## 2022-04-27 DIAGNOSIS — E87.6 HYPOKALEMIA: ICD-10-CM

## 2022-04-27 DIAGNOSIS — C53.0 MALIGNANT NEOPLASM OF ENDOCERVIX (HCC): Primary | ICD-10-CM

## 2022-04-27 PROCEDURE — 96413 CHEMO IV INFUSION 1 HR: CPT

## 2022-04-27 RX ORDER — SODIUM CHLORIDE 9 MG/ML
20 INJECTION, SOLUTION INTRAVENOUS ONCE
Status: COMPLETED | OUTPATIENT
Start: 2022-04-27 | End: 2022-04-27

## 2022-04-27 RX ADMIN — SODIUM CHLORIDE 20 ML/HR: 0.9 INJECTION, SOLUTION INTRAVENOUS at 09:50

## 2022-04-27 RX ADMIN — SODIUM CHLORIDE 200 MG: 9 INJECTION, SOLUTION INTRAVENOUS at 10:18

## 2022-04-28 ENCOUNTER — OFFICE VISIT (OUTPATIENT)
Dept: PALLIATIVE MEDICINE | Facility: CLINIC | Age: 67
End: 2022-04-28
Payer: MEDICARE

## 2022-04-28 VITALS
DIASTOLIC BLOOD PRESSURE: 68 MMHG | TEMPERATURE: 97.1 F | BODY MASS INDEX: 28.39 KG/M2 | HEART RATE: 93 BPM | WEIGHT: 140.65 LBS | OXYGEN SATURATION: 99 % | SYSTOLIC BLOOD PRESSURE: 120 MMHG

## 2022-04-28 DIAGNOSIS — F41.9 ANXIETY AND DEPRESSION: ICD-10-CM

## 2022-04-28 DIAGNOSIS — F33.9 DEPRESSION, RECURRENT (HCC): ICD-10-CM

## 2022-04-28 DIAGNOSIS — T45.1X5A CHEMOTHERAPY INDUCED NEUTROPENIA (HCC): Primary | ICD-10-CM

## 2022-04-28 DIAGNOSIS — F32.A ANXIETY AND DEPRESSION: ICD-10-CM

## 2022-04-28 DIAGNOSIS — G57.11 MERALGIA PARAESTHETICA, RIGHT: ICD-10-CM

## 2022-04-28 DIAGNOSIS — D70.1 CHEMOTHERAPY INDUCED NEUTROPENIA (HCC): Primary | ICD-10-CM

## 2022-04-28 DIAGNOSIS — J44.9 CHRONIC OBSTRUCTIVE PULMONARY DISEASE, UNSPECIFIED COPD TYPE (HCC): ICD-10-CM

## 2022-04-28 DIAGNOSIS — C53.0 MALIGNANT NEOPLASM OF ENDOCERVIX (HCC): ICD-10-CM

## 2022-04-28 PROCEDURE — 99213 OFFICE O/P EST LOW 20 MIN: CPT | Performed by: FAMILY MEDICINE

## 2022-04-28 RX ORDER — OXYCODONE HYDROCHLORIDE AND ACETAMINOPHEN 5; 325 MG/1; MG/1
1 TABLET ORAL EVERY 4 HOURS PRN
Qty: 150 TABLET | Refills: 0 | Status: SHIPPED | OUTPATIENT
Start: 2022-04-28 | End: 2022-06-09 | Stop reason: SDUPTHER

## 2022-04-28 RX ORDER — GABAPENTIN 100 MG/1
CAPSULE ORAL
Qty: 120 CAPSULE | Refills: 0 | Status: SHIPPED | OUTPATIENT
Start: 2022-04-28 | End: 2022-06-03

## 2022-04-28 NOTE — Clinical Note
Any chance you have resources for this lady? Does she qualify for any type of financial relief / félix care?
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
## 2022-05-02 ENCOUNTER — TELEPHONE (OUTPATIENT)
Dept: PALLIATIVE MEDICINE | Facility: CLINIC | Age: 67
End: 2022-05-02

## 2022-05-02 NOTE — TELEPHONE ENCOUNTER
Our [facility] is closed for a while temporarily and I did follow up with [facility] and they are going to be opening this referral for tomorrow 5/3/22

## 2022-05-05 LAB
DME PARACHUTE DELIVERY DATE REQUESTED: NORMAL
DME PARACHUTE ITEM DESCRIPTION: NORMAL
DME PARACHUTE ORDER STATUS: NORMAL
DME PARACHUTE SUPPLIER NAME: NORMAL
DME PARACHUTE SUPPLIER PHONE: NORMAL

## 2022-05-10 ENCOUNTER — DOCUMENTATION (OUTPATIENT)
Dept: HEMATOLOGY ONCOLOGY | Facility: CLINIC | Age: 67
End: 2022-05-10

## 2022-05-10 ENCOUNTER — OFFICE VISIT (OUTPATIENT)
Dept: GYNECOLOGIC ONCOLOGY | Facility: CLINIC | Age: 67
End: 2022-05-10
Payer: MEDICARE

## 2022-05-10 VITALS
TEMPERATURE: 97.8 F | HEART RATE: 112 BPM | BODY MASS INDEX: 28.3 KG/M2 | RESPIRATION RATE: 17 BRPM | SYSTOLIC BLOOD PRESSURE: 148 MMHG | HEIGHT: 59 IN | DIASTOLIC BLOOD PRESSURE: 92 MMHG | WEIGHT: 140.4 LBS | OXYGEN SATURATION: 94 %

## 2022-05-10 DIAGNOSIS — N13.1 HYDRONEPHROSIS WITH URETERAL STRICTURE, NOT ELSEWHERE CLASSIFIED: ICD-10-CM

## 2022-05-10 DIAGNOSIS — C53.0 MALIGNANT NEOPLASM OF ENDOCERVIX (HCC): Primary | ICD-10-CM

## 2022-05-10 PROCEDURE — 99214 OFFICE O/P EST MOD 30 MIN: CPT | Performed by: OBSTETRICS & GYNECOLOGY

## 2022-05-10 NOTE — ASSESSMENT & PLAN NOTE
Ureteral stent in place last exchange in April  Due for stent exchange with Dr Meño Richmond October

## 2022-05-10 NOTE — PROGRESS NOTES
recvd message from Dr Rayshawn Dominique regarding the pts [facility] care     Per notes pt has been approved for 100% [facility] care  effective 05/04/22-11/04/22  Called pt & went over the [facility] info & pt was very thankful

## 2022-05-10 NOTE — PROGRESS NOTES
Assessment/Plan:    Problem List Items Addressed This Visit        Genitourinary    Malignant neoplasm of endocervix [facility]) - Primary     Patient is on single agent pembrolizumab and has so far tolerated treatment well  Clinically, there is significant evidence of objective response as noted by decrease in size of palpable lymphadenopathy in neck and axilla  Her main concern at this time is financial and psychosocial   I will reach out to our counselors so we can optimize support on that front  Her pain is markedly improved and she continues to follow with palliative care  At this time, she has been cleared to receive cycle  of single agent pembrolizumab as lung cancer clinical and laboratory parameters remain adequate  Plan to obtain PET-CT after cycle 6  Relevant Orders    NM PET CT skull base to mid thigh    Hydronephrosis     Ureteral stent in place last exchange in April  Due for stent exchange with Dr Luiza Finley October  CHIEF COMPLAINT:   Pre chemotherapy visit, follow-up for advanced cervical cancer  Problem:  Cancer Staging  Malignant neoplasm of endocervix [facility])  Staging form: Cervix Uteri, AJCC 7th Edition  - Clinical stage from 3/29/2017: FIGO Stage IB2, calculated as Stage Unknown (T1b2, NX, M0) - Signed by Hever Thomas MD on 2/4/2021        Previous therapy:  Oncology History   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[address] )   12/2016 Initial Diagnosis    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 ([facility])       3/29/2017 -  Cancer Staged    Staging form: Cervix Uteri, AJCC 7th Edition  - Clinical stage from 3/29/2017: FIGO Stage IB2, calculated as Stage Unknown (T1b2, NX, M0) - Signed by Hever Thomas MD on 2/4/2021  Staged by: Managing physician  Specimen type: Excision  Histopathologic type: Squamous cell carcinoma, NOS  Histologic grade (G): G3  Lymph-vascular invasion (LVI): LVI present/identified, NOS  Residual tumor (R): R0 - None  Pelvic jace status: Negative  Pelvic jace method of assessment: PET  Para-aortic status: Negative  Para-aortic jace method of assessment: PET  Stage used in treatment planning: Yes  National guidelines used in treatment planning: Yes  Type of national guideline used in treatment planning: NCCN       3/29/2017 Surgery    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)  FInal path with incidental 5 cm grade 3 invasive cervical SCCA, +LVI, invasion 9/10 mm cervical stroma, no lymphadenectomy, margins negative ([facility])  2/19/2021 Progression    A  Lymph Node, Left axillary, Biopsy:  - Metastatic squamous cell carcinoma (p16 positive) involving lymph node, compatible with patient's cervical primary  3/2/2021 -  Chemotherapy    Carboplatin AUC 5, Taxol 135 milligram/m2 and Avastin at 15 milligram/kilogram all to be administer on day 1 of a 21 day cycle     7/7/2021 - 2/2/2022 Chemotherapy    bevacizumab (AVASTIN) IVPB, 910 mg, Intravenous, Once, 11 of 16 cycles  Administration: 900 mg (7/7/2021), 900 mg (7/30/2021), 900 mg (8/18/2021), 900 mg (9/8/2021), 900 mg (9/29/2021), 900 mg (10/20/2021), 900 mg (11/10/2021), 900 mg (12/1/2021), 900 mg (12/22/2021), 900 mg (1/12/2022), 900 mg (2/2/2022)     2/22/2022 -  Chemotherapy    pembrolizumab (KEYTRUDA) IVPB, 200 mg, Intravenous, Once, 4 of 6 cycles  Administration: 200 mg (2/22/2022), 200 mg (4/6/2022), 200 mg (4/27/2022), 200 mg (3/16/2022)     2/23/2022 - 2/23/2022 Chemotherapy    pembrolizumab (KEYTRUDA) IVPB, 200 mg, Intravenous, Once, 0 of 6 cycles           Patient ID: Irene Ashraf is a 79 y o  female  HPI  Patient with recurrent metastatic cervical cancer  She is aware of incurable disease  She is receiving now palliative single agent pembrolizumab she is tolerating well  She has had a significant clinical response  She also has obstructive uropathy with stent in place  Significant cancer associated pain, anxiety, etcetera    Follows with palliative care   She is very stressed now but her psychosocial circumstances and concern about financial situation  She has been referred to collections and is expecting response regarding charted care  However, she was told this process could take up to 4 months of which she does not feel she can await  Denies vaginal bleeding, drainage or discharge  Occasional intermittent maturity related to stent  Pain markedly improved  Palpable cervical and axillary lymphadenopathy decreased  Denies pelvic or abdominal pain  Normal bowel function with constipation managed with over-the-counter regimen  The following portions of the patient's history were reviewed and updated as appropriate: allergies, current medications, past family history, past medical history, past social history, past surgical history and problem list     Review of Systems  As per HPI  Twelve point review of systems otherwise unremarkable  Current Outpatient Medications   Medication Sig Dispense Refill    Diclofenac Sodium (VOLTAREN) 1 % Apply 2 g topically 4 (four) times a day 350 g 3    diphenhydrAMINE (BENADRYL) 25 mg capsule Take 50 mg by mouth daily at bedtime      famotidine (PEPCID) 20 mg tablet Take 20 mg by mouth daily        gabapentin (Neurontin) 100 mg capsule Take 2 capsules (200 mg total) by mouth daily AND 2 capsules (200 mg total) daily at bedtime  To reduce nerve pain  120 capsule 0    lidocaine-prilocaine (EMLA) cream APPLY TOPICALLY AS NEEDED FOR MILD PAIN 30 g 0    Multiple Vitamins-Minerals (Alive Womens Gummy) CHEW Chew      naloxegol oxalate (Movantik) 25 MG tablet Take 1 tablet (25 mg total) by mouth in the morning 30 tablet 2    NON FORMULARY Start with a pea size amount to entire face at night, OR directions per MD  Use eNurse software on Musely Ramón to guide you daily        oxyCODONE-acetaminophen (Percocet) 5-325 mg per tablet Take 1 tablet by mouth every 4 (four) hours as needed (cancer pain; max of 5 tabs a day) Max Daily Amount: 5 tablets 150 tablet 0    polyethylene glycol (MIRALAX) 17 g packet Take 17 g by mouth daily To prevent and reduce constipation from pain meds  Use with Movantik  Use instead of psyllium  30 each 0    tiotropium (Spiriva Respimat) 1 25 MCG/ACT AERS inhaler Inhale 2 puffs daily 4 g 5    tiZANidine (ZANAFLEX) 4 mg tablet Take 1 tablet (4 mg total) by mouth daily at bedtime 30 tablet 3    senna (SENOKOT) 8 6 MG tablet Take 1 tablet (8 6 mg total) by mouth 2 (two) times a day Take to prevent constipation from pain meds  Hold one dose for loose stool (Patient not taking: Reported on 5/10/2022 ) 60 tablet 11     No current facility-administered medications for this visit  Objective:    Blood pressure 148/92, pulse (!) 112, temperature 97 8 °F (36 6 °C), temperature source Temporal, resp  rate 17, height 4' 11 02" (1 499 m), weight 63 7 kg (140 lb 6 4 oz), SpO2 94 %, not currently breastfeeding  Body mass index is 28 34 kg/m²  Body surface area is 1 59 meters squared  Physical Exam  Vitals reviewed  Constitutional:       Appearance: Normal appearance  She is not ill-appearing or toxic-appearing  Eyes:      General: No scleral icterus  Right eye: No discharge  Left eye: No discharge  Conjunctiva/sclera: Conjunctivae normal    Cardiovascular:      Rate and Rhythm: Normal rate and regular rhythm  Heart sounds: Normal heart sounds  No murmur heard  Pulmonary:      Effort: Pulmonary effort is normal  No respiratory distress  Breath sounds: Normal breath sounds  No stridor  No wheezing  Abdominal:      General: Abdomen is flat  There is no distension  Palpations: Abdomen is soft  There is no mass  Tenderness: There is no abdominal tenderness  There is no guarding  Genitourinary:     Comments: Deferred  Musculoskeletal:      Right lower leg: No edema  Left lower leg: No edema  Lymphadenopathy:      Cervical: No cervical adenopathy  Neurological:      Mental Status: She is alert           No results found for:   Lab Results   Component Value Date     06/11/2018    K 3 7 04/25/2022     04/25/2022    CO2 25 04/25/2022    ANIONGAP 13 0 06/11/2018    BUN 29 (H) 04/25/2022    CREATININE 1 05 04/25/2022    GLUF 135 (H) 04/04/2022    CALCIUM 9 4 04/25/2022    CORRECTEDCA 10 2 (H) 11/30/2021    AST 24 04/25/2022    ALT 43 04/25/2022    ALKPHOS 137 (H) 04/25/2022    PROT 7 0 06/11/2018    BILITOT 0 3 06/11/2018    EGFR 55 04/25/2022     Lab Results   Component Value Date    WBC 5 07 04/25/2022    HGB 12 6 04/25/2022    HCT 39 8 04/25/2022    MCV 96 04/25/2022     04/25/2022     Lab Results   Component Value Date    NEUTROABS 3 04 04/25/2022     Adán Cerna MD, [place], [facility]  5/10/2022  9:52 AM

## 2022-05-10 NOTE — ASSESSMENT & PLAN NOTE
Patient is on single agent pembrolizumab and has so far tolerated treatment well  Clinically, there is significant evidence of objective response as noted by decrease in size of palpable lymphadenopathy in neck and axilla  Her main concern at this time is financial and psychosocial   I will reach out to our counselors so we can optimize support on that front  Her pain is markedly improved and she continues to follow with palliative care  At this time, she has been cleared to receive cycle  of single agent pembrolizumab as lung cancer clinical and laboratory parameters remain adequate  Plan to obtain PET-CT after cycle 6

## 2022-05-10 NOTE — PATIENT INSTRUCTIONS
Continue treatment per calendar  Keep follow-up appointment for PET-CT  Our social workers/financial counselors will reach out to offer support

## 2022-05-10 NOTE — LETTER
May 10, 2022     Renetta Myers [address]  [facility] [address]    Patient: Michelle Benítez   YOB: 1955   Date of Visit: 5/10/2022       Dear Dr Luis Gunn:    Thank you for referring Michelle Benítez to me for evaluation  Below are my notes for this consultation  If you have questions, please do not hesitate to call me  I look forward to following your patient along with you  Sincerely,        Adán Cerna MD        CC: MD Tomer Ramirez MD Ricki Bitters, MD  5/10/2022  9:52 AM  Sign when Signing Visit  Assessment/Plan:    Problem List Items Addressed This Visit        Genitourinary    Malignant neoplasm of endocervix [facility]) - Primary     Patient is on single agent pembrolizumab and has so far tolerated treatment well  Clinically, there is significant evidence of objective response as noted by decrease in size of palpable lymphadenopathy in neck and axilla  Her main concern at this time is financial and psychosocial   I will reach out to our counselors so we can optimize support on that front  Her pain is markedly improved and she continues to follow with palliative care  At this time, she has been cleared to receive cycle  of single agent pembrolizumab as lung cancer clinical and laboratory parameters remain adequate  Plan to obtain PET-CT after cycle 6  Relevant Orders    NM PET CT skull base to mid thigh    Hydronephrosis     Ureteral stent in place last exchange in April  Due for stent exchange with Dr Humble Ramirez October  CHIEF COMPLAINT:   Pre chemotherapy visit, follow-up for advanced cervical cancer      Problem:  Cancer Staging  Malignant neoplasm of endocervix [facility])  Staging form: Cervix Uteri, AJCC 7th Edition  - Clinical stage from 3/29/2017: FIGO Stage IB2, calculated as Stage Unknown (T1b2, NX, M0) - Signed by Adán Cerna MD on 2/4/2021        Previous therapy:  Oncology History   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[facility] [address] )   12/2016 Initial Diagnosis    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 [facility])  3/29/2017 -  Cancer Staged    Staging form: Cervix Uteri, AJCC 7th Edition  - Clinical stage from 3/29/2017: FIGO Stage IB2, calculated as Stage Unknown (T1b2, NX, M0) - Signed by Carlie Bonilla MD on 2/4/2021  Staged by: Managing physician  Specimen type: Excision  Histopathologic type: Squamous cell carcinoma, NOS  Histologic grade (G): G3  Lymph-vascular invasion (LVI): LVI present/identified, NOS  Residual tumor (R): R0 - None  Pelvic jace status: Negative  Pelvic jace method of assessment: PET  Para-aortic status: Negative  Para-aortic jace method of assessment: PET  Stage used in treatment planning: Yes  National guidelines used in treatment planning: Yes  Type of national guideline used in treatment planning: NCCN       3/29/2017 Surgery    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)  FInal path with incidental 5 cm grade 3 invasive cervical SCCA, +LVI, invasion 9/10 mm cervical stroma, no lymphadenectomy, margins negative ([facility])  2/19/2021 Progression    A  Lymph Node, Left axillary, Biopsy:  - Metastatic squamous cell carcinoma (p16 positive) involving lymph node, compatible with patient's cervical primary         3/2/2021 -  Chemotherapy    Carboplatin AUC 5, Taxol 135 milligram/m2 and Avastin at 15 milligram/kilogram all to be administer on day 1 of a 21 day cycle     7/7/2021 - 2/2/2022 Chemotherapy    bevacizumab (AVASTIN) IVPB, 910 mg, Intravenous, Once, 11 of 16 cycles  Administration: 900 mg (7/7/2021), 900 mg (7/30/2021), 900 mg (8/18/2021), 900 mg (9/8/2021), 900 mg (9/29/2021), 900 mg (10/20/2021), 900 mg (11/10/2021), 900 mg (12/1/2021), 900 mg (12/22/2021), 900 mg (1/12/2022), 900 mg (2/2/2022)     2/22/2022 -  Chemotherapy    pembrolizumab (KEYTRUDA) IVPB, 200 mg, Intravenous, Once, 4 of 6 cycles  Administration: 200 mg (2/22/2022), 200 mg (4/6/2022), 200 mg (4/27/2022), 200 mg (3/16/2022)     2/23/2022 - 2/23/2022 Chemotherapy    pembrolizumab (KEYTRUDA) IVPB, 200 mg, Intravenous, Once, 0 of 6 cycles           Patient ID: Sandra Baldwin is a 79 y o  female  HPI  Patient with recurrent metastatic cervical cancer  She is aware of incurable disease  She is receiving now palliative single agent pembrolizumab she is tolerating well  She has had a significant clinical response  She also has obstructive uropathy with stent in place  Significant cancer associated pain, anxiety, etcetera  Follows with palliative care  She is very stressed now but her psychosocial circumstances and concern about financial situation  She has been referred to [facility] and is expecting response regarding charted care  However, she was told this process could take up to 4 months of which she does not feel she can await  Denies vaginal bleeding, drainage or discharge  Occasional intermittent maturity related to stent  Pain markedly improved  Palpable cervical and axillary lymphadenopathy decreased  Denies pelvic or abdominal pain  Normal bowel function with constipation managed with over-the-counter regimen  The following portions of the patient's history were reviewed and updated as appropriate: allergies, current medications, past family history, past medical history, past social history, past surgical history and problem list     Review of Systems  As per [facility]  Twelve point review of systems otherwise unremarkable  Current Outpatient Medications   Medication Sig Dispense Refill    Diclofenac Sodium (VOLTAREN) 1 % Apply 2 g topically 4 (four) times a day 350 g 3    diphenhydrAMINE (BENADRYL) 25 mg capsule Take 50 mg by mouth daily at bedtime      famotidine (PEPCID) 20 mg tablet Take 20 mg by mouth daily        gabapentin (Neurontin) 100 mg capsule Take 2 capsules (200 mg total) by mouth daily AND 2 capsules (200 mg total) daily at bedtime   To reduce nerve pain  120 capsule 0    lidocaine-prilocaine (EMLA) cream APPLY TOPICALLY AS NEEDED FOR MILD PAIN 30 g 0    Multiple Vitamins-Minerals (Alive Womens Gummy) CHEW Chew      naloxegol oxalate (Movantik) 25 MG tablet Take 1 tablet (25 mg total) by mouth in the morning 30 tablet 2    NON FORMULARY Start with a pea size amount to entire face at night, OR directions per MD  Use eNurse software on Musely Ramón to guide you daily   oxyCODONE-acetaminophen (Percocet) 5-325 mg per tablet Take 1 tablet by mouth every 4 (four) hours as needed (cancer pain; max of 5 tabs a day) Max Daily Amount: 5 tablets 150 tablet 0    polyethylene glycol (MIRALAX) 17 g packet Take 17 g by mouth daily To prevent and reduce constipation from pain meds  Use with Movantik  Use instead of psyllium  30 each 0    tiotropium (Spiriva Respimat) 1 25 MCG/ACT AERS inhaler Inhale 2 puffs daily 4 g 5    tiZANidine (ZANAFLEX) 4 mg tablet Take 1 tablet (4 mg total) by mouth daily at bedtime 30 tablet 3    senna (SENOKOT) 8 6 MG tablet Take 1 tablet (8 6 mg total) by mouth 2 (two) times a day Take to prevent constipation from pain meds  Hold one dose for loose stool (Patient not taking: Reported on 5/10/2022 ) 60 tablet 11     No current facility-administered medications for this visit  Objective:    Blood pressure 148/92, pulse (!) 112, temperature 97 8 °F (36 6 °C), temperature source Temporal, resp  rate 17, height 4' 11 02" (1 499 m), weight 63 7 kg (140 lb 6 4 oz), SpO2 94 %, not currently breastfeeding  Body mass index is 28 34 kg/m²  Body surface area is 1 59 meters squared  Physical Exam  Vitals reviewed  Constitutional:       Appearance: Normal appearance  She is not ill-appearing or toxic-appearing  Eyes:      General: No scleral icterus  Right eye: No discharge  Left eye: No discharge  Conjunctiva/sclera: Conjunctivae normal    Cardiovascular:      Rate and Rhythm: Normal rate and regular rhythm  Heart sounds: Normal heart sounds  No murmur heard  Pulmonary:      Effort: Pulmonary effort is normal  No respiratory distress  Breath sounds: Normal breath sounds  No stridor  No wheezing  Abdominal:      General: Abdomen is flat  There is no distension  Palpations: Abdomen is soft  There is no mass  Tenderness: There is no abdominal tenderness  There is no guarding  Genitourinary:     Comments: Deferred  Musculoskeletal:      Right lower leg: No edema  Left lower leg: No edema  Lymphadenopathy:      Cervical: No cervical adenopathy  Neurological:      Mental Status: She is alert           No results found for:   Lab Results   Component Value Date     06/11/2018    K 3 7 04/25/2022     04/25/2022    CO2 25 04/25/2022    ANIONGAP 13 0 06/11/2018    BUN 29 (H) 04/25/2022    CREATININE 1 05 04/25/2022    GLUF 135 (H) 04/04/2022    CALCIUM 9 4 04/25/2022    CORRECTEDCA 10 2 (H) 11/30/2021    AST 24 04/25/2022    ALT 43 04/25/2022    ALKPHOS 137 (H) 04/25/2022    PROT 7 0 06/11/2018    BILITOT 0 3 06/11/2018    EGFR 55 04/25/2022     Lab Results   Component Value Date    WBC 5 07 04/25/2022    HGB 12 6 04/25/2022    HCT 39 8 04/25/2022    MCV 96 04/25/2022     04/25/2022     Lab Results   Component Value Date    NEUTROABS 3 04 04/25/2022     Annel Paul MD, [address]  5/10/2022  9:52 AM

## 2022-05-12 ENCOUNTER — ANESTHESIA EVENT (OUTPATIENT)
Dept: ANESTHESIOLOGY | Facility: HOSPITAL | Age: 67
End: 2022-05-12

## 2022-05-12 ENCOUNTER — ANESTHESIA (OUTPATIENT)
Dept: ANESTHESIOLOGY | Facility: HOSPITAL | Age: 67
End: 2022-05-12

## 2022-05-12 NOTE — ANESTHESIA PREPROCEDURE EVALUATION
Procedure:  PRE-OP ONLY  EGD: for epigastric pain and peptic ulcer rule out    TTE: EF 65%, RV wnl, PASP 35     COPD Spiriva   Relevant Problems   CARDIO   (+) Dyspnea on exertion   (+) Pain in thoracic spine      /RENAL   (+) Hydronephrosis      GYN   (+) 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HCC)      HEMATOLOGY   (+) Thrombocytopenia (HCC)      MUSCULOSKELETAL   (+) Pain in thoracic spine   (+) Primary generalized (osteo)arthritis      NEURO/PSYCH   (+) Anxiety and depression   (+) Continuous opioid dependence (HCC)   (+) Depression, recurrent (HCC)      PULMONARY   (+) Chronic obstructive pulmonary disease, unspecified COPD type (HCC)   (+) Dyspnea on exertion             Anesthesia Plan  ASA Score- 3     Anesthesia Type- IV sedation with anesthesia with ASA Monitors  Additional Monitors:   Airway Plan:           Plan Factors-    Chart reviewed  EKG reviewed  Existing labs reviewed  Patient summary reviewed              Induction-     Postoperative Plan-     Informed Consent-

## 2022-05-13 ENCOUNTER — ANESTHESIA EVENT (OUTPATIENT)
Dept: PERIOP | Facility: HOSPITAL | Age: 67
End: 2022-05-13

## 2022-05-13 ENCOUNTER — HOSPITAL ENCOUNTER (OUTPATIENT)
Dept: PERIOP | Facility: HOSPITAL | Age: 67
Setting detail: OUTPATIENT SURGERY
Discharge: HOME | End: 2022-05-13
Attending: INTERNAL MEDICINE | Admitting: INTERNAL MEDICINE
Payer: MEDICARE

## 2022-05-13 ENCOUNTER — ANESTHESIA (OUTPATIENT)
Dept: PERIOP | Facility: HOSPITAL | Age: 67
End: 2022-05-13

## 2022-05-13 VITALS
DIASTOLIC BLOOD PRESSURE: 74 MMHG | OXYGEN SATURATION: 98 % | TEMPERATURE: 97.7 F | SYSTOLIC BLOOD PRESSURE: 120 MMHG | HEART RATE: 83 BPM | RESPIRATION RATE: 20 BRPM | HEIGHT: 59 IN | WEIGHT: 140.43 LBS | BODY MASS INDEX: 28.31 KG/M2

## 2022-05-13 DIAGNOSIS — R10.13 EPIGASTRIC PAIN: ICD-10-CM

## 2022-05-13 PROCEDURE — 88305 TISSUE EXAM BY PATHOLOGIST: CPT | Performed by: SPECIALIST

## 2022-05-13 PROCEDURE — 43239 EGD BIOPSY SINGLE/MULTIPLE: CPT | Performed by: INTERNAL MEDICINE

## 2022-05-13 RX ORDER — PROPOFOL 10 MG/ML
INJECTION, EMULSION INTRAVENOUS CONTINUOUS PRN
Status: DISCONTINUED | OUTPATIENT
Start: 2022-05-13 | End: 2022-05-13

## 2022-05-13 RX ORDER — ONDANSETRON 2 MG/ML
4 INJECTION INTRAMUSCULAR; INTRAVENOUS ONCE AS NEEDED
Status: DISCONTINUED | OUTPATIENT
Start: 2022-05-13 | End: 2022-05-17 | Stop reason: HOSPADM

## 2022-05-13 RX ORDER — OMEPRAZOLE 40 MG/1
40 CAPSULE, DELAYED RELEASE ORAL DAILY
Qty: 30 CAPSULE | Refills: 3 | Status: SHIPPED | OUTPATIENT
Start: 2022-05-13

## 2022-05-13 RX ORDER — FENTANYL CITRATE/PF 50 MCG/ML
25 SYRINGE (ML) INJECTION
Status: DISCONTINUED | OUTPATIENT
Start: 2022-05-13 | End: 2022-05-17 | Stop reason: HOSPADM

## 2022-05-13 RX ORDER — PROPOFOL 10 MG/ML
INJECTION, EMULSION INTRAVENOUS AS NEEDED
Status: DISCONTINUED | OUTPATIENT
Start: 2022-05-13 | End: 2022-05-13

## 2022-05-13 RX ORDER — ALBUTEROL SULFATE 2.5 MG/3ML
2.5 SOLUTION RESPIRATORY (INHALATION) ONCE AS NEEDED
Status: DISCONTINUED | OUTPATIENT
Start: 2022-05-13 | End: 2022-05-17 | Stop reason: HOSPADM

## 2022-05-13 RX ORDER — SODIUM CHLORIDE, SODIUM LACTATE, POTASSIUM CHLORIDE, CALCIUM CHLORIDE 600; 310; 30; 20 MG/100ML; MG/100ML; MG/100ML; MG/100ML
INJECTION, SOLUTION INTRAVENOUS CONTINUOUS PRN
Status: DISCONTINUED | OUTPATIENT
Start: 2022-05-13 | End: 2022-05-13

## 2022-05-13 RX ORDER — LIDOCAINE HYDROCHLORIDE 20 MG/ML
INJECTION, SOLUTION EPIDURAL; INFILTRATION; INTRACAUDAL; PERINEURAL AS NEEDED
Status: DISCONTINUED | OUTPATIENT
Start: 2022-05-13 | End: 2022-05-13

## 2022-05-13 RX ADMIN — PROPOFOL 120 MG: 10 INJECTION, EMULSION INTRAVENOUS at 12:01

## 2022-05-13 RX ADMIN — PROPOFOL 100 MCG/KG/MIN: 10 INJECTION, EMULSION INTRAVENOUS at 12:01

## 2022-05-13 RX ADMIN — SODIUM CHLORIDE, SODIUM LACTATE, POTASSIUM CHLORIDE, AND CALCIUM CHLORIDE: .6; .31; .03; .02 INJECTION, SOLUTION INTRAVENOUS at 11:57

## 2022-05-13 RX ADMIN — LIDOCAINE HYDROCHLORIDE 100 MG: 20 INJECTION, SOLUTION EPIDURAL; INFILTRATION; INTRACAUDAL at 12:01

## 2022-05-13 NOTE — ANESTHESIA POSTPROCEDURE EVALUATION
Post-Op Assessment Note    CV Status:  Stable  Pain Score: 0    Pain management: adequate     Mental Status:  Sleepy and arousable   Hydration Status:  Euvolemic   PONV Controlled:  Controlled   Airway Patency:  Patent      Post Op Vitals Reviewed: Yes      Staff: Anesthesiologist, with CRNAs         No complications documented      BP   112/59   Temp 97 7   Pulse 94   Resp 12   SpO2 100

## 2022-05-13 NOTE — H&P
History and Physical - [facility]  Tutu Chen 79 y o  female MRN: [id number]                  HPI: Tutu Chen is a 79y o  year old female who presents for abdominal pain      REVIEW OF SYSTEMS: Per the HPI, and otherwise unremarkable      Historical Information   Past Medical History:   Diagnosis Date    Acne     Anxiety     Anxiety and depression 10/02/2019    Arthritis     Breathing difficulty     pt does not recall any breathing problem but did have neck pain after procedure"    Cancer ([address] )     cervical/ and "lymph nodes" (GYN)    Cervical cancer (HCC)     Chronic pain disorder     Neck pain    Colon polyp     GERD (gastroesophageal reflux disease)     not now    History of radiation therapy     History of shingles     Irritable bowel syndrome     Joint pain following chemotherapy     and stiffness    Liver disease     "cyst on liver"    Lung nodule     "left"    Muscle weakness     after chemo"    Neck pain     "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"has had neck pain since childhood"    Port-A-Cath in place     right chest//for chemotherapy q 3 weeks and also receives IV hydration    Shortness of breath     "started with cancer, when too much exertion like cleaning/guera doing stairs"    Thumb injury 10/2019    right    Thyroid nodule     Tinnitus of left ear 02/19/2020    Urinary problem     Wrist arthritis 03/17/2020     Past Surgical History:   Procedure Laterality Date    COLONOSCOPY      CYSTOSCOPY W/ RETROGRADES Right 6/3/2021    Procedure: Thersa Gist W/RETROGRADE;  Surgeon: Tamanna Bass MD;  Location: [facility];  Service: Urology    CYSTOSCOPY W/ RETROGRADES Right 10/18/2021    Procedure: CYSTOSCOPY WITH ureteral stent exchange;  Surgeon: Dirk Nava MD;  Location: [facility];  Service: Urology    FL RETROGRADE PYELOGRAM  2/11/2021    FL RETROGRADE PYELOGRAM  6/3/2021    FOOT SURGERY      HYSTERECTOMY      IR BIOPSY LYMPH NODE  2/19/2021    IR PORT PLACEMENT  3/9/2021    KS CYSTOURETHROSCOPY,URETER CATHETER Right 2021    Procedure: CYSTOSCOPY RETROGRADE PYELOGRAM WITH INSERTION STENT URETERAL;  Surgeon: Ginger Goyal MD;  Location:  MAIN OR;  Service: Urology    KS CYSTOURETHROSCOPY,URETER CATHETER Right 2022    Procedure: CYSTOSCOPY  WITH INSERTION STENT URETERAL Right;  Surgeon: Ginger Goyal MD;  Location: [facility];  Service: Urology    KS EXCIS INTERDIGITAL [place] Left 2018    Procedure: FOOT NEUROMA EXCISION;  Surgeon: Jac Lombardi DPM;  [facility] MAIN OR;  Service: Podiatry    [address] Right 6/3/2021    Procedure: EXCHANGE STENT;  Surgeon: Rocio Yu MD;  Location: [facility];  Service: Urology    TUBAL LIGATION       Social History   Social History     Substance and Sexual Activity   Alcohol Use Not Currently     Social History     Substance and Sexual Activity   Drug Use No    Types: Marijuana    Comment: As teenager     Social History     Tobacco Use   Smoking Status Former Smoker    Packs/day: 3 00    Years: 32 00    Pack years: 96 00    Types: Cigarettes    Start date:     Quit date: 2003    Years since quittin 8   Smokeless Tobacco Never Used     Family History   Problem Relation Age of Onset   Junius Rowe Stomach cancer Mother     Alcohol abuse Father     No Known Problems Sister     No Known Problems Daughter     No Known Problems Maternal Grandmother     No Known Problems Maternal Grandfather     No Known Problems Paternal Grandmother     No Known Problems Paternal Grandfather     No Known Problems Sister     No Known Problems Sister     No Known Problems Sister     No Known Problems Sister     No Known Problems Daughter     No Known Problems Daughter     No Known Problems Maternal Aunt     No Known Problems Maternal Aunt     No Known Problems Paternal Aunt     No Known Problems Paternal Aunt     No Known Problems Paternal Aunt     No Known Problems Paternal Aunt     No Known Problems Paternal Aunt     No Known Problems Paternal Aunt     No Known Problems Paternal Aunt     No Known Problems Paternal Aunt     No Known Problems Paternal Aunt     No Known Problems Paternal Aunt        Meds/Allergies       Current Outpatient Medications:     diphenhydrAMINE (BENADRYL) 25 mg capsule    famotidine (PEPCID) 20 mg tablet    gabapentin (Neurontin) 100 mg capsule    Multiple Vitamins-Minerals (Alive Womens Gummy) CHEW    naloxegol oxalate (Movantik) 25 MG tablet    tiotropium (Spiriva Respimat) 1 25 MCG/ACT AERS inhaler    tiZANidine (ZANAFLEX) 4 mg tablet    Diclofenac Sodium (VOLTAREN) 1 %    lidocaine-prilocaine (EMLA) cream    NON FORMULARY    oxyCODONE-acetaminophen (Percocet) 5-325 mg per tablet    polyethylene glycol (MIRALAX) 17 g packet    senna (SENOKOT) 8 6 MG tablet    Allergies   Allergen Reactions    Medical Tape Itching     And redness from adhesive    And "skin raised up" especially longer on skin    Aspirin GI Intolerance    Codeine GI Intolerance    Dust Mite Extract     Pollen Extract Sneezing       Objective     LMP  (LMP Unknown)       PHYSICAL EXAM    Gen: NAD  Head: NCAT  CV: RRR  CHEST: Clear  ABD: soft, NT/ND  EXT: no edema      ASSESSMENT/PLAN:  This is a 79y o  year old female here for endoscopy, and she is stable and optimized for her procedure

## 2022-05-13 NOTE — ANESTHESIA PREPROCEDURE EVALUATION
Procedure:  EGD  EGD: for epigastric pain and peptic ulcer rule out    TTE: EF 65%, RV wnl, PASP 35      COPD Spiriva takes in afternoon no recent exacerbations  Mild SOB with exertion but at baseline  Port present with chemo for cervical cancer    Denies the following: CP/SOB with exertion, KATLYN, stroke/TIA, seizure    Relevant Problems   CARDIO   (+) Dyspnea on exertion   (+) Pain in thoracic spine      /RENAL   (+) Hydronephrosis      GYN   (+) 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HCC)      HEMATOLOGY   (+) Thrombocytopenia (HCC)      MUSCULOSKELETAL   (+) Pain in thoracic spine   (+) Primary generalized (osteo)arthritis      NEURO/PSYCH   (+) Anxiety and depression   (+) Continuous opioid dependence (HCC)   (+) Depression, recurrent (HCC)      PULMONARY   (+) Chronic obstructive pulmonary disease, unspecified COPD type (HCC)   (+) Dyspnea on exertion        Physical Exam    Airway    Mallampati score: II  TM Distance: >3 FB  Neck ROM: full     Dental       Cardiovascular      Pulmonary      Other Findings        Anesthesia Plan  ASA Score- 3     Anesthesia Type- IV sedation with anesthesia with ASA Monitors  Additional Monitors:   Airway Plan:           Plan Factors-    Chart reviewed  EKG reviewed  Existing labs reviewed  Patient summary reviewed  Patient is not a current smoker  Obstructive sleep apnea risk education given perioperatively  Induction-     Postoperative Plan-     Informed Consent- Anesthetic plan and risks discussed with patient  I personally reviewed this patient with the CRNA  Discussed and agreed on the Anesthesia Plan with the CRNA  Larisa Elaine

## 2022-05-16 ENCOUNTER — APPOINTMENT (OUTPATIENT)
Dept: LAB | Facility: HOSPITAL | Age: 67
End: 2022-05-16
Payer: MEDICARE

## 2022-05-16 DIAGNOSIS — C53.0 MALIGNANT NEOPLASM OF ENDOCERVIX (HCC): ICD-10-CM

## 2022-05-16 DIAGNOSIS — R93.89 ABNORMAL FINDINGS ON DIAGNOSTIC IMAGING OF OTHER SPECIFIED BODY STRUCTURES: ICD-10-CM

## 2022-05-16 LAB
ALBUMIN SERPL BCP-MCNC: 3.7 G/DL (ref 3.5–5)
ALP SERPL-CCNC: 149 U/L (ref 46–116)
ALT SERPL W P-5'-P-CCNC: 24 U/L (ref 12–78)
AMYLASE SERPL-CCNC: 128 IU/L (ref 25–115)
ANION GAP SERPL CALCULATED.3IONS-SCNC: 10 MMOL/L (ref 4–13)
AST SERPL W P-5'-P-CCNC: 18 U/L (ref 5–45)
BASOPHILS # BLD AUTO: 0.03 THOUSANDS/ΜL (ref 0–0.1)
BASOPHILS NFR BLD AUTO: 1 % (ref 0–1)
BILIRUB SERPL-MCNC: 0.37 MG/DL (ref 0.2–1)
BUN SERPL-MCNC: 25 MG/DL (ref 5–25)
CALCIUM SERPL-MCNC: 9.5 MG/DL (ref 8.3–10.1)
CHLORIDE SERPL-SCNC: 102 MMOL/L (ref 100–108)
CO2 SERPL-SCNC: 26 MMOL/L (ref 21–32)
CREAT SERPL-MCNC: 1.08 MG/DL (ref 0.6–1.3)
EOSINOPHIL # BLD AUTO: 0.18 THOUSAND/ΜL (ref 0–0.61)
EOSINOPHIL NFR BLD AUTO: 3 % (ref 0–6)
ERYTHROCYTE [DISTWIDTH] IN BLOOD BY AUTOMATED COUNT: 14.6 % (ref 11.6–15.1)
GFR SERPL CREATININE-BSD FRML MDRD: 53 ML/MIN/1.73SQ M
GLUCOSE P FAST SERPL-MCNC: 95 MG/DL (ref 65–99)
HCT VFR BLD AUTO: 39.9 % (ref 34.8–46.1)
HGB BLD-MCNC: 12.6 G/DL (ref 11.5–15.4)
IMM GRANULOCYTES # BLD AUTO: 0.01 THOUSAND/UL (ref 0–0.2)
IMM GRANULOCYTES NFR BLD AUTO: 0 % (ref 0–2)
LIPASE SERPL-CCNC: 110 U/L (ref 73–393)
LYMPHOCYTES # BLD AUTO: 1.48 THOUSANDS/ΜL (ref 0.6–4.47)
LYMPHOCYTES NFR BLD AUTO: 26 % (ref 14–44)
MAGNESIUM SERPL-MCNC: 2.1 MG/DL (ref 1.6–2.6)
MCH RBC QN AUTO: 29.4 PG (ref 26.8–34.3)
MCHC RBC AUTO-ENTMCNC: 31.6 G/DL (ref 31.4–37.4)
MCV RBC AUTO: 93 FL (ref 82–98)
MONOCYTES # BLD AUTO: 0.66 THOUSAND/ΜL (ref 0.17–1.22)
MONOCYTES NFR BLD AUTO: 12 % (ref 4–12)
NEUTROPHILS # BLD AUTO: 3.33 THOUSANDS/ΜL (ref 1.85–7.62)
NEUTS SEG NFR BLD AUTO: 58 % (ref 43–75)
NRBC BLD AUTO-RTO: 0 /100 WBCS
PLATELET # BLD AUTO: 195 THOUSANDS/UL (ref 149–390)
PMV BLD AUTO: 9.4 FL (ref 8.9–12.7)
POTASSIUM SERPL-SCNC: 3.9 MMOL/L (ref 3.5–5.3)
PROT SERPL-MCNC: 8 G/DL (ref 6.4–8.2)
RBC # BLD AUTO: 4.29 MILLION/UL (ref 3.81–5.12)
SODIUM SERPL-SCNC: 138 MMOL/L (ref 136–145)
TSH SERPL DL<=0.05 MIU/L-ACNC: 1.89 UIU/ML (ref 0.45–4.5)
WBC # BLD AUTO: 5.69 THOUSAND/UL (ref 4.31–10.16)

## 2022-05-16 PROCEDURE — 82150 ASSAY OF AMYLASE: CPT

## 2022-05-16 PROCEDURE — 85025 COMPLETE CBC W/AUTO DIFF WBC: CPT

## 2022-05-16 PROCEDURE — 83735 ASSAY OF MAGNESIUM: CPT

## 2022-05-16 PROCEDURE — 80053 COMPREHEN METABOLIC PANEL: CPT

## 2022-05-16 PROCEDURE — 83690 ASSAY OF LIPASE: CPT

## 2022-05-16 PROCEDURE — 36415 COLL VENOUS BLD VENIPUNCTURE: CPT

## 2022-05-16 PROCEDURE — 84443 ASSAY THYROID STIM HORMONE: CPT

## 2022-05-17 RX ORDER — SODIUM CHLORIDE 9 MG/ML
20 INJECTION, SOLUTION INTRAVENOUS ONCE
Status: CANCELLED | OUTPATIENT
Start: 2022-05-18

## 2022-05-18 ENCOUNTER — HOSPITAL ENCOUNTER (OUTPATIENT)
Dept: INFUSION CENTER | Facility: HOSPITAL | Age: 67
Discharge: HOME | End: 2022-05-18
Attending: OBSTETRICS & GYNECOLOGY
Payer: MEDICARE

## 2022-05-18 VITALS
HEIGHT: 59 IN | BODY MASS INDEX: 28.35 KG/M2 | TEMPERATURE: 97.4 F | HEART RATE: 97 BPM | SYSTOLIC BLOOD PRESSURE: 109 MMHG | RESPIRATION RATE: 16 BRPM | DIASTOLIC BLOOD PRESSURE: 75 MMHG | OXYGEN SATURATION: 98 %

## 2022-05-18 DIAGNOSIS — E87.6 HYPOKALEMIA: ICD-10-CM

## 2022-05-18 DIAGNOSIS — C53.0 MALIGNANT NEOPLASM OF ENDOCERVIX (HCC): Primary | ICD-10-CM

## 2022-05-18 PROBLEM — M47.812 CERVICAL SPONDYLOSIS: Status: ACTIVE | Noted: 2022-05-18

## 2022-05-18 PROCEDURE — 96413 CHEMO IV INFUSION 1 HR: CPT

## 2022-05-18 RX ORDER — SODIUM CHLORIDE 9 MG/ML
20 INJECTION, SOLUTION INTRAVENOUS ONCE
Status: COMPLETED | OUTPATIENT
Start: 2022-05-18 | End: 2022-05-18

## 2022-05-18 RX ADMIN — SODIUM CHLORIDE 200 MG: 9 INJECTION, SOLUTION INTRAVENOUS at 13:09

## 2022-05-18 RX ADMIN — SODIUM CHLORIDE 20 ML/HR: 0.9 INJECTION, SOLUTION INTRAVENOUS at 12:00

## 2022-05-18 NOTE — PLAN OF CARE
Problem: INFECTION - ADULT  Goal: Absence or prevention of progression during hospitalization  Description: INTERVENTIONS:  - Assess and monitor for signs and symptoms of infection  - Monitor lab/diagnostic results  - Monitor all insertion sites, i e  indwelling lines, tubes, and drains  - Monitor endotracheal if appropriate and nasal secretions for changes in amount and color  - [place] appropriate cooling/warming therapies per order  - Administer medications as ordered  - Instruct and encourage patient and family to use good hand hygiene technique  - Identify and instruct in appropriate isolation precautions for identified infection/condition  Outcome: Progressing     Problem: Knowledge Deficit  Goal: Patient/family/caregiver demonstrates understanding of disease process, treatment plan, medications, and discharge instructions  Description: Complete learning assessment and assess knowledge base    Interventions:  - Provide teaching at level of understanding  - Provide teaching via preferred learning methods  Outcome: Progressing

## 2022-05-18 NOTE — ASSESSMENT & PLAN NOTE
Patient will continue routine follow-up with Dr Kai Rosa from palliative care  Patient has cancer related pain which is being controlled on oxycodone  Constipation is being managed with Movantik

## 2022-05-18 NOTE — ASSESSMENT & PLAN NOTE
I think the patient's abdominal pain is likely secondary to her abdominal carcinomatosis  Patient needs to notify immediately if she has postprandial abdominal pain

## 2022-05-18 NOTE — ASSESSMENT & PLAN NOTE
Patient has a right-sided lumbar radiculopathy  I reviewed Dr Zayra Valera consult from March 31st   He has referred the patient for physical therapy  I told the patient this is exactly what I would have recommended for her  I also feel that the Zanaflex will likely help with her radicular pain as well

## 2022-05-18 NOTE — ASSESSMENT & PLAN NOTE
The patient was started on Zanaflex 4 mg  She will take 1 capsule at bedtime  I advised patient to use moist heat to the affected area and rest   I did advise her that this medication should help her sleep  It will also make her mouth dry

## 2022-05-20 ENCOUNTER — TELEPHONE (OUTPATIENT)
Dept: GYNECOLOGIC ONCOLOGY | Facility: CLINIC | Age: 67
End: 2022-05-20

## 2022-05-20 NOTE — TELEPHONE ENCOUNTER
LM that appt on 6/14 is cancelled due to provider being OOO and having a duplicate appt on 3/03   Will keep the 6/28 appt instead

## 2022-05-25 ENCOUNTER — TELEPHONE (OUTPATIENT)
Dept: PALLIATIVE MEDICINE | Facility: CLINIC | Age: 67
End: 2022-05-25

## 2022-05-25 NOTE — TELEPHONE ENCOUNTER
Hanna Young has been seeing home care, patient called to cx visits for this week very overwhelmed for filling out paper work for [facility] [place]  Rene Jenkins have PT this week per pts request but will start back up next week, will be reduced to once a week

## 2022-06-06 ENCOUNTER — APPOINTMENT (OUTPATIENT)
Dept: LAB | Facility: CLINIC | Age: 67
End: 2022-06-06
Payer: MEDICARE

## 2022-06-06 DIAGNOSIS — C53.0 MALIGNANT NEOPLASM OF ENDOCERVIX (HCC): ICD-10-CM

## 2022-06-06 DIAGNOSIS — R93.89 ABNORMAL FINDINGS ON DIAGNOSTIC IMAGING OF OTHER SPECIFIED BODY STRUCTURES: ICD-10-CM

## 2022-06-06 LAB
AMYLASE SERPL-CCNC: 135 IU/L (ref 25–115)
LIPASE SERPL-CCNC: 266 U/L (ref 73–393)
TSH SERPL DL<=0.05 MIU/L-ACNC: 1.84 UIU/ML (ref 0.45–4.5)

## 2022-06-06 PROCEDURE — 83690 ASSAY OF LIPASE: CPT

## 2022-06-06 PROCEDURE — 82150 ASSAY OF AMYLASE: CPT

## 2022-06-06 PROCEDURE — 84443 ASSAY THYROID STIM HORMONE: CPT

## 2022-06-07 ENCOUNTER — TELEPHONE (OUTPATIENT)
Dept: PALLIATIVE MEDICINE | Facility: CLINIC | Age: 67
End: 2022-06-07

## 2022-06-07 ENCOUNTER — HOSPITAL ENCOUNTER (OUTPATIENT)
Dept: RADIOLOGY | Age: 67
Discharge: HOME | End: 2022-06-07
Payer: MEDICARE

## 2022-06-07 DIAGNOSIS — C53.0 MALIGNANT NEOPLASM OF ENDOCERVIX (HCC): ICD-10-CM

## 2022-06-07 LAB — GLUCOSE SERPL-MCNC: 105 MG/DL (ref 65–140)

## 2022-06-07 PROCEDURE — 82948 REAGENT STRIP/BLOOD GLUCOSE: CPT

## 2022-06-07 PROCEDURE — 78815 PET IMAGE W/CT SKULL-THIGH: CPT

## 2022-06-07 PROCEDURE — G1004 CDSM NDSC: HCPCS

## 2022-06-07 PROCEDURE — A9552 F18 FDG: HCPCS

## 2022-06-07 RX ORDER — SODIUM CHLORIDE 9 MG/ML
20 INJECTION, SOLUTION INTRAVENOUS ONCE
Status: CANCELLED | OUTPATIENT
Start: 2022-06-08

## 2022-06-07 NOTE — TELEPHONE ENCOUNTER
Lalo from [facility] [phone] calling to say patient cx appt this week due to having to many appt's He will see her starting next week

## 2022-06-08 ENCOUNTER — HOSPITAL ENCOUNTER (OUTPATIENT)
Dept: INFUSION CENTER | Facility: HOSPITAL | Age: 67
Discharge: HOME | End: 2022-06-08
Payer: MEDICARE

## 2022-06-08 ENCOUNTER — PATIENT OUTREACH (OUTPATIENT)
Dept: HEMATOLOGY ONCOLOGY | Facility: CLINIC | Age: 67
End: 2022-06-08

## 2022-06-08 VITALS
RESPIRATION RATE: 18 BRPM | DIASTOLIC BLOOD PRESSURE: 64 MMHG | BODY MASS INDEX: 28.22 KG/M2 | TEMPERATURE: 97.6 F | HEIGHT: 59 IN | WEIGHT: 140 LBS | SYSTOLIC BLOOD PRESSURE: 144 MMHG | HEART RATE: 103 BPM | OXYGEN SATURATION: 98 %

## 2022-06-08 DIAGNOSIS — E87.6 HYPOKALEMIA: ICD-10-CM

## 2022-06-08 DIAGNOSIS — C53.0 MALIGNANT NEOPLASM OF ENDOCERVIX (HCC): Primary | ICD-10-CM

## 2022-06-08 PROCEDURE — 96413 CHEMO IV INFUSION 1 HR: CPT

## 2022-06-08 RX ORDER — SODIUM CHLORIDE 9 MG/ML
20 INJECTION, SOLUTION INTRAVENOUS ONCE
Status: COMPLETED | OUTPATIENT
Start: 2022-06-08 | End: 2022-06-08

## 2022-06-08 RX ADMIN — SODIUM CHLORIDE 200 MG: 9 INJECTION, SOLUTION INTRAVENOUS at 12:30

## 2022-06-08 RX ADMIN — SODIUM CHLORIDE 20 ML/HR: 9 INJECTION, SOLUTION INTRAVENOUS at 11:46

## 2022-06-08 NOTE — PROGRESS NOTES
Pt tolerated infusion well discharged to home in stable condition  Pt has no other appointments   will sent note to doctors office, but she does have an appointment soon with him

## 2022-06-08 NOTE — PROGRESS NOTES
Pt returned call this day and explained that she was attempting to get a copy of her félix care award letter  Pt states she had called the financial office to request this and was informed that it was mailed out  When the pt explained that she never received it, she states she was instructed to go to [place] to pick this up  Pt became upset on the phone, stating that she has no car, no ride and does not feel well enough to make this ride to  the form  MSW offered to reach out to the financial aid office and request a copy and will send to the pt when she has an appointment  This appeared to appease the pt  The remainder of this call was spent listening to the pt share stories of her family and the discord in the relationships  The pt has 4 children and has a relationship with one daughter  Her daughter is planning on moving to [facility] and pt states she "will never see her again "  Pt is not open to traveling and states she is "fine with not seeing her again "  Pt reports to having a limited support system, few friends and describes herself as a "not much of a friend person "  She is not happy living in her area but feels "tarapped" as she needs subsidized housing  Pt is aware of the wait list for other counties  Emotional support was provided and MSW will continue to follow

## 2022-06-08 NOTE — PLAN OF CARE
Problem: INFECTION - ADULT  Goal: Absence or prevention of progression during hospitalization  Description: INTERVENTIONS:  - Assess and monitor for signs and symptoms of infection  - Monitor lab/diagnostic results  - Monitor all insertion sites, i e  indwelling lines, tubes, and drains  - Monitor endotracheal if appropriate and nasal secretions for changes in amount and color  - [place] appropriate cooling/warming therapies per order  - Administer medications as ordered  - Instruct and encourage patient and family to use good hand hygiene technique  - Identify and instruct in appropriate isolation precautions for identified infection/condition  Outcome: Progressing     Problem: Knowledge Deficit  Goal: Patient/family/caregiver demonstrates understanding of disease process, treatment plan, medications, and discharge instructions  Description: Complete learning assessment and assess knowledge base    Interventions:  - Provide teaching at level of understanding  - Provide teaching via preferred learning methods  Outcome: Progressing

## 2022-06-08 NOTE — PROGRESS NOTES
MSW made outreach to the pt this day to offer support and to assess any of her needs  Pt did not answer and a detailed message was left on her voicemail  MSW provided contact number and encouraged the pt to return the call when able

## 2022-06-08 NOTE — PROGRESS NOTES
Pt ambulated into infusion in stable condition  Denies any compilations   Right chest port accessed without compilations + blood return

## 2022-06-09 ENCOUNTER — OFFICE VISIT (OUTPATIENT)
Dept: PALLIATIVE MEDICINE | Facility: CLINIC | Age: 67
End: 2022-06-09
Payer: MEDICARE

## 2022-06-09 VITALS
DIASTOLIC BLOOD PRESSURE: 60 MMHG | OXYGEN SATURATION: 98 % | HEART RATE: 103 BPM | TEMPERATURE: 96 F | BODY MASS INDEX: 28.93 KG/M2 | WEIGHT: 143.3 LBS | SYSTOLIC BLOOD PRESSURE: 110 MMHG

## 2022-06-09 DIAGNOSIS — C53.0 MALIGNANT NEOPLASM OF ENDOCERVIX (HCC): ICD-10-CM

## 2022-06-09 PROCEDURE — 99497 ADVNCD CARE PLAN 30 MIN: CPT | Performed by: FAMILY MEDICINE

## 2022-06-09 PROCEDURE — 99213 OFFICE O/P EST LOW 20 MIN: CPT | Performed by: FAMILY MEDICINE

## 2022-06-09 RX ORDER — OXYCODONE HYDROCHLORIDE AND ACETAMINOPHEN 5; 325 MG/1; MG/1
1 TABLET ORAL EVERY 4 HOURS PRN
Qty: 150 TABLET | Refills: 0 | Status: SHIPPED | OUTPATIENT
Start: 2022-06-09 | End: 2022-07-12 | Stop reason: SDUPTHER

## 2022-06-09 NOTE — PROGRESS NOTES
Serious Illness Conversation    1  What is your understanding now of where you are with your illness? Prognostic Understanding: overestimates prognosis     2  How much information about what is likely to be ahead with your illness would you like to have? Information: patient wants to be fully informed     3  What did you (clinician) communicate to the patient? 4  If your health situation worsens, what are your most important goals? Goals: be mentally aware, be emotionally at peace, be independent, not be a burden     5  What are the biggest fears and worries about the future and your health? Fears/Worries: loss of control, loss of dignity, being dependent, burdening others, finances, getting treatments I do not want     6  What abilities are so critical to your life that you cannot imagine living without them? Unacceptable Function: being chronically confused or not being myself, being in pain or very uncomfortable, being unable to interact with others, being unconscious, not being myself, being in chronic severe pain, not being able to care for myself, including toileting and feeding     7  What gives you strength as you think about the future with your illness? 8  If you become sicker, how much are you willing to go through for the possibility of gaining more time? Be in the hospital: Yes Have a feeding tube: No    Live in a nursing home: Yes   Be on a ventilator: No Be uncomfortable: No   Be on dialysis: No       9  How much does your proxy and family know about your priorities and wishes? Discussion Discussion: extensive discussion with family about goals and wishes  Pt's several children are not trustworthy with the patient's medical decisions, with exception of eldest daughter Jarrod Rousseau  Jarrod Rousseau should be used for ALL medical communication and decisions  Jairo heard you say that dignity and being close with trusted family is really important to you   Keeping that in mind, and what we know about your illness, I recommend that we continue with medical management and ongoing medical cares while we track response to treatment  This will help us make sure that your treatment plans reflect whats important to you  How does this plan sound to you? I will do everything I can to help you through this    Patient verbalized understanding of the plan     I have spent 30+ minutes speaking with my patient on advanced care planning today or during this visit     Advanced directives  Five Wishes: Patient has Five WIshes in chart
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
## 2022-06-28 ENCOUNTER — OFFICE VISIT (OUTPATIENT)
Dept: GYNECOLOGIC ONCOLOGY | Facility: CLINIC | Age: 67
End: 2022-06-28
Payer: MEDICARE

## 2022-06-28 VITALS
SYSTOLIC BLOOD PRESSURE: 118 MMHG | HEIGHT: 59 IN | BODY MASS INDEX: 28.63 KG/M2 | TEMPERATURE: 97.3 F | WEIGHT: 142 LBS | DIASTOLIC BLOOD PRESSURE: 82 MMHG

## 2022-06-28 DIAGNOSIS — C53.0 MALIGNANT NEOPLASM OF ENDOCERVIX (HCC): Primary | ICD-10-CM

## 2022-06-28 DIAGNOSIS — E87.6 HYPOKALEMIA: ICD-10-CM

## 2022-06-28 DIAGNOSIS — N13.39 OTHER HYDRONEPHROSIS: ICD-10-CM

## 2022-06-28 PROBLEM — R59.1 LYMPHADENOPATHY OF HEAD AND NECK: Status: RESOLVED | Noted: 2021-11-02 | Resolved: 2022-06-28

## 2022-06-28 PROCEDURE — 99214 OFFICE O/P EST MOD 30 MIN: CPT | Performed by: OBSTETRICS & GYNECOLOGY

## 2022-06-28 RX ORDER — SODIUM CHLORIDE 9 MG/ML
20 INJECTION, SOLUTION INTRAVENOUS ONCE
Status: CANCELLED | OUTPATIENT
Start: 2022-06-29

## 2022-06-28 NOTE — ASSESSMENT & PLAN NOTE
Patient is now on single agent pembrolizumab  PET-CT prior to today's visit shows overall stable disease  Clinically, patient is feeling improvement in terms of bulky neck lymphadenopathy, etcetera  At this time I have recommended to continue with single agent pembrolizumab  Patient has been counseled and is realistic about understanding impending progression  I will also present her case at upcoming tumor Board to discuss potential treatment options which include single agent cytotoxic sporadic disease topotecan, protracted, etcetera) or other biologics (tisotumab vidotin), etc     Plan to treat with 3-4 additional cycles of pembrolizumab and then repeat PET-CT  Will continue to monitor laboratory clinical parameters prior to each infusion

## 2022-06-28 NOTE — PROGRESS NOTES
Assessment/Plan:    Problem List Items Addressed This Visit        Genitourinary    Malignant neoplasm of endocervix [facility]) - Primary     Patient is now on single agent pembrolizumab  PET-CT prior to today's visit shows overall stable disease  Clinically, patient is feeling improvement in terms of bulky neck lymphadenopathy, etcetera  At this time I have recommended to continue with single agent pembrolizumab  Patient has been counseled and is realistic about understanding impending progression  I will also present her case at upcoming tumor Board to discuss potential treatment options which include single agent cytotoxic sporadic disease topotecan, protracted, etcetera) or other biologics (tisotumab vidotin), etc     Plan to treat with 3-4 additional cycles of pembrolizumab and then repeat PET-CT  Will continue to monitor laboratory clinical parameters prior to each infusion  Hydronephrosis     Indwelling ureteral stent  Due for next exchange in October  Patient follows with Dr Roberta Cowart  CHIEF COMPLAINT:       Problem:  Cancer Staging  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[facility] )  Staging form: Cervix Uteri, AJCC 7th Edition  - Clinical stage from 3/29/2017: FIGO Stage IB2, calculated as Stage Unknown (T1b2, NX, M0) - Signed by Román Bustillos MD on 2/4/2021        Previous therapy:  Oncology History   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[facility] )   12/2016 Initial Diagnosis    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 ([facility])       3/29/2017 -  Cancer Staged    Staging form: Cervix Uteri, AJCC 7th Edition  - Clinical stage from 3/29/2017: FIGO Stage IB2, calculated as Stage Unknown (T1b2, NX, M0) - Signed by Román Bustillos MD on 2/4/2021  Staged by: Managing physician  Specimen type: Excision  Histopathologic type: Squamous cell carcinoma, NOS  Histologic grade (G): G3  Lymph-vascular invasion (LVI): LVI present/identified, NOS  Residual tumor (R): R0 - None  Pelvic jace status: Negative  Pelvic jace method of assessment: PET  Para-aortic status: Negative  Para-aortic jace method of assessment: PET  Stage used in treatment planning: Yes  National guidelines used in treatment planning: Yes  Type of national guideline used in treatment planning: NCCN       3/29/2017 Surgery    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)  FInal path with incidental 5 cm grade 3 invasive cervical SCCA, +LVI, invasion 9/10 mm cervical stroma, no lymphadenectomy, margins negative ([facility])  2/19/2021 Progression    A  Lymph Node, Left axillary, Biopsy:  - Metastatic squamous cell carcinoma (p16 positive) involving lymph node, compatible with patient's cervical primary  3/2/2021 -  Chemotherapy    Carboplatin AUC 5, Taxol 135 milligram/m2 and Avastin at 15 milligram/kilogram all to be administer on day 1 of a 21 day cycle     7/7/2021 - 2/2/2022 Chemotherapy    bevacizumab (AVASTIN) IVPB, 910 mg, Intravenous, Once, 11 of 16 cycles  Administration: 900 mg (7/7/2021), 900 mg (7/30/2021), 900 mg (8/18/2021), 900 mg (9/8/2021), 900 mg (9/29/2021), 900 mg (10/20/2021), 900 mg (11/10/2021), 900 mg (12/1/2021), 900 mg (12/22/2021), 900 mg (1/12/2022), 900 mg (2/2/2022)     2/22/2022 -  Chemotherapy    pembrolizumab (KEYTRUDA) IVPB, 200 mg, Intravenous, Once, 6 of 12 cycles  Administration: 200 mg (2/22/2022), 200 mg (4/6/2022), 200 mg (4/27/2022), 200 mg (5/18/2022), 200 mg (6/8/2022), 200 mg (3/16/2022)     2/23/2022 - 2/23/2022 Chemotherapy    pembrolizumab (KEYTRUDA) IVPB, 200 mg, Intravenous, Once, 0 of 6 cycles           Patient ID: Han Whitney is a 79 y o  female  HPI  Patient with recurrent metastatic stage I B2 cervical cancer  She 1st was treated for recurrence with carboplatin, Taxol and bevacizumab  Unfortunately, she progressed and has received single agent pembrolizumab since February of 2022  She is tolerating treatment well    Clinically, palpable neck lymphadenopathy has mostly resolved  All neck discomfort is improved  Patient complains of itching but has no skin rashes  Laboratory parameters have remained adequate throughout the course of treatment  She has an indwelling ureteral stent for extrinsic ureteral compression  This was last exchanged in April and is due for next exchange in October  She follows with Urology (Dr Coral Power)  The following portions of the patient's history were reviewed and updated as appropriate: allergies, current medications, past family history, past medical history, past social history, past surgical history and problem list     Review of Systems  Denies vaginal bleeding, drainage or discharge  Normal appetite  Normal bowel and bladder function  Occasional intermittent hematuria likely associated with stent  Occasional pruritus with no skin rash  Twelve point review of systems otherwise negative  Current Outpatient Medications   Medication Sig Dispense Refill    Diclofenac Sodium (VOLTAREN) 1 % Apply 2 g topically 4 (four) times a day 350 g 3    diphenhydrAMINE (BENADRYL) 25 mg capsule Take 50 mg by mouth daily at bedtime      gabapentin (NEURONTIN) 100 mg capsule TAKE 2 CAPSULES (200 MG TOTAL) BY MOUTH DAILY AND 2 CAPSULES (200 MG TOTAL) DAILY AT BEDTIME  TO REDUCE NERVE PAIN  120 capsule 0    lidocaine-prilocaine (EMLA) cream APPLY TOPICALLY AS NEEDED FOR MILD PAIN 30 g 0    Multiple Vitamins-Minerals (Alive Womens Gummy) CHEW Chew      naloxegol oxalate (Movantik) 25 MG tablet Take 1 tablet (25 mg total) by mouth in the morning 30 tablet 2    NON FORMULARY Start with a pea size amount to entire face at night, OR directions per MD  Use eNurse software on Musely Ramón to guide you daily   omeprazole (PriLOSEC) 40 MG capsule Take 1 capsule (40 mg total) by mouth in the morning   30 capsule 3    oxyCODONE-acetaminophen (Percocet) 5-325 mg per tablet Take 1 tablet by mouth every 4 (four) hours as needed (cancer pain; max of 5 tabs a day) Max Daily Amount: 5 tablets 150 tablet 0    polyethylene glycol (MIRALAX) 17 g packet Take 17 g by mouth daily To prevent and reduce constipation from pain meds  Use with Movantik  Use instead of psyllium  30 each 0    senna (SENOKOT) 8 6 MG tablet Take 1 tablet (8 6 mg total) by mouth 2 (two) times a day Take to prevent constipation from pain meds  Hold one dose for loose stool (Patient taking differently: Take 1 tablet by mouth 2 (two) times a day Take to prevent constipation from pain meds  Hold one dose for loose stool) 60 tablet 11    tiotropium (Spiriva Respimat) 1 25 MCG/ACT AERS inhaler Inhale 2 puffs daily 4 g 5    tiZANidine (ZANAFLEX) 4 mg tablet Take 1 tablet (4 mg total) by mouth daily at bedtime 30 tablet 3     No current facility-administered medications for this visit  Objective:    Blood pressure 118/82, temperature (!) 97 3 °F (36 3 °C), temperature source Tympanic, height 4' 11 02" (1 499 m), weight 64 4 kg (142 lb), not currently breastfeeding  Body mass index is 28 66 kg/m²  Body surface area is 1 59 meters squared  Physical Exam  Vitals reviewed  Exam conducted with a chaperone present  Constitutional:       General: She is not in acute distress  Appearance: Normal appearance  She is not toxic-appearing  Eyes:      General: No scleral icterus  Right eye: No discharge  Left eye: No discharge  Conjunctiva/sclera: Conjunctivae normal    Cardiovascular:      Rate and Rhythm: Normal rate and regular rhythm  Heart sounds: Normal heart sounds  No murmur heard  Pulmonary:      Effort: Pulmonary effort is normal  No respiratory distress  Breath sounds: Normal breath sounds  No stridor  No wheezing  Abdominal:      General: There is no distension  Palpations: Abdomen is soft  There is no mass  Tenderness: There is no abdominal tenderness  There is no guarding  Hernia: No hernia is present     Genitourinary: Comments: Pelvic exam deferred  Musculoskeletal:      Right lower leg: No edema  Left lower leg: No edema  Neurological:      Mental Status: She is alert           Jazmin Wong MD, [id number] [id number]  6/28/2022  9:20 AM

## 2022-06-29 ENCOUNTER — HOSPITAL ENCOUNTER (OUTPATIENT)
Dept: INFUSION CENTER | Facility: HOSPITAL | Age: 67
Discharge: HOME | End: 2022-06-29
Attending: OBSTETRICS & GYNECOLOGY
Payer: MEDICARE

## 2022-06-29 VITALS
TEMPERATURE: 98.2 F | BODY MASS INDEX: 28.62 KG/M2 | HEIGHT: 59 IN | WEIGHT: 141.98 LBS | SYSTOLIC BLOOD PRESSURE: 120 MMHG | OXYGEN SATURATION: 96 % | DIASTOLIC BLOOD PRESSURE: 58 MMHG | HEART RATE: 101 BPM | RESPIRATION RATE: 17 BRPM

## 2022-06-29 DIAGNOSIS — E87.6 HYPOKALEMIA: ICD-10-CM

## 2022-06-29 DIAGNOSIS — R93.89 ABNORMAL FINDINGS ON DIAGNOSTIC IMAGING OF OTHER SPECIFIED BODY STRUCTURES: ICD-10-CM

## 2022-06-29 DIAGNOSIS — C53.0 MALIGNANT NEOPLASM OF ENDOCERVIX (HCC): Primary | ICD-10-CM

## 2022-06-29 LAB
ALBUMIN SERPL BCP-MCNC: 3.6 G/DL (ref 3.5–5)
ALP SERPL-CCNC: 126 U/L (ref 46–116)
ALT SERPL W P-5'-P-CCNC: 24 U/L (ref 12–78)
ANION GAP SERPL CALCULATED.3IONS-SCNC: 7 MMOL/L (ref 4–13)
AST SERPL W P-5'-P-CCNC: 10 U/L (ref 5–45)
BASOPHILS # BLD AUTO: 0.02 THOUSANDS/ΜL (ref 0–0.1)
BASOPHILS NFR BLD AUTO: 0 % (ref 0–1)
BILIRUB SERPL-MCNC: 0.29 MG/DL (ref 0.2–1)
BUN SERPL-MCNC: 26 MG/DL (ref 5–25)
CALCIUM SERPL-MCNC: 9 MG/DL (ref 8.3–10.1)
CHLORIDE SERPL-SCNC: 104 MMOL/L (ref 100–108)
CO2 SERPL-SCNC: 27 MMOL/L (ref 21–32)
CREAT SERPL-MCNC: 1.03 MG/DL (ref 0.6–1.3)
EOSINOPHIL # BLD AUTO: 0.18 THOUSAND/ΜL (ref 0–0.61)
EOSINOPHIL NFR BLD AUTO: 3 % (ref 0–6)
ERYTHROCYTE [DISTWIDTH] IN BLOOD BY AUTOMATED COUNT: 13.7 % (ref 11.6–15.1)
GFR SERPL CREATININE-BSD FRML MDRD: 56 ML/MIN/1.73SQ M
GLUCOSE SERPL-MCNC: 137 MG/DL (ref 65–140)
HCT VFR BLD AUTO: 37.6 % (ref 34.8–46.1)
HGB BLD-MCNC: 11.9 G/DL (ref 11.5–15.4)
IMM GRANULOCYTES # BLD AUTO: 0.01 THOUSAND/UL (ref 0–0.2)
IMM GRANULOCYTES NFR BLD AUTO: 0 % (ref 0–2)
LIPASE SERPL-CCNC: 110 U/L (ref 73–393)
LYMPHOCYTES # BLD AUTO: 0.84 THOUSANDS/ΜL (ref 0.6–4.47)
LYMPHOCYTES NFR BLD AUTO: 15 % (ref 14–44)
MAGNESIUM SERPL-MCNC: 1.8 MG/DL (ref 1.6–2.6)
MCH RBC QN AUTO: 29.3 PG (ref 26.8–34.3)
MCHC RBC AUTO-ENTMCNC: 31.6 G/DL (ref 31.4–37.4)
MCV RBC AUTO: 93 FL (ref 82–98)
MONOCYTES # BLD AUTO: 0.56 THOUSAND/ΜL (ref 0.17–1.22)
MONOCYTES NFR BLD AUTO: 10 % (ref 4–12)
NEUTROPHILS # BLD AUTO: 4.02 THOUSANDS/ΜL (ref 1.85–7.62)
NEUTS SEG NFR BLD AUTO: 72 % (ref 43–75)
NRBC BLD AUTO-RTO: 0 /100 WBCS
PLATELET # BLD AUTO: 181 THOUSANDS/UL (ref 149–390)
PMV BLD AUTO: 9.8 FL (ref 8.9–12.7)
POTASSIUM SERPL-SCNC: 3.8 MMOL/L (ref 3.5–5.3)
PROT SERPL-MCNC: 7.7 G/DL (ref 6.4–8.2)
RBC # BLD AUTO: 4.06 MILLION/UL (ref 3.81–5.12)
SODIUM SERPL-SCNC: 138 MMOL/L (ref 136–145)
TSH SERPL DL<=0.05 MIU/L-ACNC: 1.23 UIU/ML (ref 0.45–4.5)
WBC # BLD AUTO: 5.63 THOUSAND/UL (ref 4.31–10.16)

## 2022-06-29 PROCEDURE — 85025 COMPLETE CBC W/AUTO DIFF WBC: CPT

## 2022-06-29 PROCEDURE — 84443 ASSAY THYROID STIM HORMONE: CPT

## 2022-06-29 PROCEDURE — 96413 CHEMO IV INFUSION 1 HR: CPT

## 2022-06-29 PROCEDURE — 83690 ASSAY OF LIPASE: CPT

## 2022-06-29 PROCEDURE — 80053 COMPREHEN METABOLIC PANEL: CPT

## 2022-06-29 PROCEDURE — 83735 ASSAY OF MAGNESIUM: CPT

## 2022-06-29 RX ORDER — SODIUM CHLORIDE 9 MG/ML
20 INJECTION, SOLUTION INTRAVENOUS ONCE
Status: COMPLETED | OUTPATIENT
Start: 2022-06-29 | End: 2022-06-29

## 2022-06-29 RX ADMIN — SODIUM CHLORIDE 200 MG: 9 INJECTION, SOLUTION INTRAVENOUS at 11:52

## 2022-06-29 RX ADMIN — SODIUM CHLORIDE 20 ML/HR: 0.9 INJECTION, SOLUTION INTRAVENOUS at 10:35

## 2022-07-11 ENCOUNTER — DOCUMENTATION (OUTPATIENT)
Dept: GYNECOLOGIC ONCOLOGY | Facility: CLINIC | Age: 67
End: 2022-07-11

## 2022-07-11 ENCOUNTER — OFFICE VISIT (OUTPATIENT)
Dept: FAMILY MEDICINE CLINIC | Facility: CLINIC | Age: 67
End: 2022-07-11
Payer: MEDICARE

## 2022-07-11 VITALS
OXYGEN SATURATION: 96 % | HEART RATE: 102 BPM | WEIGHT: 143.3 LBS | BODY MASS INDEX: 28.89 KG/M2 | DIASTOLIC BLOOD PRESSURE: 84 MMHG | TEMPERATURE: 98.2 F | HEIGHT: 59 IN | SYSTOLIC BLOOD PRESSURE: 138 MMHG

## 2022-07-11 DIAGNOSIS — K80.20 CALCULUS OF GALLBLADDER WITHOUT CHOLECYSTITIS WITHOUT OBSTRUCTION: ICD-10-CM

## 2022-07-11 DIAGNOSIS — F32.0 MAJOR DEPRESSIVE DISORDER, SINGLE EPISODE, MILD (HCC): ICD-10-CM

## 2022-07-11 DIAGNOSIS — Z00.00 ENCOUNTER FOR MEDICARE ANNUAL WELLNESS EXAM: ICD-10-CM

## 2022-07-11 DIAGNOSIS — G57.11 MERALGIA PARAESTHETICA, RIGHT: ICD-10-CM

## 2022-07-11 DIAGNOSIS — Z78.0 POST-MENOPAUSE: ICD-10-CM

## 2022-07-11 DIAGNOSIS — K59.03 DRUG INDUCED CONSTIPATION: Primary | ICD-10-CM

## 2022-07-11 DIAGNOSIS — N18.30 STAGE 3 CHRONIC KIDNEY DISEASE, UNSPECIFIED WHETHER STAGE 3A OR 3B CKD (HCC): ICD-10-CM

## 2022-07-11 DIAGNOSIS — E78.5 DYSLIPIDEMIA: ICD-10-CM

## 2022-07-11 PROCEDURE — G0438 PPPS, INITIAL VISIT: HCPCS | Performed by: FAMILY MEDICINE

## 2022-07-11 PROCEDURE — 99214 OFFICE O/P EST MOD 30 MIN: CPT | Performed by: FAMILY MEDICINE

## 2022-07-11 RX ORDER — CITALOPRAM 20 MG/1
20 TABLET ORAL
Qty: 30 TABLET | Refills: 5 | Status: SHIPPED | OUTPATIENT
Start: 2022-07-11 | End: 2022-08-04

## 2022-07-11 RX ORDER — NALOXEGOL OXALATE 25 MG/1
25 TABLET, FILM COATED ORAL DAILY
Qty: 30 TABLET | Refills: 5 | Status: SHIPPED | OUTPATIENT
Start: 2022-07-11

## 2022-07-11 RX ORDER — GABAPENTIN 100 MG/1
CAPSULE ORAL
Qty: 120 CAPSULE | Refills: 5 | Status: SHIPPED | OUTPATIENT
Start: 2022-07-11

## 2022-07-11 NOTE — ASSESSMENT & PLAN NOTE
Patient has no prior history of depression  We had a long talk  She tells me she is depressed because her cancer has come back and because she is all alone in a town that she does not like  She tells me she cannot afford to live in [place], where her daughter lives  Her other daughter, who she was close with, just moved to [place]  Patient denies any suicidal ideations to me  We discussed starting an antidepressant  The patient was agreeable  I started her on citalopram 20 mg  She will take 1 at bedtime  I scheduled the patient a follow-up visit for 4 weeks to assess efficacy

## 2022-07-11 NOTE — ASSESSMENT & PLAN NOTE
Patient continues to complain of pain in paresthesias in the right leg  She ran out of her gabapentin  I refilled her gabapentin  She will continue 200 mg in the morning and 200 mg at bedtime

## 2022-07-11 NOTE — PROGRESS NOTES
Assessment and Plan:     Problem List Items Addressed This Visit        Digestive    Calculus of gallbladder without cholecystitis without obstruction     Patient has cholelithiasis which is asymptomatic at this time  In the past, the patient was experiencing right upper quadrant pain but I suspect her pain may have been due to peritoneal metastasis and more than likely a cancer related pain  She will continue follow-up with palliative care for pain management           Drug induced constipation - Primary     Patient has constipation secondary to oxycodone use  I refilled her prescription for Movantik 25 mg daily  She has found this to be effective  She does use MiraLax as needed as well  She has not tried the Senokot yet  I encouraged her to try this as she tells me that the MiraLax takes a couple of days for to work at times           Relevant Medications    naloxegol oxalate (Movantik) 25 MG tablet       Nervous and Auditory    Meralgia paraesthetica, right     Patient continues to complain of pain in paresthesias in the right leg  She ran out of her gabapentin  I refilled her gabapentin  She will continue 200 mg in the morning and 200 mg at bedtime  Relevant Medications    gabapentin (NEURONTIN) 100 mg capsule       Genitourinary    Stage 3 chronic kidney disease, unspecified whether stage 3a or 3b CKD ([facility] [address] )       Other    Dyslipidemia    Relevant Orders    Lipid panel    Post-menopause    Relevant Orders    DXA bone density spine hip and pelvis    Major depressive disorder, single episode, mild ([facility] [address] )     Patient has no prior history of depression  We had a long talk  She tells me she is depressed because her cancer has come back and because she is all alone in a town that she does not like  She tells me she cannot afford to live in [place], where her daughter lives  Her other daughter, who she was close with, just moved to [place]  Patient denies any suicidal ideations to me  We discussed starting an antidepressant  The patient was agreeable  I started her on citalopram 20 mg  She will take 1 at bedtime  I scheduled the patient a follow-up visit for 4 weeks to assess efficacy  Relevant Medications    citalopram (CeleXA) 20 mg tablet    Encounter for Medicare annual wellness exam           Preventive health issues were discussed with patient, and age appropriate screening tests were ordered as noted in patient's After Visit Summary  Personalized health advice and appropriate referrals for health education or preventive services given if needed, as noted in patient's After Visit Summary  History of Present Illness:     Patient presents for a Medicare Wellness Visit    This is a 15-year-old white female who presents to the office today for her annual Medicare wellness exam as well as for her routine checkup  The patient reports that she is tired all the time  She is being treated with Keytruda  She complains of weight gain  She tells me an elderly neighbor is getting Meals on wheels  She keeps giving the patient the food  The patient tells me she does not want to waste that shows he has been the eating it and gained weight  She tells me that in the past, she had been quite heavy and she had to work very hard to lose it  Patient Care Team:  Albertina Butt DO as PCP - General (Family Medicine)  Jessica Echols MD as PCP - [address] [place], [place] [address]Th [facility] ([facility])  Glenroy Espinal RD (Nutrition)  Lyentte Iverson as  Care Manager ()     Review of Systems:     Review of Systems   Constitutional: Positive for unexpected weight change  Negative for activity change and appetite change  HENT:        + for Bruxism   Cardiovascular: Negative for chest pain, palpitations and leg swelling  Gastrointestinal: Positive for abdominal distention, abdominal pain and constipation  Psychiatric/Behavioral: Positive for dysphoric mood and sleep disturbance  Problem List:     Patient Active Problem List   Diagnosis    Zaldivar's neuroma, left    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[address] )    Anxiety and depression    Tinnitus of left ear    BMI 29 0-29 9,adult    Right hip pain    Hydronephrosis    Encounter for screening involving social determinants of health (SDoH)    Dehydration    Hypokalemia    Healthcare maintenance    Chemotherapy-induced nausea    Urgency of urination    Pain in thoracic spine    Chemotherapy induced neutropenia (HCC)    Thrombocytopenia (HCC)    Thyroid lesion    Dyspnea on exertion    Encounter for venous access device care    Multiple lung nodules    Primary generalized (osteo)arthritis    Xerostomia    Encounter for immunization    Sensorineural hearing loss (SNHL) of both ears    Chronic obstructive pulmonary disease, unspecified COPD type ([facility] [address] )    Hematuria    Calculus of gallbladder without cholecystitis without obstruction    Drug induced constipation    Continuous opioid dependence ([facility] [address] )    Chronic right-sided lumbar radiculopathy    Cervical spondylosis    Stage 3 chronic kidney disease, unspecified whether stage 3a or 3b CKD (HCC)    Meralgia paraesthetica, right    Dyslipidemia    Post-menopause    Major depressive disorder, single episode, mild ([facility] [address] )    Encounter for Medicare annual wellness exam      Past Medical and Surgical History:     Past Medical History:   Diagnosis Date    Acne     Anxiety     Anxiety and depression 10/02/2019    Arthritis     Breathing difficulty     pt does not recall any breathing problem but did have neck pain after procedure"    Cancer ([address] )     cervical/ and "lymph nodes" (GYN)    Cervical cancer (HCC)     Chronic pain disorder     Neck pain    Colon polyp     GERD (gastroesophageal reflux disease)     not now    History of radiation therapy     History of shingles     Irritable bowel syndrome     Joint pain following chemotherapy     and stiffness  Liver disease     "cyst on liver"    Lung nodule     "left"    Muscle weakness     after chemo"    Neck pain     "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"has had neck pain since childhood"    Port-A-Cath in place     right chest//for chemotherapy q 3 weeks and also receives IV hydration    Shortness of breath     "started with cancer, when too much exertion like cleaning/guera doing stairs"    Thumb injury 10/2019    right    Thyroid nodule     Tinnitus of left ear 02/19/2020    Urinary problem     Wrist arthritis 03/17/2020     Past Surgical History:   Procedure Laterality Date    COLONOSCOPY      CYSTOSCOPY W/ RETROGRADES Right 6/3/2021    Procedure: William Mahamed W/RETROGRADE;  Surgeon: Nisha Monterroso MD;  Location: [facility];  Service: Urology    CYSTOSCOPY W/ RETROGRADES Right 10/18/2021    Procedure: CYSTOSCOPY WITH ureteral stent exchange;  Surgeon: Ganesh Posadas MD;  Location: [facility];  Service: Urology    FL RETROGRADE PYELOGRAM  2/11/2021    FL RETROGRADE PYELOGRAM  6/3/2021    FOOT SURGERY      HYSTERECTOMY      IR BIOPSY LYMPH NODE  2/19/2021    IR PORT PLACEMENT  3/9/2021    TN CYSTOURETHROSCOPY,URETER CATHETER Right 2/11/2021    Procedure: CYSTOSCOPY RETROGRADE PYELOGRAM WITH INSERTION STENT URETERAL;  Surgeon: Ganesh Posadas MD;  [facility]: [facility];  Service: Urology    TN CYSTOURETHROSCOPY,URETER CATHETER Right 4/4/2022    Procedure: CYSTOSCOPY  WITH INSERTION STENT URETERAL Right;  Surgeon: Ganesh Posadas MD;  Location: [facility];  Service: Urology    TN EXCIS INTERDIGITAL NEUROMA,EA Left 7/12/2018    Procedure: FOOT NEUROMA EXCISION;  Surgeon: Tamera Kang DPM;  Location: [address] [place] [address] MAIN OR;  Service: Podiatry    TN REMOVE & REPLACE INDWELL URETERAL STENT [place] Right 6/3/2021    Procedure: EXCHANGE STENT;  Surgeon: Nisha Monterroso MD;  Location: [facility];  Service: Urology    TUBAL LIGATION        Family History:     Family History   Problem Relation Age of Onset    Stomach cancer Mother     Alcohol abuse Father     No Known Problems Sister     No Known Problems Daughter     No Known Problems Maternal Grandmother     No Known Problems Maternal Grandfather     No Known Problems Paternal Grandmother     No Known Problems Paternal Grandfather     No Known Problems Sister     No Known Problems Sister     No Known Problems Sister     No Known Problems Sister     No Known Problems Daughter     No Known Problems Daughter     No Known Problems Maternal Aunt     No Known Problems Maternal Aunt     No Known Problems Paternal Aunt     No Known Problems Paternal Aunt     No Known Problems Paternal Aunt     No Known Problems Paternal Aunt     No Known Problems Paternal Aunt     No Known Problems Paternal Aunt     No Known Problems Paternal Aunt     No Known Problems Paternal Aunt     No Known Problems Paternal Aunt     No Known Problems Paternal Aunt       Social History:     Social History     Socioeconomic History    Marital status:      Spouse name: None    Number of children: None    Years of education: None    Highest education level: None   Occupational History    None   Tobacco Use    Smoking status: Former Smoker     Packs/day: 3 00     Years: 32 00     Pack years: 96 00     Types: Cigarettes     Start date:      Quit date: 2003     Years since quittin 0    Smokeless tobacco: Never Used   Vaping Use    Vaping Use: Never used   Substance and Sexual Activity    Alcohol use: Not Currently    Drug use: No     Types: Marijuana     Comment: As teenager    Sexual activity: Not Currently     Comment: defer   Other Topics Concern    None   Social History Narrative    None     Social Determinants of Health     Financial Resource Strain: Not on file   Food Insecurity: Not on file   Transportation Needs: Not on file   Physical Activity: Not on file   Stress: Not on file   Social Connections: Not on file   Intimate Partner Violence: Not on file Housing Stability: Not on file      Medications and Allergies:     Current Outpatient Medications   Medication Sig Dispense Refill    citalopram (CeleXA) 20 mg tablet Take 1 tablet (20 mg total) by mouth daily at bedtime 30 tablet 5    Diclofenac Sodium (VOLTAREN) 1 % Apply 2 g topically 4 (four) times a day 350 g 3    diphenhydrAMINE (BENADRYL) 25 mg capsule Take 50 mg by mouth daily at bedtime      gabapentin (NEURONTIN) 100 mg capsule Take 2 capsules (200 mg total) by mouth daily AND 2 capsules (200 mg total) daily at bedtime  To reduce nerve pain  120 capsule 5    lidocaine-prilocaine (EMLA) cream APPLY TOPICALLY AS NEEDED FOR MILD PAIN 30 g 0    Multiple Vitamins-Minerals (Alive Womens Gummy) CHEW Chew      naloxegol oxalate (Movantik) 25 MG tablet Take 1 tablet (25 mg total) by mouth in the morning 30 tablet 5    NON FORMULARY Start with a pea size amount to entire face at night, OR directions per MD  Use eNurse software on Musely Ramón to guide you daily   omeprazole (PriLOSEC) 40 MG capsule Take 1 capsule (40 mg total) by mouth in the morning  30 capsule 3    oxyCODONE-acetaminophen (Percocet) 5-325 mg per tablet Take 1 tablet by mouth every 4 (four) hours as needed (cancer pain; max of 5 tabs a day) Max Daily Amount: 5 tablets 150 tablet 0    polyethylene glycol (MIRALAX) 17 g packet Take 17 g by mouth daily To prevent and reduce constipation from pain meds  Use with Movantik  Use instead of psyllium  30 each 0    senna (SENOKOT) 8 6 MG tablet Take 1 tablet (8 6 mg total) by mouth 2 (two) times a day Take to prevent constipation from pain meds  Hold one dose for loose stool (Patient taking differently: Take 1 tablet by mouth 2 (two) times a day Take to prevent constipation from pain meds    Hold one dose for loose stool) 60 tablet 11    tiotropium (Spiriva Respimat) 1 25 MCG/ACT AERS inhaler Inhale 2 puffs daily 4 g 5    tiZANidine (ZANAFLEX) 4 mg tablet Take 1 tablet (4 mg total) by mouth daily at bedtime 30 tablet 3     No current facility-administered medications for this visit  Allergies   Allergen Reactions    Medical Tape Itching     And redness from adhesive    And "skin raised up" especially longer on skin    Aspirin GI Intolerance    Codeine GI Intolerance    Dust Mite Extract     Pollen Extract Sneezing      Immunizations:     Immunization History   Administered Date(s) Administered    INFLUENZA 10/11/2019    Influenza, high dose seasonal 0 7 mL 11/09/2021    Influenza, recombinant, quadrivalent,injectable, preservative free 10/23/2018    Pneumococcal Conjugate 13-Valent 11/30/2021    Zoster 03/10/2017      Health Maintenance:         Topic Date Due    Cervical Cancer Screening  05/21/2021    Breast Cancer Screening: Mammogram  12/30/2023    Colorectal Cancer Screening  05/11/2028    Hepatitis C Screening  Completed         Topic Date Due    COVID-19 Vaccine (1) Never done    DTaP,Tdap,and Td Vaccines (1 - Tdap) Never done    Influenza Vaccine (1) 09/01/2022      Medicare Screening Tests and Risk Assessments:     Virgen Patino is here for her Subsequent Wellness visit  Last Medicare Wellness visit information reviewed, patient interviewed and updates made to the record today  Health Risk Assessment:   Patient rates overall health as fair  Patient feels that their physical health rating is slightly better  Patient is dissatisfied with their life  Eyesight was rated as slightly worse  Hearing was rated as same  Patient feels that their emotional and mental health rating is slightly worse  Patients states they are sometimes angry  Patient states they are always unusually tired/fatigued  Pain experienced in the last 7 days has been some  Patient's pain rating has been 4/10  Patient states that she has experienced weight loss or gain in last 6 months  Abdominal pain (chronic)    Fall Risk Screening:    In the past year, patient has experienced: no history of falling in past year      Urinary Incontinence Screening:   Patient has leaked urine accidently in the last six months  Home Safety:  Patient has trouble with stairs inside or outside of their home  Patient has working smoke alarms and has no working carbon monoxide detector  Home safety hazards include: loose rugs on the floor  Ripped up carpet in the apt  Nutrition:   Current diet is Regular  Medications:   Patient is currently taking over-the-counter supplements  OTC medications include: Vitamins  Patient is able to manage medications  Activities of Daily Living (ADLs)/Instrumental Activities of Daily Living (IADLs):   Walk and transfer into and out of bed and chair?: Yes  Dress and groom yourself?: Yes    Bathe or shower yourself?: Yes    Feed yourself? Yes  Do your laundry/housekeeping?: Yes  Manage your money, pay your bills and track your expenses?: Yes  Make your own meals?: Yes    Do your own shopping?: Yes    Previous Hospitalizations:   Any hospitalizations or ED visits within the last 12 months?: No      Advance Care Planning:   Living will: Yes    Durable POA for healthcare:  Yes    Advanced directive: Yes      Cognitive Screening:   Provider or family/friend/caregiver concerned regarding cognition?: No    PREVENTIVE SCREENINGS      Cardiovascular Screening:    General: Screening Current    Due for: Lipid Panel      Diabetes Screening:     General: Screening Current      Colorectal Cancer Screening:     General: Screening Current      Breast Cancer Screening:     General: Screening Current      Cervical Cancer Screening:    General: History Cervical Cancer      Osteoporosis Screening:    General: Risks and Benefits Discussed    Due for: DXA Axial      Abdominal Aortic Aneurysm (AAA) Screening:        General: Screening Not Indicated      Lung Cancer Screening:     General: Screening Not Indicated      Hepatitis C Screening:    General: Screening Current    Screening, Brief Intervention, and Referral to Treatment (SBIRT)    Screening  Typical number of drinks in a day: 0  Typical number of drinks in a week: 0  Interpretation: Low risk drinking behavior  AUDIT-C Screenin) How often did you have a drink containing alcohol in the past year? never  2) How many drinks did you have on a typical day when you were drinking in the past year? 0  3) How often did you have 6 or more drinks on one occasion in the past year? never    AUDIT-C Score: 0  Interpretation: Score 0-2 (female): Negative screen for alcohol misuse    Single Item Drug Screening:  How often have you used an illegal drug (including marijuana) or a prescription medication for non-medical reasons in the past year? never    Single Item Drug Screen Score: 0  Interpretation: Negative screen for possible drug use disorder    Review of Current Opioid Use    Opioid Risk Tool (ORT) Interpretation: Complete Opioid Risk Tool (ORT)    No exam data present     Physical Exam:     /84 (BP Location: Left arm, Patient Position: Sitting, Cuff Size: Adult)   Pulse 102   Temp 98 2 °F (36 8 °C) (Tympanic)   Ht 4' 11" (1 499 m)   Wt 65 kg (143 lb 4 8 oz)   LMP  (LMP Unknown)   SpO2 96%   BMI 28 94 kg/m²     Physical Exam  Vitals reviewed  Constitutional:       Comments: This patient is a 80-year-old  female  The patient appears her stated age and is in no apparent distress   HENT:      Head: Normocephalic and atraumatic  Right Ear: Tympanic membrane, ear canal and external ear normal  There is no impacted cerumen  Left Ear: Tympanic membrane, ear canal and external ear normal  There is no impacted cerumen  Mouth/Throat:      Mouth: Mucous membranes are moist       Pharynx: Oropharynx is clear  No oropharyngeal exudate  Eyes:      General: No scleral icterus  Right eye: No discharge  Left eye: No discharge  Conjunctiva/sclera: Conjunctivae normal       Pupils: Pupils are equal, round, and reactive to light  Neck:      Vascular: No carotid bruit  Comments: There is no thyromegaly  Cardiovascular:      Rate and Rhythm: Normal rate and regular rhythm  Heart sounds: Normal heart sounds  No murmur heard  No friction rub  No gallop  Pulmonary:      Effort: Pulmonary effort is normal  No respiratory distress  Breath sounds: Normal breath sounds  No stridor  No wheezing, rhonchi or rales  Abdominal:      General: Bowel sounds are normal  There is no distension  Palpations: Abdomen is soft  There is no mass  Tenderness: There is no abdominal tenderness  There is no right CVA tenderness, left CVA tenderness or guarding  Hernia: No hernia is present  Comments: I did not detect any hepatosplenomegaly   Musculoskeletal:      Cervical back: Neck supple  Lymphadenopathy:      Cervical: No cervical adenopathy  Psychiatric:         Mood and Affect: Mood normal          Behavior: Behavior normal          Thought Content:  Thought content normal          Judgment: Judgment normal       extremities:  Without cyanosis, clubbing, or edema    Birdie Radish, DO

## 2022-07-11 NOTE — PATIENT INSTRUCTIONS
Medicare Preventive Visit Patient Instructions  Thank you for completing your Welcome to Medicare Visit or Medicare Annual Wellness Visit today  Your next wellness visit will be due in one year (7/12/2023)  The screening/preventive services that you may require over the next 5-10 years are detailed below  Some tests may not apply to you based off risk factors and/or age  Screening tests ordered at today's visit but not completed yet may show as past due  Also, please note that scanned in results may not display below  Preventive Screenings:  Service Recommendations Previous Testing/Comments   Colorectal Cancer Screening  * Colonoscopy    * Fecal Occult Blood Test (FOBT)/Fecal Immunochemical Test (FIT)  * Fecal DNA/Cologuard Test  * Flexible Sigmoidoscopy Age: 54-65 years old   Colonoscopy: every 10 years (may be performed more frequently if at higher risk)  OR  FOBT/FIT: every 1 year  OR  Cologuard: every 3 years  OR  Sigmoidoscopy: every 5 years  Screening may be recommended earlier than age 48 if at higher risk for colorectal cancer  Also, an individualized decision between you and your healthcare provider will decide whether screening between the ages of 74-80 would be appropriate  Colonoscopy: 05/11/2018  FOBT/FIT: Not on file  Cologuard: Not on file  Sigmoidoscopy: Not on file    Screening Current     Breast Cancer Screening Age: 36 years old  Frequency: every 1-2 years  Not required if history of left and right mastectomy Mammogram: 12/30/2021    Screening Current   Cervical Cancer Screening Between the ages of 21-29, pap smear recommended once every 3 years  Between the ages of 33-67, can perform pap smear with HPV co-testing every 5 years     Recommendations may differ for women with a history of total hysterectomy, cervical cancer, or abnormal pap smears in past  Pap Smear: 05/21/2018    History Cervical Cancer   Hepatitis C Screening Once for adults born between 1945 and 1965  More frequently in patients at high risk for Hepatitis C Hep C Antibody: 02/19/2020    Screening Current   Diabetes Screening 1-2 times per year if you're at risk for diabetes or have pre-diabetes Fasting glucose: 92 mg/dL   A1C: No results in last 5 years    Screening Current   Cholesterol Screening Once every 5 years if you don't have a lipid disorder  May order more often based on risk factors  Lipid panel: 02/19/2020    Screening Current     Other Preventive Screenings Covered by Medicare:  1  Abdominal Aortic Aneurysm (AAA) Screening: covered once if your at risk  You're considered to be at risk if you have a family history of AAA  2  Lung Cancer Screening: covers low dose CT scan once per year if you meet all of the following conditions: (1) Age 50-69; (2) No signs or symptoms of lung cancer; (3) Current smoker or have quit smoking within the last 15 years; (4) You have a tobacco smoking history of at least 30 pack years (packs per day multiplied by number of years you smoked); (5) You get a written order from a healthcare provider  3  Glaucoma Screening: covered annually if you're considered high risk: (1) You have diabetes OR (2) Family history of glaucoma OR (3)  aged 48 and older OR (3)  American aged 72 and older  3  Osteoporosis Screening: covered every 2 years if you meet one of the following conditions: (1) You're estrogen deficient and at risk for osteoporosis based off medical history and other findings; (2) Have a vertebral abnormality; (3) On glucocorticoid therapy for more than 3 months; (4) Have primary hyperparathyroidism; (5) On osteoporosis medications and need to assess response to drug therapy  · Last bone density test (DXA Scan): 02/25/2020   5  HIV Screening: covered annually if you're between the age of 15-65  Also covered annually if you are younger than 13 and older than 72 with risk factors for HIV infection   For pregnant patients, it is covered up to 3 times per pregnancy  Immunizations:  Immunization Recommendations   Influenza Vaccine Annual influenza vaccination during flu season is recommended for all persons aged >= 6 months who do not have contraindications   Pneumococcal Vaccine (Prevnar and Pneumovax)  * Prevnar = PCV13  * Pneumovax = PPSV23   Adults 25-60 years old: 1-3 doses may be recommended based on certain risk factors  Adults 72 years old: Prevnar (PCV13) vaccine recommended followed by Pneumovax (PPSV23) vaccine  If already received PPSV23 since turning 65, then PCV13 recommended at least one year after PPSV23 dose  Hepatitis B Vaccine 3 dose series if at intermediate or high risk (ex: diabetes, end stage renal disease, liver disease)   Tetanus (Td) Vaccine - COST NOT COVERED BY MEDICARE PART B Following completion of primary series, a booster dose should be given every 10 years to maintain immunity against tetanus  Td may also be given as tetanus wound prophylaxis  Tdap Vaccine - COST NOT COVERED BY MEDICARE PART B Recommended at least once for all adults  For pregnant patients, recommended with each pregnancy  Shingles Vaccine (Shingrix) - COST NOT COVERED BY MEDICARE PART B  2 shot series recommended in those aged 48 and above     Health Maintenance Due:      Topic Date Due    Cervical Cancer Screening  05/21/2021    Breast Cancer Screening: Mammogram  12/30/2023    Colorectal Cancer Screening  05/11/2028    Hepatitis C Screening  Completed     Immunizations Due:      Topic Date Due    COVID-19 Vaccine (1) Never done    DTaP,Tdap,and Td Vaccines (1 - Tdap) Never done    Influenza Vaccine (1) 09/01/2022     Advance Directives   What are advance directives? Advance directives are legal documents that state your wishes and plans for medical care  These plans are made ahead of time in case you lose your ability to make decisions for yourself   Advance directives can apply to any medical decision, such as the treatments you want, and if you want to donate organs  What are the types of advance directives? There are many types of advance directives, and each state has rules about how to use them  You may choose a combination of any of the following:  · Living will: This is a written record of the treatment you want  You can also choose which treatments you do not want, which to limit, and which to stop at a certain time  This includes surgery, medicine, IV fluid, and tube feedings  · Durable power of  for healthcare [facility]): This is a written record that states who you want to make healthcare choices for you when you are unable to make them for yourself  This person, called a proxy, is usually a family member or a friend  You may choose more than 1 proxy  · Do not resuscitate (DNR) order:  A DNR order is used in case your heart stops beating or you stop breathing  It is a request not to have certain forms of treatment, such as CPR  A DNR order may be included in other types of advance directives  · Medical directive: This covers the care that you want if you are in a coma, near death, or unable to make decisions for yourself  You can list the treatments you want for each condition  Treatment may include pain medicine, surgery, blood transfusions, dialysis, IV or tube feedings, and a ventilator (breathing machine)  · Values history: This document has questions about your views, beliefs, and how you feel and think about life  This information can help others choose the care that you would choose  Why are advance directives important? An advance directive helps you control your care  Although spoken wishes may be used, it is better to have your wishes written down  Spoken wishes can be misunderstood, or not followed  Treatments may be given even if you do not want them  An advance directive may make it easier for your family to make difficult choices about your care     Urinary Incontinence   Urinary incontinence (UI)  is when you lose control of your bladder  UI develops because your bladder cannot store or empty urine properly  The 3 most common types of UI are stress incontinence, urge incontinence, or both  Medicines:   · May be given to help strengthen your bladder control  Report any side effects of medication to your healthcare provider  Do pelvic muscle exercises often:  Your pelvic muscles help you stop urinating  Squeeze these muscles tight for 5 seconds, then relax for 5 seconds  Gradually work up to squeezing for 10 seconds  Do 3 sets of 15 repetitions a day, or as directed  This will help strengthen your pelvic muscles and improve bladder control  Train your bladder:  Go to the bathroom at set times, such as every 2 hours, even if you do not feel the urge to go  You can also try to hold your urine when you feel the urge to go  For example, hold your urine for 5 minutes when you feel the urge to go  As that becomes easier, hold your urine for 10 minutes  Self-care:   · Keep a UI record  Write down how often you leak urine and how much you leak  Make a note of what you were doing when you leaked urine  · Drink liquids as directed  You may need to limit the amount of liquid you drink to help control your urine leakage  Do not drink any liquid right before you go to bed  Limit or do not have drinks that contain caffeine or alcohol  · Prevent constipation  Eat a variety of high-fiber foods  Good examples are high-fiber cereals, beans, vegetables, and whole-grain breads  Walking is the best way to trigger your intestines to have a bowel movement  · Exercise regularly and maintain a healthy weight  Weight loss and exercise will decrease pressure on your bladder and help you control your leakage  · Use a catheter as directed  to help empty your bladder  A catheter is a tiny, plastic tube that is put into your bladder to drain your urine  · Go to behavior therapy as directed    Behavior therapy may be used to help you learn to control your urge to urinate  Weight Management   Why it is important to manage your weight:  Being overweight increases your risk of health conditions such as heart disease, high blood pressure, type 2 diabetes, and certain types of cancer  It can also increase your risk for osteoarthritis, sleep apnea, and other respiratory problems  Aim for a slow, steady weight loss  Even a small amount of weight loss can lower your risk of health problems  How to lose weight safely:  A safe and healthy way to lose weight is to eat fewer calories and get regular exercise  You can lose up about 1 pound a week by decreasing the number of calories you eat by 500 calories each day  Healthy meal plan for weight management:  A healthy meal plan includes a variety of foods, contains fewer calories, and helps you stay healthy  A healthy meal plan includes the following:  · Eat whole-grain foods more often  A healthy meal plan should contain fiber  Fiber is the part of grains, fruits, and vegetables that is not broken down by your body  Whole-grain foods are healthy and provide extra fiber in your diet  Some examples of whole-grain foods are whole-wheat breads and pastas, oatmeal, brown rice, and bulgur  · Eat a variety of vegetables every day  Include dark, leafy greens such as spinach, kale, sheree greens, and mustard greens  Eat yellow and orange vegetables such as carrots, sweet potatoes, and winter squash  · Eat a variety of fruits every day  Choose fresh or canned fruit (canned in its own juice or light syrup) instead of juice  Fruit juice has very little or no fiber  · Eat low-fat dairy foods  Drink fat-free (skim) milk or 1% milk  Eat fat-free yogurt and low-fat cottage cheese  Try low-fat cheeses such as mozzarella and other reduced-fat cheeses  · Choose meat and other protein foods that are low in fat  Choose beans or other legumes such as split peas or lentils   Choose fish, skinless poultry (chicken or turkey), or lean cuts of red meat (beef or pork)  Before you cook meat or poultry, cut off any visible fat  · Use less fat and oil  Try baking foods instead of frying them  Add less fat, such as margarine, sour cream, regular salad dressing and mayonnaise to foods  Eat fewer high-fat foods  Some examples of high-fat foods include french fries, doughnuts, ice cream, and cakes  · Eat fewer sweets  Limit foods and drinks that are high in sugar  This includes candy, cookies, regular soda, and sweetened drinks  Exercise:  Exercise at least 30 minutes per day on most days of the week  Some examples of exercise include walking, biking, dancing, and swimming  You can also fit in more physical activity by taking the stairs instead of the elevator or parking farther away from stores  Ask your healthcare provider about the best exercise plan for you  Narcotic (Opioid) Safety    Use narcotics safely:  · Take prescribed narcotics exactly as directed  · Do not give narcotics to others or take narcotics that belong to someone else  · Do not mix narcotics without medicines or alcohol  · Do not drive or operate heavy machinery after you take the narcotic  · Monitor for side effects and notify your healthcare provider if you experienced side effects such as nausea, sleepiness, itching, or trouble thinking clearly  Manage constipation:    Constipation is the most common side effect of narcotic medicine  Constipation is when you have hard, dry bowel movements, or you go longer than usual between bowel movements  Tell your healthcare provider about all changes in your bowel movements while you are taking narcotics  He or she may recommend laxative medicine to help you have a bowel movement  He or she may also change the kind of narcotic you are taking, or change when you take it  The following are more ways you can prevent or relieve constipation:    · Drink liquids as directed    You may need to drink extra liquids to help soften and move your bowels  Ask how much liquid to drink each day and which liquids are best for you  · Eat high-fiber foods  This may help decrease constipation by adding bulk to your bowel movements  High-fiber foods include fruits, vegetables, whole-grain breads and cereals, and beans  Your healthcare provider or dietitian can help you create a high-fiber meal plan  Your provider may also recommend a fiber supplement if you cannot get enough fiber from food  · Exercise regularly  Regular physical activity can help stimulate your intestines  Walking is a good exercise to prevent or relieve constipation  Ask which exercises are best for you  · Schedule a time each day to have a bowel movement  This may help train your body to have regular bowel movements  Bend forward while you are on the toilet to help move the bowel movement out  Sit on the toilet for at least 10 minutes, even if you do not have a bowel movement  Store narcotics safely:   · Store narcotics where others cannot easily get them  Keep them in a locked cabinet or secure area  Do not  keep them in a purse or other bag you carry with you  A person may be looking for something else and find the narcotics  · Make sure narcotics are stored out of the reach of children  A child can easily overdose on narcotics  Narcotics may look like candy to a small child  The best way to dispose of narcotics: The laws vary by country and area  In the [place], the best way is to return the narcotics through a take-back program  This program is offered by the [employer] ([employer])  The following are options for using the program:  · Take the narcotics to a GERA collection site  The site is often a law enforcement center  Call your local law enforcement center for scheduled take-back days in your area  You will be given information on where to go if the collection site is in a different location    · Take the narcotics to an approved pharmacy or hospital   A pharmacy or hospital may be set up as a collection site  You will need to ask if it is a GERA collection site if you were not directed there  A pharmacy or doctor's office may not be able to take back narcotics unless it is a GERA site  · Use a mail-back system  This means you are given containers to put the narcotics into  You will then mail them in the containers  · Use a take-back drop box  This is a place to leave the narcotics at any time  People and animals will not be able to get into the box  Your local law enforcement agency can tell you where to find a drop box in your area  Other ways to manage pain:   · Ask your healthcare provider about non-narcotic medicines to control pain  Nonprescription medicines include NSAIDs (such as ibuprofen) and acetaminophen  Prescription medicines include muscle relaxers, antidepressants, and steroids  · Pain may be managed without any medicines  Some ways to relieve pain include massage, aromatherapy, or meditation  Physical or occupational therapy may also help  For more information:   · Drug Enforcement Administration  [address] [place] [address] Nicanor Burger [facility]  Phone: 2- 955 - [phone]  Web Address: [facility]/drug_disposal/    · [address]  and Drug Administration  [facility] , [address] [facility]  Phone: 7- 133 - [phone]  Web Address: http://[employer]/     © Copyright [employer] 2018 Information is for End User's use only and may not be sold, redistributed or otherwise used for commercial purposes   All illustrations and images included in CareNotes® are the copyrighted property of A D A M , Inc  or [address] [place] [address] [employer]

## 2022-07-11 NOTE — ASSESSMENT & PLAN NOTE
Patient has constipation secondary to oxycodone use  I refilled her prescription for Movantik 25 mg daily  She has found this to be effective  She does use MiraLax as needed as well  She has not tried the Senokot yet    I encouraged her to try this as she tells me that the MiraLax takes a couple of days for to work at times

## 2022-07-12 ENCOUNTER — OFFICE VISIT (OUTPATIENT)
Dept: PALLIATIVE MEDICINE | Facility: CLINIC | Age: 67
End: 2022-07-12
Payer: MEDICARE

## 2022-07-12 VITALS
BODY MASS INDEX: 28.94 KG/M2 | TEMPERATURE: 96.1 F | DIASTOLIC BLOOD PRESSURE: 80 MMHG | WEIGHT: 143.3 LBS | OXYGEN SATURATION: 98 % | SYSTOLIC BLOOD PRESSURE: 110 MMHG | HEART RATE: 94 BPM

## 2022-07-12 DIAGNOSIS — G89.3 CANCER-RELATED PAIN: Primary | ICD-10-CM

## 2022-07-12 DIAGNOSIS — Z51.5 PALLIATIVE CARE PATIENT: ICD-10-CM

## 2022-07-12 DIAGNOSIS — C53.0 MALIGNANT NEOPLASM OF ENDOCERVIX (HCC): ICD-10-CM

## 2022-07-12 PROCEDURE — 99213 OFFICE O/P EST LOW 20 MIN: CPT | Performed by: FAMILY MEDICINE

## 2022-07-12 RX ORDER — OXYCODONE HYDROCHLORIDE AND ACETAMINOPHEN 5; 325 MG/1; MG/1
1 TABLET ORAL EVERY 4 HOURS PRN
Qty: 150 TABLET | Refills: 0 | Status: SHIPPED | OUTPATIENT
Start: 2022-07-12 | End: 2022-09-22

## 2022-07-12 RX ORDER — OXYCODONE HYDROCHLORIDE AND ACETAMINOPHEN 5; 325 MG/1; MG/1
1 TABLET ORAL EVERY 4 HOURS PRN
Qty: 150 TABLET | Refills: 0 | Status: SHIPPED | OUTPATIENT
Start: 2022-08-08 | End: 2022-09-15 | Stop reason: SDUPTHER
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
## 2022-07-12 NOTE — ASSESSMENT & PLAN NOTE
Patient has cholelithiasis which is asymptomatic at this time  In the past, the patient was experiencing right upper quadrant pain but I suspect her pain may have been due to peritoneal metastasis and more than likely a cancer related pain    She will continue follow-up with palliative care for pain management

## 2022-07-14 NOTE — PROGRESS NOTES
Multidisciplinary Gynecologic Oncology Tumor Case Review       Physician Recommended Plan     Olga Jones is a 79 y o  female     Diagnosis: Recurrent stage IB2 cervical cancer     Patient was discussed at the Multidisciplinary Gynecologic Oncology Case review on 7/11/2022  The group recommended to consider treatment with tivdak or single-agent topotecan vs continuing pembrolizumab as patient is tolerating  NCCN guidelines were available for review  The final treatment plan will be left to the discretion of the patient and the treating physician  DISCLAIMERS:    TO THE TREATING PHYSICIAN:  This conference is a meeting of clinicians from various specialty areas who evaluate and discuss patients for whom a multidisciplinary treatment approach is being considered  Please note that the above opinion was a consensus of the conference attendees and is intended only to assist in quality care of the discussed patient  The responsibility for follow up on the input given during the conference, along with any final decisions regarding plan of care, is that of the patient and the patient's provider  Accordingly, appointments have only been recommended based on this information and have NOT been scheduled unless otherwise noted  TO THE PATIENT:  This summary is a brief record of major aspects of your cancer treatment  You may choose to share a copy with any of your doctors or nurses  However, this is not a detailed or comprehensive record of your care

## 2022-07-15 ENCOUNTER — TELEPHONE (OUTPATIENT)
Dept: PULMONOLOGY | Facility: CLINIC | Age: 67
End: 2022-07-15

## 2022-07-19 ENCOUNTER — HOSPITAL ENCOUNTER (OUTPATIENT)
Dept: INFUSION CENTER | Facility: HOSPITAL | Age: 67
Discharge: HOME | End: 2022-07-19
Payer: MEDICARE

## 2022-07-19 DIAGNOSIS — E86.0 DEHYDRATION: ICD-10-CM

## 2022-07-19 DIAGNOSIS — C53.0 MALIGNANT NEOPLASM OF ENDOCERVIX (HCC): Primary | ICD-10-CM

## 2022-07-19 DIAGNOSIS — R93.89 ABNORMAL FINDINGS ON DIAGNOSTIC IMAGING OF OTHER SPECIFIED BODY STRUCTURES: ICD-10-CM

## 2022-07-19 DIAGNOSIS — Z45.2 ENCOUNTER FOR VENOUS ACCESS DEVICE CARE: ICD-10-CM

## 2022-07-19 DIAGNOSIS — E78.5 DYSLIPIDEMIA: ICD-10-CM

## 2022-07-19 LAB
ALBUMIN SERPL BCP-MCNC: 3.6 G/DL (ref 3.5–5)
ALP SERPL-CCNC: 129 U/L (ref 46–116)
ALT SERPL W P-5'-P-CCNC: 22 U/L (ref 12–78)
AMYLASE SERPL-CCNC: 105 IU/L (ref 25–115)
ANION GAP SERPL CALCULATED.3IONS-SCNC: 10 MMOL/L (ref 4–13)
AST SERPL W P-5'-P-CCNC: 17 U/L (ref 5–45)
BASOPHILS # BLD AUTO: 0.02 THOUSANDS/ΜL (ref 0–0.1)
BASOPHILS NFR BLD AUTO: 0 % (ref 0–1)
BILIRUB SERPL-MCNC: 0.33 MG/DL (ref 0.2–1)
BUN SERPL-MCNC: 23 MG/DL (ref 5–25)
CALCIUM SERPL-MCNC: 9.4 MG/DL (ref 8.3–10.1)
CHLORIDE SERPL-SCNC: 104 MMOL/L (ref 96–108)
CHOLEST SERPL-MCNC: 233 MG/DL
CO2 SERPL-SCNC: 27 MMOL/L (ref 21–32)
CREAT SERPL-MCNC: 0.95 MG/DL (ref 0.6–1.3)
EOSINOPHIL # BLD AUTO: 0.19 THOUSAND/ΜL (ref 0–0.61)
EOSINOPHIL NFR BLD AUTO: 4 % (ref 0–6)
ERYTHROCYTE [DISTWIDTH] IN BLOOD BY AUTOMATED COUNT: 13.5 % (ref 11.6–15.1)
GFR SERPL CREATININE-BSD FRML MDRD: 62 ML/MIN/1.73SQ M
GLUCOSE SERPL-MCNC: 98 MG/DL (ref 65–140)
HCT VFR BLD AUTO: 36.9 % (ref 34.8–46.1)
HDLC SERPL-MCNC: 49 MG/DL
HGB BLD-MCNC: 11.8 G/DL (ref 11.5–15.4)
IMM GRANULOCYTES # BLD AUTO: 0.01 THOUSAND/UL (ref 0–0.2)
IMM GRANULOCYTES NFR BLD AUTO: 0 % (ref 0–2)
LDLC SERPL CALC-MCNC: 166 MG/DL (ref 0–100)
LIPASE SERPL-CCNC: 113 U/L (ref 73–393)
LYMPHOCYTES # BLD AUTO: 1.01 THOUSANDS/ΜL (ref 0.6–4.47)
LYMPHOCYTES NFR BLD AUTO: 19 % (ref 14–44)
MAGNESIUM SERPL-MCNC: 1.8 MG/DL (ref 1.6–2.6)
MCH RBC QN AUTO: 29.5 PG (ref 26.8–34.3)
MCHC RBC AUTO-ENTMCNC: 32 G/DL (ref 31.4–37.4)
MCV RBC AUTO: 92 FL (ref 82–98)
MONOCYTES # BLD AUTO: 0.48 THOUSAND/ΜL (ref 0.17–1.22)
MONOCYTES NFR BLD AUTO: 9 % (ref 4–12)
NEUTROPHILS # BLD AUTO: 3.58 THOUSANDS/ΜL (ref 1.85–7.62)
NEUTS SEG NFR BLD AUTO: 68 % (ref 43–75)
NONHDLC SERPL-MCNC: 184 MG/DL
NRBC BLD AUTO-RTO: 0 /100 WBCS
PLATELET # BLD AUTO: 172 THOUSANDS/UL (ref 149–390)
PMV BLD AUTO: 9.6 FL (ref 8.9–12.7)
POTASSIUM SERPL-SCNC: 3.8 MMOL/L (ref 3.5–5.3)
PROT SERPL-MCNC: 7.4 G/DL (ref 6.4–8.4)
RBC # BLD AUTO: 4 MILLION/UL (ref 3.81–5.12)
SODIUM SERPL-SCNC: 141 MMOL/L (ref 135–147)
TRIGL SERPL-MCNC: 88 MG/DL
TSH SERPL DL<=0.05 MIU/L-ACNC: 1.39 UIU/ML (ref 0.45–4.5)
WBC # BLD AUTO: 5.29 THOUSAND/UL (ref 4.31–10.16)

## 2022-07-19 PROCEDURE — 80061 LIPID PANEL: CPT

## 2022-07-19 PROCEDURE — 80053 COMPREHEN METABOLIC PANEL: CPT

## 2022-07-19 PROCEDURE — 83690 ASSAY OF LIPASE: CPT

## 2022-07-19 PROCEDURE — 83735 ASSAY OF MAGNESIUM: CPT

## 2022-07-19 PROCEDURE — 84443 ASSAY THYROID STIM HORMONE: CPT

## 2022-07-19 PROCEDURE — 85025 COMPLETE CBC W/AUTO DIFF WBC: CPT

## 2022-07-19 PROCEDURE — 82150 ASSAY OF AMYLASE: CPT

## 2022-07-19 RX ORDER — SODIUM CHLORIDE 9 MG/ML
20 INJECTION, SOLUTION INTRAVENOUS ONCE
Status: CANCELLED | OUTPATIENT
Start: 2022-07-20

## 2022-07-19 NOTE — PROGRESS NOTES
Patient tolerated port access well, no complications  Labs obtained  Port flushed and deaccessed  No distress @ discharge

## 2022-07-20 ENCOUNTER — HOSPITAL ENCOUNTER (OUTPATIENT)
Dept: INFUSION CENTER | Facility: HOSPITAL | Age: 67
Discharge: HOME | End: 2022-07-20
Attending: OBSTETRICS & GYNECOLOGY
Payer: MEDICARE

## 2022-07-20 VITALS
SYSTOLIC BLOOD PRESSURE: 111 MMHG | RESPIRATION RATE: 16 BRPM | DIASTOLIC BLOOD PRESSURE: 56 MMHG | OXYGEN SATURATION: 98 % | TEMPERATURE: 98.3 F | HEART RATE: 80 BPM

## 2022-07-20 DIAGNOSIS — E87.6 HYPOKALEMIA: ICD-10-CM

## 2022-07-20 DIAGNOSIS — C53.0 MALIGNANT NEOPLASM OF ENDOCERVIX (HCC): Primary | ICD-10-CM

## 2022-07-20 PROCEDURE — 96413 CHEMO IV INFUSION 1 HR: CPT

## 2022-07-20 RX ORDER — SODIUM CHLORIDE 9 MG/ML
20 INJECTION, SOLUTION INTRAVENOUS ONCE
Status: COMPLETED | OUTPATIENT
Start: 2022-07-20 | End: 2022-07-20

## 2022-07-20 RX ADMIN — SODIUM CHLORIDE 200 MG: 9 INJECTION, SOLUTION INTRAVENOUS at 11:36

## 2022-07-20 RX ADMIN — SODIUM CHLORIDE 20 ML/HR: 9 INJECTION, SOLUTION INTRAVENOUS at 11:00

## 2022-07-27 ENCOUNTER — TELEPHONE (OUTPATIENT)
Dept: GYNECOLOGIC ONCOLOGY | Facility: CLINIC | Age: 67
End: 2022-07-27

## 2022-07-27 NOTE — TELEPHONE ENCOUNTER
Patient would like to know if her appts 8/9 and 9/20 can be changed to virtual visits  Shes its too much to ride star for only a 5 minute appt

## 2022-08-04 DIAGNOSIS — F32.0 MAJOR DEPRESSIVE DISORDER, SINGLE EPISODE, MILD (HCC): ICD-10-CM

## 2022-08-04 RX ORDER — CITALOPRAM 20 MG/1
TABLET ORAL
Qty: 90 TABLET | Refills: 2 | Status: SHIPPED | OUTPATIENT
Start: 2022-08-04 | End: 2022-08-11 | Stop reason: HOSPADM

## 2022-08-08 ENCOUNTER — HOSPITAL ENCOUNTER (OUTPATIENT)
Dept: INFUSION CENTER | Facility: HOSPITAL | Age: 67
Discharge: HOME | End: 2022-08-08
Payer: MEDICARE

## 2022-08-08 DIAGNOSIS — C53.0 MALIGNANT NEOPLASM OF ENDOCERVIX (HCC): ICD-10-CM

## 2022-08-08 DIAGNOSIS — Z45.2 ENCOUNTER FOR VENOUS ACCESS DEVICE CARE: Primary | ICD-10-CM

## 2022-08-08 DIAGNOSIS — E86.0 DEHYDRATION: ICD-10-CM

## 2022-08-08 DIAGNOSIS — R93.89 ABNORMAL FINDINGS ON DIAGNOSTIC IMAGING OF OTHER SPECIFIED BODY STRUCTURES: ICD-10-CM

## 2022-08-08 LAB
ALBUMIN SERPL BCP-MCNC: 3.7 G/DL (ref 3.5–5)
ALP SERPL-CCNC: 145 U/L (ref 46–116)
ALT SERPL W P-5'-P-CCNC: 24 U/L (ref 12–78)
AMYLASE SERPL-CCNC: 135 IU/L (ref 25–115)
ANION GAP SERPL CALCULATED.3IONS-SCNC: 10 MMOL/L (ref 4–13)
AST SERPL W P-5'-P-CCNC: 16 U/L (ref 5–45)
BASOPHILS # BLD AUTO: 0.02 THOUSANDS/ΜL (ref 0–0.1)
BASOPHILS NFR BLD AUTO: 0 % (ref 0–1)
BILIRUB SERPL-MCNC: 0.27 MG/DL (ref 0.2–1)
BUN SERPL-MCNC: 19 MG/DL (ref 5–25)
CALCIUM SERPL-MCNC: 9.1 MG/DL (ref 8.3–10.1)
CHLORIDE SERPL-SCNC: 102 MMOL/L (ref 96–108)
CO2 SERPL-SCNC: 29 MMOL/L (ref 21–32)
CREAT SERPL-MCNC: 1.1 MG/DL (ref 0.6–1.3)
EOSINOPHIL # BLD AUTO: 0.17 THOUSAND/ΜL (ref 0–0.61)
EOSINOPHIL NFR BLD AUTO: 3 % (ref 0–6)
ERYTHROCYTE [DISTWIDTH] IN BLOOD BY AUTOMATED COUNT: 13.8 % (ref 11.6–15.1)
GFR SERPL CREATININE-BSD FRML MDRD: 52 ML/MIN/1.73SQ M
GLUCOSE P FAST SERPL-MCNC: 107 MG/DL (ref 65–99)
GLUCOSE SERPL-MCNC: 107 MG/DL (ref 65–140)
HCT VFR BLD AUTO: 38.2 % (ref 34.8–46.1)
HGB BLD-MCNC: 12 G/DL (ref 11.5–15.4)
IMM GRANULOCYTES # BLD AUTO: 0.01 THOUSAND/UL (ref 0–0.2)
IMM GRANULOCYTES NFR BLD AUTO: 0 % (ref 0–2)
LIPASE SERPL-CCNC: 139 U/L (ref 73–393)
LYMPHOCYTES # BLD AUTO: 1.02 THOUSANDS/ΜL (ref 0.6–4.47)
LYMPHOCYTES NFR BLD AUTO: 18 % (ref 14–44)
MAGNESIUM SERPL-MCNC: 1.9 MG/DL (ref 1.6–2.6)
MCH RBC QN AUTO: 29.3 PG (ref 26.8–34.3)
MCHC RBC AUTO-ENTMCNC: 31.4 G/DL (ref 31.4–37.4)
MCV RBC AUTO: 93 FL (ref 82–98)
MONOCYTES # BLD AUTO: 0.6 THOUSAND/ΜL (ref 0.17–1.22)
MONOCYTES NFR BLD AUTO: 10 % (ref 4–12)
NEUTROPHILS # BLD AUTO: 4.02 THOUSANDS/ΜL (ref 1.85–7.62)
NEUTS SEG NFR BLD AUTO: 69 % (ref 43–75)
NRBC BLD AUTO-RTO: 0 /100 WBCS
PLATELET # BLD AUTO: 201 THOUSANDS/UL (ref 149–390)
PMV BLD AUTO: 9.5 FL (ref 8.9–12.7)
POTASSIUM SERPL-SCNC: 3.9 MMOL/L (ref 3.5–5.3)
PROT SERPL-MCNC: 7.7 G/DL (ref 6.4–8.4)
RBC # BLD AUTO: 4.1 MILLION/UL (ref 3.81–5.12)
SODIUM SERPL-SCNC: 141 MMOL/L (ref 135–147)
TSH SERPL DL<=0.05 MIU/L-ACNC: 2.21 UIU/ML (ref 0.45–4.5)
WBC # BLD AUTO: 5.84 THOUSAND/UL (ref 4.31–10.16)

## 2022-08-08 PROCEDURE — 80053 COMPREHEN METABOLIC PANEL: CPT

## 2022-08-08 PROCEDURE — 82150 ASSAY OF AMYLASE: CPT

## 2022-08-08 PROCEDURE — 83690 ASSAY OF LIPASE: CPT

## 2022-08-08 PROCEDURE — 85025 COMPLETE CBC W/AUTO DIFF WBC: CPT

## 2022-08-08 PROCEDURE — 83735 ASSAY OF MAGNESIUM: CPT

## 2022-08-08 PROCEDURE — 84443 ASSAY THYROID STIM HORMONE: CPT

## 2022-08-09 ENCOUNTER — TELEPHONE (OUTPATIENT)
Dept: HEMATOLOGY ONCOLOGY | Facility: CLINIC | Age: 67
End: 2022-08-09

## 2022-08-09 ENCOUNTER — TELEPHONE (OUTPATIENT)
Dept: PALLIATIVE MEDICINE | Facility: CLINIC | Age: 67
End: 2022-08-09

## 2022-08-09 ENCOUNTER — TELEMEDICINE (OUTPATIENT)
Dept: GYNECOLOGIC ONCOLOGY | Facility: CLINIC | Age: 67
End: 2022-08-09
Payer: MEDICARE

## 2022-08-09 DIAGNOSIS — C53.0 MALIGNANT NEOPLASM OF ENDOCERVIX (HCC): Primary | ICD-10-CM

## 2022-08-09 PROCEDURE — 99213 OFFICE O/P EST LOW 20 MIN: CPT | Performed by: NURSE PRACTITIONER

## 2022-08-09 NOTE — PROGRESS NOTES
Virtual Regular Visit    Verification of patient location:    Patient is located in the following state in which I hold an active license [place]      Assessment/Plan:    Problem List Items Addressed This Visit        Genitourinary    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[facility] [address] ) - Primary     59-year-old with recurrent stage IB2 cervical cancer, originally diagnosed in 2017, currently stable on palliative Keytruda  She is tolerating treatment well, with the exception of fatigue  She believes this to be related to newly starting Celexa last week  Her PS is 0  Labs reviewed, and are WNL for treatment tomorrow  She will have a CT scan in mid-September to assess disease response  Relevant Orders    CT chest abdomen pelvis w contrast               Reason for visit is pre-chemo eval  Chief Complaint   Patient presents with    Virtual Regular Visit        Encounter provider JACE Ellington    Provider located at [address]  [place]  [phone]      Recent Visits  No visits were found meeting these conditions  Showing recent visits within past 7 days and meeting all other requirements  Today's Visits  Date Type Provider Dept   08/09/22 Telemedicine Renetta Chase, [facility] today's visits and meeting all other requirements  Future Appointments  No visits were found meeting these conditions  Showing future appointments within next 150 days and meeting all other requirements       The patient was identified by name and date of birth  Tish Sebastian was informed that this is a telemedicine visit and that the visit is being conducted through Telephone  Patient has no smart phone or computer and therefore no video capability  My office door was closed  No one else was in the room  She acknowledged consent and understanding of privacy and security of the video platform   The patient has agreed to participate and understands they can discontinue the visit at any time  Patient is aware this is a billable service  Subjective  Deanna Santamaria is a 79 y o  female       Alexandru Like has been feeling more fatigued recently  She was recently started on an anti-depressant by her PCP, and attributes the lethargy to this  She has a follow-up appt in 2 days and plans to discuss with Dr Abel Emerson  From an immunotherapy standpoint, she is doing well  No fevers, nausea, vomiting  Normal bowel and bladder function  No abdominal or pelvic pain, pressure, or bloating  No bleeding or discharge  She is ambulatory and independent at home         Past Medical History:   Diagnosis Date    Acne     Anxiety     Anxiety and depression 10/02/2019    Arthritis     Breathing difficulty     pt does not recall any breathing problem but did have neck pain after procedure"    Cancer ([address] )     cervical/ and "lymph nodes" (GYN)    Cervical cancer (HCC)     Chronic pain disorder     Neck pain    Colon polyp     GERD (gastroesophageal reflux disease)     not now    History of radiation therapy     History of shingles     Irritable bowel syndrome     Joint pain following chemotherapy     and stiffness    Liver disease     "cyst on liver"    Lung nodule     "left"    Muscle weakness     after chemo"    Neck pain     "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"has had neck pain since childhood"    Port-A-Cath in place     right chest//for chemotherapy q 3 weeks and also receives IV hydration    Shortness of breath     "started with cancer, when too much exertion like cleaning/guera doing stairs"    Thumb injury 10/2019    right    Thyroid nodule     Tinnitus of left ear 02/19/2020    Urinary problem     Wrist arthritis 03/17/2020       Past Surgical History:   Procedure Laterality Date    COLONOSCOPY      CYSTOSCOPY W/ RETROGRADES Right 6/3/2021    Procedure: Zac Saab W/RETROGRADE;  Surgeon: Climmie Lombard, MD;  Location: [facility]; Service: Urology    CYSTOSCOPY W/ RETROGRADES Right 10/18/2021    Procedure: CYSTOSCOPY WITH ureteral stent exchange;  Surgeon: Severa Chol, MD;  Location: [facility];  Service: Urology    FL RETROGRADE PYELOGRAM  2/11/2021    FL RETROGRADE PYELOGRAM  6/3/2021    FOOT SURGERY      HYSTERECTOMY      IR BIOPSY LYMPH NODE  2/19/2021    IR PORT PLACEMENT  3/9/2021    OK CYSTOURETHROSCOPY,URETER CATHETER Right 2/11/2021    Procedure: CYSTOSCOPY RETROGRADE PYELOGRAM WITH INSERTION STENT URETERAL;  Surgeon: Severa Chol, MD;  Location: [facility];  Service: Urology    OK CYSTOURETHROSCOPY,URETER CATHETER Right 4/4/2022    Procedure: CYSTOSCOPY  WITH INSERTION STENT URETERAL Right;  Surgeon: Severa Chol, MD;  Location: [facility];  Service: Urology    OK EXCIS INTERDIGITAL Noretta Shows Left 7/12/2018    Procedure: FOOT NEUROMA EXCISION;  Surgeon: Yohana Fan DPM;  Location: [address] MAIN OR;  Service: [facility] Right 6/3/2021    Procedure: EXCHANGE STENT;  Surgeon: David Matthews MD;  Location: [facility];  Service: Urology    TUBAL LIGATION         Current Outpatient Medications   Medication Sig Dispense Refill    citalopram (CeleXA) 20 mg tablet TAKE 1 TABLET BY MOUTH DAILY AT BEDTIME 90 tablet 2    Diclofenac Sodium (VOLTAREN) 1 % Apply 2 g topically 4 (four) times a day 350 g 3    diphenhydrAMINE (BENADRYL) 25 mg capsule Take 50 mg by mouth daily at bedtime      gabapentin (NEURONTIN) 100 mg capsule Take 2 capsules (200 mg total) by mouth daily AND 2 capsules (200 mg total) daily at bedtime  To reduce nerve pain   120 capsule 5    lidocaine-prilocaine (EMLA) cream APPLY TOPICALLY AS NEEDED FOR MILD PAIN 30 g 0    Multiple Vitamins-Minerals (Alive Womens Gummy) CHEW Chew      naloxegol oxalate (Movantik) 25 MG tablet Take 1 tablet (25 mg total) by mouth in the morning 30 tablet 5    NON FORMULARY Start with a pea size amount to entire face at night, OR directions per MD  Use eNurse software on Musely Ramón to guide you daily   omeprazole (PriLOSEC) 40 MG capsule Take 1 capsule (40 mg total) by mouth in the morning  30 capsule 3    oxyCODONE-acetaminophen (Percocet) 5-325 mg per tablet Take 1 tablet by mouth every 4 (four) hours as needed (cancer pain; max of 5 tabs a day) Max Daily Amount: 5 tablets 150 tablet 0    oxyCODONE-acetaminophen (Percocet) 5-325 mg per tablet Take 1 tablet by mouth every 4 (four) hours as needed (cancer pain) Max Daily Amount: 5 tablets 150 tablet 0    polyethylene glycol (MIRALAX) 17 g packet Take 17 g by mouth daily To prevent and reduce constipation from pain meds  Use with Movantik  Use instead of psyllium  30 each 0    senna (SENOKOT) 8 6 MG tablet Take 1 tablet (8 6 mg total) by mouth 2 (two) times a day Take to prevent constipation from pain meds  Hold one dose for loose stool (Patient taking differently: Take 1 tablet by mouth 2 (two) times a day Take to prevent constipation from pain meds  Hold one dose for loose stool) 60 tablet 11    tiotropium (Spiriva Respimat) 1 25 MCG/ACT AERS inhaler Inhale 2 puffs daily 4 g 5    tiZANidine (ZANAFLEX) 4 mg tablet Take 1 tablet (4 mg total) by mouth daily at bedtime 30 tablet 3     No current facility-administered medications for this visit  Allergies   Allergen Reactions    Medical Tape Itching     And redness from adhesive    And "skin raised up" especially longer on skin    Aspirin GI Intolerance    Codeine GI Intolerance    Dust Mite Extract     Pollen Extract Sneezing       Oncology History   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[address] )   12/2016 Initial Diagnosis    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 [facility])       3/29/2017 -  Cancer Staged    Staging form: Cervix Uteri, AJCC 7th Edition  - Clinical stage from 3/29/2017: FIGO Stage IB2, calculated as Stage Unknown (T1b2, NX, M0) - Signed by Fahad Gurrola MD on 2/4/2021  Staged by: Managing physician  Specimen type: Excision  Histopathologic type: Squamous cell carcinoma, NOS  Histologic grade (G): G3  Lymph-vascular invasion (LVI): LVI present/identified, NOS  Residual tumor (R): R0 - None  Pelvic jace status: Negative  Pelvic jace method of assessment: PET  Para-aortic status: Negative  Para-aortic jace method of assessment: PET  Stage used in treatment planning: Yes  National guidelines used in treatment planning: Yes  Type of national guideline used in treatment planning: NCCN       3/29/2017 Surgery    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)  FInal path with incidental 5 cm grade 3 invasive cervical SCCA, +LVI, invasion 9/10 mm cervical stroma, no lymphadenectomy, margins negative ([facility])  2/19/2021 Progression    A  Lymph Node, Left axillary, Biopsy:  - Metastatic squamous cell carcinoma (p16 positive) involving lymph node, compatible with patient's cervical primary  3/2/2021 -  Chemotherapy    Carboplatin AUC 5, Taxol 135 milligram/m2 and Avastin at 15 milligram/kilogram all to be administer on day 1 of a 21 day cycle     7/7/2021 - 2/2/2022 Chemotherapy    bevacizumab (AVASTIN) IVPB, 910 mg, Intravenous, Once, 11 of 16 cycles  Administration: 900 mg (7/7/2021), 900 mg (7/30/2021), 900 mg (8/18/2021), 900 mg (9/8/2021), 900 mg (9/29/2021), 900 mg (10/20/2021), 900 mg (11/10/2021), 900 mg (12/1/2021), 900 mg (12/22/2021), 900 mg (1/12/2022), 900 mg (2/2/2022)     2/22/2022 -  Chemotherapy    pembrolizumab (KEYTRUDA) IVPB, 200 mg, Intravenous, Once, 8 of 12 cycles  Administration: 200 mg (2/22/2022), 200 mg (4/6/2022), 200 mg (4/27/2022), 200 mg (5/18/2022), 200 mg (6/8/2022), 200 mg (3/16/2022), 200 mg (6/29/2022), 200 mg (7/20/2022)     2/23/2022 - 2/23/2022 Chemotherapy    pembrolizumab (KEYTRUDA) IVPB, 200 mg, Intravenous, Once, 0 of 6 cycles         Review of Systems   Constitutional: Positive for fatigue   Negative for activity change, appetite change, chills, fever and unexpected weight change  HENT: Negative for mouth sores  Eyes: Negative  Respiratory: Negative for cough, chest tightness, shortness of breath and wheezing  Cardiovascular: Negative for chest pain, palpitations and leg swelling  Gastrointestinal: Negative for abdominal distention, abdominal pain, anal bleeding, blood in stool, constipation, diarrhea, nausea and vomiting  Endocrine: Negative  Genitourinary: Negative for difficulty urinating, dysuria, flank pain, frequency, hematuria, pelvic pain, urgency, vaginal bleeding, vaginal discharge and vaginal pain  Musculoskeletal: Negative for arthralgias and myalgias  Skin: Negative for color change, pallor and rash  Neurological: Negative for dizziness, weakness, numbness and headaches  Hematological: Negative  Psychiatric/Behavioral: The patient is not nervous/anxious              I spent 15 minutes directly with the patient during this visit

## 2022-08-09 NOTE — ASSESSMENT & PLAN NOTE
20-year-old with recurrent stage IB2 cervical cancer, originally diagnosed in 2017, currently stable on palliative Keytruda  She is tolerating treatment well, with the exception of fatigue  She believes this to be related to newly starting Celexa last week  Her PS is 0  Labs reviewed, and are WNL for treatment tomorrow  She will have a CT scan in mid-September to assess disease response

## 2022-08-10 ENCOUNTER — HOSPITAL ENCOUNTER (OUTPATIENT)
Dept: INFUSION CENTER | Facility: HOSPITAL | Age: 67
Discharge: HOME | End: 2022-08-10
Attending: OBSTETRICS & GYNECOLOGY
Payer: MEDICARE

## 2022-08-10 VITALS
TEMPERATURE: 98.1 F | RESPIRATION RATE: 16 BRPM | OXYGEN SATURATION: 98 % | HEART RATE: 81 BPM | SYSTOLIC BLOOD PRESSURE: 156 MMHG | DIASTOLIC BLOOD PRESSURE: 67 MMHG

## 2022-08-10 DIAGNOSIS — E87.6 HYPOKALEMIA: ICD-10-CM

## 2022-08-10 DIAGNOSIS — C53.0 MALIGNANT NEOPLASM OF ENDOCERVIX (HCC): Primary | ICD-10-CM

## 2022-08-10 PROCEDURE — 96413 CHEMO IV INFUSION 1 HR: CPT

## 2022-08-10 RX ORDER — SODIUM CHLORIDE 9 MG/ML
20 INJECTION, SOLUTION INTRAVENOUS ONCE
Status: COMPLETED | OUTPATIENT
Start: 2022-08-10 | End: 2022-08-10

## 2022-08-10 RX ADMIN — SODIUM CHLORIDE 20 ML/HR: 9 INJECTION, SOLUTION INTRAVENOUS at 10:30

## 2022-08-10 RX ADMIN — SODIUM CHLORIDE 200 MG: 9 INJECTION, SOLUTION INTRAVENOUS at 11:23

## 2022-08-11 ENCOUNTER — OFFICE VISIT (OUTPATIENT)
Dept: FAMILY MEDICINE CLINIC | Facility: CLINIC | Age: 67
End: 2022-08-11
Payer: MEDICARE

## 2022-08-11 VITALS
WEIGHT: 144.4 LBS | OXYGEN SATURATION: 93 % | SYSTOLIC BLOOD PRESSURE: 126 MMHG | BODY MASS INDEX: 29.11 KG/M2 | HEIGHT: 59 IN | TEMPERATURE: 97 F | DIASTOLIC BLOOD PRESSURE: 76 MMHG | HEART RATE: 87 BPM

## 2022-08-11 DIAGNOSIS — F41.9 ANXIETY AND DEPRESSION: Primary | ICD-10-CM

## 2022-08-11 DIAGNOSIS — F32.A ANXIETY AND DEPRESSION: Primary | ICD-10-CM

## 2022-08-11 PROCEDURE — 99213 OFFICE O/P EST LOW 20 MIN: CPT | Performed by: FAMILY MEDICINE

## 2022-08-11 RX ORDER — BUPROPION HYDROCHLORIDE 150 MG/1
150 TABLET ORAL EVERY MORNING
Qty: 30 TABLET | Refills: 2 | Status: SHIPPED | OUTPATIENT
Start: 2022-08-11 | End: 2022-09-05

## 2022-08-11 NOTE — ASSESSMENT & PLAN NOTE
Patient has depression  She is having fatigue from the citalopram and also has not helped her depression  As such, I recommend we discontinue the medication to try something different  She was agreeable  I will have her stop the citalopram effective immediately  On Saturday, she will start bupropion  mg once a day  I am going to see her back again in 4 weeks  If necessary, I will titrate the dose up to 300 mg daily  Hopefully, the bupropion will be more stimulating and will help eliminate the fatigue  The citalopram was not effective in treating her depressive symptoms either  Hopefully will do better with the bupropion

## 2022-08-11 NOTE — PROGRESS NOTES
Assessment/Plan:    Anxiety and depression  Patient has depression  She is having fatigue from the citalopram and also has not helped her depression  As such, I recommend we discontinue the medication to try something different  She was agreeable  I will have her stop the citalopram effective immediately  On Saturday, she will start bupropion  mg once a day  I am going to see her back again in 4 weeks  If necessary, I will titrate the dose up to 300 mg daily  Hopefully, the bupropion will be more stimulating and will help eliminate the fatigue  The citalopram was not effective in treating her depressive symptoms either  Hopefully will do better with the bupropion  Diagnoses and all orders for this visit:    Anxiety and depression  -     buPROPion (WELLBUTRIN XL) 150 mg 24 hr tablet; Take 1 tablet (150 mg total) by mouth every morning          Subjective:      Patient ID: Omer Stapleton is a 79 y o  female  This is a 71-year-old white male presents to the office today for her follow-up visit  The patient reports increased fatigue since she started citalopram   She tells me that the Joaquin An makes her tired but she definitely noticed she is much more tired since she started the citalopram   I asked her if she feels it is helping with the depression  She tells me she is not sure  Initially, when she started it, she did have an upset stomach but that resolved  She wonders if the fatigue will resolve  The following portions of the patient's history were reviewed and updated as appropriate: allergies, current medications, past family history, past medical history, past social history, past surgical history and problem list     Review of Systems   Constitutional: Positive for fatigue  Cardiovascular: Negative for chest pain, palpitations and leg swelling  Gastrointestinal: Negative for abdominal distention, abdominal pain and constipation     Psychiatric/Behavioral: Positive for dysphoric mood  Negative for suicidal ideas  The patient is not nervous/anxious  Objective:      /76   Pulse 87   Temp (!) 97 °F (36 1 °C) (Tympanic)   Ht 4' 11" (1 499 m)   Wt 65 5 kg (144 lb 6 4 oz)   LMP  (LMP Unknown)   SpO2 93%   BMI 29 17 kg/m²          Physical Exam  Vitals reviewed  Constitutional:       Comments: Patient is a 29-year-old  female who appears her stated age  He is pleasant, cooperative, and in no distress  HENT:      Head: Normocephalic and atraumatic  Right Ear: Tympanic membrane, ear canal and external ear normal  There is no impacted cerumen  Left Ear: Tympanic membrane, ear canal and external ear normal  There is no impacted cerumen  Mouth/Throat:      Mouth: Mucous membranes are moist       Pharynx: Oropharynx is clear  No oropharyngeal exudate or posterior oropharyngeal erythema  Eyes:      General: No scleral icterus  Right eye: No discharge  Left eye: No discharge  Conjunctiva/sclera: Conjunctivae normal       Pupils: Pupils are equal, round, and reactive to light  Cardiovascular:      Rate and Rhythm: Normal rate and regular rhythm  Heart sounds: Normal heart sounds  No murmur heard  No friction rub  No gallop  Pulmonary:      Effort: Pulmonary effort is normal  No respiratory distress  Breath sounds: Normal breath sounds  No stridor  No wheezing, rhonchi or rales  Musculoskeletal:      Cervical back: Neck supple  Lymphadenopathy:      Cervical: No cervical adenopathy  Psychiatric:         Mood and Affect: Mood normal          Behavior: Behavior normal          Thought Content:  Thought content normal          Judgment: Judgment normal        extremities:  Without cyanosis, clubbing, or edema

## 2022-08-12 DIAGNOSIS — R10.13 EPIGASTRIC PAIN: ICD-10-CM

## 2022-08-12 RX ORDER — OMEPRAZOLE 40 MG/1
CAPSULE, DELAYED RELEASE ORAL
Qty: 90 CAPSULE | Refills: 1 | Status: SHIPPED | OUTPATIENT
Start: 2022-08-12

## 2022-08-30 ENCOUNTER — HOSPITAL ENCOUNTER (OUTPATIENT)
Dept: INFUSION CENTER | Facility: HOSPITAL | Age: 67
Discharge: HOME | End: 2022-08-30
Payer: MEDICARE

## 2022-08-30 DIAGNOSIS — E86.0 DEHYDRATION: ICD-10-CM

## 2022-08-30 DIAGNOSIS — C53.0 MALIGNANT NEOPLASM OF ENDOCERVIX (HCC): Primary | ICD-10-CM

## 2022-08-30 DIAGNOSIS — R93.89 ABNORMAL FINDINGS ON DIAGNOSTIC IMAGING OF OTHER SPECIFIED BODY STRUCTURES: ICD-10-CM

## 2022-08-30 DIAGNOSIS — Z45.2 ENCOUNTER FOR VENOUS ACCESS DEVICE CARE: ICD-10-CM

## 2022-08-30 LAB
ALBUMIN SERPL BCP-MCNC: 3.6 G/DL (ref 3.5–5)
ALP SERPL-CCNC: 134 U/L (ref 46–116)
ALT SERPL W P-5'-P-CCNC: 24 U/L (ref 12–78)
AMYLASE SERPL-CCNC: 104 IU/L (ref 25–115)
ANION GAP SERPL CALCULATED.3IONS-SCNC: 7 MMOL/L (ref 4–13)
AST SERPL W P-5'-P-CCNC: 20 U/L (ref 5–45)
BASOPHILS # BLD AUTO: 0.03 THOUSANDS/ΜL (ref 0–0.1)
BASOPHILS NFR BLD AUTO: 1 % (ref 0–1)
BILIRUB SERPL-MCNC: 0.28 MG/DL (ref 0.2–1)
BUN SERPL-MCNC: 15 MG/DL (ref 5–25)
CALCIUM SERPL-MCNC: 9.4 MG/DL (ref 8.3–10.1)
CHLORIDE SERPL-SCNC: 105 MMOL/L (ref 96–108)
CO2 SERPL-SCNC: 28 MMOL/L (ref 21–32)
CREAT SERPL-MCNC: 0.95 MG/DL (ref 0.6–1.3)
EOSINOPHIL # BLD AUTO: 0.1 THOUSAND/ΜL (ref 0–0.61)
EOSINOPHIL NFR BLD AUTO: 2 % (ref 0–6)
ERYTHROCYTE [DISTWIDTH] IN BLOOD BY AUTOMATED COUNT: 14.2 % (ref 11.6–15.1)
GFR SERPL CREATININE-BSD FRML MDRD: 62 ML/MIN/1.73SQ M
GLUCOSE P FAST SERPL-MCNC: 102 MG/DL (ref 65–99)
GLUCOSE SERPL-MCNC: 102 MG/DL (ref 65–140)
HCT VFR BLD AUTO: 36.1 % (ref 34.8–46.1)
HGB BLD-MCNC: 11.4 G/DL (ref 11.5–15.4)
IMM GRANULOCYTES # BLD AUTO: 0.02 THOUSAND/UL (ref 0–0.2)
IMM GRANULOCYTES NFR BLD AUTO: 0 % (ref 0–2)
LIPASE SERPL-CCNC: 75 U/L (ref 73–393)
LYMPHOCYTES # BLD AUTO: 0.81 THOUSANDS/ΜL (ref 0.6–4.47)
LYMPHOCYTES NFR BLD AUTO: 18 % (ref 14–44)
MAGNESIUM SERPL-MCNC: 2.1 MG/DL (ref 1.6–2.6)
MCH RBC QN AUTO: 29.8 PG (ref 26.8–34.3)
MCHC RBC AUTO-ENTMCNC: 31.6 G/DL (ref 31.4–37.4)
MCV RBC AUTO: 94 FL (ref 82–98)
MONOCYTES # BLD AUTO: 0.61 THOUSAND/ΜL (ref 0.17–1.22)
MONOCYTES NFR BLD AUTO: 13 % (ref 4–12)
NEUTROPHILS # BLD AUTO: 3.03 THOUSANDS/ΜL (ref 1.85–7.62)
NEUTS SEG NFR BLD AUTO: 66 % (ref 43–75)
NRBC BLD AUTO-RTO: 0 /100 WBCS
PLATELET # BLD AUTO: 166 THOUSANDS/UL (ref 149–390)
PMV BLD AUTO: 9.2 FL (ref 8.9–12.7)
POTASSIUM SERPL-SCNC: 3.8 MMOL/L (ref 3.5–5.3)
PROT SERPL-MCNC: 7.5 G/DL (ref 6.4–8.4)
RBC # BLD AUTO: 3.83 MILLION/UL (ref 3.81–5.12)
SODIUM SERPL-SCNC: 140 MMOL/L (ref 135–147)
TSH SERPL DL<=0.05 MIU/L-ACNC: 0.76 UIU/ML (ref 0.45–4.5)
WBC # BLD AUTO: 4.6 THOUSAND/UL (ref 4.31–10.16)

## 2022-08-30 PROCEDURE — 80053 COMPREHEN METABOLIC PANEL: CPT

## 2022-08-30 PROCEDURE — 83690 ASSAY OF LIPASE: CPT

## 2022-08-30 PROCEDURE — 82150 ASSAY OF AMYLASE: CPT

## 2022-08-30 PROCEDURE — 84443 ASSAY THYROID STIM HORMONE: CPT

## 2022-08-30 PROCEDURE — 83735 ASSAY OF MAGNESIUM: CPT

## 2022-08-30 PROCEDURE — 85025 COMPLETE CBC W/AUTO DIFF WBC: CPT

## 2022-08-30 RX ORDER — SODIUM CHLORIDE 9 MG/ML
20 INJECTION, SOLUTION INTRAVENOUS ONCE
Status: CANCELLED | OUTPATIENT
Start: 2022-08-31

## 2022-08-31 ENCOUNTER — HOSPITAL ENCOUNTER (OUTPATIENT)
Dept: INFUSION CENTER | Facility: HOSPITAL | Age: 67
Discharge: HOME | End: 2022-08-31
Attending: OBSTETRICS & GYNECOLOGY
Payer: MEDICARE

## 2022-08-31 VITALS
HEART RATE: 92 BPM | RESPIRATION RATE: 16 BRPM | DIASTOLIC BLOOD PRESSURE: 67 MMHG | TEMPERATURE: 97.2 F | SYSTOLIC BLOOD PRESSURE: 158 MMHG | OXYGEN SATURATION: 98 %

## 2022-08-31 DIAGNOSIS — E87.6 HYPOKALEMIA: ICD-10-CM

## 2022-08-31 DIAGNOSIS — C53.0 MALIGNANT NEOPLASM OF ENDOCERVIX (HCC): Primary | ICD-10-CM

## 2022-08-31 PROCEDURE — 96413 CHEMO IV INFUSION 1 HR: CPT

## 2022-08-31 RX ORDER — SODIUM CHLORIDE 9 MG/ML
20 INJECTION, SOLUTION INTRAVENOUS ONCE
Status: COMPLETED | OUTPATIENT
Start: 2022-08-31 | End: 2022-08-31

## 2022-08-31 RX ADMIN — SODIUM CHLORIDE 20 ML/HR: 0.9 INJECTION, SOLUTION INTRAVENOUS at 11:01

## 2022-08-31 RX ADMIN — SODIUM CHLORIDE 200 MG: 9 INJECTION, SOLUTION INTRAVENOUS at 11:29

## 2022-09-04 DIAGNOSIS — F41.9 ANXIETY AND DEPRESSION: ICD-10-CM

## 2022-09-04 DIAGNOSIS — F32.A ANXIETY AND DEPRESSION: ICD-10-CM

## 2022-09-05 RX ORDER — BUPROPION HYDROCHLORIDE 150 MG/1
TABLET ORAL
Qty: 90 TABLET | Refills: 1 | Status: SHIPPED | OUTPATIENT
Start: 2022-09-05

## 2022-09-14 ENCOUNTER — HOSPITAL ENCOUNTER (OUTPATIENT)
Dept: CT IMAGING | Facility: HOSPITAL | Age: 67
Discharge: HOME | End: 2022-09-14
Payer: MEDICARE

## 2022-09-14 DIAGNOSIS — C53.0 MALIGNANT NEOPLASM OF ENDOCERVIX (HCC): ICD-10-CM

## 2022-09-14 PROCEDURE — 71260 CT THORAX DX C+: CPT

## 2022-09-14 PROCEDURE — 74177 CT ABD & PELVIS W/CONTRAST: CPT

## 2022-09-14 RX ADMIN — IOHEXOL 100 ML: 350 INJECTION, SOLUTION INTRAVENOUS at 10:52

## 2022-09-14 RX ADMIN — IOHEXOL 50 ML: 240 INJECTION, SOLUTION INTRATHECAL; INTRAVASCULAR; INTRAVENOUS; ORAL at 10:52

## 2022-09-15 ENCOUNTER — OFFICE VISIT (OUTPATIENT)
Dept: FAMILY MEDICINE CLINIC | Facility: CLINIC | Age: 67
End: 2022-09-15
Payer: MEDICARE

## 2022-09-15 VITALS
SYSTOLIC BLOOD PRESSURE: 130 MMHG | TEMPERATURE: 99.1 F | BODY MASS INDEX: 29.21 KG/M2 | WEIGHT: 144.9 LBS | OXYGEN SATURATION: 99 % | HEART RATE: 88 BPM | HEIGHT: 59 IN | DIASTOLIC BLOOD PRESSURE: 68 MMHG

## 2022-09-15 DIAGNOSIS — F99 INSOMNIA DUE TO OTHER MENTAL DISORDER: Primary | ICD-10-CM

## 2022-09-15 DIAGNOSIS — Z23 ENCOUNTER FOR IMMUNIZATION: ICD-10-CM

## 2022-09-15 DIAGNOSIS — F32.0 MAJOR DEPRESSIVE DISORDER, SINGLE EPISODE, MILD (HCC): ICD-10-CM

## 2022-09-15 DIAGNOSIS — F51.05 INSOMNIA DUE TO OTHER MENTAL DISORDER: Primary | ICD-10-CM

## 2022-09-15 DIAGNOSIS — H92.02 LEFT EAR PAIN: ICD-10-CM

## 2022-09-15 PROCEDURE — G0008 ADMIN INFLUENZA VIRUS VAC: HCPCS

## 2022-09-15 PROCEDURE — 99214 OFFICE O/P EST MOD 30 MIN: CPT | Performed by: FAMILY MEDICINE

## 2022-09-15 PROCEDURE — 90662 IIV NO PRSV INCREASED AG IM: CPT

## 2022-09-15 RX ORDER — TRAZODONE HYDROCHLORIDE 50 MG/1
TABLET ORAL
Qty: 60 TABLET | Refills: 5 | Status: SHIPPED | OUTPATIENT
Start: 2022-09-15 | End: 2022-10-08

## 2022-09-15 NOTE — ASSESSMENT & PLAN NOTE
I think that the patient is doing well and bupropion  mg daily  I told the patient that dose can be increased  However, she is doing well on this dose so I think we should continue her on the 150 mg daily dose  She agrees

## 2022-09-15 NOTE — ASSESSMENT & PLAN NOTE
The patient's ear exam was unremarkable  However, she does have a palpable and audible jaw click on the left  I think her ear pain is secondary to TMJ  We discussed this

## 2022-09-15 NOTE — ASSESSMENT & PLAN NOTE
Patient has insomnia, likely secondary to her underlying depression  I started the patient on trazodone 50 mg   She may take 1 or 2 at bedtime as needed for sleep

## 2022-09-15 NOTE — PROGRESS NOTES
Name: Jaspal Gasca      : 1955      MRN: [id number]  Encounter Provider: Jona Segal DO  Encounter Date: 9/15/2022   Encounter department: [place] [address] [facility]    Assessment & Plan     1  Insomnia due to other mental disorder  Assessment & Plan:  Patient has insomnia, likely secondary to her underlying depression  I started the patient on trazodone 50 mg  She may take 1 or 2 at bedtime as needed for sleep    Orders:  -     traZODone (DESYREL) 50 mg tablet; Take 1-2 at bedtime    2  Encounter for immunization  -     influenza vaccine, high-dose, PF 0 7 mL (FLUZONE HIGH-DOSE)    3  Major depressive disorder, single episode, mild (HCC)  Assessment & Plan:  I think that the patient is doing well and bupropion  mg daily  I told the patient that dose can be increased  However, she is doing well on this dose so I think we should continue her on the 150 mg daily dose  She agrees  4  Left ear pain  Assessment & Plan:  The patient's ear exam was unremarkable  However, she does have a palpable and audible jaw click on the left  I think her ear pain is secondary to TMJ  We discussed this  Subjective      This is a 71-year-old  female who presents to the office today for her follow-up visit  At the time of her last office visit, her antidepressant was switched to bupropion  mg daily  The patient tells me that she is a lot better  She tells me she is not feeling depressed  She is now getting a lot of things done around the home  She states I have energy now  She tells me she still having trouble sleeping at night and as a result of that often feels tired  Review of Systems   Constitutional: Positive for fatigue  HENT: Positive for ear pain  Cardiovascular: Negative for chest pain, palpitations and leg swelling  Gastrointestinal: Negative for abdominal distention, abdominal pain, blood in stool, constipation, diarrhea and nausea  Psychiatric/Behavioral: Positive for sleep disturbance  Negative for dysphoric mood  The patient is not nervous/anxious  Current Outpatient Medications on File Prior to Visit   Medication Sig    buPROPion (WELLBUTRIN XL) 150 mg 24 hr tablet TAKE 1 TABLET BY MOUTH EVERY DAY IN THE MORNING    Diclofenac Sodium (VOLTAREN) 1 % Apply 2 g topically 4 (four) times a day    diphenhydrAMINE (BENADRYL) 25 mg capsule Take 50 mg by mouth daily at bedtime    gabapentin (NEURONTIN) 100 mg capsule Take 2 capsules (200 mg total) by mouth daily AND 2 capsules (200 mg total) daily at bedtime  To reduce nerve pain   lidocaine-prilocaine (EMLA) cream APPLY TOPICALLY AS NEEDED FOR MILD PAIN    Multiple Vitamins-Minerals (Alive Womens Gummy) CHEW Chew    naloxegol oxalate (Movantik) 25 MG tablet Take 1 tablet (25 mg total) by mouth in the morning    omeprazole (PriLOSEC) 40 MG capsule TAKE 1 CAPSULE BY MOUTH EVERY DAY IN THE MORNING    oxyCODONE-acetaminophen (Percocet) 5-325 mg per tablet Take 1 tablet by mouth every 4 (four) hours as needed (cancer pain; max of 5 tabs a day) Max Daily Amount: 5 tablets    polyethylene glycol (MIRALAX) 17 g packet Take 17 g by mouth daily To prevent and reduce constipation from pain meds  Use with Movantik  Use instead of psyllium   senna (SENOKOT) 8 6 MG tablet Take 1 tablet (8 6 mg total) by mouth 2 (two) times a day Take to prevent constipation from pain meds  Hold one dose for loose stool (Patient not taking: Reported on 9/15/2022)    tiotropium (Spiriva Respimat) 1 25 MCG/ACT AERS inhaler Inhale 2 puffs daily (Patient not taking: Reported on 9/15/2022)    [DISCONTINUED] famotidine (PEPCID) 20 mg tablet Take 20 mg by mouth daily      [DISCONTINUED] NON FORMULARY Start with a pea size amount to entire face at night, OR directions per MD  Use eNurse software on [employer] to guide you daily      [DISCONTINUED] oxyCODONE-acetaminophen (Percocet) 5-325 mg per tablet Take 1 tablet by mouth every 4 (four) hours as needed (cancer pain) Max Daily Amount: 5 tablets    [DISCONTINUED] tiZANidine (ZANAFLEX) 4 mg tablet Take 1 tablet (4 mg total) by mouth daily at bedtime (Patient not taking: Reported on 9/15/2022)       Objective     /68   Pulse 88   Temp 99 1 °F (37 3 °C) (Tympanic)   Ht 4' 11" (1 499 m)   Wt 65 7 kg (144 lb 14 4 oz)   LMP  (LMP Unknown)   SpO2 99%   BMI 29 27 kg/m²     Physical Exam  Vitals reviewed  Constitutional:       Comments: This is a 49-year-old  female who appears her stated age  She is in no apparent distress   HENT:      Head: Normocephalic and atraumatic  Right Ear: Tympanic membrane, ear canal and external ear normal       Left Ear: Tympanic membrane, ear canal and external ear normal       Mouth/Throat:      Mouth: Mucous membranes are moist       Pharynx: Oropharynx is clear  No oropharyngeal exudate  Comments: Palpable and audible jaw click noted on the left  Eyes:      General: No scleral icterus  Right eye: No discharge  Left eye: No discharge  Conjunctiva/sclera: Conjunctivae normal       Pupils: Pupils are equal, round, and reactive to light  Cardiovascular:      Rate and Rhythm: Normal rate and regular rhythm  Pulses: Normal pulses  Heart sounds: Normal heart sounds  No murmur heard  No friction rub  No gallop  Pulmonary:      Effort: Pulmonary effort is normal  No respiratory distress  Breath sounds: Normal breath sounds  No stridor  No wheezing, rhonchi or rales  Abdominal:      General: Bowel sounds are normal  There is no distension  Palpations: Abdomen is soft  There is no mass  Tenderness: There is no abdominal tenderness  There is no guarding  Musculoskeletal:      Cervical back: Neck supple  Lymphadenopathy:      Cervical: No cervical adenopathy     Psychiatric:         Mood and Affect: Mood normal          Behavior: Behavior normal          Thought Content:  Thought content normal          Judgment: Judgment normal        Veneda Bernheim, DO

## 2022-09-19 ENCOUNTER — TELEPHONE (OUTPATIENT)
Dept: GYNECOLOGIC ONCOLOGY | Facility: CLINIC | Age: 67
End: 2022-09-19

## 2022-09-19 ENCOUNTER — HOSPITAL ENCOUNTER (OUTPATIENT)
Dept: INFUSION CENTER | Facility: HOSPITAL | Age: 67
Discharge: HOME | End: 2022-09-19
Payer: MEDICARE

## 2022-09-19 VITALS — TEMPERATURE: 98.3 F

## 2022-09-19 DIAGNOSIS — Z45.2 ENCOUNTER FOR VENOUS ACCESS DEVICE CARE: Primary | ICD-10-CM

## 2022-09-19 DIAGNOSIS — R93.89 ABNORMAL FINDINGS ON DIAGNOSTIC IMAGING OF OTHER SPECIFIED BODY STRUCTURES: ICD-10-CM

## 2022-09-19 DIAGNOSIS — C53.0 MALIGNANT NEOPLASM OF ENDOCERVIX (HCC): ICD-10-CM

## 2022-09-19 DIAGNOSIS — E86.0 DEHYDRATION: ICD-10-CM

## 2022-09-19 LAB
ALBUMIN SERPL BCP-MCNC: 3.5 G/DL (ref 3.5–5)
ALP SERPL-CCNC: 138 U/L (ref 46–116)
ALT SERPL W P-5'-P-CCNC: 22 U/L (ref 12–78)
AMYLASE SERPL-CCNC: 98 IU/L (ref 25–115)
ANION GAP SERPL CALCULATED.3IONS-SCNC: 7 MMOL/L (ref 4–13)
AST SERPL W P-5'-P-CCNC: 18 U/L (ref 5–45)
BASOPHILS # BLD AUTO: 0.01 THOUSANDS/ΜL (ref 0–0.1)
BASOPHILS NFR BLD AUTO: 0 % (ref 0–1)
BILIRUB SERPL-MCNC: 0.23 MG/DL (ref 0.2–1)
BUN SERPL-MCNC: 20 MG/DL (ref 5–25)
CALCIUM SERPL-MCNC: 9.7 MG/DL (ref 8.3–10.1)
CHLORIDE SERPL-SCNC: 104 MMOL/L (ref 96–108)
CO2 SERPL-SCNC: 29 MMOL/L (ref 21–32)
CREAT SERPL-MCNC: 1.02 MG/DL (ref 0.6–1.3)
EOSINOPHIL # BLD AUTO: 0.08 THOUSAND/ΜL (ref 0–0.61)
EOSINOPHIL NFR BLD AUTO: 2 % (ref 0–6)
ERYTHROCYTE [DISTWIDTH] IN BLOOD BY AUTOMATED COUNT: 14.5 % (ref 11.6–15.1)
GFR SERPL CREATININE-BSD FRML MDRD: 57 ML/MIN/1.73SQ M
GLUCOSE P FAST SERPL-MCNC: 106 MG/DL (ref 65–99)
GLUCOSE SERPL-MCNC: 106 MG/DL (ref 65–140)
HCT VFR BLD AUTO: 35.6 % (ref 34.8–46.1)
HGB BLD-MCNC: 11.2 G/DL (ref 11.5–15.4)
IMM GRANULOCYTES # BLD AUTO: 0.01 THOUSAND/UL (ref 0–0.2)
IMM GRANULOCYTES NFR BLD AUTO: 0 % (ref 0–2)
LIPASE SERPL-CCNC: 82 U/L (ref 73–393)
LYMPHOCYTES # BLD AUTO: 0.79 THOUSANDS/ΜL (ref 0.6–4.47)
LYMPHOCYTES NFR BLD AUTO: 17 % (ref 14–44)
MAGNESIUM SERPL-MCNC: 1.9 MG/DL (ref 1.6–2.6)
MCH RBC QN AUTO: 29.9 PG (ref 26.8–34.3)
MCHC RBC AUTO-ENTMCNC: 31.5 G/DL (ref 31.4–37.4)
MCV RBC AUTO: 95 FL (ref 82–98)
MONOCYTES # BLD AUTO: 0.46 THOUSAND/ΜL (ref 0.17–1.22)
MONOCYTES NFR BLD AUTO: 10 % (ref 4–12)
NEUTROPHILS # BLD AUTO: 3.44 THOUSANDS/ΜL (ref 1.85–7.62)
NEUTS SEG NFR BLD AUTO: 71 % (ref 43–75)
NRBC BLD AUTO-RTO: 0 /100 WBCS
PLATELET # BLD AUTO: 171 THOUSANDS/UL (ref 149–390)
PMV BLD AUTO: 9.4 FL (ref 8.9–12.7)
POTASSIUM SERPL-SCNC: 4.3 MMOL/L (ref 3.5–5.3)
PROT SERPL-MCNC: 7.5 G/DL (ref 6.4–8.4)
RBC # BLD AUTO: 3.74 MILLION/UL (ref 3.81–5.12)
SODIUM SERPL-SCNC: 140 MMOL/L (ref 135–147)
TSH SERPL DL<=0.05 MIU/L-ACNC: 1.06 UIU/ML (ref 0.45–4.5)
WBC # BLD AUTO: 4.79 THOUSAND/UL (ref 4.31–10.16)

## 2022-09-19 PROCEDURE — 82150 ASSAY OF AMYLASE: CPT

## 2022-09-19 PROCEDURE — 85025 COMPLETE CBC W/AUTO DIFF WBC: CPT

## 2022-09-19 PROCEDURE — 84443 ASSAY THYROID STIM HORMONE: CPT

## 2022-09-19 PROCEDURE — 80053 COMPREHEN METABOLIC PANEL: CPT

## 2022-09-19 PROCEDURE — 83690 ASSAY OF LIPASE: CPT

## 2022-09-19 PROCEDURE — 83735 ASSAY OF MAGNESIUM: CPT

## 2022-09-20 ENCOUNTER — TELEMEDICINE (OUTPATIENT)
Dept: GYNECOLOGIC ONCOLOGY | Facility: CLINIC | Age: 67
End: 2022-09-20
Payer: MEDICARE

## 2022-09-20 DIAGNOSIS — E87.6 HYPOKALEMIA: ICD-10-CM

## 2022-09-20 DIAGNOSIS — C53.0 MALIGNANT NEOPLASM OF ENDOCERVIX (HCC): Primary | ICD-10-CM

## 2022-09-20 DIAGNOSIS — N13.30 HYDRONEPHROSIS, UNSPECIFIED HYDRONEPHROSIS TYPE: ICD-10-CM

## 2022-09-20 PROCEDURE — 99442 PR PHYS/QHP TELEPHONE EVALUATION 11-20 MIN: CPT | Performed by: OBSTETRICS & GYNECOLOGY

## 2022-09-20 RX ORDER — SODIUM CHLORIDE 9 MG/ML
20 INJECTION, SOLUTION INTRAVENOUS ONCE
Status: CANCELLED | OUTPATIENT
Start: 2022-09-21

## 2022-09-20 NOTE — PROGRESS NOTES
Virtual Brief Visit    Patient is located in the following state in which I hold an active license [place]  It was my intent to perform this visit via video technology but the patient was not able to do a video connection so the visit was completed via audio telephone only  Patient with recurrent metastatic cervical cancer on Keytruda  Now CT scan demonstrates evidence of disease progression  Oncology History   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[address] )   12/2016 Initial Diagnosis    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)  3/29/2017 -  Cancer Staged    Staging form: Cervix Uteri, AJCC 7th Edition  - Clinical stage from 3/29/2017: FIGO Stage IB2, calculated as Stage Unknown (T1b2, NX, M0) - Signed by Mateus Sol MD on 2/4/2021  Staged by: Managing physician  Specimen type: Excision  Histopathologic type: Squamous cell carcinoma, NOS  Histologic grade (G): G3  Lymph-vascular invasion (LVI): LVI present/identified, NOS  Residual tumor (R): R0 - None  Pelvic jace status: Negative  Pelvic jace method of assessment: PET  Para-aortic status: Negative  Para-aortic jace method of assessment: PET  Stage used in treatment planning: Yes  National guidelines used in treatment planning: Yes  Type of national guideline used in treatment planning: NCCN       3/29/2017 Surgery    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)  FInal path with incidental 5 cm grade 3 invasive cervical SCCA, +LVI, invasion 9/10 mm cervical stroma, no lymphadenectomy, margins negative ([facility])  2/19/2021 Progression    A  Lymph Node, Left axillary, Biopsy:  - Metastatic squamous cell carcinoma (p16 positive) involving lymph node, compatible with patient's cervical primary         3/2/2021 -  Chemotherapy    Carboplatin AUC 5, Taxol 135 milligram/m2 and Avastin at 15 milligram/kilogram all to be administer on day 1 of a 21 day cycle     7/7/2021 - 2/2/2022 Chemotherapy    bevacizumab (AVASTIN) IVPB, 910 mg, Intravenous, Once, 11 of 16 cycles  Administration: 900 mg (7/7/2021), 900 mg (7/30/2021), 900 mg (8/18/2021), 900 mg (9/8/2021), 900 mg (9/29/2021), 900 mg (10/20/2021), 900 mg (11/10/2021), 900 mg (12/1/2021), 900 mg (12/22/2021), 900 mg (1/12/2022), 900 mg (2/2/2022)     2/22/2022 - 8/31/2022 Chemotherapy    pembrolizumab (KEYTRUDA) IVPB, 200 mg, Intravenous, Once, 10 of 12 cycles  Administration: 200 mg (2/22/2022), 200 mg (4/6/2022), 200 mg (4/27/2022), 200 mg (5/18/2022), 200 mg (6/8/2022), 200 mg (3/16/2022), 200 mg (6/29/2022), 200 mg (7/20/2022), 200 mg (8/10/2022), 200 mg (8/31/2022)     2/23/2022 - 2/23/2022 Chemotherapy    pembrolizumab (KEYTRUDA) IVPB, 200 mg, Intravenous, Once, 0 of 6 cycles     9/21/2022 -  Chemotherapy    Pegfilgrastim-bmez (ZIEXTENZO), 6 mg, Subcutaneous, Once, 0 of 4 cycles  topotecan (HYCAMTIN) chemo infusion, 3 mg/m2 = 4 8 mg, Intravenous, Once, 0 of 4 cycles         Assessment/Plan:    Problem List Items Addressed This Visit        Genitourinary    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[address] ) - Primary     Now with evidence of disease progression  I discussed with patient again incurable nature of her disease  We discussed possibility of best supportive care versus tumor directed therapy  She is still interested in tumor directed therapies  Unfortunately, she is not inclined to go to [place] [id number] for consideration of clinical trials due to social/transportation difficulties  She is interested in treatment close to home  With this in mind, I offered and she agreed to receive palliative cytotoxic chemotherapy  She consented to topotecan which will be dosed at 3 milligram/m2 on days 1, 8 and 15 of a 28 day cycle  Will check clinical and hematologic/biochemical parameters prior to each infusion  I discussed with patient rationale, logistics, common side effects and toxicities associated with chemotherapy regimen    She understands toxicities include but are not limited to alopecia, fatigue, decreased appetite, nausea and vomiting, peripheral neuropathy, bone marrow suppression ( anemia, neutropenia and or thrombocytopenia) with subsequent risk of life-threatening infection or hemorrhage, kidney and or liver damage, etc   Patient verbalizes understanding, agrees and wants to proceed  Patient also consents to blood transfusions if those are deemed necessary then the course of treatment  She understands risks include but are not limited to hypersensitivity reactions and infection with blood borne pathogens  Patient verbally consented to the aforementioned treatments and her consent was documented in informed consent forms accordingly  Hydronephrosis     Now worsening despite stent  Will communicate with Dr Efraín Boyle  Patient may benefit from stent removal and PCN placement at this time  Recent Visits  Date Type Provider Dept   09/19/22 Telephone Markie Faria MD [address] [facility]   09/15/22 Office Visit Dia Pichardo DO  [facility]   Showing recent visits within past 7 days and meeting all other requirements  Today's Visits  Date Type Provider Dept   [facility] visits and meeting all other requirements  Future Appointments  No visits were found meeting these conditions  Showing future appointments within next 150 days and meeting all other requirements     9/14/2022   Narrative & Impression   CT CHEST, ABDOMEN AND PELVIS WITH IV CONTRAST     INDICATION:   C53 0: Malignant neoplasm of endocervix      COMPARISON:  Multiple priors, recently an FDG PET CT 6/7/2022     TECHNIQUE: CT examination of the chest, abdomen and pelvis was performed   Axial, sagittal, and coronal 2D reformatted images were created from the source data and submitted for interpretation      Radiation dose length product (DLP) for this visit:  642 63 mGy-cm   This examination, like all CT scans performed in the [facility], was performed utilizing techniques to minimize radiation dose exposure, including the use of iterative   reconstruction and automated exposure control      IV Contrast:  50 mL of iohexol (OMNIPAQUE) 100 mL of iohexol (OMNIPAQUE)  Enteric Contrast: Enteric contrast was administered      FINDINGS:     CHEST     LUNGS:  Again seen are a few scattered sub-4 mm pulmonary nodules, which are unchanged over multiple prior studies  No new or enlarging pulmonary nodules  No consolidation or pulmonary edema  No interstitial lung disease      PLEURA:  Unremarkable      HEART/GREAT VESSELS: Heart is unremarkable for patient's age  No thoracic aortic aneurysm      MEDIASTINUM AND ELEN:  Unremarkable      CHEST WALL AND LOWER NECK:  Mild interval increase in size of a 1 2 x 1 0 cm left subpectoral lymph node (series 2, image 11), previously 1 0 x 0 8 cm on June 7, 2022  Right chest port catheter in stable position      ABDOMEN     LIVER/BILIARY TREE:  Stable subcentimeter low-attenuation lesion within the anterior segment of the right hepatic lobe (series 2, image 63)  Stable 1 9 cm hyperenhancing lesion within the lateral right hepatic lobe (series 2, image 52), unchanged from   studies dating back to 4/10/2017 and probably represents an area of shunting or flash hemangioma      GALLBLADDER:  No calcified gallstones  No pericholecystic inflammatory change      SPLEEN:  Unremarkable      PANCREAS:  Unremarkable      ADRENAL GLANDS:  Unremarkable      KIDNEYS/URETERS:    New dilation of the right renal pelvis that extends to the UPJ with indwelling right ureteral stent that extends into the urinary bladder  The right ureter itself is not dilated    There is haziness surrounding the right UPJ which may be related to   inflammation or tumor infiltration      STOMACH AND BOWEL:  Sigmoid diverticulosis      APPENDIX:  No findings to suggest appendicitis      ABDOMINOPELVIC CAVITY:  No ascites  No pneumoperitoneum  Progression of retroperitoneal lymph nodes compared to the PET CT of June 7, 2022, including:     - 1 3 x 1 0 cm left paraaortic lymph node (series 2, image 64), previously 1 0 x 1 0 cm 6/7/2022  -1 1 x 2 2 cm lymph node medial to the right adrenal gland (series 2, image 51), previously 0 8 x 1 9 cm on 6/7/2022     There is also increase in size of an omental metastasis in the left lower quadrant, now measuring 2 6 x 1 4 cm (series 2, image 80), previously 1 4 x 1 3 cm on 6/7/2022      New 0 9 x 1 2 cm soft tissue density lesion inferior to the right kidney (series 2, image 65)  There is another new 1 0 x 0 7 centimeter lesion in the hepatorenal fossa (series 2, image 58)     VESSELS:  Unremarkable for patient's age      PELVIS     REPRODUCTIVE ORGANS:  Unremarkable for patient's age      URINARY BLADDER:  Collapsed with indwelling right ureteral stent      ABDOMINAL WALL/INGUINAL REGIONS:  Unremarkable      OSSEOUS STRUCTURES:  No acute fracture or destructive osseous lesion      IMPRESSION:     Progression of metastatic disease compared to the most recent FDG PET/CT of 6/7/2022  This includes increase in size of lymph nodes in the left subpectoral region and retroperitoneum  There is also increase in size of a left lower quadrant omental   metastasis, and new soft tissue density lesions surrounding the right kidney      New right hydronephrosis up to the level of the right UPJ, with normal size right ureter  There is an indwelling right ureteral stent   There is new haziness surrounding the right UPJ, which may be related to inflammation or tumor infiltration        The study was marked in EPIC for immediate notification                Workstation performed: [id number]         I spent 20 minutes directly with the patient during this visit      Destiny Swan MD, [address] [place] [address]  9/20/2022  12:40 PM

## 2022-09-20 NOTE — ASSESSMENT & PLAN NOTE
Now worsening despite stent  Will communicate with Dr Yuli Forrest  Patient may benefit from stent removal and PCN placement at this time

## 2022-09-20 NOTE — ASSESSMENT & PLAN NOTE
Now with evidence of disease progression  I discussed with patient again incurable nature of her disease  We discussed possibility of best supportive care versus tumor directed therapy  She is still interested in tumor directed therapies  Unfortunately, she is not inclined to go to [place] [address] for consideration of clinical trials due to social/transportation difficulties  She is interested in treatment close to home  With this in mind, I offered and she agreed to receive palliative cytotoxic chemotherapy  She consented to topotecan which will be dosed at 3 milligram/m2 on days 1, 8 and 15 of a 28 day cycle  Will check clinical and hematologic/biochemical parameters prior to each infusion  I discussed with patient rationale, logistics, common side effects and toxicities associated with chemotherapy regimen  She understands toxicities include but are not limited to alopecia, fatigue, decreased appetite, nausea and vomiting, peripheral neuropathy, bone marrow suppression ( anemia, neutropenia and or thrombocytopenia) with subsequent risk of life-threatening infection or hemorrhage, kidney and or liver damage, etc   Patient verbalizes understanding, agrees and wants to proceed  Patient also consents to blood transfusions if those are deemed necessary then the course of treatment  She understands risks include but are not limited to hypersensitivity reactions and infection with blood borne pathogens  Patient verbally consented to the aforementioned treatments and her consent was documented in informed consent forms accordingly

## 2022-09-21 ENCOUNTER — HOSPITAL ENCOUNTER (OUTPATIENT)
Dept: INFUSION CENTER | Facility: HOSPITAL | Age: 67
Discharge: HOME | End: 2022-09-21
Attending: OBSTETRICS & GYNECOLOGY
Payer: MEDICARE

## 2022-09-21 ENCOUNTER — PATIENT OUTREACH (OUTPATIENT)
Dept: HEMATOLOGY ONCOLOGY | Facility: CLINIC | Age: 67
End: 2022-09-21

## 2022-09-21 VITALS
RESPIRATION RATE: 16 BRPM | TEMPERATURE: 98.6 F | OXYGEN SATURATION: 100 % | SYSTOLIC BLOOD PRESSURE: 136 MMHG | WEIGHT: 142.2 LBS | BODY MASS INDEX: 28.67 KG/M2 | HEIGHT: 59 IN | DIASTOLIC BLOOD PRESSURE: 64 MMHG

## 2022-09-21 DIAGNOSIS — E87.6 HYPOKALEMIA: ICD-10-CM

## 2022-09-21 DIAGNOSIS — C53.0 MALIGNANT NEOPLASM OF ENDOCERVIX (HCC): Primary | ICD-10-CM

## 2022-09-21 PROCEDURE — 96367 TX/PROPH/DG ADDL SEQ IV INF: CPT

## 2022-09-21 PROCEDURE — 96413 CHEMO IV INFUSION 1 HR: CPT

## 2022-09-21 RX ORDER — SODIUM CHLORIDE 9 MG/ML
20 INJECTION, SOLUTION INTRAVENOUS ONCE
Status: COMPLETED | OUTPATIENT
Start: 2022-09-21 | End: 2022-09-21

## 2022-09-21 RX ADMIN — TOPOTECAN 4.8 MG: 1 INJECTION, SOLUTION, CONCENTRATE INTRAVENOUS at 12:27

## 2022-09-21 RX ADMIN — DEXAMETHASONE SODIUM PHOSPHATE 10 MG: 10 INJECTION, SOLUTION INTRAMUSCULAR; INTRAVENOUS at 11:40

## 2022-09-21 RX ADMIN — SODIUM CHLORIDE 20 ML/HR: 9 INJECTION, SOLUTION INTRAVENOUS at 11:20

## 2022-09-21 NOTE — PROGRESS NOTES
MSW made outreach to the pt this day to offer support and to assess any needs and received her voicemail  A detailed message was left, along with a contact number and the pt was encouraged to return the call

## 2022-09-22 ENCOUNTER — OFFICE VISIT (OUTPATIENT)
Dept: PALLIATIVE MEDICINE | Facility: CLINIC | Age: 67
End: 2022-09-22
Payer: MEDICARE

## 2022-09-22 VITALS
BODY MASS INDEX: 29.15 KG/M2 | TEMPERATURE: 97.1 F | OXYGEN SATURATION: 98 % | HEART RATE: 92 BPM | DIASTOLIC BLOOD PRESSURE: 70 MMHG | WEIGHT: 144.4 LBS | SYSTOLIC BLOOD PRESSURE: 130 MMHG

## 2022-09-22 DIAGNOSIS — K59.03 THERAPEUTIC OPIOID-INDUCED CONSTIPATION (OIC): ICD-10-CM

## 2022-09-22 DIAGNOSIS — C53.0 MALIGNANT NEOPLASM OF ENDOCERVIX (HCC): ICD-10-CM

## 2022-09-22 DIAGNOSIS — G89.3 CANCER-RELATED PAIN: Primary | ICD-10-CM

## 2022-09-22 DIAGNOSIS — T40.2X5A THERAPEUTIC OPIOID-INDUCED CONSTIPATION (OIC): ICD-10-CM

## 2022-09-22 DIAGNOSIS — Z51.5 PALLIATIVE CARE BY SPECIALIST: ICD-10-CM

## 2022-09-22 PROCEDURE — 99214 OFFICE O/P EST MOD 30 MIN: CPT | Performed by: FAMILY MEDICINE

## 2022-09-22 RX ORDER — SENNA PLUS 8.6 MG/1
1 TABLET ORAL 2 TIMES DAILY
Qty: 60 TABLET | Refills: 11 | Status: SHIPPED | OUTPATIENT
Start: 2022-09-22

## 2022-09-22 RX ORDER — OXYCODONE AND ACETAMINOPHEN 10; 325 MG/1; MG/1
1 TABLET ORAL 4 TIMES DAILY PRN
Qty: 120 TABLET | Refills: 0 | Status: SHIPPED | OUTPATIENT
Start: 2022-09-22

## 2022-09-22 NOTE — Clinical Note
Are there therapies that have a good evidence base for this lady that are not offered by [facility]? Or are we thinking the cancer centers elsewhere have trials? Pt would consider her options differently depending on the relative predicted success of new treatments
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
## 2022-09-27 ENCOUNTER — HOSPITAL ENCOUNTER (OUTPATIENT)
Dept: INFUSION CENTER | Facility: HOSPITAL | Age: 67
Discharge: HOME | End: 2022-09-27
Payer: MEDICARE

## 2022-09-27 DIAGNOSIS — Z45.2 ENCOUNTER FOR VENOUS ACCESS DEVICE CARE: ICD-10-CM

## 2022-09-27 DIAGNOSIS — E86.0 DEHYDRATION: ICD-10-CM

## 2022-09-27 DIAGNOSIS — C53.0 MALIGNANT NEOPLASM OF ENDOCERVIX (HCC): Primary | ICD-10-CM

## 2022-09-27 LAB
ALBUMIN SERPL BCP-MCNC: 3.5 G/DL (ref 3.5–5)
ALP SERPL-CCNC: 157 U/L (ref 46–116)
ALT SERPL W P-5'-P-CCNC: 73 U/L (ref 12–78)
ANION GAP SERPL CALCULATED.3IONS-SCNC: 10 MMOL/L (ref 4–13)
AST SERPL W P-5'-P-CCNC: 21 U/L (ref 5–45)
BASOPHILS # BLD AUTO: 0.02 THOUSANDS/ΜL (ref 0–0.1)
BASOPHILS NFR BLD AUTO: 1 % (ref 0–1)
BILIRUB SERPL-MCNC: 0.33 MG/DL (ref 0.2–1)
BUN SERPL-MCNC: 17 MG/DL (ref 5–25)
CALCIUM SERPL-MCNC: 9.4 MG/DL (ref 8.3–10.1)
CHLORIDE SERPL-SCNC: 104 MMOL/L (ref 96–108)
CO2 SERPL-SCNC: 27 MMOL/L (ref 21–32)
CREAT SERPL-MCNC: 0.82 MG/DL (ref 0.6–1.3)
EOSINOPHIL # BLD AUTO: 0.1 THOUSAND/ΜL (ref 0–0.61)
EOSINOPHIL NFR BLD AUTO: 3 % (ref 0–6)
ERYTHROCYTE [DISTWIDTH] IN BLOOD BY AUTOMATED COUNT: 14.4 % (ref 11.6–15.1)
GFR SERPL CREATININE-BSD FRML MDRD: 74 ML/MIN/1.73SQ M
GLUCOSE SERPL-MCNC: 84 MG/DL (ref 65–140)
HCT VFR BLD AUTO: 34.3 % (ref 34.8–46.1)
HGB BLD-MCNC: 10.9 G/DL (ref 11.5–15.4)
IMM GRANULOCYTES # BLD AUTO: 0.04 THOUSAND/UL (ref 0–0.2)
IMM GRANULOCYTES NFR BLD AUTO: 1 % (ref 0–2)
LYMPHOCYTES # BLD AUTO: 1.16 THOUSANDS/ΜL (ref 0.6–4.47)
LYMPHOCYTES NFR BLD AUTO: 31 % (ref 14–44)
MAGNESIUM SERPL-MCNC: 1.8 MG/DL (ref 1.6–2.6)
MCH RBC QN AUTO: 29.9 PG (ref 26.8–34.3)
MCHC RBC AUTO-ENTMCNC: 31.8 G/DL (ref 31.4–37.4)
MCV RBC AUTO: 94 FL (ref 82–98)
MONOCYTES # BLD AUTO: 0.37 THOUSAND/ΜL (ref 0.17–1.22)
MONOCYTES NFR BLD AUTO: 10 % (ref 4–12)
NEUTROPHILS # BLD AUTO: 2.05 THOUSANDS/ΜL (ref 1.85–7.62)
NEUTS SEG NFR BLD AUTO: 54 % (ref 43–75)
NRBC BLD AUTO-RTO: 0 /100 WBCS
PLATELET # BLD AUTO: 132 THOUSANDS/UL (ref 149–390)
PMV BLD AUTO: 9.8 FL (ref 8.9–12.7)
POTASSIUM SERPL-SCNC: 3.6 MMOL/L (ref 3.5–5.3)
PROT SERPL-MCNC: 7.3 G/DL (ref 6.4–8.4)
RBC # BLD AUTO: 3.65 MILLION/UL (ref 3.81–5.12)
SODIUM SERPL-SCNC: 141 MMOL/L (ref 135–147)
WBC # BLD AUTO: 3.74 THOUSAND/UL (ref 4.31–10.16)

## 2022-09-27 PROCEDURE — 80053 COMPREHEN METABOLIC PANEL: CPT

## 2022-09-27 PROCEDURE — 83735 ASSAY OF MAGNESIUM: CPT

## 2022-09-27 PROCEDURE — 85025 COMPLETE CBC W/AUTO DIFF WBC: CPT

## 2022-09-27 RX ORDER — SODIUM CHLORIDE 9 MG/ML
20 INJECTION, SOLUTION INTRAVENOUS ONCE
Status: CANCELLED | OUTPATIENT
Start: 2022-09-28

## 2022-09-28 ENCOUNTER — HOSPITAL ENCOUNTER (OUTPATIENT)
Dept: INFUSION CENTER | Facility: HOSPITAL | Age: 67
Discharge: HOME | End: 2022-09-28
Attending: OBSTETRICS & GYNECOLOGY
Payer: MEDICARE

## 2022-09-28 VITALS
WEIGHT: 145.06 LBS | HEART RATE: 88 BPM | SYSTOLIC BLOOD PRESSURE: 129 MMHG | OXYGEN SATURATION: 100 % | DIASTOLIC BLOOD PRESSURE: 60 MMHG | TEMPERATURE: 98.2 F | RESPIRATION RATE: 16 BRPM | HEIGHT: 59 IN | BODY MASS INDEX: 29.24 KG/M2

## 2022-09-28 DIAGNOSIS — C53.0 MALIGNANT NEOPLASM OF ENDOCERVIX (HCC): Primary | ICD-10-CM

## 2022-09-28 DIAGNOSIS — E87.6 HYPOKALEMIA: ICD-10-CM

## 2022-09-28 PROCEDURE — 96367 TX/PROPH/DG ADDL SEQ IV INF: CPT

## 2022-09-28 PROCEDURE — 96413 CHEMO IV INFUSION 1 HR: CPT

## 2022-09-28 RX ORDER — SODIUM CHLORIDE 9 MG/ML
20 INJECTION, SOLUTION INTRAVENOUS ONCE
Status: COMPLETED | OUTPATIENT
Start: 2022-09-28 | End: 2022-09-28

## 2022-09-28 RX ORDER — ONDANSETRON HYDROCHLORIDE 8 MG/1
8 TABLET, FILM COATED ORAL EVERY 8 HOURS PRN
Qty: 20 TABLET | Refills: 1 | Status: SHIPPED | OUTPATIENT
Start: 2022-09-28

## 2022-09-28 RX ADMIN — DEXAMETHASONE SODIUM PHOSPHATE 10 MG: 10 INJECTION, SOLUTION INTRAMUSCULAR; INTRAVENOUS at 10:36

## 2022-09-28 RX ADMIN — SODIUM CHLORIDE 20 ML/HR: 9 INJECTION, SOLUTION INTRAVENOUS at 10:20

## 2022-09-28 RX ADMIN — TOPOTECAN 4.8 MG: 1 INJECTION, SOLUTION, CONCENTRATE INTRAVENOUS at 11:05

## 2022-10-04 ENCOUNTER — HOSPITAL ENCOUNTER (OUTPATIENT)
Dept: INFUSION CENTER | Facility: HOSPITAL | Age: 67
Discharge: HOME | End: 2022-10-04
Payer: MEDICARE

## 2022-10-04 ENCOUNTER — TELEPHONE (OUTPATIENT)
Dept: GYNECOLOGIC ONCOLOGY | Facility: CLINIC | Age: 67
End: 2022-10-04

## 2022-10-04 ENCOUNTER — TELEPHONE (OUTPATIENT)
Dept: INFUSION CENTER | Facility: HOSPITAL | Age: 67
End: 2022-10-04

## 2022-10-04 VITALS — TEMPERATURE: 99 F

## 2022-10-04 DIAGNOSIS — C53.0 MALIGNANT NEOPLASM OF ENDOCERVIX (HCC): ICD-10-CM

## 2022-10-04 LAB
ALBUMIN SERPL BCP-MCNC: 3.4 G/DL (ref 3.5–5)
ALP SERPL-CCNC: 168 U/L (ref 46–116)
ALT SERPL W P-5'-P-CCNC: 87 U/L (ref 12–78)
ANION GAP SERPL CALCULATED.3IONS-SCNC: 8 MMOL/L (ref 4–13)
AST SERPL W P-5'-P-CCNC: 61 U/L (ref 5–45)
BASOPHILS # BLD AUTO: 0.02 THOUSANDS/ΜL (ref 0–0.1)
BASOPHILS NFR BLD AUTO: 1 % (ref 0–1)
BILIRUB SERPL-MCNC: 0.56 MG/DL (ref 0.2–1)
BUN SERPL-MCNC: 14 MG/DL (ref 5–25)
CALCIUM ALBUM COR SERPL-MCNC: 9.4 MG/DL (ref 8.3–10.1)
CALCIUM SERPL-MCNC: 8.9 MG/DL (ref 8.3–10.1)
CHLORIDE SERPL-SCNC: 101 MMOL/L (ref 96–108)
CO2 SERPL-SCNC: 28 MMOL/L (ref 21–32)
CREAT SERPL-MCNC: 0.98 MG/DL (ref 0.6–1.3)
EOSINOPHIL # BLD AUTO: 0.06 THOUSAND/ΜL (ref 0–0.61)
EOSINOPHIL NFR BLD AUTO: 2 % (ref 0–6)
ERYTHROCYTE [DISTWIDTH] IN BLOOD BY AUTOMATED COUNT: 13.8 % (ref 11.6–15.1)
GFR SERPL CREATININE-BSD FRML MDRD: 59 ML/MIN/1.73SQ M
GLUCOSE P FAST SERPL-MCNC: 91 MG/DL (ref 65–99)
GLUCOSE SERPL-MCNC: 91 MG/DL (ref 65–140)
HCT VFR BLD AUTO: 30.5 % (ref 34.8–46.1)
HGB BLD-MCNC: 10 G/DL (ref 11.5–15.4)
IMM GRANULOCYTES # BLD AUTO: 0.03 THOUSAND/UL (ref 0–0.2)
IMM GRANULOCYTES NFR BLD AUTO: 1 % (ref 0–2)
LYMPHOCYTES # BLD AUTO: 0.46 THOUSANDS/ΜL (ref 0.6–4.47)
LYMPHOCYTES NFR BLD AUTO: 16 % (ref 14–44)
MAGNESIUM SERPL-MCNC: 1.7 MG/DL (ref 1.6–2.6)
MCH RBC QN AUTO: 29.9 PG (ref 26.8–34.3)
MCHC RBC AUTO-ENTMCNC: 32.8 G/DL (ref 31.4–37.4)
MCV RBC AUTO: 91 FL (ref 82–98)
MONOCYTES # BLD AUTO: 0.23 THOUSAND/ΜL (ref 0.17–1.22)
MONOCYTES NFR BLD AUTO: 8 % (ref 4–12)
NEUTROPHILS # BLD AUTO: 2.05 THOUSANDS/ΜL (ref 1.85–7.62)
NEUTS SEG NFR BLD AUTO: 72 % (ref 43–75)
NRBC BLD AUTO-RTO: 0 /100 WBCS
PLATELET # BLD AUTO: 77 THOUSANDS/UL (ref 149–390)
PMV BLD AUTO: 10.3 FL (ref 8.9–12.7)
POTASSIUM SERPL-SCNC: 3.7 MMOL/L (ref 3.5–5.3)
PROT SERPL-MCNC: 7.3 G/DL (ref 6.4–8.4)
RBC # BLD AUTO: 3.34 MILLION/UL (ref 3.81–5.12)
SODIUM SERPL-SCNC: 137 MMOL/L (ref 135–147)
WBC # BLD AUTO: 2.85 THOUSAND/UL (ref 4.31–10.16)

## 2022-10-04 PROCEDURE — 80053 COMPREHEN METABOLIC PANEL: CPT

## 2022-10-04 PROCEDURE — 83735 ASSAY OF MAGNESIUM: CPT

## 2022-10-04 PROCEDURE — 85025 COMPLETE CBC W/AUTO DIFF WBC: CPT

## 2022-10-04 NOTE — TELEPHONE ENCOUNTER
LMOM for patient regarding cancellation of D15 treatment tomorrow given platelet count 73T  Will notify infusion and transport

## 2022-10-04 NOTE — PROGRESS NOTES
Pt ambulated into infusion in stable condition  Labs drawn without issues pt tolerated process well

## 2022-10-04 NOTE — TELEPHONE ENCOUNTER
1510 Call from [facility] stating treatment will be held for 10/05/2022 and neulasta will be given on 10/06/2022   [facility] made aware of same by Eden Lomas RN

## 2022-10-04 NOTE — PLAN OF CARE
Problem: INFECTION - ADULT  Goal: Absence of fever/infection during neutropenic period  Description: INTERVENTIONS:  - Monitor WBC    Outcome: Progressing     Problem: INFECTION - ADULT  Goal: Absence or prevention of progression during hospitalization  Description: INTERVENTIONS:  - Assess and monitor for signs and symptoms of infection  - Monitor lab/diagnostic results  - Monitor all insertion sites, i e  indwelling lines, tubes, and drains  - Monitor endotracheal if appropriate and nasal secretions for changes in amount and color  - [place] appropriate cooling/warming therapies per order  - Administer medications as ordered  - Instruct and encourage patient and family to use good hand hygiene technique  - Identify and instruct in appropriate isolation precautions for identified infection/condition  Outcome: Progressing     Problem: Knowledge Deficit  Goal: Patient/family/caregiver demonstrates understanding of disease process, treatment plan, medications, and discharge instructions  Description: Complete learning assessment and assess knowledge base    Interventions:  - Provide teaching at level of understanding  - Provide teaching via preferred learning methods  Outcome: Progressing

## 2022-10-04 NOTE — TELEPHONE ENCOUNTER
Call to [facility] infusion notifying them that chemo for tomorrow 10/5 should be held d/t low PLT's of 77  Neulasta should be given on 10/6 as scheduled  [facility] staff will notify [facility] to cancel transportation for tomorrow

## 2022-10-05 ENCOUNTER — HOSPITAL ENCOUNTER (OUTPATIENT)
Dept: INFUSION CENTER | Facility: HOSPITAL | Age: 67
Discharge: HOME | End: 2022-10-05
Attending: OBSTETRICS & GYNECOLOGY

## 2022-10-06 ENCOUNTER — HOSPITAL ENCOUNTER (OUTPATIENT)
Dept: INFUSION CENTER | Facility: HOSPITAL | Age: 67
Discharge: HOME | End: 2022-10-06
Attending: OBSTETRICS & GYNECOLOGY
Payer: MEDICARE

## 2022-10-06 VITALS
TEMPERATURE: 98.4 F | RESPIRATION RATE: 16 BRPM | OXYGEN SATURATION: 99 % | SYSTOLIC BLOOD PRESSURE: 124 MMHG | DIASTOLIC BLOOD PRESSURE: 60 MMHG | HEART RATE: 100 BPM

## 2022-10-06 DIAGNOSIS — T45.1X5A CHEMOTHERAPY INDUCED NEUTROPENIA (HCC): ICD-10-CM

## 2022-10-06 DIAGNOSIS — D70.1 CHEMOTHERAPY INDUCED NEUTROPENIA (HCC): ICD-10-CM

## 2022-10-06 DIAGNOSIS — C53.0 MALIGNANT NEOPLASM OF ENDOCERVIX (HCC): Primary | ICD-10-CM

## 2022-10-06 DIAGNOSIS — E87.6 HYPOKALEMIA: ICD-10-CM

## 2022-10-06 PROCEDURE — 96372 THER/PROPH/DIAG INJ SC/IM: CPT

## 2022-10-06 RX ADMIN — PEGFILGRASTIM-BMEZ 6 MG: 6 INJECTION SUBCUTANEOUS at 12:42

## 2022-10-07 ENCOUNTER — TELEPHONE (OUTPATIENT)
Dept: OTHER | Facility: OTHER | Age: 67
End: 2022-10-07

## 2022-10-08 DIAGNOSIS — F51.05 INSOMNIA DUE TO OTHER MENTAL DISORDER: ICD-10-CM

## 2022-10-08 DIAGNOSIS — F99 INSOMNIA DUE TO OTHER MENTAL DISORDER: ICD-10-CM

## 2022-10-08 RX ORDER — TRAZODONE HYDROCHLORIDE 50 MG/1
TABLET ORAL
Qty: 180 TABLET | Refills: 2 | Status: SHIPPED | OUTPATIENT
Start: 2022-10-08

## 2022-10-11 PROBLEM — Z00.00 ENCOUNTER FOR MEDICARE ANNUAL WELLNESS EXAM: Status: RESOLVED | Noted: 2022-07-11 | Resolved: 2022-10-11

## 2022-10-12 ENCOUNTER — PATIENT OUTREACH (OUTPATIENT)
Dept: CASE MANAGEMENT | Facility: HOSPITAL | Age: 67
End: 2022-10-12

## 2022-10-12 PROBLEM — Z00.00 HEALTHCARE MAINTENANCE: Status: RESOLVED | Noted: 2021-04-08 | Resolved: 2022-10-12
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
## 2022-10-14 ENCOUNTER — TELEMEDICINE (OUTPATIENT)
Dept: GYNECOLOGIC ONCOLOGY | Facility: CLINIC | Age: 67
End: 2022-10-14
Payer: MEDICARE

## 2022-10-14 DIAGNOSIS — D69.59 CHEMOTHERAPY-INDUCED THROMBOCYTOPENIA: ICD-10-CM

## 2022-10-14 DIAGNOSIS — T45.1X5A CHEMOTHERAPY-INDUCED THROMBOCYTOPENIA: ICD-10-CM

## 2022-10-14 DIAGNOSIS — C53.0 MALIGNANT NEOPLASM OF ENDOCERVIX (HCC): Primary | ICD-10-CM

## 2022-10-14 PROCEDURE — 99442 PR PHYS/QHP TELEPHONE EVALUATION 11-20 MIN: CPT | Performed by: NURSE PRACTITIONER

## 2022-10-14 NOTE — PROGRESS NOTES
Virtual Regular Visit    Verification of patient location:    Patient is located in the following state in which I hold an active license [place]      Assessment/Plan:    Problem List Items Addressed This Visit        Hematopoietic and Hemostatic    Chemotherapy-induced thrombocytopenia     Platelets 77 last week  Missed labs this week  Will repeat next ween on Tuesday  Genitourinary    Malignant neoplasm of endocervix [facility]) - Primary     79year-old with recurrent, progressive stage IB2 cervical cancer, diagnosed in 2017  She was noted to have progression of disease on her 9/14/22 CT in chest and retroperitoneal LNs, as well as new omental metastases  She is fatigued, with low platelets, but offers no significant complaints today  Her PS is 0  Labs to be repeated next week  Will proceed with treatment provided metabolic and hematologic parameters are met  RTO prior to cycle 2  CT to be ordered prior to cycle 4 to assess disease response  Reason for visit is pre-chemo eval  Chief Complaint   Patient presents with   • Follow-up   • Virtual Regular Visit        Encounter provider JACE Sanderson    Provider located at [address] [address] [phone]      Recent Visits  No visits were found meeting these conditions  Showing recent visits within past 7 days and meeting all other requirements  Today's Visits  Date Type Provider Dept   10/14/22 Telemedicine [place] [facility], [address] [place] [address] today's visits and meeting all other requirements  Future Appointments  No visits were found meeting these conditions  Showing future appointments within next 150 days and meeting all other requirements       The patient was identified by name and date of birth   Tani Shahramfrancis was informed that this is a telemedicine visit and that the visit is being conducted through Telephone  Attempted video visit, but patient did not connect  She later reported that she does not have a smart phone nor computer  My office door was closed  No one else was in the room  She acknowledged consent and understanding of privacy and security of the video platform  The patient has agreed to participate and understands they can discontinue the visit at any time  Patient is aware this is a billable service  Oncology History   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[address] )   12/2016 Initial Diagnosis    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 [facility])  3/29/2017 -  Cancer Staged    Staging form: Cervix Uteri, AJCC 7th Edition  - Clinical stage from 3/29/2017: FIGO Stage IB2, calculated as Stage Unknown (T1b2, NX, M0) - Signed by Mansoor Alvarez MD on 2/4/2021  Staged by: Managing physician  Specimen type: Excision  Histopathologic type: Squamous cell carcinoma, NOS  Histologic grade (G): G3  Lymph-vascular invasion (LVI): LVI present/identified, NOS  Residual tumor (R): R0 - None  Pelvic jace status: Negative  Pelvic jace method of assessment: PET  Para-aortic status: Negative  Para-aortic jace method of assessment: PET  Stage used in treatment planning: Yes  National guidelines used in treatment planning: Yes  Type of national guideline used in treatment planning: NCCN       3/29/2017 Surgery    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)  FInal path with incidental 5 cm grade 3 invasive cervical SCCA, +LVI, invasion 9/10 mm cervical stroma, no lymphadenectomy, margins negative ([facility])  2/19/2021 Progression    A  Lymph Node, Left axillary, Biopsy:  - Metastatic squamous cell carcinoma (p16 positive) involving lymph node, compatible with patient's cervical primary         3/2/2021 -  Chemotherapy    Carboplatin AUC 5, Taxol 135 milligram/m2 and Avastin at 15 milligram/kilogram all to be administer on day 1 of a 21 day cycle     7/7/2021 - 2/2/2022 Chemotherapy bevacizumab (AVASTIN) IVPB, 910 mg, Intravenous, Once, 11 of 16 cycles  Administration: 900 mg (7/7/2021), 900 mg (7/30/2021), 900 mg (8/18/2021), 900 mg (9/8/2021), 900 mg (9/29/2021), 900 mg (10/20/2021), 900 mg (11/10/2021), 900 mg (12/1/2021), 900 mg (12/22/2021), 900 mg (1/12/2022), 900 mg (2/2/2022)     2/22/2022 - 8/31/2022 Chemotherapy    pembrolizumab (KEYTRUDA) IVPB, 200 mg, Intravenous, Once, 10 of 12 cycles  Administration: 200 mg (2/22/2022), 200 mg (4/6/2022), 200 mg (4/27/2022), 200 mg (5/18/2022), 200 mg (6/8/2022), 200 mg (3/16/2022), 200 mg (6/29/2022), 200 mg (7/20/2022), 200 mg (8/10/2022), 200 mg (8/31/2022)     2/23/2022 - 2/23/2022 Chemotherapy    pembrolizumab (KEYTRUDA) IVPB, 200 mg, Intravenous, Once, 0 of 6 cycles     9/14/2022 Progression    Progression of metastatic disease noted on CT     9/21/2022 -  Chemotherapy    Pegfilgrastim-bmez (ZIEXTENZO), 6 mg, Subcutaneous, Once, 1 of 4 cycles  topotecan (HYCAMTIN) chemo infusion, 3 mg/m2 = 4 8 mg, Intravenous, Once, 1 of 4 cycles  Administration: 4 8 mg (9/21/2022), 4 8 mg (9/28/2022)           Naren Hopkins is a 79 y o  female       Patient presents for evaluation without interval concerns  She tolerated her first cycle of topotecan without significant difficulty  She has been fatigued  She has been afebrile, and without nausea or vomiting  She has a normal appetite  She denies bowel or bladder dysfunction, and has no pain in the abdomen or pelvis  She is without vaginal bleeding or discharge  She is ambulatory and independent with all ADL's           Past Medical History:   Diagnosis Date   • Acne    • Anxiety    • Anxiety and depression 10/02/2019   • Arthritis    • Breathing difficulty     pt does not recall any breathing problem but did have neck pain after procedure"   • Cancer ([address] )     cervical/ and "lymph nodes" (GYN)   • Cervical cancer (HCC)    • Chronic pain disorder     Neck pain   • Colon polyp    • GERD (gastroesophageal reflux disease)     not now   • History of radiation therapy    • History of shingles    • Irritable bowel syndrome    • Joint pain following chemotherapy     and stiffness   • Liver disease     "cyst on liver"   • Lung nodule     "left"   • Muscle weakness     after chemo"   • Neck pain     "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"has had neck pain since childhood"   • Port-A-Cath in place     right chest//for chemotherapy q 3 weeks and also receives IV hydration   • Shortness of breath     "started with cancer, when too much exertion like cleaning/guera doing stairs"   • Thumb injury 10/2019    right   • Thyroid nodule    • Tinnitus of left ear 02/19/2020   • Urinary problem    • Wrist arthritis 03/17/2020       Past Surgical History:   Procedure Laterality Date   • COLONOSCOPY     • CYSTOSCOPY W/ RETROGRADES Right 6/3/2021    Procedure: Ralph Durant W/RETROGRADE;  Surgeon: David Matthews MD;  Location: [facility];  Service: Urology   • CYSTOSCOPY W/ RETROGRADES Right 10/18/2021    Procedure: CYSTOSCOPY WITH ureteral stent exchange;  Surgeon: Severa Chol, MD;  Location: [facility];  Service: Urology   • FL RETROGRADE PYELOGRAM  2/11/2021   • FL RETROGRADE PYELOGRAM  6/3/2021   • FOOT SURGERY     • HYSTERECTOMY     • IR BIOPSY LYMPH NODE  2/19/2021   • IR PORT PLACEMENT  3/9/2021   • GA CYSTOURETHROSCOPY,URETER CATHETER Right 2/11/2021    Procedure: CYSTOSCOPY RETROGRADE PYELOGRAM WITH INSERTION STENT URETERAL;  Surgeon: Severa Chol, MD;  Location:  MAIN OR;  Service: Urology   • GA CYSTOURETHROSCOPY,URETER CATHETER Right 4/4/2022    Procedure: CYSTOSCOPY  WITH INSERTION STENT URETERAL Right;  Surgeon: Severa Chol, MD;  Location: [facility];  Service: Urology   • GA EXCIS INTERDIGITAL Noretta Shows Left 7/12/2018    Procedure: FOOT NEUROMA EXCISION;  Surgeon: Yohana Fan DPM;  Location: [facility] MAIN OR;  Service: Podiatry   • GA REMOVE & REPLACE INDWELL URETERAL STENT [place] Right 6/3/2021    Procedure: EXCHANGE STENT;  Surgeon: Whitney Guerrier MD;  Location: [facility];  Service: Urology   • TUBAL LIGATION         Current Outpatient Medications   Medication Sig Dispense Refill   • buPROPion (WELLBUTRIN XL) 150 mg 24 hr tablet TAKE 1 TABLET BY MOUTH EVERY DAY IN THE MORNING 90 tablet 1   • Diclofenac Sodium (VOLTAREN) 1 % Apply 2 g topically 4 (four) times a day 350 g 3   • diphenhydrAMINE (BENADRYL) 25 mg capsule Take 50 mg by mouth daily at bedtime     • gabapentin (NEURONTIN) 100 mg capsule Take 2 capsules (200 mg total) by mouth daily AND 2 capsules (200 mg total) daily at bedtime  To reduce nerve pain  120 capsule 5   • lidocaine-prilocaine (EMLA) cream APPLY TOPICALLY AS NEEDED FOR MILD PAIN 30 g 0   • Multiple Vitamins-Minerals (Alive Womens Gummy) CHEW Chew     • naloxegol oxalate (Movantik) 25 MG tablet Take 1 tablet (25 mg total) by mouth in the morning 30 tablet 5   • omeprazole (PriLOSEC) 40 MG capsule TAKE 1 CAPSULE BY MOUTH EVERY DAY IN THE MORNING 90 capsule 1   • ondansetron (ZOFRAN) 8 mg tablet Take 1 tablet (8 mg total) by mouth every 8 (eight) hours as needed for nausea or vomiting 20 tablet 1   • oxyCODONE-acetaminophen (Percocet)  mg per tablet Take 1 tablet by mouth 4 (four) times a day as needed (cancer pain) Max Daily Amount: 4 tablets 120 tablet 0   • polyethylene glycol (MIRALAX) 17 g packet Take 17 g by mouth daily To prevent and reduce constipation from pain meds  Use with Movantik  Use instead of psyllium  30 each 0   • senna (SENOKOT) 8 6 MG tablet Take 1 tablet (8 6 mg total) by mouth 2 (two) times a day Take to prevent constipation from pain meds  Hold one dose for loose stool 60 tablet 11   • tiotropium (Spiriva Respimat) 1 25 MCG/ACT AERS inhaler Inhale 2 puffs daily (Patient not taking: Reported on 9/15/2022) 4 g 5   • traZODone (DESYREL) 50 mg tablet TAKE 1-2 AT BEDTIME 180 tablet 2     No current facility-administered medications for this visit          Allergies Allergen Reactions   • Medical Tape Itching     And redness from adhesive    And "skin raised up" especially longer on skin   • Aspirin GI Intolerance   • Codeine GI Intolerance   • Dust Mite Extract    • Pollen Extract Sneezing       Review of Systems   Constitutional: Positive for fatigue  Negative for activity change, appetite change, chills, fever and unexpected weight change  HENT: Negative for mouth sores  Eyes: Negative  Respiratory: Negative for cough, chest tightness, shortness of breath and wheezing  Cardiovascular: Negative for chest pain, palpitations and leg swelling  Gastrointestinal: Negative for abdominal distention, abdominal pain, anal bleeding, blood in stool, constipation, diarrhea, nausea and vomiting  Endocrine: Negative  Genitourinary: Negative for difficulty urinating, dysuria, flank pain, frequency, hematuria, pelvic pain, urgency, vaginal bleeding, vaginal discharge and vaginal pain  Musculoskeletal: Negative for arthralgias and myalgias  Skin: Negative for color change, pallor and rash  Neurological: Negative for dizziness, weakness, numbness and headaches  Hematological: Negative  Psychiatric/Behavioral: The patient is not nervous/anxious            I spent 15 minutes directly with the patient during this visit

## 2022-10-14 NOTE — ASSESSMENT & PLAN NOTE
71-year-old with recurrent, progressive stage IB2 cervical cancer, diagnosed in 2017  She was noted to have progression of disease on her 9/14/22 CT in chest and retroperitoneal LNs, as well as new omental metastases  She is fatigued, with low platelets, but offers no significant complaints today  Her PS is 0  Labs to be repeated next week  Will proceed with treatment provided metabolic and hematologic parameters are met  RTO prior to cycle 2  CT to be ordered prior to cycle 4 to assess disease response

## 2022-10-17 ENCOUNTER — PATIENT OUTREACH (OUTPATIENT)
Dept: CASE MANAGEMENT | Facility: HOSPITAL | Age: 67
End: 2022-10-17

## 2022-10-17 NOTE — PROGRESS NOTES
S/w pt by phone this afternoon to f/u from our last conversation, she says that her appt last week went well but she forgot to ask about the clinical trial in [place]  She says that she has not been feeling well the last few days and wanted to r/o to Dr Radha Mc  I explained to her how to sent messages through [employer], which she was unaware was an option previously  She thanked me for the call and denies any needs, I encouraged her to call me directly if she needed anything moving forward  No needs or concerns at this time

## 2022-10-18 ENCOUNTER — HOSPITAL ENCOUNTER (OUTPATIENT)
Dept: INFUSION CENTER | Facility: HOSPITAL | Age: 67
Discharge: HOME | End: 2022-10-18
Payer: MEDICARE

## 2022-10-18 DIAGNOSIS — Z45.2 ENCOUNTER FOR VENOUS ACCESS DEVICE CARE: Primary | ICD-10-CM

## 2022-10-18 DIAGNOSIS — E86.0 DEHYDRATION: ICD-10-CM

## 2022-10-18 DIAGNOSIS — C53.0 MALIGNANT NEOPLASM OF ENDOCERVIX (HCC): ICD-10-CM

## 2022-10-18 LAB
ALBUMIN SERPL BCP-MCNC: 3.5 G/DL (ref 3.5–5)
ALP SERPL-CCNC: 162 U/L (ref 46–116)
ALT SERPL W P-5'-P-CCNC: 17 U/L (ref 12–78)
ANION GAP SERPL CALCULATED.3IONS-SCNC: 10 MMOL/L (ref 4–13)
AST SERPL W P-5'-P-CCNC: 12 U/L (ref 5–45)
BASOPHILS # BLD AUTO: 0.02 THOUSANDS/ΜL (ref 0–0.1)
BASOPHILS NFR BLD AUTO: 0 % (ref 0–1)
BILIRUB SERPL-MCNC: 0.25 MG/DL (ref 0.2–1)
BUN SERPL-MCNC: 14 MG/DL (ref 5–25)
CALCIUM SERPL-MCNC: 9.4 MG/DL (ref 8.3–10.1)
CHLORIDE SERPL-SCNC: 105 MMOL/L (ref 96–108)
CO2 SERPL-SCNC: 26 MMOL/L (ref 21–32)
CREAT SERPL-MCNC: 0.98 MG/DL (ref 0.6–1.3)
EOSINOPHIL # BLD AUTO: 0.07 THOUSAND/ΜL (ref 0–0.61)
EOSINOPHIL NFR BLD AUTO: 1 % (ref 0–6)
ERYTHROCYTE [DISTWIDTH] IN BLOOD BY AUTOMATED COUNT: 15.8 % (ref 11.6–15.1)
GFR SERPL CREATININE-BSD FRML MDRD: 59 ML/MIN/1.73SQ M
GLUCOSE P FAST SERPL-MCNC: 105 MG/DL (ref 65–99)
GLUCOSE SERPL-MCNC: 105 MG/DL (ref 65–140)
HCT VFR BLD AUTO: 33.7 % (ref 34.8–46.1)
HGB BLD-MCNC: 10.7 G/DL (ref 11.5–15.4)
IMM GRANULOCYTES # BLD AUTO: 0.05 THOUSAND/UL (ref 0–0.2)
IMM GRANULOCYTES NFR BLD AUTO: 1 % (ref 0–2)
LYMPHOCYTES # BLD AUTO: 0.83 THOUSANDS/ΜL (ref 0.6–4.47)
LYMPHOCYTES NFR BLD AUTO: 9 % (ref 14–44)
MAGNESIUM SERPL-MCNC: 1.8 MG/DL (ref 1.6–2.6)
MCH RBC QN AUTO: 30 PG (ref 26.8–34.3)
MCHC RBC AUTO-ENTMCNC: 31.8 G/DL (ref 31.4–37.4)
MCV RBC AUTO: 94 FL (ref 82–98)
MONOCYTES # BLD AUTO: 0.69 THOUSAND/ΜL (ref 0.17–1.22)
MONOCYTES NFR BLD AUTO: 7 % (ref 4–12)
NEUTROPHILS # BLD AUTO: 7.61 THOUSANDS/ΜL (ref 1.85–7.62)
NEUTS SEG NFR BLD AUTO: 82 % (ref 43–75)
NRBC BLD AUTO-RTO: 0 /100 WBCS
PLATELET # BLD AUTO: 211 THOUSANDS/UL (ref 149–390)
PMV BLD AUTO: 9.7 FL (ref 8.9–12.7)
POTASSIUM SERPL-SCNC: 4.2 MMOL/L (ref 3.5–5.3)
PROT SERPL-MCNC: 7.5 G/DL (ref 6.4–8.4)
RBC # BLD AUTO: 3.57 MILLION/UL (ref 3.81–5.12)
SODIUM SERPL-SCNC: 141 MMOL/L (ref 135–147)
WBC # BLD AUTO: 9.27 THOUSAND/UL (ref 4.31–10.16)

## 2022-10-18 PROCEDURE — 85025 COMPLETE CBC W/AUTO DIFF WBC: CPT

## 2022-10-18 PROCEDURE — 83735 ASSAY OF MAGNESIUM: CPT

## 2022-10-18 PROCEDURE — 80053 COMPREHEN METABOLIC PANEL: CPT

## 2022-10-18 RX ORDER — SODIUM CHLORIDE 9 MG/ML
20 INJECTION, SOLUTION INTRAVENOUS ONCE
Status: CANCELLED | OUTPATIENT
Start: 2022-10-26

## 2022-10-18 RX ORDER — SODIUM CHLORIDE 9 MG/ML
20 INJECTION, SOLUTION INTRAVENOUS ONCE
Status: CANCELLED | OUTPATIENT
Start: 2022-10-19

## 2022-10-18 RX ORDER — SODIUM CHLORIDE 9 MG/ML
20 INJECTION, SOLUTION INTRAVENOUS ONCE
Status: CANCELLED | OUTPATIENT
Start: 2022-11-02

## 2022-10-19 ENCOUNTER — HOSPITAL ENCOUNTER (OUTPATIENT)
Dept: INFUSION CENTER | Facility: HOSPITAL | Age: 67
Discharge: HOME | End: 2022-10-19
Attending: OBSTETRICS & GYNECOLOGY
Payer: MEDICARE

## 2022-10-19 VITALS
BODY MASS INDEX: 28.31 KG/M2 | SYSTOLIC BLOOD PRESSURE: 117 MMHG | HEART RATE: 93 BPM | OXYGEN SATURATION: 95 % | HEIGHT: 59 IN | DIASTOLIC BLOOD PRESSURE: 61 MMHG | TEMPERATURE: 97.8 F | WEIGHT: 140.43 LBS | RESPIRATION RATE: 18 BRPM

## 2022-10-19 DIAGNOSIS — E87.6 HYPOKALEMIA: ICD-10-CM

## 2022-10-19 DIAGNOSIS — C53.0 MALIGNANT NEOPLASM OF ENDOCERVIX (HCC): Primary | ICD-10-CM

## 2022-10-19 PROCEDURE — 96367 TX/PROPH/DG ADDL SEQ IV INF: CPT

## 2022-10-19 PROCEDURE — 96413 CHEMO IV INFUSION 1 HR: CPT

## 2022-10-19 RX ORDER — SODIUM CHLORIDE 9 MG/ML
20 INJECTION, SOLUTION INTRAVENOUS ONCE
Status: COMPLETED | OUTPATIENT
Start: 2022-10-19 | End: 2022-10-19

## 2022-10-19 RX ADMIN — SODIUM CHLORIDE 20 ML/HR: 0.9 INJECTION, SOLUTION INTRAVENOUS at 10:12

## 2022-10-19 RX ADMIN — TOPOTECAN 4.8 MG: 1 INJECTION, SOLUTION, CONCENTRATE INTRAVENOUS at 10:51

## 2022-10-19 RX ADMIN — DEXAMETHASONE SODIUM PHOSPHATE 10 MG: 10 INJECTION, SOLUTION INTRAMUSCULAR; INTRAVENOUS at 10:12

## 2022-10-19 NOTE — PLAN OF CARE
Problem: INFECTION - ADULT  Goal: Absence or prevention of progression during hospitalization  Description: INTERVENTIONS:  - Assess and monitor for signs and symptoms of infection  - Monitor lab/diagnostic results  - Monitor all insertion sites, i e  indwelling lines, tubes, and drains  - Monitor endotracheal if appropriate and nasal secretions for changes in amount and color  - [place] appropriate cooling/warming therapies per order  - Administer medications as ordered  - Instruct and encourage patient and family to use good hand hygiene technique  - Identify and instruct in appropriate isolation precautions for identified infection/condition  Outcome: Progressing     Problem: Knowledge Deficit  Goal: Patient/family/caregiver demonstrates understanding of disease process, treatment plan, medications, and discharge instructions  Description: Complete learning assessment and assess knowledge base    Interventions:  - Provide teaching at level of understanding  - Provide teaching via preferred learning methods  Outcome: Progressing     Problem: INFECTION - ADULT  Goal: Absence or prevention of progression during hospitalization  Description: INTERVENTIONS:  - Assess and monitor for signs and symptoms of infection  - Monitor lab/diagnostic results  - Monitor all insertion sites, i e  indwelling lines, tubes, and drains  - Monitor endotracheal if appropriate and nasal secretions for changes in amount and color  - [place] appropriate cooling/warming therapies per order  - Administer medications as ordered  - Instruct and encourage patient and family to use good hand hygiene technique  - Identify and instruct in appropriate isolation precautions for identified infection/condition  Outcome: Progressing     Problem: Knowledge Deficit  Goal: Patient/family/caregiver demonstrates understanding of disease process, treatment plan, medications, and discharge instructions  Description: Complete learning assessment and assess knowledge base    Interventions:  - Provide teaching at level of understanding  - Provide teaching via preferred learning methods  Outcome: Progressing

## 2022-10-21 ENCOUNTER — OFFICE VISIT (OUTPATIENT)
Dept: UROLOGY | Facility: CLINIC | Age: 67
End: 2022-10-21
Payer: MEDICARE

## 2022-10-21 VITALS
WEIGHT: 142 LBS | BODY MASS INDEX: 28.66 KG/M2 | DIASTOLIC BLOOD PRESSURE: 80 MMHG | SYSTOLIC BLOOD PRESSURE: 122 MMHG | HEART RATE: 67 BPM

## 2022-10-21 DIAGNOSIS — N13.30 HYDRONEPHROSIS, UNSPECIFIED HYDRONEPHROSIS TYPE: Primary | ICD-10-CM

## 2022-10-21 DIAGNOSIS — R39.89 SUSPECTED UTI: ICD-10-CM

## 2022-10-21 DIAGNOSIS — Z01.818 PREOP TESTING: ICD-10-CM

## 2022-10-21 PROCEDURE — 99213 OFFICE O/P EST LOW 20 MIN: CPT

## 2022-10-21 NOTE — H&P (VIEW-ONLY)
10/21/2022    No chief complaint on file  Assessment and Plan    79 y o  female manage by Dr Tanna Cervantes    1  Hydronephrosis secondary to ureteral stricture disease  · History and physical was performed for the patient's upcoming cystoscopy, right ureteral stent exchange on 10/27/2022 with Dr Tanna Cervantes  Technique, benefits, and risk of the procedure listed above were discussed with them today and informed written consent was obtained  All questions and concerns regarding surgery and perioperative expectations have been addressed and answered  No overall changes in their health since last visit  Denies any prior complications with anesthesia  We will proceed with surgery as planned  ·  Pre-operative Urine culture to be completed today      History of Present Illness  Jackie Parsons is a 79 y o  female here for history and physical as she is scheduled for upcoming cystoscopy, right ureteral stent exchange on 10/27/2022 with Dr Tanna Cervantes  She has a history of hydronephrosis due to ureteral stricture related to retroperitoneal lymphadenopathy  She is managed with a ureteral stent  Last stent exchange on 4/04/2022 by Dr Tanna Cervantes  Review of Systems   Constitutional: Negative for chills and fever  HENT: Negative for ear pain and sore throat  Eyes: Negative for pain and visual disturbance  Respiratory: Negative for cough and shortness of breath  Cardiovascular: Negative for chest pain and palpitations  Gastrointestinal: Negative for abdominal pain, constipation, diarrhea and vomiting  Genitourinary: Negative for dysuria, flank pain, frequency, hematuria, pelvic pain and urgency  Musculoskeletal: Negative for arthralgias and back pain  Skin: Negative for color change and rash  Neurological: Negative for dizziness, seizures and syncope  All other systems reviewed and are negative                Vitals  Vitals:    10/21/22 0936   BP: 122/80   Pulse: 67   Weight: 64 4 kg (142 lb)       Physical Exam  Vitals reviewed  Constitutional:       General: She is not in acute distress  Appearance: Normal appearance  She is normal weight  She is not ill-appearing or toxic-appearing  HENT:      Head: Normocephalic and atraumatic  Nose: Nose normal       Mouth/Throat:      Mouth: Mucous membranes are moist       Pharynx: Oropharynx is clear  Eyes:      General: No scleral icterus  Conjunctiva/sclera: Conjunctivae normal    Cardiovascular:      Rate and Rhythm: Normal rate and regular rhythm  Pulses: Normal pulses  Heart sounds: Normal heart sounds  Pulmonary:      Effort: Pulmonary effort is normal  No respiratory distress  Breath sounds: Normal breath sounds  Abdominal:      General: Abdomen is flat  Palpations: Abdomen is soft  Tenderness: There is no abdominal tenderness  There is no right CVA tenderness or left CVA tenderness  Hernia: No hernia is present  Musculoskeletal:         General: Normal range of motion  Cervical back: Normal range of motion  Skin:     General: Skin is warm and dry  Neurological:      General: No focal deficit present  Mental Status: She is alert and oriented to person, place, and time  Mental status is at baseline  Psychiatric:         Mood and Affect: Mood normal          Behavior: Behavior normal          Thought Content:  Thought content normal          Judgment: Judgment normal          Past History  Past Medical History:   Diagnosis Date   • Acne    • Anxiety    • Anxiety and depression 10/02/2019   • Arthritis    • Breathing difficulty     pt does not recall any breathing problem but did have neck pain after procedure"   • Cancer ([address] )     cervical/ and "lymph nodes" (GYN)   • Cervical cancer (HCC)    • Chronic pain disorder     Neck pain   • Colon polyp    • GERD (gastroesophageal reflux disease)     not now   • History of radiation therapy    • History of shingles    • Irritable bowel syndrome    • Joint pain following chemotherapy     and stiffness   • Liver disease     "cyst on liver"   • Lung nodule     "left"   • Muscle weakness     after chemo"   • Neck pain     "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"has had neck pain since childhood"   • Port-A-Cath in place     right chest//for chemotherapy q 3 weeks and also receives IV hydration   • Shortness of breath     "started with cancer, when too much exertion like cleaning/guera doing stairs"   • Thumb injury 10/2019    right   • Thyroid nodule    • Tinnitus of left ear 2020   • Urinary problem    • Wrist arthritis 2020     Social History     Socioeconomic History   • Marital status:      Spouse name: None   • Number of children: None   • Years of education: None   • Highest education level: None   Occupational History   • None   Tobacco Use   • Smoking status: Former Smoker     Packs/day: 3 00     Years: 32 00     Pack years: 96 00     Types: Cigarettes     Start date:      Quit date: 2003     Years since quittin 2   • Smokeless tobacco: Never Used   Vaping Use   • Vaping Use: Never used   Substance and Sexual Activity   • Alcohol use: Not Currently   • Drug use: No     Types: Marijuana     Comment: As teenager   • Sexual activity: Not Currently     Comment: defer   Other Topics Concern   • None   Social History Narrative   • None     Social Determinants of Health     Financial Resource Strain: Not on file   Food Insecurity: Not on file   Transportation Needs: Not on file   Physical Activity: Not on file   Stress: Not on file   Social Connections: Not on file   Intimate Partner Violence: Not on file   Housing Stability: Not on file     Social History     Tobacco Use   Smoking Status Former Smoker   • Packs/day: 3 00   • Years: 32 00   • Pack years: 96 00   • Types: Cigarettes   • Start date:    • Quit date: 2003   • Years since quittin 2   Smokeless Tobacco Never Used     Family History   Problem Relation Age of Onset   • Stomach cancer Mother    • Alcohol abuse Father    • No Known Problems Sister    • No Known Problems Daughter    • No Known Problems Maternal Grandmother    • No Known Problems Maternal Grandfather    • No Known Problems Paternal Grandmother    • No Known Problems Paternal Grandfather    • No Known Problems Sister    • No Known Problems Sister    • No Known Problems Sister    • No Known Problems Sister    • No Known Problems Daughter    • No Known Problems Daughter    • No Known Problems Maternal Aunt    • No Known Problems Maternal Aunt    • No Known Problems Paternal Aunt    • No Known Problems Paternal Aunt    • No Known Problems Paternal Aunt    • No Known Problems Paternal Aunt    • No Known Problems Paternal Aunt    • No Known Problems Paternal Aunt    • No Known Problems Paternal Aunt    • No Known Problems Paternal Aunt    • No Known Problems Paternal Aunt    • No Known Problems Paternal Aunt        The following portions of the patient's history were reviewed and updated as appropriate allergies, current medications, past medical history, past social history, past surgical history and problem list    Imaging:    Results  No results found for this or any previous visit (from the past 1 hour(s))  ]  No results found for: PSA  Lab Results   Component Value Date    CALCIUM 9 4 10/18/2022     06/11/2018    K 4 2 10/18/2022    CO2 26 10/18/2022     10/18/2022    BUN 14 10/18/2022    CREATININE 0 98 10/18/2022     Lab Results   Component Value Date    WBC 9 27 10/18/2022    HGB 10 7 (L) 10/18/2022    HCT 33 7 (L) 10/18/2022    MCV 94 10/18/2022     10/18/2022       Please Note:  Voice dictation software has been used to create this document  There may be inadvertent transcriptions errors       Gutierrez Martinez

## 2022-10-21 NOTE — PROGRESS NOTES
10/21/2022    No chief complaint on file  Assessment and Plan    79 y o  female manage by Dr Demi Layton    1  Hydronephrosis secondary to ureteral stricture disease  · History and physical was performed for the patient's upcoming cystoscopy, right ureteral stent exchange on 10/27/2022 with Dr Demi Layton  Technique, benefits, and risk of the procedure listed above were discussed with them today and informed written consent was obtained  All questions and concerns regarding surgery and perioperative expectations have been addressed and answered  No overall changes in their health since last visit  Denies any prior complications with anesthesia  We will proceed with surgery as planned  ·  Pre-operative Urine culture to be completed today      History of Present Illness  Juan Mccollum is a 79 y o  female here for history and physical as she is scheduled for upcoming cystoscopy, right ureteral stent exchange on 10/27/2022 with Dr Demi Layton  She has a history of hydronephrosis due to ureteral stricture related to retroperitoneal lymphadenopathy  She is managed with a ureteral stent  Last stent exchange on 4/04/2022 by Dr Demi Layton  Review of Systems   Constitutional: Negative for chills and fever  HENT: Negative for ear pain and sore throat  Eyes: Negative for pain and visual disturbance  Respiratory: Negative for cough and shortness of breath  Cardiovascular: Negative for chest pain and palpitations  Gastrointestinal: Negative for abdominal pain, constipation, diarrhea and vomiting  Genitourinary: Negative for dysuria, flank pain, frequency, hematuria, pelvic pain and urgency  Musculoskeletal: Negative for arthralgias and back pain  Skin: Negative for color change and rash  Neurological: Negative for dizziness, seizures and syncope  All other systems reviewed and are negative                Vitals  Vitals:    10/21/22 0936   BP: 122/80   Pulse: 67   Weight: 64 4 kg (142 lb)       Physical Exam  Vitals reviewed  Constitutional:       General: She is not in acute distress  Appearance: Normal appearance  She is normal weight  She is not ill-appearing or toxic-appearing  HENT:      Head: Normocephalic and atraumatic  Nose: Nose normal       Mouth/Throat:      Mouth: Mucous membranes are moist       Pharynx: Oropharynx is clear  Eyes:      General: No scleral icterus  Conjunctiva/sclera: Conjunctivae normal    Cardiovascular:      Rate and Rhythm: Normal rate and regular rhythm  Pulses: Normal pulses  Heart sounds: Normal heart sounds  Pulmonary:      Effort: Pulmonary effort is normal  No respiratory distress  Breath sounds: Normal breath sounds  Abdominal:      General: Abdomen is flat  Palpations: Abdomen is soft  Tenderness: There is no abdominal tenderness  There is no right CVA tenderness or left CVA tenderness  Hernia: No hernia is present  Musculoskeletal:         General: Normal range of motion  Cervical back: Normal range of motion  Skin:     General: Skin is warm and dry  Neurological:      General: No focal deficit present  Mental Status: She is alert and oriented to person, place, and time  Mental status is at baseline  Psychiatric:         Mood and Affect: Mood normal          Behavior: Behavior normal          Thought Content:  Thought content normal          Judgment: Judgment normal          Past History  Past Medical History:   Diagnosis Date   • Acne    • Anxiety    • Anxiety and depression 10/02/2019   • Arthritis    • Breathing difficulty     pt does not recall any breathing problem but did have neck pain after procedure"   • Cancer ([address] )     cervical/ and "lymph nodes" (GYN)   • Cervical cancer (HCC)    • Chronic pain disorder     Neck pain   • Colon polyp    • GERD (gastroesophageal reflux disease)     not now   • History of radiation therapy    • History of shingles    • Irritable bowel syndrome    • Joint pain following chemotherapy     and stiffness   • Liver disease     "cyst on liver"   • Lung nodule     "left"   • Muscle weakness     after chemo"   • Neck pain     "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"has had neck pain since childhood"   • Port-A-Cath in place     right chest//for chemotherapy q 3 weeks and also receives IV hydration   • Shortness of breath     "started with cancer, when too much exertion like cleaning/guera doing stairs"   • Thumb injury 10/2019    right   • Thyroid nodule    • Tinnitus of left ear 2020   • Urinary problem    • Wrist arthritis 2020     Social History     Socioeconomic History   • Marital status:      Spouse name: None   • Number of children: None   • Years of education: None   • Highest education level: None   Occupational History   • None   Tobacco Use   • Smoking status: Former Smoker     Packs/day: 3 00     Years: 32 00     Pack years: 96 00     Types: Cigarettes     Start date:      Quit date: 2003     Years since quittin 2   • Smokeless tobacco: Never Used   Vaping Use   • Vaping Use: Never used   Substance and Sexual Activity   • Alcohol use: Not Currently   • Drug use: No     Types: Marijuana     Comment: As teenager   • Sexual activity: Not Currently     Comment: defer   Other Topics Concern   • None   Social History Narrative   • None     Social Determinants of Health     Financial Resource Strain: Not on file   Food Insecurity: Not on file   Transportation Needs: Not on file   Physical Activity: Not on file   Stress: Not on file   Social Connections: Not on file   Intimate Partner Violence: Not on file   Housing Stability: Not on file     Social History     Tobacco Use   Smoking Status Former Smoker   • Packs/day: 3 00   • Years: 32 00   • Pack years: 96 00   • Types: Cigarettes   • Start date:    • Quit date: 2003   • Years since quittin 2   Smokeless Tobacco Never Used     Family History   Problem Relation Age of Onset   • Stomach cancer Mother    • Alcohol abuse Father    • No Known Problems Sister    • No Known Problems Daughter    • No Known Problems Maternal Grandmother    • No Known Problems Maternal Grandfather    • No Known Problems Paternal Grandmother    • No Known Problems Paternal Grandfather    • No Known Problems Sister    • No Known Problems Sister    • No Known Problems Sister    • No Known Problems Sister    • No Known Problems Daughter    • No Known Problems Daughter    • No Known Problems Maternal Aunt    • No Known Problems Maternal Aunt    • No Known Problems Paternal Aunt    • No Known Problems Paternal Aunt    • No Known Problems Paternal Aunt    • No Known Problems Paternal Aunt    • No Known Problems Paternal Aunt    • No Known Problems Paternal Aunt    • No Known Problems Paternal Aunt    • No Known Problems Paternal Aunt    • No Known Problems Paternal Aunt    • No Known Problems Paternal Aunt        The following portions of the patient's history were reviewed and updated as appropriate allergies, current medications, past medical history, past social history, past surgical history and problem list    Imaging:    Results  No results found for this or any previous visit (from the past 1 hour(s))  ]  No results found for: PSA  Lab Results   Component Value Date    CALCIUM 9 4 10/18/2022     06/11/2018    K 4 2 10/18/2022    CO2 26 10/18/2022     10/18/2022    BUN 14 10/18/2022    CREATININE 0 98 10/18/2022     Lab Results   Component Value Date    WBC 9 27 10/18/2022    HGB 10 7 (L) 10/18/2022    HCT 33 7 (L) 10/18/2022    MCV 94 10/18/2022     10/18/2022       Please Note:  Voice dictation software has been used to create this document  There may be inadvertent transcriptions errors       Michael Hawley

## 2022-10-23 LAB — BACTERIA UR CULT: NORMAL

## 2022-10-24 ENCOUNTER — TELEPHONE (OUTPATIENT)
Dept: UROLOGY | Facility: CLINIC | Age: 67
End: 2022-10-24

## 2022-10-24 DIAGNOSIS — R39.89 SUSPECTED UTI: Primary | ICD-10-CM

## 2022-10-24 RX ORDER — CEPHALEXIN 500 MG/1
500 CAPSULE ORAL EVERY 6 HOURS SCHEDULED
Qty: 20 CAPSULE | Refills: 0 | Status: SHIPPED | OUTPATIENT
Start: 2022-10-24 | End: 2022-10-29

## 2022-10-24 NOTE — TELEPHONE ENCOUNTER
Called the patient and left a voicemail stating Elliscurt Floretnin sent over an antibiotic to start taking today in preparation of her stent exchange on the 27th  Asked patient to please call the office to review

## 2022-10-24 NOTE — TELEPHONE ENCOUNTER
----- Message from Nicolas U  79  sent at 10/24/2022  7:35 AM EDT -----  Please let patient know that her urine cultures shows mixed contaminants  With her upcoming stent exchange, I will empirically treat there with Keflex  I have sent a prescription to her pharmacy

## 2022-10-24 NOTE — TELEPHONE ENCOUNTER
Spoke with patient and reviewed instructions for Keflex to start taking prior to stent exchange  Patient verbalized understanding

## 2022-10-25 ENCOUNTER — HOSPITAL ENCOUNTER (OUTPATIENT)
Dept: INFUSION CENTER | Facility: HOSPITAL | Age: 67
Discharge: HOME | End: 2022-10-25
Payer: MEDICARE

## 2022-10-25 DIAGNOSIS — Z45.2 ENCOUNTER FOR VENOUS ACCESS DEVICE CARE: ICD-10-CM

## 2022-10-25 DIAGNOSIS — C53.0 MALIGNANT NEOPLASM OF ENDOCERVIX (HCC): Primary | ICD-10-CM

## 2022-10-25 DIAGNOSIS — E86.0 DEHYDRATION: ICD-10-CM

## 2022-10-25 LAB
ALBUMIN SERPL BCP-MCNC: 3.5 G/DL (ref 3.5–5)
ALP SERPL-CCNC: 147 U/L (ref 46–116)
ALT SERPL W P-5'-P-CCNC: 17 U/L (ref 12–78)
ANION GAP SERPL CALCULATED.3IONS-SCNC: 9 MMOL/L (ref 4–13)
AST SERPL W P-5'-P-CCNC: 14 U/L (ref 5–45)
BASOPHILS # BLD AUTO: 0.04 THOUSANDS/ÂΜL (ref 0–0.1)
BASOPHILS NFR BLD AUTO: 1 % (ref 0–1)
BILIRUB SERPL-MCNC: 0.43 MG/DL (ref 0.2–1)
BUN SERPL-MCNC: 19 MG/DL (ref 5–25)
CALCIUM SERPL-MCNC: 9.5 MG/DL (ref 8.3–10.1)
CHLORIDE SERPL-SCNC: 101 MMOL/L (ref 96–108)
CO2 SERPL-SCNC: 27 MMOL/L (ref 21–32)
CREAT SERPL-MCNC: 1.09 MG/DL (ref 0.6–1.3)
EOSINOPHIL # BLD AUTO: 0.03 THOUSAND/ÂΜL (ref 0–0.61)
EOSINOPHIL NFR BLD AUTO: 1 % (ref 0–6)
ERYTHROCYTE [DISTWIDTH] IN BLOOD BY AUTOMATED COUNT: 15.2 % (ref 11.6–15.1)
GFR SERPL CREATININE-BSD FRML MDRD: 52 ML/MIN/1.73SQ M
GLUCOSE P FAST SERPL-MCNC: 105 MG/DL (ref 65–99)
GLUCOSE SERPL-MCNC: 105 MG/DL (ref 65–140)
HCT VFR BLD AUTO: 30.4 % (ref 34.8–46.1)
HGB BLD-MCNC: 9.7 G/DL (ref 11.5–15.4)
IMM GRANULOCYTES # BLD AUTO: 0.05 THOUSAND/UL (ref 0–0.2)
IMM GRANULOCYTES NFR BLD AUTO: 1 % (ref 0–2)
LYMPHOCYTES # BLD AUTO: 0.67 THOUSANDS/ÂΜL (ref 0.6–4.47)
LYMPHOCYTES NFR BLD AUTO: 17 % (ref 14–44)
MAGNESIUM SERPL-MCNC: 1.9 MG/DL (ref 1.6–2.6)
MCH RBC QN AUTO: 29.6 PG (ref 26.8–34.3)
MCHC RBC AUTO-ENTMCNC: 31.9 G/DL (ref 31.4–37.4)
MCV RBC AUTO: 93 FL (ref 82–98)
MONOCYTES # BLD AUTO: 0.41 THOUSAND/ÂΜL (ref 0.17–1.22)
MONOCYTES NFR BLD AUTO: 11 % (ref 4–12)
NEUTROPHILS # BLD AUTO: 2.67 THOUSANDS/ÂΜL (ref 1.85–7.62)
NEUTS SEG NFR BLD AUTO: 69 % (ref 43–75)
NRBC BLD AUTO-RTO: 0 /100 WBCS
PLATELET # BLD AUTO: 148 THOUSANDS/UL (ref 149–390)
PMV BLD AUTO: 9.9 FL (ref 8.9–12.7)
POTASSIUM SERPL-SCNC: 4 MMOL/L (ref 3.5–5.3)
PROT SERPL-MCNC: 7.3 G/DL (ref 6.4–8.4)
RBC # BLD AUTO: 3.28 MILLION/UL (ref 3.81–5.12)
SODIUM SERPL-SCNC: 137 MMOL/L (ref 135–147)
WBC # BLD AUTO: 3.87 THOUSAND/UL (ref 4.31–10.16)

## 2022-10-25 PROCEDURE — 83735 ASSAY OF MAGNESIUM: CPT

## 2022-10-25 PROCEDURE — 80053 COMPREHEN METABOLIC PANEL: CPT

## 2022-10-25 PROCEDURE — 85025 COMPLETE CBC W/AUTO DIFF WBC: CPT

## 2022-10-25 NOTE — PRE-PROCEDURE INSTRUCTIONS
Pre-Surgery Instructions:   Medication Instructions   • buPROPion (WELLBUTRIN XL) 150 mg 24 hr tablet Take day of surgery  • cephalexin (KEFLEX) 500 mg capsule Take day of surgery  • Diclofenac Sodium (VOLTAREN) 1 % Hold day of surgery  • diphenhydrAMINE (BENADRYL) 25 mg capsule Hold day of surgery  • gabapentin (NEURONTIN) 100 mg capsule Take day of surgery  • lidocaine-prilocaine (EMLA) cream Hold day of surgery  • Multiple Vitamins-Minerals (Alive Womens Gummy) CHEW Hold day of surgery  • naloxegol oxalate (Movantik) 25 MG tablet Hold day of surgery  • omeprazole (PriLOSEC) 40 MG capsule Take day of surgery  • ondansetron (ZOFRAN) 8 mg tablet Uses PRN- OK to take day of surgery   • oxyCODONE-acetaminophen (Percocet)  mg per tablet Uses PRN- OK to take day of surgery   • polyethylene glycol (MIRALAX) 17 g packet Hold day of surgery  • senna (SENOKOT) 8 6 MG tablet Hold day of surgery  • traZODone (DESYREL) 50 mg tablet Take night before surgery    [facility] preop instructions reviewed with pt  Pt has soap

## 2022-10-26 ENCOUNTER — ANESTHESIA EVENT (OUTPATIENT)
Dept: PERIOP | Facility: HOSPITAL | Age: 67
End: 2022-10-26
Payer: MEDICARE

## 2022-10-26 ENCOUNTER — HOSPITAL ENCOUNTER (OUTPATIENT)
Dept: INFUSION CENTER | Facility: HOSPITAL | Age: 67
Discharge: HOME | End: 2022-10-26
Attending: OBSTETRICS & GYNECOLOGY
Payer: MEDICARE

## 2022-10-26 ENCOUNTER — OFFICE VISIT (OUTPATIENT)
Dept: LAB | Facility: HOSPITAL | Age: 67
End: 2022-10-26
Attending: UROLOGY
Payer: MEDICARE

## 2022-10-26 VITALS
RESPIRATION RATE: 16 BRPM | HEART RATE: 90 BPM | TEMPERATURE: 98.7 F | BODY MASS INDEX: 28.36 KG/M2 | SYSTOLIC BLOOD PRESSURE: 118 MMHG | HEIGHT: 59 IN | DIASTOLIC BLOOD PRESSURE: 56 MMHG | WEIGHT: 140.65 LBS | OXYGEN SATURATION: 98 %

## 2022-10-26 DIAGNOSIS — N13.30 HYDRONEPHROSIS, UNSPECIFIED HYDRONEPHROSIS TYPE: ICD-10-CM

## 2022-10-26 DIAGNOSIS — C53.0 MALIGNANT NEOPLASM OF ENDOCERVIX (HCC): Primary | ICD-10-CM

## 2022-10-26 DIAGNOSIS — Z01.810 PREOP CARDIOVASCULAR EXAM: ICD-10-CM

## 2022-10-26 DIAGNOSIS — E87.6 HYPOKALEMIA: ICD-10-CM

## 2022-10-26 PROCEDURE — 93005 ELECTROCARDIOGRAM TRACING: CPT

## 2022-10-26 RX ORDER — SODIUM CHLORIDE 9 MG/ML
20 INJECTION, SOLUTION INTRAVENOUS ONCE
Status: COMPLETED | OUTPATIENT
Start: 2022-10-26 | End: 2022-10-26

## 2022-10-26 RX ADMIN — DEXAMETHASONE SODIUM PHOSPHATE 10 MG: 10 INJECTION, SOLUTION INTRAMUSCULAR; INTRAVENOUS at 11:48

## 2022-10-26 RX ADMIN — TOPOTECAN 4.7 MG: 1 INJECTION, SOLUTION, CONCENTRATE INTRAVENOUS at 12:23

## 2022-10-26 RX ADMIN — SODIUM CHLORIDE 20 ML/HR: 0.9 INJECTION, SOLUTION INTRAVENOUS at 11:44

## 2022-10-26 NOTE — PLAN OF CARE
Problem: INFECTION - ADULT  Goal: Absence or prevention of progression during hospitalization  Description: INTERVENTIONS:  - Assess and monitor for signs and symptoms of infection  - Monitor lab/diagnostic results  - Monitor all insertion sites, i e  indwelling lines, tubes, and drains  - Monitor endotracheal if appropriate and nasal secretions for changes in amount and color  - [place] appropriate cooling/warming therapies per order  - Administer medications as ordered  - Instruct and encourage patient and family to use good hand hygiene technique  - Identify and instruct in appropriate isolation precautions for identified infection/condition  Outcome: Progressing

## 2022-10-27 ENCOUNTER — APPOINTMENT (OUTPATIENT)
Dept: RADIOLOGY | Facility: HOSPITAL | Age: 67
End: 2022-10-27

## 2022-10-27 ENCOUNTER — ANESTHESIA (OUTPATIENT)
Dept: PERIOP | Facility: HOSPITAL | Age: 67
End: 2022-10-27
Payer: MEDICARE

## 2022-10-27 ENCOUNTER — HOSPITAL ENCOUNTER (OUTPATIENT)
Facility: HOSPITAL | Age: 67
Setting detail: OUTPATIENT SURGERY
Discharge: HOME | End: 2022-10-27
Attending: UROLOGY | Admitting: UROLOGY

## 2022-10-27 VITALS
TEMPERATURE: 97.8 F | RESPIRATION RATE: 18 BRPM | BODY MASS INDEX: 28.22 KG/M2 | DIASTOLIC BLOOD PRESSURE: 55 MMHG | HEART RATE: 66 BPM | WEIGHT: 140 LBS | OXYGEN SATURATION: 99 % | SYSTOLIC BLOOD PRESSURE: 109 MMHG | HEIGHT: 59 IN

## 2022-10-27 DIAGNOSIS — N13.1 HYDRONEPHROSIS WITH URETERAL STRICTURE, NOT ELSEWHERE CLASSIFIED: Primary | ICD-10-CM

## 2022-10-27 LAB
ATRIAL RATE: 95 BPM
P AXIS: 78 DEGREES
PR INTERVAL: 132 MS
QRS AXIS: 76 DEGREES
QRSD INTERVAL: 76 MS
QT INTERVAL: 352 MS
QTC INTERVAL: 442 MS
T WAVE AXIS: 65 DEGREES
VENTRICULAR RATE: 95 BPM

## 2022-10-27 PROCEDURE — 93010 ELECTROCARDIOGRAM REPORT: CPT | Performed by: INTERNAL MEDICINE

## 2022-10-27 RX ORDER — SODIUM CHLORIDE, SODIUM LACTATE, POTASSIUM CHLORIDE, CALCIUM CHLORIDE 600; 310; 30; 20 MG/100ML; MG/100ML; MG/100ML; MG/100ML
INJECTION, SOLUTION INTRAVENOUS CONTINUOUS PRN
Status: DISCONTINUED | OUTPATIENT
Start: 2022-10-27 | End: 2022-10-27

## 2022-10-27 RX ORDER — DEXAMETHASONE SODIUM PHOSPHATE 10 MG/ML
INJECTION, SOLUTION INTRAMUSCULAR; INTRAVENOUS AS NEEDED
Status: DISCONTINUED | OUTPATIENT
Start: 2022-10-27 | End: 2022-10-27

## 2022-10-27 RX ORDER — CEFAZOLIN SODIUM 2 G/50ML
2000 SOLUTION INTRAVENOUS ONCE
Status: COMPLETED | OUTPATIENT
Start: 2022-10-27 | End: 2022-10-27

## 2022-10-27 RX ORDER — ONDANSETRON 2 MG/ML
INJECTION INTRAMUSCULAR; INTRAVENOUS AS NEEDED
Status: DISCONTINUED | OUTPATIENT
Start: 2022-10-27 | End: 2022-10-27

## 2022-10-27 RX ORDER — PROPOFOL 10 MG/ML
INJECTION, EMULSION INTRAVENOUS CONTINUOUS PRN
Status: DISCONTINUED | OUTPATIENT
Start: 2022-10-27 | End: 2022-10-27

## 2022-10-27 RX ORDER — LIDOCAINE HYDROCHLORIDE 10 MG/ML
INJECTION, SOLUTION EPIDURAL; INFILTRATION; INTRACAUDAL; PERINEURAL AS NEEDED
Status: DISCONTINUED | OUTPATIENT
Start: 2022-10-27 | End: 2022-10-27

## 2022-10-27 RX ORDER — MAGNESIUM HYDROXIDE 1200 MG/15ML
LIQUID ORAL AS NEEDED
Status: DISCONTINUED | OUTPATIENT
Start: 2022-10-27 | End: 2022-10-27 | Stop reason: HOSPADM

## 2022-10-27 RX ORDER — PROPOFOL 10 MG/ML
INJECTION, EMULSION INTRAVENOUS AS NEEDED
Status: DISCONTINUED | OUTPATIENT
Start: 2022-10-27 | End: 2022-10-27

## 2022-10-27 RX ORDER — LIDOCAINE HYDROCHLORIDE 10 MG/ML
0.5 INJECTION, SOLUTION EPIDURAL; INFILTRATION; INTRACAUDAL; PERINEURAL ONCE AS NEEDED
Status: DISCONTINUED | OUTPATIENT
Start: 2022-10-27 | End: 2022-10-27 | Stop reason: HOSPADM

## 2022-10-27 RX ORDER — FENTANYL CITRATE/PF 50 MCG/ML
25 SYRINGE (ML) INJECTION
Status: DISCONTINUED | OUTPATIENT
Start: 2022-10-27 | End: 2022-10-27 | Stop reason: HOSPADM

## 2022-10-27 RX ORDER — MIDAZOLAM HYDROCHLORIDE 2 MG/2ML
INJECTION, SOLUTION INTRAMUSCULAR; INTRAVENOUS AS NEEDED
Status: DISCONTINUED | OUTPATIENT
Start: 2022-10-27 | End: 2022-10-27

## 2022-10-27 RX ORDER — PHENAZOPYRIDINE HYDROCHLORIDE 100 MG/1
200 TABLET, FILM COATED ORAL ONCE
Status: COMPLETED | OUTPATIENT
Start: 2022-10-27 | End: 2022-10-27

## 2022-10-27 RX ORDER — SODIUM CHLORIDE, SODIUM LACTATE, POTASSIUM CHLORIDE, CALCIUM CHLORIDE 600; 310; 30; 20 MG/100ML; MG/100ML; MG/100ML; MG/100ML
50 INJECTION, SOLUTION INTRAVENOUS CONTINUOUS
Status: DISCONTINUED | OUTPATIENT
Start: 2022-10-27 | End: 2022-10-27 | Stop reason: HOSPADM

## 2022-10-27 RX ORDER — HYDROCODONE BITARTRATE AND ACETAMINOPHEN 5; 325 MG/1; MG/1
1 TABLET ORAL EVERY 6 HOURS PRN
Status: DISCONTINUED | OUTPATIENT
Start: 2022-10-27 | End: 2022-10-27 | Stop reason: HOSPADM

## 2022-10-27 RX ORDER — FENTANYL CITRATE 50 UG/ML
INJECTION, SOLUTION INTRAMUSCULAR; INTRAVENOUS AS NEEDED
Status: DISCONTINUED | OUTPATIENT
Start: 2022-10-27 | End: 2022-10-27

## 2022-10-27 RX ADMIN — LIDOCAINE HYDROCHLORIDE 50 MG: 10 INJECTION, SOLUTION EPIDURAL; INFILTRATION; INTRACAUDAL; PERINEURAL at 08:03

## 2022-10-27 RX ADMIN — FENTANYL CITRATE 25 MCG: 50 INJECTION, SOLUTION INTRAMUSCULAR; INTRAVENOUS at 08:14

## 2022-10-27 RX ADMIN — MIDAZOLAM HYDROCHLORIDE 2 MG: 1 INJECTION, SOLUTION INTRAMUSCULAR; INTRAVENOUS at 07:59

## 2022-10-27 RX ADMIN — FENTANYL CITRATE 25 MCG: 50 INJECTION, SOLUTION INTRAMUSCULAR; INTRAVENOUS at 08:03

## 2022-10-27 RX ADMIN — PHENAZOPYRIDINE 200 MG: 100 TABLET ORAL at 08:41

## 2022-10-27 RX ADMIN — PROPOFOL 40 MG: 10 INJECTION, EMULSION INTRAVENOUS at 08:14

## 2022-10-27 RX ADMIN — DEXAMETHASONE SODIUM PHOSPHATE 10 MG: 10 INJECTION INTRAMUSCULAR; INTRAVENOUS at 08:03

## 2022-10-27 RX ADMIN — CEFAZOLIN SODIUM 2000 MG: 2 SOLUTION INTRAVENOUS at 08:09

## 2022-10-27 RX ADMIN — ONDANSETRON 4 MG: 2 INJECTION INTRAMUSCULAR; INTRAVENOUS at 08:03

## 2022-10-27 RX ADMIN — SODIUM CHLORIDE, SODIUM LACTATE, POTASSIUM CHLORIDE, AND CALCIUM CHLORIDE: .6; .31; .03; .02 INJECTION, SOLUTION INTRAVENOUS at 07:59

## 2022-10-27 RX ADMIN — PROPOFOL 100 MCG/KG/MIN: 10 INJECTION, EMULSION INTRAVENOUS at 08:04

## 2022-10-27 RX ADMIN — PROPOFOL 70 MG: 10 INJECTION, EMULSION INTRAVENOUS at 08:03

## 2022-10-27 RX ADMIN — SODIUM CHLORIDE, SODIUM LACTATE, POTASSIUM CHLORIDE, AND CALCIUM CHLORIDE 50 ML/HR: .6; .31; .03; .02 INJECTION, SOLUTION INTRAVENOUS at 07:30

## 2022-10-27 NOTE — DISCHARGE INSTRUCTIONS
Usual discharge instructions  Expect to see blood in the urine, and have burning urgency and frequency of urination  Call for fever greater than 101 5°, severe pain not relieved by pain medications  Tomorrow you may resume all normal activities including driving  No driving or operating machinery today  See me in 3 months for another stent exchange

## 2022-10-27 NOTE — ANESTHESIA POSTPROCEDURE EVALUATION
Post-Op Assessment Note    CV Status:  Stable  Pain Score: 0    Pain management: adequate     Mental Status:  Alert and awake   Hydration Status:  Euvolemic   PONV Controlled:  Controlled   Airway Patency:  Patent      Post Op Vitals Reviewed: Yes      Staff: CRNA         No complications documented      BP   104/51   Temp   98 4   Pulse  96   Resp   18   SpO2   100

## 2022-10-27 NOTE — ANESTHESIA PREPROCEDURE EVALUATION
Procedure:  Cystoscopy, right ureteral stent exchange (Right Ureter)    Currently on IV chemotherapy, last dose yesterday  Stage IB2 cervical cancer  Leukopenic without neutropenia    Relevant Problems   CARDIO   (+) Dyspnea on exertion   (+) Pain in thoracic spine      /RENAL   (+) Hydronephrosis   (+) Stage 3 chronic kidney disease, unspecified whether stage 3a or 3b CKD (HCC)      GYN   (+) 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HCC)      HEMATOLOGY   (+) Chemotherapy-induced thrombocytopenia   (+) Thrombocytopenia (HCC)      MUSCULOSKELETAL   (+) Cervical spondylosis   (+) Pain in thoracic spine   (+) Primary generalized (osteo)arthritis      NEURO/PSYCH   (+) Anxiety and depression   (+) Continuous opioid dependence (HCC)   (+) Major depressive disorder, single episode, mild (HCC)      PULMONARY   (+) Chronic obstructive pulmonary disease, unspecified COPD type (HCC)   (+) Dyspnea on exertion        Physical Exam    Airway    Mallampati score: II  TM Distance: >3 FB  Neck ROM: full     Dental       Cardiovascular      Pulmonary      Other Findings        Anesthesia Plan  ASA Score- 3     Anesthesia Type- IV sedation with anesthesia with ASA Monitors  Additional Monitors:   Airway Plan:     Comment: IV sedation, plan for GA+ LMA as back up      Recent labs personally reviewed:  Lab Results       Component                Value               Date                       WBC                      3 87 (L)            10/25/2022                 HGB                      9 7 (L)             10/25/2022                 PLT                      148 (L)             10/25/2022            Lab Results       Component                Value               Date                       NA                       139                 06/11/2018                 K                        4 0                 10/25/2022                 BUN                      19                  10/25/2022                 CREATININE               1 09 10/25/2022            Lab Results       Component                Value               Date                       PTT                      37 4                06/11/2018             Lab Results       Component                Value               Date                       INR                      1 11                02/19/2021              Blood type     I, Drew Felix MD, have personally seen and evaluated the patient prior to anesthetic care  I have reviewed the pre-anesthetic record, medical history, allergies, medications and any other medical records if appropriate to the anesthetic care  If a CRNA is involved in the case, I have reviewed the CRNA assessment, if present, and agree  Patient consented for IV Sedation, general anesthesia as back up  Discussed risks of aspiration, IV infiltration, indications for conversion to general anesthesia  All questions and concerns addressed          Plan Factors-Exercise tolerance (METS): >4 METS  Chart reviewed  EKG reviewed  Imaging results reviewed  Existing labs reviewed  Patient summary reviewed  Patient is not a current smoker  Patient did not smoke on day of surgery  Obstructive sleep apnea risk education given perioperatively  Induction- intravenous  Postoperative Plan-     Informed Consent- Anesthetic plan and risks discussed with patient  I personally reviewed this patient with the CRNA  Discussed and agreed on the Anesthesia Plan with the CRNA  Sosa Bright

## 2022-10-27 NOTE — OP NOTE
OPERATIVE REPORT  PATIENT NAME: Keagan Lawton    :  1955  MRN: [id number]  Pt Location: [facility] OR ROOM 02    SURGERY DATE: 10/27/2022    Surgeon(s) and Role:     Era Richardson MD - Primary    Preop Diagnosis:  Hydronephrosis with ureteral stricture, not elsewhere classified [N13 1] right hydronephrosis due to compressive lymphadenopathy    Post-Op Diagnosis Codes: * Hydronephrosis with ureteral stricture, not elsewhere classified [N13 1] right hydronephrosis due to compressive lymphadenopathy    Procedure(s) (LRB):  Cystoscopy, right ureteral stent exchange (Right)    Specimen(s):  * No specimens in log *    Estimated Blood Loss:   Minimal    Drains:  Ureteral Drain/Stent Right ureter 6 Fr  (Active)   Number of days: 511       Anesthesia Type:   General/LMA    Operative Indications:  Hydronephrosis with ureteral stricture, not elsewhere classified [N13 1]  Right hydronephrosis due to compressive lymphadenopathy    Operative Findings:  Normal bladder  Stent changed without difficulty  Complications:   None    Procedure and Technique:  27-year-old woman with recurrent cervical cancer with right hydronephrosis, found have a 4 cm stricture the mid ureter on the right side, and a 1 cm stricture more proximal with hydronephrosis  She has an indwelling 6 [place] by 26 cm stent and she is here for routine stent exchange  Last stent exchange was 2022  The risks of bleeding infection damage ureter and need for additional procedures were explained she gives informed consent  She was noted to have progression metastatic disease compared to the most recent FDG PET CT of 2022 on her CT scan 2022  This included increase in size of lymph nodes in the left subpectoral region and retroperitoneum  There was also an increase in size of the left lower quadrant omental metastases and a new soft tissue density surrounding the right kidney    There is new right hydronephrosis up to the level of the right UPJ with a normal size right ureter an indwelling ureteral stent  Dr Valdivia Nicely reached out to me for consideration of right nephrostomy tube  As her creatinine was normal she was asymptomatic, I decided just to simply go through the stent exchange as long as she remained asymptomatic and creatinine was normal   Patient does not want a nephrostomy tube  She does not think she would be able live with this  Therefore she is in agreement with my plan  The patient was brought to the operating room identified properly  LMA was induced patient was prepped and draped in dorsal lithotomy position usual fashion  A time-out was performed  Cystoscopy was carried out with a 22 [place] cystoscopy sheath and 30 degree lens  Urethra was normal without stricture  The bladder was unremarkable  There are no stones tumors or lesions  The orifices orthotopic and intact  I grasped the indwelling stent brought out to the urethral meatus under fluoroscopic guidance placed 0 035 in guidewire up the wire up into the renal pelvis  The old stent was removed intact and discarded, I placed a fresh 6 [place] by 26 cm stent over the wire and coils were established renal pelvis and bladder  No string was left attached  Bladder was drained with the cystoscopy sheath     I was present for the entire procedure and A qualified resident physician was not available    Patient Disposition:  PACU  and hemodynamically stable        SIGNATURE: Chapis Arechiga MD  DATE: October 27, 2022  TIME: 7:55 AM

## 2022-10-27 NOTE — INTERVAL H&P NOTE
H&P reviewed  After examining the patient I find no changes in the patients condition since the H&P had been written      Vitals:    10/27/22 0708   BP: 123/55   Pulse: 70   Resp: 18   Temp: 97 8 °F (36 6 °C)   SpO2: 98%

## 2022-11-01 ENCOUNTER — HOSPITAL ENCOUNTER (EMERGENCY)
Facility: HOSPITAL | Age: 67
Discharge: HOME | End: 2022-11-01
Attending: EMERGENCY MEDICINE

## 2022-11-01 ENCOUNTER — TELEPHONE (OUTPATIENT)
Dept: GYNECOLOGIC ONCOLOGY | Facility: CLINIC | Age: 67
End: 2022-11-01

## 2022-11-01 ENCOUNTER — HOSPITAL ENCOUNTER (OUTPATIENT)
Dept: INFUSION CENTER | Facility: HOSPITAL | Age: 67
Discharge: HOME | End: 2022-11-01

## 2022-11-01 VITALS
SYSTOLIC BLOOD PRESSURE: 115 MMHG | OXYGEN SATURATION: 97 % | HEIGHT: 59 IN | RESPIRATION RATE: 18 BRPM | HEART RATE: 96 BPM | WEIGHT: 139.55 LBS | BODY MASS INDEX: 28.13 KG/M2 | DIASTOLIC BLOOD PRESSURE: 58 MMHG | TEMPERATURE: 97.9 F

## 2022-11-01 VITALS
SYSTOLIC BLOOD PRESSURE: 124 MMHG | DIASTOLIC BLOOD PRESSURE: 57 MMHG | OXYGEN SATURATION: 95 % | HEART RATE: 101 BPM | TEMPERATURE: 99.5 F | RESPIRATION RATE: 18 BRPM

## 2022-11-01 DIAGNOSIS — Z13.9 ENCOUNTER FOR MEDICAL SCREENING EXAMINATION: Primary | ICD-10-CM

## 2022-11-01 DIAGNOSIS — E87.6 HYPOKALEMIA: ICD-10-CM

## 2022-11-01 DIAGNOSIS — C53.0 MALIGNANT NEOPLASM OF ENDOCERVIX (HCC): ICD-10-CM

## 2022-11-01 LAB
ALBUMIN SERPL BCP-MCNC: 1.7 G/DL (ref 3.5–5)
ALBUMIN SERPL BCP-MCNC: 3.3 G/DL (ref 3.5–5)
ALP SERPL-CCNC: 135 U/L (ref 46–116)
ALP SERPL-CCNC: 77 U/L (ref 46–116)
ALT SERPL W P-5'-P-CCNC: 10 U/L (ref 12–78)
ALT SERPL W P-5'-P-CCNC: 26 U/L (ref 12–78)
ANION GAP SERPL CALCULATED.3IONS-SCNC: 11 MMOL/L (ref 4–13)
ANION GAP SERPL CALCULATED.3IONS-SCNC: 9 MMOL/L (ref 4–13)
AST SERPL W P-5'-P-CCNC: 10 U/L (ref 5–45)
BASOPHILS # BLD AUTO: 0.01 THOUSANDS/ÂΜL (ref 0–0.1)
BASOPHILS # BLD AUTO: 0.02 THOUSANDS/ÂΜL (ref 0–0.1)
BASOPHILS NFR BLD AUTO: 0 % (ref 0–1)
BASOPHILS NFR BLD AUTO: 1 % (ref 0–1)
BILIRUB SERPL-MCNC: 0.4 MG/DL (ref 0.2–1)
BILIRUB SERPL-MCNC: 0.45 MG/DL (ref 0.2–1)
BUN SERPL-MCNC: 11 MG/DL (ref 5–25)
BUN SERPL-MCNC: 23 MG/DL (ref 5–25)
CALCIUM ALBUM COR SERPL-MCNC: 6.8 MG/DL (ref 8.3–10.1)
CALCIUM ALBUM COR SERPL-MCNC: 9.6 MG/DL (ref 8.3–10.1)
CALCIUM SERPL-MCNC: 5 MG/DL (ref 8.3–10.1)
CALCIUM SERPL-MCNC: 9 MG/DL (ref 8.3–10.1)
CHLORIDE SERPL-SCNC: 101 MMOL/L (ref 96–108)
CHLORIDE SERPL-SCNC: 113 MMOL/L (ref 96–108)
CO2 SERPL-SCNC: 17 MMOL/L (ref 21–32)
CO2 SERPL-SCNC: 26 MMOL/L (ref 21–32)
CREAT SERPL-MCNC: 0.51 MG/DL (ref 0.6–1.3)
CREAT SERPL-MCNC: 1.27 MG/DL (ref 0.6–1.3)
EOSINOPHIL # BLD AUTO: 0.1 THOUSAND/ÂΜL (ref 0–0.61)
EOSINOPHIL # BLD AUTO: 0.11 THOUSAND/ÂΜL (ref 0–0.61)
EOSINOPHIL NFR BLD AUTO: 3 % (ref 0–6)
EOSINOPHIL NFR BLD AUTO: 3 % (ref 0–6)
ERYTHROCYTE [DISTWIDTH] IN BLOOD BY AUTOMATED COUNT: 15.6 % (ref 11.6–15.1)
ERYTHROCYTE [DISTWIDTH] IN BLOOD BY AUTOMATED COUNT: 15.6 % (ref 11.6–15.1)
GFR SERPL CREATININE-BSD FRML MDRD: 43 ML/MIN/1.73SQ M
GFR SERPL CREATININE-BSD FRML MDRD: 99 ML/MIN/1.73SQ M
GLUCOSE P FAST SERPL-MCNC: 67 MG/DL (ref 65–99)
GLUCOSE SERPL-MCNC: 67 MG/DL (ref 65–140)
GLUCOSE SERPL-MCNC: 89 MG/DL (ref 65–140)
HCT VFR BLD AUTO: 28 % (ref 34.8–46.1)
HCT VFR BLD AUTO: 28.7 % (ref 34.8–46.1)
HGB BLD-MCNC: 9 G/DL (ref 11.5–15.4)
HGB BLD-MCNC: 9.4 G/DL (ref 11.5–15.4)
IMM GRANULOCYTES # BLD AUTO: 0.03 THOUSAND/UL (ref 0–0.2)
IMM GRANULOCYTES # BLD AUTO: 0.05 THOUSAND/UL (ref 0–0.2)
IMM GRANULOCYTES NFR BLD AUTO: 1 % (ref 0–2)
IMM GRANULOCYTES NFR BLD AUTO: 1 % (ref 0–2)
LYMPHOCYTES # BLD AUTO: 0.67 THOUSANDS/ÂΜL (ref 0.6–4.47)
LYMPHOCYTES # BLD AUTO: 0.86 THOUSANDS/ÂΜL (ref 0.6–4.47)
LYMPHOCYTES NFR BLD AUTO: 17 % (ref 14–44)
LYMPHOCYTES NFR BLD AUTO: 25 % (ref 14–44)
MAGNESIUM SERPL-MCNC: 0.9 MG/DL (ref 1.6–2.6)
MAGNESIUM SERPL-MCNC: 2 MG/DL (ref 1.6–2.6)
MCH RBC QN AUTO: 29.8 PG (ref 26.8–34.3)
MCH RBC QN AUTO: 30.1 PG (ref 26.8–34.3)
MCHC RBC AUTO-ENTMCNC: 32.1 G/DL (ref 31.4–37.4)
MCHC RBC AUTO-ENTMCNC: 32.8 G/DL (ref 31.4–37.4)
MCV RBC AUTO: 92 FL (ref 82–98)
MCV RBC AUTO: 93 FL (ref 82–98)
MONOCYTES # BLD AUTO: 0.23 THOUSAND/ÂΜL (ref 0.17–1.22)
MONOCYTES # BLD AUTO: 0.25 THOUSAND/ÂΜL (ref 0.17–1.22)
MONOCYTES NFR BLD AUTO: 6 % (ref 4–12)
MONOCYTES NFR BLD AUTO: 7 % (ref 4–12)
NEUTROPHILS # BLD AUTO: 2.13 THOUSANDS/ÂΜL (ref 1.85–7.62)
NEUTROPHILS # BLD AUTO: 2.95 THOUSANDS/ÂΜL (ref 1.85–7.62)
NEUTS SEG NFR BLD AUTO: 64 % (ref 43–75)
NEUTS SEG NFR BLD AUTO: 72 % (ref 43–75)
NRBC BLD AUTO-RTO: 0 /100 WBCS
NRBC BLD AUTO-RTO: 0 /100 WBCS
PLATELET # BLD AUTO: 73 THOUSANDS/UL (ref 149–390)
PLATELET # BLD AUTO: 78 THOUSANDS/UL (ref 149–390)
PMV BLD AUTO: 10 FL (ref 8.9–12.7)
PMV BLD AUTO: 9.9 FL (ref 8.9–12.7)
POTASSIUM SERPL-SCNC: 1.9 MMOL/L (ref 3.5–5.3)
POTASSIUM SERPL-SCNC: 3.7 MMOL/L (ref 3.5–5.3)
PROT SERPL-MCNC: 3.8 G/DL (ref 6.4–8.4)
PROT SERPL-MCNC: 7 G/DL (ref 6.4–8.4)
RBC # BLD AUTO: 3.02 MILLION/UL (ref 3.81–5.12)
RBC # BLD AUTO: 3.12 MILLION/UL (ref 3.81–5.12)
SODIUM SERPL-SCNC: 136 MMOL/L (ref 135–147)
SODIUM SERPL-SCNC: 141 MMOL/L (ref 135–147)
WBC # BLD AUTO: 3.38 THOUSAND/UL (ref 4.31–10.16)
WBC # BLD AUTO: 4.03 THOUSAND/UL (ref 4.31–10.16)

## 2022-11-01 RX ORDER — POTASSIUM CHLORIDE 20MEQ/15ML
20 LIQUID (ML) ORAL ONCE
Status: COMPLETED | OUTPATIENT
Start: 2022-11-01 | End: 2022-11-01

## 2022-11-01 RX ADMIN — POTASSIUM CHLORIDE 20 MEQ: 1.5 SOLUTION ORAL at 16:26

## 2022-11-01 NOTE — TELEPHONE ENCOUNTER
2nd call placed to patient  Discussed abnormal findings on lab results including K 1 9, calcium 5, mag 0 9  Discussed that the fasted way to have these repleted is by presenting to the ED  She is agreeable  Platelets also 12,330  Will cancel chemo scheduled tomorrow and resume next week

## 2022-11-01 NOTE — ED ATTENDING ATTESTATION
11/1/2022  ILinda DO, saw and evaluated the patient  I have discussed the patient with the resident/non-physician practitioner and agree with the resident's/non-physician practitioner's findings, Plan of Care, and MDM as documented in the resident's/non-physician practitioner's note, except where noted  All available labs and Radiology studies were reviewed  I was present for key portions of any procedure(s) performed by the resident/non-physician practitioner and I was immediately available to provide assistance  At this point I agree with the current assessment done in the Emergency Department  I have conducted an independent evaluation of this patient a history and physical is as follows:    ED Course     This is a 80-year-old female with a history of cervical cancer with lymphatic spread ongoing chemotherapy recent ureteral stent exchange for compressive uropathy due to lymphadenopathy presents to the ER for evaluation of abnormal outpatient labs  Outpatient labs were notable for a low magnesium and a critically low potassium of 1 9  Patient denies any significant symptoms which would correspond to this  She was notified by phone to go to the ER for workup and possible repletion of electrolyte derangements  No historical factors that would suggest this degree of hypokalemia based on review of the chart her medications in her recent procedure history  Decided to repeat the labs in the ER prior to initiating treatment she only had mild hypokalemia of 3 7 simple oral repletion in the ER  Magnesium was normal   Vital signs were appropriate  Patient discharged with outpatient follow-up  No further intervention the ER      Critical Care Time  Procedures

## 2022-11-01 NOTE — PLAN OF CARE
Problem: INFECTION - ADULT  Goal: Absence or prevention of progression during hospitalization  Description: INTERVENTIONS:  - Assess and monitor for signs and symptoms of infection  - Monitor lab/diagnostic results  - Monitor all insertion sites, i e  indwelling lines, tubes, and drains  - Monitor endotracheal if appropriate and nasal secretions for changes in amount and color  - [place] appropriate cooling/warming therapies per order  - Administer medications as ordered  - Instruct and encourage patient and family to use good hand hygiene technique  - Identify and instruct in appropriate isolation precautions for identified infection/condition  Outcome: Progressing     Problem: Knowledge Deficit  Goal: Patient/family/caregiver demonstrates understanding of disease process, treatment plan, medications, and discharge instructions  Description: Complete learning assessment and assess knowledge base    Interventions:  - Provide teaching at level of understanding  - Provide teaching via preferred learning methods  Outcome: Progressing

## 2022-11-02 ENCOUNTER — HOSPITAL ENCOUNTER (OUTPATIENT)
Dept: INFUSION CENTER | Facility: HOSPITAL | Age: 67
Discharge: HOME | End: 2022-11-02
Attending: OBSTETRICS & GYNECOLOGY

## 2022-11-02 LAB
ATRIAL RATE: 92 BPM
P AXIS: 113 DEGREES
PR INTERVAL: 140 MS
QRS AXIS: 119 DEGREES
QRSD INTERVAL: 74 MS
QT INTERVAL: 362 MS
QTC INTERVAL: 447 MS
T WAVE AXIS: 122 DEGREES
VENTRICULAR RATE: 92 BPM

## 2022-11-02 NOTE — ED PROVIDER NOTES
History  Chief Complaint   Patient presents with   • Abnormal Lab     Had bloodwork done this morning received a call saying that her potassium level is low and that she needs to be evaluated  Does not know how low her potassium is      Patient is a 28-year-old female with PMH of cervical cancer with metastasis to the nodes on active chemotherapy that presents to the emergency department after abnormal laboratory evaluation this morning  Patient had labs taken was found to have potassium of 1 9, calcium of 5, and magnesium of 0 9  After labs resulted she was called by her provider to come immediately to the emergency department for evaluation and repletion of electrolytes  The patient denies any acute symptoms and states that she has general lightheadedness and fatigue that has been present since she started infusions several months ago  The patient also recently had a stent placed in the right ureter due to ureteral stenosis, with instrumentation occurring roughly 4 days ago  Patient otherwise feels in her normal state of health and denies any recent illnesses or injuries  Prior to Admission Medications   Prescriptions Last Dose Informant Patient Reported? Taking? Diclofenac Sodium (VOLTAREN) 1 %  Self No No   Sig: Apply 2 g topically 4 (four) times a day   Multiple Vitamins-Minerals (Alive Womens Gummy) CHEW  Self Yes No   Sig: Chew   buPROPion (WELLBUTRIN XL) 150 mg 24 hr tablet  Self No No   Sig: TAKE 1 TABLET BY MOUTH EVERY DAY IN THE MORNING   diphenhydrAMINE (BENADRYL) 25 mg capsule  Self Yes No   Sig: Take 50 mg by mouth if needed   gabapentin (NEURONTIN) 100 mg capsule  Self No No   Sig: Take 2 capsules (200 mg total) by mouth daily AND 2 capsules (200 mg total) daily at bedtime   To reduce nerve pain    lidocaine-prilocaine (EMLA) cream  Self No No   Sig: APPLY TOPICALLY AS NEEDED FOR MILD PAIN   naloxegol oxalate (Movantik) 25 MG tablet   No No   Sig: Take 1 tablet (25 mg total) by mouth in the morning   Patient taking differently: Take 25 mg by mouth if needed   omeprazole (PriLOSEC) 40 MG capsule  Self No No   Sig: TAKE 1 CAPSULE BY MOUTH EVERY DAY IN THE MORNING   ondansetron (ZOFRAN) 8 mg tablet  Self No No   Sig: Take 1 tablet (8 mg total) by mouth every 8 (eight) hours as needed for nausea or vomiting   oxyCODONE-acetaminophen (Percocet)  mg per tablet  Self No No   Sig: Take 1 tablet by mouth 4 (four) times a day as needed (cancer pain) Max Daily Amount: 4 tablets   polyethylene glycol (MIRALAX) 17 g packet   No No   Sig: Take 17 g by mouth daily To prevent and reduce constipation from pain meds  Use with Movantik  Use instead of psyllium  Patient taking differently: Take 17 g by mouth if needed To prevent and reduce constipation from pain meds  Use with Movantik  Use instead of psyllium  senna (SENOKOT) 8 6 MG tablet   No No   Sig: Take 1 tablet (8 6 mg total) by mouth 2 (two) times a day Take to prevent constipation from pain meds  Hold one dose for loose stool   Patient taking differently: Take 1 tablet by mouth if needed Take to prevent constipation from pain meds    Hold one dose for loose stool   tiotropium (Spiriva Respimat) 1 25 MCG/ACT AERS inhaler   No No   Sig: Inhale 2 puffs daily   traZODone (DESYREL) 50 mg tablet  Self No No   Sig: TAKE 1-2 AT BEDTIME      Facility-Administered Medications: None       Past Medical History:   Diagnosis Date   • Acne    • Anxiety    • Anxiety and depression 10/02/2019   • Arthritis    • Breathing difficulty     pt does not recall any breathing problem but did have neck pain after procedure"   • Cancer ([facility] [address] )     cervical/ and "lymph nodes" (GYN)   • Cervical cancer (HCC)    • Chronic pain disorder     Neck pain   • Claustrophobia    • Colon polyp    • GERD (gastroesophageal reflux disease)     not now   • Headache    • History of radiation therapy    • History of shingles    • Irritable bowel syndrome    • Joint pain following chemotherapy     and stiffness   • Liver disease     "cyst on liver"   • Lung nodule     "left"   • Motion sickness    • Muscle weakness     after chemo"   • Neck pain     "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"has had neck pain since childhood"   • Port-A-Cath in place     right chest//for chemotherapy q 3 weeks and also receives IV hydration   • Shortness of breath     "started with cancer, when too much exertion like cleaning/guera doing stairs"-pt states it is much better   • Thumb injury 10/2019    right   • Thyroid nodule    • Tinnitus of left ear 02/19/2020   • Urinary problem    • Wears contact lenses    • Wrist arthritis 03/17/2020       Past Surgical History:   Procedure Laterality Date   • COLONOSCOPY     • CYSTOSCOPY W/ RETROGRADES Right 6/3/2021    Procedure: Nelson Chris W/RETROGRADE;  Surgeon: Kacy Singh MD;  Location: [facility];  Service: Urology   • CYSTOSCOPY W/ RETROGRADES Right 10/18/2021    Procedure: CYSTOSCOPY WITH ureteral stent exchange;  Surgeon: Armond Robertson MD;  Location: [facility];  Service: Urology   • FL RETROGRADE PYELOGRAM  2/11/2021   • FL RETROGRADE PYELOGRAM  6/3/2021   • FOOT SURGERY     • HYSTERECTOMY     • IR BIOPSY LYMPH NODE  2/19/2021   • IR PORT PLACEMENT  3/9/2021   • ND CYSTOURETHROSCOPY,URETER CATHETER Right 2/11/2021    Procedure: CYSTOSCOPY RETROGRADE PYELOGRAM WITH INSERTION STENT URETERAL;  Surgeon: Armond Robertson MD;  Location:  MAIN OR;  Service: Urology   • ND CYSTOURETHROSCOPY,URETER CATHETER Right 4/4/2022    Procedure: CYSTOSCOPY  WITH INSERTION STENT URETERAL Right;  Surgeon: Armond Robertson MD;  Location: [facility];  Service: Urology   • ND CYSTOURETHROSCOPY,URETER CATHETER Right 10/27/2022    Procedure: Cystoscopy, right ureteral stent exchange;  Surgeon: Armond Robertson MD;  Location: [facility];  Service: Urology   • ND EXCIS INTERDIGITAL Dyllan Half Left 7/12/2018    Procedure: FOOT NEUROMA EXCISION;  Surgeon: Jenna Cowan DPM;  Location: [address] [place] [address] MAIN OR; Service: Podiatry   • RI REMOVE & REPLACE INDWELL URETERAL STENT TRURTHRL Right 6/3/2021    Procedure: EXCHANGE STENT;  Surgeon: Waldemar Mejia MD;  Location: [facility];  Service: Urology   • TUBAL LIGATION         Family History   Problem Relation Age of Onset   • Stomach cancer Mother    • Alcohol abuse Father    • No Known Problems Sister    • No Known Problems Daughter    • No Known Problems Maternal Grandmother    • No Known Problems Maternal Grandfather    • No Known Problems Paternal Grandmother    • No Known Problems Paternal Grandfather    • No Known Problems Sister    • No Known Problems Sister    • No Known Problems Sister    • No Known Problems Sister    • No Known Problems Daughter    • No Known Problems Daughter    • No Known Problems Maternal Aunt    • No Known Problems Maternal Aunt    • No Known Problems Paternal Aunt    • No Known Problems Paternal Aunt    • No Known Problems Paternal Aunt    • No Known Problems Paternal Aunt    • No Known Problems Paternal Aunt    • No Known Problems Paternal Aunt    • No Known Problems Paternal Aunt    • No Known Problems Paternal Aunt    • No Known Problems Paternal Aunt    • No Known Problems Paternal Aunt      I have reviewed and agree with the history as documented  E-Cigarette/Vaping   • E-Cigarette Use Never User      E-Cigarette/Vaping Substances   • Nicotine No    • THC No    • CBD No    • Flavoring No    • Other No    • Unknown No      Social History     Tobacco Use   • Smoking status: Former Smoker     Packs/day: 3 00     Years: 32 00     Pack years: 96 00     Types: Cigarettes     Start date:      Quit date: 2003     Years since quittin 3   • Smokeless tobacco: Never Used   Vaping Use   • Vaping Use: Never used   Substance Use Topics   • Alcohol use: Not Currently   • Drug use: No     Types: Marijuana     Comment: As teenager        Review of Systems   Constitutional: Positive for fatigue (Mild, chronic)   Negative for chills and fever    HENT: Negative for congestion, ear pain and sore throat  Eyes: Negative for pain and visual disturbance  Respiratory: Negative for cough and shortness of breath  Cardiovascular: Negative for chest pain and palpitations  Gastrointestinal: Negative for abdominal pain, constipation, diarrhea, nausea and vomiting  Genitourinary: Negative for dysuria and hematuria  Musculoskeletal: Negative for arthralgias and back pain  Skin: Negative for color change and rash  Neurological: Positive for light-headedness ( mild, chronic)  Negative for dizziness, seizures, syncope and headaches  All other systems reviewed and are negative  Physical Exam  ED Triage Vitals [11/01/22 1450]   Temperature Pulse Respirations Blood Pressure SpO2   97 9 °F (36 6 °C) 97 18 107/51 98 %      Temp Source Heart Rate Source Patient Position - Orthostatic VS BP Location FiO2 (%)   Temporal Monitor Sitting Right arm --      Pain Score       No Pain             Orthostatic Vital Signs  Vitals:    11/01/22 1450 11/01/22 1500   BP: 107/51 115/58   Pulse: 97 96   Patient Position - Orthostatic VS: Sitting        Physical Exam  Vitals and nursing note reviewed  Constitutional:       General: She is not in acute distress  Appearance: Normal appearance  She is well-developed  She is not ill-appearing  HENT:      Head: Normocephalic and atraumatic  Right Ear: External ear normal       Left Ear: External ear normal       Mouth/Throat:      Pharynx: Oropharynx is clear  No oropharyngeal exudate or posterior oropharyngeal erythema  Eyes:      General:         Right eye: No discharge  Left eye: No discharge  Extraocular Movements: Extraocular movements intact  Conjunctiva/sclera: Conjunctivae normal    Cardiovascular:      Rate and Rhythm: Normal rate and regular rhythm  Pulses: Normal pulses  Heart sounds: Normal heart sounds  No murmur heard    Pulmonary:      Effort: Pulmonary effort is normal  No respiratory distress  Breath sounds: Normal breath sounds  No wheezing, rhonchi or rales  Abdominal:      General: Abdomen is flat  Palpations: Abdomen is soft  Tenderness: There is no abdominal tenderness  There is no guarding or rebound  Musculoskeletal:         General: No swelling or deformity  Normal range of motion  Cervical back: Neck supple  No tenderness  Skin:     General: Skin is warm and dry  Capillary Refill: Capillary refill takes less than 2 seconds  Neurological:      Mental Status: She is alert  ED Medications  Medications   potassium chloride oral solution 20 mEq (20 mEq Oral Given 11/1/22 1626)       Diagnostic Studies  Results Reviewed     Procedure Component Value Units Date/Time    CBC and differential [[id number]]  (Abnormal) Collected: 11/01/22 1528    Lab Status: Final result Specimen: Blood from Arm, Left Updated: 11/01/22 1615     WBC 3 38 Thousand/uL      RBC 3 02 Million/uL      Hemoglobin 9 0 g/dL      Hematocrit 28 0 %      MCV 93 fL      MCH 29 8 pg      MCHC 32 1 g/dL      RDW 15 6 %      MPV 10 0 fL      Platelets 73 Thousands/uL      nRBC 0 /100 WBCs      Neutrophils Relative 64 %      Immat GRANS % 1 %      Lymphocytes Relative 25 %      Monocytes Relative 7 %      Eosinophils Relative 3 %      Basophils Relative 0 %      Neutrophils Absolute 2 13 Thousands/µL      Immature Grans Absolute 0 03 Thousand/uL      Lymphocytes Absolute 0 86 Thousands/µL      Monocytes Absolute 0 25 Thousand/µL      Eosinophils Absolute 0 10 Thousand/µL      Basophils Absolute 0 01 Thousands/µL     Narrative: This is an appended report  These results have been appended to a previously verified report      Comprehensive metabolic panel [[id number]]  (Abnormal) Collected: 11/01/22 1528    Lab Status: Final result Specimen: Blood from Arm, Left Updated: 11/01/22 1069     Sodium 136 mmol/L      Potassium 3 7 mmol/L      Chloride 101 mmol/L      CO2 26 mmol/L      ANION GAP 9 mmol/L      BUN 23 mg/dL      Creatinine 1 27 mg/dL      Glucose 89 mg/dL      Calcium 9 0 mg/dL      Corrected Calcium 9 6 mg/dL      AST --     ALT 26 U/L      Alkaline Phosphatase 135 U/L      Total Protein 7 0 g/dL      Albumin 3 3 g/dL      Total Bilirubin 0 45 mg/dL      eGFR 43 ml/min/1 73sq m     Narrative:      [place] guidelines for Chronic Kidney Disease (CKD):   •  Stage 1 with normal or high GFR (GFR > 90 mL/min/1 73 square meters)  •  Stage 2 Mild CKD (GFR = 60-89 mL/min/1 73 square meters)  •  Stage 3A Moderate CKD (GFR = 45-59 mL/min/1 73 square meters)  •  Stage 3B Moderate CKD (GFR = 30-44 mL/min/1 73 square meters)  •  Stage 4 Severe CKD (GFR = 15-29 mL/min/1 73 square meters)  •  Stage 5 End Stage CKD (GFR <15 mL/min/1 73 square meters)  Note: GFR calculation is accurate only with a steady state creatinine    Magnesium [[id number]]  (Normal) Collected: 11/01/22 1528    Lab Status: Final result Specimen: Blood from Arm, Left Updated: 11/01/22 1557     Magnesium 2 0 mg/dL                  No orders to display         Procedures  ECG 12 Lead Documentation Only    Date/Time: 11/1/2022 3:45 PM  Performed by: Marc Whitfield DO  Authorized by:  Marc Whitfield DO     Indications / Diagnosis:  Hypokalemia  ECG reviewed by me, the ED Provider: yes    Patient location:  ED  Previous ECG:     Previous ECG:  Unavailable  Interpretation:     Interpretation: normal    Rate:     ECG rate:  92    ECG rate assessment: normal    Rhythm:     Rhythm: sinus rhythm    Ectopy:     Ectopy: none    QRS:     QRS axis:  Normal    QRS intervals:  Normal  Conduction:     Conduction: abnormal      Abnormal conduction: LAFB    ST segments:     ST segments:  Normal  T waves:     T waves: inverted      Inverted:  I and aVL          ED Course                             SBIRT 20yo+    Flowsheet Row Most Recent Value   SBIRT (25 yo +)    In order to provide better care to our patients, we are screening all of our patients for alcohol and drug use  Would it be okay to ask you these screening questions? Yes Filed at: 11/01/2022 1530   Initial Alcohol Screen: US AUDIT-C     1  How often do you have a drink containing alcohol? 0 Filed at: 11/01/2022 1530   2  How many drinks containing alcohol do you have on a typical day you are drinking? 0 Filed at: 11/01/2022 1530   3b  FEMALE Any Age, or MALE 65+: How often do you have 4 or more drinks on one occassion? 0 Filed at: 11/01/2022 1530   Audit-C Score 0 Filed at: 11/01/2022 1530   CORINNA: How many times in the past year have you    Used an illegal drug or used a prescription medication for non-medical reasons? Never Filed at: 11/01/2022 1530                MDM  Number of Diagnoses or Management Options  Encounter for medical screening examination  Hypokalemia  Diagnosis management comments: 67F with PMH of cervical cancer and metastasis to the nodes on active chemotherapy presents the emergency department after blood draw this morning found potassium of 1 9, magnesium of 0 9, and calcium of 5  Overall patient looks well with no concerning findings on physical exam   Upon redraw in the emergency department the patient's potassium is found to be 3 7, calcium 9 0 and magnesium of 2 0  Additionally the patient's creatinine is 1 27 which is consistent with her baseline  The patient's CBC is also consistent with active chemotherapy treatment and consistent with recent cbc evaluations  It appears that the patient's abnormal electrolyte levels from this morning were found to be made in error and it is unlikely she had electrolyte levels reported from this morning  Patient looks well, has normal EKG, and is asymptomatic and is appropriate for discharge home at this time  She is advised to continue to follow-up with her primary care provider, and oncologist for further evaluation and treatment of underlying disorders        Disposition  Final diagnoses:   Encounter for medical screening examination   Hypokalemia     Time reflects when diagnosis was documented in both MDM as applicable and the Disposition within this note     Time User Action Codes Description Comment    11/1/2022  3:53 PM Brandy Alvarez Add [T20 10XA] Superficial burn of face and head, initial encounter     11/1/2022  3:55 PM Devon Vegas  [address] Superficial burn of face and head, initial encounter     11/1/2022  4:32 PM Radha Quick Add [Z13 9] Encounter for medical screening examination     11/1/2022  4:32 PM Radha Quick Add [E87 6] Hypokalemia       ED Disposition     ED Disposition   Discharge    Condition   Stable    Date/Time   Tue Nov 1, 2022  3:53 PM    [address] discharge to home/self care  Follow-up Information     Follow up With Specialties Details Why Rajiv, DO Family Medicine Call  If you develop severe shortness of breath, chest pain, or weakness [address] [place] [address] 1  [id number]   [phone]            Discharge Medication List as of 11/1/2022  4:33 PM      CONTINUE these medications which have NOT CHANGED    Details   buPROPion (WELLBUTRIN XL) 150 mg 24 hr tablet TAKE 1 TABLET BY MOUTH EVERY DAY IN THE MORNING, Normal      Diclofenac Sodium (VOLTAREN) 1 % Apply 2 g topically 4 (four) times a day, Starting Tue 11/9/2021, Normal      diphenhydrAMINE (BENADRYL) 25 mg capsule Take 50 mg by mouth if needed, Historical Med      gabapentin (NEURONTIN) 100 mg capsule Multiple Dosages:Starting Mon 7/11/2022Take 2 capsules (200 mg total) by mouth daily AND 2 capsules (200 mg total) daily at bedtime   To reduce nerve pain , Normal      lidocaine-prilocaine (EMLA) cream APPLY TOPICALLY AS NEEDED FOR MILD PAIN, Starting Wed 7/7/2021, Normal      Multiple Vitamins-Minerals (Alive Womens Gummy) CHEW Chew, Historical Med      naloxegol oxalate (Movantik) 25 MG tablet Take 1 tablet (25 mg total) by mouth in the morning, Starting Mon 7/11/2022, Normal      omeprazole (PriLOSEC) 40 MG capsule TAKE 1 CAPSULE BY MOUTH EVERY DAY IN THE MORNING, Normal      ondansetron (ZOFRAN) 8 mg tablet Take 1 tablet (8 mg total) by mouth every 8 (eight) hours as needed for nausea or vomiting, Starting Wed 9/28/2022, Normal      oxyCODONE-acetaminophen (Percocet)  mg per tablet Take 1 tablet by mouth 4 (four) times a day as needed (cancer pain) Max Daily Amount: 4 tablets, Starting Thu 9/22/2022, Normal      polyethylene glycol (MIRALAX) 17 g packet Take 17 g by mouth daily To prevent and reduce constipation from pain meds  Use with Movantik  Use instead of psyllium  , Starting Thu 3/31/2022, Normal      senna (SENOKOT) 8 6 MG tablet Take 1 tablet (8 6 mg total) by mouth 2 (two) times a day Take to prevent constipation from pain meds  Hold one dose for loose stool, Starting Thu 9/22/2022, Normal      tiotropium (Spiriva Respimat) 1 25 MCG/ACT AERS inhaler Inhale 2 puffs daily, Starting Wed 3/23/2022, Print      traZODone (DESYREL) 50 mg tablet TAKE 1-2 AT BEDTIME, Normal           No discharge procedures on file  PDMP Review       Value Time User    PDMP Reviewed  Yes 9/22/2022 11:54 AM Anel Narayan MD           ED Provider  Attending physically available and evaluated Maximino Maliha  ARIC managed the patient along with the ED Attending      Electronically Signed by         Ana Tenorio DO  11/01/22 7996

## 2022-11-03 ENCOUNTER — HOSPITAL ENCOUNTER (OUTPATIENT)
Dept: INFUSION CENTER | Facility: HOSPITAL | Age: 67
End: 2022-11-03
Attending: OBSTETRICS & GYNECOLOGY

## 2022-11-15 ENCOUNTER — TELEPHONE (OUTPATIENT)
Dept: GYNECOLOGIC ONCOLOGY | Facility: CLINIC | Age: 67
End: 2022-11-15

## 2022-11-15 ENCOUNTER — HOSPITAL ENCOUNTER (OUTPATIENT)
Dept: INFUSION CENTER | Facility: HOSPITAL | Age: 67
Discharge: HOME | End: 2022-11-15

## 2022-11-15 VITALS — TEMPERATURE: 97.9 F

## 2022-11-15 DIAGNOSIS — C53.0 MALIGNANT NEOPLASM OF ENDOCERVIX (HCC): ICD-10-CM

## 2022-11-15 DIAGNOSIS — C53.0 MALIGNANT NEOPLASM OF ENDOCERVIX (HCC): Primary | ICD-10-CM

## 2022-11-15 DIAGNOSIS — Z45.2 ENCOUNTER FOR VENOUS ACCESS DEVICE CARE: Primary | ICD-10-CM

## 2022-11-15 DIAGNOSIS — E86.0 DEHYDRATION: ICD-10-CM

## 2022-11-15 LAB
ALBUMIN SERPL BCP-MCNC: 3.3 G/DL (ref 3.5–5)
ALP SERPL-CCNC: 126 U/L (ref 46–116)
ALT SERPL W P-5'-P-CCNC: 20 U/L (ref 12–78)
ANION GAP SERPL CALCULATED.3IONS-SCNC: 8 MMOL/L (ref 4–13)
AST SERPL W P-5'-P-CCNC: 20 U/L (ref 5–45)
BASOPHILS # BLD AUTO: 0.01 THOUSANDS/ÂΜL (ref 0–0.1)
BASOPHILS NFR BLD AUTO: 0 % (ref 0–1)
BILIRUB SERPL-MCNC: 0.35 MG/DL (ref 0.2–1)
BUN SERPL-MCNC: 16 MG/DL (ref 5–25)
CALCIUM ALBUM COR SERPL-MCNC: 9.6 MG/DL (ref 8.3–10.1)
CALCIUM SERPL-MCNC: 9 MG/DL (ref 8.3–10.1)
CHLORIDE SERPL-SCNC: 104 MMOL/L (ref 96–108)
CO2 SERPL-SCNC: 27 MMOL/L (ref 21–32)
CREAT SERPL-MCNC: 1 MG/DL (ref 0.6–1.3)
EOSINOPHIL # BLD AUTO: 0.12 THOUSAND/ÂΜL (ref 0–0.61)
EOSINOPHIL NFR BLD AUTO: 3 % (ref 0–6)
ERYTHROCYTE [DISTWIDTH] IN BLOOD BY AUTOMATED COUNT: 16.4 % (ref 11.6–15.1)
GFR SERPL CREATININE-BSD FRML MDRD: 58 ML/MIN/1.73SQ M
GLUCOSE SERPL-MCNC: 149 MG/DL (ref 65–140)
HCT VFR BLD AUTO: 30.3 % (ref 34.8–46.1)
HGB BLD-MCNC: 9.7 G/DL (ref 11.5–15.4)
IMM GRANULOCYTES # BLD AUTO: 0.01 THOUSAND/UL (ref 0–0.2)
IMM GRANULOCYTES NFR BLD AUTO: 0 % (ref 0–2)
LYMPHOCYTES # BLD AUTO: 0.78 THOUSANDS/ÂΜL (ref 0.6–4.47)
LYMPHOCYTES NFR BLD AUTO: 18 % (ref 14–44)
MAGNESIUM SERPL-MCNC: 1.5 MG/DL (ref 1.6–2.6)
MCH RBC QN AUTO: 30.2 PG (ref 26.8–34.3)
MCHC RBC AUTO-ENTMCNC: 32 G/DL (ref 31.4–37.4)
MCV RBC AUTO: 94 FL (ref 82–98)
MONOCYTES # BLD AUTO: 0.48 THOUSAND/ÂΜL (ref 0.17–1.22)
MONOCYTES NFR BLD AUTO: 11 % (ref 4–12)
NEUTROPHILS # BLD AUTO: 2.86 THOUSANDS/ÂΜL (ref 1.85–7.62)
NEUTS SEG NFR BLD AUTO: 68 % (ref 43–75)
NRBC BLD AUTO-RTO: 0 /100 WBCS
PLATELET # BLD AUTO: 202 THOUSANDS/UL (ref 149–390)
PMV BLD AUTO: 9.2 FL (ref 8.9–12.7)
POTASSIUM SERPL-SCNC: 3.4 MMOL/L (ref 3.5–5.3)
PROT SERPL-MCNC: 7.1 G/DL (ref 6.4–8.4)
RBC # BLD AUTO: 3.21 MILLION/UL (ref 3.81–5.12)
SODIUM SERPL-SCNC: 139 MMOL/L (ref 135–147)
WBC # BLD AUTO: 4.26 THOUSAND/UL (ref 4.31–10.16)

## 2022-11-15 RX ORDER — SODIUM CHLORIDE 9 MG/ML
20 INJECTION, SOLUTION INTRAVENOUS ONCE
Status: CANCELLED | OUTPATIENT
Start: 2022-11-16

## 2022-11-15 NOTE — TELEPHONE ENCOUNTER
Called patient  Reviewed labs and let her know all looks good for treatment tomorrow  She is feeling well  Will schedule CT prior to next visit

## 2022-11-15 NOTE — PLAN OF CARE
Problem: INFECTION - ADULT  Goal: Absence or prevention of progression during hospitalization  Description: INTERVENTIONS:  - Assess and monitor for signs and symptoms of infection  - Monitor lab/diagnostic results  - Monitor all insertion sites, i e  indwelling lines, tubes, and drains  - Monitor endotracheal if appropriate and nasal secretions for changes in amount and color  - [place] appropriate cooling/warming therapies per order  - Administer medications as ordered  - Instruct and encourage patient and family to use good hand hygiene technique  - Identify and instruct in appropriate isolation precautions for identified infection/condition  Outcome: Progressing     Problem: Knowledge Deficit  Goal: Patient/family/caregiver demonstrates understanding of disease process, treatment plan, medications, and discharge instructions  Description: Complete learning assessment and assess knowledge base    Interventions:  - Provide teaching at level of understanding  - Provide teaching via preferred learning methods  Outcome: Progressing

## 2022-11-16 ENCOUNTER — HOSPITAL ENCOUNTER (OUTPATIENT)
Dept: INFUSION CENTER | Facility: HOSPITAL | Age: 67
Discharge: HOME | End: 2022-11-16
Attending: OBSTETRICS & GYNECOLOGY

## 2022-11-16 VITALS
WEIGHT: 140.87 LBS | HEART RATE: 92 BPM | TEMPERATURE: 97.7 F | DIASTOLIC BLOOD PRESSURE: 57 MMHG | BODY MASS INDEX: 28.4 KG/M2 | RESPIRATION RATE: 16 BRPM | SYSTOLIC BLOOD PRESSURE: 110 MMHG | OXYGEN SATURATION: 96 % | HEIGHT: 59 IN

## 2022-11-16 DIAGNOSIS — C53.0 MALIGNANT NEOPLASM OF ENDOCERVIX (HCC): Primary | ICD-10-CM

## 2022-11-16 DIAGNOSIS — E87.6 HYPOKALEMIA: ICD-10-CM

## 2022-11-16 RX ORDER — SODIUM CHLORIDE 9 MG/ML
20 INJECTION, SOLUTION INTRAVENOUS ONCE
Status: COMPLETED | OUTPATIENT
Start: 2022-11-16 | End: 2022-11-16

## 2022-11-16 RX ADMIN — TOPOTECAN 4.7 MG: 1 INJECTION, SOLUTION, CONCENTRATE INTRAVENOUS at 12:11

## 2022-11-16 RX ADMIN — SODIUM CHLORIDE 20 ML/HR: 0.9 INJECTION, SOLUTION INTRAVENOUS at 11:00

## 2022-11-16 RX ADMIN — DEXAMETHASONE SODIUM PHOSPHATE 10 MG: 10 INJECTION, SOLUTION INTRAMUSCULAR; INTRAVENOUS at 11:34

## 2022-11-22 ENCOUNTER — HOSPITAL ENCOUNTER (OUTPATIENT)
Dept: INFUSION CENTER | Facility: HOSPITAL | Age: 67
Discharge: HOME | End: 2022-11-22

## 2022-11-22 DIAGNOSIS — C53.0 MALIGNANT NEOPLASM OF ENDOCERVIX (HCC): ICD-10-CM

## 2022-11-22 LAB
ALBUMIN SERPL BCP-MCNC: 3.2 G/DL (ref 3.5–5)
ALP SERPL-CCNC: 129 U/L (ref 46–116)
ALT SERPL W P-5'-P-CCNC: 24 U/L (ref 12–78)
ANION GAP SERPL CALCULATED.3IONS-SCNC: 7 MMOL/L (ref 4–13)
AST SERPL W P-5'-P-CCNC: 17 U/L (ref 5–45)
BASOPHILS # BLD AUTO: 0.03 THOUSANDS/ÂΜL (ref 0–0.1)
BASOPHILS NFR BLD AUTO: 1 % (ref 0–1)
BILIRUB SERPL-MCNC: 0.32 MG/DL (ref 0.2–1)
BUN SERPL-MCNC: 16 MG/DL (ref 5–25)
CALCIUM ALBUM COR SERPL-MCNC: 9.2 MG/DL (ref 8.3–10.1)
CALCIUM SERPL-MCNC: 8.6 MG/DL (ref 8.3–10.1)
CHLORIDE SERPL-SCNC: 107 MMOL/L (ref 96–108)
CO2 SERPL-SCNC: 27 MMOL/L (ref 21–32)
CREAT SERPL-MCNC: 0.97 MG/DL (ref 0.6–1.3)
EOSINOPHIL # BLD AUTO: 0.06 THOUSAND/ÂΜL (ref 0–0.61)
EOSINOPHIL NFR BLD AUTO: 2 % (ref 0–6)
ERYTHROCYTE [DISTWIDTH] IN BLOOD BY AUTOMATED COUNT: 16.3 % (ref 11.6–15.1)
GFR SERPL CREATININE-BSD FRML MDRD: 60 ML/MIN/1.73SQ M
GLUCOSE SERPL-MCNC: 139 MG/DL (ref 65–140)
HCT VFR BLD AUTO: 28.6 % (ref 34.8–46.1)
HGB BLD-MCNC: 9 G/DL (ref 11.5–15.4)
IMM GRANULOCYTES # BLD AUTO: 0.02 THOUSAND/UL (ref 0–0.2)
IMM GRANULOCYTES NFR BLD AUTO: 1 % (ref 0–2)
LYMPHOCYTES # BLD AUTO: 0.68 THOUSANDS/ÂΜL (ref 0.6–4.47)
LYMPHOCYTES NFR BLD AUTO: 22 % (ref 14–44)
MAGNESIUM SERPL-MCNC: 1.6 MG/DL (ref 1.6–2.6)
MCH RBC QN AUTO: 29.8 PG (ref 26.8–34.3)
MCHC RBC AUTO-ENTMCNC: 31.5 G/DL (ref 31.4–37.4)
MCV RBC AUTO: 95 FL (ref 82–98)
MONOCYTES # BLD AUTO: 0.38 THOUSAND/ÂΜL (ref 0.17–1.22)
MONOCYTES NFR BLD AUTO: 13 % (ref 4–12)
NEUTROPHILS # BLD AUTO: 1.88 THOUSANDS/ÂΜL (ref 1.85–7.62)
NEUTS SEG NFR BLD AUTO: 61 % (ref 43–75)
NRBC BLD AUTO-RTO: 0 /100 WBCS
PLATELET # BLD AUTO: 116 THOUSANDS/UL (ref 149–390)
PMV BLD AUTO: 10.2 FL (ref 8.9–12.7)
POTASSIUM SERPL-SCNC: 3.5 MMOL/L (ref 3.5–5.3)
PROT SERPL-MCNC: 6.8 G/DL (ref 6.4–8.4)
RBC # BLD AUTO: 3.02 MILLION/UL (ref 3.81–5.12)
SODIUM SERPL-SCNC: 141 MMOL/L (ref 135–147)
WBC # BLD AUTO: 3.05 THOUSAND/UL (ref 4.31–10.16)

## 2022-11-22 RX ORDER — SODIUM CHLORIDE 9 MG/ML
20 INJECTION, SOLUTION INTRAVENOUS ONCE
Status: CANCELLED | OUTPATIENT
Start: 2022-11-23

## 2022-11-23 ENCOUNTER — HOSPITAL ENCOUNTER (OUTPATIENT)
Dept: INFUSION CENTER | Facility: HOSPITAL | Age: 67
Discharge: HOME | End: 2022-11-23
Attending: OBSTETRICS & GYNECOLOGY

## 2022-11-23 VITALS
SYSTOLIC BLOOD PRESSURE: 126 MMHG | DIASTOLIC BLOOD PRESSURE: 62 MMHG | WEIGHT: 140.87 LBS | TEMPERATURE: 97.9 F | BODY MASS INDEX: 28.4 KG/M2 | RESPIRATION RATE: 16 BRPM | OXYGEN SATURATION: 97 % | HEART RATE: 86 BPM | HEIGHT: 59 IN

## 2022-11-23 DIAGNOSIS — C53.0 MALIGNANT NEOPLASM OF ENDOCERVIX (HCC): Primary | ICD-10-CM

## 2022-11-23 DIAGNOSIS — E87.6 HYPOKALEMIA: ICD-10-CM

## 2022-11-23 DIAGNOSIS — E86.0 DEHYDRATION: ICD-10-CM

## 2022-11-23 DIAGNOSIS — Z45.2 ENCOUNTER FOR VENOUS ACCESS DEVICE CARE: ICD-10-CM

## 2022-11-23 RX ORDER — SODIUM CHLORIDE 9 MG/ML
20 INJECTION, SOLUTION INTRAVENOUS ONCE
Status: COMPLETED | OUTPATIENT
Start: 2022-11-23 | End: 2022-11-23

## 2022-11-23 RX ADMIN — SODIUM CHLORIDE 20 ML/HR: 0.9 INJECTION, SOLUTION INTRAVENOUS at 10:30

## 2022-11-23 RX ADMIN — DEXAMETHASONE SODIUM PHOSPHATE 10 MG: 10 INJECTION, SOLUTION INTRAMUSCULAR; INTRAVENOUS at 10:51

## 2022-11-23 RX ADMIN — TOPOTECAN 4.7 MG: 1 INJECTION, SOLUTION, CONCENTRATE INTRAVENOUS at 11:30

## 2022-11-29 ENCOUNTER — HOSPITAL ENCOUNTER (OUTPATIENT)
Dept: INFUSION CENTER | Facility: HOSPITAL | Age: 67
Discharge: HOME | End: 2022-11-29

## 2022-11-29 DIAGNOSIS — E86.0 DEHYDRATION: ICD-10-CM

## 2022-11-29 DIAGNOSIS — Z45.2 ENCOUNTER FOR VENOUS ACCESS DEVICE CARE: ICD-10-CM

## 2022-11-29 DIAGNOSIS — C53.0 MALIGNANT NEOPLASM OF ENDOCERVIX (HCC): Primary | ICD-10-CM

## 2022-11-29 LAB
ALBUMIN SERPL BCP-MCNC: 3.1 G/DL (ref 3.5–5)
ALP SERPL-CCNC: 124 U/L (ref 46–116)
ALT SERPL W P-5'-P-CCNC: 19 U/L (ref 12–78)
ANION GAP SERPL CALCULATED.3IONS-SCNC: 7 MMOL/L (ref 4–13)
AST SERPL W P-5'-P-CCNC: 14 U/L (ref 5–45)
BASOPHILS # BLD AUTO: 0.01 THOUSANDS/ÂΜL (ref 0–0.1)
BASOPHILS NFR BLD AUTO: 0 % (ref 0–1)
BILIRUB SERPL-MCNC: 0.23 MG/DL (ref 0.2–1)
BUN SERPL-MCNC: 12 MG/DL (ref 5–25)
CALCIUM ALBUM COR SERPL-MCNC: 9.3 MG/DL (ref 8.3–10.1)
CALCIUM SERPL-MCNC: 8.6 MG/DL (ref 8.3–10.1)
CHLORIDE SERPL-SCNC: 103 MMOL/L (ref 96–108)
CO2 SERPL-SCNC: 28 MMOL/L (ref 21–32)
CREAT SERPL-MCNC: 0.94 MG/DL (ref 0.6–1.3)
EOSINOPHIL # BLD AUTO: 0.04 THOUSAND/ÂΜL (ref 0–0.61)
EOSINOPHIL NFR BLD AUTO: 2 % (ref 0–6)
ERYTHROCYTE [DISTWIDTH] IN BLOOD BY AUTOMATED COUNT: 15.9 % (ref 11.6–15.1)
GFR SERPL CREATININE-BSD FRML MDRD: 62 ML/MIN/1.73SQ M
GLUCOSE SERPL-MCNC: 207 MG/DL (ref 65–140)
HCT VFR BLD AUTO: 26.4 % (ref 34.8–46.1)
HGB BLD-MCNC: 8.3 G/DL (ref 11.5–15.4)
IMM GRANULOCYTES # BLD AUTO: 0.03 THOUSAND/UL (ref 0–0.2)
IMM GRANULOCYTES NFR BLD AUTO: 1 % (ref 0–2)
LYMPHOCYTES # BLD AUTO: 0.61 THOUSANDS/ÂΜL (ref 0.6–4.47)
LYMPHOCYTES NFR BLD AUTO: 25 % (ref 14–44)
MAGNESIUM SERPL-MCNC: 1.4 MG/DL (ref 1.6–2.6)
MCH RBC QN AUTO: 29.5 PG (ref 26.8–34.3)
MCHC RBC AUTO-ENTMCNC: 31.4 G/DL (ref 31.4–37.4)
MCV RBC AUTO: 94 FL (ref 82–98)
MONOCYTES # BLD AUTO: 0.2 THOUSAND/ÂΜL (ref 0.17–1.22)
MONOCYTES NFR BLD AUTO: 8 % (ref 4–12)
NEUTROPHILS # BLD AUTO: 1.57 THOUSANDS/ÂΜL (ref 1.85–7.62)
NEUTS SEG NFR BLD AUTO: 64 % (ref 43–75)
NRBC BLD AUTO-RTO: 0 /100 WBCS
PLATELET # BLD AUTO: 58 THOUSANDS/UL (ref 149–390)
PMV BLD AUTO: 10 FL (ref 8.9–12.7)
POTASSIUM SERPL-SCNC: 3.4 MMOL/L (ref 3.5–5.3)
PROT SERPL-MCNC: 6.6 G/DL (ref 6.4–8.4)
RBC # BLD AUTO: 2.81 MILLION/UL (ref 3.81–5.12)
SODIUM SERPL-SCNC: 138 MMOL/L (ref 135–147)
WBC # BLD AUTO: 2.46 THOUSAND/UL (ref 4.31–10.16)

## 2022-11-30 ENCOUNTER — HOSPITAL ENCOUNTER (OUTPATIENT)
Dept: INFUSION CENTER | Facility: HOSPITAL | Age: 67
Discharge: HOME | End: 2022-11-30
Attending: OBSTETRICS & GYNECOLOGY

## 2022-12-01 ENCOUNTER — HOSPITAL ENCOUNTER (OUTPATIENT)
Dept: INFUSION CENTER | Facility: HOSPITAL | Age: 67
Discharge: HOME | End: 2022-12-01
Attending: OBSTETRICS & GYNECOLOGY

## 2022-12-02 ENCOUNTER — TELEPHONE (OUTPATIENT)
Dept: UROLOGY | Facility: MEDICAL CENTER | Age: 67
End: 2022-12-02

## 2022-12-02 ENCOUNTER — PREP FOR PROCEDURE (OUTPATIENT)
Dept: UROLOGY | Facility: MEDICAL CENTER | Age: 67
End: 2022-12-02

## 2022-12-02 DIAGNOSIS — R39.89 SUSPECTED UTI: Primary | ICD-10-CM

## 2022-12-02 DIAGNOSIS — N13.30 HYDRONEPHROSIS, UNSPECIFIED HYDRONEPHROSIS TYPE: ICD-10-CM

## 2022-12-02 DIAGNOSIS — Z01.812 PRE-OPERATIVE LABORATORY EXAMINATION: ICD-10-CM

## 2022-12-02 NOTE — TELEPHONE ENCOUNTER
Called patient to schedule surgery with Dr Akiko Park  Left voice message with office number to call back

## 2022-12-05 ENCOUNTER — HOSPITAL ENCOUNTER (OUTPATIENT)
Dept: CT IMAGING | Facility: HOSPITAL | Age: 67
Discharge: HOME | End: 2022-12-05

## 2022-12-05 DIAGNOSIS — C53.0 MALIGNANT NEOPLASM OF ENDOCERVIX (HCC): ICD-10-CM

## 2022-12-05 RX ADMIN — IOHEXOL 100 ML: 350 INJECTION, SOLUTION INTRAVENOUS at 12:34

## 2022-12-07 ENCOUNTER — TELEPHONE (OUTPATIENT)
Dept: OTHER | Facility: OTHER | Age: 67
End: 2022-12-07

## 2022-12-07 ENCOUNTER — TELEPHONE (OUTPATIENT)
Dept: GYNECOLOGIC ONCOLOGY | Facility: CLINIC | Age: 67
End: 2022-12-07

## 2022-12-07 NOTE — TELEPHONE ENCOUNTER
Patient called into the office to reschedule her appointment with Dr Harry Gibbons for her pre cycle #4   Patient can be contacted back @ [phone]

## 2022-12-07 NOTE — TELEPHONE ENCOUNTER
Noted that [facility] already spoke with patient  Patient will be updated once changes in infusion schedule are complete

## 2022-12-07 NOTE — TELEPHONE ENCOUNTER
Patient is requesting a call back from the office to schedule a followup to have a kidney stent replacement

## 2022-12-13 ENCOUNTER — HOSPITAL ENCOUNTER (OUTPATIENT)
Dept: INFUSION CENTER | Facility: CLINIC | Age: 67
Discharge: HOME | End: 2022-12-13

## 2022-12-13 ENCOUNTER — HOSPITAL ENCOUNTER (OUTPATIENT)
Dept: INFUSION CENTER | Facility: HOSPITAL | Age: 67
Discharge: HOME | End: 2022-12-13

## 2022-12-13 ENCOUNTER — OFFICE VISIT (OUTPATIENT)
Dept: GYNECOLOGIC ONCOLOGY | Facility: CLINIC | Age: 67
End: 2022-12-13

## 2022-12-13 VITALS
DIASTOLIC BLOOD PRESSURE: 58 MMHG | HEART RATE: 101 BPM | HEIGHT: 59 IN | TEMPERATURE: 97.7 F | BODY MASS INDEX: 28.43 KG/M2 | OXYGEN SATURATION: 100 % | WEIGHT: 141 LBS | SYSTOLIC BLOOD PRESSURE: 128 MMHG

## 2022-12-13 DIAGNOSIS — Z45.2 ENCOUNTER FOR VENOUS ACCESS DEVICE CARE: ICD-10-CM

## 2022-12-13 DIAGNOSIS — C53.0 MALIGNANT NEOPLASM OF ENDOCERVIX (HCC): Primary | ICD-10-CM

## 2022-12-13 DIAGNOSIS — D61.818 PANCYTOPENIA (HCC): ICD-10-CM

## 2022-12-13 DIAGNOSIS — E87.6 HYPOKALEMIA: ICD-10-CM

## 2022-12-13 DIAGNOSIS — N13.39 OTHER HYDRONEPHROSIS: ICD-10-CM

## 2022-12-13 DIAGNOSIS — E86.0 DEHYDRATION: ICD-10-CM

## 2022-12-13 PROBLEM — D69.6 THROMBOCYTOPENIA (HCC): Status: RESOLVED | Noted: 2021-06-10 | Resolved: 2022-12-13

## 2022-12-13 LAB
ALBUMIN SERPL BCP-MCNC: 3.3 G/DL (ref 3.5–5)
ALP SERPL-CCNC: 128 U/L (ref 46–116)
ALT SERPL W P-5'-P-CCNC: 15 U/L (ref 12–78)
ANION GAP SERPL CALCULATED.3IONS-SCNC: 8 MMOL/L (ref 4–13)
AST SERPL W P-5'-P-CCNC: 15 U/L (ref 5–45)
BASOPHILS # BLD AUTO: 0.01 THOUSANDS/ÂΜL (ref 0–0.1)
BASOPHILS NFR BLD AUTO: 0 % (ref 0–1)
BILIRUB SERPL-MCNC: 0.25 MG/DL (ref 0.2–1)
BUN SERPL-MCNC: 18 MG/DL (ref 5–25)
CALCIUM ALBUM COR SERPL-MCNC: 10 MG/DL (ref 8.3–10.1)
CALCIUM SERPL-MCNC: 9.4 MG/DL (ref 8.3–10.1)
CHLORIDE SERPL-SCNC: 103 MMOL/L (ref 96–108)
CO2 SERPL-SCNC: 27 MMOL/L (ref 21–32)
CREAT SERPL-MCNC: 0.94 MG/DL (ref 0.6–1.3)
EOSINOPHIL # BLD AUTO: 0.05 THOUSAND/ÂΜL (ref 0–0.61)
EOSINOPHIL NFR BLD AUTO: 1 % (ref 0–6)
ERYTHROCYTE [DISTWIDTH] IN BLOOD BY AUTOMATED COUNT: 17.1 % (ref 11.6–15.1)
GFR SERPL CREATININE-BSD FRML MDRD: 62 ML/MIN/1.73SQ M
GLUCOSE SERPL-MCNC: 150 MG/DL (ref 65–140)
HCT VFR BLD AUTO: 29.4 % (ref 34.8–46.1)
HGB BLD-MCNC: 8.7 G/DL (ref 11.5–15.4)
IMM GRANULOCYTES # BLD AUTO: 0.01 THOUSAND/UL (ref 0–0.2)
IMM GRANULOCYTES NFR BLD AUTO: 0 % (ref 0–2)
LYMPHOCYTES # BLD AUTO: 0.76 THOUSANDS/ÂΜL (ref 0.6–4.47)
LYMPHOCYTES NFR BLD AUTO: 21 % (ref 14–44)
MAGNESIUM SERPL-MCNC: 1.9 MG/DL (ref 1.6–2.6)
MCH RBC QN AUTO: 28.5 PG (ref 26.8–34.3)
MCHC RBC AUTO-ENTMCNC: 29.6 G/DL (ref 31.4–37.4)
MCV RBC AUTO: 96 FL (ref 82–98)
MONOCYTES # BLD AUTO: 0.5 THOUSAND/ÂΜL (ref 0.17–1.22)
MONOCYTES NFR BLD AUTO: 14 % (ref 4–12)
NEUTROPHILS # BLD AUTO: 2.29 THOUSANDS/ÂΜL (ref 1.85–7.62)
NEUTS SEG NFR BLD AUTO: 64 % (ref 43–75)
NRBC BLD AUTO-RTO: 0 /100 WBCS
PLATELET # BLD AUTO: 192 THOUSANDS/UL (ref 149–390)
PMV BLD AUTO: 10.8 FL (ref 8.9–12.7)
POTASSIUM SERPL-SCNC: 4 MMOL/L (ref 3.5–5.3)
PROT SERPL-MCNC: 7.1 G/DL (ref 6.4–8.4)
RBC # BLD AUTO: 3.05 MILLION/UL (ref 3.81–5.12)
SODIUM SERPL-SCNC: 138 MMOL/L (ref 135–147)
WBC # BLD AUTO: 3.62 THOUSAND/UL (ref 4.31–10.16)

## 2022-12-13 NOTE — PLAN OF CARE
Problem: Potential for Falls  Goal: Patient will remain free of falls  Description: INTERVENTIONS:  - Educate patient/family on patient safety including physical limitations  - Instruct patient to call for assistance with activity   - Consult OT/PT to assist with strengthening/mobility   - Keep Call bell within reach  - Keep bed low and locked with side rails adjusted as appropriate  - Keep care items and personal belongings within reach  - Initiate and maintain comfort rounds  - Make Fall Risk Sign visible to staff  - Offer Toileting every Hours, in advance of need  - Initiate/Maintain alarm  - Obtain necessary fall risk management equipment:   - Apply yellow socks and bracelet for high fall risk patients  - Consider moving patient to room near nurses station  Outcome: Not Progressing

## 2022-12-13 NOTE — PROGRESS NOTES
Assessment/Plan:    Problem List Items Addressed This Visit        Hematopoietic and Hemostatic    Pancytopenia ([facility] [address] )     Patient has had pancytopenia with most recent chemotherapy treatments  This is due to cytotoxic effect of the drugs as well as heavy pretreatment  We will continue to monitor counts  She is due for labs today  Treatment for tomorrow may need to be held  I would like her counts to recover significantly prior to instituting neck cycle of chemotherapy  Regarding thrombocytopenia, she might be eligible for clinical trial for platelet stimulating agent  We will have her case reviewed by research coordinators  Genitourinary    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[facility] ) - Primary     Patient has no evidence of disease progression after 3 cycles of topotecan and bevacizumab  We will discontinue therapy  I discussed with her pros and cons of potential treatment options  If counts recover, we have agreed to proceed with gemcitabine which will be dose reduced by 25% so she will receive 750 mg per metered square on days 1 and 8 of a 21-day cycle  We will continue to monitor laboratory parameters and clinical status prior to each infusion  Plan to repeat CT scan after 3 cycles  I discussed with patient rationale, logistics, common side effects and toxicities associated with chemotherapy regimen  She understands toxicities include but are not limited to pneumonitis, GI toxicity, alopecia, fatigue, decreased appetite, nausea and vomiting, peripheral neuropathy, bone marrow suppression ( anemia, neutropenia and or thrombocytopenia) with subsequent risk of life-threatening infection or hemorrhage, kidney and or liver damage, etc   Patient verbalizes understanding, agrees and wants to proceed  Hydronephrosis     Management per urology  Indwelling stent in place and being exchanged every 3 months                CHIEF COMPLAINT:       Problem:  Cancer Staging   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[facility] [address] )  Staging form: Cervix Uteri, AJCC 7th Edition  - Clinical stage from 3/29/2017: FIGO Stage IB2, calculated as Stage Unknown (T1b2, NX, M0) - Signed by Morelia Frazier MD on 2/4/2021        Previous therapy:  Oncology History   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[facility] [address] )   12/2016 Initial Diagnosis    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 [facility])  3/29/2017 -  Cancer Staged    Staging form: Cervix Uteri, AJCC 7th Edition  - Clinical stage from 3/29/2017: FIGO Stage IB2, calculated as Stage Unknown (T1b2, NX, M0) - Signed by Morelia Frazier MD on 2/4/2021  Staged by: Managing physician  Specimen type: Excision  Histopathologic type: Squamous cell carcinoma, NOS  Histologic grade (G): G3  Lymph-vascular invasion (LVI): LVI present/identified, NOS  Residual tumor (R): R0 - None  Pelvic jace status: Negative  Pelvic jace method of assessment: PET  Para-aortic status: Negative  Para-aortic jace method of assessment: PET  Stage used in treatment planning: Yes  National guidelines used in treatment planning: Yes  Type of national guideline used in treatment planning: NCCN       3/29/2017 Surgery    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)  FInal path with incidental 5 cm grade 3 invasive cervical SCCA, +LVI, invasion 9/10 mm cervical stroma, no lymphadenectomy, margins negative ([facility])  2/19/2021 Progression    A  Lymph Node, Left axillary, Biopsy:  - Metastatic squamous cell carcinoma (p16 positive) involving lymph node, compatible with patient's cervical primary         3/2/2021 -  Chemotherapy    Carboplatin AUC 5, Taxol 135 milligram/m2 and Avastin at 15 milligram/kilogram all to be administer on day 1 of a 21 day cycle     7/7/2021 - 2/2/2022 Chemotherapy    bevacizumab (AVASTIN) IVPB, 910 mg, Intravenous, Once, 11 of 16 cycles  Administration: 900 mg (7/7/2021), 900 mg (7/30/2021), 900 mg (8/18/2021), 900 mg (9/8/2021), 900 mg (9/29/2021), 900 mg (10/20/2021), 900 mg (11/10/2021), 900 mg (12/1/2021), 900 mg (12/22/2021), 900 mg (1/12/2022), 900 mg (2/2/2022)     2/22/2022 - 8/31/2022 Chemotherapy    pembrolizumab (KEYTRUDA) IVPB, 200 mg, Intravenous, Once, 10 of 12 cycles  Administration: 200 mg (2/22/2022), 200 mg (4/6/2022), 200 mg (4/27/2022), 200 mg (5/18/2022), 200 mg (6/8/2022), 200 mg (3/16/2022), 200 mg (6/29/2022), 200 mg (7/20/2022), 200 mg (8/10/2022), 200 mg (8/31/2022)     2/23/2022 - 2/23/2022 Chemotherapy    pembrolizumab (KEYTRUDA) IVPB, 200 mg, Intravenous, Once, 0 of 6 cycles     9/14/2022 Progression    Progression of metastatic disease noted on CT     9/21/2022 -  Chemotherapy    Pegfilgrastim-bmez (ZIEXTENZO), 6 mg, Subcutaneous, Once, 1 of 2 cycles  Administration: 6 mg (10/6/2022)  topotecan (HYCAMTIN) chemo infusion, 3 mg/m2 = 4 8 mg, Intravenous, Once, 3 of 4 cycles  Dose modification: 2 5 mg/m2 (original dose 3 mg/m2, Cycle 4, Reason: Dose modified as per discussion with consulting physician)  Administration: 4 8 mg (9/21/2022), 4 8 mg (9/28/2022), 4 8 mg (10/19/2022), 4 7 mg (10/26/2022), 4 7 mg (11/16/2022), 4 7 mg (11/23/2022)           Patient ID: Yolanda Robbins is a 79 y o  female  HPI  Patient with recurrent cervical cancer  Here for evaluation of response after 3 cycles of topotecan and Avastin  Previsit CT scan demonstrates evidence of disease progression  Patient reports fatigue  Denies vaginal bleeding, drainage or discharge  Her counts have been low necessitating dose adjustments  Otherwise no significant toxicities  She sees urology and is due for stent exchange in the near future, this is being coordinated  The following portions of the patient's history were reviewed and updated as appropriate: allergies, current medications, past family history, past medical history, past social history, past surgical history and problem list     Review of Systems  As per HPI    Upon review of systems is otherwise unremarkable  Current Outpatient Medications   Medication Sig Dispense Refill   • buPROPion (WELLBUTRIN XL) 150 mg 24 hr tablet TAKE 1 TABLET BY MOUTH EVERY DAY IN THE MORNING 90 tablet 1   • Diclofenac Sodium (VOLTAREN) 1 % Apply 2 g topically 4 (four) times a day 350 g 3   • diphenhydrAMINE (BENADRYL) 25 mg capsule Take 50 mg by mouth if needed     • gabapentin (NEURONTIN) 100 mg capsule Take 2 capsules (200 mg total) by mouth daily AND 2 capsules (200 mg total) daily at bedtime  To reduce nerve pain  120 capsule 5   • lidocaine-prilocaine (EMLA) cream APPLY TOPICALLY AS NEEDED FOR MILD PAIN 30 g 0   • Multiple Vitamins-Minerals (Alive Womens Gummy) CHEW Chew     • naloxegol oxalate (Movantik) 25 MG tablet Take 1 tablet (25 mg total) by mouth in the morning (Patient taking differently: Take 25 mg by mouth if needed) 30 tablet 5   • omeprazole (PriLOSEC) 40 MG capsule TAKE 1 CAPSULE BY MOUTH EVERY DAY IN THE MORNING 90 capsule 1   • ondansetron (ZOFRAN) 8 mg tablet Take 1 tablet (8 mg total) by mouth every 8 (eight) hours as needed for nausea or vomiting 20 tablet 1   • oxyCODONE-acetaminophen (Percocet)  mg per tablet Take 1 tablet by mouth 4 (four) times a day as needed (cancer pain) Max Daily Amount: 4 tablets 120 tablet 0   • polyethylene glycol (MIRALAX) 17 g packet Take 17 g by mouth daily To prevent and reduce constipation from pain meds  Use with Movantik  Use instead of psyllium  (Patient taking differently: Take 17 g by mouth if needed To prevent and reduce constipation from pain meds  Use with Movantik  Use instead of psyllium ) 30 each 0   • senna (SENOKOT) 8 6 MG tablet Take 1 tablet (8 6 mg total) by mouth 2 (two) times a day Take to prevent constipation from pain meds  Hold one dose for loose stool (Patient taking differently: Take 1 tablet by mouth if needed Take to prevent constipation from pain meds    Hold one dose for loose stool) 60 tablet 11   • tiotropium (Spiriva Respimat) 1 25 MCG/ACT AERS inhaler Inhale 2 puffs daily 4 g 5   • traZODone (DESYREL) 50 mg tablet TAKE 1-2 AT BEDTIME 180 tablet 2     No current facility-administered medications for this visit  Objective:    Blood pressure 128/58, pulse 101, temperature 97 7 °F (36 5 °C), temperature source Tympanic, height 4' 11 02" (1 499 m), weight 64 kg (141 lb), SpO2 100 %, not currently breastfeeding  Body mass index is 28 46 kg/m²  Body surface area is 1 59 meters squared  Physical Exam  Vitals reviewed  Exam conducted with a chaperone present  Constitutional:       Appearance: Normal appearance  She is not ill-appearing or toxic-appearing  Eyes:      General: No scleral icterus  Right eye: No discharge  Left eye: No discharge  Conjunctiva/sclera: Conjunctivae normal    Cardiovascular:      Rate and Rhythm: Normal rate and regular rhythm  Heart sounds: Normal heart sounds  No murmur heard  Pulmonary:      Effort: Pulmonary effort is normal  No respiratory distress  Breath sounds: Normal breath sounds  No wheezing  Abdominal:      General: There is no distension  Palpations: Abdomen is soft  There is no mass  Tenderness: There is no abdominal tenderness  There is no guarding  Hernia: No hernia is present  Genitourinary:     Comments: Deferred  Musculoskeletal:      Right lower leg: No edema  Left lower leg: No edema  Neurological:      Mental Status: She is alert           No results found for:   Lab Results   Component Value Date     06/11/2018    K 3 4 (L) 11/29/2022     11/29/2022    CO2 28 11/29/2022    ANIONGAP 13 0 06/11/2018    BUN 12 11/29/2022    CREATININE 0 94 11/29/2022    GLUF 67 11/01/2022    CALCIUM 8 6 11/29/2022    CORRECTEDCA 9 3 11/29/2022    AST 14 11/29/2022    ALT 19 11/29/2022    ALKPHOS 124 (H) 11/29/2022    PROT 7 0 06/11/2018    BILITOT 0 3 06/11/2018    EGFR 62 11/29/2022     Lab Results   Component Value Date    WBC 2 46 (L) 11/29/2022    HGB 8 3 (L) 11/29/2022    HCT 26 4 (L) 11/29/2022    MCV 94 11/29/2022    PLT 58 (L) 11/29/2022     Lab Results   Component Value Date    NEUTROABS 1 57 (L) 11/29/2022       Shanna Durbin MD, [address] [facility]  12/13/2022  10:51 AM

## 2022-12-14 ENCOUNTER — HOSPITAL ENCOUNTER (OUTPATIENT)
Dept: INFUSION CENTER | Facility: HOSPITAL | Age: 67
Discharge: HOME | End: 2022-12-14
Attending: OBSTETRICS & GYNECOLOGY

## 2022-12-16 ENCOUNTER — OFFICE VISIT (OUTPATIENT)
Dept: FAMILY MEDICINE CLINIC | Facility: CLINIC | Age: 67
End: 2022-12-16

## 2022-12-16 VITALS
TEMPERATURE: 100.1 F | BODY MASS INDEX: 28.22 KG/M2 | DIASTOLIC BLOOD PRESSURE: 60 MMHG | HEART RATE: 110 BPM | OXYGEN SATURATION: 99 % | HEIGHT: 59 IN | SYSTOLIC BLOOD PRESSURE: 130 MMHG | WEIGHT: 140 LBS

## 2022-12-16 DIAGNOSIS — C53.0 MALIGNANT NEOPLASM OF ENDOCERVIX (HCC): ICD-10-CM

## 2022-12-16 DIAGNOSIS — F32.0 MAJOR DEPRESSIVE DISORDER, SINGLE EPISODE, MILD (HCC): ICD-10-CM

## 2022-12-16 DIAGNOSIS — K59.09 CHRONIC CONSTIPATION: Primary | ICD-10-CM

## 2022-12-16 RX ORDER — BUPROPION HYDROCHLORIDE 300 MG/1
300 TABLET ORAL DAILY
Qty: 30 TABLET | Refills: 5 | Status: SHIPPED | OUTPATIENT
Start: 2022-12-16

## 2022-12-16 RX ORDER — POLYETHYLENE GLYCOL 3350 17 G/17G
17 POWDER, FOR SOLUTION ORAL DAILY
Qty: 30 EACH | Refills: 5 | Status: SHIPPED | OUTPATIENT
Start: 2022-12-16

## 2022-12-16 NOTE — ASSESSMENT & PLAN NOTE
Bupropion XL was increased to 300 mg daily  Patient will take this every morning    Continue trazodone 50 mg at bedtime

## 2022-12-16 NOTE — PROGRESS NOTES
Name: Zoltan Hagen      : 1955      MRN: [id number]  Encounter Provider: Lou Stoddard DO  Encounter Date: 2022   Encounter department: [facility]     1  Chronic constipation  Assessment & Plan:  I restarted the patient on MiraLAX 17 g in water daily  Patient may also take senna as needed  Orders:  -     polyethylene glycol (MIRALAX) 17 g packet; Take 17 g by mouth daily    2  Major depressive disorder, single episode, mild (HCC)  Assessment & Plan:  Bupropion XL was increased to 300 mg daily  Patient will take this every morning  Continue trazodone 50 mg at bedtime    Orders:  -     buPROPion (WELLBUTRIN XL) 300 mg 24 hr tablet; Take 1 tablet (300 mg total) by mouth daily    3  Malignant neoplasm of endocervix [facility])  Assessment & Plan:  I reviewed with the patient the results of her most recent CAT scan  We discussed her chemotherapy  I advised the patient to continue routine follow-up with her gynecologic oncologist       BMI Counseling: Body mass index is 28 26 kg/m²  Follow-up plan was not completed due to patient receiving palliative care  Subjective      This is a 70-year-old  female with this today for her follow-up visit  She complains of constipation  Her constipation has gotten worse  In fact, she stopped her opiates sometime ago because the constipation was so bad  She also stopped the Movantik although she tried it for the constipation, without the opiates, and did not find any improvement  She continues to experience abdominal pain  She experiences pain in her right ribs and her hip bone  She has not been using the MiraLAX  I am not quite sure why  She has been using the senna but tells me its not helping  She also complains of worsening depression  She tells me she is procrastinating  She is sleeping all the time  She feels depressed      Review of Systems   Constitutional: Positive for unexpected weight change  Negative for activity change and appetite change  Respiratory: Negative for cough, shortness of breath and wheezing  Cardiovascular: Negative for chest pain, palpitations and leg swelling  Gastrointestinal: Positive for abdominal distention, abdominal pain and constipation  Negative for blood in stool, diarrhea and nausea  Current Outpatient Medications on File Prior to Visit   Medication Sig   • Diclofenac Sodium (VOLTAREN) 1 % Apply 2 g topically 4 (four) times a day   • diphenhydrAMINE (BENADRYL) 25 mg capsule Take 50 mg by mouth if needed   • gabapentin (NEURONTIN) 100 mg capsule Take 2 capsules (200 mg total) by mouth daily AND 2 capsules (200 mg total) daily at bedtime  To reduce nerve pain  • lidocaine-prilocaine (EMLA) cream APPLY TOPICALLY AS NEEDED FOR MILD PAIN   • Multiple Vitamins-Minerals (Alive Womens Gummy) CHEW Chew   • omeprazole (PriLOSEC) 40 MG capsule TAKE 1 CAPSULE BY MOUTH EVERY DAY IN THE MORNING   • ondansetron (ZOFRAN) 8 mg tablet Take 1 tablet (8 mg total) by mouth every 8 (eight) hours as needed for nausea or vomiting   • senna (SENOKOT) 8 6 MG tablet Take 1 tablet (8 6 mg total) by mouth 2 (two) times a day Take to prevent constipation from pain meds  Hold one dose for loose stool (Patient taking differently: Take 1 tablet by mouth if needed Take to prevent constipation from pain meds    Hold one dose for loose stool)   • tiotropium (Spiriva Respimat) 1 25 MCG/ACT AERS inhaler Inhale 2 puffs daily   • traZODone (DESYREL) 50 mg tablet TAKE 1-2 AT BEDTIME   • [DISCONTINUED] buPROPion (WELLBUTRIN XL) 150 mg 24 hr tablet TAKE 1 TABLET BY MOUTH EVERY DAY IN THE MORNING   • [DISCONTINUED] naloxegol oxalate (Movantik) 25 MG tablet Take 1 tablet (25 mg total) by mouth in the morning (Patient taking differently: Take 25 mg by mouth if needed)   • [DISCONTINUED] oxyCODONE-acetaminophen (Percocet)  mg per tablet Take 1 tablet by mouth 4 (four) times a day as needed (cancer pain) Max Daily Amount: 4 tablets   • [DISCONTINUED] polyethylene glycol (MIRALAX) 17 g packet Take 17 g by mouth daily To prevent and reduce constipation from pain meds  Use with Movantik  Use instead of psyllium  (Patient taking differently: Take 17 g by mouth if needed To prevent and reduce constipation from pain meds  Use with Movantik  Use instead of psyllium )   • [DISCONTINUED] famotidine (PEPCID) 20 mg tablet Take 20 mg by mouth daily         Objective     /60   Pulse (!) 110   Temp 100 1 °F (37 8 °C) (Tympanic)   Ht 4' 11 02" (1 499 m)   Wt 63 5 kg (140 lb)   LMP  (LMP Unknown)   SpO2 99%   BMI 28 26 kg/m²     Physical Exam  Vitals reviewed  Constitutional:       Comments: This is a 51-year-old  female who appears her stated age  She is well-nourished, neat in appearance, and in no distress   HENT:      Head: Normocephalic and atraumatic  Right Ear: Tympanic membrane, ear canal and external ear normal  There is no impacted cerumen  Left Ear: Tympanic membrane, ear canal and external ear normal  There is no impacted cerumen  Mouth/Throat:      Mouth: Mucous membranes are moist       Pharynx: Oropharynx is clear  No oropharyngeal exudate or posterior oropharyngeal erythema  Eyes:      General: No scleral icterus  Right eye: No discharge  Left eye: No discharge  Conjunctiva/sclera: Conjunctivae normal       Pupils: Pupils are equal, round, and reactive to light  Cardiovascular:      Rate and Rhythm: Normal rate and regular rhythm  Heart sounds: Normal heart sounds  No murmur heard  No friction rub  No gallop  Pulmonary:      Effort: Pulmonary effort is normal  No respiratory distress  Breath sounds: Normal breath sounds  No stridor  No wheezing or rales  Comments: There was tenderness noted to palpation over the lower right rib cage  Chest:      Chest wall: Tenderness present     Abdominal:      General: Bowel sounds are normal  There is no distension  Palpations: Abdomen is soft  There is no mass  Tenderness: There is abdominal tenderness  There is no guarding  Comments: Mild diffuse tenderness noted to palpation  There were no peritoneal signs elicited  No hepatosplenomegaly was noted   Musculoskeletal:      Cervical back: Neck supple  Lymphadenopathy:      Cervical: No cervical adenopathy  Psychiatric:         Mood and Affect: Mood normal          Behavior: Behavior normal          Thought Content:  Thought content normal          Judgment: Judgment normal      Extremities: Without cyanosis, clubbing, or edema  Say Hicks,

## 2022-12-16 NOTE — ASSESSMENT & PLAN NOTE
I reviewed with the patient the results of her most recent CAT scan  We discussed her chemotherapy    I advised the patient to continue routine follow-up with her gynecologic oncologist

## 2022-12-20 ENCOUNTER — TELEPHONE (OUTPATIENT)
Dept: OTHER | Facility: OTHER | Age: 67
End: 2022-12-20

## 2022-12-20 NOTE — TELEPHONE ENCOUNTER
Patient is requesting a call back from the office regarding [employer] for her upcoming appointment 1/12/23    She states the office is calling the incorrect number and their number is  [phone]  Please call her to confirm

## 2022-12-21 NOTE — TELEPHONE ENCOUNTER
Spoke with [employer] - confirmed and verified pickup on 1/12/2023 - 9:30 am at her home for a 10:30 am appt with Rivera Ledesma URO

## 2022-12-27 ENCOUNTER — HOSPITAL ENCOUNTER (OUTPATIENT)
Dept: INFUSION CENTER | Facility: HOSPITAL | Age: 67
Discharge: HOME | End: 2022-12-27

## 2022-12-27 ENCOUNTER — TELEPHONE (OUTPATIENT)
Dept: GYNECOLOGIC ONCOLOGY | Facility: CLINIC | Age: 67
End: 2022-12-27

## 2022-12-27 DIAGNOSIS — C53.0 MALIGNANT NEOPLASM OF ENDOCERVIX (HCC): Primary | ICD-10-CM

## 2022-12-27 DIAGNOSIS — Z45.2 ENCOUNTER FOR VENOUS ACCESS DEVICE CARE: ICD-10-CM

## 2022-12-27 DIAGNOSIS — E86.0 DEHYDRATION: ICD-10-CM

## 2022-12-27 LAB
ALBUMIN SERPL BCP-MCNC: 3.2 G/DL (ref 3.5–5)
ALP SERPL-CCNC: 112 U/L (ref 46–116)
ALT SERPL W P-5'-P-CCNC: 19 U/L (ref 12–78)
ANION GAP SERPL CALCULATED.3IONS-SCNC: 8 MMOL/L (ref 4–13)
AST SERPL W P-5'-P-CCNC: 23 U/L (ref 5–45)
BASOPHILS # BLD AUTO: 0.01 THOUSANDS/ÂΜL (ref 0–0.1)
BASOPHILS NFR BLD AUTO: 0 % (ref 0–1)
BILIRUB SERPL-MCNC: 0.23 MG/DL (ref 0.2–1)
BUN SERPL-MCNC: 19 MG/DL (ref 5–25)
CALCIUM ALBUM COR SERPL-MCNC: 9.7 MG/DL (ref 8.3–10.1)
CALCIUM SERPL-MCNC: 9.1 MG/DL (ref 8.3–10.1)
CHLORIDE SERPL-SCNC: 106 MMOL/L (ref 96–108)
CO2 SERPL-SCNC: 27 MMOL/L (ref 21–32)
CREAT SERPL-MCNC: 0.89 MG/DL (ref 0.6–1.3)
EOSINOPHIL # BLD AUTO: 0.12 THOUSAND/ÂΜL (ref 0–0.61)
EOSINOPHIL NFR BLD AUTO: 3 % (ref 0–6)
ERYTHROCYTE [DISTWIDTH] IN BLOOD BY AUTOMATED COUNT: 16.6 % (ref 11.6–15.1)
GFR SERPL CREATININE-BSD FRML MDRD: 67 ML/MIN/1.73SQ M
GLUCOSE SERPL-MCNC: 97 MG/DL (ref 65–140)
HCT VFR BLD AUTO: 31 % (ref 34.8–46.1)
HGB BLD-MCNC: 9.3 G/DL (ref 11.5–15.4)
IMM GRANULOCYTES # BLD AUTO: 0.01 THOUSAND/UL (ref 0–0.2)
IMM GRANULOCYTES NFR BLD AUTO: 0 % (ref 0–2)
LYMPHOCYTES # BLD AUTO: 0.68 THOUSANDS/ÂΜL (ref 0.6–4.47)
LYMPHOCYTES NFR BLD AUTO: 20 % (ref 14–44)
MAGNESIUM SERPL-MCNC: 1.6 MG/DL (ref 1.6–2.6)
MCH RBC QN AUTO: 28.6 PG (ref 26.8–34.3)
MCHC RBC AUTO-ENTMCNC: 30 G/DL (ref 31.4–37.4)
MCV RBC AUTO: 95 FL (ref 82–98)
MONOCYTES # BLD AUTO: 0.37 THOUSAND/ÂΜL (ref 0.17–1.22)
MONOCYTES NFR BLD AUTO: 11 % (ref 4–12)
NEUTROPHILS # BLD AUTO: 2.3 THOUSANDS/ÂΜL (ref 1.85–7.62)
NEUTS SEG NFR BLD AUTO: 66 % (ref 43–75)
NRBC BLD AUTO-RTO: 0 /100 WBCS
PLATELET # BLD AUTO: 138 THOUSANDS/UL (ref 149–390)
PMV BLD AUTO: 9.2 FL (ref 8.9–12.7)
POTASSIUM SERPL-SCNC: 3.7 MMOL/L (ref 3.5–5.3)
PROT SERPL-MCNC: 6.9 G/DL (ref 6.4–8.4)
RBC # BLD AUTO: 3.25 MILLION/UL (ref 3.81–5.12)
SODIUM SERPL-SCNC: 141 MMOL/L (ref 135–147)
WBC # BLD AUTO: 3.49 THOUSAND/UL (ref 4.31–10.16)

## 2022-12-27 RX ORDER — SODIUM CHLORIDE 9 MG/ML
20 INJECTION, SOLUTION INTRAVENOUS ONCE
Status: CANCELLED | OUTPATIENT
Start: 2023-01-04

## 2022-12-27 RX ORDER — SODIUM CHLORIDE 9 MG/ML
20 INJECTION, SOLUTION INTRAVENOUS ONCE
Status: CANCELLED | OUTPATIENT
Start: 2022-12-28

## 2022-12-27 NOTE — TELEPHONE ENCOUNTER
I rescheduled all her virtual visits to be with Jose Deluna beginning 1/9/23  She will need a new calendar mailed reflecting those changes

## 2022-12-27 NOTE — TELEPHONE ENCOUNTER
Patient called requesting that Dr Dooley Satish assign her a different physician assistant or nurse practitioner in place of Lady Guthrie  Patient did not want to explain all the reasons why

## 2022-12-28 ENCOUNTER — HOSPITAL ENCOUNTER (OUTPATIENT)
Dept: INFUSION CENTER | Facility: HOSPITAL | Age: 67
Discharge: HOME | End: 2022-12-28
Attending: OBSTETRICS & GYNECOLOGY

## 2022-12-28 VITALS
RESPIRATION RATE: 18 BRPM | HEART RATE: 85 BPM | TEMPERATURE: 97.2 F | WEIGHT: 144.4 LBS | SYSTOLIC BLOOD PRESSURE: 138 MMHG | DIASTOLIC BLOOD PRESSURE: 61 MMHG | OXYGEN SATURATION: 98 % | BODY MASS INDEX: 29.11 KG/M2 | HEIGHT: 59 IN

## 2022-12-28 DIAGNOSIS — E86.0 DEHYDRATION: ICD-10-CM

## 2022-12-28 DIAGNOSIS — C53.0 MALIGNANT NEOPLASM OF ENDOCERVIX (HCC): Primary | ICD-10-CM

## 2022-12-28 DIAGNOSIS — Z45.2 ENCOUNTER FOR VENOUS ACCESS DEVICE CARE: ICD-10-CM

## 2022-12-28 DIAGNOSIS — E87.6 HYPOKALEMIA: ICD-10-CM

## 2022-12-28 RX ORDER — SODIUM CHLORIDE 9 MG/ML
20 INJECTION, SOLUTION INTRAVENOUS ONCE
Status: COMPLETED | OUTPATIENT
Start: 2022-12-28 | End: 2022-12-28

## 2022-12-28 RX ADMIN — GEMCITABINE 1200 MG: 38 INJECTION, SOLUTION INTRAVENOUS at 12:04

## 2022-12-28 RX ADMIN — SODIUM CHLORIDE 20 ML/HR: 0.9 INJECTION, SOLUTION INTRAVENOUS at 10:50

## 2022-12-28 RX ADMIN — DEXAMETHASONE SODIUM PHOSPHATE 10 MG: 10 INJECTION, SOLUTION INTRAMUSCULAR; INTRAVENOUS at 11:22

## 2022-12-29 ENCOUNTER — HOSPITAL ENCOUNTER (OUTPATIENT)
Dept: INFUSION CENTER | Facility: HOSPITAL | Age: 67
Discharge: HOME | End: 2022-12-29
Attending: OBSTETRICS & GYNECOLOGY

## 2023-01-01 ENCOUNTER — HOME CARE VISIT (OUTPATIENT)
Dept: HOME HEALTH SERVICES | Facility: HOME HEALTHCARE | Age: 68
End: 2023-01-01
Payer: MEDICARE

## 2023-01-01 ENCOUNTER — APPOINTMENT (EMERGENCY)
Dept: CT IMAGING | Facility: HOSPITAL | Age: 68
End: 2023-01-01
Payer: MEDICARE

## 2023-01-01 ENCOUNTER — TRANSITIONAL CARE MANAGEMENT (OUTPATIENT)
Dept: FAMILY MEDICINE CLINIC | Facility: CLINIC | Age: 68
End: 2023-01-01

## 2023-01-01 ENCOUNTER — HOSPITAL ENCOUNTER (INPATIENT)
Facility: HOSPITAL | Age: 68
LOS: 10 days | Discharge: NURSING, REHAB OR LONG-TERM CARE | End: 2023-09-28
Attending: EMERGENCY MEDICINE | Admitting: INTERNAL MEDICINE
Payer: MEDICARE

## 2023-01-01 ENCOUNTER — HOME CARE VISIT (OUTPATIENT)
Dept: HOME HOSPICE | Facility: HOSPICE | Age: 68
End: 2023-01-01
Payer: MEDICARE

## 2023-01-01 ENCOUNTER — TELEPHONE (OUTPATIENT)
Dept: PAIN MEDICINE | Facility: CLINIC | Age: 68
End: 2023-01-01

## 2023-01-01 ENCOUNTER — APPOINTMENT (INPATIENT)
Dept: INTERVENTIONAL RADIOLOGY/VASCULAR | Facility: HOSPITAL | Age: 68
End: 2023-01-01
Attending: FAMILY MEDICINE
Payer: MEDICARE

## 2023-01-01 ENCOUNTER — HOSPICE ADMISSION (OUTPATIENT)
Dept: HOME HOSPICE | Facility: HOSPICE | Age: 68
End: 2023-01-01
Payer: MEDICARE

## 2023-01-01 VITALS
TEMPERATURE: 98.4 F | SYSTOLIC BLOOD PRESSURE: 99 MMHG | BODY MASS INDEX: 27.11 KG/M2 | DIASTOLIC BLOOD PRESSURE: 61 MMHG | RESPIRATION RATE: 17 BRPM | HEIGHT: 59 IN | HEART RATE: 101 BPM | WEIGHT: 134.48 LBS | OXYGEN SATURATION: 99 %

## 2023-01-01 VITALS
BODY MASS INDEX: 23.23 KG/M2 | TEMPERATURE: 96.3 F | HEART RATE: 92 BPM | WEIGHT: 115 LBS | DIASTOLIC BLOOD PRESSURE: 57 MMHG | RESPIRATION RATE: 16 BRPM | SYSTOLIC BLOOD PRESSURE: 88 MMHG

## 2023-01-01 VITALS — RESPIRATION RATE: 12 BRPM | HEART RATE: 97 BPM

## 2023-01-01 DIAGNOSIS — D48.0 NEOPLASM OF UNCERTAIN BEHAVIOR OF BONE AND ARTICULAR CARTILAGE: ICD-10-CM

## 2023-01-01 DIAGNOSIS — R18.8 PELVIC ASCITES: ICD-10-CM

## 2023-01-01 DIAGNOSIS — E87.1 HYPONATREMIA: Primary | ICD-10-CM

## 2023-01-01 DIAGNOSIS — R18.0 MALIGNANT ASCITES: ICD-10-CM

## 2023-01-01 LAB
ALBUMIN FLD-MCNC: 1.6 G/DL
ALBUMIN SERPL BCP-MCNC: 2.6 G/DL (ref 3.5–5)
ALBUMIN SERPL BCP-MCNC: 2.7 G/DL (ref 3.5–5)
ALBUMIN SERPL BCP-MCNC: 2.8 G/DL (ref 3.5–5)
ALBUMIN SERPL BCP-MCNC: 2.9 G/DL (ref 3.5–5)
ALBUMIN SERPL BCP-MCNC: 3.1 G/DL (ref 3.5–5)
ALP SERPL-CCNC: 131 U/L (ref 34–104)
ALP SERPL-CCNC: 139 U/L (ref 34–104)
ALP SERPL-CCNC: 146 U/L (ref 34–104)
ALP SERPL-CCNC: 157 U/L (ref 34–104)
ALP SERPL-CCNC: 188 U/L (ref 34–104)
ALT SERPL W P-5'-P-CCNC: 10 U/L (ref 7–52)
ALT SERPL W P-5'-P-CCNC: 11 U/L (ref 7–52)
ALT SERPL W P-5'-P-CCNC: 15 U/L (ref 7–52)
ALT SERPL W P-5'-P-CCNC: 20 U/L (ref 7–52)
ALT SERPL W P-5'-P-CCNC: 65 U/L (ref 7–52)
ANION GAP SERPL CALCULATED.3IONS-SCNC: 10 MMOL/L
ANION GAP SERPL CALCULATED.3IONS-SCNC: 11 MMOL/L
ANION GAP SERPL CALCULATED.3IONS-SCNC: 12 MMOL/L
ANION GAP SERPL CALCULATED.3IONS-SCNC: 6 MMOL/L
ANION GAP SERPL CALCULATED.3IONS-SCNC: 7 MMOL/L
ANION GAP SERPL CALCULATED.3IONS-SCNC: 8 MMOL/L
ANION GAP SERPL CALCULATED.3IONS-SCNC: 9 MMOL/L
APPEARANCE FLD: ABNORMAL
APTT PPP: 49 SECONDS (ref 23–37)
AST SERPL W P-5'-P-CCNC: 191 U/L (ref 13–39)
AST SERPL W P-5'-P-CCNC: 21 U/L (ref 13–39)
AST SERPL W P-5'-P-CCNC: 23 U/L (ref 13–39)
AST SERPL W P-5'-P-CCNC: 35 U/L (ref 13–39)
AST SERPL W P-5'-P-CCNC: 39 U/L (ref 13–39)
ATRIAL RATE: 108 BPM
BACTERIA BLD CULT: NORMAL
BACTERIA BLD CULT: NORMAL
BACTERIA SPEC BFLD CULT: NO GROWTH
BACTERIA UR CULT: NORMAL
BACTERIA UR QL AUTO: ABNORMAL /HPF
BASOPHILS # BLD AUTO: 0.02 THOUSANDS/ÂΜL (ref 0–0.1)
BASOPHILS # BLD AUTO: 0.03 THOUSANDS/ÂΜL (ref 0–0.1)
BASOPHILS # BLD AUTO: 0.04 THOUSANDS/ÂΜL (ref 0–0.1)
BASOPHILS NFR BLD AUTO: 0 % (ref 0–1)
BILIRUB DIRECT SERPL-MCNC: 0.14 MG/DL (ref 0–0.2)
BILIRUB SERPL-MCNC: 0.42 MG/DL (ref 0.2–1)
BILIRUB SERPL-MCNC: 0.59 MG/DL (ref 0.2–1)
BILIRUB SERPL-MCNC: 0.77 MG/DL (ref 0.2–1)
BILIRUB SERPL-MCNC: 0.77 MG/DL (ref 0.2–1)
BILIRUB SERPL-MCNC: 0.92 MG/DL (ref 0.2–1)
BILIRUB UR QL STRIP: ABNORMAL
BUN SERPL-MCNC: 17 MG/DL (ref 5–25)
BUN SERPL-MCNC: 18 MG/DL (ref 5–25)
BUN SERPL-MCNC: 19 MG/DL (ref 5–25)
BUN SERPL-MCNC: 20 MG/DL (ref 5–25)
BUN SERPL-MCNC: 21 MG/DL (ref 5–25)
BUN SERPL-MCNC: 22 MG/DL (ref 5–25)
BUN SERPL-MCNC: 24 MG/DL (ref 5–25)
BUN SERPL-MCNC: 27 MG/DL (ref 5–25)
BUN SERPL-MCNC: 28 MG/DL (ref 5–25)
BUN SERPL-MCNC: 28 MG/DL (ref 5–25)
BUN SERPL-MCNC: 29 MG/DL (ref 5–25)
BUN SERPL-MCNC: 29 MG/DL (ref 5–25)
BUN SERPL-MCNC: 30 MG/DL (ref 5–25)
BUN SERPL-MCNC: 30 MG/DL (ref 5–25)
BUN SERPL-MCNC: 31 MG/DL (ref 5–25)
BUN SERPL-MCNC: 31 MG/DL (ref 5–25)
BUN SERPL-MCNC: 32 MG/DL (ref 5–25)
BUN SERPL-MCNC: 33 MG/DL (ref 5–25)
BUN SERPL-MCNC: 34 MG/DL (ref 5–25)
BUN SERPL-MCNC: 34 MG/DL (ref 5–25)
BUN SERPL-MCNC: 37 MG/DL (ref 5–25)
BUN SERPL-MCNC: 37 MG/DL (ref 5–25)
BUN SERPL-MCNC: 38 MG/DL (ref 5–25)
BUN SERPL-MCNC: 39 MG/DL (ref 5–25)
BUN SERPL-MCNC: 40 MG/DL (ref 5–25)
BUN SERPL-MCNC: 40 MG/DL (ref 5–25)
BUN SERPL-MCNC: 44 MG/DL (ref 5–25)
CALCIUM ALBUM COR SERPL-MCNC: 9.5 MG/DL (ref 8.3–10.1)
CALCIUM ALBUM COR SERPL-MCNC: 9.6 MG/DL (ref 8.3–10.1)
CALCIUM ALBUM COR SERPL-MCNC: 9.7 MG/DL (ref 8.3–10.1)
CALCIUM ALBUM COR SERPL-MCNC: 9.8 MG/DL (ref 8.3–10.1)
CALCIUM SERPL-MCNC: 6.7 MG/DL (ref 8.4–10.2)
CALCIUM SERPL-MCNC: 7.6 MG/DL (ref 8.4–10.2)
CALCIUM SERPL-MCNC: 7.7 MG/DL (ref 8.4–10.2)
CALCIUM SERPL-MCNC: 8.1 MG/DL (ref 8.4–10.2)
CALCIUM SERPL-MCNC: 8.1 MG/DL (ref 8.4–10.2)
CALCIUM SERPL-MCNC: 8.2 MG/DL (ref 8.4–10.2)
CALCIUM SERPL-MCNC: 8.3 MG/DL (ref 8.4–10.2)
CALCIUM SERPL-MCNC: 8.3 MG/DL (ref 8.4–10.2)
CALCIUM SERPL-MCNC: 8.4 MG/DL (ref 8.4–10.2)
CALCIUM SERPL-MCNC: 8.5 MG/DL (ref 8.4–10.2)
CALCIUM SERPL-MCNC: 8.6 MG/DL (ref 8.4–10.2)
CALCIUM SERPL-MCNC: 8.7 MG/DL (ref 8.4–10.2)
CALCIUM SERPL-MCNC: 8.8 MG/DL (ref 8.4–10.2)
CALCIUM SERPL-MCNC: 9 MG/DL (ref 8.4–10.2)
CALCIUM SERPL-MCNC: 9.1 MG/DL (ref 8.4–10.2)
CHLORIDE SERPL-SCNC: 84 MMOL/L (ref 96–108)
CHLORIDE SERPL-SCNC: 86 MMOL/L (ref 96–108)
CHLORIDE SERPL-SCNC: 87 MMOL/L (ref 96–108)
CHLORIDE SERPL-SCNC: 88 MMOL/L (ref 96–108)
CHLORIDE SERPL-SCNC: 89 MMOL/L (ref 96–108)
CHLORIDE SERPL-SCNC: 89 MMOL/L (ref 96–108)
CHLORIDE SERPL-SCNC: 90 MMOL/L (ref 96–108)
CHLORIDE SERPL-SCNC: 91 MMOL/L (ref 96–108)
CHLORIDE SERPL-SCNC: 92 MMOL/L (ref 96–108)
CHLORIDE SERPL-SCNC: 93 MMOL/L (ref 96–108)
CHLORIDE SERPL-SCNC: 94 MMOL/L (ref 96–108)
CHLORIDE SERPL-SCNC: 98 MMOL/L (ref 96–108)
CLARITY UR: CLEAR
CO2 SERPL-SCNC: 16 MMOL/L (ref 21–32)
CO2 SERPL-SCNC: 17 MMOL/L (ref 21–32)
CO2 SERPL-SCNC: 18 MMOL/L (ref 21–32)
CO2 SERPL-SCNC: 19 MMOL/L (ref 21–32)
CO2 SERPL-SCNC: 20 MMOL/L (ref 21–32)
CO2 SERPL-SCNC: 21 MMOL/L (ref 21–32)
CO2 SERPL-SCNC: 22 MMOL/L (ref 21–32)
CO2 SERPL-SCNC: 23 MMOL/L (ref 21–32)
CO2 SERPL-SCNC: 24 MMOL/L (ref 21–32)
CO2 SERPL-SCNC: 25 MMOL/L (ref 21–32)
CO2 SERPL-SCNC: 26 MMOL/L (ref 21–32)
COLOR FLD: ABNORMAL
COLOR UR: YELLOW
CORTIS SERPL-MCNC: 11.3 UG/DL
CREAT SERPL-MCNC: 0.59 MG/DL (ref 0.6–1.3)
CREAT SERPL-MCNC: 0.65 MG/DL (ref 0.6–1.3)
CREAT SERPL-MCNC: 0.66 MG/DL (ref 0.6–1.3)
CREAT SERPL-MCNC: 0.66 MG/DL (ref 0.6–1.3)
CREAT SERPL-MCNC: 0.67 MG/DL (ref 0.6–1.3)
CREAT SERPL-MCNC: 0.68 MG/DL (ref 0.6–1.3)
CREAT SERPL-MCNC: 0.71 MG/DL (ref 0.6–1.3)
CREAT SERPL-MCNC: 0.73 MG/DL (ref 0.6–1.3)
CREAT SERPL-MCNC: 0.75 MG/DL (ref 0.6–1.3)
CREAT SERPL-MCNC: 0.75 MG/DL (ref 0.6–1.3)
CREAT SERPL-MCNC: 0.76 MG/DL (ref 0.6–1.3)
CREAT SERPL-MCNC: 0.77 MG/DL (ref 0.6–1.3)
CREAT SERPL-MCNC: 0.77 MG/DL (ref 0.6–1.3)
CREAT SERPL-MCNC: 0.8 MG/DL (ref 0.6–1.3)
CREAT SERPL-MCNC: 0.83 MG/DL (ref 0.6–1.3)
CREAT SERPL-MCNC: 0.88 MG/DL (ref 0.6–1.3)
CREAT SERPL-MCNC: 0.91 MG/DL (ref 0.6–1.3)
CREAT SERPL-MCNC: 0.91 MG/DL (ref 0.6–1.3)
CREAT SERPL-MCNC: 0.92 MG/DL (ref 0.6–1.3)
CREAT SERPL-MCNC: 0.92 MG/DL (ref 0.6–1.3)
CREAT SERPL-MCNC: 0.94 MG/DL (ref 0.6–1.3)
CREAT SERPL-MCNC: 0.96 MG/DL (ref 0.6–1.3)
CREAT SERPL-MCNC: 1.05 MG/DL (ref 0.6–1.3)
CREAT SERPL-MCNC: 1.17 MG/DL (ref 0.6–1.3)
CREAT SERPL-MCNC: 1.3 MG/DL (ref 0.6–1.3)
CREAT SERPL-MCNC: 1.3 MG/DL (ref 0.6–1.3)
CREAT SERPL-MCNC: 1.51 MG/DL (ref 0.6–1.3)
CREAT SERPL-MCNC: 1.71 MG/DL (ref 0.6–1.3)
CREAT SERPL-MCNC: 1.9 MG/DL (ref 0.6–1.3)
CREAT SERPL-MCNC: 2.03 MG/DL (ref 0.6–1.3)
CREAT SERPL-MCNC: 2.11 MG/DL (ref 0.6–1.3)
CREAT SERPL-MCNC: 2.2 MG/DL (ref 0.6–1.3)
CREAT SERPL-MCNC: 2.23 MG/DL (ref 0.6–1.3)
CREAT SERPL-MCNC: 2.44 MG/DL (ref 0.6–1.3)
CREAT SERPL-MCNC: 2.47 MG/DL (ref 0.6–1.3)
CREAT SERPL-MCNC: 2.59 MG/DL (ref 0.6–1.3)
CREAT UR-MCNC: 114.6 MG/DL
CREAT UR-MCNC: 80.8 MG/DL
EOSINOPHIL # BLD AUTO: 0.04 THOUSAND/ÂΜL (ref 0–0.61)
EOSINOPHIL # BLD AUTO: 0.04 THOUSAND/ÂΜL (ref 0–0.61)
EOSINOPHIL # BLD AUTO: 0.05 THOUSAND/ÂΜL (ref 0–0.61)
EOSINOPHIL # BLD AUTO: 0.06 THOUSAND/ÂΜL (ref 0–0.61)
EOSINOPHIL # BLD AUTO: 0.06 THOUSAND/ÂΜL (ref 0–0.61)
EOSINOPHIL # BLD AUTO: 0.07 THOUSAND/ÂΜL (ref 0–0.61)
EOSINOPHIL # BLD AUTO: 0.1 THOUSAND/ÂΜL (ref 0–0.61)
EOSINOPHIL # BLD AUTO: 0.13 THOUSAND/ÂΜL (ref 0–0.61)
EOSINOPHIL NFR BLD AUTO: 0 % (ref 0–6)
EOSINOPHIL NFR BLD AUTO: 1 % (ref 0–6)
ERYTHROCYTE [DISTWIDTH] IN BLOOD BY AUTOMATED COUNT: 17.4 % (ref 11.6–15.1)
ERYTHROCYTE [DISTWIDTH] IN BLOOD BY AUTOMATED COUNT: 17.5 % (ref 11.6–15.1)
ERYTHROCYTE [DISTWIDTH] IN BLOOD BY AUTOMATED COUNT: 17.6 % (ref 11.6–15.1)
ERYTHROCYTE [DISTWIDTH] IN BLOOD BY AUTOMATED COUNT: 17.6 % (ref 11.6–15.1)
ERYTHROCYTE [DISTWIDTH] IN BLOOD BY AUTOMATED COUNT: 17.7 % (ref 11.6–15.1)
ERYTHROCYTE [DISTWIDTH] IN BLOOD BY AUTOMATED COUNT: 17.8 % (ref 11.6–15.1)
FERRITIN SERPL-MCNC: 281 NG/ML (ref 11–307)
FLUAV RNA RESP QL NAA+PROBE: NEGATIVE
FLUBV RNA RESP QL NAA+PROBE: NEGATIVE
GFR SERPL CREATININE-BSD FRML MDRD: 18 ML/MIN/1.73SQ M
GFR SERPL CREATININE-BSD FRML MDRD: 19 ML/MIN/1.73SQ M
GFR SERPL CREATININE-BSD FRML MDRD: 19 ML/MIN/1.73SQ M
GFR SERPL CREATININE-BSD FRML MDRD: 22 ML/MIN/1.73SQ M
GFR SERPL CREATININE-BSD FRML MDRD: 22 ML/MIN/1.73SQ M
GFR SERPL CREATININE-BSD FRML MDRD: 23 ML/MIN/1.73SQ M
GFR SERPL CREATININE-BSD FRML MDRD: 24 ML/MIN/1.73SQ M
GFR SERPL CREATININE-BSD FRML MDRD: 26 ML/MIN/1.73SQ M
GFR SERPL CREATININE-BSD FRML MDRD: 30 ML/MIN/1.73SQ M
GFR SERPL CREATININE-BSD FRML MDRD: 35 ML/MIN/1.73SQ M
GFR SERPL CREATININE-BSD FRML MDRD: 42 ML/MIN/1.73SQ M
GFR SERPL CREATININE-BSD FRML MDRD: 42 ML/MIN/1.73SQ M
GFR SERPL CREATININE-BSD FRML MDRD: 47 ML/MIN/1.73SQ M
GFR SERPL CREATININE-BSD FRML MDRD: 54 ML/MIN/1.73SQ M
GFR SERPL CREATININE-BSD FRML MDRD: 60 ML/MIN/1.73SQ M
GFR SERPL CREATININE-BSD FRML MDRD: 62 ML/MIN/1.73SQ M
GFR SERPL CREATININE-BSD FRML MDRD: 64 ML/MIN/1.73SQ M
GFR SERPL CREATININE-BSD FRML MDRD: 64 ML/MIN/1.73SQ M
GFR SERPL CREATININE-BSD FRML MDRD: 65 ML/MIN/1.73SQ M
GFR SERPL CREATININE-BSD FRML MDRD: 65 ML/MIN/1.73SQ M
GFR SERPL CREATININE-BSD FRML MDRD: 67 ML/MIN/1.73SQ M
GFR SERPL CREATININE-BSD FRML MDRD: 72 ML/MIN/1.73SQ M
GFR SERPL CREATININE-BSD FRML MDRD: 75 ML/MIN/1.73SQ M
GFR SERPL CREATININE-BSD FRML MDRD: 79 ML/MIN/1.73SQ M
GFR SERPL CREATININE-BSD FRML MDRD: 79 ML/MIN/1.73SQ M
GFR SERPL CREATININE-BSD FRML MDRD: 80 ML/MIN/1.73SQ M
GFR SERPL CREATININE-BSD FRML MDRD: 82 ML/MIN/1.73SQ M
GFR SERPL CREATININE-BSD FRML MDRD: 82 ML/MIN/1.73SQ M
GFR SERPL CREATININE-BSD FRML MDRD: 84 ML/MIN/1.73SQ M
GFR SERPL CREATININE-BSD FRML MDRD: 87 ML/MIN/1.73SQ M
GFR SERPL CREATININE-BSD FRML MDRD: 90 ML/MIN/1.73SQ M
GFR SERPL CREATININE-BSD FRML MDRD: 90 ML/MIN/1.73SQ M
GFR SERPL CREATININE-BSD FRML MDRD: 91 ML/MIN/1.73SQ M
GFR SERPL CREATININE-BSD FRML MDRD: 94 ML/MIN/1.73SQ M
GLUCOSE FLD-MCNC: 126 MG/DL
GLUCOSE SERPL-MCNC: 102 MG/DL (ref 65–140)
GLUCOSE SERPL-MCNC: 105 MG/DL (ref 65–140)
GLUCOSE SERPL-MCNC: 107 MG/DL (ref 65–140)
GLUCOSE SERPL-MCNC: 107 MG/DL (ref 65–140)
GLUCOSE SERPL-MCNC: 110 MG/DL (ref 65–140)
GLUCOSE SERPL-MCNC: 110 MG/DL (ref 65–140)
GLUCOSE SERPL-MCNC: 113 MG/DL (ref 65–140)
GLUCOSE SERPL-MCNC: 115 MG/DL (ref 65–140)
GLUCOSE SERPL-MCNC: 117 MG/DL (ref 65–140)
GLUCOSE SERPL-MCNC: 118 MG/DL (ref 65–140)
GLUCOSE SERPL-MCNC: 118 MG/DL (ref 65–140)
GLUCOSE SERPL-MCNC: 119 MG/DL (ref 65–140)
GLUCOSE SERPL-MCNC: 124 MG/DL (ref 65–140)
GLUCOSE SERPL-MCNC: 126 MG/DL (ref 65–140)
GLUCOSE SERPL-MCNC: 134 MG/DL (ref 65–140)
GLUCOSE SERPL-MCNC: 138 MG/DL (ref 65–140)
GLUCOSE SERPL-MCNC: 138 MG/DL (ref 65–140)
GLUCOSE SERPL-MCNC: 142 MG/DL (ref 65–140)
GLUCOSE SERPL-MCNC: 144 MG/DL (ref 65–140)
GLUCOSE SERPL-MCNC: 145 MG/DL (ref 65–140)
GLUCOSE SERPL-MCNC: 148 MG/DL (ref 65–140)
GLUCOSE SERPL-MCNC: 151 MG/DL (ref 65–140)
GLUCOSE SERPL-MCNC: 151 MG/DL (ref 65–140)
GLUCOSE SERPL-MCNC: 152 MG/DL (ref 65–140)
GLUCOSE SERPL-MCNC: 153 MG/DL (ref 65–140)
GLUCOSE SERPL-MCNC: 154 MG/DL (ref 65–140)
GLUCOSE SERPL-MCNC: 163 MG/DL (ref 65–140)
GLUCOSE SERPL-MCNC: 168 MG/DL (ref 65–140)
GLUCOSE SERPL-MCNC: 170 MG/DL (ref 65–140)
GLUCOSE SERPL-MCNC: 176 MG/DL (ref 65–140)
GLUCOSE SERPL-MCNC: 205 MG/DL (ref 65–140)
GLUCOSE SERPL-MCNC: 216 MG/DL (ref 65–140)
GLUCOSE SERPL-MCNC: 86 MG/DL (ref 65–140)
GLUCOSE SERPL-MCNC: 92 MG/DL (ref 65–140)
GLUCOSE SERPL-MCNC: 93 MG/DL (ref 65–140)
GLUCOSE SERPL-MCNC: 97 MG/DL (ref 65–140)
GLUCOSE UR STRIP-MCNC: NEGATIVE MG/DL
GRAM STN SPEC: NORMAL
GRAM STN SPEC: NORMAL
HCT VFR BLD AUTO: 25.6 % (ref 34.8–46.1)
HCT VFR BLD AUTO: 25.9 % (ref 34.8–46.1)
HCT VFR BLD AUTO: 26.8 % (ref 34.8–46.1)
HCT VFR BLD AUTO: 26.9 % (ref 34.8–46.1)
HCT VFR BLD AUTO: 27.3 % (ref 34.8–46.1)
HCT VFR BLD AUTO: 27.7 % (ref 34.8–46.1)
HCT VFR BLD AUTO: 29.4 % (ref 34.8–46.1)
HCT VFR BLD AUTO: 32.8 % (ref 34.8–46.1)
HGB BLD-MCNC: 10.3 G/DL (ref 11.5–15.4)
HGB BLD-MCNC: 8 G/DL (ref 11.5–15.4)
HGB BLD-MCNC: 8.1 G/DL (ref 11.5–15.4)
HGB BLD-MCNC: 8.3 G/DL (ref 11.5–15.4)
HGB BLD-MCNC: 8.5 G/DL (ref 11.5–15.4)
HGB BLD-MCNC: 8.6 G/DL (ref 11.5–15.4)
HGB BLD-MCNC: 8.7 G/DL (ref 11.5–15.4)
HGB BLD-MCNC: 9.3 G/DL (ref 11.5–15.4)
HGB UR QL STRIP.AUTO: ABNORMAL
HISTIOCYTES NFR FLD: 14 %
IMM GRANULOCYTES # BLD AUTO: 0.04 THOUSAND/UL (ref 0–0.2)
IMM GRANULOCYTES # BLD AUTO: 0.05 THOUSAND/UL (ref 0–0.2)
IMM GRANULOCYTES # BLD AUTO: 0.06 THOUSAND/UL (ref 0–0.2)
IMM GRANULOCYTES # BLD AUTO: 0.07 THOUSAND/UL (ref 0–0.2)
IMM GRANULOCYTES # BLD AUTO: 0.1 THOUSAND/UL (ref 0–0.2)
IMM GRANULOCYTES # BLD AUTO: 0.1 THOUSAND/UL (ref 0–0.2)
IMM GRANULOCYTES # BLD AUTO: 0.11 THOUSAND/UL (ref 0–0.2)
IMM GRANULOCYTES # BLD AUTO: 0.14 THOUSAND/UL (ref 0–0.2)
IMM GRANULOCYTES NFR BLD AUTO: 0 % (ref 0–2)
IMM GRANULOCYTES NFR BLD AUTO: 1 % (ref 0–2)
INR PPP: 1.23 (ref 0.84–1.19)
INR PPP: 1.31 (ref 0.84–1.19)
INR PPP: 1.32 (ref 0.84–1.19)
INR PPP: 1.39 (ref 0.84–1.19)
INR PPP: 2.25 (ref 0.84–1.19)
INR PPP: 4.96 (ref 0.84–1.19)
KETONES UR STRIP-MCNC: ABNORMAL MG/DL
LACTATE SERPL-SCNC: 2 MMOL/L (ref 0.5–2)
LDH FLD L TO P-CCNC: 144 U/L
LEUKOCYTE ESTERASE UR QL STRIP: ABNORMAL
LIPASE SERPL-CCNC: 7 U/L (ref 11–82)
LYMPHOCYTES # BLD AUTO: 0.46 THOUSANDS/ÂΜL (ref 0.6–4.47)
LYMPHOCYTES # BLD AUTO: 0.56 THOUSANDS/ÂΜL (ref 0.6–4.47)
LYMPHOCYTES # BLD AUTO: 0.61 THOUSANDS/ÂΜL (ref 0.6–4.47)
LYMPHOCYTES # BLD AUTO: 0.68 THOUSANDS/ÂΜL (ref 0.6–4.47)
LYMPHOCYTES # BLD AUTO: 0.68 THOUSANDS/ÂΜL (ref 0.6–4.47)
LYMPHOCYTES # BLD AUTO: 0.7 THOUSANDS/ÂΜL (ref 0.6–4.47)
LYMPHOCYTES # BLD AUTO: 0.78 THOUSANDS/ÂΜL (ref 0.6–4.47)
LYMPHOCYTES # BLD AUTO: 0.82 THOUSANDS/ÂΜL (ref 0.6–4.47)
LYMPHOCYTES NFR BLD AUTO: 29 %
LYMPHOCYTES NFR BLD AUTO: 4 % (ref 14–44)
LYMPHOCYTES NFR BLD AUTO: 4 % (ref 14–44)
LYMPHOCYTES NFR BLD AUTO: 5 % (ref 14–44)
LYMPHOCYTES NFR BLD AUTO: 5 % (ref 14–44)
LYMPHOCYTES NFR BLD AUTO: 6 % (ref 14–44)
LYMPHOCYTES NFR BLD AUTO: 7 % (ref 14–44)
MAGNESIUM SERPL-MCNC: 1.6 MG/DL (ref 1.9–2.7)
MAGNESIUM SERPL-MCNC: 1.7 MG/DL (ref 1.9–2.7)
MAGNESIUM SERPL-MCNC: 1.7 MG/DL (ref 1.9–2.7)
MAGNESIUM SERPL-MCNC: 1.8 MG/DL (ref 1.9–2.7)
MAGNESIUM SERPL-MCNC: 2.1 MG/DL (ref 1.9–2.7)
MAGNESIUM SERPL-MCNC: 2.4 MG/DL (ref 1.9–2.7)
MCH RBC QN AUTO: 26.1 PG (ref 26.8–34.3)
MCH RBC QN AUTO: 26.6 PG (ref 26.8–34.3)
MCH RBC QN AUTO: 26.6 PG (ref 26.8–34.3)
MCH RBC QN AUTO: 26.8 PG (ref 26.8–34.3)
MCH RBC QN AUTO: 27 PG (ref 26.8–34.3)
MCH RBC QN AUTO: 27.1 PG (ref 26.8–34.3)
MCHC RBC AUTO-ENTMCNC: 31 G/DL (ref 31.4–37.4)
MCHC RBC AUTO-ENTMCNC: 31.3 G/DL (ref 31.4–37.4)
MCHC RBC AUTO-ENTMCNC: 31.3 G/DL (ref 31.4–37.4)
MCHC RBC AUTO-ENTMCNC: 31.4 G/DL (ref 31.4–37.4)
MCHC RBC AUTO-ENTMCNC: 31.4 G/DL (ref 31.4–37.4)
MCHC RBC AUTO-ENTMCNC: 31.5 G/DL (ref 31.4–37.4)
MCHC RBC AUTO-ENTMCNC: 31.6 G/DL (ref 31.4–37.4)
MCHC RBC AUTO-ENTMCNC: 31.6 G/DL (ref 31.4–37.4)
MCV RBC AUTO: 83 FL (ref 82–98)
MCV RBC AUTO: 84 FL (ref 82–98)
MCV RBC AUTO: 85 FL (ref 82–98)
MCV RBC AUTO: 85 FL (ref 82–98)
MCV RBC AUTO: 86 FL (ref 82–98)
MCV RBC AUTO: 86 FL (ref 82–98)
MCV RBC AUTO: 87 FL (ref 82–98)
MCV RBC AUTO: 87 FL (ref 82–98)
MONOCYTES # BLD AUTO: 1.02 THOUSAND/ÂΜL (ref 0.17–1.22)
MONOCYTES # BLD AUTO: 1.11 THOUSAND/ÂΜL (ref 0.17–1.22)
MONOCYTES # BLD AUTO: 1.15 THOUSAND/ÂΜL (ref 0.17–1.22)
MONOCYTES # BLD AUTO: 1.24 THOUSAND/ÂΜL (ref 0.17–1.22)
MONOCYTES # BLD AUTO: 1.25 THOUSAND/ÂΜL (ref 0.17–1.22)
MONOCYTES # BLD AUTO: 1.29 THOUSAND/ÂΜL (ref 0.17–1.22)
MONOCYTES # BLD AUTO: 1.42 THOUSAND/ÂΜL (ref 0.17–1.22)
MONOCYTES # BLD AUTO: 1.49 THOUSAND/ÂΜL (ref 0.17–1.22)
MONOCYTES NFR BLD AUTO: 11 % (ref 4–12)
MONOCYTES NFR BLD AUTO: 11 % (ref 4–12)
MONOCYTES NFR BLD AUTO: 12 % (ref 4–12)
MONOCYTES NFR BLD AUTO: 12 % (ref 4–12)
MONOCYTES NFR BLD AUTO: 7 % (ref 4–12)
MONOCYTES NFR BLD AUTO: 9 % (ref 4–12)
NEUTROPHILS # BLD AUTO: 10.61 THOUSANDS/ÂΜL (ref 1.85–7.62)
NEUTROPHILS # BLD AUTO: 12.73 THOUSANDS/ÂΜL (ref 1.85–7.62)
NEUTROPHILS # BLD AUTO: 13.9 THOUSANDS/ÂΜL (ref 1.85–7.62)
NEUTROPHILS # BLD AUTO: 8.35 THOUSANDS/ÂΜL (ref 1.85–7.62)
NEUTROPHILS # BLD AUTO: 9.54 THOUSANDS/ÂΜL (ref 1.85–7.62)
NEUTROPHILS # BLD AUTO: 9.59 THOUSANDS/ÂΜL (ref 1.85–7.62)
NEUTROPHILS # BLD AUTO: 9.74 THOUSANDS/ÂΜL (ref 1.85–7.62)
NEUTROPHILS # BLD AUTO: 9.75 THOUSANDS/ÂΜL (ref 1.85–7.62)
NEUTS SEG NFR BLD AUTO: 57 %
NEUTS SEG NFR BLD AUTO: 79 % (ref 43–75)
NEUTS SEG NFR BLD AUTO: 79 % (ref 43–75)
NEUTS SEG NFR BLD AUTO: 80 % (ref 43–75)
NEUTS SEG NFR BLD AUTO: 82 % (ref 43–75)
NEUTS SEG NFR BLD AUTO: 84 % (ref 43–75)
NEUTS SEG NFR BLD AUTO: 85 % (ref 43–75)
NEUTS SEG NFR BLD AUTO: 86 % (ref 43–75)
NEUTS SEG NFR BLD AUTO: 88 % (ref 43–75)
NITRITE UR QL STRIP: NEGATIVE
NON-SQ EPI CELLS URNS QL MICRO: ABNORMAL /HPF
NRBC BLD AUTO-RTO: 0 /100 WBCS
OSMOLALITY UR/SERPL-RTO: 268 MMOL/KG (ref 282–298)
OSMOLALITY UR: 655 MMOL/KG
P AXIS: 92 DEGREES
PH UR STRIP.AUTO: 5.5 [PH]
PHOSPHATE SERPL-MCNC: 2.6 MG/DL (ref 2.3–4.1)
PHOSPHATE SERPL-MCNC: 3 MG/DL (ref 2.3–4.1)
PHOSPHATE SERPL-MCNC: 3.9 MG/DL (ref 2.3–4.1)
PHOSPHATE SERPL-MCNC: 4.8 MG/DL (ref 2.3–4.1)
PLATELET # BLD AUTO: 118 THOUSANDS/UL (ref 149–390)
PLATELET # BLD AUTO: 128 THOUSANDS/UL (ref 149–390)
PLATELET # BLD AUTO: 138 THOUSANDS/UL (ref 149–390)
PLATELET # BLD AUTO: 142 THOUSANDS/UL (ref 149–390)
PLATELET # BLD AUTO: 143 THOUSANDS/UL (ref 149–390)
PLATELET # BLD AUTO: 157 THOUSANDS/UL (ref 149–390)
PLATELET # BLD AUTO: 167 THOUSANDS/UL (ref 149–390)
PLATELET # BLD AUTO: 168 THOUSANDS/UL (ref 149–390)
PMV BLD AUTO: 10 FL (ref 8.9–12.7)
PMV BLD AUTO: 10.1 FL (ref 8.9–12.7)
PMV BLD AUTO: 10.1 FL (ref 8.9–12.7)
PMV BLD AUTO: 10.3 FL (ref 8.9–12.7)
PMV BLD AUTO: 10.4 FL (ref 8.9–12.7)
PMV BLD AUTO: 10.8 FL (ref 8.9–12.7)
PMV BLD AUTO: 9.8 FL (ref 8.9–12.7)
PMV BLD AUTO: 9.8 FL (ref 8.9–12.7)
POTASSIUM SERPL-SCNC: 3.1 MMOL/L (ref 3.5–5.3)
POTASSIUM SERPL-SCNC: 3.7 MMOL/L (ref 3.5–5.3)
POTASSIUM SERPL-SCNC: 3.7 MMOL/L (ref 3.5–5.3)
POTASSIUM SERPL-SCNC: 3.9 MMOL/L (ref 3.5–5.3)
POTASSIUM SERPL-SCNC: 4 MMOL/L (ref 3.5–5.3)
POTASSIUM SERPL-SCNC: 4.1 MMOL/L (ref 3.5–5.3)
POTASSIUM SERPL-SCNC: 4.2 MMOL/L (ref 3.5–5.3)
POTASSIUM SERPL-SCNC: 4.3 MMOL/L (ref 3.5–5.3)
POTASSIUM SERPL-SCNC: 4.4 MMOL/L (ref 3.5–5.3)
POTASSIUM SERPL-SCNC: 4.5 MMOL/L (ref 3.5–5.3)
POTASSIUM SERPL-SCNC: 4.5 MMOL/L (ref 3.5–5.3)
POTASSIUM SERPL-SCNC: 4.6 MMOL/L (ref 3.5–5.3)
POTASSIUM SERPL-SCNC: 4.7 MMOL/L (ref 3.5–5.3)
POTASSIUM SERPL-SCNC: 4.8 MMOL/L (ref 3.5–5.3)
POTASSIUM SERPL-SCNC: 4.8 MMOL/L (ref 3.5–5.3)
POTASSIUM SERPL-SCNC: 4.9 MMOL/L (ref 3.5–5.3)
POTASSIUM SERPL-SCNC: 5 MMOL/L (ref 3.5–5.3)
POTASSIUM SERPL-SCNC: 5.1 MMOL/L (ref 3.5–5.3)
POTASSIUM SERPL-SCNC: 5.3 MMOL/L (ref 3.5–5.3)
PR INTERVAL: 126 MS
PROCALCITONIN SERPL-MCNC: 0.23 NG/ML
PROCALCITONIN SERPL-MCNC: 0.51 NG/ML
PROCALCITONIN SERPL-MCNC: 1.47 NG/ML
PROT FLD-MCNC: <3 G/DL
PROT SERPL-MCNC: 4.9 G/DL (ref 6.4–8.4)
PROT SERPL-MCNC: 5 G/DL (ref 6.4–8.4)
PROT SERPL-MCNC: 5.5 G/DL (ref 6.4–8.4)
PROT SERPL-MCNC: 5.5 G/DL (ref 6.4–8.4)
PROT SERPL-MCNC: 6.2 G/DL (ref 6.4–8.4)
PROT UR STRIP-MCNC: ABNORMAL MG/DL
PROTHROMBIN TIME: 15.4 SECONDS (ref 11.6–14.5)
PROTHROMBIN TIME: 16.1 SECONDS (ref 11.6–14.5)
PROTHROMBIN TIME: 16.2 SECONDS (ref 11.6–14.5)
PROTHROMBIN TIME: 16.8 SECONDS (ref 11.6–14.5)
PROTHROMBIN TIME: 24.4 SECONDS (ref 11.6–14.5)
PROTHROMBIN TIME: 44.9 SECONDS (ref 11.6–14.5)
QRS AXIS: 86 DEGREES
QRSD INTERVAL: 68 MS
QT INTERVAL: 328 MS
QTC INTERVAL: 439 MS
RBC # BLD AUTO: 2.99 MILLION/UL (ref 3.81–5.12)
RBC # BLD AUTO: 2.99 MILLION/UL (ref 3.81–5.12)
RBC # BLD AUTO: 3.1 MILLION/UL (ref 3.81–5.12)
RBC # BLD AUTO: 3.17 MILLION/UL (ref 3.81–5.12)
RBC # BLD AUTO: 3.18 MILLION/UL (ref 3.81–5.12)
RBC # BLD AUTO: 3.27 MILLION/UL (ref 3.81–5.12)
RBC # BLD AUTO: 3.49 MILLION/UL (ref 3.81–5.12)
RBC # BLD AUTO: 3.94 MILLION/UL (ref 3.81–5.12)
RBC #/AREA URNS AUTO: ABNORMAL /HPF
RETICS # AUTO: ABNORMAL 10*3/UL (ref 14097–95744)
RETICS # CALC: 2.41 % (ref 0.37–1.87)
RSV RNA RESP QL NAA+PROBE: NEGATIVE
SARS-COV-2 RNA RESP QL NAA+PROBE: NEGATIVE
SARS-COV-2 RNA RESP QL NAA+PROBE: NEGATIVE
SITE: ABNORMAL
SODIUM 24H UR-SCNC: 14 MOL/L
SODIUM 24H UR-SCNC: 25 MOL/L
SODIUM 24H UR-SCNC: <10 MOL/L
SODIUM SERPL-SCNC: 118 MMOL/L (ref 135–147)
SODIUM SERPL-SCNC: 118 MMOL/L (ref 135–147)
SODIUM SERPL-SCNC: 119 MMOL/L (ref 135–147)
SODIUM SERPL-SCNC: 120 MMOL/L (ref 135–147)
SODIUM SERPL-SCNC: 121 MMOL/L (ref 135–147)
SODIUM SERPL-SCNC: 122 MMOL/L (ref 135–147)
SODIUM SERPL-SCNC: 125 MMOL/L (ref 135–147)
SP GR UR STRIP.AUTO: 1.01 (ref 1–1.03)
T WAVE AXIS: 89 DEGREES
TOTAL CELLS COUNTED SPEC: 100
TSH SERPL DL<=0.05 MIU/L-ACNC: 1.27 UIU/ML (ref 0.45–4.5)
UROBILINOGEN UR QL STRIP.AUTO: 1 E.U./DL
VENTRICULAR RATE: 108 BPM
WBC # BLD AUTO: 10.52 THOUSAND/UL (ref 4.31–10.16)
WBC # BLD AUTO: 11.35 THOUSAND/UL (ref 4.31–10.16)
WBC # BLD AUTO: 11.64 THOUSAND/UL (ref 4.31–10.16)
WBC # BLD AUTO: 11.72 THOUSAND/UL (ref 4.31–10.16)
WBC # BLD AUTO: 12.29 THOUSAND/UL (ref 4.31–10.16)
WBC # BLD AUTO: 12.55 THOUSAND/UL (ref 4.31–10.16)
WBC # BLD AUTO: 15.03 THOUSAND/UL (ref 4.31–10.16)
WBC # BLD AUTO: 15.78 THOUSAND/UL (ref 4.31–10.16)
WBC # FLD MANUAL: 2310 /UL
WBC #/AREA URNS AUTO: ABNORMAL /HPF

## 2023-01-01 PROCEDURE — 88112 CYTOPATH CELL ENHANCE TECH: CPT | Performed by: PATHOLOGY

## 2023-01-01 PROCEDURE — NC001 PR NO CHARGE: Performed by: RADIOLOGY

## 2023-01-01 PROCEDURE — 84100 ASSAY OF PHOSPHORUS: CPT | Performed by: INTERNAL MEDICINE

## 2023-01-01 PROCEDURE — 85025 COMPLETE CBC W/AUTO DIFF WBC: CPT | Performed by: EMERGENCY MEDICINE

## 2023-01-01 PROCEDURE — 85610 PROTHROMBIN TIME: CPT | Performed by: INTERNAL MEDICINE

## 2023-01-01 PROCEDURE — 80053 COMPREHEN METABOLIC PANEL: CPT | Performed by: FAMILY MEDICINE

## 2023-01-01 PROCEDURE — 87635 SARS-COV-2 COVID-19 AMP PRB: CPT | Performed by: INTERNAL MEDICINE

## 2023-01-01 PROCEDURE — 80048 BASIC METABOLIC PNL TOTAL CA: CPT | Performed by: INTERNAL MEDICINE

## 2023-01-01 PROCEDURE — 99232 SBSQ HOSP IP/OBS MODERATE 35: CPT | Performed by: INTERNAL MEDICINE

## 2023-01-01 PROCEDURE — 83690 ASSAY OF LIPASE: CPT | Performed by: EMERGENCY MEDICINE

## 2023-01-01 PROCEDURE — 80076 HEPATIC FUNCTION PANEL: CPT | Performed by: INTERNAL MEDICINE

## 2023-01-01 PROCEDURE — G0156 HHCP-SVS OF AIDE,EA 15 MIN: HCPCS

## 2023-01-01 PROCEDURE — 96365 THER/PROPH/DIAG IV INF INIT: CPT

## 2023-01-01 PROCEDURE — 85610 PROTHROMBIN TIME: CPT | Performed by: FAMILY MEDICINE

## 2023-01-01 PROCEDURE — 99223 1ST HOSP IP/OBS HIGH 75: CPT | Performed by: NURSE PRACTITIONER

## 2023-01-01 PROCEDURE — 0241U HB NFCT DS VIR RESP RNA 4 TRGT: CPT | Performed by: EMERGENCY MEDICINE

## 2023-01-01 PROCEDURE — 99233 SBSQ HOSP IP/OBS HIGH 50: CPT | Performed by: INTERNAL MEDICINE

## 2023-01-01 PROCEDURE — 99223 1ST HOSP IP/OBS HIGH 75: CPT | Performed by: INTERNAL MEDICINE

## 2023-01-01 PROCEDURE — 84300 ASSAY OF URINE SODIUM: CPT | Performed by: INTERNAL MEDICINE

## 2023-01-01 PROCEDURE — 83930 ASSAY OF BLOOD OSMOLALITY: CPT | Performed by: INTERNAL MEDICINE

## 2023-01-01 PROCEDURE — 87205 SMEAR GRAM STAIN: CPT | Performed by: FAMILY MEDICINE

## 2023-01-01 PROCEDURE — NC001 PR NO CHARGE: Performed by: FAMILY MEDICINE

## 2023-01-01 PROCEDURE — 83605 ASSAY OF LACTIC ACID: CPT | Performed by: EMERGENCY MEDICINE

## 2023-01-01 PROCEDURE — 82945 GLUCOSE OTHER FLUID: CPT | Performed by: FAMILY MEDICINE

## 2023-01-01 PROCEDURE — G0155 HHCP-SVS OF CSW,EA 15 MIN: HCPCS

## 2023-01-01 PROCEDURE — 84145 PROCALCITONIN (PCT): CPT | Performed by: INTERNAL MEDICINE

## 2023-01-01 PROCEDURE — 99232 SBSQ HOSP IP/OBS MODERATE 35: CPT | Performed by: FAMILY MEDICINE

## 2023-01-01 PROCEDURE — 81001 URINALYSIS AUTO W/SCOPE: CPT | Performed by: EMERGENCY MEDICINE

## 2023-01-01 PROCEDURE — 97535 SELF CARE MNGMENT TRAINING: CPT

## 2023-01-01 PROCEDURE — 88342 IMHCHEM/IMCYTCHM 1ST ANTB: CPT | Performed by: PATHOLOGY

## 2023-01-01 PROCEDURE — 83735 ASSAY OF MAGNESIUM: CPT | Performed by: INTERNAL MEDICINE

## 2023-01-01 PROCEDURE — 82533 TOTAL CORTISOL: CPT | Performed by: INTERNAL MEDICINE

## 2023-01-01 PROCEDURE — 87086 URINE CULTURE/COLONY COUNT: CPT | Performed by: EMERGENCY MEDICINE

## 2023-01-01 PROCEDURE — 99231 SBSQ HOSP IP/OBS SF/LOW 25: CPT | Performed by: PHYSICIAN ASSISTANT

## 2023-01-01 PROCEDURE — 88305 TISSUE EXAM BY PATHOLOGIST: CPT | Performed by: PATHOLOGY

## 2023-01-01 PROCEDURE — 83935 ASSAY OF URINE OSMOLALITY: CPT | Performed by: EMERGENCY MEDICINE

## 2023-01-01 PROCEDURE — 85610 PROTHROMBIN TIME: CPT | Performed by: EMERGENCY MEDICINE

## 2023-01-01 PROCEDURE — 83735 ASSAY OF MAGNESIUM: CPT | Performed by: EMERGENCY MEDICINE

## 2023-01-01 PROCEDURE — 85730 THROMBOPLASTIN TIME PARTIAL: CPT | Performed by: EMERGENCY MEDICINE

## 2023-01-01 PROCEDURE — 84157 ASSAY OF PROTEIN OTHER: CPT | Performed by: FAMILY MEDICINE

## 2023-01-01 PROCEDURE — 89051 BODY FLUID CELL COUNT: CPT | Performed by: FAMILY MEDICINE

## 2023-01-01 PROCEDURE — 49083 ABD PARACENTESIS W/IMAGING: CPT

## 2023-01-01 PROCEDURE — 97116 GAIT TRAINING THERAPY: CPT

## 2023-01-01 PROCEDURE — 85025 COMPLETE CBC W/AUTO DIFF WBC: CPT | Performed by: INTERNAL MEDICINE

## 2023-01-01 PROCEDURE — 85045 AUTOMATED RETICULOCYTE COUNT: CPT | Performed by: INTERNAL MEDICINE

## 2023-01-01 PROCEDURE — 82728 ASSAY OF FERRITIN: CPT | Performed by: INTERNAL MEDICINE

## 2023-01-01 PROCEDURE — 97167 OT EVAL HIGH COMPLEX 60 MIN: CPT

## 2023-01-01 PROCEDURE — 99239 HOSP IP/OBS DSCHRG MGMT >30: CPT | Performed by: INTERNAL MEDICINE

## 2023-01-01 PROCEDURE — 97163 PT EVAL HIGH COMPLEX 45 MIN: CPT

## 2023-01-01 PROCEDURE — 96361 HYDRATE IV INFUSION ADD-ON: CPT

## 2023-01-01 PROCEDURE — 99285 EMERGENCY DEPT VISIT HI MDM: CPT | Performed by: EMERGENCY MEDICINE

## 2023-01-01 PROCEDURE — 84300 ASSAY OF URINE SODIUM: CPT | Performed by: FAMILY MEDICINE

## 2023-01-01 PROCEDURE — 99497 ADVNCD CARE PLAN 30 MIN: CPT | Performed by: FAMILY MEDICINE

## 2023-01-01 PROCEDURE — 87070 CULTURE OTHR SPECIMN AEROBIC: CPT | Performed by: FAMILY MEDICINE

## 2023-01-01 PROCEDURE — 96375 TX/PRO/DX INJ NEW DRUG ADDON: CPT

## 2023-01-01 PROCEDURE — 83735 ASSAY OF MAGNESIUM: CPT | Performed by: FAMILY MEDICINE

## 2023-01-01 PROCEDURE — 80048 BASIC METABOLIC PNL TOTAL CA: CPT | Performed by: FAMILY MEDICINE

## 2023-01-01 PROCEDURE — 49083 ABD PARACENTESIS W/IMAGING: CPT | Performed by: RADIOLOGY

## 2023-01-01 PROCEDURE — 82570 ASSAY OF URINE CREATININE: CPT | Performed by: INTERNAL MEDICINE

## 2023-01-01 PROCEDURE — G0299 HHS/HOSPICE OF RN EA 15 MIN: HCPCS

## 2023-01-01 PROCEDURE — 82042 OTHER SOURCE ALBUMIN QUAN EA: CPT | Performed by: FAMILY MEDICINE

## 2023-01-01 PROCEDURE — 84443 ASSAY THYROID STIM HORMONE: CPT | Performed by: EMERGENCY MEDICINE

## 2023-01-01 PROCEDURE — 82570 ASSAY OF URINE CREATININE: CPT | Performed by: FAMILY MEDICINE

## 2023-01-01 PROCEDURE — 10330087 HSPC SERVICE INTENSITY ADD-ON

## 2023-01-01 PROCEDURE — 97530 THERAPEUTIC ACTIVITIES: CPT

## 2023-01-01 PROCEDURE — 84145 PROCALCITONIN (PCT): CPT | Performed by: EMERGENCY MEDICINE

## 2023-01-01 PROCEDURE — 80053 COMPREHEN METABOLIC PANEL: CPT | Performed by: INTERNAL MEDICINE

## 2023-01-01 PROCEDURE — 74177 CT ABD & PELVIS W/CONTRAST: CPT

## 2023-01-01 PROCEDURE — G1004 CDSM NDSC: HCPCS

## 2023-01-01 PROCEDURE — 93005 ELECTROCARDIOGRAM TRACING: CPT

## 2023-01-01 PROCEDURE — 99285 EMERGENCY DEPT VISIT HI MDM: CPT

## 2023-01-01 PROCEDURE — 0W9G3ZZ DRAINAGE OF PERITONEAL CAVITY, PERCUTANEOUS APPROACH: ICD-10-PCS | Performed by: RADIOLOGY

## 2023-01-01 PROCEDURE — 80048 BASIC METABOLIC PNL TOTAL CA: CPT | Performed by: EMERGENCY MEDICINE

## 2023-01-01 PROCEDURE — 85025 COMPLETE CBC W/AUTO DIFF WBC: CPT | Performed by: FAMILY MEDICINE

## 2023-01-01 PROCEDURE — 87040 BLOOD CULTURE FOR BACTERIA: CPT | Performed by: EMERGENCY MEDICINE

## 2023-01-01 PROCEDURE — 83615 LACTATE (LD) (LDH) ENZYME: CPT | Performed by: FAMILY MEDICINE

## 2023-01-01 PROCEDURE — 93010 ELECTROCARDIOGRAM REPORT: CPT | Performed by: INTERNAL MEDICINE

## 2023-01-01 PROCEDURE — 80053 COMPREHEN METABOLIC PANEL: CPT | Performed by: EMERGENCY MEDICINE

## 2023-01-01 RX ORDER — MAGNESIUM SULFATE HEPTAHYDRATE 40 MG/ML
2 INJECTION, SOLUTION INTRAVENOUS ONCE
Status: COMPLETED | OUTPATIENT
Start: 2023-01-01 | End: 2023-01-01

## 2023-01-01 RX ORDER — MIDODRINE HYDROCHLORIDE 5 MG/1
10 TABLET ORAL
Status: DISCONTINUED | OUTPATIENT
Start: 2023-01-01 | End: 2023-01-01

## 2023-01-01 RX ORDER — HYDROMORPHONE HCL/PF 1 MG/ML
0.5 SYRINGE (ML) INJECTION
Status: DISCONTINUED | OUTPATIENT
Start: 2023-01-01 | End: 2023-01-01

## 2023-01-01 RX ORDER — WARFARIN SODIUM 2 MG/1
2 TABLET ORAL
Status: DISCONTINUED | OUTPATIENT
Start: 2023-01-01 | End: 2023-01-01

## 2023-01-01 RX ORDER — SODIUM CHLORIDE AND POTASSIUM CHLORIDE 150; 900 MG/100ML; MG/100ML
60 INJECTION, SOLUTION INTRAVENOUS CONTINUOUS
Status: DISCONTINUED | OUTPATIENT
Start: 2023-01-01 | End: 2023-01-01

## 2023-01-01 RX ORDER — TOLVAPTAN 15 MG/1
15 TABLET ORAL ONCE
Status: DISCONTINUED | OUTPATIENT
Start: 2023-01-01 | End: 2023-01-01

## 2023-01-01 RX ORDER — HYDROMORPHONE HCL IN WATER/PF 6 MG/30 ML
0.2 PATIENT CONTROLLED ANALGESIA SYRINGE INTRAVENOUS ONCE
Status: COMPLETED | OUTPATIENT
Start: 2023-01-01 | End: 2023-01-01

## 2023-01-01 RX ORDER — HYDROMORPHONE HCL/PF 1 MG/ML
1 SYRINGE (ML) INJECTION
Status: DISCONTINUED | OUTPATIENT
Start: 2023-01-01 | End: 2023-01-01

## 2023-01-01 RX ORDER — MIDODRINE HYDROCHLORIDE 5 MG/1
5 TABLET ORAL ONCE
Status: COMPLETED | OUTPATIENT
Start: 2023-01-01 | End: 2023-01-01

## 2023-01-01 RX ORDER — ALBUMIN (HUMAN) 12.5 G/50ML
25 SOLUTION INTRAVENOUS ONCE
Status: COMPLETED | OUTPATIENT
Start: 2023-01-01 | End: 2023-01-01

## 2023-01-01 RX ORDER — LORAZEPAM 2 MG/ML
1 INJECTION INTRAMUSCULAR
Status: DISCONTINUED | OUTPATIENT
Start: 2023-01-01 | End: 2023-01-01 | Stop reason: HOSPADM

## 2023-01-01 RX ORDER — ONDANSETRON 2 MG/ML
4 INJECTION INTRAMUSCULAR; INTRAVENOUS EVERY 6 HOURS PRN
Status: DISCONTINUED | OUTPATIENT
Start: 2023-01-01 | End: 2023-01-01 | Stop reason: HOSPADM

## 2023-01-01 RX ORDER — BISACODYL 10 MG
10 SUPPOSITORY, RECTAL RECTAL DAILY PRN
Status: DISCONTINUED | OUTPATIENT
Start: 2023-01-01 | End: 2023-01-01

## 2023-01-01 RX ORDER — ALBUTEROL SULFATE 2.5 MG/3ML
2.5 SOLUTION RESPIRATORY (INHALATION) EVERY 4 HOURS PRN
Status: DISCONTINUED | OUTPATIENT
Start: 2023-01-01 | End: 2023-01-01

## 2023-01-01 RX ORDER — OXYCODONE HYDROCHLORIDE 10 MG/1
10 TABLET ORAL EVERY 4 HOURS PRN
Status: DISCONTINUED | OUTPATIENT
Start: 2023-01-01 | End: 2023-01-01

## 2023-01-01 RX ORDER — TOLVAPTAN 15 MG/1
15 TABLET ORAL ONCE
Status: COMPLETED | OUTPATIENT
Start: 2023-01-01 | End: 2023-01-01

## 2023-01-01 RX ORDER — POLYETHYLENE GLYCOL 3350 17 G/17G
17 POWDER, FOR SOLUTION ORAL DAILY
Status: DISCONTINUED | OUTPATIENT
Start: 2023-01-01 | End: 2023-01-01

## 2023-01-01 RX ORDER — CEFTRIAXONE 1 G/50ML
1000 INJECTION, SOLUTION INTRAVENOUS EVERY 24 HOURS
Status: DISCONTINUED | OUTPATIENT
Start: 2023-01-01 | End: 2023-01-01

## 2023-01-01 RX ORDER — ALBUMIN (HUMAN) 12.5 G/50ML
25 SOLUTION INTRAVENOUS EVERY 6 HOURS
Status: COMPLETED | OUTPATIENT
Start: 2023-01-01 | End: 2023-01-01

## 2023-01-01 RX ORDER — DIPHENHYDRAMINE HCL 25 MG
25 TABLET ORAL EVERY 8 HOURS PRN
Status: DISCONTINUED | OUTPATIENT
Start: 2023-01-01 | End: 2023-01-01

## 2023-01-01 RX ORDER — LORAZEPAM 0.5 MG/1
0.5 TABLET ORAL 2 TIMES DAILY PRN
Status: DISCONTINUED | OUTPATIENT
Start: 2023-01-01 | End: 2023-01-01

## 2023-01-01 RX ORDER — CEFEPIME HYDROCHLORIDE 2 G/50ML
2000 INJECTION, SOLUTION INTRAVENOUS ONCE
Status: COMPLETED | OUTPATIENT
Start: 2023-01-01 | End: 2023-01-01

## 2023-01-01 RX ORDER — FUROSEMIDE 10 MG/ML
40 INJECTION INTRAMUSCULAR; INTRAVENOUS
Status: DISCONTINUED | OUTPATIENT
Start: 2023-01-01 | End: 2023-01-01

## 2023-01-01 RX ORDER — SODIUM BICARBONATE 650 MG/1
650 TABLET ORAL DAILY
Status: DISCONTINUED | OUTPATIENT
Start: 2023-01-01 | End: 2023-01-01

## 2023-01-01 RX ORDER — PANTOPRAZOLE SODIUM 40 MG/1
40 TABLET, DELAYED RELEASE ORAL
Status: DISCONTINUED | OUTPATIENT
Start: 2023-01-01 | End: 2023-01-01

## 2023-01-01 RX ORDER — MIDODRINE HYDROCHLORIDE 5 MG/1
5 TABLET ORAL
Status: DISCONTINUED | OUTPATIENT
Start: 2023-01-01 | End: 2023-01-01

## 2023-01-01 RX ORDER — GABAPENTIN 100 MG/1
200 CAPSULE ORAL 3 TIMES DAILY
Status: DISCONTINUED | OUTPATIENT
Start: 2023-01-01 | End: 2023-01-01

## 2023-01-01 RX ORDER — SODIUM CHLORIDE 1 G/1
2 TABLET ORAL 2 TIMES DAILY WITH MEALS
Status: DISCONTINUED | OUTPATIENT
Start: 2023-01-01 | End: 2023-01-01

## 2023-01-01 RX ORDER — WARFARIN SODIUM 5 MG/1
5 TABLET ORAL
Status: DISCONTINUED | OUTPATIENT
Start: 2023-01-01 | End: 2023-01-01

## 2023-01-01 RX ORDER — HALOPERIDOL 5 MG/ML
0.5 INJECTION INTRAMUSCULAR EVERY 2 HOUR PRN
Status: DISCONTINUED | OUTPATIENT
Start: 2023-01-01 | End: 2023-01-01 | Stop reason: HOSPADM

## 2023-01-01 RX ORDER — OXYCODONE HYDROCHLORIDE AND ACETAMINOPHEN 5; 325 MG/1; MG/1
2 TABLET ORAL EVERY 4 HOURS PRN
Status: DISCONTINUED | OUTPATIENT
Start: 2023-01-01 | End: 2023-01-01

## 2023-01-01 RX ORDER — MAGNESIUM HYDROXIDE/ALUMINUM HYDROXICE/SIMETHICONE 120; 1200; 1200 MG/30ML; MG/30ML; MG/30ML
30 SUSPENSION ORAL EVERY 6 HOURS PRN
Status: DISCONTINUED | OUTPATIENT
Start: 2023-01-01 | End: 2023-01-01

## 2023-01-01 RX ORDER — ENOXAPARIN SODIUM 100 MG/ML
40 INJECTION SUBCUTANEOUS DAILY
Status: DISCONTINUED | OUTPATIENT
Start: 2023-01-01 | End: 2023-01-01

## 2023-01-01 RX ADMIN — DICLOFENAC SODIUM TOPICAL GEL, 1% 2 G: 10 GEL TOPICAL at 12:57

## 2023-01-01 RX ADMIN — HYDROMORPHONE HYDROCHLORIDE 1 MG: 1 INJECTION, SOLUTION INTRAMUSCULAR; INTRAVENOUS; SUBCUTANEOUS at 12:33

## 2023-01-01 RX ADMIN — SODIUM CHLORIDE 1000 ML: 0.9 INJECTION, SOLUTION INTRAVENOUS at 16:03

## 2023-01-01 RX ADMIN — GABAPENTIN 200 MG: 100 CAPSULE ORAL at 16:14

## 2023-01-01 RX ADMIN — DICLOFENAC SODIUM TOPICAL GEL, 1% 2 G: 10 GEL TOPICAL at 09:34

## 2023-01-01 RX ADMIN — DIPHENHYDRAMINE HYDROCHLORIDE 25 MG: 25 TABLET ORAL at 18:51

## 2023-01-01 RX ADMIN — PANTOPRAZOLE SODIUM 40 MG: 40 TABLET, DELAYED RELEASE ORAL at 06:03

## 2023-01-01 RX ADMIN — DICLOFENAC SODIUM TOPICAL GEL, 1% 2 G: 10 GEL TOPICAL at 10:21

## 2023-01-01 RX ADMIN — TOLVAPTAN 15 MG: 15 TABLET ORAL at 08:15

## 2023-01-01 RX ADMIN — GABAPENTIN 200 MG: 100 CAPSULE ORAL at 08:18

## 2023-01-01 RX ADMIN — ONDANSETRON 4 MG: 2 INJECTION INTRAMUSCULAR; INTRAVENOUS at 08:34

## 2023-01-01 RX ADMIN — SODIUM CHLORIDE 500 ML: 0.9 INJECTION, SOLUTION INTRAVENOUS at 03:44

## 2023-01-01 RX ADMIN — DICLOFENAC SODIUM TOPICAL GEL, 1% 2 G: 10 GEL TOPICAL at 08:20

## 2023-01-01 RX ADMIN — SODIUM CHLORIDE 500 ML: 0.9 INJECTION, SOLUTION INTRAVENOUS at 06:23

## 2023-01-01 RX ADMIN — ALBUMIN (HUMAN) 25 G: 0.25 INJECTION, SOLUTION INTRAVENOUS at 13:45

## 2023-01-01 RX ADMIN — GABAPENTIN 200 MG: 100 CAPSULE ORAL at 21:30

## 2023-01-01 RX ADMIN — ALUMINUM HYDROXIDE, MAGNESIUM HYDROXIDE, AND DIMETHICONE 30 ML: 200; 20; 200 SUSPENSION ORAL at 15:42

## 2023-01-01 RX ADMIN — GABAPENTIN 200 MG: 100 CAPSULE ORAL at 16:32

## 2023-01-01 RX ADMIN — CEFEPIME HYDROCHLORIDE 2000 MG: 2 INJECTION, SOLUTION INTRAVENOUS at 16:50

## 2023-01-01 RX ADMIN — SODIUM CHLORIDE AND POTASSIUM CHLORIDE 50 ML/HR: .9; .15 SOLUTION INTRAVENOUS at 22:48

## 2023-01-01 RX ADMIN — CEFTRIAXONE 1000 MG: 1 INJECTION, SOLUTION INTRAVENOUS at 09:34

## 2023-01-01 RX ADMIN — TOLVAPTAN 15 MG: 15 TABLET ORAL at 18:13

## 2023-01-01 RX ADMIN — DICLOFENAC SODIUM TOPICAL GEL, 1% 2 G: 10 GEL TOPICAL at 21:31

## 2023-01-01 RX ADMIN — DICLOFENAC SODIUM TOPICAL GEL, 1% 2 G: 10 GEL TOPICAL at 21:30

## 2023-01-01 RX ADMIN — GABAPENTIN 200 MG: 100 CAPSULE ORAL at 20:49

## 2023-01-01 RX ADMIN — ENOXAPARIN SODIUM 40 MG: 40 INJECTION SUBCUTANEOUS at 09:04

## 2023-01-01 RX ADMIN — DICLOFENAC SODIUM TOPICAL GEL, 1% 2 G: 10 GEL TOPICAL at 18:00

## 2023-01-01 RX ADMIN — SODIUM CHLORIDE 30 ML/HR: 4 INJECTION, SOLUTION, CONCENTRATE INTRAVENOUS at 11:23

## 2023-01-01 RX ADMIN — DICLOFENAC SODIUM TOPICAL GEL, 1% 2 G: 10 GEL TOPICAL at 09:04

## 2023-01-01 RX ADMIN — SODIUM BICARBONATE 650 MG: 650 TABLET ORAL at 11:05

## 2023-01-01 RX ADMIN — GABAPENTIN 200 MG: 100 CAPSULE ORAL at 17:16

## 2023-01-01 RX ADMIN — IOHEXOL 100 ML: 350 INJECTION, SOLUTION INTRAVENOUS at 18:05

## 2023-01-01 RX ADMIN — MIDODRINE HYDROCHLORIDE 5 MG: 5 TABLET ORAL at 06:03

## 2023-01-01 RX ADMIN — ENOXAPARIN SODIUM 40 MG: 40 INJECTION SUBCUTANEOUS at 09:34

## 2023-01-01 RX ADMIN — SODIUM CHLORIDE 500 ML: 0.9 INJECTION, SOLUTION INTRAVENOUS at 00:57

## 2023-01-01 RX ADMIN — SODIUM CHLORIDE TAB 1 GM 2 G: 1 TAB at 08:14

## 2023-01-01 RX ADMIN — SODIUM CHLORIDE 500 ML: 0.9 INJECTION, SOLUTION INTRAVENOUS at 12:06

## 2023-01-01 RX ADMIN — DICLOFENAC SODIUM TOPICAL GEL, 1% 2 G: 10 GEL TOPICAL at 12:38

## 2023-01-01 RX ADMIN — DICLOFENAC SODIUM TOPICAL GEL, 1% 2 G: 10 GEL TOPICAL at 08:36

## 2023-01-01 RX ADMIN — CEFTRIAXONE 1000 MG: 1 INJECTION, SOLUTION INTRAVENOUS at 08:15

## 2023-01-01 RX ADMIN — HYDROMORPHONE HYDROCHLORIDE 1 MG: 1 INJECTION, SOLUTION INTRAMUSCULAR; INTRAVENOUS; SUBCUTANEOUS at 09:30

## 2023-01-01 RX ADMIN — ENOXAPARIN SODIUM 40 MG: 40 INJECTION SUBCUTANEOUS at 09:26

## 2023-01-01 RX ADMIN — HYDROMORPHONE HYDROCHLORIDE 1 MG: 1 INJECTION, SOLUTION INTRAMUSCULAR; INTRAVENOUS; SUBCUTANEOUS at 00:40

## 2023-01-01 RX ADMIN — GABAPENTIN 200 MG: 100 CAPSULE ORAL at 09:34

## 2023-01-01 RX ADMIN — MIDODRINE HYDROCHLORIDE 5 MG: 5 TABLET ORAL at 18:10

## 2023-01-01 RX ADMIN — GABAPENTIN 200 MG: 100 CAPSULE ORAL at 21:52

## 2023-01-01 RX ADMIN — GABAPENTIN 200 MG: 100 CAPSULE ORAL at 08:28

## 2023-01-01 RX ADMIN — OXYCODONE HYDROCHLORIDE 10 MG: 10 TABLET ORAL at 21:31

## 2023-01-01 RX ADMIN — MAGNESIUM SULFATE HEPTAHYDRATE 2 G: 40 INJECTION, SOLUTION INTRAVENOUS at 07:18

## 2023-01-01 RX ADMIN — GABAPENTIN 200 MG: 100 CAPSULE ORAL at 08:25

## 2023-01-01 RX ADMIN — PANTOPRAZOLE SODIUM 40 MG: 40 TABLET, DELAYED RELEASE ORAL at 05:24

## 2023-01-01 RX ADMIN — ONDANSETRON 4 MG: 2 INJECTION INTRAMUSCULAR; INTRAVENOUS at 20:13

## 2023-01-01 RX ADMIN — HYDROMORPHONE HYDROCHLORIDE 0.2 MG: 0.2 INJECTION, SOLUTION INTRAMUSCULAR; INTRAVENOUS; SUBCUTANEOUS at 19:48

## 2023-01-01 RX ADMIN — ENOXAPARIN SODIUM 40 MG: 40 INJECTION SUBCUTANEOUS at 08:16

## 2023-01-01 RX ADMIN — OXYCODONE HYDROCHLORIDE 10 MG: 10 TABLET ORAL at 20:13

## 2023-01-01 RX ADMIN — DICLOFENAC SODIUM TOPICAL GEL, 1% 2 G: 10 GEL TOPICAL at 17:59

## 2023-01-01 RX ADMIN — PANTOPRAZOLE SODIUM 40 MG: 40 TABLET, DELAYED RELEASE ORAL at 05:35

## 2023-01-01 RX ADMIN — HYDROMORPHONE HYDROCHLORIDE 1 MG: 1 INJECTION, SOLUTION INTRAMUSCULAR; INTRAVENOUS; SUBCUTANEOUS at 20:44

## 2023-01-01 RX ADMIN — HYDROMORPHONE HYDROCHLORIDE 0.2 MG: 0.2 INJECTION, SOLUTION INTRAMUSCULAR; INTRAVENOUS; SUBCUTANEOUS at 16:04

## 2023-01-01 RX ADMIN — PANTOPRAZOLE SODIUM 40 MG: 40 TABLET, DELAYED RELEASE ORAL at 05:13

## 2023-01-01 RX ADMIN — DICLOFENAC SODIUM TOPICAL GEL, 1% 2 G: 10 GEL TOPICAL at 21:01

## 2023-01-01 RX ADMIN — DICLOFENAC SODIUM TOPICAL GEL, 1% 2 G: 10 GEL TOPICAL at 22:05

## 2023-01-01 RX ADMIN — PIPERACILLIN AND TAZOBACTAM 3.38 G: 3; .375 INJECTION, POWDER, FOR SOLUTION INTRAVENOUS at 22:43

## 2023-01-01 RX ADMIN — OXYCODONE HYDROCHLORIDE 10 MG: 10 TABLET ORAL at 01:04

## 2023-01-01 RX ADMIN — MIDODRINE HYDROCHLORIDE 5 MG: 5 TABLET ORAL at 09:26

## 2023-01-01 RX ADMIN — DICLOFENAC SODIUM TOPICAL GEL, 1% 2 G: 10 GEL TOPICAL at 18:46

## 2023-01-01 RX ADMIN — GABAPENTIN 200 MG: 100 CAPSULE ORAL at 17:41

## 2023-01-01 RX ADMIN — DICLOFENAC SODIUM TOPICAL GEL, 1% 2 G: 10 GEL TOPICAL at 17:18

## 2023-01-01 RX ADMIN — GABAPENTIN 200 MG: 100 CAPSULE ORAL at 17:03

## 2023-01-01 RX ADMIN — MIDODRINE HYDROCHLORIDE 10 MG: 5 TABLET ORAL at 17:16

## 2023-01-01 RX ADMIN — ONDANSETRON 4 MG: 2 INJECTION INTRAMUSCULAR; INTRAVENOUS at 01:04

## 2023-01-01 RX ADMIN — ONDANSETRON 4 MG: 2 INJECTION INTRAMUSCULAR; INTRAVENOUS at 18:19

## 2023-01-01 RX ADMIN — HYDROMORPHONE HYDROCHLORIDE 0.5 MG: 1 INJECTION, SOLUTION INTRAMUSCULAR; INTRAVENOUS; SUBCUTANEOUS at 23:06

## 2023-01-01 RX ADMIN — DICLOFENAC SODIUM TOPICAL GEL, 1% 2 G: 10 GEL TOPICAL at 08:16

## 2023-01-01 RX ADMIN — ONDANSETRON 4 MG: 2 INJECTION INTRAMUSCULAR; INTRAVENOUS at 20:34

## 2023-01-01 RX ADMIN — ENOXAPARIN SODIUM 40 MG: 40 INJECTION SUBCUTANEOUS at 08:28

## 2023-01-01 RX ADMIN — ONDANSETRON 4 MG: 2 INJECTION INTRAMUSCULAR; INTRAVENOUS at 20:54

## 2023-01-01 RX ADMIN — PANTOPRAZOLE SODIUM 40 MG: 40 TABLET, DELAYED RELEASE ORAL at 05:09

## 2023-01-01 RX ADMIN — DICLOFENAC SODIUM TOPICAL GEL, 1% 2 G: 10 GEL TOPICAL at 14:10

## 2023-01-01 RX ADMIN — DICLOFENAC SODIUM TOPICAL GEL, 1% 2 G: 10 GEL TOPICAL at 22:55

## 2023-01-01 RX ADMIN — SODIUM CHLORIDE 500 ML: 0.9 INJECTION, SOLUTION INTRAVENOUS at 07:58

## 2023-01-01 RX ADMIN — OXYCODONE HYDROCHLORIDE 10 MG: 10 TABLET ORAL at 00:14

## 2023-01-01 RX ADMIN — GABAPENTIN 200 MG: 100 CAPSULE ORAL at 22:05

## 2023-01-01 RX ADMIN — PIPERACILLIN AND TAZOBACTAM 3.38 G: 3; .375 INJECTION, POWDER, FOR SOLUTION INTRAVENOUS at 04:47

## 2023-01-01 RX ADMIN — GABAPENTIN 200 MG: 100 CAPSULE ORAL at 09:04

## 2023-01-01 RX ADMIN — WARFARIN SODIUM 5 MG: 5 TABLET ORAL at 18:13

## 2023-01-01 RX ADMIN — WARFARIN SODIUM 5 MG: 5 TABLET ORAL at 19:12

## 2023-01-01 RX ADMIN — ONDANSETRON 4 MG: 2 INJECTION INTRAMUSCULAR; INTRAVENOUS at 23:06

## 2023-01-01 RX ADMIN — MIDODRINE HYDROCHLORIDE 10 MG: 5 TABLET ORAL at 06:07

## 2023-01-01 RX ADMIN — HYDROMORPHONE HYDROCHLORIDE 1 MG: 1 INJECTION, SOLUTION INTRAMUSCULAR; INTRAVENOUS; SUBCUTANEOUS at 06:24

## 2023-01-01 RX ADMIN — FUROSEMIDE 40 MG: 10 INJECTION, SOLUTION INTRAMUSCULAR; INTRAVENOUS at 17:15

## 2023-01-01 RX ADMIN — PANTOPRAZOLE SODIUM 40 MG: 40 TABLET, DELAYED RELEASE ORAL at 06:07

## 2023-01-01 RX ADMIN — GABAPENTIN 200 MG: 100 CAPSULE ORAL at 20:34

## 2023-01-01 RX ADMIN — SODIUM CHLORIDE TAB 1 GM 2 G: 1 TAB at 21:22

## 2023-01-01 RX ADMIN — DICLOFENAC SODIUM TOPICAL GEL, 1% 2 G: 10 GEL TOPICAL at 11:45

## 2023-01-01 RX ADMIN — MAGNESIUM SULFATE HEPTAHYDRATE 2 G: 40 INJECTION, SOLUTION INTRAVENOUS at 10:20

## 2023-01-01 RX ADMIN — TOLVAPTAN 15 MG: 15 TABLET ORAL at 12:29

## 2023-01-01 RX ADMIN — PANTOPRAZOLE SODIUM 40 MG: 40 TABLET, DELAYED RELEASE ORAL at 05:30

## 2023-01-01 RX ADMIN — GABAPENTIN 200 MG: 100 CAPSULE ORAL at 09:26

## 2023-01-01 RX ADMIN — MIDODRINE HYDROCHLORIDE 10 MG: 5 TABLET ORAL at 12:09

## 2023-01-01 RX ADMIN — ALBUMIN (HUMAN) 25 G: 0.25 INJECTION, SOLUTION INTRAVENOUS at 18:10

## 2023-01-01 RX ADMIN — WARFARIN SODIUM 5 MG: 5 TABLET ORAL at 18:10

## 2023-01-01 RX ADMIN — ENOXAPARIN SODIUM 40 MG: 40 INJECTION SUBCUTANEOUS at 08:18

## 2023-01-01 RX ADMIN — DICLOFENAC SODIUM TOPICAL GEL, 1% 2 G: 10 GEL TOPICAL at 17:37

## 2023-01-01 RX ADMIN — DICLOFENAC SODIUM TOPICAL GEL, 1% 2 G: 10 GEL TOPICAL at 08:43

## 2023-01-01 RX ADMIN — HYDROMORPHONE HYDROCHLORIDE 1 MG: 1 INJECTION, SOLUTION INTRAMUSCULAR; INTRAVENOUS; SUBCUTANEOUS at 02:38

## 2023-01-01 RX ADMIN — GABAPENTIN 200 MG: 100 CAPSULE ORAL at 08:14

## 2023-01-01 RX ADMIN — GABAPENTIN 200 MG: 100 CAPSULE ORAL at 20:01

## 2023-01-01 RX ADMIN — DICLOFENAC SODIUM TOPICAL GEL, 1% 2 G: 10 GEL TOPICAL at 12:09

## 2023-01-01 RX ADMIN — CEFTRIAXONE 1000 MG: 1 INJECTION, SOLUTION INTRAVENOUS at 10:22

## 2023-01-01 RX ADMIN — CEFTRIAXONE 1000 MG: 1 INJECTION, SOLUTION INTRAVENOUS at 08:18

## 2023-01-01 RX ADMIN — PANTOPRAZOLE SODIUM 40 MG: 40 TABLET, DELAYED RELEASE ORAL at 05:05

## 2023-01-01 RX ADMIN — ALBUMIN (HUMAN) 25 G: 0.25 INJECTION, SOLUTION INTRAVENOUS at 13:07

## 2023-01-01 RX ADMIN — GABAPENTIN 200 MG: 100 CAPSULE ORAL at 17:36

## 2023-01-01 RX ADMIN — DICLOFENAC SODIUM TOPICAL GEL, 1% 2 G: 10 GEL TOPICAL at 17:04

## 2023-01-01 RX ADMIN — DICLOFENAC SODIUM TOPICAL GEL, 1% 2 G: 10 GEL TOPICAL at 21:52

## 2023-01-01 RX ADMIN — POLYETHYLENE GLYCOL 3350 17 G: 17 POWDER, FOR SOLUTION ORAL at 09:33

## 2023-01-01 RX ADMIN — Medication 7.5 MG: at 09:33

## 2023-01-01 RX ADMIN — DICLOFENAC SODIUM TOPICAL GEL, 1% 2 G: 10 GEL TOPICAL at 10:00

## 2023-01-01 RX ADMIN — ONDANSETRON 4 MG: 2 INJECTION INTRAMUSCULAR; INTRAVENOUS at 20:51

## 2023-01-03 ENCOUNTER — TELEPHONE (OUTPATIENT)
Dept: GYNECOLOGIC ONCOLOGY | Facility: CLINIC | Age: 68
End: 2023-01-03

## 2023-01-03 ENCOUNTER — HOSPITAL ENCOUNTER (OUTPATIENT)
Dept: INFUSION CENTER | Facility: HOSPITAL | Age: 68
Discharge: HOME | End: 2023-01-03

## 2023-01-03 DIAGNOSIS — C53.0 MALIGNANT NEOPLASM OF ENDOCERVIX (HCC): ICD-10-CM

## 2023-01-03 LAB
ALBUMIN SERPL BCP-MCNC: 3.2 G/DL (ref 3.5–5)
ALP SERPL-CCNC: 128 U/L (ref 46–116)
ALT SERPL W P-5'-P-CCNC: 42 U/L (ref 12–78)
ANION GAP SERPL CALCULATED.3IONS-SCNC: 6 MMOL/L (ref 4–13)
AST SERPL W P-5'-P-CCNC: 34 U/L (ref 5–45)
BASOPHILS # BLD AUTO: 0.02 THOUSANDS/ÂΜL (ref 0–0.1)
BASOPHILS NFR BLD AUTO: 1 % (ref 0–1)
BILIRUB SERPL-MCNC: 0.4 MG/DL (ref 0.2–1)
BUN SERPL-MCNC: 17 MG/DL (ref 5–25)
CALCIUM ALBUM COR SERPL-MCNC: 9.8 MG/DL (ref 8.3–10.1)
CALCIUM SERPL-MCNC: 9.2 MG/DL (ref 8.3–10.1)
CHLORIDE SERPL-SCNC: 99 MMOL/L (ref 96–108)
CO2 SERPL-SCNC: 27 MMOL/L (ref 21–32)
CREAT SERPL-MCNC: 1.16 MG/DL (ref 0.6–1.3)
EOSINOPHIL # BLD AUTO: 0.05 THOUSAND/ÂΜL (ref 0–0.61)
EOSINOPHIL NFR BLD AUTO: 2 % (ref 0–6)
ERYTHROCYTE [DISTWIDTH] IN BLOOD BY AUTOMATED COUNT: 16.2 % (ref 11.6–15.1)
GFR SERPL CREATININE-BSD FRML MDRD: 48 ML/MIN/1.73SQ M
GLUCOSE SERPL-MCNC: 137 MG/DL (ref 65–140)
HCT VFR BLD AUTO: 28.3 % (ref 34.8–46.1)
HGB BLD-MCNC: 8.6 G/DL (ref 11.5–15.4)
IMM GRANULOCYTES # BLD AUTO: 0.02 THOUSAND/UL (ref 0–0.2)
IMM GRANULOCYTES NFR BLD AUTO: 1 % (ref 0–2)
LYMPHOCYTES # BLD AUTO: 0.57 THOUSANDS/ÂΜL (ref 0.6–4.47)
LYMPHOCYTES NFR BLD AUTO: 24 % (ref 14–44)
MAGNESIUM SERPL-MCNC: 1.8 MG/DL (ref 1.6–2.6)
MCH RBC QN AUTO: 28.6 PG (ref 26.8–34.3)
MCHC RBC AUTO-ENTMCNC: 30.4 G/DL (ref 31.4–37.4)
MCV RBC AUTO: 94 FL (ref 82–98)
MONOCYTES # BLD AUTO: 0.27 THOUSAND/ÂΜL (ref 0.17–1.22)
MONOCYTES NFR BLD AUTO: 12 % (ref 4–12)
NEUTROPHILS # BLD AUTO: 1.42 THOUSANDS/ÂΜL (ref 1.85–7.62)
NEUTS SEG NFR BLD AUTO: 60 % (ref 43–75)
NRBC BLD AUTO-RTO: 0 /100 WBCS
PLATELET # BLD AUTO: 109 THOUSANDS/UL (ref 149–390)
PMV BLD AUTO: 10 FL (ref 8.9–12.7)
POTASSIUM SERPL-SCNC: 3.9 MMOL/L (ref 3.5–5.3)
PROT SERPL-MCNC: 7.3 G/DL (ref 6.4–8.4)
RBC # BLD AUTO: 3.01 MILLION/UL (ref 3.81–5.12)
SODIUM SERPL-SCNC: 132 MMOL/L (ref 135–147)
WBC # BLD AUTO: 2.35 THOUSAND/UL (ref 4.31–10.16)

## 2023-01-03 RX ORDER — ONDANSETRON HYDROCHLORIDE 8 MG/1
8 TABLET, FILM COATED ORAL EVERY 8 HOURS PRN
Qty: 60 TABLET | Refills: 1 | Status: SHIPPED | OUTPATIENT
Start: 2023-01-03

## 2023-01-03 RX ORDER — LORAZEPAM 1 MG/1
1 TABLET ORAL EVERY 8 HOURS PRN
Qty: 20 TABLET | Refills: 0 | Status: SHIPPED | OUTPATIENT
Start: 2023-01-03

## 2023-01-03 NOTE — PLAN OF CARE
Problem: Potential for Falls  Goal: Patient will remain free of falls  Description: INTERVENTIONS:  - Educate patient/family on patient safety including physical limitations  - Instruct patient to call for assistance with activity   - Keep Call bell within reach  - Keep chair locked and adjusted as appropriate  - Keep care items and personal belongings within reach  - Initiate and maintain comfort rounds  - Apply yellow socks and bracelet for high fall risk patients  - Consider moving patient to room near nurses station  1/3/2023 1038 by Adeline Cordova RN  Outcome: Progressing

## 2023-01-03 NOTE — TELEPHONE ENCOUNTER
Return call placed to patient  Refills submitted  Encouraged her to take zofran 8mg every 8 hrs starting the morning of chemo for 2-3 days

## 2023-01-03 NOTE — TELEPHONE ENCOUNTER
Pt called in explaining that she has been feeling sick since her infusion last week Wednesday  The nurses there advised her to call us to possibly see if Dr Humera Bailon can prescribe a pre-med of some sort to avoid nausea   Pt can be reached at [phone]

## 2023-01-03 NOTE — PROGRESS NOTES
Patient c/o HA, nausea, fatigue, and decreased appetite since her last Gemzar tx  She stated that she had spoken with Kuldip Carvalho and was instructed to take her BP  Patient had stated that she has been taking her at home antiemetic, but not routinely since she will run out before her refill is due  BP obtained while in unit, 115/56  Kuldip Shi made aware, and stated that she spoke with patient  She will start taking Zofran at home routinely on day of tx and for 2-3 days after  Called patient's pharmacy, [facility], and was informed that her script will be filled today  Called patient and informed her of instructions from [facility] and that med will be ready to pickup today  She stated understanding  Patient tolerated port access well, no complications  Labs obtained  Port flushed and deaccessed  No distress @ discharge

## 2023-01-04 ENCOUNTER — HOSPITAL ENCOUNTER (OUTPATIENT)
Dept: INFUSION CENTER | Facility: HOSPITAL | Age: 68
Discharge: HOME | End: 2023-01-04
Attending: OBSTETRICS & GYNECOLOGY

## 2023-01-04 VITALS
OXYGEN SATURATION: 96 % | DIASTOLIC BLOOD PRESSURE: 56 MMHG | HEIGHT: 59 IN | RESPIRATION RATE: 18 BRPM | WEIGHT: 139.11 LBS | SYSTOLIC BLOOD PRESSURE: 122 MMHG | HEART RATE: 94 BPM | TEMPERATURE: 98.5 F | BODY MASS INDEX: 28.04 KG/M2

## 2023-01-04 DIAGNOSIS — C53.0 MALIGNANT NEOPLASM OF ENDOCERVIX (HCC): Primary | ICD-10-CM

## 2023-01-04 DIAGNOSIS — E87.6 HYPOKALEMIA: ICD-10-CM

## 2023-01-04 RX ORDER — SODIUM CHLORIDE 9 MG/ML
20 INJECTION, SOLUTION INTRAVENOUS ONCE
Status: COMPLETED | OUTPATIENT
Start: 2023-01-04 | End: 2023-01-04

## 2023-01-04 RX ADMIN — DEXAMETHASONE SODIUM PHOSPHATE 10 MG: 10 INJECTION, SOLUTION INTRAMUSCULAR; INTRAVENOUS at 10:40

## 2023-01-04 RX ADMIN — SODIUM CHLORIDE 20 ML/HR: 0.9 INJECTION, SOLUTION INTRAVENOUS at 10:15

## 2023-01-04 RX ADMIN — GEMCITABINE 1200 MG: 38 INJECTION, SOLUTION INTRAVENOUS at 11:38

## 2023-01-04 NOTE — PROGRESS NOTES
Ok to tx ANC 1 42 per Mj CUEVAS  Patient tolerated port access and chemotherapy infusion well, no complications  Port flushed and deaccessed  No distress @ discharge

## 2023-01-07 DIAGNOSIS — F32.0 MAJOR DEPRESSIVE DISORDER, SINGLE EPISODE, MILD (HCC): ICD-10-CM

## 2023-01-09 ENCOUNTER — TELEPHONE (OUTPATIENT)
Dept: CARDIAC SURGERY | Facility: CLINIC | Age: 68
End: 2023-01-09

## 2023-01-09 ENCOUNTER — HOSPITAL ENCOUNTER (OUTPATIENT)
Dept: INFUSION CENTER | Facility: HOSPITAL | Age: 68
Discharge: HOME | End: 2023-01-09

## 2023-01-09 ENCOUNTER — TELEMEDICINE (OUTPATIENT)
Dept: GYNECOLOGIC ONCOLOGY | Facility: CLINIC | Age: 68
End: 2023-01-09

## 2023-01-09 VITALS — TEMPERATURE: 97 F

## 2023-01-09 DIAGNOSIS — C53.0 MALIGNANT NEOPLASM OF ENDOCERVIX (HCC): ICD-10-CM

## 2023-01-09 DIAGNOSIS — E86.0 DEHYDRATION: ICD-10-CM

## 2023-01-09 DIAGNOSIS — T45.1X5A CHEMOTHERAPY-INDUCED NAUSEA: ICD-10-CM

## 2023-01-09 DIAGNOSIS — C53.0 MALIGNANT NEOPLASM OF ENDOCERVIX (HCC): Primary | ICD-10-CM

## 2023-01-09 DIAGNOSIS — Z45.2 ENCOUNTER FOR VENOUS ACCESS DEVICE CARE: Primary | ICD-10-CM

## 2023-01-09 DIAGNOSIS — R11.0 CHEMOTHERAPY-INDUCED NAUSEA: ICD-10-CM

## 2023-01-09 LAB
ALBUMIN SERPL BCP-MCNC: 3 G/DL (ref 3.5–5)
ALP SERPL-CCNC: 122 U/L (ref 46–116)
ALT SERPL W P-5'-P-CCNC: 27 U/L (ref 12–78)
ANION GAP SERPL CALCULATED.3IONS-SCNC: 9 MMOL/L (ref 4–13)
AST SERPL W P-5'-P-CCNC: 24 U/L (ref 5–45)
BASOPHILS # BLD AUTO: 0.01 THOUSANDS/ÂΜL (ref 0–0.1)
BASOPHILS NFR BLD AUTO: 1 % (ref 0–1)
BILIRUB SERPL-MCNC: 0.58 MG/DL (ref 0.2–1)
BUN SERPL-MCNC: 21 MG/DL (ref 5–25)
CALCIUM ALBUM COR SERPL-MCNC: 9.6 MG/DL (ref 8.3–10.1)
CALCIUM SERPL-MCNC: 8.8 MG/DL (ref 8.3–10.1)
CHLORIDE SERPL-SCNC: 94 MMOL/L (ref 96–108)
CO2 SERPL-SCNC: 25 MMOL/L (ref 21–32)
CREAT SERPL-MCNC: 1.29 MG/DL (ref 0.6–1.3)
EOSINOPHIL # BLD AUTO: 0.01 THOUSAND/ÂΜL (ref 0–0.61)
EOSINOPHIL NFR BLD AUTO: 1 % (ref 0–6)
ERYTHROCYTE [DISTWIDTH] IN BLOOD BY AUTOMATED COUNT: 15.9 % (ref 11.6–15.1)
GFR SERPL CREATININE-BSD FRML MDRD: 42 ML/MIN/1.73SQ M
GLUCOSE P FAST SERPL-MCNC: 190 MG/DL (ref 65–99)
GLUCOSE SERPL-MCNC: 190 MG/DL (ref 65–140)
HCT VFR BLD AUTO: 26.2 % (ref 34.8–46.1)
HGB BLD-MCNC: 8.4 G/DL (ref 11.5–15.4)
IMM GRANULOCYTES # BLD AUTO: 0.07 THOUSAND/UL (ref 0–0.2)
IMM GRANULOCYTES NFR BLD AUTO: 4 % (ref 0–2)
LYMPHOCYTES # BLD AUTO: 0.34 THOUSANDS/ÂΜL (ref 0.6–4.47)
LYMPHOCYTES NFR BLD AUTO: 22 % (ref 14–44)
MAGNESIUM SERPL-MCNC: 1.7 MG/DL (ref 1.6–2.6)
MCH RBC QN AUTO: 29.4 PG (ref 26.8–34.3)
MCHC RBC AUTO-ENTMCNC: 32.1 G/DL (ref 31.4–37.4)
MCV RBC AUTO: 92 FL (ref 82–98)
MONOCYTES # BLD AUTO: 0.1 THOUSAND/ÂΜL (ref 0.17–1.22)
MONOCYTES NFR BLD AUTO: 6 % (ref 4–12)
NEUTROPHILS # BLD AUTO: 1.05 THOUSANDS/ÂΜL (ref 1.85–7.62)
NEUTS SEG NFR BLD AUTO: 66 % (ref 43–75)
NRBC BLD AUTO-RTO: 0 /100 WBCS
PLATELET # BLD AUTO: 54 THOUSANDS/UL (ref 149–390)
PMV BLD AUTO: 9.7 FL (ref 8.9–12.7)
POTASSIUM SERPL-SCNC: 3.6 MMOL/L (ref 3.5–5.3)
PROT SERPL-MCNC: 7.3 G/DL (ref 6.4–8.4)
RBC # BLD AUTO: 2.86 MILLION/UL (ref 3.81–5.12)
SODIUM SERPL-SCNC: 128 MMOL/L (ref 135–147)
WBC # BLD AUTO: 1.58 THOUSAND/UL (ref 4.31–10.16)

## 2023-01-09 RX ORDER — BUPROPION HYDROCHLORIDE 300 MG/1
TABLET ORAL
Qty: 90 TABLET | Refills: 2 | Status: SHIPPED | OUTPATIENT
Start: 2023-01-09

## 2023-01-09 RX ORDER — PROMETHAZINE HYDROCHLORIDE 25 MG/1
25 TABLET ORAL EVERY 6 HOURS PRN
Qty: 30 TABLET | Refills: 1 | Status: SHIPPED | OUTPATIENT
Start: 2023-01-09

## 2023-01-09 NOTE — PROGRESS NOTES
Virtual Regular Visit    Verification of patient location:    Patient is located in the following state in which I hold an active license [place]      Assessment/Plan:    Problem List Items Addressed This Visit        Genitourinary    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[facility] [address] ) - Primary     Recurrent stage IB2 cervical cancer with disease progression on topotecan/avastin now receiving palliative chemotherapy with gemcitabine 750 mg/m2 days 1 and 8 every 21 days  She is experiencing chemotherapy-induced fatigue and nausea  Will plan to dose-reduce gemcitabine to 650 mg/m2 with cycle 2  Proceed with cycle 2 as long as her metabolic and hematologic parameters are adequate  Return to the office as per her chemotherapy calendar  Other    Chemotherapy-induced nausea     Poorly controlled  Patient is not taking scheduled anti-emetics  We discussed the role of scheduled zofran and ativan for several days following treatment  Will also send additional prescription for phenergan  She understands that she may take zofran/phenergan/ativa at the same time, or space out doses to gain better anti-emetic control  Plan to add IV emend with treatment days  Relevant Medications    promethazine (PHENERGAN) 25 mg tablet            Reason for visit is   Chief Complaint   Patient presents with   • Virtual Regular Visit        Encounter provider Dario Shaikh PA-C    Provider located at [address] [address] [phone]      Recent Visits  Date Type Provider Dept   01/09/23 Telemedicine Dario Shaikh PA-C  [facility]   Showing recent visits within past 7 days and meeting all other requirements  Future Appointments  No visits were found meeting these conditions    Showing future appointments within next 150 days and meeting all other requirements       The patient was identified by name and date of birth  Clifford Zhong was informed that this is a telemedicine visit and that the visit is being conducted through Telephone  My office door was closed  No one else was in the room  She acknowledged consent and understanding of privacy and security of the video platform  The patient has agreed to participate and understands they can discontinue the visit at any time  Patient is aware this is a billable service  Subjective  Clifford Zhong is a 79 y o  female   who presents to the office virtually for pre-chemotherapy evaluation  She has been afebrile  The patient is fatigued and nauseated with treatment  She is not regularly taking her anti-emetics  Her appetite is reduced  She is constipated, but passing flatus  She denies abdominal pain  No skin rashes  CBC/Diff, CMP, Mg from 1/3/23 reviewed  Today's labs are pending  Oncology History   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[facility] [address] )   12/2016 Initial Diagnosis    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 [facility])  3/29/2017 -  Cancer Staged    Staging form: Cervix Uteri, AJCC 7th Edition  - Clinical stage from 3/29/2017: FIGO Stage IB2, calculated as Stage Unknown (T1b2, NX, M0) - Signed by Kwame Shelton MD on 2/4/2021  Staged by: Managing physician  Specimen type: Excision  Histopathologic type: Squamous cell carcinoma, NOS  Histologic grade (G): G3  Lymph-vascular invasion (LVI): LVI present/identified, NOS  Residual tumor (R): R0 - None  Pelvic jace status: Negative  Pelvic jace method of assessment: PET  Para-aortic status: Negative  Para-aortic jace method of assessment: PET  Stage used in treatment planning: Yes  National guidelines used in treatment planning: Yes  Type of national guideline used in treatment planning: NCCN       3/29/2017 Surgery    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)   FInal path with incidental 5 cm grade 3 invasive cervical SCCA, +LVI, invasion 9/10 mm cervical stroma, no lymphadenectomy, margins negative ([facility])  2/19/2021 Progression    A  Lymph Node, Left axillary, Biopsy:  - Metastatic squamous cell carcinoma (p16 positive) involving lymph node, compatible with patient's cervical primary  3/2/2021 -  Chemotherapy    Carboplatin AUC 5, Taxol 135 milligram/m2 and Avastin at 15 milligram/kilogram all to be administer on day 1 of a 21 day cycle     7/7/2021 - 2/2/2022 Chemotherapy    bevacizumab (AVASTIN) IVPB, 910 mg, Intravenous, Once, 11 of 16 cycles  Administration: 900 mg (7/7/2021), 900 mg (7/30/2021), 900 mg (8/18/2021), 900 mg (9/8/2021), 900 mg (9/29/2021), 900 mg (10/20/2021), 900 mg (11/10/2021), 900 mg (12/1/2021), 900 mg (12/22/2021), 900 mg (1/12/2022), 900 mg (2/2/2022)     2/22/2022 - 8/31/2022 Chemotherapy    pembrolizumab (KEYTRUDA) IVPB, 200 mg, Intravenous, Once, 10 of 12 cycles  Administration: 200 mg (2/22/2022), 200 mg (4/6/2022), 200 mg (4/27/2022), 200 mg (5/18/2022), 200 mg (6/8/2022), 200 mg (3/16/2022), 200 mg (6/29/2022), 200 mg (7/20/2022), 200 mg (8/10/2022), 200 mg (8/31/2022)     2/23/2022 - 2/23/2022 Chemotherapy    pembrolizumab (KEYTRUDA) IVPB, 200 mg, Intravenous, Once, 0 of 6 cycles     9/14/2022 Progression    Progression of metastatic disease noted on CT     9/21/2022 - 11/23/2022 Chemotherapy    Pegfilgrastim-bmez (ZIEXTENZO), 6 mg, Subcutaneous, Once, 1 of 2 cycles  Administration: 6 mg (10/6/2022)  topotecan (HYCAMTIN) chemo infusion, 3 mg/m2 = 4 8 mg, Intravenous, Once, 3 of 4 cycles  Dose modification: 2 5 mg/m2 (original dose 3 mg/m2, Cycle 4, Reason: Dose modified as per discussion with consulting physician)  Administration: 4 8 mg (9/21/2022), 4 8 mg (9/28/2022), 4 8 mg (10/19/2022), 4 7 mg (10/26/2022), 4 7 mg (11/16/2022), 4 7 mg (11/23/2022)     12/28/2022 -  Chemotherapy    Gemcitabine 750 mg/m2 days 1 and 8 every 21 days  She is scheduled for cycle 2                Past Medical History:   Diagnosis Date   • Acne    • Anxiety    • Anxiety and depression 10/02/2019   • Arthritis    • Breathing difficulty     pt does not recall any breathing problem but did have neck pain after procedure"   • Cancer ([address] )     cervical/ and "lymph nodes" (GYN)   • Cervical cancer (HCC)    • Chronic pain disorder     Neck pain   • Claustrophobia    • Colon polyp    • GERD (gastroesophageal reflux disease)     not now   • Headache    • History of radiation therapy    • History of shingles    • Irritable bowel syndrome    • Joint pain following chemotherapy     and stiffness   • Liver disease     "cyst on liver"   • Lung nodule     "left"   • Motion sickness    • Muscle weakness     after chemo"   • Neck pain     "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"has had neck pain since childhood"   • Port-A-Cath in place     right chest//for chemotherapy q 3 weeks and also receives IV hydration   • Shortness of breath     "started with cancer, when too much exertion like cleaning/guera doing stairs"-pt states it is much better   • Thumb injury 10/2019    right   • Thyroid nodule    • Tinnitus of left ear 02/19/2020   • Urinary problem    • Wears contact lenses    • Wrist arthritis 03/17/2020       Past Surgical History:   Procedure Laterality Date   • COLONOSCOPY     • CYSTOSCOPY W/ RETROGRADES Right 6/3/2021    Procedure: Josie Lobstein W/RETROGRADE;  Surgeon: Fred Sprague MD;  Location: [facility];  Service: Urology   • CYSTOSCOPY W/ RETROGRADES Right 10/18/2021    Procedure: CYSTOSCOPY WITH ureteral stent exchange;  Surgeon: Olya Brower MD;  Location: [facility];  Service: Urology   • FL RETROGRADE PYELOGRAM  2/11/2021   • FL RETROGRADE PYELOGRAM  6/3/2021   • FOOT SURGERY     • HYSTERECTOMY     • IR BIOPSY LYMPH NODE  2/19/2021   • IR PORT PLACEMENT  3/9/2021   • NE CYSTO BLADDER W/URETERAL CATHETERIZATION Right 2/11/2021    Procedure: CYSTOSCOPY RETROGRADE PYELOGRAM WITH INSERTION STENT URETERAL;  Surgeon: Olya Brower MD;  Location:  MAIN OR;  Service: Urology   • SD CYSTO BLADDER W/URETERAL CATHETERIZATION Right 4/4/2022    Procedure: CYSTOSCOPY  WITH INSERTION STENT URETERAL Right;  Surgeon: Narda Jauregui MD;  Location: [facility];  Service: Urology   • SD CYSTO BLADDER W/URETERAL CATHETERIZATION Right 10/27/2022    Procedure: Cystoscopy, right ureteral stent exchange;  Surgeon: Narda Jauregui MD;  Location: [facility];  Service: Urology   • SD EXCISION INTERDIGITAL KESSLER NEUROMA SINGLE EACH Left 7/12/2018    Procedure: FOOT NEUROMA EXCISION;  Surgeon: Travis Martinez DPM;  Location: [address] MAIN OR;  Service: Podiatry   • [address] Right 6/3/2021    Procedure: EXCHANGE STENT;  Surgeon: Jen Richard MD;  Location: [facility];  Service: Urology   • TUBAL LIGATION         Current Outpatient Medications   Medication Sig Dispense Refill   • promethazine (PHENERGAN) 25 mg tablet Take 1 tablet (25 mg total) by mouth every 6 (six) hours as needed for nausea or vomiting 30 tablet 1   • buPROPion (WELLBUTRIN XL) 300 mg 24 hr tablet TAKE 1 TABLET BY MOUTH EVERY DAY 90 tablet 2   • Diclofenac Sodium (VOLTAREN) 1 % Apply 2 g topically 4 (four) times a day 350 g 3   • diphenhydrAMINE (BENADRYL) 25 mg capsule Take 50 mg by mouth if needed     • gabapentin (NEURONTIN) 100 mg capsule Take 2 capsules (200 mg total) by mouth daily AND 2 capsules (200 mg total) daily at bedtime  To reduce nerve pain   120 capsule 5   • lidocaine-prilocaine (EMLA) cream APPLY TOPICALLY AS NEEDED FOR MILD PAIN 30 g 0   • LORazepam (ATIVAN) 1 mg tablet Take 1 tablet (1 mg total) by mouth every 8 (eight) hours as needed for anxiety 20 tablet 0   • Multiple Vitamins-Minerals (Alive Womens Gummy) CHEW Chew     • omeprazole (PriLOSEC) 40 MG capsule TAKE 1 CAPSULE BY MOUTH EVERY DAY IN THE MORNING 90 capsule 1   • ondansetron (ZOFRAN) 8 mg tablet Take 1 tablet (8 mg total) by mouth every 8 (eight) hours as needed for nausea or vomiting 60 tablet 1   • polyethylene glycol (MIRALAX) 17 g packet Take 17 g by mouth daily 30 each 5   • senna (SENOKOT) 8 6 MG tablet Take 1 tablet (8 6 mg total) by mouth 2 (two) times a day Take to prevent constipation from pain meds  Hold one dose for loose stool (Patient taking differently: Take 1 tablet by mouth if needed Take to prevent constipation from pain meds  Hold one dose for loose stool) 60 tablet 11   • tiotropium (Spiriva Respimat) 1 25 MCG/ACT AERS inhaler Inhale 2 puffs daily 4 g 5   • traZODone (DESYREL) 50 mg tablet TAKE 1-2 AT BEDTIME 180 tablet 2     No current facility-administered medications for this visit  Allergies   Allergen Reactions   • Medical Tape Itching     And redness from adhesive    And "skin raised up" especially longer on skin   • Aspirin GI Intolerance   • Codeine GI Intolerance   • Dust Mite Extract    • Pollen Extract Sneezing       Review of Systems   Constitutional: Positive for appetite change and fatigue  Negative for fever  HENT: Negative  Eyes: Negative  Respiratory: Negative  Cardiovascular: Negative  Gastrointestinal: Positive for constipation and nausea  Negative for diarrhea and vomiting  Genitourinary: Negative  Musculoskeletal: Negative  Skin: Negative  Neurological: Negative  Psychiatric/Behavioral: Negative  Video Exam    There were no vitals filed for this visit  Visit performed via audio only  No video capabilities available      I spent 20 minutes with patient today in which greater than 50% of the time was spent in counseling/coordination of care regarding chemotherapy

## 2023-01-10 ENCOUNTER — PATIENT OUTREACH (OUTPATIENT)
Dept: CASE MANAGEMENT | Facility: HOSPITAL | Age: 68
End: 2023-01-10

## 2023-01-10 NOTE — PROGRESS NOTES
Closing OncSW episode as of today, no contact with pt since 10/22  MSW will remain available to pt as needed moving forward

## 2023-01-11 ENCOUNTER — TELEPHONE (OUTPATIENT)
Dept: GYNECOLOGIC ONCOLOGY | Facility: CLINIC | Age: 68
End: 2023-01-11

## 2023-01-11 DIAGNOSIS — G57.11 MERALGIA PARAESTHETICA, RIGHT: ICD-10-CM

## 2023-01-11 DIAGNOSIS — C53.0 MALIGNANT NEOPLASM OF ENDOCERVIX (HCC): ICD-10-CM

## 2023-01-11 RX ORDER — LIDOCAINE AND PRILOCAINE 25; 25 MG/G; MG/G
CREAM TOPICAL
Qty: 30 G | Refills: 2 | Status: SHIPPED | OUTPATIENT
Start: 2023-01-11

## 2023-01-11 RX ORDER — GABAPENTIN 100 MG/1
CAPSULE ORAL
Qty: 120 CAPSULE | Refills: 5 | Status: SHIPPED | OUTPATIENT
Start: 2023-01-11

## 2023-01-11 NOTE — ASSESSMENT & PLAN NOTE
Recurrent stage IB2 cervical cancer with disease progression on topotecan/avastin now receiving palliative chemotherapy with gemcitabine 750 mg/m2 days 1 and 8 every 21 days  She is experiencing chemotherapy-induced fatigue and nausea  Will plan to dose-reduce gemcitabine to 650 mg/m2 with cycle 2  Proceed with cycle 2 as long as her metabolic and hematologic parameters are adequate  Return to the office as per her chemotherapy calendar

## 2023-01-11 NOTE — ASSESSMENT & PLAN NOTE
Poorly controlled  Patient is not taking scheduled anti-emetics  We discussed the role of scheduled zofran and ativan for several days following treatment  Will also send additional prescription for phenergan  She understands that she may take zofran/phenergan/ativa at the same time, or space out doses to gain better anti-emetic control  Plan to add IV emend with treatment days

## 2023-01-11 NOTE — TELEPHONE ENCOUNTER
Patient called into the office requesting a refill of the medication below       lidocaine-prilocaine (EMLA) cream [[id number]      [id number] [place] - [address] [address]   Phone:  [phone]  Fax:  [phone]   GERA #:  [id number]

## 2023-01-12 ENCOUNTER — OFFICE VISIT (OUTPATIENT)
Dept: UROLOGY | Facility: CLINIC | Age: 68
End: 2023-01-12

## 2023-01-12 VITALS
DIASTOLIC BLOOD PRESSURE: 70 MMHG | HEART RATE: 72 BPM | SYSTOLIC BLOOD PRESSURE: 102 MMHG | HEIGHT: 59 IN | BODY MASS INDEX: 27.21 KG/M2 | WEIGHT: 135 LBS

## 2023-01-12 DIAGNOSIS — N13.30 HYDRONEPHROSIS, UNSPECIFIED HYDRONEPHROSIS TYPE: Primary | ICD-10-CM

## 2023-01-12 DIAGNOSIS — R39.89 SUSPECTED UTI: ICD-10-CM

## 2023-01-12 NOTE — LETTER
January 12, 2023     Patient: Bernard Campbell  YOB: 1955  Date of Visit: 1/12/2023      To Whom it May Concern:    Bernard Campbell is under my professional care  Irish Crespo was seen in my office on 1/12/2023  Irish Crespo requires transportation to office appointments as well as surgical appointment due to complications from her cervical cancer  If you have any questions or concerns, please don't hesitate to call           Sincerely,          JACE Villegas        CC: No Recipients

## 2023-01-12 NOTE — PROGRESS NOTES
1/12/2023    No chief complaint on file  Assessment and Plan    79 y o  female manage by Dr Barbie Patiño    1  Hydronephrosis secondary to ureteral stricture disease  · History and physical was performed for the patient's upcoming   [facility] Po   on 1/26/2023 with Dr Barbie Patiño  Technique, benefits, and risk of the procedure listed above were discussed with them today and informed written consent was obtained  All questions and concerns regarding surgery and perioperative expectations have been addressed and answered  No overall changes in their health since last visit  Denies any prior complications with anesthesia  · Urine culture to be completed today  · She currently receiving palliative chemotherapy with gemcitabine  Review of current CBC from 1/09/2023 shows WBC 1 58 with ANC 1 05  I will discuss with Dr Barbie Patiño as well her Gynecology Oncology team prior to moving forward with planned right ureteral stent exchange as she is at a significantly increased risk for sepsis due to her current WBC and ANC counts  Surgery will likely need to be delayed  Subjective:    History of Present Illness  Abelino Ron is a 79 y o  female here for history and physical as she is scheduled for upcoming cystoscopy, right ureteral stent exchange on 1/26/2023 with Dr Barbie Patiño  She has a history of hydronephrosis due to ureteral stricture related to retroperitoneal lymphadenopathy  Clarke Mcconnell is managed with a ureteral stent  Last stent exchange on 10/27/2022 by Dr Barbie Patiño  Review of Systems   Constitutional: Negative for chills and fever  HENT: Negative for ear pain and sore throat  Eyes: Negative for pain and visual disturbance  Respiratory: Negative for cough and shortness of breath  Cardiovascular: Negative for chest pain and palpitations  Gastrointestinal: Negative for abdominal pain, constipation, diarrhea, nausea and vomiting     Genitourinary: Negative for difficulty urinating, dysuria, flank pain, frequency, hematuria and urgency  Musculoskeletal: Negative for arthralgias and back pain  Skin: Negative for color change and rash  Neurological: Negative for dizziness, syncope, weakness and numbness  All other systems reviewed and are negative  Vitals  Vitals:    01/12/23 1003   BP: 102/70   Pulse: 72   Weight: 61 2 kg (135 lb)   Height: 4' 11" (1 499 m)       Physical Exam  Vitals reviewed  Constitutional:       General: She is not in acute distress  Appearance: Normal appearance  She is normal weight  She is not ill-appearing or toxic-appearing  HENT:      Head: Normocephalic and atraumatic  Nose: Nose normal       Mouth/Throat:      Mouth: Mucous membranes are dry  Eyes:      General: No scleral icterus  Conjunctiva/sclera: Conjunctivae normal    Cardiovascular:      Rate and Rhythm: Normal rate and regular rhythm  Pulses: Normal pulses  Heart sounds: Normal heart sounds  Pulmonary:      Effort: Pulmonary effort is normal  No respiratory distress  Breath sounds: Normal breath sounds  Abdominal:      General: Abdomen is flat  Palpations: Abdomen is soft  Tenderness: There is no abdominal tenderness  There is no right CVA tenderness or left CVA tenderness  Hernia: No hernia is present  Musculoskeletal:         General: Normal range of motion  Cervical back: Normal range of motion  Skin:     General: Skin is warm and dry  Neurological:      General: No focal deficit present  Mental Status: She is alert and oriented to person, place, and time  Mental status is at baseline  Psychiatric:         Mood and Affect: Mood normal          Behavior: Behavior normal          Thought Content:  Thought content normal          Judgment: Judgment normal          Past History  Past Medical History:   Diagnosis Date   • Acne    • Anxiety    • Anxiety and depression 10/02/2019   • Arthritis    • Breathing difficulty     pt does not recall any breathing problem but did have neck pain after procedure"   • Cancer ([facility] [address] )     cervical/ and "lymph nodes" (GYN)   • Cervical cancer (HCC)    • Chronic pain disorder     Neck pain   • Claustrophobia    • Colon polyp    • GERD (gastroesophageal reflux disease)     not now   • Headache    • History of radiation therapy    • History of shingles    • Irritable bowel syndrome    • Joint pain following chemotherapy     and stiffness   • Liver disease     "cyst on liver"   • Lung nodule     "left"   • Motion sickness    • Muscle weakness     after chemo"   • Neck pain     "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"has had neck pain since childhood"   • Port-A-Cath in place     right chest//for chemotherapy q 3 weeks and also receives IV hydration   • Shortness of breath     "started with cancer, when too much exertion like cleaning/guera doing stairs"-pt states it is much better   • Thumb injury 10/2019    right   • Thyroid nodule    • Tinnitus of left ear 2020   • Urinary problem    • Wears contact lenses    • Wrist arthritis 2020     Social History     Socioeconomic History   • Marital status:      Spouse name: None   • Number of children: None   • Years of education: None   • Highest education level: None   Occupational History   • None   Tobacco Use   • Smoking status: Former     Packs/day: 3 00     Years: 32 00     Pack years: 96 00     Types: Cigarettes     Start date:      Quit date: 2003     Years since quittin 5   • Smokeless tobacco: Never   Vaping Use   • Vaping Use: Never used   Substance and Sexual Activity   • Alcohol use: Not Currently   • Drug use: No     Types: Marijuana     Comment: As teenager   • Sexual activity: None   Other Topics Concern   • None   Social History Narrative   • None     Social Determinants of Health     Financial Resource Strain: Not on file   Food Insecurity: Not on file   Transportation Needs: Not on file   Physical Activity: Not on file   Stress: Not on file   Social Connections: Not on file   Intimate Partner Violence: Not on file   Housing Stability: Not on file     Social History     Tobacco Use   Smoking Status Former   • Packs/day: 3 00   • Years: 32 00   • Pack years: 96 00   • Types: Cigarettes   • Start date:    • Quit date: 2003   • Years since quittin 5   Smokeless Tobacco Never     Family History   Problem Relation Age of Onset   • Stomach cancer Mother    • Alcohol abuse Father    • No Known Problems Sister    • No Known Problems Daughter    • No Known Problems Maternal Grandmother    • No Known Problems Maternal Grandfather    • No Known Problems Paternal Grandmother    • No Known Problems Paternal Grandfather    • No Known Problems Sister    • No Known Problems Sister    • No Known Problems Sister    • No Known Problems Sister    • No Known Problems Daughter    • No Known Problems Daughter    • No Known Problems Maternal Aunt    • No Known Problems Maternal Aunt    • No Known Problems Paternal Aunt    • No Known Problems Paternal Aunt    • No Known Problems Paternal Aunt    • No Known Problems Paternal Aunt    • No Known Problems Paternal Aunt    • No Known Problems Paternal Aunt    • No Known Problems Paternal Aunt    • No Known Problems Paternal Aunt    • No Known Problems Paternal Aunt    • No Known Problems Paternal Aunt        The following portions of the patient's history were reviewed and updated as appropriate allergies, current medications, past medical history, past social history, past surgical history and problem list    Imaging:    Results  No results found for this or any previous visit (from the past 1 hour(s))  ]  No results found for: PSA  Lab Results   Component Value Date    CALCIUM 8 8 2023     2018    K 3 6 2023    CO2 25 2023    CL 94 (L) 2023    BUN 21 2023    CREATININE 1 29 2023     Lab Results   Component Value Date    WBC 1 58 (LL) 2023    HGB 8 4 (L) 2023    HCT 26  2 (L) 01/09/2023    MCV 92 01/09/2023    PLT 54 (L) 01/09/2023       Please Note:  Voice dictation software has been used to create this document  There may be inadvertent transcriptions errors       Barbara Moore

## 2023-01-14 LAB — BACTERIA UR CULT: NORMAL

## 2023-01-16 ENCOUNTER — TELEPHONE (OUTPATIENT)
Dept: UROLOGY | Facility: CLINIC | Age: 68
End: 2023-01-16

## 2023-01-16 ENCOUNTER — HOSPITAL ENCOUNTER (OUTPATIENT)
Dept: INFUSION CENTER | Facility: HOSPITAL | Age: 68
Discharge: HOME | End: 2023-01-16

## 2023-01-16 VITALS
HEART RATE: 100 BPM | SYSTOLIC BLOOD PRESSURE: 128 MMHG | DIASTOLIC BLOOD PRESSURE: 57 MMHG | OXYGEN SATURATION: 100 % | RESPIRATION RATE: 18 BRPM | TEMPERATURE: 97.2 F

## 2023-01-16 DIAGNOSIS — E86.0 DEHYDRATION: ICD-10-CM

## 2023-01-16 DIAGNOSIS — Z45.2 ENCOUNTER FOR VENOUS ACCESS DEVICE CARE: ICD-10-CM

## 2023-01-16 DIAGNOSIS — R39.89 SUSPECTED UTI: Primary | ICD-10-CM

## 2023-01-16 DIAGNOSIS — C53.0 MALIGNANT NEOPLASM OF ENDOCERVIX (HCC): Primary | ICD-10-CM

## 2023-01-16 LAB
ALBUMIN SERPL BCP-MCNC: 3.2 G/DL (ref 3.5–5)
ALP SERPL-CCNC: 127 U/L (ref 46–116)
ALT SERPL W P-5'-P-CCNC: 21 U/L (ref 12–78)
ANION GAP SERPL CALCULATED.3IONS-SCNC: 9 MMOL/L (ref 4–13)
AST SERPL W P-5'-P-CCNC: 17 U/L (ref 5–45)
BASOPHILS # BLD AUTO: 0.01 THOUSANDS/ÂΜL (ref 0–0.1)
BASOPHILS NFR BLD AUTO: 0 % (ref 0–1)
BILIRUB SERPL-MCNC: 0.28 MG/DL (ref 0.2–1)
BUN SERPL-MCNC: 11 MG/DL (ref 5–25)
CALCIUM ALBUM COR SERPL-MCNC: 10 MG/DL (ref 8.3–10.1)
CALCIUM SERPL-MCNC: 9.4 MG/DL (ref 8.3–10.1)
CHLORIDE SERPL-SCNC: 102 MMOL/L (ref 96–108)
CO2 SERPL-SCNC: 28 MMOL/L (ref 21–32)
CREAT SERPL-MCNC: 1.16 MG/DL (ref 0.6–1.3)
EOSINOPHIL # BLD AUTO: 0.17 THOUSAND/ÂΜL (ref 0–0.61)
EOSINOPHIL NFR BLD AUTO: 6 % (ref 0–6)
ERYTHROCYTE [DISTWIDTH] IN BLOOD BY AUTOMATED COUNT: 16.8 % (ref 11.6–15.1)
GFR SERPL CREATININE-BSD FRML MDRD: 48 ML/MIN/1.73SQ M
GLUCOSE SERPL-MCNC: 97 MG/DL (ref 65–140)
HCT VFR BLD AUTO: 29.3 % (ref 34.8–46.1)
HGB BLD-MCNC: 9 G/DL (ref 11.5–15.4)
IMM GRANULOCYTES # BLD AUTO: 0.01 THOUSAND/UL (ref 0–0.2)
IMM GRANULOCYTES NFR BLD AUTO: 0 % (ref 0–2)
LYMPHOCYTES # BLD AUTO: 0.52 THOUSANDS/ÂΜL (ref 0.6–4.47)
LYMPHOCYTES NFR BLD AUTO: 17 % (ref 14–44)
MAGNESIUM SERPL-MCNC: 1.9 MG/DL (ref 1.6–2.6)
MCH RBC QN AUTO: 29.3 PG (ref 26.8–34.3)
MCHC RBC AUTO-ENTMCNC: 30.7 G/DL (ref 31.4–37.4)
MCV RBC AUTO: 95 FL (ref 82–98)
MONOCYTES # BLD AUTO: 0.43 THOUSAND/ÂΜL (ref 0.17–1.22)
MONOCYTES NFR BLD AUTO: 14 % (ref 4–12)
NEUTROPHILS # BLD AUTO: 1.86 THOUSANDS/ÂΜL (ref 1.85–7.62)
NEUTS SEG NFR BLD AUTO: 63 % (ref 43–75)
NRBC BLD AUTO-RTO: 0 /100 WBCS
PLATELET # BLD AUTO: 135 THOUSANDS/UL (ref 149–390)
PMV BLD AUTO: 10.4 FL (ref 8.9–12.7)
POTASSIUM SERPL-SCNC: 4 MMOL/L (ref 3.5–5.3)
PROT SERPL-MCNC: 7.4 G/DL (ref 6.4–8.4)
RBC # BLD AUTO: 3.07 MILLION/UL (ref 3.81–5.12)
SODIUM SERPL-SCNC: 139 MMOL/L (ref 135–147)
WBC # BLD AUTO: 3 THOUSAND/UL (ref 4.31–10.16)

## 2023-01-16 RX ORDER — CEPHALEXIN 500 MG/1
500 CAPSULE ORAL EVERY 6 HOURS SCHEDULED
Qty: 20 CAPSULE | Refills: 0 | Status: SHIPPED | OUTPATIENT
Start: 2023-01-16 | End: 2023-01-21

## 2023-01-16 NOTE — RESULT ENCOUNTER NOTE
Please let patient know that her urine culture shows mixed contaminants  Given her recent neutropenia I have sent a prescription to her pharmacy for keflex

## 2023-01-16 NOTE — PROGRESS NOTES
Pt stated she is achy and has congestion and fatigue  Denies fever or sore throat  Encouraged to test for flu/covid prior to tx on Wednseday  Verbalized understanding  Instructed on obtaining free COVID tests through insurance  Tolerated lab draw from port  Discharged in satisfactory condition

## 2023-01-16 NOTE — TELEPHONE ENCOUNTER
----- Message from Nicolas ARMSTRONG  79  sent at 1/16/2023 12:33 PM EST -----  Please let patient know that her urine culture shows mixed contaminants  Given her recent neutropenia I have sent a prescription to her pharmacy for keflex

## 2023-01-17 RX ORDER — SODIUM CHLORIDE 9 MG/ML
20 INJECTION, SOLUTION INTRAVENOUS ONCE
OUTPATIENT
Start: 2023-02-08

## 2023-01-20 ENCOUNTER — ANESTHESIA EVENT (OUTPATIENT)
Dept: PERIOP | Facility: HOSPITAL | Age: 68
End: 2023-01-20

## 2023-01-23 ENCOUNTER — TELEPHONE (OUTPATIENT)
Dept: GYNECOLOGIC ONCOLOGY | Facility: CLINIC | Age: 68
End: 2023-01-23

## 2023-01-23 NOTE — TELEPHONE ENCOUNTER
Tiger text message received from infusion informing us that Kamran cancelled lab draw for today because she was not feeling well  Attempted to reach patient by phone, VM message left asking she call the office, and as for now  we will plan on having her labs drawn this Wednesday pre- chemo    Infusion informed of same via tiger text to Magdalena Moncada RN

## 2023-01-25 ENCOUNTER — HOSPITAL ENCOUNTER (OUTPATIENT)
Dept: INFUSION CENTER | Facility: HOSPITAL | Age: 68
Discharge: HOME | End: 2023-01-25
Attending: OBSTETRICS & GYNECOLOGY

## 2023-01-26 ENCOUNTER — ANESTHESIA (OUTPATIENT)
Dept: PERIOP | Facility: HOSPITAL | Age: 68
End: 2023-01-26

## 2023-01-30 ENCOUNTER — HOSPITAL ENCOUNTER (OUTPATIENT)
Dept: INFUSION CENTER | Facility: HOSPITAL | Age: 68
Discharge: HOME | End: 2023-01-30

## 2023-01-30 VITALS — TEMPERATURE: 97.6 F

## 2023-01-30 DIAGNOSIS — Z45.2 ENCOUNTER FOR VENOUS ACCESS DEVICE CARE: Primary | ICD-10-CM

## 2023-01-30 DIAGNOSIS — C53.0 MALIGNANT NEOPLASM OF ENDOCERVIX (HCC): ICD-10-CM

## 2023-01-30 DIAGNOSIS — E86.0 DEHYDRATION: ICD-10-CM

## 2023-01-30 LAB
ALBUMIN SERPL BCP-MCNC: 3.9 G/DL (ref 3.5–5)
ALP SERPL-CCNC: 104 U/L (ref 34–104)
ALT SERPL W P-5'-P-CCNC: 13 U/L (ref 7–52)
ANION GAP SERPL CALCULATED.3IONS-SCNC: 10 MMOL/L (ref 4–13)
AST SERPL W P-5'-P-CCNC: 20 U/L (ref 13–39)
BASOPHILS # BLD AUTO: 0.02 THOUSANDS/ÂΜL (ref 0–0.1)
BASOPHILS NFR BLD AUTO: 0 % (ref 0–1)
BILIRUB SERPL-MCNC: 0.52 MG/DL (ref 0.2–1)
BUN SERPL-MCNC: 11 MG/DL (ref 5–25)
CALCIUM SERPL-MCNC: 9.5 MG/DL (ref 8.4–10.2)
CHLORIDE SERPL-SCNC: 106 MMOL/L (ref 96–108)
CO2 SERPL-SCNC: 24 MMOL/L (ref 21–32)
CREAT SERPL-MCNC: 0.89 MG/DL (ref 0.6–1.3)
EOSINOPHIL # BLD AUTO: 0.17 THOUSAND/ÂΜL (ref 0–0.61)
EOSINOPHIL NFR BLD AUTO: 4 % (ref 0–6)
ERYTHROCYTE [DISTWIDTH] IN BLOOD BY AUTOMATED COUNT: 18.2 % (ref 11.6–15.1)
GFR SERPL CREATININE-BSD FRML MDRD: 67 ML/MIN/1.73SQ M
GLUCOSE P FAST SERPL-MCNC: 106 MG/DL (ref 65–99)
GLUCOSE SERPL-MCNC: 106 MG/DL (ref 65–140)
HCT VFR BLD AUTO: 31.1 % (ref 34.8–46.1)
HGB BLD-MCNC: 9.3 G/DL (ref 11.5–15.4)
IMM GRANULOCYTES # BLD AUTO: 0.02 THOUSAND/UL (ref 0–0.2)
IMM GRANULOCYTES NFR BLD AUTO: 0 % (ref 0–2)
LYMPHOCYTES # BLD AUTO: 0.87 THOUSANDS/ÂΜL (ref 0.6–4.47)
LYMPHOCYTES NFR BLD AUTO: 19 % (ref 14–44)
MAGNESIUM SERPL-MCNC: 1.8 MG/DL (ref 1.9–2.7)
MCH RBC QN AUTO: 28.5 PG (ref 26.8–34.3)
MCHC RBC AUTO-ENTMCNC: 29.9 G/DL (ref 31.4–37.4)
MCV RBC AUTO: 95 FL (ref 82–98)
MONOCYTES # BLD AUTO: 0.61 THOUSAND/ÂΜL (ref 0.17–1.22)
MONOCYTES NFR BLD AUTO: 13 % (ref 4–12)
NEUTROPHILS # BLD AUTO: 2.98 THOUSANDS/ÂΜL (ref 1.85–7.62)
NEUTS SEG NFR BLD AUTO: 64 % (ref 43–75)
NRBC BLD AUTO-RTO: 0 /100 WBCS
PLATELET # BLD AUTO: 173 THOUSANDS/UL (ref 149–390)
PMV BLD AUTO: 10.3 FL (ref 8.9–12.7)
POTASSIUM SERPL-SCNC: 3.7 MMOL/L (ref 3.5–5.3)
PROT SERPL-MCNC: 6.7 G/DL (ref 6.4–8.4)
RBC # BLD AUTO: 3.26 MILLION/UL (ref 3.81–5.12)
SODIUM SERPL-SCNC: 140 MMOL/L (ref 135–147)
WBC # BLD AUTO: 4.67 THOUSAND/UL (ref 4.31–10.16)

## 2023-02-01 ENCOUNTER — HOSPITAL ENCOUNTER (OUTPATIENT)
Dept: INFUSION CENTER | Facility: HOSPITAL | Age: 68
Discharge: HOME | End: 2023-02-01
Attending: OBSTETRICS & GYNECOLOGY

## 2023-02-01 VITALS
TEMPERATURE: 98.4 F | HEART RATE: 96 BPM | WEIGHT: 133.82 LBS | RESPIRATION RATE: 18 BRPM | DIASTOLIC BLOOD PRESSURE: 58 MMHG | SYSTOLIC BLOOD PRESSURE: 125 MMHG | OXYGEN SATURATION: 100 % | BODY MASS INDEX: 26.98 KG/M2 | HEIGHT: 59 IN

## 2023-02-01 DIAGNOSIS — E87.6 HYPOKALEMIA: ICD-10-CM

## 2023-02-01 DIAGNOSIS — C53.0 MALIGNANT NEOPLASM OF ENDOCERVIX (HCC): Primary | ICD-10-CM

## 2023-02-01 RX ORDER — SODIUM CHLORIDE 9 MG/ML
20 INJECTION, SOLUTION INTRAVENOUS ONCE
Status: COMPLETED | OUTPATIENT
Start: 2023-02-01 | End: 2023-02-01

## 2023-02-01 RX ADMIN — GEMCITABINE 1000 MG: 38 INJECTION, SOLUTION INTRAVENOUS at 13:05

## 2023-02-01 RX ADMIN — SODIUM CHLORIDE 20 ML/HR: 0.9 INJECTION, SOLUTION INTRAVENOUS at 11:06

## 2023-02-01 RX ADMIN — DEXAMETHASONE SODIUM PHOSPHATE 10 MG: 10 INJECTION, SOLUTION INTRAMUSCULAR; INTRAVENOUS at 11:39

## 2023-02-01 RX ADMIN — FOSAPREPITANT 150 MG: 150 INJECTION, POWDER, LYOPHILIZED, FOR SOLUTION INTRAVENOUS at 12:13

## 2023-02-01 NOTE — PROGRESS NOTES
-- DO NOT REPLY / DO NOT REPLY ALL --  -- Message is from the [facility]--    General Patient Message      Reason for Call: Pharmacy has questions regarding patient's Birth control loestrin; site would like to know why medication keeps getting denied.     Caller Information       Type Contact Phone/Fax    06/28/2022 12:40 PM CDT Phone (Incoming) [facility] [id number] - [place], [place] - [address] (Pharmacy) [phone]          Alternative phone number: no    Turnaround time given to caller:   \"This message will be sent to [state Provider's name]. The clinical team will fulfill your request as soon as they review your message.\"     Patient tolerated chemotherapy infusion well, no complications  No distress noted

## 2023-02-01 NOTE — PLAN OF CARE
Problem: Potential for Falls  Goal: Patient will remain free of falls  Description: INTERVENTIONS:  - Educate patient/family on patient safety including physical limitations  - Instruct patient to call for assistance with activity  - Keep Call bell within reach  - Keep chair locked and adjusted as appropriate  - Keep care items and personal belongings within reach  - Initiate and maintain comfort rounds  - Consider moving patient to room near nurses station  Outcome: Progressing     Problem: INFECTION - ADULT  Goal: Absence of fever/infection during neutropenic period  Description: INTERVENTIONS:  - Monitor WBC    Outcome: Progressing

## 2023-02-06 ENCOUNTER — HOSPITAL ENCOUNTER (OUTPATIENT)
Dept: INFUSION CENTER | Facility: HOSPITAL | Age: 68
Discharge: HOME | End: 2023-02-06

## 2023-02-06 DIAGNOSIS — Z45.2 ENCOUNTER FOR VENOUS ACCESS DEVICE CARE: Primary | ICD-10-CM

## 2023-02-06 DIAGNOSIS — C53.0 MALIGNANT NEOPLASM OF ENDOCERVIX (HCC): ICD-10-CM
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
## 2023-02-06 DIAGNOSIS — Z57.8 EMPLOYEE EXPOSURE TO BLOOD: ICD-10-CM

## 2023-02-06 DIAGNOSIS — Z11.59 SCREENING FOR VIRAL DISEASE: ICD-10-CM

## 2023-02-06 DIAGNOSIS — E86.0 DEHYDRATION: ICD-10-CM

## 2023-02-06 DIAGNOSIS — Z20.828 VIRAL DISEASE EXPOSURE: ICD-10-CM

## 2023-02-06 LAB
ALBUMIN SERPL BCP-MCNC: 3.5 G/DL (ref 3.5–5)
ALP SERPL-CCNC: 92 U/L (ref 34–104)
ALT SERPL W P-5'-P-CCNC: 31 U/L (ref 7–52)
ANION GAP SERPL CALCULATED.3IONS-SCNC: 9 MMOL/L (ref 4–13)
AST SERPL W P-5'-P-CCNC: 26 U/L (ref 13–39)
BASOPHILS # BLD AUTO: 0.02 THOUSANDS/ÂΜL (ref 0–0.1)
BASOPHILS NFR BLD AUTO: 1 % (ref 0–1)
BILIRUB SERPL-MCNC: 0.51 MG/DL (ref 0.2–1)
BUN SERPL-MCNC: 18 MG/DL (ref 5–25)
CALCIUM SERPL-MCNC: 8.8 MG/DL (ref 8.4–10.2)
CHLORIDE SERPL-SCNC: 103 MMOL/L (ref 96–108)
CO2 SERPL-SCNC: 24 MMOL/L (ref 21–32)
CREAT SERPL-MCNC: 0.8 MG/DL (ref 0.6–1.3)
EOSINOPHIL # BLD AUTO: 0.23 THOUSAND/ÂΜL (ref 0–0.61)
EOSINOPHIL NFR BLD AUTO: 6 % (ref 0–6)
ERYTHROCYTE [DISTWIDTH] IN BLOOD BY AUTOMATED COUNT: 18.1 % (ref 11.6–15.1)
GFR SERPL CREATININE-BSD FRML MDRD: 76 ML/MIN/1.73SQ M
GLUCOSE SERPL-MCNC: 104 MG/DL (ref 65–140)
HBV SURFACE AG SER QL: NORMAL
HCT VFR BLD AUTO: 28.5 % (ref 34.8–46.1)
HCV AB SER QL: NORMAL
HGB BLD-MCNC: 8.7 G/DL (ref 11.5–15.4)
HIV 1+2 AB+HIV1 P24 AG SERPL QL IA: NORMAL
HIV1 P24 AG SER QL: NORMAL
IMM GRANULOCYTES # BLD AUTO: 0.04 THOUSAND/UL (ref 0–0.2)
IMM GRANULOCYTES NFR BLD AUTO: 1 % (ref 0–2)
LYMPHOCYTES # BLD AUTO: 0.69 THOUSANDS/ÂΜL (ref 0.6–4.47)
LYMPHOCYTES NFR BLD AUTO: 17 % (ref 14–44)
MAGNESIUM SERPL-MCNC: 1.6 MG/DL (ref 1.9–2.7)
MCH RBC QN AUTO: 28.6 PG (ref 26.8–34.3)
MCHC RBC AUTO-ENTMCNC: 30.5 G/DL (ref 31.4–37.4)
MCV RBC AUTO: 94 FL (ref 82–98)
MONOCYTES # BLD AUTO: 0.14 THOUSAND/ÂΜL (ref 0.17–1.22)
MONOCYTES NFR BLD AUTO: 3 % (ref 4–12)
NEUTROPHILS # BLD AUTO: 3.05 THOUSANDS/ÂΜL (ref 1.85–7.62)
NEUTS SEG NFR BLD AUTO: 72 % (ref 43–75)
NRBC BLD AUTO-RTO: 0 /100 WBCS
PLATELET # BLD AUTO: 128 THOUSANDS/UL (ref 149–390)
PMV BLD AUTO: 9.6 FL (ref 8.9–12.7)
POTASSIUM SERPL-SCNC: 3.7 MMOL/L (ref 3.5–5.3)
PROT SERPL-MCNC: 6.3 G/DL (ref 6.4–8.4)
RBC # BLD AUTO: 3.04 MILLION/UL (ref 3.81–5.12)
SODIUM SERPL-SCNC: 136 MMOL/L (ref 135–147)
WBC # BLD AUTO: 4.17 THOUSAND/UL (ref 4.31–10.16)

## 2023-02-06 SDOH — HEALTH STABILITY - PHYSICAL HEALTH: OCCUPATIONAL EXPOSURE TO OTHER RISK FACTORS: Z57.8

## 2023-02-07 RX ORDER — SODIUM CHLORIDE 9 MG/ML
20 INJECTION, SOLUTION INTRAVENOUS ONCE
Status: CANCELLED | OUTPATIENT
Start: 2023-02-08

## 2023-02-08 ENCOUNTER — HOSPITAL ENCOUNTER (OUTPATIENT)
Dept: INFUSION CENTER | Facility: HOSPITAL | Age: 68
Discharge: HOME | End: 2023-02-08
Attending: OBSTETRICS & GYNECOLOGY

## 2023-02-08 VITALS
OXYGEN SATURATION: 98 % | HEART RATE: 99 BPM | SYSTOLIC BLOOD PRESSURE: 119 MMHG | DIASTOLIC BLOOD PRESSURE: 58 MMHG | TEMPERATURE: 97.6 F | RESPIRATION RATE: 18 BRPM | BODY MASS INDEX: 26.98 KG/M2 | WEIGHT: 133.82 LBS | HEIGHT: 59 IN

## 2023-02-08 DIAGNOSIS — E87.6 HYPOKALEMIA: ICD-10-CM

## 2023-02-08 DIAGNOSIS — C53.0 MALIGNANT NEOPLASM OF ENDOCERVIX (HCC): Primary | ICD-10-CM

## 2023-02-08 RX ORDER — SODIUM CHLORIDE 9 MG/ML
20 INJECTION, SOLUTION INTRAVENOUS ONCE
Status: COMPLETED | OUTPATIENT
Start: 2023-02-08 | End: 2023-02-08

## 2023-02-08 RX ADMIN — DEXAMETHASONE SODIUM PHOSPHATE 10 MG: 10 INJECTION, SOLUTION INTRAMUSCULAR; INTRAVENOUS at 11:38

## 2023-02-08 RX ADMIN — SODIUM CHLORIDE 20 ML/HR: 0.9 INJECTION, SOLUTION INTRAVENOUS at 11:05

## 2023-02-08 RX ADMIN — GEMCITABINE 1000 MG: 38 INJECTION, SOLUTION INTRAVENOUS at 13:02

## 2023-02-08 RX ADMIN — FOSAPREPITANT 150 MG: 150 INJECTION, POWDER, LYOPHILIZED, FOR SOLUTION INTRAVENOUS at 12:14

## 2023-02-08 NOTE — PROGRESS NOTES
Patient tolerated port access and chemotherapy infusion well, no complications  Port flushed and deaccessed  No distress @ discharge

## 2023-02-13 ENCOUNTER — HOSPITAL ENCOUNTER (OUTPATIENT)
Dept: INFUSION CENTER | Facility: HOSPITAL | Age: 68
Discharge: HOME | End: 2023-02-13

## 2023-02-13 ENCOUNTER — TELEMEDICINE (OUTPATIENT)
Dept: GYNECOLOGIC ONCOLOGY | Facility: CLINIC | Age: 68
End: 2023-02-13

## 2023-02-13 VITALS — TEMPERATURE: 99.6 F

## 2023-02-13 DIAGNOSIS — C53.0 MALIGNANT NEOPLASM OF ENDOCERVIX (HCC): ICD-10-CM

## 2023-02-13 DIAGNOSIS — E86.0 DEHYDRATION: ICD-10-CM

## 2023-02-13 DIAGNOSIS — D69.59 CHEMOTHERAPY-INDUCED THROMBOCYTOPENIA: ICD-10-CM

## 2023-02-13 DIAGNOSIS — Z45.2 ENCOUNTER FOR VENOUS ACCESS DEVICE CARE: Primary | ICD-10-CM

## 2023-02-13 DIAGNOSIS — T45.1X5A CHEMOTHERAPY-INDUCED THROMBOCYTOPENIA: ICD-10-CM

## 2023-02-13 DIAGNOSIS — C53.0 MALIGNANT NEOPLASM OF ENDOCERVIX (HCC): Primary | ICD-10-CM

## 2023-02-13 LAB
ALBUMIN SERPL BCP-MCNC: 3.8 G/DL (ref 3.5–5)
ALP SERPL-CCNC: 165 U/L (ref 34–104)
ALT SERPL W P-5'-P-CCNC: 73 U/L (ref 7–52)
ANION GAP SERPL CALCULATED.3IONS-SCNC: 10 MMOL/L (ref 4–13)
AST SERPL W P-5'-P-CCNC: 45 U/L (ref 13–39)
BASOPHILS # BLD AUTO: 0.02 THOUSANDS/ÂΜL (ref 0–0.1)
BASOPHILS NFR BLD AUTO: 0 % (ref 0–1)
BILIRUB SERPL-MCNC: 0.59 MG/DL (ref 0.2–1)
BUN SERPL-MCNC: 19 MG/DL (ref 5–25)
CALCIUM SERPL-MCNC: 9.2 MG/DL (ref 8.4–10.2)
CHLORIDE SERPL-SCNC: 96 MMOL/L (ref 96–108)
CO2 SERPL-SCNC: 25 MMOL/L (ref 21–32)
CREAT SERPL-MCNC: 0.93 MG/DL (ref 0.6–1.3)
EOSINOPHIL # BLD AUTO: 0.06 THOUSAND/ÂΜL (ref 0–0.61)
EOSINOPHIL NFR BLD AUTO: 1 % (ref 0–6)
ERYTHROCYTE [DISTWIDTH] IN BLOOD BY AUTOMATED COUNT: 17.7 % (ref 11.6–15.1)
GFR SERPL CREATININE-BSD FRML MDRD: 63 ML/MIN/1.73SQ M
GLUCOSE SERPL-MCNC: 199 MG/DL (ref 65–140)
HCT VFR BLD AUTO: 27.7 % (ref 34.8–46.1)
HGB BLD-MCNC: 8.7 G/DL (ref 11.5–15.4)
IMM GRANULOCYTES # BLD AUTO: 0.06 THOUSAND/UL (ref 0–0.2)
IMM GRANULOCYTES NFR BLD AUTO: 1 % (ref 0–2)
LYMPHOCYTES # BLD AUTO: 0.5 THOUSANDS/ÂΜL (ref 0.6–4.47)
LYMPHOCYTES NFR BLD AUTO: 11 % (ref 14–44)
MAGNESIUM SERPL-MCNC: 1.8 MG/DL (ref 1.9–2.7)
MCH RBC QN AUTO: 28.9 PG (ref 26.8–34.3)
MCHC RBC AUTO-ENTMCNC: 31.4 G/DL (ref 31.4–37.4)
MCV RBC AUTO: 92 FL (ref 82–98)
MONOCYTES # BLD AUTO: 0.12 THOUSAND/ÂΜL (ref 0.17–1.22)
MONOCYTES NFR BLD AUTO: 3 % (ref 4–12)
NEUTROPHILS # BLD AUTO: 3.91 THOUSANDS/ÂΜL (ref 1.85–7.62)
NEUTS SEG NFR BLD AUTO: 84 % (ref 43–75)
NRBC BLD AUTO-RTO: 0 /100 WBCS
PLATELET # BLD AUTO: 83 THOUSANDS/UL (ref 149–390)
PMV BLD AUTO: 10.2 FL (ref 8.9–12.7)
POTASSIUM SERPL-SCNC: 3.7 MMOL/L (ref 3.5–5.3)
PROT SERPL-MCNC: 6.8 G/DL (ref 6.4–8.4)
RBC # BLD AUTO: 3.01 MILLION/UL (ref 3.81–5.12)
SODIUM SERPL-SCNC: 131 MMOL/L (ref 135–147)
WBC # BLD AUTO: 4.67 THOUSAND/UL (ref 4.31–10.16)

## 2023-02-13 NOTE — PROGRESS NOTES
Virtual Regular Visit    Verification of patient location:    Patient is located in the following state in which I hold an active license [place]      Assessment/Plan:    Problem List Items Addressed This Visit        Hematopoietic and Hemostatic    Chemotherapy-induced thrombocytopenia     Jonatan 84,000 with cycle 2  Will plan to repeat prior to next cycle  Will consider dose-reduction is borderline  Gemzar previously dose-reduce to 650 mg/m2 due to fatigue and nausea  Genitourinary    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[facility] [address] ) - Primary     Recurrent stage IB2 cervical cancer with disease progression on topotecan/avastin now receiving palliative chemotherapy with gemcitabine 650 mg/m2 days 1 and 8 every 21 days  Dose-reduced following cycle 1 due to chemotherapy-induced fatigue and nausea  Her treatment related symptoms are stable  She has chemotherapy-induced thrombocytopenia      Plan to roceed with cycle 3 as long as her metabolic and hematologic parameters are adequate       CT chest, abdomen and pelvis to be performed after completion of cycle 3  Return to the office as per her chemotherapy calendar  Relevant Orders    CT chest abdomen pelvis w contrast            Reason for visit is   Chief Complaint   Patient presents with   • Virtual Regular Visit        Encounter provider Olivia Love PA-C    Provider located at [address] [address] [phone]      Recent Visits  No visits were found meeting these conditions  Showing recent visits within past 7 days and meeting all other requirements  Today's Visits  Date Type Provider Dept   02/13/23 Telemedicine Olivia Love PA-C [facility] today's visits and meeting all other requirements  Future Appointments  No visits were found meeting these conditions    Showing future appointments within next 150 days and meeting all other requirements       The patient was identified by name and date of birth  Sol Leos was informed that this is a telemedicine visit and that the visit is being conducted through Telephone  My office door was closed  No one else was in the room  She acknowledged consent and understanding of privacy and security of the video platform  The patient has agreed to participate and understands they can discontinue the visit at any time  Patient is aware this is a billable service  Subjective  Sol Leos is a 79 y o  female   who presents virtually for pre-chemotherapy evaluation  Overall, the patient is without new complaints related to treatment  She notes an upper respiratory/head cold over the last few weeks and notes a persistent cough  She has taken home COVID tests and is negative  She has low-grade temps, 99s  She notes intermittent nausea  No vomiting  Appetite is appropriate  No abdominal pain  Bowel/bladder function stable  She denies skin rashes or mouth sores  CBC/Diff, CMP, Mg from 2/13/23 reviewed  Jonatan platelet count 07,491  Oncology History   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[facility] [address] )   12/2016 Initial Diagnosis    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 [facility])       3/29/2017 -  Cancer Staged    Staging form: Cervix Uteri, AJCC 7th Edition  - Clinical stage from 3/29/2017: FIGO Stage IB2, calculated as Stage Unknown (T1b2, NX, M0) - Signed by Aurelio Ponce MD on 2/4/2021  Staged by: Managing physician  Specimen type: Excision  Histopathologic type: Squamous cell carcinoma, NOS  Histologic grade (G): G3  Lymph-vascular invasion (LVI): LVI present/identified, NOS  Residual tumor (R): R0 - None  Pelvic jace status: Negative  Pelvic jace method of assessment: PET  Para-aortic status: Negative  Para-aortic jace method of assessment: PET  Stage used in treatment planning: Yes  National guidelines used in treatment planning: Yes  Type of national guideline used in treatment planning: NCCN       3/29/2017 Surgery    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)  FInal path with incidental 5 cm grade 3 invasive cervical SCCA, +LVI, invasion 9/10 mm cervical stroma, no lymphadenectomy, margins negative ([facility])  2/19/2021 Progression    A  Lymph Node, Left axillary, Biopsy:  - Metastatic squamous cell carcinoma (p16 positive) involving lymph node, compatible with patient's cervical primary         3/2/2021 -  Chemotherapy    Carboplatin AUC 5, Taxol 135 milligram/m2 and Avastin at 15 milligram/kilogram all to be administer on day 1 of a 21 day cycle     7/7/2021 - 2/2/2022 Chemotherapy    bevacizumab (AVASTIN) IVPB, 910 mg, Intravenous, Once, 11 of 16 cycles  Administration: 900 mg (7/7/2021), 900 mg (7/30/2021), 900 mg (8/18/2021), 900 mg (9/8/2021), 900 mg (9/29/2021), 900 mg (10/20/2021), 900 mg (11/10/2021), 900 mg (12/1/2021), 900 mg (12/22/2021), 900 mg (1/12/2022), 900 mg (2/2/2022)     2/22/2022 - 8/31/2022 Chemotherapy    pembrolizumab (KEYTRUDA) IVPB, 200 mg, Intravenous, Once, 10 of 12 cycles  Administration: 200 mg (2/22/2022), 200 mg (4/6/2022), 200 mg (4/27/2022), 200 mg (5/18/2022), 200 mg (6/8/2022), 200 mg (3/16/2022), 200 mg (6/29/2022), 200 mg (7/20/2022), 200 mg (8/10/2022), 200 mg (8/31/2022)     2/23/2022 - 2/23/2022 Chemotherapy    pembrolizumab (KEYTRUDA) IVPB, 200 mg, Intravenous, Once, 0 of 6 cycles     9/14/2022 Progression    Progression of metastatic disease noted on CT     9/21/2022 - 11/23/2022 Chemotherapy    Pegfilgrastim-bmez (ZIEXTENZO), 6 mg, Subcutaneous, Once, 1 of 2 cycles  Administration: 6 mg (10/6/2022)  topotecan (HYCAMTIN) chemo infusion, 3 mg/m2 = 4 8 mg, Intravenous, Once, 3 of 4 cycles  Dose modification: 2 5 mg/m2 (original dose 3 mg/m2, Cycle 4, Reason: Dose modified as per discussion with consulting physician)  Administration: 4 8 mg (9/21/2022), 4 8 mg (9/28/2022), 4 8 mg (10/19/2022), 4 7 mg (10/26/2022), 4 7 mg (11/16/2022), 4 7 mg (11/23/2022)     12/28/2022 -  Chemotherapy    Gemcitabine 750 mg/m2 days 1 and 8 every 21 days  Dose-reduced to 650 mg/m2 with cycle 2  She is scheduled for cycle 3              Past Medical History:   Diagnosis Date   • Acne    • Anxiety    • Anxiety and depression 10/02/2019   • Arthritis    • Breathing difficulty     pt does not recall any breathing problem but did have neck pain after procedure"   • Cancer ([address] )     cervical/ and "lymph nodes" (GYN)   • Cervical cancer (HCC)    • Chronic pain disorder     Neck pain   • Claustrophobia    • Colon polyp    • GERD (gastroesophageal reflux disease)     not now   • Headache    • History of radiation therapy    • History of shingles    • Irritable bowel syndrome    • Joint pain following chemotherapy     and stiffness   • Liver disease     "cyst on liver"   • Lung nodule     "left"   • Motion sickness    • Muscle weakness     after chemo"   • Neck pain     "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"has had neck pain since childhood"   • Port-A-Cath in place     right chest//for chemotherapy q 3 weeks and also receives IV hydration   • Shortness of breath     "started with cancer, when too much exertion like cleaning/guera doing stairs"-pt states it is much better   • Thumb injury 10/2019    right   • Thyroid nodule    • Tinnitus of left ear 02/19/2020   • Urinary problem    • Wears contact lenses    • Wrist arthritis 03/17/2020       Past Surgical History:   Procedure Laterality Date   • COLONOSCOPY     • CYSTOSCOPY W/ RETROGRADES Right 6/3/2021    Procedure: Dian Ebbs W/RETROGRADE;  Surgeon: Vicki Loera MD;  Location: [facility];  Service: Urology   • CYSTOSCOPY W/ RETROGRADES Right 10/18/2021    Procedure: CYSTOSCOPY WITH ureteral stent exchange;  Surgeon: Harinder Sanford MD;  Location: [facility];  Service: Urology   • [facility] RETROGRADE PYELOGRAM  2/11/2021   • FL RETROGRADE PYELOGRAM  6/3/2021   • FOOT SURGERY     • HYSTERECTOMY     • IR BIOPSY LYMPH NODE  2/19/2021   • IR PORT PLACEMENT  3/9/2021   • LA CYSTO BLADDER W/URETERAL CATHETERIZATION Right 2/11/2021    Procedure: CYSTOSCOPY RETROGRADE PYELOGRAM WITH INSERTION STENT URETERAL;  Surgeon: Kaylene Gates MD;  Location: [facility];  Service: Urology   • LA CYSTO BLADDER W/URETERAL CATHETERIZATION Right 4/4/2022    Procedure: CYSTOSCOPY  WITH INSERTION STENT URETERAL Right;  Surgeon: Kaylene Gates MD;  Location: [facility];  Service: Urology   • LA CYSTO BLADDER W/URETERAL CATHETERIZATION Right 10/27/2022    Procedure: Cystoscopy, right ureteral stent exchange;  Surgeon: Kaylene Gates MD;  Location: [facility];  Service: Urology   • LA EXCISION INTERDIGITAL KESSLER NEUROMA SINGLE EACH Left 7/12/2018    Procedure: FOOT NEUROMA EXCISION;  Surgeon: Gunnar Salter DPM;  Location: [address] MAIN OR;  Service: Podiatry   • LA REMOVE & REPLACE INDWELL URETERAL STENT [place] Right 6/3/2021    Procedure: EXCHANGE STENT;  Surgeon: Awais Gutierres MD;  Location: [facility];  Service: Urology   • TUBAL LIGATION         Current Outpatient Medications   Medication Sig Dispense Refill   • buPROPion (WELLBUTRIN XL) 300 mg 24 hr tablet TAKE 1 TABLET BY MOUTH EVERY DAY 90 tablet 2   • Diclofenac Sodium (VOLTAREN) 1 % Apply 2 g topically 4 (four) times a day 350 g 3   • diphenhydrAMINE (BENADRYL) 25 mg capsule Take 50 mg by mouth if needed     • gabapentin (NEURONTIN) 100 mg capsule TAKE 2 CAPSULES (200 MG TOTAL) BY MOUTH DAILY AND 2 CAPSULES (200 MG TOTAL) DAILY AT BEDTIME   TO REDUCE NERVE PAIN  120 capsule 5   • lidocaine-prilocaine (EMLA) cream Apply to PORT site 30 min prior to labs or chemo 30 g 2   • LORazepam (ATIVAN) 1 mg tablet Take 1 tablet (1 mg total) by mouth every 8 (eight) hours as needed for anxiety 20 tablet 0   • Multiple Vitamins-Minerals (Alive Womens Gummy) CHEW Chew     • omeprazole (PriLOSEC) 40 MG capsule TAKE 1 CAPSULE BY MOUTH EVERY DAY IN THE MORNING 90 capsule 1   • ondansetron (ZOFRAN) 8 mg tablet Take 1 tablet (8 mg total) by mouth every 8 (eight) hours as needed for nausea or vomiting 60 tablet 1   • polyethylene glycol (MIRALAX) 17 g packet Take 17 g by mouth daily 30 each 5   • promethazine (PHENERGAN) 25 mg tablet Take 1 tablet (25 mg total) by mouth every 6 (six) hours as needed for nausea or vomiting 30 tablet 1   • senna (SENOKOT) 8 6 MG tablet Take 1 tablet (8 6 mg total) by mouth 2 (two) times a day Take to prevent constipation from pain meds  Hold one dose for loose stool (Patient taking differently: Take 1 tablet by mouth if needed Take to prevent constipation from pain meds  Hold one dose for loose stool) 60 tablet 11   • tiotropium (Spiriva Respimat) 1 25 MCG/ACT AERS inhaler Inhale 2 puffs daily 4 g 5   • traZODone (DESYREL) 50 mg tablet TAKE 1-2 AT BEDTIME 180 tablet 2     No current facility-administered medications for this visit  Facility-Administered Medications Ordered in Other Visits   Medication Dose Route Frequency Provider Last Rate Last Admin   • alteplase (CATHFLO) injection 2 mg  2 mg Intracatheter Q1MIN PRN Nixon Waller MD            Allergies   Allergen Reactions   • Medical Tape Itching     And redness from adhesive    And "skin raised up" especially longer on skin   • Aspirin GI Intolerance   • Codeine GI Intolerance   • Dust Mite Extract    • Pollen Extract Sneezing       Review of Systems   Constitutional: Positive for fatigue  Negative for fever  HENT: Negative  Eyes: Negative  Respiratory: Positive for cough  Negative for shortness of breath  Cardiovascular: Negative  Gastrointestinal: Positive for nausea  Negative for abdominal pain and vomiting  Genitourinary: Negative  Musculoskeletal: Negative  Skin: Negative  Neurological: Negative  Psychiatric/Behavioral: Negative  Video Exam    There were no vitals filed for this visit  Visit performed via audio only  No video capabilities available       I spent 20 minutes with patient today in which greater than 50% of the time was spent in counseling/coordination of care regarding chemotherapy

## 2023-02-13 NOTE — ASSESSMENT & PLAN NOTE
Recurrent stage IB2 cervical cancer with disease progression on topotecan/avastin now receiving palliative chemotherapy with gemcitabine 650 mg/m2 days 1 and 8 every 21 days  Dose-reduced following cycle 1 due to chemotherapy-induced fatigue and nausea  Her treatment related symptoms are stable  She has chemotherapy-induced thrombocytopenia      Plan to roceed with cycle 3 as long as her metabolic and hematologic parameters are adequate       CT chest, abdomen and pelvis to be performed after completion of cycle 3  Return to the office as per her chemotherapy calendar

## 2023-02-13 NOTE — PROGRESS NOTES
Tolerated port access/blood draw well  C/O dry cough, lungs clear on auscultation  Discharged in stable condition   Declined AVS

## 2023-02-13 NOTE — ASSESSMENT & PLAN NOTE
Jonatan 84,000 with cycle 2  Will plan to repeat prior to next cycle  Will consider dose-reduction is borderline  Gemzar previously dose-reduce to 650 mg/m2 due to fatigue and nausea

## 2023-02-16 DIAGNOSIS — R10.13 EPIGASTRIC PAIN: ICD-10-CM

## 2023-02-16 RX ORDER — OMEPRAZOLE 40 MG/1
40 CAPSULE, DELAYED RELEASE ORAL EVERY MORNING
Qty: 90 CAPSULE | Refills: 0 | Status: SHIPPED | OUTPATIENT
Start: 2023-02-16 | End: 2023-05-18

## 2023-02-17 ENCOUNTER — TELEPHONE (OUTPATIENT)
Dept: GASTROENTEROLOGY | Facility: CLINIC | Age: 68
End: 2023-02-17

## 2023-02-20 ENCOUNTER — OFFICE VISIT (OUTPATIENT)
Dept: FAMILY MEDICINE CLINIC | Facility: CLINIC | Age: 68
End: 2023-02-20

## 2023-02-20 ENCOUNTER — HOSPITAL ENCOUNTER (OUTPATIENT)
Dept: INFUSION CENTER | Facility: HOSPITAL | Age: 68
Discharge: HOME | End: 2023-02-20

## 2023-02-20 VITALS
WEIGHT: 135.8 LBS | OXYGEN SATURATION: 100 % | DIASTOLIC BLOOD PRESSURE: 64 MMHG | SYSTOLIC BLOOD PRESSURE: 131 MMHG | HEART RATE: 93 BPM | TEMPERATURE: 98.7 F | HEIGHT: 59 IN | BODY MASS INDEX: 27.38 KG/M2

## 2023-02-20 DIAGNOSIS — C53.0 MALIGNANT NEOPLASM OF ENDOCERVIX (HCC): ICD-10-CM

## 2023-02-20 DIAGNOSIS — F32.0 MAJOR DEPRESSIVE DISORDER, SINGLE EPISODE, MILD (HCC): ICD-10-CM

## 2023-02-20 DIAGNOSIS — N18.30 STAGE 3 CHRONIC KIDNEY DISEASE, UNSPECIFIED WHETHER STAGE 3A OR 3B CKD (HCC): ICD-10-CM

## 2023-02-20 DIAGNOSIS — C77.3 METASTATIC CANCER TO AXILLARY LYMPH NODES (HCC): ICD-10-CM

## 2023-02-20 DIAGNOSIS — J44.9 CHRONIC OBSTRUCTIVE PULMONARY DISEASE, UNSPECIFIED COPD TYPE (HCC): ICD-10-CM

## 2023-02-20 DIAGNOSIS — D61.818 PANCYTOPENIA (HCC): ICD-10-CM

## 2023-02-20 DIAGNOSIS — C53.0 MALIGNANT NEOPLASM OF ENDOCERVIX (HCC): Primary | ICD-10-CM

## 2023-02-20 DIAGNOSIS — J01.90 ACUTE NON-RECURRENT SINUSITIS, UNSPECIFIED LOCATION: Primary | ICD-10-CM

## 2023-02-20 DIAGNOSIS — F11.20 CONTINUOUS OPIOID DEPENDENCE (HCC): ICD-10-CM

## 2023-02-20 DIAGNOSIS — Z45.2 ENCOUNTER FOR VENOUS ACCESS DEVICE CARE: Primary | ICD-10-CM

## 2023-02-20 DIAGNOSIS — E86.0 DEHYDRATION: ICD-10-CM

## 2023-02-20 LAB
ALBUMIN SERPL BCP-MCNC: 3.7 G/DL (ref 3.5–5)
ALP SERPL-CCNC: 128 U/L (ref 34–104)
ALT SERPL W P-5'-P-CCNC: 22 U/L (ref 7–52)
ANION GAP SERPL CALCULATED.3IONS-SCNC: 10 MMOL/L (ref 4–13)
AST SERPL W P-5'-P-CCNC: 14 U/L (ref 13–39)
BASOPHILS # BLD AUTO: 0.01 THOUSANDS/ÂΜL (ref 0–0.1)
BASOPHILS NFR BLD AUTO: 0 % (ref 0–1)
BILIRUB SERPL-MCNC: 0.36 MG/DL (ref 0.2–1)
BUN SERPL-MCNC: 6 MG/DL (ref 5–25)
CALCIUM SERPL-MCNC: 9.1 MG/DL (ref 8.4–10.2)
CHLORIDE SERPL-SCNC: 106 MMOL/L (ref 96–108)
CO2 SERPL-SCNC: 24 MMOL/L (ref 21–32)
CREAT SERPL-MCNC: 0.75 MG/DL (ref 0.6–1.3)
EOSINOPHIL # BLD AUTO: 0.07 THOUSAND/ÂΜL (ref 0–0.61)
EOSINOPHIL NFR BLD AUTO: 2 % (ref 0–6)
ERYTHROCYTE [DISTWIDTH] IN BLOOD BY AUTOMATED COUNT: 18.9 % (ref 11.6–15.1)
GFR SERPL CREATININE-BSD FRML MDRD: 82 ML/MIN/1.73SQ M
GLUCOSE SERPL-MCNC: 95 MG/DL (ref 65–140)
HCT VFR BLD AUTO: 28.7 % (ref 34.8–46.1)
HGB BLD-MCNC: 8.8 G/DL (ref 11.5–15.4)
IMM GRANULOCYTES # BLD AUTO: 0.01 THOUSAND/UL (ref 0–0.2)
IMM GRANULOCYTES NFR BLD AUTO: 0 % (ref 0–2)
LYMPHOCYTES # BLD AUTO: 0.51 THOUSANDS/ÂΜL (ref 0.6–4.47)
LYMPHOCYTES NFR BLD AUTO: 15 % (ref 14–44)
MAGNESIUM SERPL-MCNC: 1.8 MG/DL (ref 1.9–2.7)
MCH RBC QN AUTO: 29.1 PG (ref 26.8–34.3)
MCHC RBC AUTO-ENTMCNC: 30.7 G/DL (ref 31.4–37.4)
MCV RBC AUTO: 95 FL (ref 82–98)
MONOCYTES # BLD AUTO: 0.41 THOUSAND/ÂΜL (ref 0.17–1.22)
MONOCYTES NFR BLD AUTO: 12 % (ref 4–12)
NEUTROPHILS # BLD AUTO: 2.41 THOUSANDS/ÂΜL (ref 1.85–7.62)
NEUTS SEG NFR BLD AUTO: 71 % (ref 43–75)
NRBC BLD AUTO-RTO: 0 /100 WBCS
PLATELET # BLD AUTO: 130 THOUSANDS/UL (ref 149–390)
PMV BLD AUTO: 11.1 FL (ref 8.9–12.7)
POTASSIUM SERPL-SCNC: 3.5 MMOL/L (ref 3.5–5.3)
PROT SERPL-MCNC: 6.3 G/DL (ref 6.4–8.4)
RBC # BLD AUTO: 3.02 MILLION/UL (ref 3.81–5.12)
SODIUM SERPL-SCNC: 140 MMOL/L (ref 135–147)
WBC # BLD AUTO: 3.42 THOUSAND/UL (ref 4.31–10.16)

## 2023-02-20 RX ORDER — BENZONATATE 100 MG/1
CAPSULE ORAL
Qty: 40 CAPSULE | Refills: 1 | Status: SHIPPED | OUTPATIENT
Start: 2023-02-20

## 2023-02-20 RX ORDER — ALBUTEROL SULFATE 90 UG/1
2 AEROSOL, METERED RESPIRATORY (INHALATION) EVERY 4 HOURS PRN
Qty: 18 G | Refills: 0 | Status: SHIPPED | OUTPATIENT
Start: 2023-02-20

## 2023-02-20 RX ORDER — CEFUROXIME AXETIL 500 MG/1
500 TABLET ORAL 2 TIMES DAILY
Qty: 20 TABLET | Refills: 0 | Status: SHIPPED | OUTPATIENT
Start: 2023-02-20 | End: 2023-03-02

## 2023-02-20 NOTE — PROGRESS NOTES
Name: Yuni Garrett      : 1955      MRN: [id number]  Encounter Provider: Berta Sales DO  Encounter Date: 2023   Encounter department: [place] [address] PRIMARY CARE    Assessment & Plan     1  Acute non-recurrent sinusitis, unspecified location  Assessment & Plan:  Patient was started on Ceftin 500 mg  She will take 1 tablet twice a day with food for 10 days  She was also started on benzonatate 100 mg  She may take 1 to 2 capsules by mouth 3 times per day as needed for cough  She has been instructed to increase fluid intake and rest   She may take Tylenol as needed    Orders:  -     benzonatate (TESSALON PERLES) 100 mg capsule; Take 1-2 capsules by mouth three times a day as needed for cough  -     cefuroxime (CEFTIN) 500 mg tablet; Take 1 tablet (500 mg total) by mouth 2 (two) times a day for 10 days    2  Chronic obstructive pulmonary disease, unspecified COPD type ([facility] [address] )  Assessment & Plan:  Patient has COPD which is currently stable  She was started on a Ventolin inhaler to use as needed for cough, wheezing, or shortness of breath    Orders:  -     albuterol (Ventolin HFA) 90 mcg/act inhaler; Inhale 2 puffs every 4 (four) hours as needed for shortness of breath    3  Pancytopenia ([address] )    4  Continuous opioid dependence ([facility] [address] )    5  Major depressive disorder, single episode, mild (HCC)    6  Stage 3 chronic kidney disease, unspecified whether stage 3a or 3b CKD ([facility] [address] )    7  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 [facility])  Assessment & Plan:  Patient has cervical cancer with metastasis  She currently on palliative care  She will continue follow-up with gynecologic oncology  Subjective      Is a 51-year-old white female who presents to the office today complaining of a bad cough x1 month duration  She initially had low-grade fevers but that is since resolved  She also complains of runny nose and nasal congestion  She did several home COVID tests which have all been negative  She denies any shortness of breath  Denies any wheezing  Review of Systems   Constitutional: Negative for chills and fever  HENT: Positive for congestion, rhinorrhea and sinus pressure  Respiratory: Positive for cough  Negative for shortness of breath and wheezing  Gastrointestinal: Negative for diarrhea, nausea and vomiting  Current Outpatient Medications on File Prior to Visit   Medication Sig   • buPROPion (WELLBUTRIN XL) 300 mg 24 hr tablet TAKE 1 TABLET BY MOUTH EVERY DAY   • Diclofenac Sodium (VOLTAREN) 1 % Apply 2 g topically 4 (four) times a day   • diphenhydrAMINE (BENADRYL) 25 mg capsule Take 50 mg by mouth if needed   • gabapentin (NEURONTIN) 100 mg capsule TAKE 2 CAPSULES (200 MG TOTAL) BY MOUTH DAILY AND 2 CAPSULES (200 MG TOTAL) DAILY AT BEDTIME  TO REDUCE NERVE PAIN  • lidocaine-prilocaine (EMLA) cream Apply to PORT site 30 min prior to labs or chemo   • LORazepam (ATIVAN) 1 mg tablet Take 1 tablet (1 mg total) by mouth every 8 (eight) hours as needed for anxiety   • Multiple Vitamins-Minerals (Alive Womens Gummy) CHEW Chew   • omeprazole (PriLOSEC) 40 MG capsule Take 1 capsule (40 mg total) by mouth every morning   • ondansetron (ZOFRAN) 8 mg tablet Take 1 tablet (8 mg total) by mouth every 8 (eight) hours as needed for nausea or vomiting   • polyethylene glycol (MIRALAX) 17 g packet Take 17 g by mouth daily   • promethazine (PHENERGAN) 25 mg tablet Take 1 tablet (25 mg total) by mouth every 6 (six) hours as needed for nausea or vomiting   • senna (SENOKOT) 8 6 MG tablet Take 1 tablet (8 6 mg total) by mouth 2 (two) times a day Take to prevent constipation from pain meds  Hold one dose for loose stool (Patient taking differently: Take 1 tablet by mouth if needed Take to prevent constipation from pain meds    Hold one dose for loose stool)   • traZODone (DESYREL) 50 mg tablet TAKE 1-2 AT BEDTIME   • [DISCONTINUED] tiotropium (Spiriva Respimat) 1 25 MCG/ACT AERS inhaler Inhale 2 puffs daily   • [DISCONTINUED] famotidine (PEPCID) 20 mg tablet Take 20 mg by mouth daily         Objective     /64   Pulse 93   Temp 98 7 °F (37 1 °C) (Tympanic)   Ht 4' 11 2" (1 504 m)   Wt 61 6 kg (135 lb 12 8 oz)   LMP  (LMP Unknown)   SpO2 100%   BMI 27 24 kg/m²     Physical Exam  Vitals reviewed  Constitutional:       Comments: This is a 80-year-old clinic female who appears her stated age  She is nonseptic in appearance and in no apparent distress   HENT:      Head: Normocephalic and atraumatic  Comments: There is tenderness noted to palpation over the right maxillary sinus     Right Ear: Tympanic membrane, ear canal and external ear normal  There is no impacted cerumen  Left Ear: Tympanic membrane, ear canal and external ear normal  There is no impacted cerumen  Nose: Congestion present  No rhinorrhea  Mouth/Throat:      Mouth: Mucous membranes are moist       Pharynx: Oropharynx is clear  No oropharyngeal exudate or posterior oropharyngeal erythema  Eyes:      General: No scleral icterus  Right eye: No discharge  Left eye: No discharge  Conjunctiva/sclera: Conjunctivae normal       Pupils: Pupils are equal, round, and reactive to light  Cardiovascular:      Rate and Rhythm: Normal rate and regular rhythm  Heart sounds: Normal heart sounds  No murmur heard  No gallop  Pulmonary:      Effort: Pulmonary effort is normal  No respiratory distress  Breath sounds: Normal breath sounds  No stridor  No wheezing, rhonchi or rales  Musculoskeletal:      Cervical back: Neck supple  Lymphadenopathy:      Cervical: No cervical adenopathy         Ivon Allen DO

## 2023-02-20 NOTE — ASSESSMENT & PLAN NOTE
Patient has cervical cancer with metastasis  She currently on palliative care  She will continue follow-up with gynecologic oncology

## 2023-02-20 NOTE — ASSESSMENT & PLAN NOTE
Patient was started on Ceftin 500 mg  She will take 1 tablet twice a day with food for 10 days  She was also started on benzonatate 100 mg  She may take 1 to 2 capsules by mouth 3 times per day as needed for cough    She has been instructed to increase fluid intake and rest   She may take Tylenol as needed

## 2023-02-20 NOTE — ASSESSMENT & PLAN NOTE
Patient has COPD which is currently stable    She was started on a Ventolin inhaler to use as needed for cough, wheezing, or shortness of breath

## 2023-02-21 RX ORDER — SODIUM CHLORIDE 9 MG/ML
20 INJECTION, SOLUTION INTRAVENOUS ONCE
Status: CANCELLED | OUTPATIENT
Start: 2023-03-01

## 2023-02-21 RX ORDER — SODIUM CHLORIDE 9 MG/ML
20 INJECTION, SOLUTION INTRAVENOUS ONCE
Status: CANCELLED | OUTPATIENT
Start: 2023-02-22

## 2023-02-22 ENCOUNTER — HOSPITAL ENCOUNTER (OUTPATIENT)
Dept: INFUSION CENTER | Facility: HOSPITAL | Age: 68
Discharge: HOME | End: 2023-02-22
Attending: OBSTETRICS & GYNECOLOGY

## 2023-02-22 VITALS
WEIGHT: 132.72 LBS | RESPIRATION RATE: 18 BRPM | HEART RATE: 96 BPM | HEIGHT: 59 IN | DIASTOLIC BLOOD PRESSURE: 60 MMHG | TEMPERATURE: 97.9 F | BODY MASS INDEX: 26.76 KG/M2 | SYSTOLIC BLOOD PRESSURE: 126 MMHG | OXYGEN SATURATION: 99 %

## 2023-02-22 DIAGNOSIS — Z45.2 ENCOUNTER FOR VENOUS ACCESS DEVICE CARE: ICD-10-CM

## 2023-02-22 DIAGNOSIS — E87.6 HYPOKALEMIA: ICD-10-CM

## 2023-02-22 DIAGNOSIS — C53.0 MALIGNANT NEOPLASM OF ENDOCERVIX (HCC): Primary | ICD-10-CM

## 2023-02-22 DIAGNOSIS — E86.0 DEHYDRATION: ICD-10-CM

## 2023-02-22 RX ORDER — SODIUM CHLORIDE 9 MG/ML
20 INJECTION, SOLUTION INTRAVENOUS ONCE
Status: COMPLETED | OUTPATIENT
Start: 2023-02-22 | End: 2023-02-22

## 2023-02-22 RX ADMIN — DEXAMETHASONE SODIUM PHOSPHATE 10 MG: 10 INJECTION, SOLUTION INTRAMUSCULAR; INTRAVENOUS at 10:38

## 2023-02-22 RX ADMIN — FOSAPREPITANT DIMEGLUMINE 150 MG: 150 INJECTION, POWDER, LYOPHILIZED, FOR SOLUTION INTRAVENOUS at 11:11

## 2023-02-22 RX ADMIN — GEMCITABINE 1000 MG: 38 INJECTION, SOLUTION INTRAVENOUS at 12:02

## 2023-02-22 RX ADMIN — SODIUM CHLORIDE 20 ML/HR: 0.9 INJECTION, SOLUTION INTRAVENOUS at 10:05

## 2023-02-27 ENCOUNTER — HOSPITAL ENCOUNTER (OUTPATIENT)
Dept: INFUSION CENTER | Facility: HOSPITAL | Age: 68
Discharge: HOME | End: 2023-02-27

## 2023-02-27 DIAGNOSIS — Z01.812 PRE-OPERATIVE LABORATORY EXAMINATION: ICD-10-CM

## 2023-02-27 DIAGNOSIS — N13.1 HYDRONEPHROSIS WITH URETERAL STRICTURE, NOT ELSEWHERE CLASSIFIED: ICD-10-CM

## 2023-02-27 DIAGNOSIS — R39.89 SUSPECTED UTI: ICD-10-CM

## 2023-02-27 DIAGNOSIS — C53.0 MALIGNANT NEOPLASM OF ENDOCERVIX (HCC): ICD-10-CM

## 2023-02-27 LAB
ALBUMIN SERPL BCP-MCNC: 3.7 G/DL (ref 3.5–5)
ALP SERPL-CCNC: 169 U/L (ref 34–104)
ALT SERPL W P-5'-P-CCNC: 124 U/L (ref 7–52)
ANION GAP SERPL CALCULATED.3IONS-SCNC: 9 MMOL/L (ref 4–13)
AST SERPL W P-5'-P-CCNC: 51 U/L (ref 13–39)
BILIRUB SERPL-MCNC: 0.42 MG/DL (ref 0.2–1)
BUN SERPL-MCNC: 20 MG/DL (ref 5–25)
CALCIUM SERPL-MCNC: 8.9 MG/DL (ref 8.4–10.2)
CHLORIDE SERPL-SCNC: 98 MMOL/L (ref 96–108)
CO2 SERPL-SCNC: 25 MMOL/L (ref 21–32)
CREAT SERPL-MCNC: 0.77 MG/DL (ref 0.6–1.3)
GFR SERPL CREATININE-BSD FRML MDRD: 80 ML/MIN/1.73SQ M
GLUCOSE SERPL-MCNC: 195 MG/DL (ref 65–140)
MAGNESIUM SERPL-MCNC: 1.7 MG/DL (ref 1.9–2.7)
POTASSIUM SERPL-SCNC: 3.7 MMOL/L (ref 3.5–5.3)
PROT SERPL-MCNC: 6.7 G/DL (ref 6.4–8.4)
SODIUM SERPL-SCNC: 132 MMOL/L (ref 135–147)

## 2023-02-28 LAB
ANISOCYTOSIS BLD QL SMEAR: PRESENT
BACTERIA UR CULT: ABNORMAL
BASOPHILS # BLD MANUAL: 0 THOUSAND/UL (ref 0–0.1)
BASOPHILS NFR MAR MANUAL: 0 % (ref 0–1)
EOSINOPHIL # BLD MANUAL: 0.02 THOUSAND/UL (ref 0–0.4)
EOSINOPHIL NFR BLD MANUAL: 1 % (ref 0–6)
ERYTHROCYTE [DISTWIDTH] IN BLOOD BY AUTOMATED COUNT: 19 % (ref 11.6–15.1)
HCT VFR BLD AUTO: 28 % (ref 34.8–46.1)
HGB BLD-MCNC: 8.9 G/DL (ref 11.5–15.4)
LYMPHOCYTES # BLD AUTO: 0.39 THOUSAND/UL (ref 0.6–4.47)
LYMPHOCYTES # BLD AUTO: 20 % (ref 14–44)
MCH RBC QN AUTO: 29.7 PG (ref 26.8–34.3)
MCHC RBC AUTO-ENTMCNC: 31.8 G/DL (ref 31.4–37.4)
MCV RBC AUTO: 93 FL (ref 82–98)
MONOCYTES # BLD AUTO: 0.21 THOUSAND/UL (ref 0–1.22)
MONOCYTES NFR BLD: 11 % (ref 4–12)
NEUTROPHILS # BLD MANUAL: 1.33 THOUSAND/UL (ref 1.85–7.62)
NEUTS SEG NFR BLD AUTO: 68 % (ref 43–75)
NRBC BLD AUTO-RTO: 0 /100 WBCS
OVALOCYTES BLD QL SMEAR: PRESENT
PLATELET # BLD AUTO: 203 THOUSANDS/UL (ref 149–390)
PLATELET BLD QL SMEAR: ADEQUATE
PMV BLD AUTO: 9.7 FL (ref 8.9–12.7)
POLYCHROMASIA BLD QL SMEAR: PRESENT
RBC # BLD AUTO: 3 MILLION/UL (ref 3.81–5.12)
WBC # BLD AUTO: 1.95 THOUSAND/UL (ref 4.31–10.16)

## 2023-02-28 NOTE — PRE-PROCEDURE INSTRUCTIONS
"Pre-Surgery Instructions:   Medication Instructions    albuterol (Ventolin HFA) 90 mcg/act inhaler Take day of surgery   buPROPion (WELLBUTRIN XL) 300 mg 24 hr tablet Take night before surgery    cefuroxime (CEFTIN) 500 mg tablet course ends 3/2/23    gabapentin (NEURONTIN) 100 mg capsule Take day of surgery   LORazepam (ATIVAN) 1 mg tablet Uses PRN- OK to take day of surgery    omeprazole (PriLOSEC) 40 MG capsule Take day of surgery   ondansetron (ZOFRAN) 8 mg tablet Uses PRN- OK to take day of surgery    polyethylene glycol (MIRALAX) 17 g packet Hold day of surgery   promethazine (PHENERGAN) 25 mg tablet Uses PRN- OK to take day of surgery    senna (SENOKOT) 8 6 MG tablet Hold day of surgery   traZODone (DESYREL) 50 mg tablet Take night before surgery      - Reviewed with patient, in detail, instructions from [facility]\"  - Instructed to avoid all OTC vitamins/supplements starting today and NSAIDS for 3 days prior to surgery per anesthesia guidelines  Tylenol ok to take PRN  - Advised patient nothing eat or drink after midnight prior to surgery, except medications that he/she is to take morning DOS with only small sip of water     - Advised patient that Anthony Orozco will call with surgery arrival time and hospital directions the business day prior to surgery  Patient verbalized understanding of current visitor restrictions/masking guidelines and advised that he/she can confirm these at time of arrival call with Anthony Orozco      - Patient verbalized understanding and knows to call surgeon's office with any additional questions prior to surgery  Instructed to call surgeon's office in meantime with any new illnesses/exposure, patient verbalized understanding      "

## 2023-02-28 NOTE — PLAN OF CARE
Problem: INFECTION - ADULT  Goal: Absence of fever/infection during neutropenic period  Description: INTERVENTIONS:  - Monitor WBC    Outcome: Progressing Render In Strict Bullet Format?: No Detail Level: Zone Defer Treatment (Provide Reason For Deferment In Text Field Below): Offered [place]; pt deferred Plan: Recommended white vinegar soaks daily to feet

## 2023-03-01 ENCOUNTER — TELEPHONE (OUTPATIENT)
Dept: UROLOGY | Facility: CLINIC | Age: 68
End: 2023-03-01

## 2023-03-01 ENCOUNTER — HOSPITAL ENCOUNTER (OUTPATIENT)
Dept: INFUSION CENTER | Facility: HOSPITAL | Age: 68
Discharge: HOME | End: 2023-03-01
Attending: OBSTETRICS & GYNECOLOGY

## 2023-03-01 VITALS
WEIGHT: 131.39 LBS | HEART RATE: 104 BPM | DIASTOLIC BLOOD PRESSURE: 58 MMHG | RESPIRATION RATE: 18 BRPM | OXYGEN SATURATION: 100 % | BODY MASS INDEX: 26.49 KG/M2 | TEMPERATURE: 97.6 F | SYSTOLIC BLOOD PRESSURE: 126 MMHG | HEIGHT: 59 IN

## 2023-03-01 DIAGNOSIS — C53.0 MALIGNANT NEOPLASM OF ENDOCERVIX (HCC): Primary | ICD-10-CM

## 2023-03-01 DIAGNOSIS — T45.1X5A CHEMOTHERAPY INDUCED NEUTROPENIA (HCC): ICD-10-CM

## 2023-03-01 DIAGNOSIS — D70.1 CHEMOTHERAPY INDUCED NEUTROPENIA (HCC): ICD-10-CM

## 2023-03-01 DIAGNOSIS — E87.6 HYPOKALEMIA: ICD-10-CM

## 2023-03-01 DIAGNOSIS — Z45.2 ENCOUNTER FOR VENOUS ACCESS DEVICE CARE: ICD-10-CM

## 2023-03-01 DIAGNOSIS — R39.89 SUSPECTED UTI: Primary | ICD-10-CM

## 2023-03-01 DIAGNOSIS — E86.0 DEHYDRATION: ICD-10-CM

## 2023-03-01 RX ORDER — SODIUM CHLORIDE 9 MG/ML
20 INJECTION, SOLUTION INTRAVENOUS ONCE
Status: COMPLETED | OUTPATIENT
Start: 2023-03-01 | End: 2023-03-01

## 2023-03-01 RX ADMIN — GEMCITABINE 1000 MG: 38 INJECTION, SOLUTION INTRAVENOUS at 11:43

## 2023-03-01 RX ADMIN — FOSAPREPITANT 150 MG: 150 INJECTION, POWDER, LYOPHILIZED, FOR SOLUTION INTRAVENOUS at 11:04

## 2023-03-01 RX ADMIN — SODIUM CHLORIDE 20 ML/HR: 0.9 INJECTION, SOLUTION INTRAVENOUS at 10:20

## 2023-03-01 RX ADMIN — DEXAMETHASONE SODIUM PHOSPHATE 10 MG: 10 INJECTION, SOLUTION INTRAMUSCULAR; INTRAVENOUS at 10:31

## 2023-03-01 NOTE — TELEPHONE ENCOUNTER
Per Dr Nataliya Grimaldo and Dr Florence Gowers messages, pt needs OR to be r/s  Received notice from [address] while pt was at chemo appt today  Coordinated procedure with her for May 1st at [address] [place] [address]  Offered March 16th at [place] but pt did not want to travel that far according to prior chart notes  Placed change date request and mailed new dates to pt

## 2023-03-02 ENCOUNTER — HOSPITAL ENCOUNTER (OUTPATIENT)
Dept: INFUSION CENTER | Facility: HOSPITAL | Age: 68
Discharge: HOME | End: 2023-03-02
Attending: OBSTETRICS & GYNECOLOGY

## 2023-03-02 VITALS
RESPIRATION RATE: 18 BRPM | TEMPERATURE: 97 F | OXYGEN SATURATION: 98 % | SYSTOLIC BLOOD PRESSURE: 130 MMHG | DIASTOLIC BLOOD PRESSURE: 57 MMHG | HEART RATE: 99 BPM

## 2023-03-02 DIAGNOSIS — T45.1X5A CHEMOTHERAPY INDUCED NEUTROPENIA (HCC): Primary | ICD-10-CM

## 2023-03-02 DIAGNOSIS — D70.1 CHEMOTHERAPY INDUCED NEUTROPENIA (HCC): Primary | ICD-10-CM

## 2023-03-02 DIAGNOSIS — T45.1X5A CHEMOTHERAPY INDUCED NEUTROPENIA (HCC): ICD-10-CM

## 2023-03-02 DIAGNOSIS — D70.1 CHEMOTHERAPY INDUCED NEUTROPENIA (HCC): ICD-10-CM

## 2023-03-02 DIAGNOSIS — C53.0 MALIGNANT NEOPLASM OF ENDOCERVIX (HCC): Primary | ICD-10-CM

## 2023-03-02 RX ADMIN — PEGFILGRASTIM-BMEZ 6 MG: 6 INJECTION SUBCUTANEOUS at 13:20

## 2023-03-06 ENCOUNTER — HOSPITAL ENCOUNTER (OUTPATIENT)
Dept: INFUSION CENTER | Facility: HOSPITAL | Age: 68
Discharge: HOME | End: 2023-03-06

## 2023-03-06 DIAGNOSIS — Z45.2 ENCOUNTER FOR VENOUS ACCESS DEVICE CARE: ICD-10-CM

## 2023-03-06 DIAGNOSIS — E86.0 DEHYDRATION: ICD-10-CM

## 2023-03-06 DIAGNOSIS — C53.0 MALIGNANT NEOPLASM OF ENDOCERVIX (HCC): Primary | ICD-10-CM

## 2023-03-06 LAB
ALBUMIN SERPL BCP-MCNC: 3.8 G/DL (ref 3.5–5)
ALP SERPL-CCNC: 178 U/L (ref 34–104)
ALT SERPL W P-5'-P-CCNC: 36 U/L (ref 7–52)
ANION GAP SERPL CALCULATED.3IONS-SCNC: 11 MMOL/L (ref 4–13)
ANISOCYTOSIS BLD QL SMEAR: PRESENT
AST SERPL W P-5'-P-CCNC: 22 U/L (ref 13–39)
BASOPHILS # BLD MANUAL: 0 THOUSAND/UL (ref 0–0.1)
BASOPHILS NFR MAR MANUAL: 0 % (ref 0–1)
BILIRUB SERPL-MCNC: 0.38 MG/DL (ref 0.2–1)
BUN SERPL-MCNC: 14 MG/DL (ref 5–25)
CALCIUM SERPL-MCNC: 9.8 MG/DL (ref 8.4–10.2)
CHLORIDE SERPL-SCNC: 101 MMOL/L (ref 96–108)
CO2 SERPL-SCNC: 24 MMOL/L (ref 21–32)
CREAT SERPL-MCNC: 0.78 MG/DL (ref 0.6–1.3)
EOSINOPHIL # BLD MANUAL: 0.17 THOUSAND/UL (ref 0–0.4)
EOSINOPHIL NFR BLD MANUAL: 1 % (ref 0–6)
ERYTHROCYTE [DISTWIDTH] IN BLOOD BY AUTOMATED COUNT: 19.9 % (ref 11.6–15.1)
GFR SERPL CREATININE-BSD FRML MDRD: 78 ML/MIN/1.73SQ M
GLUCOSE P FAST SERPL-MCNC: 126 MG/DL (ref 65–99)
GLUCOSE SERPL-MCNC: 126 MG/DL (ref 65–140)
HCT VFR BLD AUTO: 29.3 % (ref 34.8–46.1)
HGB BLD-MCNC: 9.2 G/DL (ref 11.5–15.4)
LYMPHOCYTES # BLD AUTO: 12 % (ref 14–44)
LYMPHOCYTES # BLD AUTO: 2 THOUSAND/UL (ref 0.6–4.47)
MAGNESIUM SERPL-MCNC: 1.6 MG/DL (ref 1.9–2.7)
MCH RBC QN AUTO: 29.6 PG (ref 26.8–34.3)
MCHC RBC AUTO-ENTMCNC: 31.4 G/DL (ref 31.4–37.4)
MCV RBC AUTO: 94 FL (ref 82–98)
MONOCYTES # BLD AUTO: 1 THOUSAND/UL (ref 0–1.22)
MONOCYTES NFR BLD: 6 % (ref 4–12)
NEUTROPHILS # BLD MANUAL: 13.53 THOUSAND/UL (ref 1.85–7.62)
NEUTS BAND NFR BLD MANUAL: 5 % (ref 0–8)
NEUTS SEG NFR BLD AUTO: 76 % (ref 43–75)
OVALOCYTES BLD QL SMEAR: PRESENT
PLATELET # BLD AUTO: 123 THOUSANDS/UL (ref 149–390)
PLATELET BLD QL SMEAR: ADEQUATE
PMV BLD AUTO: 10 FL (ref 8.9–12.7)
POLYCHROMASIA BLD QL SMEAR: PRESENT
POTASSIUM SERPL-SCNC: 3.8 MMOL/L (ref 3.5–5.3)
PROT SERPL-MCNC: 6.6 G/DL (ref 6.4–8.4)
RBC # BLD AUTO: 3.11 MILLION/UL (ref 3.81–5.12)
SODIUM SERPL-SCNC: 136 MMOL/L (ref 135–147)
WBC # BLD AUTO: 16.7 THOUSAND/UL (ref 4.31–10.16)

## 2023-03-07 ENCOUNTER — HOSPITAL ENCOUNTER (OUTPATIENT)
Dept: INFUSION CENTER | Facility: HOSPITAL | Age: 68
Discharge: HOME | End: 2023-03-07

## 2023-03-08 ENCOUNTER — TELEPHONE (OUTPATIENT)
Dept: GYNECOLOGIC ONCOLOGY | Facility: CLINIC | Age: 68
End: 2023-03-08

## 2023-03-08 NOTE — TELEPHONE ENCOUNTER
Return call placed  Rescheduled to 3/21, as CT is not scheduled until 3/16  To delay cycle 4 x 1 week pending CT results/office visit

## 2023-03-08 NOTE — TELEPHONE ENCOUNTER
Patient called into the office to reschedule her pre-cycle appointment with Dr Tatiana Stark on 3/14- Patient can be reached back @[phone]

## 2023-03-15 ENCOUNTER — HOSPITAL ENCOUNTER (OUTPATIENT)
Dept: INFUSION CENTER | Facility: HOSPITAL | Age: 68
Discharge: HOME | End: 2023-03-15
Attending: OBSTETRICS & GYNECOLOGY

## 2023-03-15 ENCOUNTER — TELEPHONE (OUTPATIENT)
Dept: GYNECOLOGIC ONCOLOGY | Facility: CLINIC | Age: 68
End: 2023-03-15

## 2023-03-15 NOTE — TELEPHONE ENCOUNTER
Patient called in to ask if [facility] will be picking her up for her ct tomorrow  Per Patient she wants to be there at 9 am for the 1130 am scan  Confirmed with [facility] that she is being picked up tomorrow but patient wants to be there at 9 am   [facility] agreed and informed patient that she will be picked up so she will be there at 9 am  Patient gave verbal understanding

## 2023-03-16 ENCOUNTER — HOSPITAL ENCOUNTER (OUTPATIENT)
Dept: CT IMAGING | Facility: HOSPITAL | Age: 68
Discharge: HOME | End: 2023-03-16

## 2023-03-16 DIAGNOSIS — C53.0 MALIGNANT NEOPLASM OF ENDOCERVIX (HCC): ICD-10-CM

## 2023-03-16 RX ADMIN — IOHEXOL 100 ML: 350 INJECTION, SOLUTION INTRAVENOUS at 11:11

## 2023-03-16 RX ADMIN — IOHEXOL 35 ML: 300 INJECTION, SOLUTION INTRAVENOUS at 11:11

## 2023-03-20 ENCOUNTER — HOSPITAL ENCOUNTER (OUTPATIENT)
Dept: INFUSION CENTER | Facility: HOSPITAL | Age: 68
Discharge: HOME | End: 2023-03-20

## 2023-03-20 DIAGNOSIS — E86.0 DEHYDRATION: ICD-10-CM

## 2023-03-20 DIAGNOSIS — Z45.2 ENCOUNTER FOR VENOUS ACCESS DEVICE CARE: ICD-10-CM

## 2023-03-20 DIAGNOSIS — C53.0 MALIGNANT NEOPLASM OF ENDOCERVIX (HCC): Primary | ICD-10-CM

## 2023-03-20 LAB
ALBUMIN SERPL BCP-MCNC: 3.7 G/DL (ref 3.5–5)
ALP SERPL-CCNC: 219 U/L (ref 34–104)
ALT SERPL W P-5'-P-CCNC: 57 U/L (ref 7–52)
ANION GAP SERPL CALCULATED.3IONS-SCNC: 10 MMOL/L (ref 4–13)
AST SERPL W P-5'-P-CCNC: 22 U/L (ref 13–39)
BASOPHILS # BLD AUTO: 0.03 THOUSANDS/ÂΜL (ref 0–0.1)
BASOPHILS NFR BLD AUTO: 0 % (ref 0–1)
BILIRUB SERPL-MCNC: 0.41 MG/DL (ref 0.2–1)
BUN SERPL-MCNC: 16 MG/DL (ref 5–25)
CALCIUM SERPL-MCNC: 9.3 MG/DL (ref 8.4–10.2)
CHLORIDE SERPL-SCNC: 100 MMOL/L (ref 96–108)
CO2 SERPL-SCNC: 24 MMOL/L (ref 21–32)
CREAT SERPL-MCNC: 0.86 MG/DL (ref 0.6–1.3)
EOSINOPHIL # BLD AUTO: 0.06 THOUSAND/ÂΜL (ref 0–0.61)
EOSINOPHIL NFR BLD AUTO: 1 % (ref 0–6)
ERYTHROCYTE [DISTWIDTH] IN BLOOD BY AUTOMATED COUNT: 20 % (ref 11.6–15.1)
GFR SERPL CREATININE-BSD FRML MDRD: 70 ML/MIN/1.73SQ M
GLUCOSE SERPL-MCNC: 154 MG/DL (ref 65–140)
HCT VFR BLD AUTO: 28.7 % (ref 34.8–46.1)
HGB BLD-MCNC: 8.8 G/DL (ref 11.5–15.4)
IMM GRANULOCYTES # BLD AUTO: 0.05 THOUSAND/UL (ref 0–0.2)
IMM GRANULOCYTES NFR BLD AUTO: 1 % (ref 0–2)
LYMPHOCYTES # BLD AUTO: 0.58 THOUSANDS/ÂΜL (ref 0.6–4.47)
LYMPHOCYTES NFR BLD AUTO: 7 % (ref 14–44)
MAGNESIUM SERPL-MCNC: 1.7 MG/DL (ref 1.9–2.7)
MCH RBC QN AUTO: 29.3 PG (ref 26.8–34.3)
MCHC RBC AUTO-ENTMCNC: 30.7 G/DL (ref 31.4–37.4)
MCV RBC AUTO: 96 FL (ref 82–98)
MONOCYTES # BLD AUTO: 1.15 THOUSAND/ÂΜL (ref 0.17–1.22)
MONOCYTES NFR BLD AUTO: 14 % (ref 4–12)
NEUTROPHILS # BLD AUTO: 6.56 THOUSANDS/ÂΜL (ref 1.85–7.62)
NEUTS SEG NFR BLD AUTO: 77 % (ref 43–75)
NRBC BLD AUTO-RTO: 0 /100 WBCS
PLATELET # BLD AUTO: 284 THOUSANDS/UL (ref 149–390)
PMV BLD AUTO: 9.7 FL (ref 8.9–12.7)
POTASSIUM SERPL-SCNC: 4.3 MMOL/L (ref 3.5–5.3)
PROT SERPL-MCNC: 6.6 G/DL (ref 6.4–8.4)
RBC # BLD AUTO: 3 MILLION/UL (ref 3.81–5.12)
SODIUM SERPL-SCNC: 134 MMOL/L (ref 135–147)
WBC # BLD AUTO: 8.43 THOUSAND/UL (ref 4.31–10.16)

## 2023-03-21 ENCOUNTER — TELEPHONE (OUTPATIENT)
Dept: SURGICAL ONCOLOGY | Facility: CLINIC | Age: 68
End: 2023-03-21

## 2023-03-21 ENCOUNTER — OFFICE VISIT (OUTPATIENT)
Dept: GYNECOLOGIC ONCOLOGY | Facility: CLINIC | Age: 68
End: 2023-03-21

## 2023-03-21 VITALS
HEIGHT: 59 IN | DIASTOLIC BLOOD PRESSURE: 60 MMHG | OXYGEN SATURATION: 99 % | BODY MASS INDEX: 26.41 KG/M2 | WEIGHT: 131 LBS | SYSTOLIC BLOOD PRESSURE: 118 MMHG | HEART RATE: 122 BPM

## 2023-03-21 DIAGNOSIS — Z71.89 DNR (DO NOT RESUSCITATE) DISCUSSION: ICD-10-CM

## 2023-03-21 DIAGNOSIS — C53.0 MALIGNANT NEOPLASM OF ENDOCERVIX (HCC): Primary | ICD-10-CM

## 2023-03-21 PROBLEM — D61.818 PANCYTOPENIA (HCC): Status: RESOLVED | Noted: 2022-10-14 | Resolved: 2023-03-21

## 2023-03-21 NOTE — ASSESSMENT & PLAN NOTE
Is aware of her prognosis and she has made clear her desire to be DO NOT RESUSCITATE/DO NOT INTUBATE status    She will be referred to palliative care to further discuss goals of care, complete POLST form, etc

## 2023-03-21 NOTE — ASSESSMENT & PLAN NOTE
Patient has now progressive disease after 3 cycles of treatment with single agent gemcitabine  Unfortunately, her disease is incurable, progressive and multidrug-resistant  Today we discussed potential treatment options including referral for clinical trials (not interested), treatment with other palliative agents (discussed specific logistics and toxicities associated with Flonnie Romp and other cytotoxic agents and patient is not interested in exploring any of those)  She understands potential benefit from tumor directed therapies is quite modest at this point  She also understands potential risk of toxicities which include worsen her overall quality of life  Alternatively, we discussed the possibility of change goal of care to comfort  She is interested in maximizing her quality of life  Patient is not interested in further tumor directed therapies  With this in mind we will refer her to palliative care and to discuss best supportive care and eventual transition to hospice if/as appropriate  Patient has made clear her desire to be DNR/DNI  Questions answered to patient's satisfaction  I will remain available if patient changes her mind or if she has any other questions regarding her disease state, prognosis or management options

## 2023-03-22 ENCOUNTER — HOSPITAL ENCOUNTER (OUTPATIENT)
Dept: INFUSION CENTER | Facility: HOSPITAL | Age: 68
Discharge: HOME | End: 2023-03-22
Attending: OBSTETRICS & GYNECOLOGY

## 2023-03-24 NOTE — ADDENDUM NOTE
Encounter addended by: Ed Champion RN on: 3/24/2023 10:09 AM   Actions taken: Flowsheet accepted, Clinical Note Signed

## 2023-03-29 ENCOUNTER — TELEPHONE (OUTPATIENT)
Dept: GASTROENTEROLOGY | Facility: CLINIC | Age: 68
End: 2023-03-29

## 2023-03-29 NOTE — TELEPHONE ENCOUNTER
Called patient and had to [facility] to call the office to schedule a follow up appointment

## 2023-04-05 ENCOUNTER — OFFICE VISIT (OUTPATIENT)
Dept: PALLIATIVE MEDICINE | Facility: CLINIC | Age: 68
End: 2023-04-05

## 2023-04-05 ENCOUNTER — HOME CARE VISIT (OUTPATIENT)
Dept: HOME HEALTH SERVICES | Facility: HOME HEALTHCARE | Age: 68
End: 2023-04-05

## 2023-04-05 VITALS
SYSTOLIC BLOOD PRESSURE: 130 MMHG | BODY MASS INDEX: 25.99 KG/M2 | DIASTOLIC BLOOD PRESSURE: 80 MMHG | HEART RATE: 110 BPM | WEIGHT: 128.75 LBS | TEMPERATURE: 97.2 F | OXYGEN SATURATION: 98 %

## 2023-04-05 DIAGNOSIS — G57.11 MERALGIA PARAESTHETICA, RIGHT: ICD-10-CM

## 2023-04-05 DIAGNOSIS — C53.0 MALIGNANT NEOPLASM OF ENDOCERVIX (HCC): Primary | ICD-10-CM

## 2023-04-05 RX ORDER — PREDNISONE 10 MG/1
10 TABLET ORAL
Qty: 15 TABLET | Refills: 0 | Status: SHIPPED | OUTPATIENT
Start: 2023-04-05

## 2023-04-05 RX ORDER — ONDANSETRON HYDROCHLORIDE 8 MG/1
8 TABLET, FILM COATED ORAL
Qty: 90 TABLET | Refills: 0 | Status: SHIPPED | OUTPATIENT
Start: 2023-04-05

## 2023-04-05 RX ORDER — GABAPENTIN 300 MG/1
300 CAPSULE ORAL 3 TIMES DAILY
Qty: 90 CAPSULE | Refills: 0 | Status: SHIPPED | OUTPATIENT
Start: 2023-04-05
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
## 2023-04-21 PROBLEM — J01.90 ACUTE NON-RECURRENT SINUSITIS: Status: RESOLVED | Noted: 2023-02-20 | Resolved: 2023-04-21

## 2023-04-24 DIAGNOSIS — C53.0 MALIGNANT NEOPLASM OF ENDOCERVIX (HCC): ICD-10-CM

## 2023-04-25 DIAGNOSIS — G57.11 MERALGIA PARAESTHETICA, RIGHT: ICD-10-CM

## 2023-04-25 DIAGNOSIS — C53.0 MALIGNANT NEOPLASM OF ENDOCERVIX (HCC): ICD-10-CM

## 2023-04-25 RX ORDER — ONDANSETRON HYDROCHLORIDE 8 MG/1
TABLET, FILM COATED ORAL
Qty: 90 TABLET | Refills: 0 | Status: SHIPPED | OUTPATIENT
Start: 2023-04-25

## 2023-04-26 ENCOUNTER — LAB (OUTPATIENT)
Dept: LAB | Facility: MEDICAL CENTER | Age: 68
End: 2023-04-26

## 2023-04-26 ENCOUNTER — OFFICE VISIT (OUTPATIENT)
Dept: UROLOGY | Facility: CLINIC | Age: 68
End: 2023-04-26

## 2023-04-26 ENCOUNTER — OFFICE VISIT (OUTPATIENT)
Dept: LAB | Facility: HOSPITAL | Age: 68
End: 2023-04-26

## 2023-04-26 VITALS
BODY MASS INDEX: 26.24 KG/M2 | WEIGHT: 130 LBS | SYSTOLIC BLOOD PRESSURE: 122 MMHG | DIASTOLIC BLOOD PRESSURE: 70 MMHG | HEART RATE: 68 BPM

## 2023-04-26 DIAGNOSIS — N13.30 HYDRONEPHROSIS, UNSPECIFIED HYDRONEPHROSIS TYPE: Primary | ICD-10-CM

## 2023-04-26 DIAGNOSIS — N39.0 URINARY TRACT INFECTION WITHOUT HEMATURIA, SITE UNSPECIFIED: ICD-10-CM

## 2023-04-26 DIAGNOSIS — Z01.818 PREOP TESTING: ICD-10-CM

## 2023-04-26 DIAGNOSIS — N13.30 HYDRONEPHROSIS, UNSPECIFIED HYDRONEPHROSIS TYPE: ICD-10-CM

## 2023-04-26 RX ORDER — PREDNISONE 10 MG/1
10 TABLET ORAL
Qty: 15 TABLET | Refills: 0 | OUTPATIENT
Start: 2023-04-26

## 2023-04-26 NOTE — H&P (VIEW-ONLY)
4/26/2023    No chief complaint on file  Assessment and Plan    76 y o  female manage by Dr Zay Mercado    1  Hydronephrosis secondary to ureteral stricture disease  · History and physical was performed for the patient's upcoming right ureteral stent exchange on 5/01/2023 with Dr Zay Mercado  Technique, benefits, and risk of the procedure listed above were discussed with them today and informed written consent was obtained  All questions and concerns regarding surgery and perioperative expectations have been addressed and answered  No overall changes in their health since last visit  Denies any prior complications with anesthesia  · Consent obtain 1/12/2023 available under Procedure Consent tab  · Review of CBC and CMP from 3/20/2023 are within expected range  WBC 8 43 and ANC 6 56  Has not received palliative chemotherapy since before 3/16/2023  · EKG and Urine culture to be completed today  History of Present Illness  Alex Barron is a 76 y o  female history and physical as she is scheduled for upcoming cystoscopy, right ureteral stent exchange on 5/01/2023 with Dr Garsia  She has a history of hydronephrosis due to ureteral stricture related to retroperitoneal lymphadenopathy  Aveltevin Fish is managed with a ureteral stent  Last stent exchange on 10/27/2022 by Dr David Shepherd    She was last seen by me on 1/12/2023 for history and physical as she was supposed to undergo ureteral stent exchange on 1/26/2023 with Dr Zay Mercado  However as she is receiving palliative chemotherapy with gemcitabine  Upon review of her preoperative CBC she was noted to have a WBC of 1 58 with ANC of 1 05  It was discussed with Dr Zay Mercado and her gynecology oncology team and the decision was made to delay right ureteral stent exchange until her WBC and ANC count improved  Review of Systems   Constitutional: Negative for chills and fever  HENT: Negative for ear pain and sore throat      Eyes: Negative for pain and visual disturbance  Respiratory: Negative for cough and shortness of breath  Cardiovascular: Negative for chest pain and palpitations  Gastrointestinal: Negative for abdominal pain, constipation, diarrhea, nausea and vomiting  Genitourinary: Negative for decreased urine volume, difficulty urinating, dysuria, flank pain, frequency, hematuria and urgency  Musculoskeletal: Negative for arthralgias and back pain  Skin: Negative for color change and rash  Neurological: Negative for dizziness, seizures and syncope  All other systems reviewed and are negative  Vitals  Vitals:    04/26/23 1326   BP: 122/70   Pulse: 68   Weight: 59 kg (130 lb)       Physical Exam  Vitals reviewed  Constitutional:       General: She is not in acute distress  Appearance: Normal appearance  She is normal weight  She is not ill-appearing or toxic-appearing  HENT:      Head: Normocephalic and atraumatic  Nose: Nose normal       Mouth/Throat:      Mouth: Mucous membranes are moist       Pharynx: Oropharynx is clear  Eyes:      General: No scleral icterus  Conjunctiva/sclera: Conjunctivae normal    Cardiovascular:      Rate and Rhythm: Normal rate and regular rhythm  Pulses: Normal pulses  Heart sounds: Normal heart sounds  Pulmonary:      Effort: Pulmonary effort is normal  No respiratory distress  Breath sounds: Normal breath sounds  Abdominal:      General: Abdomen is flat  Palpations: Abdomen is soft  Tenderness: There is no abdominal tenderness  There is no right CVA tenderness or left CVA tenderness  Hernia: No hernia is present  Musculoskeletal:         General: Normal range of motion  Cervical back: Normal range of motion  Skin:     General: Skin is warm and dry  Neurological:      General: No focal deficit present  Mental Status: She is alert and oriented to person, place, and time  Mental status is at baseline     Psychiatric:         Mood "and Affect: Mood normal          Behavior: Behavior normal          Thought Content:  Thought content normal          Judgment: Judgment normal          Past History  Past Medical History:   Diagnosis Date    Acne     Anxiety     Anxiety and depression 10/02/2019    Arthritis     Breathing difficulty     pt does not recall any breathing problem but did have neck pain after procedure\" Gave her an incentive spirometer to use    Cancer [facility] [address]     cervical/ and \"lymph nodes\" (GYN)    [facility]     Chronic kidney disease     stage 3    Chronic pain disorder     Neck pain    Claustrophobia     Colon polyp     GERD (gastroesophageal reflux disease)     not now    Headache     History of radiation therapy     History of shingles     Irritable bowel syndrome     Joint pain following chemotherapy     and stiffness    Liver disease     \"cyst on liver\"    Lung nodule     \"left\"    Motion sickness     Muscle weakness     after chemo\"    Neck pain     \"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\"has had neck pain since childhood\"    Peptic ulceration 2016    Port-A-Cath in place     right chest//for chemotherapy q 3 weeks and also receives IV hydration    Shortness of breath     \"started with cancer, when too much exertion like cleaning/guera doing stairs\"-pt states it is much better    Thumb injury 10/2019    right    Thyroid nodule     Tinnitus of left ear 2020    Urinary problem     Wears contact lenses     Wrist arthritis 2020     Social History     Socioeconomic History    Marital status:      Spouse name: None    Number of children: None    Years of education: None    Highest education level: None   Occupational History    None   Tobacco Use    Smoking status: Former     Packs/day: 3 00     Years: 25 00     Pack years: 75 00     Types: Cigarettes     Start date:      Quit date: 2003     Years since quittin 8    Smokeless tobacco: Never   Vaping Use    " Vaping Use: Never used   Substance and Sexual Activity    Alcohol use: Not Currently     Comment: Occasional    Drug use: No     Types: Marijuana     Comment: As teenager    Sexual activity: Not Currently     Partners: Male   Other Topics Concern    None   Social History Narrative    None     Social Determinants of Health     Financial Resource Strain: Not on file   Food Insecurity: Not on file   Transportation Needs: Not on file   Physical Activity: Not on file   Stress: Not on file   Social Connections: Not on file   Intimate Partner Violence: Not on file   Housing Stability: Not on file     Social History     Tobacco Use   Smoking Status Former    Packs/day: 3 00    Years: 25 00    Pack years: 75 00    Types: Cigarettes    Start date:     Quit date: 2003    Years since quittin 8   Smokeless Tobacco Never     Family History   Problem Relation Age of Onset    Stomach cancer Mother     Cancer Mother         Stomach Cancer    Diabetes Mother     Alcohol abuse Father     No Known Problems Sister     No Known Problems Daughter     No Known Problems Maternal Grandmother     No Known Problems Maternal Grandfather     No Known Problems Paternal Grandmother     No Known Problems Paternal Grandfather     No Known Problems Sister     No Known Problems Sister     No Known Problems Sister     No Known Problems Sister     No Known Problems Daughter     No Known Problems Daughter     No Known Problems Maternal Aunt     No Known Problems Maternal Aunt     No Known Problems Paternal Aunt     No Known Problems Paternal Aunt     No Known Problems Paternal Aunt     No Known Problems Paternal Aunt     No Known Problems Paternal Aunt     No Known Problems Paternal Aunt     No Known Problems Paternal Aunt     No Known Problems Paternal Aunt     No Known Problems Paternal Aunt     No Known Problems Paternal Aunt        The following portions of the patient's history were reviewed and updated as appropriate allergies, current medications, past medical history, past social history, past surgical history and problem list    Imaging:    Results  No results found for this or any previous visit (from the past 1 hour(s))  ]  No results found for: PSA  Lab Results   Component Value Date    CALCIUM 9 3 03/20/2023     06/11/2018    K 4 3 03/20/2023    CO2 24 03/20/2023     03/20/2023    BUN 16 03/20/2023    CREATININE 0 86 03/20/2023     Lab Results   Component Value Date    WBC 8 43 03/20/2023    HGB 8 8 (L) 03/20/2023    HCT 28 7 (L) 03/20/2023    MCV 96 03/20/2023     03/20/2023       Please Note:  Voice dictation software has been used to create this document  There may be inadvertent transcriptions errors       JACE Donato 04/26/23

## 2023-04-26 NOTE — PROGRESS NOTES
4/26/2023    No chief complaint on file  Assessment and Plan    76 y o  female manage by Dr Rachna Hauser    1  Hydronephrosis secondary to ureteral stricture disease  · History and physical was performed for the patient's upcoming right ureteral stent exchange on 5/01/2023 with Dr Rachna Hauser  Technique, benefits, and risk of the procedure listed above were discussed with them today and informed written consent was obtained  All questions and concerns regarding surgery and perioperative expectations have been addressed and answered  No overall changes in their health since last visit  Denies any prior complications with anesthesia  · Consent obtain 1/12/2023 available under Procedure Consent tab  · Review of CBC and CMP from 3/20/2023 are within expected range  WBC 8 43 and ANC 6 56  Has not received palliative chemotherapy since before 3/16/2023  · EKG and Urine culture to be completed today  History of Present Illness  Sophie Foster is a 76 y o  female history and physical as she is scheduled for upcoming cystoscopy, right ureteral stent exchange on 5/01/2023 with Dr Garsia  She has a history of hydronephrosis due to ureteral stricture related to retroperitoneal lymphadenopathy  [facility] is managed with a ureteral stent  Last stent exchange on 10/27/2022 by Dr Melissa Singh    She was last seen by me on 1/12/2023 for history and physical as she was supposed to undergo ureteral stent exchange on 1/26/2023 with Dr Rachna Hauser  However as she is receiving palliative chemotherapy with gemcitabine  Upon review of her preoperative CBC she was noted to have a WBC of 1 58 with ANC of 1 05  It was discussed with Dr Rachna Hauser and her gynecology oncology team and the decision was made to delay right ureteral stent exchange until her WBC and ANC count improved  Review of Systems   Constitutional: Negative for chills and fever  HENT: Negative for ear pain and sore throat      Eyes: Negative for pain and visual disturbance  Respiratory: Negative for cough and shortness of breath  Cardiovascular: Negative for chest pain and palpitations  Gastrointestinal: Negative for abdominal pain, constipation, diarrhea, nausea and vomiting  Genitourinary: Negative for decreased urine volume, difficulty urinating, dysuria, flank pain, frequency, hematuria and urgency  Musculoskeletal: Negative for arthralgias and back pain  Skin: Negative for color change and rash  Neurological: Negative for dizziness, seizures and syncope  All other systems reviewed and are negative  Vitals  Vitals:    04/26/23 1326   BP: 122/70   Pulse: 68   Weight: 59 kg (130 lb)       Physical Exam  Vitals reviewed  Constitutional:       General: She is not in acute distress  Appearance: Normal appearance  She is normal weight  She is not ill-appearing or toxic-appearing  HENT:      Head: Normocephalic and atraumatic  Nose: Nose normal       Mouth/Throat:      Mouth: Mucous membranes are moist       Pharynx: Oropharynx is clear  Eyes:      General: No scleral icterus  Conjunctiva/sclera: Conjunctivae normal    Cardiovascular:      Rate and Rhythm: Normal rate and regular rhythm  Pulses: Normal pulses  Heart sounds: Normal heart sounds  Pulmonary:      Effort: Pulmonary effort is normal  No respiratory distress  Breath sounds: Normal breath sounds  Abdominal:      General: Abdomen is flat  Palpations: Abdomen is soft  Tenderness: There is no abdominal tenderness  There is no right CVA tenderness or left CVA tenderness  Hernia: No hernia is present  Musculoskeletal:         General: Normal range of motion  Cervical back: Normal range of motion  Skin:     General: Skin is warm and dry  Neurological:      General: No focal deficit present  Mental Status: She is alert and oriented to person, place, and time  Mental status is at baseline     Psychiatric:         Mood "and Affect: Mood normal          Behavior: Behavior normal          Thought Content:  Thought content normal          Judgment: Judgment normal          Past History  Past Medical History:   Diagnosis Date   • Acne    • Anxiety    • Anxiety and depression 10/02/2019   • Arthritis    • Breathing difficulty     pt does not recall any breathing problem but did have neck pain after procedure\" Gave her an incentive spirometer to use   • Cancer (HCC)     cervical/ and \"lymph nodes\" (GYN)   • Cervical cancer (HCC)    • Chronic kidney disease     stage 3   • Chronic pain disorder     Neck pain   • Claustrophobia    • Colon polyp    • GERD (gastroesophageal reflux disease)     not now   • Headache    • History of radiation therapy    • History of shingles    • Irritable bowel syndrome    • Joint pain following chemotherapy     and stiffness   • Liver disease     \"cyst on liver\"   • Lung nodule     \"left\"   • Motion sickness    • Muscle weakness     after chemo\"   • Neck pain     \"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\"has had neck pain since childhood\"   • Peptic ulceration    • Port-A-Cath in place     right chest//for chemotherapy q 3 weeks and also receives IV hydration   • Shortness of breath     \"started with cancer, when too much exertion like cleaning/guera doing stairs\"-pt states it is much better   • Thumb injury 10/2019    right   • Thyroid nodule    • Tinnitus of left ear 2020   • Urinary problem    • Wears contact lenses    • Wrist arthritis 2020     Social History     Socioeconomic History   • Marital status:      Spouse name: None   • Number of children: None   • Years of education: None   • Highest education level: None   Occupational History   • None   Tobacco Use   • Smoking status: Former     Packs/day: 3 00     Years: 25 00     Pack years: 75 00     Types: Cigarettes     Start date:      Quit date: 2003     Years since quittin 8   • Smokeless tobacco: Never   Vaping Use   • " Vaping Use: Never used   Substance and Sexual Activity   • Alcohol use: Not Currently     Comment: Occasional   • Drug use: No     Types: Marijuana     Comment: As teenager   • Sexual activity: Not Currently     Partners: Male   Other Topics Concern   • None   Social History Narrative   • None     Social Determinants of Health     Financial Resource Strain: Not on file   Food Insecurity: Not on file   Transportation Needs: Not on file   Physical Activity: Not on file   Stress: Not on file   Social Connections: Not on file   Intimate Partner Violence: Not on file   Housing Stability: Not on file     Social History     Tobacco Use   Smoking Status Former   • Packs/day: 3 00   • Years: 25 00   • Pack years: 75 00   • Types: Cigarettes   • Start date:    • Quit date: 2003   • Years since quittin 8   Smokeless Tobacco Never     Family History   Problem Relation Age of Onset   • Stomach cancer Mother    • Cancer Mother         Stomach Cancer   • Diabetes Mother    • Alcohol abuse Father    • No Known Problems Sister    • No Known Problems Daughter    • No Known Problems Maternal Grandmother    • No Known Problems Maternal Grandfather    • No Known Problems Paternal Grandmother    • No Known Problems Paternal Grandfather    • No Known Problems Sister    • No Known Problems Sister    • No Known Problems Sister    • No Known Problems Sister    • No Known Problems Daughter    • No Known Problems Daughter    • No Known Problems Maternal Aunt    • No Known Problems Maternal Aunt    • No Known Problems Paternal Aunt    • No Known Problems Paternal Aunt    • No Known Problems Paternal Aunt    • No Known Problems Paternal Aunt    • No Known Problems Paternal Aunt    • No Known Problems Paternal Aunt    • No Known Problems Paternal Aunt    • No Known Problems Paternal Aunt    • No Known Problems Paternal Aunt    • No Known Problems Paternal Aunt        The following portions of the patient's history were reviewed and updated as appropriate allergies, current medications, past medical history, past social history, past surgical history and problem list    Imaging:    Results  No results found for this or any previous visit (from the past 1 hour(s))  ]  No results found for: PSA  Lab Results   Component Value Date    CALCIUM 9 3 03/20/2023     06/11/2018    K 4 3 03/20/2023    CO2 24 03/20/2023     03/20/2023    BUN 16 03/20/2023    CREATININE 0 86 03/20/2023     Lab Results   Component Value Date    WBC 8 43 03/20/2023    HGB 8 8 (L) 03/20/2023    HCT 28 7 (L) 03/20/2023    MCV 96 03/20/2023     03/20/2023       Please Note:  Voice dictation software has been used to create this document  There may be inadvertent transcriptions errors       JACE Matias 04/26/23

## 2023-04-26 NOTE — H&P
4/26/2023    No chief complaint on file  Assessment and Plan    76 y o  female manage by Dr Leilani Coronel    1  Hydronephrosis secondary to ureteral stricture disease  · History and physical was performed for the patient's upcoming right ureteral stent exchange on 5/01/2023 with Dr Leilani Coronel  Technique, benefits, and risk of the procedure listed above were discussed with them today and informed written consent was obtained  All questions and concerns regarding surgery and perioperative expectations have been addressed and answered  No overall changes in their health since last visit  Denies any prior complications with anesthesia  · Consent obtain 1/12/2023 available under Procedure Consent tab  · Review of CBC and CMP from 3/20/2023 are within expected range  WBC 8 43 and ANC 6 56  Has not received palliative chemotherapy since before 3/16/2023  · EKG and Urine culture to be completed today  History of Present Illness  Maira Richey is a 76 y o  female history and physical as she is scheduled for upcoming cystoscopy, right ureteral stent exchange on 5/01/2023 with Dr Garsia  She has a history of hydronephrosis due to ureteral stricture related to retroperitoneal lymphadenopathy  [facility] is managed with a ureteral stent  Last stent exchange on 10/27/2022 by Dr Nghia Kumari    She was last seen by me on 1/12/2023 for history and physical as she was supposed to undergo ureteral stent exchange on 1/26/2023 with Dr Leilani Coronel  However as she is receiving palliative chemotherapy with gemcitabine  Upon review of her preoperative CBC she was noted to have a WBC of 1 58 with ANC of 1 05  It was discussed with Dr Leilani Coronel and her gynecology oncology team and the decision was made to delay right ureteral stent exchange until her WBC and ANC count improved  Review of Systems   Constitutional: Negative for chills and fever  HENT: Negative for ear pain and sore throat      Eyes: Negative for pain and visual disturbance  Respiratory: Negative for cough and shortness of breath  Cardiovascular: Negative for chest pain and palpitations  Gastrointestinal: Negative for abdominal pain, constipation, diarrhea, nausea and vomiting  Genitourinary: Negative for decreased urine volume, difficulty urinating, dysuria, flank pain, frequency, hematuria and urgency  Musculoskeletal: Negative for arthralgias and back pain  Skin: Negative for color change and rash  Neurological: Negative for dizziness, seizures and syncope  All other systems reviewed and are negative  Vitals  Vitals:    04/26/23 1326   BP: 122/70   Pulse: 68   Weight: 59 kg (130 lb)       Physical Exam  Vitals reviewed  Constitutional:       General: She is not in acute distress  Appearance: Normal appearance  She is normal weight  She is not ill-appearing or toxic-appearing  HENT:      Head: Normocephalic and atraumatic  Nose: Nose normal       Mouth/Throat:      Mouth: Mucous membranes are moist       Pharynx: Oropharynx is clear  Eyes:      General: No scleral icterus  Conjunctiva/sclera: Conjunctivae normal    Cardiovascular:      Rate and Rhythm: Normal rate and regular rhythm  Pulses: Normal pulses  Heart sounds: Normal heart sounds  Pulmonary:      Effort: Pulmonary effort is normal  No respiratory distress  Breath sounds: Normal breath sounds  Abdominal:      General: Abdomen is flat  Palpations: Abdomen is soft  Tenderness: There is no abdominal tenderness  There is no right CVA tenderness or left CVA tenderness  Hernia: No hernia is present  Musculoskeletal:         General: Normal range of motion  Cervical back: Normal range of motion  Skin:     General: Skin is warm and dry  Neurological:      General: No focal deficit present  Mental Status: She is alert and oriented to person, place, and time  Mental status is at baseline     Psychiatric:         Mood "and Affect: Mood normal          Behavior: Behavior normal          Thought Content:  Thought content normal          Judgment: Judgment normal          Past History  Past Medical History:   Diagnosis Date   • Acne    • Anxiety    • Anxiety and depression 10/02/2019   • Arthritis    • Breathing difficulty     pt does not recall any breathing problem but did have neck pain after procedure\" Gave her an incentive spirometer to use   • Cancer (HCC)     cervical/ and \"lymph nodes\" (GYN)   • Cervical cancer (HCC)    • Chronic kidney disease     stage 3   • Chronic pain disorder     Neck pain   • Claustrophobia    • Colon polyp    • GERD (gastroesophageal reflux disease)     not now   • Headache    • History of radiation therapy    • History of shingles    • Irritable bowel syndrome    • Joint pain following chemotherapy     and stiffness   • Liver disease     \"cyst on liver\"   • Lung nodule     \"left\"   • Motion sickness    • Muscle weakness     after chemo\"   • Neck pain     \"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\"has had neck pain since childhood\"   • Peptic ulceration    • Port-A-Cath in place     right chest//for chemotherapy q 3 weeks and also receives IV hydration   • Shortness of breath     \"started with cancer, when too much exertion like cleaning/guera doing stairs\"-pt states it is much better   • Thumb injury 10/2019    right   • Thyroid nodule    • Tinnitus of left ear 2020   • Urinary problem    • Wears contact lenses    • Wrist arthritis 2020     Social History     Socioeconomic History   • Marital status:      Spouse name: None   • Number of children: None   • Years of education: None   • Highest education level: None   Occupational History   • None   Tobacco Use   • Smoking status: Former     Packs/day: 3 00     Years: 25 00     Pack years: 75 00     Types: Cigarettes     Start date:      Quit date: 2003     Years since quittin 8   • Smokeless tobacco: Never   Vaping Use   • " Vaping Use: Never used   Substance and Sexual Activity   • Alcohol use: Not Currently     Comment: Occasional   • Drug use: No     Types: Marijuana     Comment: As teenager   • Sexual activity: Not Currently     Partners: Male   Other Topics Concern   • None   Social History Narrative   • None     Social Determinants of Health     Financial Resource Strain: Not on file   Food Insecurity: Not on file   Transportation Needs: Not on file   Physical Activity: Not on file   Stress: Not on file   Social Connections: Not on file   Intimate Partner Violence: Not on file   Housing Stability: Not on file     Social History     Tobacco Use   Smoking Status Former   • Packs/day: 3 00   • Years: 25 00   • Pack years: 75 00   • Types: Cigarettes   • Start date:    • Quit date: 2003   • Years since quittin 8   Smokeless Tobacco Never     Family History   Problem Relation Age of Onset   • Stomach cancer Mother    • Cancer Mother         Stomach Cancer   • Diabetes Mother    • Alcohol abuse Father    • No Known Problems Sister    • No Known Problems Daughter    • No Known Problems Maternal Grandmother    • No Known Problems Maternal Grandfather    • No Known Problems Paternal Grandmother    • No Known Problems Paternal Grandfather    • No Known Problems Sister    • No Known Problems Sister    • No Known Problems Sister    • No Known Problems Sister    • No Known Problems Daughter    • No Known Problems Daughter    • No Known Problems Maternal Aunt    • No Known Problems Maternal Aunt    • No Known Problems Paternal Aunt    • No Known Problems Paternal Aunt    • No Known Problems Paternal Aunt    • No Known Problems Paternal Aunt    • No Known Problems Paternal Aunt    • No Known Problems Paternal Aunt    • No Known Problems Paternal Aunt    • No Known Problems Paternal Aunt    • No Known Problems Paternal Aunt    • No Known Problems Paternal Aunt        The following portions of the patient's history were reviewed and updated as appropriate allergies, current medications, past medical history, past social history, past surgical history and problem list    Imaging:    Results  No results found for this or any previous visit (from the past 1 hour(s))  ]  No results found for: PSA  Lab Results   Component Value Date    CALCIUM 9 3 03/20/2023     06/11/2018    K 4 3 03/20/2023    CO2 24 03/20/2023     03/20/2023    BUN 16 03/20/2023    CREATININE 0 86 03/20/2023     Lab Results   Component Value Date    WBC 8 43 03/20/2023    HGB 8 8 (L) 03/20/2023    HCT 28 7 (L) 03/20/2023    MCV 96 03/20/2023     03/20/2023       Please Note:  Voice dictation software has been used to create this document  There may be inadvertent transcriptions errors       JACE Colbert 04/26/23

## 2023-04-27 LAB
ATRIAL RATE: 104 BPM
BACTERIA UR CULT: ABNORMAL
BACTERIA UR CULT: ABNORMAL
P AXIS: 73 DEGREES
PR INTERVAL: 136 MS
QRS AXIS: 70 DEGREES
QRSD INTERVAL: 70 MS
QT INTERVAL: 334 MS
QTC INTERVAL: 439 MS
T WAVE AXIS: 59 DEGREES
VENTRICULAR RATE: 104 BPM

## 2023-04-28 ENCOUNTER — TELEPHONE (OUTPATIENT)
Dept: UROLOGY | Facility: CLINIC | Age: 68
End: 2023-04-28

## 2023-04-28 DIAGNOSIS — N39.0 URINARY TRACT INFECTION WITHOUT HEMATURIA, SITE UNSPECIFIED: Primary | ICD-10-CM

## 2023-04-28 RX ORDER — CEPHALEXIN 500 MG/1
500 CAPSULE ORAL EVERY 6 HOURS SCHEDULED
Qty: 28 CAPSULE | Refills: 0 | Status: SHIPPED | OUTPATIENT
Start: 2023-04-28 | End: 2023-05-05

## 2023-04-28 NOTE — TELEPHONE ENCOUNTER
----- Message from Nicolas U  79  sent at 4/28/2023  7:48 AM EDT -----  Please let patient know her pre-operative urine culture is positive  I have sent a prescription to her pharmacy for Keflex to start immediately

## 2023-04-28 NOTE — RESULT ENCOUNTER NOTE
Please let patient know her pre-operative urine culture is positive  I have sent a prescription to her pharmacy for Keflex to start immediately

## 2023-05-01 ENCOUNTER — HOSPITAL ENCOUNTER (OUTPATIENT)
Facility: HOSPITAL | Age: 68
Setting detail: OUTPATIENT SURGERY
Discharge: HOME | End: 2023-05-01
Attending: UROLOGY | Admitting: UROLOGY

## 2023-05-01 ENCOUNTER — TELEPHONE (OUTPATIENT)
Dept: UROLOGY | Facility: CLINIC | Age: 68
End: 2023-05-01

## 2023-05-01 ENCOUNTER — APPOINTMENT (OUTPATIENT)
Dept: RADIOLOGY | Facility: HOSPITAL | Age: 68
End: 2023-05-01

## 2023-05-01 VITALS
DIASTOLIC BLOOD PRESSURE: 55 MMHG | TEMPERATURE: 97.3 F | SYSTOLIC BLOOD PRESSURE: 107 MMHG | RESPIRATION RATE: 18 BRPM | HEART RATE: 95 BPM | OXYGEN SATURATION: 99 % | BODY MASS INDEX: 26.61 KG/M2 | HEIGHT: 59 IN | WEIGHT: 132 LBS

## 2023-05-01 DIAGNOSIS — N13.39 OTHER HYDRONEPHROSIS: Primary | ICD-10-CM

## 2023-05-01 RX ORDER — GLYCOPYRROLATE 0.2 MG/ML
INJECTION INTRAMUSCULAR; INTRAVENOUS AS NEEDED
Status: DISCONTINUED | OUTPATIENT
Start: 2023-05-01 | End: 2023-05-01

## 2023-05-01 RX ORDER — ONDANSETRON 2 MG/ML
INJECTION INTRAMUSCULAR; INTRAVENOUS AS NEEDED
Status: DISCONTINUED | OUTPATIENT
Start: 2023-05-01 | End: 2023-05-01

## 2023-05-01 RX ORDER — FENTANYL CITRATE/PF 50 MCG/ML
25 SYRINGE (ML) INJECTION
Status: DISCONTINUED | OUTPATIENT
Start: 2023-05-01 | End: 2023-05-01 | Stop reason: HOSPADM

## 2023-05-01 RX ORDER — MIDAZOLAM HYDROCHLORIDE 2 MG/2ML
INJECTION, SOLUTION INTRAMUSCULAR; INTRAVENOUS AS NEEDED
Status: DISCONTINUED | OUTPATIENT
Start: 2023-05-01 | End: 2023-05-01

## 2023-05-01 RX ORDER — CEFAZOLIN SODIUM 2 G/50ML
2000 SOLUTION INTRAVENOUS ONCE
Status: COMPLETED | OUTPATIENT
Start: 2023-05-01 | End: 2023-05-01

## 2023-05-01 RX ORDER — PROPOFOL 10 MG/ML
INJECTION, EMULSION INTRAVENOUS CONTINUOUS PRN
Status: DISCONTINUED | OUTPATIENT
Start: 2023-05-01 | End: 2023-05-01

## 2023-05-01 RX ORDER — LIDOCAINE HYDROCHLORIDE 10 MG/ML
INJECTION, SOLUTION EPIDURAL; INFILTRATION; INTRACAUDAL; PERINEURAL AS NEEDED
Status: DISCONTINUED | OUTPATIENT
Start: 2023-05-01 | End: 2023-05-01

## 2023-05-01 RX ORDER — ONDANSETRON 2 MG/ML
4 INJECTION INTRAMUSCULAR; INTRAVENOUS ONCE AS NEEDED
Status: DISCONTINUED | OUTPATIENT
Start: 2023-05-01 | End: 2023-05-01 | Stop reason: HOSPADM

## 2023-05-01 RX ORDER — PHENYLEPHRINE HYDROCHLORIDE 10 MG/ML
INJECTION INTRAVENOUS AS NEEDED
Status: DISCONTINUED | OUTPATIENT
Start: 2023-05-01 | End: 2023-05-01

## 2023-05-01 RX ORDER — PHENAZOPYRIDINE HYDROCHLORIDE 100 MG/1
200 TABLET, FILM COATED ORAL ONCE
Status: COMPLETED | OUTPATIENT
Start: 2023-05-01 | End: 2023-05-01

## 2023-05-01 RX ORDER — PROPOFOL 10 MG/ML
INJECTION, EMULSION INTRAVENOUS AS NEEDED
Status: DISCONTINUED | OUTPATIENT
Start: 2023-05-01 | End: 2023-05-01

## 2023-05-01 RX ORDER — OXYCODONE HYDROCHLORIDE 5 MG/1
5 TABLET ORAL EVERY 4 HOURS PRN
Status: DISCONTINUED | OUTPATIENT
Start: 2023-05-01 | End: 2023-05-01 | Stop reason: HOSPADM

## 2023-05-01 RX ORDER — ALBUTEROL SULFATE 2.5 MG/3ML
2.5 SOLUTION RESPIRATORY (INHALATION) ONCE AS NEEDED
Status: DISCONTINUED | OUTPATIENT
Start: 2023-05-01 | End: 2023-05-01 | Stop reason: HOSPADM

## 2023-05-01 RX ORDER — SODIUM CHLORIDE, SODIUM LACTATE, POTASSIUM CHLORIDE, CALCIUM CHLORIDE 600; 310; 30; 20 MG/100ML; MG/100ML; MG/100ML; MG/100ML
125 INJECTION, SOLUTION INTRAVENOUS CONTINUOUS
Status: DISCONTINUED | OUTPATIENT
Start: 2023-05-01 | End: 2023-05-01 | Stop reason: HOSPADM

## 2023-05-01 RX ORDER — OXYCODONE AND ACETAMINOPHEN 10; 325 MG/1; MG/1
1 TABLET ORAL EVERY 4 HOURS PRN
COMMUNITY

## 2023-05-01 RX ORDER — FENTANYL CITRATE 50 UG/ML
INJECTION, SOLUTION INTRAMUSCULAR; INTRAVENOUS AS NEEDED
Status: DISCONTINUED | OUTPATIENT
Start: 2023-05-01 | End: 2023-05-01

## 2023-05-01 RX ORDER — SODIUM CHLORIDE, SODIUM LACTATE, POTASSIUM CHLORIDE, CALCIUM CHLORIDE 600; 310; 30; 20 MG/100ML; MG/100ML; MG/100ML; MG/100ML
INJECTION, SOLUTION INTRAVENOUS CONTINUOUS PRN
Status: DISCONTINUED | OUTPATIENT
Start: 2023-05-01 | End: 2023-05-01

## 2023-05-01 RX ADMIN — FENTANYL CITRATE 25 MCG: 50 INJECTION, SOLUTION INTRAMUSCULAR; INTRAVENOUS at 07:41

## 2023-05-01 RX ADMIN — CEFAZOLIN SODIUM 2000 MG: 2 SOLUTION INTRAVENOUS at 07:30

## 2023-05-01 RX ADMIN — LIDOCAINE HYDROCHLORIDE 25 MG: 10 INJECTION, SOLUTION EPIDURAL; INFILTRATION; INTRACAUDAL; PERINEURAL at 07:35

## 2023-05-01 RX ADMIN — PROPOFOL 100 MCG/KG/MIN: 10 INJECTION, EMULSION INTRAVENOUS at 07:35

## 2023-05-01 RX ADMIN — PHENAZOPYRIDINE 200 MG: 100 TABLET ORAL at 08:20

## 2023-05-01 RX ADMIN — FENTANYL CITRATE 25 MCG: 50 INJECTION, SOLUTION INTRAMUSCULAR; INTRAVENOUS at 07:38

## 2023-05-01 RX ADMIN — FENTANYL CITRATE 25 MCG: 50 INJECTION, SOLUTION INTRAMUSCULAR; INTRAVENOUS at 07:35

## 2023-05-01 RX ADMIN — PHENYLEPHRINE HYDROCHLORIDE 100 MCG: 10 INJECTION INTRAVENOUS at 07:49

## 2023-05-01 RX ADMIN — GLYCOPYRROLATE 0.2 MG: 0.2 INJECTION, SOLUTION INTRAMUSCULAR; INTRAVENOUS at 07:29

## 2023-05-01 RX ADMIN — ONDANSETRON 4 MG: 2 INJECTION INTRAMUSCULAR; INTRAVENOUS at 07:38

## 2023-05-01 RX ADMIN — FENTANYL CITRATE 25 MCG: 50 INJECTION, SOLUTION INTRAMUSCULAR; INTRAVENOUS at 07:45

## 2023-05-01 RX ADMIN — PROPOFOL 20 MG: 10 INJECTION, EMULSION INTRAVENOUS at 07:35

## 2023-05-01 RX ADMIN — SODIUM CHLORIDE, SODIUM LACTATE, POTASSIUM CHLORIDE, AND CALCIUM CHLORIDE: .6; .31; .03; .02 INJECTION, SOLUTION INTRAVENOUS at 07:20

## 2023-05-01 RX ADMIN — MIDAZOLAM 2 MG: 1 INJECTION INTRAMUSCULAR; INTRAVENOUS at 07:29

## 2023-05-01 NOTE — ANESTHESIA POSTPROCEDURE EVALUATION
Post-Op Assessment Note    CV Status:  Stable    Pain management: adequate     Mental Status:  Sleepy   Hydration Status:  Euvolemic   PONV Controlled:  Controlled   Airway Patency:  Patent      Post Op Vitals Reviewed: Yes      Staff: CRNA         No notable events documented      BP   106/56   Temp   97 4   Pulse 94   Resp   18   SpO2   98%

## 2023-05-01 NOTE — ANESTHESIA PREPROCEDURE EVALUATION
Procedure:  CYSTOSCOPY with right ureteral stent exchange (Right: Ureter)    Relevant Problems   CARDIO   (+) Dyspnea on exertion   (+) Pain in thoracic spine      /RENAL   (+) Hydronephrosis   (+) Stage 3 chronic kidney disease, unspecified whether stage 3a or 3b CKD (HCC)      GYN   (+) 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HCC)      HEMATOLOGY   (+) Chemotherapy-induced thrombocytopenia      MUSCULOSKELETAL   (+) Cervical spondylosis   (+) Pain in thoracic spine   (+) Primary generalized (osteo)arthritis      NEURO/PSYCH   (+) Anxiety and depression   (+) Continuous opioid dependence (HCC)   (+) Major depressive disorder, single episode, mild (HCC)      PULMONARY   (+) Chronic obstructive pulmonary disease, unspecified COPD type (HCC)   (+) Dyspnea on exertion      Other   (+) 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)        Physical Exam    Airway    Mallampati score: III  TM Distance: >3 FB  Neck ROM: full     Dental   No notable dental hx     Cardiovascular  Cardiovascular exam normal    Pulmonary  Pulmonary exam normal     Other Findings    No problems w prev anesthestics except for neck pain after one a while ago  Poor exercise tolerance  Uses inhaler to go upstairs  Recent short steroid course    Anesthesia Plan  ASA Score- 3     Anesthesia Type- IV sedation with anesthesia with ASA Monitors  Additional Monitors:   Airway Plan:           Plan Factors-Exercise tolerance (METS): <4 METS  Chart reviewed  EKG reviewed  Existing labs reviewed  Patient summary reviewed  Patient is not a current smoker  Induction- intravenous  Postoperative Plan-     Informed Consent- Anesthetic plan and risks discussed with patient  I personally reviewed this patient with the CRNA  Discussed and agreed on the Anesthesia Plan with the CRNA  German Calle

## 2023-05-01 NOTE — INTERVAL H&P NOTE
H&P reviewed  After examining the patient I find no changes in the patients condition since the H&P had been written      Vitals:    05/01/23 0641   BP: 116/54   Pulse: (!) 109   Resp: 18   Temp: 97 8 °F (36 6 °C)   SpO2: 98%

## 2023-05-01 NOTE — TELEPHONE ENCOUNTER
----- Message from Romayne Sill, MD sent at 5/1/2023  7:55 AM EDT -----  Please call patient to set up cystoscopy with right ureteral stent exchange in 6 months  Case request placed  ambulatory warm

## 2023-05-01 NOTE — DISCHARGE INSTR - AVS FIRST PAGE
Expect to see blood in the urine, and to experience urgency/frequency/burning with urination and dribbling  This is normal after urological procedures  Is also normal to experience some nausea after these procedures  Go back your regular diet carefully  It is normal to feel pain in the kidney when urinating and when the bladder is filling due to urine refluxing up to the kidney because of the open stent  The stent is necessary to keep the ureter open to allow clots and swelling to resolve and to allow the kidney to drain properly after instrumentation  Call for fever greater that 101 5, inability to urinate, prolonged nausea and vomiting, or severe pain not relieved by pain medications       No driving/operating machinery for 24 hours, and while taking narcotics  Take over the counter remedy of choice to avoid constipation  Drink plenty of fluids  The office will call you to set up a stent exchange in 6 months

## 2023-05-01 NOTE — OP NOTE
OPERATIVE REPORT  PATIENT NAME: Richie Jay    :  1955  MRN: [id number]  Pt Location: [facility] OR ROOM 02    SURGERY DATE: 2023    Surgeon(s) and Role:     * Sarai Hendrix MD - Primary    Preop Diagnosis:  Hydronephrosis with ureteral stricture, not elsewhere classified [N13 1] right hydronephrosis due to compressive lymphadenopathy from recurrent cervical cancer    Post-Op Diagnosis Codes: * Hydronephrosis with ureteral stricture, not elsewhere classified [N13 1] right hydronephrosis due to compressive lymphadenopathy from recurrent cervical cancer    Procedure(s):  Right - CYSTOSCOPY with right ureteral stent exchange    Specimen(s):  * No specimens in log *    Estimated Blood Loss:   Minimal    Drains:  Ureteral Drain/Stent Right ureter 6 Fr  (Active)   Number of days: 697       Anesthesia Type:   IV Sedation with Anesthesia    Operative Indications:  Hydronephrosis with ureteral stricture, not elsewhere classified [N13 1]  As above    Operative Findings:  Unremarkable bladder  Stent not occluded  Exchanged for 6 [place] by 26 cm stent as was used previously  Complications:   None    Procedure and Technique:    55-year-old woman with recurrent cervical cancer with right hydronephrosis, found to have a 4 cm stricture in the mid right ureter, and a 1 cm stricture more proximal hydronephrosis  She has an indwelling 6 [place] by 26 cm stent and she is here for routine stent exchange  The last stent exchange was 2022  The risks of bleeding infection damage to ureter and need for additional procedures have been explained she is informed consent  She has declined nephrostomy tube placement in the past   She continues to progress despite chemotherapy with her metastatic disease and she has been told there are no other further treatment options available  The patient was brought to the operating room identified properly    LMA was induced patient was prepped and draped in dorsal lithotomy position usual fashion   A time-out was performed   Cystoscopy was carried out with a 25 [place] cystoscopy sheath and 30 degree lens  Fairy Boyle was normal without stricture   The bladder was unremarkable  Allean Breech are no stones tumors or lesions   The orifices orthotopic and intact   I grasped the indwelling stent brought out to the urethral meatus under fluoroscopic guidance placed 0 035 in guidewire up the wire up into the renal pelvis   The old stent was removed intact and discarded, I placed a fresh 6 [place] by 26 cm stent over the wire and coils were established renal pelvis and bladder   No string was left attached   Bladder was drained with the cystoscopy sheath  I was present for the entire procedure  and A qualified resident physician was not available      Patient Disposition:  PACU  and extubated and stable        SIGNATURE: Dacia Bryan MD  DATE: May 1, 2023  TIME: 7:58 AM

## 2023-05-02 NOTE — TELEPHONE ENCOUNTER
Called and left  for patient that I was calling to follow up with her s/p stent exchange with Dr Mike Cerna yesterday  Advised as long as she is feeling well she does not need to call back at this time  Sam Postal would be contacting patient to arrange next routine stent exchange due 6 months from now

## 2023-05-04 DIAGNOSIS — C53.0 MALIGNANT NEOPLASM OF ENDOCERVIX (HCC): ICD-10-CM

## 2023-05-04 DIAGNOSIS — G57.11 MERALGIA PARAESTHETICA, RIGHT: ICD-10-CM

## 2023-05-04 RX ORDER — PREDNISONE 10 MG/1
10 TABLET ORAL
Qty: 15 TABLET | Refills: 0 | OUTPATIENT
Start: 2023-05-04

## 2023-05-04 NOTE — TELEPHONE ENCOUNTER
Declining Surescripts request for prednisone  Per chart review this medication was discontinued / therapy complete  See Dr Danna Morales encounter from 4/25/23

## 2023-05-18 DIAGNOSIS — R10.13 EPIGASTRIC PAIN: ICD-10-CM

## 2023-05-18 RX ORDER — OMEPRAZOLE 40 MG/1
40 CAPSULE, DELAYED RELEASE ORAL EVERY MORNING
Qty: 90 CAPSULE | Refills: 0 | Status: SHIPPED | OUTPATIENT
Start: 2023-05-18

## 2023-05-23 ENCOUNTER — OFFICE VISIT (OUTPATIENT)
Dept: OBGYN CLINIC | Facility: CLINIC | Age: 68
End: 2023-05-23

## 2023-05-23 ENCOUNTER — APPOINTMENT (OUTPATIENT)
Dept: RADIOLOGY | Facility: MEDICAL CENTER | Age: 68
End: 2023-05-23

## 2023-05-23 VITALS
BODY MASS INDEX: 26.21 KG/M2 | WEIGHT: 130 LBS | DIASTOLIC BLOOD PRESSURE: 72 MMHG | HEIGHT: 59 IN | RESPIRATION RATE: 18 BRPM | HEART RATE: 97 BPM | SYSTOLIC BLOOD PRESSURE: 116 MMHG

## 2023-05-23 DIAGNOSIS — M79.604 RIGHT LEG PAIN: Primary | ICD-10-CM

## 2023-05-23 DIAGNOSIS — M25.551 PAIN IN RIGHT HIP: ICD-10-CM

## 2023-05-23 DIAGNOSIS — D48.0 NEOPLASM OF UNCERTAIN BEHAVIOR OF BONE AND ARTICULAR CARTILAGE: ICD-10-CM

## 2023-05-23 DIAGNOSIS — M79.604 RIGHT LEG PAIN: ICD-10-CM

## 2023-05-23 NOTE — PROGRESS NOTES
Orthopedic Surgery Office Note  Derinda Collet (70 y o  female)  : 1955 Encounter Date: 2023  Dr Jaime Ascencio DO, Orthopedic Surgeon  [facility]thopedic Oncology [facility]   Phone:[phone] OZW:[phone]    Assessment and Plan: Derinda Collet is a 76 y o  female with:    1  Right thigh pain   - discussed further evaluation needed to investigate the cause of her ongoing thigh pain   - goals of care: live her life without pain; currently unable to perform standing ADLs due to constant, intense pain; patient expresses desire to continue evaluating and treating thigh pain  - continue pain regimen    - MRI right femur w wo contrast to evaluate femur and thigh, XR inconclusive  - if MRI appears normal, will continue management with spine program to consider pain relieving interventions; also consider spine and pain due to thoracic spine changes seen on CT C/A/P    2  Metastatic recurrent squamous cell cervical carcinoma  - continue management with Dr Stacey Desai and palliative care  - history of radiation therapy     3  Comorbidity, including: COPD, hearing loss, CKD, hydronephrosis, chronic pain disorder, continuous opioid dependence, MDD, xerostomia  - continue current management     Procedure:  No procedures performed    Surgical Planning:   No surgery planned at this time    Follow up: Return for MRI results        Problem List Items Addressed This Visit        Musculoskeletal and Integument    Neoplasm of uncertain behavior of bone and articular cartilage    Relevant Orders    MRI femur right w wo contrast    Cane       Other    Right leg pain - Primary    Relevant Orders    XR femur 2 vw right    XR knee 4+ vw right injury    MRI femur right w wo contrast    Cane   Other Visit Diagnoses     Pain in right hip        Relevant Orders    XR hip/pelv 2-3 vws right if performed        ___________________________________________________________________    History of Present Illness:     Derinda Collet is a 76 "y o  female with history of metastatic cervical cancer who presents for consultation at the request of [place] DO   regarding right thigh pain  2 years ago, sustained fall to right side and right hip, has had pain in the area since  She has been told previously that the radiating to the thigh could be associated with kidney concerns, although she does not see the comparison  Pain is constant now, in the past few months increasing in intensity   Pain with sitting in chairs; would have to have pillow under right   Recently, she has noted the pain radiating to her knee as well  Solely anterior; described as unable to use washcloth over thigh  Describes significant weakness in the thigh  Sensation of cool water running down leg, with tingling to any touch  Had home PT, without relief from exercises over the course of weeks   Currently taking gabapentin and oxycodone     At baseline patient gaits without assistance  No noted constitutional symptoms such as fever, chills, night sweats, fatigue, weight gains/losses  No noted  chest pain/shortness of breath  Patient Reports  personal history of cancer  Review of Systems:   Allergies, medications, past medical/surgical/family/social history have been reviewed  Complete 12 system review performed and found to be negative except: except as per mentioned in HPI  Physical Examination:   Height: 4' 11 02\" (149 9 cm)  Weight - Scale: 59 kg (130 lb)  BMI (Calculated): 26 2  BSA (Calculated - m2): 1 54 sq meters     Vitals:    05/23/23 0947   BP: 116/72   Pulse: 97   Resp: 18     Body mass index is 26 24 kg/m²  General: alert and oriented; well nourished/well developed; no apparent distress  Psychiatric: normal mood and affect  HEENT: NCAT  Head/neck - full range of motion  Lungs: breathing comfortably; equal symmetric chest expansion  Abdomen: soft, non-tender, non-distended     Skin: warm; dry; no lesions, rashes, petechiae or purpura; no " clubbing, no cyanosis, no edema, no palpable masses  Extremity: Right thigh   Inspection: no edema, skin abnormalities throughout   Palpation: no  palpable masses or lesions   Range of motion of joints: WFL range of motion all extremities  Motor strength:  Dorsal/Plantar flexion: intact  3/5 right knee extension; 5/5 left knee extension   Sensation: grossly intact to all extremities  Pulses: present  Gait: normal gait  IMAGING RESULTS, All images personally review today by Dr Liz Balbuena  Study: PET CT  Date: 6/7/22  Impression: I have personally reviewed all relevant imaging  I have read and agree with the radiologist report  My impression is as follows:    OSSEOUS STRUCTURES:  No FDG avid lesions are seen  CT images: No significant findings  1  New hypermetabolic left subpectoral lymph node  2  Worsening multifocal adenopathy within the right upper quadrant as described above  3  The left inferior anterior lower abdominal omental metastasis has mildly improved  There is also improvement of previously seen activity posterior to the left adrenal gland  4  Other abnormalities such as activity superior to the right adrenal gland and small retroperitoneal lymph nodes with residual hypermetabolism are similar     Study: CT C/A/P  Date: 3/16/23  Impression: I have personally reviewed all relevant imaging  I have read and agree with the radiologist report  My impression is as follows:     Osseous structures: ankylosing of thoracic vertebrae, with well maintained lumbar disc height and without significant osteophyte formation    Study: XR right femur  Date: 05/23/23  Impression: I have personally reviewed all relevant imaging  No radiologist report available at this time  My impression is as follows:  Blurring of cortical borders on posterior aspect of proximal femur evidenced on lateral view  Indeterminate of lesion  Well maintained cortices, with no acute fracture or dislocation        Study: XR pelvis and XR knee  Date: 05/23/23  Impression: I have personally reviewed all relevant imaging  No radiologist report was available at this time  My impression is as follows: Minimal to mild osteoarthritic changes to the knee and hip joint  Without evidence of acute osseous abnormality or bony lesions  Pertinent laboratory findings:  None    Microbiology:  Cultures:n/a    Review of referring provider's records:  Referring provider: Yulisa Gaviria DO     Oncology:     Malignant neoplasm of endocervix [facility]) - Primary       Patient has now progressive disease after 3 cycles of treatment with single agent gemcitabine  Unfortunately, her disease is incurable, progressive and multidrug-resistant  Today we discussed potential treatment options including referral for clinical trials (not interested), treatment with other palliative agents (discussed specific logistics and toxicities associated with Mitra Dayday and other cytotoxic agents and patient is not interested in exploring any of those)  She understands potential benefit from tumor directed therapies is quite modest at this point  She also understands potential risk of toxicities which include worsen her overall quality of life  Alternatively, we discussed the possibility of change goal of care to comfort  She is interested in maximizing her quality of life  Patient is not interested in further tumor directed therapies  With this in mind we will refer her to palliative care and to discuss best supportive care and eventual transition to hospice if/as appropriate  Patient has made clear her desire to be DNR/DNI              Patient Care team:   Patient Care Team:  Jorden Benavides DO as PCP - General (Family Medicine)  Karlene Rooney MD as PCP - [address] [place], [place] [id number] [facility] ([facility])  Chiqui Pelayo MD as PCP - Attending  Alvaro Garcia RD (Nutrition)  JANEE Kaur as  Care Manager (Oncology)  Theresa Richardson as Nurse Practitioner (Nurse "Practitioner)     Past Medical History:   Diagnosis Date   • Acne    • Anxiety    • Anxiety and depression 10/02/2019   • Arthritis    • Breathing difficulty     pt does not recall any breathing problem but did have neck pain after procedure\" Gave her an incentive spirometer to use   • Cancer ([facility] [address] )     cervical/ and \"lymph nodes\" (GYN)   • Cervical cancer ([facility] [address] )    • Chronic kidney disease     stage 3   • Chronic pain disorder     Neck pain   • Claustrophobia    • Colon polyp    • GERD (gastroesophageal reflux disease)     not now   • Headache    • History of radiation therapy    • History of shingles    • Irritable bowel syndrome    • Joint pain following chemotherapy     and stiffness   • Liver disease     \"cyst on liver\"   • Lung nodule     \"left\"   • Motion sickness    • Muscle weakness     after chemo\"   • Neck pain     \"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\"has had neck pain since childhood\"   • Peptic ulceration 2016   • Port-A-Cath in place     right chest//for chemotherapy q 3 weeks and also receives IV hydration   • Shortness of breath     \"started with cancer, when too much exertion like cleaning/guera doing stairs\"-pt states it is much better   • Thumb injury 10/2019    right   • Thyroid nodule    • Tinnitus of left ear 02/19/2020   • Urinary problem    • Wears contact lenses    • Wrist arthritis 03/17/2020     Past Surgical History:   Procedure Laterality Date   • COLONOSCOPY     • CYSTOSCOPY W/ RETROGRADES Right 06/03/2021    Procedure: Mariel Phalen W/RETROGRADE;  Surgeon: Sierra Woodward MD;  Location: [facility];  Service: Urology   • CYSTOSCOPY W/ RETROGRADES Right 10/18/2021    Procedure: CYSTOSCOPY WITH ureteral stent exchange;  Surgeon: Carlos Browning MD;  Location: [facility];  Service: Urology   • FL RETROGRADE PYELOGRAM  02/11/2021   • FL RETROGRADE PYELOGRAM  06/03/2021   • FOOT SURGERY     • HYSTERECTOMY     • IR BIOPSY LYMPH NODE  02/19/2021   • IR PORT PLACEMENT  03/09/2021   • LYMPH NODE BIOPSY  2021 " • SC CYSTO BLADDER W/URETERAL CATHETERIZATION Right 02/11/2021    Procedure: CYSTOSCOPY RETROGRADE PYELOGRAM WITH INSERTION STENT URETERAL;  Surgeon: Alessia Vidal MD;  [facility];  Service: Urology   • SC CYSTO BLADDER W/URETERAL CATHETERIZATION Right 04/04/2022    Procedure: CYSTOSCOPY  WITH INSERTION STENT URETERAL Right;  Surgeon: Alessia Vidal MD;  [facility]: [facility];  Service: Urology   • SC CYSTO BLADDER W/URETERAL CATHETERIZATION Right 10/27/2022    Procedure: Cystoscopy, right ureteral stent exchange;  Surgeon: Alessia Vidal MD;  [facility]: [facility];  Service: Urology   • SC CYSTO BLADDER W/URETERAL CATHETERIZATION Right 5/1/2023    Procedure: CYSTOSCOPY with right ureteral stent exchange;  Surgeon: Alessia Vidal MD;  Location: [facility];  Service: Urology   • SC EXCISION INTERDIGITAL KESSLER NEUROMA SINGLE EACH Left 07/12/2018    Procedure: FOOT NEUROMA EXCISION;  Surgeon: Cynthia Hernandez DPM;  Location: [facility] MAIN OR;  Service: Podiatry   • SC REMOVE & REPLACE INDWELL URETERAL STENT [place] Right 06/03/2021    Procedure: EXCHANGE STENT;  Surgeon: Thony Mcdaniel MD;  Location: [facility];  Service: Urology   • TUBAL LIGATION     • UPPER GASTROINTESTINAL ENDOSCOPY  2016       Current Outpatient Medications:   •  albuterol (PROVENTIL HFA,VENTOLIN HFA) 90 mcg/act inhaler, INHALE 2 PUFFS EVERY 4 HOURS AS NEEDED FOR SHORTNESS OF BREATH , Disp: 18 g, Rfl: 1  •  buPROPion (WELLBUTRIN XL) 300 mg 24 hr tablet, TAKE 1 TABLET BY MOUTH EVERY DAY (Patient taking differently: every morning), Disp: 90 tablet, Rfl: 2  •  Diclofenac Sodium (VOLTAREN) 1 %, Apply 2 g topically 4 (four) times a day, Disp: 350 g, Rfl: 3  •  gabapentin (NEURONTIN) 300 mg capsule, Take 1 capsule (300 mg total) by mouth 3 (three) times a day To reduce nerve pain, Disp: 90 capsule, Rfl: 0  •  lidocaine-prilocaine (EMLA) cream, Apply to PORT site 30 min prior to labs or chemo, Disp: 30 g, Rfl: 2  •  LORazepam (ATIVAN) 1 mg tablet, "Take 1 tablet (1 mg total) by mouth every 8 (eight) hours as needed for anxiety, Disp: 20 tablet, Rfl: 0  •  omeprazole (PriLOSEC) 40 MG capsule, TAKE 1 CAPSULE (40 MG TOTAL) BY MOUTH EVERY MORNING , Disp: 90 capsule, Rfl: 0  •  ondansetron (ZOFRAN) 8 mg tablet, TAKE 1 TABLET BY MOUTH 3 TIMES A DAY BEFORE MEALS TO PREVENT AND REDUCE NAUSEA, Disp: 90 tablet, Rfl: 0  •  oxyCODONE-acetaminophen (PERCOCET)  mg per tablet, Take 1 tablet by mouth every 4 (four) hours as needed for moderate pain, Disp: , Rfl:   •  polyethylene glycol (MIRALAX) 17 g packet, Take 17 g by mouth daily, Disp: 30 each, Rfl: 5  •  promethazine (PHENERGAN) 25 mg tablet, Take 1 tablet (25 mg total) by mouth every 6 (six) hours as needed for nausea or vomiting, Disp: 30 tablet, Rfl: 1  •  senna (SENOKOT) 8 6 MG tablet, Take 1 tablet (8 6 mg total) by mouth 2 (two) times a day Take to prevent constipation from pain meds  Hold one dose for loose stool (Patient taking differently: Take 1 tablet by mouth if needed Take to prevent constipation from pain meds    Hold one dose for loose stool), Disp: 60 tablet, Rfl: 11  •  traZODone (DESYREL) 50 mg tablet, TAKE 1-2 AT BEDTIME, Disp: 180 tablet, Rfl: 2  •  benzonatate (TESSALON PERLES) 100 mg capsule, Take 1-2 capsules by mouth three times a day as needed for cough (Patient not taking: Reported on 5/23/2023), Disp: 40 capsule, Rfl: 1  •  predniSONE 10 mg tablet, Take 1 tablet (10 mg total) by mouth daily with breakfast STOP when complete (Patient not taking: Reported on 5/23/2023), Disp: 15 tablet, Rfl: 0  Allergies   Allergen Reactions   • Medical Tape Itching     And redness from adhesive    And \"skin raised up\" especially longer on skin   • Aspirin GI Intolerance   • Codeine GI Intolerance   • Dust Mite Extract    • Pollen Extract Sneezing     Family History   Problem Relation Age of Onset   • Stomach cancer Mother    • Cancer Mother         Stomach Cancer   • Diabetes Mother    • Alcohol abuse " Father    • No Known Problems Sister    • No Known Problems Daughter    • No Known Problems Maternal Grandmother    • No Known Problems Maternal Grandfather    • No Known Problems Paternal Grandmother    • No Known Problems Paternal Grandfather    • No Known Problems Sister    • No Known Problems Sister    • No Known Problems Sister    • No Known Problems Sister    • No Known Problems Daughter    • No Known Problems Daughter    • No Known Problems Maternal Aunt    • No Known Problems Maternal Aunt    • No Known Problems Paternal Aunt    • No Known Problems Paternal Aunt    • No Known Problems Paternal Aunt    • No Known Problems Paternal Aunt    • No Known Problems Paternal Aunt    • No Known Problems Paternal Aunt    • No Known Problems Paternal Aunt    • No Known Problems Paternal Aunt    • No Known Problems Paternal Aunt    • No Known Problems Paternal Aunt      Social History     Socioeconomic History   • Marital status:      Spouse name: Not on file   • Number of children: Not on file   • Years of education: Not on file   • Highest education level: Not on file   Occupational History   • Not on file   Tobacco Use   • Smoking status: Former     Packs/day: 3 00     Years: 25 00     Pack years: 75 00     Types: Cigarettes     Start date:      Quit date: 2003     Years since quittin 8   • Smokeless tobacco: Never   Vaping Use   • Vaping Use: Never used   Substance and Sexual Activity   • Alcohol use: Not Currently     Comment: Occasional   • Drug use: No     Types: Marijuana     Comment: As teenager   • Sexual activity: Not Currently     Partners: Male   Other Topics Concern   • Not on file   Social History Narrative   • Not on file     Social Determinants of Health     Financial Resource Strain: Not on file   Food Insecurity: Not on file   Transportation Needs: Not on file   Physical Activity: Not on file   Stress: Not on file   Social Connections: Not on file   Intimate Partner Violence: Not on file   Housing Stability: Not on file       60 minutes was spent in the coordination of care, reviewing of imaging and with the patient on the date of service    Scribe Attestation    I,:  Tamara Chairez PA-C am acting as a scribe while in the presence of the attending physician :       I,:  Deondre Medina, DO personally performed the services described in this documentation    as scribed in my presence :            Tamara Chairez PA-C   5/23/2023 3:01 PM

## 2023-05-26 ENCOUNTER — PREP FOR PROCEDURE (OUTPATIENT)
Dept: UROLOGY | Facility: CLINIC | Age: 68
End: 2023-05-26

## 2023-05-26 ENCOUNTER — TELEPHONE (OUTPATIENT)
Dept: UROLOGY | Facility: CLINIC | Age: 68
End: 2023-05-26

## 2023-05-26 DIAGNOSIS — Z01.818 ENCOUNTER FOR PREADMISSION TESTING: Primary | ICD-10-CM

## 2023-05-26 DIAGNOSIS — R39.89 SUSPECTED UTI: ICD-10-CM

## 2023-05-26 NOTE — TELEPHONE ENCOUNTER
Lmom notifying pt I am scheduling 6 month stent change for 11/20  Appt also scheduled for preop in office 11/7  Advised to c/b if dates are not good  Mailed all instructions

## 2023-06-02 ENCOUNTER — HOSPITAL ENCOUNTER (OUTPATIENT)
Dept: MRI IMAGING | Facility: HOSPITAL | Age: 68
Discharge: HOME | End: 2023-06-02

## 2023-06-02 DIAGNOSIS — M79.604 RIGHT LEG PAIN: ICD-10-CM

## 2023-06-02 DIAGNOSIS — D48.0 NEOPLASM OF UNCERTAIN BEHAVIOR OF BONE AND ARTICULAR CARTILAGE: ICD-10-CM

## 2023-06-02 RX ADMIN — GADOBUTROL 5 ML: 604.72 INJECTION INTRAVENOUS at 10:08

## 2023-06-06 ENCOUNTER — OFFICE VISIT (OUTPATIENT)
Dept: OBGYN CLINIC | Facility: CLINIC | Age: 68
End: 2023-06-06
Payer: MEDICARE

## 2023-06-06 VITALS
SYSTOLIC BLOOD PRESSURE: 109 MMHG | BODY MASS INDEX: 23.79 KG/M2 | HEART RATE: 107 BPM | HEIGHT: 59 IN | WEIGHT: 118 LBS | DIASTOLIC BLOOD PRESSURE: 76 MMHG

## 2023-06-06 DIAGNOSIS — M51.36 DDD (DEGENERATIVE DISC DISEASE), LUMBAR: Primary | ICD-10-CM

## 2023-06-06 PROCEDURE — 99214 OFFICE O/P EST MOD 30 MIN: CPT | Performed by: STUDENT IN AN ORGANIZED HEALTH CARE EDUCATION/TRAINING PROGRAM

## 2023-06-06 NOTE — PROGRESS NOTES
Orthopedic Surgery Office Note  Karlyn Olszewski (86 y o  female)  : 1955 Encounter Date: 2023  Dr Rosy Saavedra DO, Orthopedic Surgeon  Orthopedic Oncology & Sarcoma Surgery   Phone:[phone] TMV:[phone]    Assessment and Plan: Karlyn Olszewski is a 76 y o  female with:    1  Right thigh pain   - MRI of right femur w wo contrast was reviewed  - MRI appears normal, will continue management with spine program to consider pain relieving interventions; also consider spine and pain due to thoracic spine changes seen on CT C/A/P  - Ambulatory referral to pain management placed  - discussed further evaluation needed to investigate the cause of her ongoing thigh pain   - goals of care: live her life without pain; currently unable to perform standing ADLs due to constant, intense pain; patient expresses desire to continue evaluating and treating thigh pain  - continue pain regimen      2  Metastatic recurrent squamous cell cervical carcinoma  - continue management with Dr Sarah Cotto and palliative care  - history of radiation therapy     3  Comorbidity, including: COPD, hearing loss, CKD, hydronephrosis, chronic pain disorder, continuous opioid dependence, MDD, xerostomia  - continue current management     Procedure:  No procedures performed    Surgical Planning:   No surgery planned at this time    Follow up: Return if symptoms worsen or fail to improve  Problem List Items Addressed This Visit    None  Visit Diagnoses     DDD (degenerative disc disease), lumbar    -  Primary    Relevant Orders    Ambulatory Referral to Pain Management        ___________________________________________________________________    History of Present Illness:     Karlyn Olszewski is a 76 y o  female with history of metastatic cervical cancer who presents for follow-up regarding MRI of right femur  On today's presentation, the patient states her symptoms have not improved since the last visit      2 years ago, sustained fall to "right side and right hip, has had pain in the area since  She has been told previously that the radiating to the thigh could be associated with kidney concerns, although she does not see the comparison  Pain is constant now, in the past few months increasing in intensity   Pain with sitting in chairs; would have to have pillow under right   Recently, she has noted the pain radiating to her knee as well  Solely anterior; described as unable to use washcloth over thigh  Describes significant weakness in the thigh  Sensation of cool water running down leg, with tingling to any touch  Had home PT, without relief from exercises over the course of weeks   Currently taking gabapentin and oxycodone     At baseline patient gaits without assistance  Noted constitutional symptoms such as vomiting, fatigue, loss of appetite, diarrhea, tachycardia, shortness of breath, fely loss due to inability to eat, nausea and headache  Patient Reports personal history of cancer  Review of Systems:   Allergies, medications, past medical/surgical/family/social history have been reviewed  Complete 12 system review performed and found to be negative except: except as per mentioned in HPI  Physical Examination:   Height: 4' 11\" (149 9 cm)  Weight - Scale: 53 5 kg (118 lb)  BMI (Calculated): 23 8  BSA (Calculated - m2): 1 47 sq meters     Vitals:    06/06/23 1045   BP: 109/76   Pulse: (!) 107     Body mass index is 23 83 kg/m²  General: alert and oriented; well nourished/well developed; no apparent distress  Psychiatric: normal mood and affect  HEENT: NCAT  Head/neck - full range of motion  Lungs: breathing comfortably; equal symmetric chest expansion  Abdomen: soft, non-tender, non-distended  Skin: warm; dry; no lesions, rashes, petechiae or purpura; no clubbing, no cyanosis, no edema, no palpable masses      Extremity: Right thigh   Inspection: no edema, skin abnormalities throughout   Palpation: no  palpable " masses or lesions   Range of motion of joints: WFL range of motion all extremities  Motor strength:  Dorsal/Plantar flexion: intact  3/5 right knee extension; 5/5 left knee extension   Sensation: grossly intact to all extremities  Pulses: present  Gait: normal gait  IMAGING RESULTS, All images personally review today by Dr Nadine Ortiz    Study: MRI femur right  Date: 06/02/2023  Impression: I have personally reviewed all relevant imaging  I have read and agree with the radiologist report  My impression is as follows: No evidence of metastatic disease  Nonspecific edema in the right hip adductor musculature, possibly myositis versus grade 1 strain  Study: PET CT  Date: 6/7/22  Impression: I have personally reviewed all relevant imaging  I have read and agree with the radiologist report  My impression is as follows:    OSSEOUS STRUCTURES:  No FDG avid lesions are seen  CT images: No significant findings  1  New hypermetabolic left subpectoral lymph node  2  Worsening multifocal adenopathy within the right upper quadrant as described above  3  The left inferior anterior lower abdominal omental metastasis has mildly improved  There is also improvement of previously seen activity posterior to the left adrenal gland  4  Other abnormalities such as activity superior to the right adrenal gland and small retroperitoneal lymph nodes with residual hypermetabolism are similar     Study: CT C/A/P  Date: 3/16/23  Impression: I have personally reviewed all relevant imaging  I have read and agree with the radiologist report  My impression is as follows:     Osseous structures: ankylosing of thoracic vertebrae, with well maintained lumbar disc height and without significant osteophyte formation    Study: XR right femur  Date: 06/06/23  Impression: I have personally reviewed all relevant imaging  No radiologist report available at this time    My impression is as follows:  Blurring of cortical borders on posterior aspect of proximal femur evidenced on lateral view  Indeterminate of lesion  Well maintained cortices, with no acute fracture or dislocation  Study: XR pelvis and XR knee  Date: 06/06/23  Impression: I have personally reviewed all relevant imaging  No radiologist report was available at this time  My impression is as follows: Minimal to mild osteoarthritic changes to the knee and hip joint  Without evidence of acute osseous abnormality or bony lesions  Pertinent laboratory findings:  None    Microbiology:  Cultures:n/a    Review of referring provider's records:  Referring provider: Verna Smith DO     Oncology:     Malignant neoplasm of endocervix [facility]) - Primary       Patient has now progressive disease after 3 cycles of treatment with single agent gemcitabine  Unfortunately, her disease is incurable, progressive and multidrug-resistant  Today we discussed potential treatment options including referral for clinical trials (not interested), treatment with other palliative agents (discussed specific logistics and toxicities associated with Pedro Bang and other cytotoxic agents and patient is not interested in exploring any of those)  She understands potential benefit from tumor directed therapies is quite modest at this point  She also understands potential risk of toxicities which include worsen her overall quality of life  Alternatively, we discussed the possibility of change goal of care to comfort  She is interested in maximizing her quality of life  Patient is not interested in further tumor directed therapies  With this in mind we will refer her to palliative care and to discuss best supportive care and eventual transition to hospice if/as appropriate  Patient has made clear her desire to be DNR/DNI              Patient Care team:   Patient Care Team:  Sujata Jameson DO as PCP - General (Family Medicine)  Yandy Tapia MD as PCP - [address] [place], [place] [address] [facility] (RTE)  Casey Small MD "as PCP - Attending  Jeramy May, [address] (Nutrition)  Joi Roldan MSW as  Care Manager (Oncology)  Carlin Boston as Nurse Practitioner (Nurse Practitioner)     Past Medical History:   Diagnosis Date   • Acne    • Anxiety    • Anxiety and depression 10/02/2019   • Arthritis    • Breathing difficulty     pt does not recall any breathing problem but did have neck pain after procedure\" Gave her an incentive spirometer to use   • Cancer ([facility] [address] )     cervical/ and \"lymph nodes\" (GYN)   • Cervical cancer ([facility] [address] )    • Chronic kidney disease     stage 3   • Chronic pain disorder     Neck pain   • Claustrophobia    • Colon polyp    • GERD (gastroesophageal reflux disease)     not now   • Headache    • History of radiation therapy    • History of shingles    • Irritable bowel syndrome    • Joint pain following chemotherapy     and stiffness   • Liver disease     \"cyst on liver\"   • Lung nodule     \"left\"   • Motion sickness    • Muscle weakness     after chemo\"   • Neck pain     \"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\"has had neck pain since childhood\"   • Peptic ulceration 2016   • Port-A-Cath in place     right chest//for chemotherapy q 3 weeks and also receives IV hydration   • Shortness of breath     \"started with cancer, when too much exertion like cleaning/guera doing stairs\"-pt states it is much better   • Thumb injury 10/2019    right   • Thyroid nodule    • Tinnitus of left ear 02/19/2020   • Urinary problem    • Wears contact lenses    • Wrist arthritis 03/17/2020     Past Surgical History:   Procedure Laterality Date   • COLONOSCOPY     • CYSTOSCOPY W/ RETROGRADES Right 06/03/2021    Procedure: [place] W/RETROGRADE;  Surgeon: Justino Gomez MD;  Location: [facility];  Service: Urology   • CYSTOSCOPY W/ RETROGRADES Right 10/18/2021    Procedure: CYSTOSCOPY WITH ureteral stent exchange;  Surgeon: Neto Gutiérrez MD;  Location: [facility];  Service: Urology   • [facility] RETROGRADE PYELOGRAM  02/11/2021   • [place] " RETROGRADE PYELOGRAM  06/03/2021   • FOOT SURGERY     • HYSTERECTOMY     • IR BIOPSY LYMPH NODE  02/19/2021   • IR PORT PLACEMENT  03/09/2021   • LYMPH NODE BIOPSY  2021   • CA CYSTO BLADDER W/URETERAL CATHETERIZATION Right 02/11/2021    Procedure: CYSTOSCOPY RETROGRADE PYELOGRAM WITH INSERTION STENT URETERAL;  Surgeon: Lincoln Ko MD;  Location:  MAIN OR;  Service: Urology   • CA CYSTO BLADDER W/URETERAL CATHETERIZATION Right 04/04/2022    Procedure: CYSTOSCOPY  WITH INSERTION STENT URETERAL Right;  Surgeon: Lincoln Ko MD;  [facility]: [facility];  Service: Urology   • CA CYSTO BLADDER W/URETERAL CATHETERIZATION Right 10/27/2022    Procedure: Cystoscopy, right ureteral stent exchange;  Surgeon: Lincoln Ko MD;  [facility]: [facility];  Service: Urology   • CA CYSTO BLADDER W/URETERAL CATHETERIZATION Right 5/1/2023    Procedure: CYSTOSCOPY with right ureteral stent exchange;  Surgeon: Lincoln Ko MD;  [facility]: [facility];  Service: Urology   • CA EXCISION INTERDIGITAL KESSLER NEUROMA SINGLE EACH Left 07/12/2018    Procedure: FOOT NEUROMA EXCISION;  Surgeon: Haim Jackson DPM;  Location: [facility] MAIN OR;  Service: Podiatry   • CA REMOVE & REPLACE INDWELL URETERAL STENT [place] Right 06/03/2021    Procedure: EXCHANGE STENT;  Surgeon: Ruchi Lara MD;  Location: [facility];  Service: Urology   • TUBAL LIGATION     • UPPER GASTROINTESTINAL ENDOSCOPY  2016       Current Outpatient Medications:   •  albuterol (PROVENTIL HFA,VENTOLIN HFA) 90 mcg/act inhaler, INHALE 2 PUFFS EVERY 4 HOURS AS NEEDED FOR SHORTNESS OF BREATH , Disp: 18 g, Rfl: 1  •  buPROPion (WELLBUTRIN XL) 300 mg 24 hr tablet, TAKE 1 TABLET BY MOUTH EVERY DAY (Patient taking differently: every morning), Disp: 90 tablet, Rfl: 2  •  Diclofenac Sodium (VOLTAREN) 1 %, Apply 2 g topically 4 (four) times a day, Disp: 350 g, Rfl: 3  •  gabapentin (NEURONTIN) 300 mg capsule, Take 1 capsule (300 mg total) by mouth 3 (three) times a day To reduce nerve pain, "Disp: 90 capsule, Rfl: 0  •  lidocaine-prilocaine (EMLA) cream, Apply to PORT site 30 min prior to labs or chemo, Disp: 30 g, Rfl: 2  •  LORazepam (ATIVAN) 1 mg tablet, Take 1 tablet (1 mg total) by mouth every 8 (eight) hours as needed for anxiety, Disp: 20 tablet, Rfl: 0  •  omeprazole (PriLOSEC) 40 MG capsule, TAKE 1 CAPSULE (40 MG TOTAL) BY MOUTH EVERY MORNING , Disp: 90 capsule, Rfl: 0  •  ondansetron (ZOFRAN) 8 mg tablet, TAKE 1 TABLET BY MOUTH 3 TIMES A DAY BEFORE MEALS TO PREVENT AND REDUCE NAUSEA, Disp: 90 tablet, Rfl: 0  •  oxyCODONE-acetaminophen (PERCOCET)  mg per tablet, Take 1 tablet by mouth every 4 (four) hours as needed for moderate pain, Disp: , Rfl:   •  polyethylene glycol (MIRALAX) 17 g packet, Take 17 g by mouth daily, Disp: 30 each, Rfl: 5  •  promethazine (PHENERGAN) 25 mg tablet, Take 1 tablet (25 mg total) by mouth every 6 (six) hours as needed for nausea or vomiting, Disp: 30 tablet, Rfl: 1  •  senna (SENOKOT) 8 6 MG tablet, Take 1 tablet (8 6 mg total) by mouth 2 (two) times a day Take to prevent constipation from pain meds  Hold one dose for loose stool (Patient taking differently: Take 1 tablet by mouth if needed Take to prevent constipation from pain meds    Hold one dose for loose stool), Disp: 60 tablet, Rfl: 11  •  traZODone (DESYREL) 50 mg tablet, TAKE 1-2 AT BEDTIME, Disp: 180 tablet, Rfl: 2  •  benzonatate (TESSALON PERLES) 100 mg capsule, Take 1-2 capsules by mouth three times a day as needed for cough (Patient not taking: Reported on 5/23/2023), Disp: 40 capsule, Rfl: 1  •  predniSONE 10 mg tablet, Take 1 tablet (10 mg total) by mouth daily with breakfast STOP when complete (Patient not taking: Reported on 5/23/2023), Disp: 15 tablet, Rfl: 0  Allergies   Allergen Reactions   • Medical Tape Itching     And redness from adhesive    And \"skin raised up\" especially longer on skin   • Aspirin GI Intolerance   • Codeine GI Intolerance   • Dust Mite Extract    • Pollen Extract " Sneezing     Family History   Problem Relation Age of Onset   • Stomach cancer Mother    • Cancer Mother         Stomach Cancer   • Diabetes Mother    • Alcohol abuse Father    • No Known Problems Sister    • No Known Problems Daughter    • No Known Problems Maternal Grandmother    • No Known Problems Maternal Grandfather    • No Known Problems Paternal Grandmother    • No Known Problems Paternal Grandfather    • No Known Problems Sister    • No Known Problems Sister    • No Known Problems Sister    • No Known Problems Sister    • No Known Problems Daughter    • No Known Problems Daughter    • No Known Problems Maternal Aunt    • No Known Problems Maternal Aunt    • No Known Problems Paternal Aunt    • No Known Problems Paternal Aunt    • No Known Problems Paternal Aunt    • No Known Problems Paternal Aunt    • No Known Problems Paternal Aunt    • No Known Problems Paternal Aunt    • No Known Problems Paternal Aunt    • No Known Problems Paternal Aunt    • No Known Problems Paternal Aunt    • No Known Problems Paternal Aunt      Social History     Socioeconomic History   • Marital status:      Spouse name: Not on file   • Number of children: Not on file   • Years of education: Not on file   • Highest education level: Not on file   Occupational History   • Not on file   Tobacco Use   • Smoking status: Former     Packs/day: 3 00     Years: 25 00     Total pack years: 75 00     Types: Cigarettes     Start date:      Quit date: 2003     Years since quittin 9   • Smokeless tobacco: Never   Vaping Use   • Vaping Use: Never used   Substance and Sexual Activity   • Alcohol use: Not Currently     Comment: Occasional   • Drug use: No     Types: Marijuana     Comment: As teenager   • Sexual activity: Not Currently     Partners: Male   Other Topics Concern   • Not on file   Social History Narrative   • Not on file     Social Determinants of Health     Financial Resource Strain: Not on file   Food Insecurity: Not on file   Transportation Needs: Not on file   Physical Activity: Not on file   Stress: Not on file   Social Connections: Not on file   Intimate Partner Violence: Not on file   Housing Stability: Not on file     30 minutes was spent in the coordination of care, reviewing of imaging and with the patient on the date of service    Scribe Attestation    I,:  Bertha Young am acting as a scribe while in the presence of the attending physician :       I,:  Roma Brittle, DO personally performed the services described in this documentation    as scribed in my presence :

## 2023-06-08 ENCOUNTER — TELEPHONE (OUTPATIENT)
Dept: PALLIATIVE MEDICINE | Facility: CLINIC | Age: 68
End: 2023-06-08

## 2023-06-08 NOTE — TELEPHONE ENCOUNTER
6/7/2023 and 6/8/2023 -Left message for patient to return call to the office to schedule a follow up appointment with Palliative Care

## 2023-07-03 ENCOUNTER — CONSULT (OUTPATIENT)
Dept: PAIN MEDICINE | Facility: CLINIC | Age: 68
End: 2023-07-03
Payer: MEDICARE

## 2023-07-03 VITALS
RESPIRATION RATE: 20 BRPM | HEART RATE: 142 BPM | DIASTOLIC BLOOD PRESSURE: 66 MMHG | SYSTOLIC BLOOD PRESSURE: 86 MMHG | BODY MASS INDEX: 23.1 KG/M2 | HEIGHT: 59 IN | WEIGHT: 114.6 LBS

## 2023-07-03 DIAGNOSIS — G57.11 MERALGIA PARAESTHETICA, RIGHT: ICD-10-CM

## 2023-07-03 DIAGNOSIS — M51.16 LUMBAR DISC DISEASE WITH RADICULOPATHY: Primary | ICD-10-CM

## 2023-07-03 DIAGNOSIS — G89.4 CHRONIC PAIN SYNDROME: ICD-10-CM

## 2023-07-03 PROCEDURE — 99204 OFFICE O/P NEW MOD 45 MIN: CPT | Performed by: ANESTHESIOLOGY

## 2023-07-03 RX ORDER — METHYLPREDNISOLONE 4 MG/1
TABLET ORAL
Qty: 21 TABLET | Refills: 0 | Status: SHIPPED | OUTPATIENT
Start: 2023-07-03

## 2023-07-03 RX ORDER — GABAPENTIN 400 MG/1
400 CAPSULE ORAL 3 TIMES DAILY
Qty: 90 CAPSULE | Refills: 1 | Status: SHIPPED | OUTPATIENT
Start: 2023-07-03 | End: 2023-08-02

## 2023-07-03 NOTE — PROGRESS NOTES
Assessment:  1. Lumbar disc disease with radiculopathy    2. Chronic pain syndrome        Plan:  Patient is a 75-year-old female with complaints of low back and right thigh pain with chronic patient secondary lumbar degenerative disc disease with radiculopathy presents to office for initial consultation. Patient reports burning, cramping, throbbing, tingling, numbness in the right thigh which is nearly constant like a tooth ache. Patient requires a cane for gait assistance. Patient's history is complicated by metastatic cancer secondary to cervical cancer. MRI of lumbar spine is reviewed which does show a L2-L3 disc bulge/herniation with encroachment on the exiting L2 nerve root. 1.  We will schedule patient for right L2-L3 transforaminal epidural steroid injection  2. We will provide patient with a Medrol Dosepak  3. Follow-up 1 month after injection  4. We will titrate gabapentin up to 400 mg p.o. 3 times daily      Complete risks and benefits including bleeding, infection, tissue reaction, nerve injury and allergic reaction were discussed. The approach was demonstrated using models and literature was provided. Verbal and written consent was obtained. History of Present Illness: The patient is a 76 y.o. female who presents for consultation in regards to Back Pain and Leg Pain (Right sided). Symptoms have been present for 2 years. Symptoms began without any precipitating injury or trauma. Pain is reported to be 8 on the numeric rating scale. Symptoms are felt constantly and worst in the no typical pattern. Symptoms are characterized as burning, shooting, sharp, numbing, tingling and throbbing. Symptoms are associated with right leg weakness. Aggravating factors include lying down, standing, bending, leaning forward, leaning bckward, sitting, walking, coughing/sneezing and bowel movements. Relieving factors include nothing.   No change in symptoms with kneeling, exercise, turning the head and relaxation. Treatments that have been helpful include nothing. physical therapy, home exercise, TENS unit and heat/ice have provided no relief. Medications to relieve symptoms include none. Patient has tried and failed oxycodone, Vicodin, gabapentin    Review of Systems:    Review of Systems   Constitutional: Positive for unexpected weight change. Weight loss due to cancer diagnosis   Eyes: Positive for visual disturbance. Respiratory: Positive for shortness of breath. Cardiovascular: Positive for chest pain. Gastrointestinal: Positive for abdominal pain, nausea and vomiting. Musculoskeletal: Positive for back pain and gait problem. Joint pain (right leg)   Neurological: Positive for dizziness. All other systems reviewed and are negative.           Past Medical History:   Diagnosis Date   • Acne    • Anxiety    • Anxiety and depression 10/02/2019   • Arthritis    • Breathing difficulty     pt does not recall any breathing problem but did have neck pain after procedure" Gave her an incentive spirometer to use   • Cancer (HCC)     cervical/ and "lymph nodes" (GYN)   • Cervical cancer (HCC)    • Chronic kidney disease     stage 3   • Chronic pain disorder     Neck pain   • Claustrophobia    • Colon polyp    • GERD (gastroesophageal reflux disease)     not now   • Headache    • History of radiation therapy    • History of shingles    • Irritable bowel syndrome    • Joint pain following chemotherapy     and stiffness   • Liver disease     "cyst on liver"   • Lung nodule     "left"   • Motion sickness    • Muscle weakness     after chemo"   • Neck pain     "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"has had neck pain since childhood"   • Peptic ulceration 2016   • Port-A-Cath in place     right chest//for chemotherapy q 3 weeks and also receives IV hydration   • Shortness of breath     "started with cancer, when too much exertion like cleaning/guera doing stairs"-pt states it is much better   • Thumb injury 10/2019    right   • Thyroid nodule    • Tinnitus of left ear 02/19/2020   • Urinary problem    • Wears contact lenses    • Wrist arthritis 03/17/2020       Past Surgical History:   Procedure Laterality Date   • COLONOSCOPY     • CYSTOSCOPY W/ RETROGRADES Right 06/03/2021    Procedure: Lorena [place] W/RETROGRADE;  Surgeon: Harley Angeles MD;  Location: [facility];  Service: Urology   • CYSTOSCOPY W/ RETROGRADES Right 10/18/2021    Procedure: CYSTOSCOPY WITH ureteral stent exchange;  Surgeon: Kathrine Larose MD;  Location: [facility];  Service: Urology   • FL RETROGRADE PYELOGRAM  02/11/2021   • FL RETROGRADE PYELOGRAM  06/03/2021   • FOOT SURGERY     • HYSTERECTOMY     • IR BIOPSY LYMPH NODE  02/19/2021   • IR PORT PLACEMENT  03/09/2021   • LYMPH NODE BIOPSY  2021   • IA CYSTO BLADDER W/URETERAL CATHETERIZATION Right 02/11/2021    Procedure: CYSTOSCOPY RETROGRADE PYELOGRAM WITH INSERTION STENT URETERAL;  Surgeon: Kathrine Larose MD;  [facility];  Service: Urology   • IA CYSTO BLADDER W/URETERAL CATHETERIZATION Right 04/04/2022    Procedure: CYSTOSCOPY  WITH INSERTION STENT URETERAL Right;  Surgeon: Kathrine aLrose MD;  Location: [facility];  Service: Urology   • IA CYSTO BLADDER W/URETERAL CATHETERIZATION Right 10/27/2022    Procedure: Cystoscopy, right ureteral stent exchange;  Surgeon: Kathrine Larose MD;  Location: [facility];  Service: Urology   • IA CYSTO BLADDER W/URETERAL CATHETERIZATION Right 5/1/2023    Procedure: CYSTOSCOPY with right ureteral stent exchange;  Surgeon: Kathrine Larose MD;  Location: [facility];  Service: Urology   • IA EXCISION INTERDIGITAL KESSLER NEUROMA SINGLE EACH Left 07/12/2018    Procedure: FOOT NEUROMA EXCISION;  Surgeon: Yojana Higuera DPM;  Location: [address] MAIN OR;  Service: Podiatry   • IA REMOVE & REPLACE INDWELL URETERAL STENT [address] [facility] [place] Right 06/03/2021    Procedure: EXCHANGE STENT;  Surgeon: Harley Angeles MD;  Location: [facility];  Service: Urology   • TUBAL LIGATION     • UPPER GASTROINTESTINAL ENDOSCOPY  2016       Family History   Problem Relation Age of Onset   • Stomach cancer Mother    • Cancer Mother         Stomach Cancer   • Diabetes Mother    • Alcohol abuse Father    • No Known Problems Sister    • No Known Problems Daughter    • No Known Problems Maternal Grandmother    • No Known Problems Maternal Grandfather    • No Known Problems Paternal Grandmother    • No Known Problems Paternal Grandfather    • No Known Problems Sister    • No Known Problems Sister    • No Known Problems Sister    • No Known Problems Sister    • No Known Problems Daughter    • No Known Problems Daughter    • No Known Problems Maternal Aunt    • No Known Problems Maternal Aunt    • No Known Problems Paternal Aunt    • No Known Problems Paternal Aunt    • No Known Problems Paternal Aunt    • No Known Problems Paternal Aunt    • No Known Problems Paternal Aunt    • No Known Problems Paternal Aunt    • No Known Problems Paternal Aunt    • No Known Problems Paternal Aunt    • No Known Problems Paternal Aunt    • No Known Problems Paternal Aunt        Social History     Occupational History   • Not on file   Tobacco Use   • Smoking status: Former     Packs/day: 3.00     Years: 25.00     Total pack years: 75.00     Types: Cigarettes     Start date:      Quit date: 2003     Years since quittin.9   • Smokeless tobacco: Never   Vaping Use   • Vaping Use: Never used   Substance and Sexual Activity   • Alcohol use: Not Currently     Comment: Occasional   • Drug use: No     Types: Marijuana     Comment: As teenager   • Sexual activity: Not Currently     Partners: Male         Current Outpatient Medications:   •  albuterol (PROVENTIL HFA,VENTOLIN HFA) 90 mcg/act inhaler, INHALE 2 PUFFS EVERY 4 HOURS AS NEEDED FOR SHORTNESS OF BREATH., Disp: 18 g, Rfl: 1  •  Diclofenac Sodium (VOLTAREN) 1 %, Apply 2 g topically 4 (four) times a day, Disp: 350 g, Rfl: 3  •  gabapentin (NEURONTIN) 300 mg capsule, Take 1 capsule (300 mg total) by mouth 3 (three) times a day To reduce nerve pain, Disp: 90 capsule, Rfl: 0  •  lidocaine-prilocaine (EMLA) cream, Apply to PORT site 30 min prior to labs or chemo, Disp: 30 g, Rfl: 2  •  LORazepam (ATIVAN) 1 mg tablet, Take 1 tablet (1 mg total) by mouth every 8 (eight) hours as needed for anxiety, Disp: 20 tablet, Rfl: 0  •  omeprazole (PriLOSEC) 40 MG capsule, TAKE 1 CAPSULE (40 MG TOTAL) BY MOUTH EVERY MORNING., Disp: 90 capsule, Rfl: 0  •  ondansetron (ZOFRAN) 8 mg tablet, TAKE 1 TABLET BY MOUTH 3 TIMES A DAY BEFORE MEALS TO PREVENT AND REDUCE NAUSEA, Disp: 90 tablet, Rfl: 0  •  oxyCODONE-acetaminophen (PERCOCET)  mg per tablet, Take 1 tablet by mouth every 4 (four) hours as needed for moderate pain, Disp: , Rfl:   •  polyethylene glycol (MIRALAX) 17 g packet, Take 17 g by mouth daily, Disp: 30 each, Rfl: 5  •  promethazine (PHENERGAN) 25 mg tablet, Take 1 tablet (25 mg total) by mouth every 6 (six) hours as needed for nausea or vomiting, Disp: 30 tablet, Rfl: 1  •  senna (SENOKOT) 8.6 MG tablet, Take 1 tablet (8.6 mg total) by mouth 2 (two) times a day Take to prevent constipation from pain meds. Hold one dose for loose stool (Patient taking differently: Take 1 tablet by mouth if needed Take to prevent constipation from pain meds.   Hold one dose for loose stool), Disp: 60 tablet, Rfl: 11  •  traZODone (DESYREL) 50 mg tablet, TAKE 1-2 AT BEDTIME, Disp: 180 tablet, Rfl: 2  •  buPROPion (WELLBUTRIN XL) 300 mg 24 hr tablet, TAKE 1 TABLET BY MOUTH EVERY DAY (Patient taking differently: every morning), Disp: 90 tablet, Rfl: 2  •  predniSONE 10 mg tablet, Take 1 tablet (10 mg total) by mouth daily with breakfast STOP when complete (Patient not taking: Reported on 5/23/2023), Disp: 15 tablet, Rfl: 0    Allergies   Allergen Reactions   • Medical Tape Itching     And redness from adhesive    And "skin raised up" especially longer on skin   • Aspirin GI Intolerance   • Codeine GI Intolerance   • Dust Mite Extract    • Pollen Extract Sneezing       Physical Exam:    BP (!) 86/66 (BP Location: Left arm, Patient Position: Sitting, Cuff Size: Standard)   Pulse (!) 142   Resp 20   Ht 4' 11" (1.499 m)   Wt 52 kg (114 lb 9.6 oz)   LMP  (LMP Unknown)   BMI 23.15 kg/m²     Constitutional: normal, well developed, well nourished, alert, in no distress and non-toxic and no overt pain behavior. Eyes: anicteric  HEENT: grossly intact  Neck: supple, symmetric, trachea midline and no masses   Pulmonary:even and unlabored  Cardiovascular:No edema or pitting edema present  Skin:Normal without rashes or lesions and well hydrated  Psychiatric:Mood and affect appropriate  Neurologic:Cranial Nerves II-XII grossly intact  Musculoskeletal:antalgic     Lumbar/Sacral Spine examination demonstrates. Full range of motion lumbar spine with pain upon: flexion, lateral rotation to the left/right, and bending to the left/right. Bilateral lumbar paraspinals tender to palpation. Muscle spasms noted in the lumbar area bilaterally. 4/5 lower extremity strength in all muscle groups bilaterally. Positive seated straight leg raise for bilateral lower extremities. Sensitivity to light touch intact bilateral lower extremities. 2+ reflexes in the patella and Achilles. No ankle clonus    Imaging    Study Result    Narrative & Impression   MRI LUMBAR SPINE WITHOUT CONTRAST     INDICATION: M54.16: Radiculopathy, lumbar region.     COMPARISON:  None.     TECHNIQUE:  Sagittal T1, sagittal T2, sagittal inversion recovery, axial T1 and axial T2, coronal T2.    IMAGE QUALITY:  Diagnostic     FINDINGS:     VERTEBRAL BODIES:  There are 5 lumbar type vertebral bodies. Normal alignment of the lumbar spine. No spondylolysis or spondylolisthesis. No scoliosis. No compression fracture. Normal marrow signal is identified within the visualized bony   structures.   No discrete marrow lesion.     SACRUM:  Normal signal within the sacrum. No evidence of insufficiency or stress fracture.     DISTAL CORD AND CONUS:  Normal size and signal within the distal cord and conus.     PARASPINAL SOFT TISSUES:  Paraspinal soft tissues are unremarkable.     LOWER THORACIC DISC SPACES:  Normal disc height and signal.  No disc herniation, canal stenosis or foraminal narrowing.     LUMBAR DISC SPACES:     L1-L2:  There is a small right foraminal disc protrusion without nerve compression or displacement.     L2-L3: Disc desiccation with slight loss of disc height. Mild annular bulging with a very small superiorly extruded right paramedian disc herniation. Minor facet degenerative change on the left. There is no canal stenosis. Mild right foraminal   narrowing with disc material abutting the ventral aspect of the exiting nerve. Correlate for right L2 radiculopathy.     L3-L4:  Normal disc height. No disc herniation or canal stenosis. No nerve impingement.     L4-L5:  Normal disc height and signal.  No disc herniation, canal stenosis or foraminal narrowing.     L5-S1:  There is a small, broad-based left paramedian disc herniation abutting the left S1 nerve with slight posterior displacement of the nerve. No mass effect upon the thecal sac. No foraminal nerve impingement.     IMPRESSION:     Lumbar degenerative disc disease including a small broad-based right-sided disc herniation at L2-3 abutting the ventral aspect of the exiting nerve and a small broad-based left paramedian disc herniation at L5-S1, abutting and slightly posteriorly   displacing the S1 nerve. No orders to display       No orders of the defined types were placed in this encounter.

## 2023-07-03 NOTE — PATIENT INSTRUCTIONS
Do not apply heat to any area that is numb. If you have discomfort or soreness at the injection site, you may apply ice today, 20 minutes on and 20 minutes off. Tomorrow you may use ice or warm, moist heat. Do not apply ice or heat directly to the skin. If you experience severe shortness of breath, go to the Emergency Room. You may have numbness for several hours from the local anesthetic. Please use caution and common sense, especially with weight-bearing activities. You may have an increase or change in the discomfort for 36-48 hours after your treatment. Apply ice and continue with any pain medicine you have been prescribed. Do not do anything strenuous today. You may shower, but no tub baths or hot tubs today. You may resume your normal activities tomorrow, but do not “overdo it”. Resume normal activities slowly when you are feeling better. If you experience redness, drainage or swelling at the injection site, or if you develop a fever above 100 degrees, please call [facility] at [phone] or go to the Emergency Room. Continue to take all routine medicines prescribed by your primary care physician unless otherwise instructed by our staff. Most blood thinners should be started again according to your regularly scheduled dosing. If you have any questions, please give our office a call. As no general anesthesia was used in today's procedure, you should not experience any side effects related to anesthesia. If you have a problem specifically related to your procedure, please call our office at [phone]. Problems not related to your procedure should be directed to your primary care physician.

## 2023-07-13 ENCOUNTER — OFFICE VISIT (OUTPATIENT)
Dept: FAMILY MEDICINE CLINIC | Facility: CLINIC | Age: 68
End: 2023-07-13
Payer: MEDICARE

## 2023-07-13 VITALS
OXYGEN SATURATION: 99 % | DIASTOLIC BLOOD PRESSURE: 58 MMHG | HEIGHT: 59 IN | TEMPERATURE: 97.4 F | HEART RATE: 122 BPM | BODY MASS INDEX: 22.92 KG/M2 | SYSTOLIC BLOOD PRESSURE: 104 MMHG | WEIGHT: 113.7 LBS

## 2023-07-13 DIAGNOSIS — F32.A ANXIETY AND DEPRESSION: ICD-10-CM

## 2023-07-13 DIAGNOSIS — Z00.00 MEDICARE ANNUAL WELLNESS VISIT, SUBSEQUENT: ICD-10-CM

## 2023-07-13 DIAGNOSIS — K59.03 DRUG INDUCED CONSTIPATION: Primary | ICD-10-CM

## 2023-07-13 DIAGNOSIS — R11.0 NAUSEA: ICD-10-CM

## 2023-07-13 DIAGNOSIS — C53.0 MALIGNANT NEOPLASM OF ENDOCERVIX (HCC): ICD-10-CM

## 2023-07-13 DIAGNOSIS — F41.9 ANXIETY AND DEPRESSION: ICD-10-CM

## 2023-07-13 PROCEDURE — G0439 PPPS, SUBSEQ VISIT: HCPCS | Performed by: FAMILY MEDICINE

## 2023-07-13 PROCEDURE — 99214 OFFICE O/P EST MOD 30 MIN: CPT | Performed by: FAMILY MEDICINE

## 2023-07-13 RX ORDER — LORAZEPAM 1 MG/1
1 TABLET ORAL EVERY 8 HOURS PRN
Qty: 50 TABLET | Refills: 5 | Status: SHIPPED | OUTPATIENT
Start: 2023-07-13

## 2023-07-13 RX ORDER — ONDANSETRON 8 MG/1
8 TABLET, ORALLY DISINTEGRATING ORAL EVERY 8 HOURS PRN
Qty: 90 TABLET | Refills: 3 | Status: SHIPPED | OUTPATIENT
Start: 2023-07-13

## 2023-07-13 NOTE — ASSESSMENT & PLAN NOTE
I reviewed the patient's last CT scan. I also recently viewed the last consultation with palliative medicine. The patient was offered hospice. However, she wishes to proceed with pain management for injections in her back so she was not a candidate for hospice. She reports that her treatment was discontinued by gynecologic oncology. We will try to help the patient with symptom management as best as possible until she is ready to go on hospice.   I patient to make an appointment to see Dr. General Humphreys again

## 2023-07-13 NOTE — ASSESSMENT & PLAN NOTE
Patient has depression anxiety. She will continue bupropion 300 mg daily. I advised the patient that this medication is not available in a liquid form. She has for a refill for lorazepam.  She is on opiates. I queried the [place] prescription drug monitoring program.  This patient with advanced cancer, I think the benefits outweigh the risk and I filled a prescription for lorazepam to take as needed.

## 2023-07-13 NOTE — PROGRESS NOTES
Assessment and Plan:     Problem List Items Addressed This Visit        Digestive    Drug induced constipation - Primary     Patient tells me she hates taking opiates but she has been taking her opiates again now as she is experiencing constipation. She requested to go back on Movantik which I prescribed for her in the past.  I refilled the prescription. She will take 1 daily. 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 I also recently viewed the last consultation with palliative medicine. The patient was offered hospice. However, she wishes to proceed with pain management for injections in her back so she was not a candidate for hospice. She reports that her treatment was discontinued by gynecologic oncology. We will try to help the patient with symptom management as best as possible until she is ready to go on hospice. I patient to make an appointment to see Dr. Tiffanie Yepez again         Relevant Medications    ondansetron (ZOFRAN-ODT) 8 mg disintegrating tablet    LORazepam (ATIVAN) 1 mg tablet       Other    Anxiety and depression     Patient has depression anxiety. She will continue bupropion 300 mg daily. I advised the patient that this medication is not available in a liquid form. She has for a refill for lorazepam.  She is on opiates. I queried the [place] prescription drug monitoring program.  This patient with advanced cancer, I think the benefits outweigh the risk and I filled a prescription for lorazepam to take as needed. Relevant Medications    LORazepam (ATIVAN) 1 mg tablet    Nausea     Patient has nausea associated with her cancer diagnosis. She has not been taking the ondansetron as she finds that when she takes this it causes her to vomit. She asked for a liquid form.   I told her they do not make this but they make an oral disintegrating tablet. She was agreeable to this and I sent in a new prescription. Relevant Medications    ondansetron (ZOFRAN-ODT) 8 mg disintegrating tablet    Medicare annual wellness visit, subsequent       Urinary Incontinence Plan of Care: counseling topics discussed: use restroom every 2 hours. Preventive health issues were discussed with patient, and age appropriate screening tests were ordered as noted in patient's After Visit Summary. Personalized health advice and appropriate referrals for health education or preventive services given if needed, as noted in patient's After Visit Summary. History of Present Illness:     Patient presents for a Medicare Wellness Visit    This patient is a 70-year-old  female who presents to the office today for her annual Medicare wellness exam as well as for follow-up visit. Patient is not doing well. She has increasing pain. She reports that she has been experiencing nausea. She tells me that she is unable to take the ondansetron for treatment of her nausea because she vomits it. She finds she does much better with liquids. She has been losing weight and reports a decreased appetite. Her main complaint is pain in her back which radiates down her right leg to the knee. She is scheduled to see pain management. Patient Care Team:  Breanna Alfred DO as PCP - General (Family Medicine)  Agnes Cheung MD as PCP - [facility]  Sunny Stauffer MD as PCP - Attending  Apurva Rios RD (Nutrition)  JANEE Cao as  Care Manager (Oncology)  José Davis MD (Pain Medicine)     Review of Systems:     Review of Systems   Constitutional: Positive for activity change, appetite change and unexpected weight change. Respiratory: Negative for cough and shortness of breath. Cardiovascular: Negative for chest pain, palpitations and leg swelling.    Gastrointestinal: Positive for abdominal pain, constipation, nausea and vomiting. Negative for abdominal distention, blood in stool and diarrhea. Psychiatric/Behavioral: Positive for dysphoric mood and sleep disturbance.         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)   • Chemotherapy-induced nausea   • Urgency of urination   • Pain in thoracic spine   • Chemotherapy induced neutropenia (HCC)   • Thyroid lesion   • Dyspnea on exertion   • Encounter for venous access device care   • Multiple lung nodules   • Primary generalized (osteo)arthritis   • Xerostomia   • Encounter for immunization   • Sensorineural hearing loss (SNHL) of both ears   • Chronic obstructive pulmonary disease, unspecified COPD type ([address])   • Hematuria   • Calculus of gallbladder without cholecystitis without obstruction   • Chronic constipation   • Continuous opioid dependence ([address])   • Lumbar disc disease with radiculopathy   • Cervical spondylosis   • Stage 3 chronic kidney disease, unspecified whether stage 3a or 3b CKD ([facility])   • Meralgia paraesthetica, right   • Dyslipidemia   • Post-menopause   • Major depressive disorder, single episode, mild ([facility])   • Insomnia due to other mental disorder   • Chemotherapy-induced thrombocytopenia   • DNR (do not resuscitate) discussion   • Neoplasm of uncertain behavior of bone and articular cartilage   • Right leg pain   • Chronic pain syndrome   • Drug induced constipation   • Medicare annual wellness visit, subsequent      Past Medical and Surgical History:     Past Medical History:   Diagnosis Date   • Acne    • Anxiety    • Anxiety and depression 10/02/2019   • Arthritis    • Breathing difficulty     pt does not recall any breathing problem but did have neck pain after procedure" Gave her an incentive spirometer to use   • Cancer (HCC)     cervical/ and "lymph nodes" (GYN)   • Cervical cancer (HCC)    • Chronic kidney disease     stage 3   • Chronic pain disorder     Neck pain   • Claustrophobia    • Colon polyp    • GERD (gastroesophageal reflux disease)     not now   • Headache    • History of radiation therapy    • History of shingles    • Irritable bowel syndrome    • Joint pain following chemotherapy     and stiffness   • Liver disease     "cyst on liver"   • Lung nodule     "left"   • Motion sickness    • Muscle weakness     after chemo"   • Neck pain     "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"has had neck pain since childhood"   • Peptic ulceration 2016   • Port-A-Cath in place     right chest//for chemotherapy q 3 weeks and also receives IV hydration   • Shortness of breath     "started with cancer, when too much exertion like cleaning/guera doing stairs"-pt states it is much better   • Thumb injury 10/2019    right   • Thyroid nodule    • Tinnitus of left ear 02/19/2020   • Urinary problem    • Wears contact lenses    • Wrist arthritis 03/17/2020     Past Surgical History:   Procedure Laterality Date   • COLONOSCOPY     • CYSTOSCOPY W/ RETROGRADES Right 06/03/2021    Procedure: Nataly Ross W/RETROGRADE;  Surgeon: Sherry Jacob MD;  Location: [facility];  Service: Urology   • CYSTOSCOPY W/ RETROGRADES Right 10/18/2021    Procedure: CYSTOSCOPY WITH ureteral stent exchange;  Surgeon: Marli Spencer MD;  Location: [facility];  Service: Urology   • FL RETROGRADE PYELOGRAM  02/11/2021   • FL RETROGRADE PYELOGRAM  06/03/2021   • FOOT SURGERY     • HYSTERECTOMY     • IR BIOPSY LYMPH NODE  02/19/2021   • IR PORT PLACEMENT  03/09/2021   • LYMPH NODE BIOPSY  2021   • ID CYSTO BLADDER W/URETERAL CATHETERIZATION Right 02/11/2021    Procedure: CYSTOSCOPY RETROGRADE PYELOGRAM WITH INSERTION STENT URETERAL;  Surgeon: Sally Butcher MD;  Location:  MAIN OR;  Service: Urology   • SC CYSTO BLADDER W/URETERAL CATHETERIZATION Right 04/04/2022    Procedure: CYSTOSCOPY  WITH INSERTION STENT URETERAL Right;  Surgeon: Sally Butcher MD;  [facility]: [facility];  Service: Urology   • SC CYSTO BLADDER W/URETERAL CATHETERIZATION Right 10/27/2022    Procedure: Cystoscopy, right ureteral stent exchange;  Surgeon: Sally Butcher MD;  [facility]: [facility];  Service: Urology   • SC CYSTO BLADDER W/URETERAL CATHETERIZATION Right 5/1/2023    Procedure: CYSTOSCOPY with right ureteral stent exchange;  Surgeon: Sally Butcher MD;  Location: [facility];  Service: Urology   • SC EXCISION INTERDIGITAL KESSLER NEUROMA SINGLE EACH Left 07/12/2018    Procedure: FOOT NEUROMA EXCISION;  Surgeon: Hayder Gutiérrez DPM;  Location: [address] [place] [address] MAIN OR;  Service: Podiatry   • [address] [place] [address] Right 06/03/2021    Procedure: EXCHANGE STENT;  Surgeon: Bryant León MD;  Location: [facility];  Service: Urology   • TUBAL LIGATION     • UPPER GASTROINTESTINAL ENDOSCOPY  2016      Family History:     Family History   Problem Relation Age of Onset   • Stomach cancer Mother    • Cancer Mother         Stomach Cancer   • Diabetes Mother    • Alcohol abuse Father    • No Known Problems Sister    • No Known Problems Daughter    • No Known Problems Maternal Grandmother    • No Known Problems Maternal Grandfather    • No Known Problems Paternal Grandmother    • No Known Problems Paternal Grandfather    • No Known Problems Sister    • No Known Problems Sister    • No Known Problems Sister    • No Known Problems Sister    • No Known Problems Daughter    • No Known Problems Daughter    • No Known Problems Maternal Aunt    • No Known Problems Maternal Aunt    • No Known Problems Paternal Aunt    • No Known Problems Paternal Aunt    • No Known Problems Paternal Aunt    • No Known Problems Paternal Aunt    • No Known Problems Paternal Aunt • No Known Problems Paternal Aunt    • No Known Problems Paternal Aunt    • No Known Problems Paternal Aunt    • No Known Problems Paternal Aunt    • No Known Problems Paternal Aunt       Social History:     Social History     Socioeconomic History   • Marital status:      Spouse name: None   • Number of children: None   • Years of education: None   • Highest education level: None   Occupational History   • None   Tobacco Use   • Smoking status: Former     Packs/day: 3.00     Years: 25.00     Total pack years: 75.00     Types: Cigarettes     Start date:      Quit date: 2003     Years since quittin.0   • Smokeless tobacco: Never   Vaping Use   • Vaping Use: Never used   Substance and Sexual Activity   • Alcohol use: Not Currently     Comment: Occasional   • Drug use: No     Types: Marijuana     Comment: As teenager   • Sexual activity: Not Currently     Partners: Male   Other Topics Concern   • None   Social History Narrative   • None     Social Determinants of Health     Financial Resource Strain: Medium Risk (2023)    Overall Financial Resource Strain (CARDIA)    • Difficulty of Paying Living Expenses: Somewhat hard   Food Insecurity: Not on file   Transportation Needs: No Transportation Needs (2023)    PRAPARE - Transportation    • Lack of Transportation (Medical): No    • Lack of Transportation (Non-Medical):  No   Physical Activity: Not on file   Stress: Not on file   Social Connections: Not on file   Intimate Partner Violence: Not on file   Housing Stability: Not on file      Medications and Allergies:     Current Outpatient Medications   Medication Sig Dispense Refill   • albuterol (PROVENTIL HFA,VENTOLIN HFA) 90 mcg/act inhaler INHALE 2 PUFFS EVERY 4 HOURS AS NEEDED FOR SHORTNESS OF BREATH. 18 g 1   • buPROPion (WELLBUTRIN XL) 300 mg 24 hr tablet TAKE 1 TABLET BY MOUTH EVERY DAY (Patient taking differently: every morning) 90 tablet 2   • Diclofenac Sodium (VOLTAREN) 1 % Apply 2 g topically 4 (four) times a day 350 g 3   • gabapentin (NEURONTIN) 400 mg capsule Take 1 capsule (400 mg total) by mouth 3 (three) times a day To reduce nerve pain 90 capsule 1   • lidocaine-prilocaine (EMLA) cream Apply to PORT site 30 min prior to labs or chemo 30 g 2   • LORazepam (ATIVAN) 1 mg tablet Take 1 tablet (1 mg total) by mouth every 8 (eight) hours as needed for anxiety 50 tablet 5   • methylPREDNISolone 4 MG tablet therapy pack Use as directed on package 21 tablet 0   • naloxegol oxalate (MOVANTIK) 25 MG tablet Take 1 tablet (25 mg total) by mouth in the morning 90 tablet 3   • omeprazole (PriLOSEC) 40 MG capsule TAKE 1 CAPSULE (40 MG TOTAL) BY MOUTH EVERY MORNING. 90 capsule 0   • ondansetron (ZOFRAN-ODT) 8 mg disintegrating tablet Take 1 tablet (8 mg total) by mouth every 8 (eight) hours as needed for nausea or vomiting 90 tablet 3   • oxyCODONE-acetaminophen (PERCOCET)  mg per tablet Take 1 tablet by mouth every 4 (four) hours as needed for moderate pain     • polyethylene glycol (MIRALAX) 17 g packet Take 17 g by mouth daily 30 each 5   • promethazine (PHENERGAN) 25 mg tablet Take 1 tablet (25 mg total) by mouth every 6 (six) hours as needed for nausea or vomiting 30 tablet 1   • senna (SENOKOT) 8.6 MG tablet Take 1 tablet (8.6 mg total) by mouth 2 (two) times a day Take to prevent constipation from pain meds. Hold one dose for loose stool (Patient taking differently: Take 1 tablet by mouth if needed Take to prevent constipation from pain meds. Hold one dose for loose stool) 60 tablet 11   • traZODone (DESYREL) 50 mg tablet TAKE 1-2 AT BEDTIME 180 tablet 2     No current facility-administered medications for this visit.      Allergies   Allergen Reactions   • Medical Tape Itching     And redness from adhesive    And "skin raised up" especially longer on skin   • Aspirin GI Intolerance   • Codeine GI Intolerance   • Dust Mite Extract    • Pollen Extract Sneezing      Immunizations: Immunization History   Administered Date(s) Administered   • INFLUENZA 10/11/2019   • Influenza, high dose seasonal 0.7 mL 11/09/2021, 09/15/2022   • Influenza, recombinant, quadrivalent,injectable, preservative free 10/23/2018   • Pneumococcal Conjugate 13-Valent 11/30/2021   • Zoster 03/10/2017      Health Maintenance:         Topic Date Due   • Cervical Cancer Screening  05/21/2021   • Breast Cancer Screening: Mammogram  12/30/2023   • Colorectal Cancer Screening  05/11/2028   • Hepatitis C Screening  Completed         Topic Date Due   • COVID-19 Vaccine (1) Never done   • Pneumococcal Vaccine: 65+ Years (2 - PPSV23 if available, else PCV20) 01/25/2022   • Influenza Vaccine (1) 09/01/2023      Medicare Screening Tests and Risk Assessments:     Juan Townsend is here for her Subsequent Wellness visit. Last Medicare Wellness visit information reviewed, patient interviewed and updates made to the record today. Health Risk Assessment:   Patient rates overall health as poor. Patient feels that their physical health rating is much worse. Patient is very dissatisfied with their life. Eyesight was rated as slightly worse. Hearing was rated as same. Patient feels that their emotional and mental health rating is slightly worse. Patients states they are sometimes angry. Patient states they are often unusually tired/fatigued. Pain experienced in the last 7 days has been a lot. Patient's pain rating has been 10/10. Patient states that she has experienced no weight loss or gain in last 6 months. Fall Risk Screening: In the past year, patient has experienced: no history of falling in past year      Urinary Incontinence Screening:   Patient has leaked urine accidently in the last six months. Home Safety:  Patient has trouble with stairs inside or outside of their home. Patient has working smoke alarms and has working carbon monoxide detector. Home safety hazards include: none. Nutrition:   Current diet is Regular. Medications:   Patient is not currently taking any over-the-counter supplements. Patient is able to manage medications. Activities of Daily Living (ADLs)/Instrumental Activities of Daily Living (IADLs):   Walk and transfer into and out of bed and chair?: Yes  Dress and groom yourself?: Yes    Bathe or shower yourself?: Yes    Feed yourself? Yes  Do your laundry/housekeeping?: Yes  Manage your money, pay your bills and track your expenses?: Yes  Make your own meals?: Yes    Do your own shopping?: Yes    Previous Hospitalizations:   Any hospitalizations or ED visits within the last 12 months?: No      Advance Care Planning:   Living will: Yes    Durable POA for healthcare: Yes    Advanced directive: Yes      Cognitive Screening:   Provider or family/friend/caregiver concerned regarding cognition?: No    PREVENTIVE SCREENINGS      Cardiovascular Screening:    General: Screening Current      Diabetes Screening:     General: Screening Current      Colorectal Cancer Screening:     General: Screening Current      Breast Cancer Screening:     General: Risks and Benefits Discussed and Patient Declines      Cervical Cancer Screening:    General: History Cervical Cancer      Osteoporosis Screening:    General: Risks and Benefits Discussed and Patient Declines      Abdominal Aortic Aneurysm (AAA) Screening:        General: Screening Not Indicated      Lung Cancer Screening:     General: Screening Not Indicated      Hepatitis C Screening:    General: Screening Current    Screening, Brief Intervention, and Referral to Treatment (SBIRT)    Screening  Typical number of drinks in a day: 0  Typical number of drinks in a week: 0  Interpretation: Low risk drinking behavior.     AUDIT-C Screenin) How often did you have a drink containing alcohol in the past year? never  2) How many drinks did you have on a typical day when you were drinking in the past year? 0  3) How often did you have 6 or more drinks on one occasion in the past year? never    AUDIT-C Score: 0  Interpretation: Score 0-2 (female): Negative screen for alcohol misuse    Single Item Drug Screening:  How often have you used an illegal drug (including marijuana) or a prescription medication for non-medical reasons in the past year? never    Single Item Drug Screen Score: 0  Interpretation: Negative screen for possible drug use disorder    Review of Current Opioid Use  Opioid Risk Tool (ORT) Score: 0  Opioid Risk Tool (ORT) Interpretation: Score 0-3: Low risk for opioid misuse    No results found. Physical Exam:     /58   Pulse (!) 122   Temp (!) 97.4 °F (36.3 °C) (Tympanic)   Ht 4' 11" (1.499 m)   Wt 51.6 kg (113 lb 11.2 oz)   LMP  (LMP Unknown)   SpO2 99%   BMI 22.96 kg/m²     Physical Exam  Vitals reviewed. Constitutional:       Comments: Patient is a 40-year-old  female who appears her stated age. She looks weak and frail. She is ashen in color. HENT:      Head: Normocephalic and atraumatic. Right Ear: Tympanic membrane, ear canal and external ear normal. There is no impacted cerumen. Left Ear: Tympanic membrane, ear canal and external ear normal. There is no impacted cerumen. Mouth/Throat:      Mouth: Mucous membranes are moist.      Pharynx: Oropharynx is clear. No oropharyngeal exudate or posterior oropharyngeal erythema. Eyes:      General: No scleral icterus. Right eye: No discharge. Left eye: No discharge. Conjunctiva/sclera: Conjunctivae normal.      Pupils: Pupils are equal, round, and reactive to light. Neck:      Comments: No thyromegaly noted  Cardiovascular:      Rate and Rhythm: Normal rate and regular rhythm. Heart sounds: Normal heart sounds. No murmur heard. No friction rub. No gallop. Pulmonary:      Effort: Pulmonary effort is normal. No respiratory distress. Breath sounds: Normal breath sounds. No stridor. No wheezing, rhonchi or rales.    Abdominal:      General: Bowel sounds are normal. There is no distension. Palpations: Abdomen is soft. There is no mass. Tenderness: There is abdominal tenderness. There is no guarding. Musculoskeletal:      Cervical back: Neck supple. Lymphadenopathy:      Cervical: No cervical adenopathy. Psychiatric:      Comments: Patient looks depressed. She became tearful at times. Extremities: Without cyanosis, clubbing, or edema. There was no calf tenderness and Homans' sign is negative bilaterally.     Tommy No, DO

## 2023-07-13 NOTE — ASSESSMENT & PLAN NOTE
Patient tells me she hates taking opiates but she has been taking her opiates again now as she is experiencing constipation. She requested to go back on Movantik which I prescribed for her in the past.  I refilled the prescription. She will take 1 daily.

## 2023-07-13 NOTE — PATIENT INSTRUCTIONS
Medicare Preventive Visit Patient Instructions  Thank you for completing your Welcome to Medicare Visit or Medicare Annual Wellness Visit today. Your next wellness visit will be due in one year (7/13/2024). The screening/preventive services that you may require over the next 5-10 years are detailed below. Some tests may not apply to you based off risk factors and/or age. Screening tests ordered at today's visit but not completed yet may show as past due. Also, please note that scanned in results may not display below. Preventive Screenings:  Service Recommendations Previous Testing/Comments   Colorectal Cancer Screening  * Colonoscopy    * Fecal Occult Blood Test (FOBT)/Fecal Immunochemical Test (FIT)  * Fecal DNA/Cologuard Test  * Flexible Sigmoidoscopy Age: 43-73 years old   Colonoscopy: every 10 years (may be performed more frequently if at higher risk)  OR  FOBT/FIT: every 1 year  OR  Cologuard: every 3 years  OR  Sigmoidoscopy: every 5 years  Screening may be recommended earlier than age 39 if at higher risk for colorectal cancer. Also, an individualized decision between you and your healthcare provider will decide whether screening between the ages of 77-80 would be appropriate. Colonoscopy: 05/11/2018  FOBT/FIT: Not on file  Cologuard: Not on file  Sigmoidoscopy: Not on file    Screening Current     Breast Cancer Screening Age: 36 years old  Frequency: every 1-2 years  Not required if history of left and right mastectomy Mammogram: 12/30/2021    Screening Current   Cervical Cancer Screening Between the ages of 21-29, pap smear recommended once every 3 years. Between the ages of 32-69, can perform pap smear with HPV co-testing every 5 years.    Recommendations may differ for women with a history of total hysterectomy, cervical cancer, or abnormal pap smears in past. Pap Smear: 05/21/2018    History Cervical Cancer   Hepatitis C Screening Once for adults born between 1945 and 1965  More frequently in patients at high risk for Hepatitis C Hep C Antibody: 02/06/2023    Screening Current   Diabetes Screening 1-2 times per year if you're at risk for diabetes or have pre-diabetes Fasting glucose: 126 mg/dL (3/6/2023)  A1C: No results in last 5 years (No results in last 5 years)  Screening Current   Cholesterol Screening Once every 5 years if you don't have a lipid disorder. May order more often based on risk factors. Lipid panel: 07/19/2022    Screening Current     Other Preventive Screenings Covered by Medicare:  1. Abdominal Aortic Aneurysm (AAA) Screening: covered once if your at risk. You're considered to be at risk if you have a family history of AAA. 2. Lung Cancer Screening: covers low dose CT scan once per year if you meet all of the following conditions: (1) Age 48-67; (2) No signs or symptoms of lung cancer; (3) Current smoker or have quit smoking within the last 15 years; (4) You have a tobacco smoking history of at least 20 pack years (packs per day multiplied by number of years you smoked); (5) You get a written order from a healthcare provider. 3. Glaucoma Screening: covered annually if you're considered high risk: (1) You have diabetes OR (2) Family history of glaucoma OR (3)  aged 48 and older OR (3)  American aged 72 and older  3. Osteoporosis Screening: covered every 2 years if you meet one of the following conditions: (1) You're estrogen deficient and at risk for osteoporosis based off medical history and other findings; (2) Have a vertebral abnormality; (3) On glucocorticoid therapy for more than 3 months; (4) Have primary hyperparathyroidism; (5) On osteoporosis medications and need to assess response to drug therapy. · Last bone density test (DXA Scan): 02/25/2020.  5. HIV Screening: covered annually if you're between the age of 15-65. Also covered annually if you are younger than 13 and older than 72 with risk factors for HIV infection.  For pregnant patients, it is covered up to 3 times per pregnancy. Immunizations:  Immunization Recommendations   Influenza Vaccine Annual influenza vaccination during flu season is recommended for all persons aged >= 6 months who do not have contraindications   Pneumococcal Vaccine   * Pneumococcal conjugate vaccine = PCV13 (Prevnar 13), PCV15 (Vaxneuvance), PCV20 (Prevnar 20)  * Pneumococcal polysaccharide vaccine = PPSV23 (Pneumovax) Adults 20-63 years old: 1-3 doses may be recommended based on certain risk factors  Adults 72 years old: 1-2 doses may be recommended based off what pneumonia vaccine you previously received   Hepatitis B Vaccine 3 dose series if at intermediate or high risk (ex: diabetes, end stage renal disease, liver disease)   Tetanus (Td) Vaccine - COST NOT COVERED BY MEDICARE PART B Following completion of primary series, a booster dose should be given every 10 years to maintain immunity against tetanus. Td may also be given as tetanus wound prophylaxis. Tdap Vaccine - COST NOT COVERED BY MEDICARE PART B Recommended at least once for all adults. For pregnant patients, recommended with each pregnancy. Shingles Vaccine (Shingrix) - COST NOT COVERED BY MEDICARE PART B  2 shot series recommended in those aged 48 and above     Health Maintenance Due:      Topic Date Due   • Cervical Cancer Screening  05/21/2021   • Breast Cancer Screening: Mammogram  12/30/2023   • Colorectal Cancer Screening  05/11/2028   • Hepatitis C Screening  Completed     Immunizations Due:      Topic Date Due   • COVID-19 Vaccine (1) Never done   • Pneumococcal Vaccine: 65+ Years (2 - PPSV23 if available, else PCV20) 01/25/2022   • Influenza Vaccine (1) 09/01/2023     Advance Directives   What are advance directives? Advance directives are legal documents that state your wishes and plans for medical care. These plans are made ahead of time in case you lose your ability to make decisions for yourself.  Advance directives can apply to any medical decision, such as the treatments you want, and if you want to donate organs. What are the types of advance directives? There are many types of advance directives, and each state has rules about how to use them. You may choose a combination of any of the following:  · Living will: This is a written record of the treatment you want. You can also choose which treatments you do not want, which to limit, and which to stop at a certain time. This includes surgery, medicine, IV fluid, and tube feedings. · Durable power of  for healthcare [facility]): This is a written record that states who you want to make healthcare choices for you when you are unable to make them for yourself. This person, called a proxy, is usually a family member or a friend. You may choose more than 1 proxy. · Do not resuscitate (DNR) order:  A DNR order is used in case your heart stops beating or you stop breathing. It is a request not to have certain forms of treatment, such as CPR. A DNR order may be included in other types of advance directives. · Medical directive: This covers the care that you want if you are in a coma, near death, or unable to make decisions for yourself. You can list the treatments you want for each condition. Treatment may include pain medicine, surgery, blood transfusions, dialysis, IV or tube feedings, and a ventilator (breathing machine). · Values history: This document has questions about your views, beliefs, and how you feel and think about life. This information can help others choose the care that you would choose. Why are advance directives important? An advance directive helps you control your care. Although spoken wishes may be used, it is better to have your wishes written down. Spoken wishes can be misunderstood, or not followed. Treatments may be given even if you do not want them. An advance directive may make it easier for your family to make difficult choices about your care.    Urinary Incontinence   Urinary incontinence (UI)  is when you lose control of your bladder. UI develops because your bladder cannot store or empty urine properly. The 3 most common types of UI are stress incontinence, urge incontinence, or both. Medicines:   · May be given to help strengthen your bladder control. Report any side effects of medication to your healthcare provider. Do pelvic muscle exercises often:  Your pelvic muscles help you stop urinating. Squeeze these muscles tight for 5 seconds, then relax for 5 seconds. Gradually work up to squeezing for 10 seconds. Do 3 sets of 15 repetitions a day, or as directed. This will help strengthen your pelvic muscles and improve bladder control. Train your bladder:  Go to the bathroom at set times, such as every 2 hours, even if you do not feel the urge to go. You can also try to hold your urine when you feel the urge to go. For example, hold your urine for 5 minutes when you feel the urge to go. As that becomes easier, hold your urine for 10 minutes. Self-care:   · Keep a UI record. Write down how often you leak urine and how much you leak. Make a note of what you were doing when you leaked urine. · Drink liquids as directed. You may need to limit the amount of liquid you drink to help control your urine leakage. Do not drink any liquid right before you go to bed. Limit or do not have drinks that contain caffeine or alcohol. · Prevent constipation. Eat a variety of high-fiber foods. Good examples are high-fiber cereals, beans, vegetables, and whole-grain breads. Walking is the best way to trigger your intestines to have a bowel movement. · Exercise regularly and maintain a healthy weight. Weight loss and exercise will decrease pressure on your bladder and help you control your leakage. · Use a catheter as directed  to help empty your bladder. A catheter is a tiny, plastic tube that is put into your bladder to drain your urine.    · Go to behavior therapy as directed. Behavior therapy may be used to help you learn to control your urge to urinate. Narcotic (Opioid) Safety    Use narcotics safely:  · Take prescribed narcotics exactly as directed  · Do not give narcotics to others or take narcotics that belong to someone else  · Do not mix narcotics without medicines or alcohol  · Do not drive or operate heavy machinery after you take the narcotic  · Monitor for side effects and notify your healthcare provider if you experienced side effects such as nausea, sleepiness, itching, or trouble thinking clearly. Manage constipation:    Constipation is the most common side effect of narcotic medicine. Constipation is when you have hard, dry bowel movements, or you go longer than usual between bowel movements. Tell your healthcare provider about all changes in your bowel movements while you are taking narcotics. He or she may recommend laxative medicine to help you have a bowel movement. He or she may also change the kind of narcotic you are taking, or change when you take it. The following are more ways you can prevent or relieve constipation:    · Drink liquids as directed. You may need to drink extra liquids to help soften and move your bowels. Ask how much liquid to drink each day and which liquids are best for you. · Eat high-fiber foods. This may help decrease constipation by adding bulk to your bowel movements. High-fiber foods include fruits, vegetables, whole-grain breads and cereals, and beans. Your healthcare provider or dietitian can help you create a high-fiber meal plan. Your provider may also recommend a fiber supplement if you cannot get enough fiber from food. · Exercise regularly. Regular physical activity can help stimulate your intestines. Walking is a good exercise to prevent or relieve constipation. Ask which exercises are best for you. · Schedule a time each day to have a bowel movement.   This may help train your body to have regular bowel movements. Bend forward while you are on the toilet to help move the bowel movement out. Sit on the toilet for at least 10 minutes, even if you do not have a bowel movement. Store narcotics safely:   · Store narcotics where others cannot easily get them. Keep them in a locked cabinet or secure area. Do not  keep them in a purse or other bag you carry with you. A person may be looking for something else and find the narcotics. · Make sure narcotics are stored out of the reach of children. A child can easily overdose on narcotics. Narcotics may look like candy to a small child. The best way to dispose of narcotics: The laws vary by country and area. In the [facility], the best way is to return the narcotics through a take-back program. This program is offered by the [employer] The following are options for using the program:  · Take the narcotics to a GERA collection site. The site is often a law enforcement center. Call your local law enforcement center for scheduled take-back days in your area. You will be given information on where to go if the collection site is in a different location. · Take the narcotics to an approved pharmacy or hospital.  A pharmacy or hospital may be set up as a collection site. You will need to ask if it is a GERA collection site if you were not directed there. A pharmacy or doctor's office may not be able to take back narcotics unless it is a GERA site. · Use a mail-back system. This means you are given containers to put the narcotics into. You will then mail them in the containers. · Use a take-back drop box. This is a place to leave the narcotics at any time. People and animals will not be able to get into the box. Your local law enforcement agency can tell you where to find a drop box in your area. Other ways to manage pain:   · Ask your healthcare provider about non-narcotic medicines to control pain.   Nonprescription medicines include NSAIDs (such as ibuprofen) and acetaminophen. Prescription medicines include muscle relaxers, antidepressants, and steroids. · Pain may be managed without any medicines. Some ways to relieve pain include massage, aromatherapy, or meditation. Physical or occupational therapy may also help. For more information:   · Drug Enforcement Administration  [address] , [address]  Phone: 4- 334 - [phone]  Web Address: [facility].. [email or web address]    · [address] and Drug Administration  [address] , [address]  Phone: 8- 624 - [phone]  Web Address: http://[employer]/     © Copyright [employer] 2018 Information is for End User's use only and may not be sold, redistributed or otherwise used for commercial purposes.  All illustrations and images included in CareNotes® are the copyrighted property of [employer].A[employer] Inc. or [address] [place] [address]

## 2023-07-13 NOTE — ASSESSMENT & PLAN NOTE
Patient has nausea associated with her cancer diagnosis. She has not been taking the ondansetron as she finds that when she takes this it causes her to vomit. She asked for a liquid form. I told her they do not make this but they make an oral disintegrating tablet. She was agreeable to this and I sent in a new prescription.

## 2023-07-18 DIAGNOSIS — R39.89 SUSPECTED UTI: Primary | ICD-10-CM

## 2023-07-19 ENCOUNTER — APPOINTMENT (OUTPATIENT)
Age: 68
End: 2023-07-19
Payer: MEDICARE

## 2023-07-19 LAB
BACTERIA UR QL AUTO: ABNORMAL /HPF
BILIRUB UR QL STRIP: NEGATIVE
CAOX CRY URNS QL MICRO: ABNORMAL /HPF
CLARITY UR: ABNORMAL
COLOR UR: YELLOW
GLUCOSE UR STRIP-MCNC: ABNORMAL MG/DL
HGB UR QL STRIP.AUTO: ABNORMAL
HYALINE CASTS #/AREA URNS LPF: ABNORMAL /LPF
KETONES UR STRIP-MCNC: NEGATIVE MG/DL
LEUKOCYTE ESTERASE UR QL STRIP: ABNORMAL
MUCOUS THREADS UR QL AUTO: ABNORMAL
NITRITE UR QL STRIP: NEGATIVE
NON-SQ EPI CELLS URNS QL MICRO: ABNORMAL /HPF
PH UR STRIP.AUTO: 6 [PH]
PROT UR STRIP-MCNC: ABNORMAL MG/DL
RBC #/AREA URNS AUTO: ABNORMAL /HPF
SP GR UR STRIP.AUTO: 1.02 (ref 1–1.03)
TRANS CELLS #/AREA URNS HPF: PRESENT /[HPF]
UROBILINOGEN UR STRIP-ACNC: <2 MG/DL
WBC #/AREA URNS AUTO: ABNORMAL /HPF

## 2023-07-19 PROCEDURE — 81001 URINALYSIS AUTO W/SCOPE: CPT

## 2023-07-20 DIAGNOSIS — N30.00 ACUTE CYSTITIS WITHOUT HEMATURIA: Primary | ICD-10-CM

## 2023-07-24 ENCOUNTER — APPOINTMENT (OUTPATIENT)
Dept: LAB | Facility: CLINIC | Age: 68
End: 2023-07-24
Payer: MEDICARE

## 2023-07-24 DIAGNOSIS — N30.00 ACUTE CYSTITIS WITHOUT HEMATURIA: ICD-10-CM

## 2023-07-25 LAB — BACTERIA UR CULT: NORMAL

## 2023-07-26 ENCOUNTER — TELEPHONE (OUTPATIENT)
Dept: FAMILY MEDICINE CLINIC | Facility: CLINIC | Age: 68
End: 2023-07-26

## 2023-07-28 NOTE — TELEPHONE ENCOUNTER
Urine culture is negative    Called patient to give her the results. Patient did not answer. Left message for patient to call the office for results.

## 2023-08-02 ENCOUNTER — HOSPITAL ENCOUNTER (OUTPATIENT)
Facility: HOSPITAL | Age: 68
Setting detail: OUTPATIENT SURGERY
Discharge: HOME | End: 2023-08-02
Attending: ANESTHESIOLOGY | Admitting: ANESTHESIOLOGY
Payer: MEDICARE

## 2023-08-02 ENCOUNTER — APPOINTMENT (OUTPATIENT)
Dept: RADIOLOGY | Facility: HOSPITAL | Age: 68
End: 2023-08-02
Payer: MEDICARE

## 2023-08-02 VITALS
RESPIRATION RATE: 20 BRPM | TEMPERATURE: 97 F | OXYGEN SATURATION: 100 % | HEART RATE: 92 BPM | SYSTOLIC BLOOD PRESSURE: 133 MMHG | DIASTOLIC BLOOD PRESSURE: 71 MMHG

## 2023-08-02 DIAGNOSIS — M51.16 LUMBAR DISC DISEASE WITH RADICULOPATHY: ICD-10-CM

## 2023-08-02 PROCEDURE — 64483 NJX AA&/STRD TFRM EPI L/S 1: CPT | Performed by: ANESTHESIOLOGY

## 2023-08-02 RX ORDER — LIDOCAINE HYDROCHLORIDE 10 MG/ML
INJECTION, SOLUTION EPIDURAL; INFILTRATION; INTRACAUDAL; PERINEURAL AS NEEDED
Status: DISCONTINUED | OUTPATIENT
Start: 2023-08-02 | End: 2023-08-02 | Stop reason: HOSPADM

## 2023-08-02 RX ORDER — DEXAMETHASONE SODIUM PHOSPHATE 10 MG/ML
INJECTION, SOLUTION INTRAMUSCULAR; INTRAVENOUS AS NEEDED
Status: DISCONTINUED | OUTPATIENT
Start: 2023-08-02 | End: 2023-08-02 | Stop reason: HOSPADM

## 2023-08-02 NOTE — H&P
Assessment:  1. Lumbar disc disease with radiculopathy  FL spine and pain procedure    FL spine and pain procedure          Plan:  Rogerio Duran is a 76 y.o. female with complaints of low back and right leg pain presents to surgical center for procedure. 1. We will perform a Procedure(s) (LRB):  2. BLOCK / INJECTION EPIDURAL STEROID LUMBAR RIGHT L2-3 TFESI (Right)   3. 2. Follow-up 1 month after injection    Complete risks and benefits including bleeding, infection, tissue reaction, nerve injury and allergic reaction were discussed. The approach was demonstrated using models and literature was provided. Verbal and written consent was obtained. My impressions and treatment recommendations were discussed in detail with the patient who verbalized understanding and had no further questions. Discharge instructions were provided. I personally saw and examined the patient and I agree with the above discussed plan of care. Orders Placed This Encounter   Procedures   • FL spine and pain procedure     Standing Status:   Standing     Number of Occurrences:   1     Order Specific Question:   Reason for Exam:     Answer:   Procedure: BLOCK / INJECTION EPIDURAL STEROID LUMBAR RIGHT L2-3 TFESI (Right: Spine Lumbar)     Order Specific Question:   Anticoagulant hold needed? Answer:   na     No orders of the defined types were placed in this encounter. History of Present Illness:  Rogerio Duran is a 76 y.o. female who presents for a follow up office visit in regards to low back and right leg pain. The patient’s current symptoms include 8 out of 10 sharp and stabbing, throbbing pain with any particular time pattern. I have personally reviewed and/or updated the patient's past medical history, past surgical history, family history, social history, current medications, allergies, and vital signs today. Review of Systems   Musculoskeletal: Positive for arthralgias and back pain.    All other systems reviewed and are negative.       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)   • Chemotherapy-induced nausea   • Urgency of urination   • Pain in thoracic spine   • Chemotherapy induced neutropenia (HCC)   • Thyroid lesion   • Dyspnea on exertion   • Encounter for venous access device care   • Multiple lung nodules   • Primary generalized (osteo)arthritis   • Xerostomia   • Encounter for immunization   • Sensorineural hearing loss (SNHL) of both ears   • Chronic obstructive pulmonary disease, unspecified COPD type ([address])   • Hematuria   • Calculus of gallbladder without cholecystitis without obstruction   • Chronic constipation   • Continuous opioid dependence ([address])   • Lumbar disc disease with radiculopathy   • Cervical spondylosis   • Stage 3 chronic kidney disease, unspecified whether stage 3a or 3b CKD ([facility])   • Meralgia paraesthetica, right   • Dyslipidemia   • Post-menopause   • Major depressive disorder, single episode, mild ([facility])   • Insomnia due to other mental disorder   • Chemotherapy-induced thrombocytopenia   • DNR (do not resuscitate) discussion   • Neoplasm of uncertain behavior of bone and articular cartilage   • Right leg pain   • Chronic pain syndrome   • Drug induced constipation   • Medicare annual wellness visit, subsequent       Past Medical History:   Diagnosis Date   • Acne    • Anxiety    • Anxiety and depression 10/02/2019   • Arthritis    • Breathing difficulty     pt does not recall any breathing problem but did have neck pain after procedure" Gave her an incentive spirometer to use   • Cancer ([address])     cervical/ and "lymph nodes" (GYN) • Cervical cancer (HCC)    • Chronic kidney disease     stage 3   • Chronic pain disorder     Neck pain   • Claustrophobia    • Colon polyp    • GERD (gastroesophageal reflux disease)     not now   • Headache    • History of radiation therapy    • History of shingles    • Irritable bowel syndrome    • Joint pain following chemotherapy     and stiffness   • Liver disease     "cyst on liver"   • Lung nodule     "left"   • Motion sickness    • Muscle weakness     after chemo"   • Neck pain     "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"has had neck pain since childhood"   • Peptic ulceration 2016   • Port-A-Cath in place     right chest//for chemotherapy q 3 weeks and also receives IV hydration   • Shortness of breath     "started with cancer, when too much exertion like cleaning/guera doing stairs"-pt states it is much better   • Thumb injury 10/2019    right   • Thyroid nodule    • Tinnitus of left ear 02/19/2020   • Urinary problem    • Wears contact lenses    • Wrist arthritis 03/17/2020       Past Surgical History:   Procedure Laterality Date   • COLONOSCOPY     • CYSTOSCOPY W/ RETROGRADES Right 06/03/2021    Procedure: [place] W/RETROGRADE;  Surgeon: Elsa Tubbs MD;  Location: [facility];  Service: Urology   • CYSTOSCOPY W/ RETROGRADES Right 10/18/2021    Procedure: CYSTOSCOPY WITH ureteral stent exchange;  Surgeon: Forrest Root MD;  Location: [facility];  Service: Urology   • FL RETROGRADE PYELOGRAM  02/11/2021   • FL RETROGRADE PYELOGRAM  06/03/2021   • FOOT SURGERY     • HYSTERECTOMY     • IR BIOPSY LYMPH NODE  02/19/2021   • IR PORT PLACEMENT  03/09/2021   • LYMPH NODE BIOPSY  2021   • MN CYSTO BLADDER W/URETERAL CATHETERIZATION Right 02/11/2021    Procedure: CYSTOSCOPY RETROGRADE PYELOGRAM WITH INSERTION STENT URETERAL;  Surgeon: Forrest Root MD;  Location:  MAIN OR;  Service: Urology   • MN CYSTO BLADDER W/URETERAL CATHETERIZATION Right 04/04/2022    Procedure: CYSTOSCOPY  WITH INSERTION STENT URETERAL Right; Surgeon: Alejandra Kessler MD;  Location: [facility];  Service: Urology   • AK CYSTO BLADDER W/URETERAL CATHETERIZATION Right 10/27/2022    Procedure: Cystoscopy, right ureteral stent exchange;  Surgeon: Alejandra Kessler MD;  Location: [facility];  Service: Urology   • AK CYSTO BLADDER W/URETERAL CATHETERIZATION Right 5/1/2023    Procedure: CYSTOSCOPY with right ureteral stent exchange;  Surgeon: Alejandra Kessler MD;  Location: [facility];  Service: Urology   • AK EXCISION INTERDIGITAL [id number] SINGLE EACH Left 07/12/2018    Procedure: FOOT NEUROMA EXCISION;  Surgeon: Annika Hagen DPM;  Location: [address] MAIN OR;  Service: Podiatry   • AK REMOVE & REPLACE INDWELL URETERAL STENT [address] Right 06/03/2021    Procedure: EXCHANGE STENT;  Surgeon: Filemon Conteh MD;  Location: [facility];  Service: Urology   • TUBAL LIGATION     • UPPER GASTROINTESTINAL ENDOSCOPY  2016       Family History   Problem Relation Age of Onset   • Stomach cancer Mother    • Cancer Mother         Stomach Cancer   • Diabetes Mother    • Alcohol abuse Father    • No Known Problems Sister    • No Known Problems Daughter    • No Known Problems Maternal Grandmother    • No Known Problems Maternal Grandfather    • No Known Problems Paternal Grandmother    • No Known Problems Paternal Grandfather    • No Known Problems Sister    • No Known Problems Sister    • No Known Problems Sister    • No Known Problems Sister    • No Known Problems Daughter    • No Known Problems Daughter    • No Known Problems Maternal Aunt    • No Known Problems Maternal Aunt    • No Known Problems Paternal Aunt    • No Known Problems Paternal Aunt    • No Known Problems Paternal Aunt    • No Known Problems Paternal Aunt    • No Known Problems Paternal Aunt    • No Known Problems Paternal Aunt    • No Known Problems Paternal Aunt    • No Known Problems Paternal Aunt    • No Known Problems Paternal Aunt    • No Known Problems Paternal Aunt        Social History     Occupational History   • Not on file   Tobacco Use   • Smoking status: Former     Packs/day: 3.00     Years: 25.00     Total pack years: 75.00     Types: Cigarettes     Start date:      Quit date: 2003     Years since quittin.0   • Smokeless tobacco: Never   Vaping Use   • Vaping Use: Never used   Substance and Sexual Activity   • Alcohol use: Not Currently     Comment: Occasional   • Drug use: No     Types: Marijuana     Comment: As teenager   • Sexual activity: Not Currently     Partners: Male       No current facility-administered medications on file prior to encounter. Current Outpatient Medications on File Prior to Encounter   Medication Sig   • albuterol (PROVENTIL HFA,VENTOLIN HFA) 90 mcg/act inhaler INHALE 2 PUFFS EVERY 4 HOURS AS NEEDED FOR SHORTNESS OF BREATH. • buPROPion (WELLBUTRIN XL) 300 mg 24 hr tablet TAKE 1 TABLET BY MOUTH EVERY DAY (Patient taking differently: every morning)   • Diclofenac Sodium (VOLTAREN) 1 % Apply 2 g topically 4 (four) times a day   • gabapentin (NEURONTIN) 400 mg capsule Take 1 capsule (400 mg total) by mouth 3 (three) times a day To reduce nerve pain   • lidocaine-prilocaine (EMLA) cream Apply to PORT site 30 min prior to labs or chemo   • methylPREDNISolone 4 MG tablet therapy pack Use as directed on package   • omeprazole (PriLOSEC) 40 MG capsule TAKE 1 CAPSULE (40 MG TOTAL) BY MOUTH EVERY MORNING. • oxyCODONE-acetaminophen (PERCOCET)  mg per tablet Take 1 tablet by mouth every 4 (four) hours as needed for moderate pain   • polyethylene glycol (MIRALAX) 17 g packet Take 17 g by mouth daily   • promethazine (PHENERGAN) 25 mg tablet Take 1 tablet (25 mg total) by mouth every 6 (six) hours as needed for nausea or vomiting   • senna (SENOKOT) 8.6 MG tablet Take 1 tablet (8.6 mg total) by mouth 2 (two) times a day Take to prevent constipation from pain meds.   Hold one dose for loose stool (Patient taking differently: Take 1 tablet by mouth if needed Take to prevent constipation from pain meds. Hold one dose for loose stool)   • traZODone (DESYREL) 50 mg tablet TAKE 1-2 AT BEDTIME   • [DISCONTINUED] famotidine (PEPCID) 20 mg tablet Take 20 mg by mouth daily         Allergies   Allergen Reactions   • Medical Tape Itching     And redness from adhesive    And "skin raised up" especially longer on skin   • Aspirin GI Intolerance   • Codeine GI Intolerance   • Dust Mite Extract    • Pollen Extract Sneezing       Physical Exam:    LMP  (LMP Unknown)     Constitutional:normal, well developed, well nourished, alert, in no distress and non-toxic and no overt pain behavior.   Eyes:anicteric  HEENT:grossly intact  Neck:supple, symmetric, trachea midline and no masses   Pulmonary:even and unlabored  Cardiovascular:No edema or pitting edema present  Skin:Normal without rashes or lesions and well hydrated  Psychiatric:Mood and affect appropriate  Neurologic:Cranial Nerves II-XII grossly intact  Musculoskeletal:normal

## 2023-08-02 NOTE — DISCHARGE INSTR - AVS FIRST PAGE
Epidural Steroid Injection   WHAT YOU NEED TO KNOW:   An epidural steroid injection (ARISTIDES) is a procedure to inject steroid medicine into the epidural space. The epidural space is between your spinal cord and vertebrae. Steroids reduce inflammation and fluid buildup in your spine that may be causing pain. You may be given pain medicine along with the steroids. ACTIVITY  Do not drive or operate machinery today. No strenuous activity today - bending, lifting, etc.  You may resume normal activites starting tomorrow - start slowly and as tolerated. You may shower today, but no tub baths or hot tubs. You may have numbness for several hours from the local anesthetic. Please use caution and common sense, especially with weight-bearing activities. CARE OF THE INJECTION SITE  If you have soreness or pain, apply ice to the area today (20 minutes on/20 minutes off). Starting tomorrow, you may use warm, moist heat or ice if needed. You may have an increase or change in your discomfort for 36-48 hours after your treatment. Apply ice and continue with any pain medication you have been prescribed. Notify [facility] if you have any of the following: redness, drainage, swelling, headache, stiff neck or fever above 100°F.    SPECIAL INSTRUCTIONS  Our office will contact you in approximately 7 days for a progress report. MEDICATIONS  Continue to take all routine medications. Our office may have instructed you to hold some medications. As no general anesthesia was used in today's procedure, you should not experience any side effects related to anesthesia. If you are diabetic, the steroids used in today's injection may temporarily increase your blood sugar levels after the first few days after your injection. Please keep a close eye on your sugars and alert the doctor who manages your diabetes if your sugars are significantly high from your baseline or you are symptomatic.      If you have a problem specifically related to your procedure, please call our office at [phone]. Problems not related to your procedure should be directed to your primary care physician.

## 2023-08-02 NOTE — OP NOTE
OPERATIVE REPORT  PATIENT NAME: Isidoro Mena    :  1955  MRN: [id number]  Pt Location: [facility] ROOM 01    SURGERY DATE: 2023    Surgeon(s) and Role:     * Althea Onofre MD - Primary    Preop Diagnosis:  Lumbar disc disease with radiculopathy [M51.16]    Post-Op Diagnosis Codes:     * Lumbar disc disease with radiculopathy [M51.16]    Procedure(s):  Right - BLOCK / INJECTION EPIDURAL STEROID LUMBAR RIGHT L2-3 TFESI    Specimen(s):  * No specimens in log *    Estimated Blood Loss:   Minimal    Drains:  Ureteral Internal Stent Right ureter 6 Fr. (Active)   Number of days: 93       Anesthesia Type:   Local    Operative Indications:  Lumbar disc disease with radiculopathy [M51.16]      Operative Findings:  same    Complications:   None    Procedure and Technique:  Fluoroscopically-guided right L2-L3 transforaminal epidural steroid injection under fluoroscopy      After discussing the risks, benefits, and alternatives to the procedure, the patient expressed understanding and wished to proceed. The patient was brought to the fluoroscopy suite and placed in the prone position. A procedural pause was conducted to verify:  correct patient identity, procedure to be performed and as applicable, correct side and site, correct patient position, and availability of implants, special equipment and special requirements. After identifying the right L2 pedicle fluoroscopically with an oblique view, the skin was sterilely prepped and draped in the usual fashion using Chloraprep skin prep. The skin and subcutaneous tissue were anesthetized with 0.5% lidocaine. A 3.5 inch 22 gauge spinal needle was then advanced under fluoroscopic guidance to the posterior aspect of the right L2-L3 neural foramen. Appropriate foraminal depth was determined with a lateral fluoroscopic view, and AP visualization confirmed needle positioning at approximately the 6 o’clock position relative to the pedicle.   After negative aspiration, 1 mL Omnipaque 300 contrast was injected using live fluoroscopy/digital subtraction angiography, confirming appropriate transforaminal spread without evidence of intravascular or intrathecal uptake. Next, a local anesthetic test dose consisting of 1 mL of 2% lidocaine was injected through the needle. After an appropriate period of observation, a directed neurological exam was performed which revealed no new neurologic deficits. Next, a 1.5 ml solution consisting of 7.5 mg of dexamethasone in sterile saline was injected slowly and incrementally into the epidural space. Following the injection the needle was withdrawn slightly and flushed with lidocaine as it was fully extracted. The patient tolerated the procedure well and there were no apparent complications. The patient did not develop any new neurologic deficits. After appropriate observation, the patient was dismissed from the clinic in good condition under their own power. COMMENTS  The patient received a total steroid dose of 7.5 mg of dexamethasone. I was present for the entire procedure.     Patient Disposition:  hemodynamically stable        SIGNATURE: Pablo Wayne MD  DATE: August 2, 2023  TIME: 9:57 AM

## 2023-09-08 ENCOUNTER — TELEPHONE (OUTPATIENT)
Age: 68
End: 2023-09-08

## 2023-09-08 NOTE — TELEPHONE ENCOUNTER
Left a voicemail for her to go to UC or ER due to symptoms left in message. No one answered her phone.

## 2023-09-08 NOTE — TELEPHONE ENCOUNTER
Patient seen by Mery Cortez at [facility]    Patient called stating she has burning and pain when urinating . She has been suffering for the last month with it. Patient is getting a fever off and on. She know something is going on with a uti. She is having financial issues. She has cancer and its hard for her. She stating she has a hard time collecting the urine. She would like to know how to proceed.      Patient can be reached at [phone]

## 2023-09-11 PROBLEM — Z00.00 MEDICARE ANNUAL WELLNESS VISIT, SUBSEQUENT: Status: RESOLVED | Noted: 2023-07-13 | Resolved: 2023-09-11

## 2023-09-12 ENCOUNTER — APPOINTMENT (OUTPATIENT)
Age: 68
End: 2023-09-12
Payer: MEDICARE

## 2023-09-12 ENCOUNTER — APPOINTMENT (OUTPATIENT)
Dept: RADIOLOGY | Facility: CLINIC | Age: 68
End: 2023-09-12
Payer: MEDICARE

## 2023-09-12 ENCOUNTER — OFFICE VISIT (OUTPATIENT)
Dept: URGENT CARE | Facility: CLINIC | Age: 68
End: 2023-09-12
Payer: MEDICARE

## 2023-09-12 VITALS
SYSTOLIC BLOOD PRESSURE: 96 MMHG | DIASTOLIC BLOOD PRESSURE: 54 MMHG | RESPIRATION RATE: 22 BRPM | TEMPERATURE: 98.2 F | HEART RATE: 122 BPM | OXYGEN SATURATION: 100 %

## 2023-09-12 DIAGNOSIS — Z51.5 PALLIATIVE CARE PATIENT: ICD-10-CM

## 2023-09-12 DIAGNOSIS — R39.89 SUSPECTED UTI: ICD-10-CM

## 2023-09-12 DIAGNOSIS — E86.0 DEHYDRATION: ICD-10-CM

## 2023-09-12 DIAGNOSIS — R06.6 INTRACTABLE HICCUPS: ICD-10-CM

## 2023-09-12 DIAGNOSIS — R07.9 CHEST PAIN, UNSPECIFIED TYPE: Primary | ICD-10-CM

## 2023-09-12 DIAGNOSIS — R11.2 NAUSEA AND VOMITING, UNSPECIFIED VOMITING TYPE: ICD-10-CM

## 2023-09-12 DIAGNOSIS — C53.0 MALIGNANT NEOPLASM OF ENDOCERVIX (HCC): ICD-10-CM

## 2023-09-12 DIAGNOSIS — R10.13 EPIGASTRIC PAIN: ICD-10-CM

## 2023-09-12 DIAGNOSIS — M89.8X9 BONE PAIN: ICD-10-CM

## 2023-09-12 DIAGNOSIS — R07.9 CHEST PAIN, UNSPECIFIED TYPE: ICD-10-CM

## 2023-09-12 PROCEDURE — 71046 X-RAY EXAM CHEST 2 VIEWS: CPT

## 2023-09-12 PROCEDURE — 99214 OFFICE O/P EST MOD 30 MIN: CPT | Performed by: NURSE PRACTITIONER

## 2023-09-12 PROCEDURE — G0463 HOSPITAL OUTPT CLINIC VISIT: HCPCS | Performed by: NURSE PRACTITIONER

## 2023-09-12 PROCEDURE — 93005 ELECTROCARDIOGRAM TRACING: CPT | Performed by: NURSE PRACTITIONER

## 2023-09-12 PROCEDURE — 87086 URINE CULTURE/COLONY COUNT: CPT

## 2023-09-12 RX ORDER — ONDANSETRON 4 MG/1
4 TABLET, ORALLY DISINTEGRATING ORAL EVERY 6 HOURS PRN
Qty: 30 TABLET | Refills: 0 | Status: ON HOLD | OUTPATIENT
Start: 2023-09-12

## 2023-09-12 RX ORDER — OMEPRAZOLE 20 MG/1
20 CAPSULE, DELAYED RELEASE ORAL EVERY MORNING
Qty: 30 CAPSULE | Refills: 0 | Status: ON HOLD | OUTPATIENT
Start: 2023-09-12

## 2023-09-12 NOTE — PROGRESS NOTES
[facility]        NAME: Chio Whitehead is a 76 y.o. female  : 1955    MRN: [id number]  DATE: 2023  TIME: 4:22 PM    Assessment and Plan   Chest pain, unspecified type [R07.9]  1. Chest pain, unspecified type  ECG 12 lead    omeprazole (PriLOSEC) 20 mg delayed release capsule    XR chest pa & lateral      2. Bone pain      of chest         3. Dehydration        4. Epigastric pain  omeprazole (PriLOSEC) 20 mg delayed release capsule      5. Nausea and vomiting, unspecified vomiting type  ondansetron (ZOFRAN-ODT) 4 mg disintegrating tablet      6. 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[address])        7. Palliative care patient        8. Intractable hiccups              Patient Instructions       Follow up with PCP in 3-5 days. Proceed to  ER if symptoms worsen. t was recommended that you go to the ED for a higher level of care and evaluation due to your complaints of chest pain, dysuria, shortness of breath and you feeling like you are dehydrated. You have refused to go to the ED. You stated that you have been vomiting for 3 weeks and have had chest pain (lump on your chest ) x 3 weeks. YOU NEED TO CALL YOUR PCP FOR FOLLOW UP AND EVALUATION SINCE YOU HAVE DECLINED THE EMERGENCY ROOM. It has been explained to you that you could have sepsis, MI with cardiac involvement, respiratory distress, dehydration, death - you verbalize understanding of this. Go to the ED if symptoms worsen   You have been prescribed omeprazole and zofran for nausea and GERD like feelings. Your xray was preliminarily read by your provider. A radiologist will read the xray and you will be notified if it is abnormal.            Chief Complaint     Chief Complaint   Patient presents with   • Chest Pain     C/o midsternal chest pain related to "a new lump appeared here", pt presents with multiple complaints. C/o chest pain related to "new lump", and vomiting onset "3 weeks ago".  Denies PCP contact, "I don't even like them anymore". Denies recent ED visit. Denies fall/trauma. Denies utilizing OTC medications to aid symptoms. EKG completed in triage. • Vomiting         History of Present Illness       This is a 76year old female who was having blood work done today and told the  she was having chest pain x 3 weeks along with vomiting. She came to care now for evaluation. She states the chest pain is from the lump she has discovered on her chest.  She states that it started the same time she has really started to vomit about 3 weeks ago. She states she knows she is dehydrated and that she had been unable to keep anything done since. She denies eating anything today. She also c/o hiccups for the last 3 weeks as well - she does admit they are intermittent. She states she has GERD and feels burning in her chest as well. She states she is out of her omeprazole and has no zofran. She has endocervical cancer and also with mets. She is on palliative care with opoid agreement. She c/o dysuria but this is currently being worked up on by Urology. Her Epic states she is a DNR. Review of Systems   Review of Systems   Constitutional: Negative. HENT: Negative. Eyes: Negative. Respiratory: Positive for shortness of breath. Cardiovascular: Positive for chest pain. Gastrointestinal: Positive for nausea and vomiting. Endocrine: Negative. Genitourinary: Positive for dysuria. Musculoskeletal: Negative. Skin: Negative. Allergic/Immunologic: Negative. Neurological: Negative. Hematological: Negative. Psychiatric/Behavioral: Negative.           Current Medications       Current Outpatient Medications:   •  omeprazole (PriLOSEC) 20 mg delayed release capsule, Take 1 capsule (20 mg total) by mouth every morning, Disp: 30 capsule, Rfl: 0  •  ondansetron (ZOFRAN-ODT) 4 mg disintegrating tablet, Take 1 tablet (4 mg total) by mouth every 6 (six) hours as needed for nausea or vomiting, Disp: 30 tablet, Rfl: 0  •  albuterol (PROVENTIL HFA,VENTOLIN HFA) 90 mcg/act inhaler, INHALE 2 PUFFS EVERY 4 HOURS AS NEEDED FOR SHORTNESS OF BREATH., Disp: 18 g, Rfl: 1  •  buPROPion (WELLBUTRIN XL) 300 mg 24 hr tablet, TAKE 1 TABLET BY MOUTH EVERY DAY (Patient taking differently: every morning), Disp: 90 tablet, Rfl: 2  •  Diclofenac Sodium (VOLTAREN) 1 %, Apply 2 g topically 4 (four) times a day, Disp: 350 g, Rfl: 3  •  gabapentin (NEURONTIN) 400 mg capsule, Take 1 capsule (400 mg total) by mouth 3 (three) times a day To reduce nerve pain, Disp: 90 capsule, Rfl: 1  •  lidocaine-prilocaine (EMLA) cream, Apply to PORT site 30 min prior to labs or chemo, Disp: 30 g, Rfl: 2  •  LORazepam (ATIVAN) 1 mg tablet, Take 1 tablet (1 mg total) by mouth every 8 (eight) hours as needed for anxiety, Disp: 50 tablet, Rfl: 5  •  methylPREDNISolone 4 MG tablet therapy pack, Use as directed on package, Disp: 21 tablet, Rfl: 0  •  naloxegol oxalate (MOVANTIK) 25 MG tablet, Take 1 tablet (25 mg total) by mouth in the morning, Disp: 90 tablet, Rfl: 3  •  ondansetron (ZOFRAN-ODT) 8 mg disintegrating tablet, Take 1 tablet (8 mg total) by mouth every 8 (eight) hours as needed for nausea or vomiting, Disp: 90 tablet, Rfl: 3  •  oxyCODONE-acetaminophen (PERCOCET)  mg per tablet, Take 1 tablet by mouth every 4 (four) hours as needed for moderate pain, Disp: , Rfl:   •  polyethylene glycol (MIRALAX) 17 g packet, Take 17 g by mouth daily, Disp: 30 each, Rfl: 5  •  promethazine (PHENERGAN) 25 mg tablet, Take 1 tablet (25 mg total) by mouth every 6 (six) hours as needed for nausea or vomiting, Disp: 30 tablet, Rfl: 1  •  senna (SENOKOT) 8.6 MG tablet, Take 1 tablet (8.6 mg total) by mouth 2 (two) times a day Take to prevent constipation from pain meds. Hold one dose for loose stool (Patient taking differently: Take 1 tablet by mouth if needed Take to prevent constipation from pain meds.   Hold one dose for loose stool), Disp: 60 tablet, Rfl: 11  •  traZODone (DESYREL) 50 mg tablet, TAKE 1-2 AT BEDTIME, Disp: 180 tablet, Rfl: 2    Current Allergies     Allergies as of 09/12/2023 - Reviewed 09/12/2023   Allergen Reaction Noted   • Medical tape Itching 05/28/2021   • Aspirin GI Intolerance 05/28/2021   • Codeine GI Intolerance 05/28/2021   • Dust mite extract  07/12/2018   • Pollen extract Sneezing 07/12/2018            The following portions of the patient's history were reviewed and updated as appropriate: allergies, current medications, past family history, past medical history, past social history, past surgical history and problem list.     Past Medical History:   Diagnosis Date   • Acne    • Anxiety    • Anxiety and depression 10/02/2019   • Arthritis    • Breathing difficulty     pt does not recall any breathing problem but did have neck pain after procedure" Gave her an incentive spirometer to use   • Cancer ([address])     cervical/ and "lymph nodes" (GYN)   • Cervical cancer (HCC)    • Chronic kidney disease     stage 3   • Chronic pain disorder     Neck pain   • Claustrophobia    • Colon polyp    • GERD (gastroesophageal reflux disease)     not now   • Headache    • History of radiation therapy    • History of shingles    • Irritable bowel syndrome    • Joint pain following chemotherapy     and stiffness   • Liver disease     "cyst on liver"   • Lung nodule     "left"   • Motion sickness    • Muscle weakness     after chemo"   • Neck pain     "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"has had neck pain since childhood"   • Peptic ulceration 2016   • Port-A-Cath in place     right chest//for chemotherapy q 3 weeks and also receives IV hydration   • Shortness of breath     "started with cancer, when too much exertion like cleaning/guera doing stairs"-pt states it is much better   • Thumb injury 10/2019    right   • Thyroid nodule    • Tinnitus of left ear 02/19/2020   • Urinary problem    • Wears contact lenses    • Wrist arthritis 03/17/2020       Past Surgical History:   Procedure Laterality Date   • COLONOSCOPY     • CYSTOSCOPY W/ RETROGRADES Right 06/03/2021    Procedure: Karla Rinne W/RETROGRADE;  Surgeon: Sally Cloud MD;  Location: [facility];  Service: Urology   • CYSTOSCOPY W/ RETROGRADES Right 10/18/2021    Procedure: CYSTOSCOPY WITH ureteral stent exchange;  Surgeon: Jennifer Perez MD;  Location: [facility];  Service: Urology   • EPIDURAL BLOCK INJECTION Right 8/2/2023    Procedure: BLOCK / INJECTION EPIDURAL STEROID LUMBAR RIGHT L2-3 TFESI;  Surgeon: Dave Crocker MD;  Location: [facility];  Service: Pain Management    • FL RETROGRADE PYELOGRAM  02/11/2021   • FL RETROGRADE PYELOGRAM  06/03/2021   • FOOT SURGERY     • HYSTERECTOMY     • IR BIOPSY LYMPH NODE  02/19/2021   • IR PORT PLACEMENT  03/09/2021   • LYMPH NODE BIOPSY  2021   • NH CYSTO BLADDER W/URETERAL CATHETERIZATION Right 02/11/2021    Procedure: CYSTOSCOPY RETROGRADE PYELOGRAM WITH INSERTION STENT URETERAL;  Surgeon: Jennifer Perez MD;  [facility];  Service: Urology   • NH CYSTO BLADDER W/URETERAL CATHETERIZATION Right 04/04/2022    Procedure: CYSTOSCOPY  WITH INSERTION STENT URETERAL Right;  Surgeon: Jennifer Perez MD;  [facility];  Service: Urology   • NH CYSTO BLADDER W/URETERAL CATHETERIZATION Right 10/27/2022    Procedure: Cystoscopy, right ureteral stent exchange;  Surgeon: Jennifer Perez MD;  [facility];  Service: Urology   • NH CYSTO BLADDER W/URETERAL CATHETERIZATION Right 5/1/2023    Procedure: CYSTOSCOPY with right ureteral stent exchange;  Surgeon: Jennifer Perez MD;  Location: [facility];  Service: Urology   • NH EXCISION INTERDIGITAL KESSLER NEUROMA SINGLE EACH Left 07/12/2018    Procedure: FOOT NEUROMA EXCISION;  Surgeon: Veronica Mederos DPM;  Location: [address] MA[facility] OR;  Service: Podiatry   • NH REMOVE & REPLACE INDWELL URETERAL STENT [address] Right 06/03/2021    Procedure: EXCHANGE STENT;  Surgeon: Sally Cloud MD;  Location: [facility];  Service: Urology • TUBAL LIGATION     • UPPER GASTROINTESTINAL ENDOSCOPY  2016       Family History   Problem Relation Age of Onset   • Stomach cancer Mother    • Cancer Mother         Stomach Cancer   • Diabetes Mother    • Alcohol abuse Father    • No Known Problems Sister    • No Known Problems Daughter    • No Known Problems Maternal Grandmother    • No Known Problems Maternal Grandfather    • No Known Problems Paternal Grandmother    • No Known Problems Paternal Grandfather    • No Known Problems Sister    • No Known Problems Sister    • No Known Problems Sister    • No Known Problems Sister    • No Known Problems Daughter    • No Known Problems Daughter    • No Known Problems Maternal Aunt    • No Known Problems Maternal Aunt    • No Known Problems Paternal Aunt    • No Known Problems Paternal Aunt    • No Known Problems Paternal Aunt    • No Known Problems Paternal Aunt    • No Known Problems Paternal Aunt    • No Known Problems Paternal Aunt    • No Known Problems Paternal Aunt    • No Known Problems Paternal Aunt    • No Known Problems Paternal Aunt    • No Known Problems Paternal Aunt          Medications have been verified. Objective   BP 96/54 (BP Location: Left arm, Patient Position: Standing)   Pulse (!) 122   Temp 98.2 °F (36.8 °C) (Temporal)   Resp 22   LMP  (LMP Unknown)   SpO2 100%   No LMP recorded (lmp unknown). Patient has had a hysterectomy. Physical Exam     Physical Exam  Vitals and nursing note reviewed. Constitutional:       General: She is not in acute distress. Appearance: She is ill-appearing. She is not toxic-appearing or diaphoretic. Comments: Cachectic appearing female   Requires assistance to walk from [address] to exam room    HENT:      Head: Normocephalic and atraumatic. Eyes:      Extraocular Movements: Extraocular movements intact. Cardiovascular:      Rate and Rhythm: Regular rhythm. Tachycardia present. Heart sounds: Normal heart sounds.  No murmur heard.  Pulmonary:      Effort: Pulmonary effort is normal.      Breath sounds: Normal breath sounds. No decreased breath sounds. Comments: Pt had no hiccups until it was time to discharge her. Chest:      Chest wall: Tenderness present. Abdominal:      General: Bowel sounds are decreased. There is distension. Palpations: Abdomen is soft. Musculoskeletal:         General: Normal range of motion. Cervical back: Normal range of motion and neck supple. Skin:     General: Skin is warm and dry. Capillary Refill: Capillary refill takes less than 2 seconds. Neurological:      General: No focal deficit present. Mental Status: She is alert and oriented to person, place, and time. Psychiatric:         Mood and Affect: Mood normal.         Behavior: Behavior normal.             Preliminary reading CXR  No acute cardiopulmonary process noted  No sternal mass seen  + catheter chest wall  Waiting on rad read       EKG   No stemi  QRS 66          9/12/2023 2:03 PM 9/12/2023 2:04 PM 9/12/2023 2:05 PM   /63 100/61 96/54   BP Location Left arm Left arm Left arm   Patient Position Supine Sitting Standing   Pulse 117 115 122   Resp 18 18 22   SpO2 100 % 100 % 100 %                                                 + orthostatic BP's. Pt is dehydrated but continues to refuse to go to ED for higher level of care and evaluation. I have informed pt that she is dehydrated and that it would be best if she goes to the ED for a higher level of care and evaluation and get some iV hydration. Pt has refused. She states her daughter is coming Friday from [facility] and that she is not going to go to the hospital now. Pt informed of possible risk factors sepsis, Mi/cardiac involvement, respiratory distress, dehydration, death. She verbalizes understanding of this and signs AMA form.

## 2023-09-12 NOTE — PATIENT INSTRUCTIONS
It was recommended that you go to the ED for a higher level of care and evaluation due to your complaints of chest pain, dysuria, shortness of breath and you feeling like you are dehydrated. You have refused to go to the ED. You stated that you have been vomiting for 3 weeks and have had chest pain (lump on your chest ) x 3 weeks. YOU NEED TO CALL YOUR PCP FOR FOLLOW UP AND EVALUATION SINCE YOU HAVE DECLINED THE EMERGENCY ROOM. It has been explained to you that you could have sepsis, MI with cardiac involvement, respiratory distress, dehydration, death - you verbalize understanding of this. Go to the ED if symptoms worsen   You have been prescribed omeprazole and zofran for nausea and GERD like feelings. Your xray was preliminarily read by your provider.   A radiologist will read the xray and you will be notified if it is abnormal.

## 2023-09-13 LAB — BACTERIA UR CULT: NORMAL

## 2023-09-15 LAB
ATRIAL RATE: 114 BPM
P AXIS: 76 DEGREES
PR INTERVAL: 122 MS
QRS AXIS: 61 DEGREES
QRSD INTERVAL: 66 MS
QT INTERVAL: 298 MS
QTC INTERVAL: 410 MS
T WAVE AXIS: 69 DEGREES
VENTRICULAR RATE: 114 BPM

## 2023-09-15 PROCEDURE — 93010 ELECTROCARDIOGRAM REPORT: CPT | Performed by: INTERNAL MEDICINE

## 2023-09-18 PROBLEM — E87.1 DEHYDRATION WITH HYPONATREMIA: Status: ACTIVE | Noted: 2021-02-26

## 2023-09-18 PROBLEM — R18.0 MALIGNANT ASCITES: Chronic | Status: ACTIVE | Noted: 2023-09-18

## 2023-09-18 PROBLEM — N39.0 COMPLICATED UTI (URINARY TRACT INFECTION): Status: ACTIVE | Noted: 2023-01-01

## 2023-09-18 PROBLEM — R53.1 GENERALIZED WEAKNESS: Status: ACTIVE | Noted: 2023-01-01

## 2023-09-18 PROBLEM — D63.8 ANEMIA, CHRONIC DISEASE: Status: ACTIVE | Noted: 2023-01-01

## 2023-09-18 NOTE — ED PROVIDER NOTES
Final Diagnosis:  1. Hyponatremia    2. Pelvic ascites        Chief Complaint   Patient presents with   • Weakness - Generalized   • Vomiting     Pt c/o increased weakness over the past two weeks. Pt states she has also been vomiting with diarrhea and having uti symptoms. Pt currently has ca. HPI  Patient presents for worsening abdominal pain, weakness, and dehydration. Patient states that a couple weeks ago she was having continual vomiting as well as diarrhea. .  She states that these symptoms then stopped but she feels like she is still significantly dehydrated and was so weak today she could not go about her normal day. She denies any kevin fever or chills. No known sick contacts. She endorses generalized abdominal pain as well as bloating. She states that she is taking her medications as needed. Review of records show that the patient is following with palliative care. 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 This apparently has been resistant to all treatment modalities and she has decided to pursue no further treatment and focus on comfort per last palliative note. Unless otherwise specified:  - No language barrier.   - History obtained from patient. - There are no limitations to the history obtained.   - Previous charting was reviewed    PMH:   has a past medical history of Acne, Anxiety, Anxiety and depression (10/02/2019), Arthritis, Breathing difficulty, Cancer ([address]), Cervical cancer ([address]), Chronic kidney disease, Chronic pain disorder, Claustrophobia, Colon polyp, GERD (gastroesophageal reflux disease), Headache, History of radiation therapy, History of shingles, Irritable bowel syndrome, Joint pain following chemotherapy, Liver disease, Lung nodule, Motion sickness, Muscle weakness, Neck pain, Peptic ulceration (2016), Port-A-Cath in place, Shortness of breath, Thumb injury (10/2019), Thyroid nodule, Tinnitus of left ear (02/19/2020), Urinary problem, Wears contact lenses, and Wrist arthritis (03/17/2020). PSH:   has a past surgical history that includes Hysterectomy; Tubal ligation; pr excision interdigital barba neuroma single each (Left, 07/12/2018); Foot surgery; FL retrograde pyelogram (02/11/2021); pr cysto bladder w/ureteral catheterization (Right, 02/11/2021); IR biopsy lymph node (02/19/2021); IR port placement (03/09/2021); Colonoscopy; pr remove & replace indwell ureteral stent trurthrl (Right, 06/03/2021); Cystoscopy (Right, 06/03/2021); FL retrograde pyelogram (06/03/2021); Cystoscopy (Right, 10/18/2021); pr cysto bladder w/ureteral catheterization (Right, 04/04/2022); pr cysto bladder w/ureteral catheterization (Right, 10/27/2022); Lymph node biopsy (2021); Upper gastrointestinal endoscopy (2016); pr cysto bladder w/ureteral catheterization (Right, 5/1/2023); and Epidural block injection (Right, 8/2/2023). ROS:  Review of Systems   - 13 point ROS was performed and all are normal unless stated in the history above. - Nursing note reviewed. Vitals reviewed. - Orders placed by myself and/or advanced practitioner / resident. PE:   Vitals:    09/18/23 1458 09/18/23 1556 09/18/23 1656 09/18/23 1757   BP:  91/53 115/59 104/53   BP Location:  Left arm Left arm Left arm   Pulse:  105 97 100   Resp:  18 18 17   Temp: (!) 96.8 °F (36 °C)      TempSrc: Tympanic      SpO2:  100% 100% 100%     Vitals reviewed by me. Patient appears weak, nondistressed in bed though. No increased work of breathing. She is alert and oriented. Abdomen is distended and tender to generalized palpation. Unless otherwise specified above:    General: VS reviewed  Appears in NAD    Head: Normocephalic, atraumatic  Eyes: EOM-I.     ENT: Atraumatic external nose and ears. No drooling. Neck: No JVD.     CV: No pallor noted  Lungs:   No tachypnea  No respiratory distress    Abdomen:  Soft, non-tender, non-distended    MSK:   No obvious deformity    Skin: Dry, intact. No obvious rash. Psychiatric/Behavioral: Appropriate mood and affect   Exam: deferred    Physical Exam     Procedures   A:  - Nursing note reviewed. ED Course as of 09/18/23 1933   Mon Sep 18, 2023   1515 Procedure Note: EKG  Date/Time: 09/18/23 3:15 PM   Interpreted by: Benita Rodas  Indications / Diagnosis: Weakness  ECG reviewed by me, the ED Provider: yes   The EKG demonstrates:  Rhythm: normal sinus  Intervals: normal intervals  Axis: normal axis  QRS/Blocks: normal QRS  ST Changes: No acute ST Changes, no STD/DAO. No diffuse elevations to indicate pericarditis. No coved ST elevations greater than 2mm with negative T waves in V1-3 to indicate concern for brugada. No biphasic T waves in V2, V3 to indicate Wellens (critical stenosis of LAD). No elevation in aVR or deviation when compared to V1 (can be associated with ST depression in I,II, V4-6 when left main occlusion is present). 1633 WBC(!): 15.78   1710 Sodium(!): 120  Significant hyponatremia which is not consistent with patient's prior values. Possible extensive GI losses though I would expect that would lead to possibly some concentration. We will repeat. 1736 Sodium(!): 121  Consistent with other value. 1836 Patient updated on results. Anticipate admission. CT abdomen pelvis with contrast   ED Interpretation   Abnormal   Large volume of ascites which seems to be new compared to prior months ago. No free air on my interpretation. Final Result      Worsening metastatic disease with new caudate lobe liver mass measuring 4.0 x 2.5 cm. Enlarging bilateral adrenal gland metastases as described. Interval development of extensive abdominal pelvic ascites likely due to peritoneal carcinomatosis with multiple serosal implants noted as well as omental caking.       Extensive soft tissue in the upper abdomen adjacent to the celiac axis in the hasmukh hepatis consistent with progression of metastatic disease with portal vein thrombosis and cavernous transformation. 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                   Workstation performed: [id number]           Orders Placed This Encounter   Procedures   • FLU/RSV/COVID - if FLU/RSV clinically relevant   • Blood culture #1   • Blood culture #2   • Urine culture   • CT abdomen pelvis with contrast   • CBC and differential   • Protime-INR   • APTT   • Comprehensive metabolic panel   • Lipase   • Magnesium   • UA w Reflex to Microscopic w Reflex to Culture   • Lactic acid, plasma (w/reflex if result > 2.0)   • Procalcitonin   • Basic metabolic panel   • Urine Microscopic   • TSH, 3rd generation with Free T4 reflex   • Osmolality-"If this is regarding a toxic alcohol, please STOP and consult medical  for further guidance."   • Osmolality, urine   • Insert peripheral IV   • Okay to use Port A Cath   • Nursing communication Okay to pull cultures from port   • 24 Hour Telemetry Monitoring   • ECG 12 lead   • INPATIENT ADMISSION     Labs Reviewed   CBC AND DIFFERENTIAL - Abnormal       Result Value Ref Range Status    WBC 15.78 (*) 4.31 - 10.16 Thousand/uL Final    RBC 3.94  3.81 - 5.12 Million/uL Final    Hemoglobin 10.3 (*) 11.5 - 15.4 g/dL Final    Hematocrit 32.8 (*) 34.8 - 46.1 % Final    MCV 83  82 - 98 fL Final    MCH 26.1 (*) 26.8 - 34.3 pg Final    MCHC 31.4  31.4 - 37.4 g/dL Final    RDW 17.4 (*) 11.6 - 15.1 % Final    MPV 9.8  8.9 - 12.7 fL Final    Platelets 084  160 - 390 Thousands/uL Final    nRBC 0  /100 WBCs Final    Neutrophils Relative 88 (*) 43 - 75 % Final    Immat GRANS % 1  0 - 2 % Final    Lymphocytes Relative 4 (*) 14 - 44 % Final    Monocytes Relative 7  4 - 12 % Final    Eosinophils Relative 0  0 - 6 % Final    Basophils Relative 0  0 - 1 % Final    Neutrophils Absolute 13.90 (*) 1.85 - 7.62 Thousands/µL Final    Immature Grans Absolute 0.10  0.00 - 0.20 Thousand/uL Final    Lymphocytes Absolute 0.61  0.60 - 4.47 Thousands/µL Final    Monocytes Absolute 1.11  0.17 - 1.22 Thousand/µL Final    Eosinophils Absolute 0.04  0.00 - 0.61 Thousand/µL Final    Basophils Absolute 0.02  0.00 - 0.10 Thousands/µL Final   PROTIME-INR - Abnormal    Protime 16.1 (*) 11.6 - 14.5 seconds Final    INR 1.31 (*) 0.84 - 1.19 Final   APTT - Abnormal    PTT 49 (*) 23 - 37 seconds Final    Comment: Therapeutic Heparin Range =  60-90 seconds   COMPREHENSIVE METABOLIC PANEL - Abnormal    Sodium 120 (*) 135 - 147 mmol/L Final    Potassium 4.0  3.5 - 5.3 mmol/L Final    Chloride 84 (*) 96 - 108 mmol/L Final    CO2 25  21 - 32 mmol/L Final    ANION GAP 11  mmol/L Final    BUN 34 (*) 5 - 25 mg/dL Final    Creatinine 1.05  0.60 - 1.30 mg/dL Final    Comment: Standardized to IDMS reference method    Glucose 138  65 - 140 mg/dL Final    Comment: If the patient is fasting, the ADA then defines impaired fasting glucose as > 100 mg/dL and diabetes as > or equal to 123 mg/dL. Calcium 9.1  8.4 - 10.2 mg/dL Final    Corrected Calcium 9.8  8.3 - 10.1 mg/dL Final    AST 23  13 - 39 U/L Final    ALT 11  7 - 52 U/L Final    Comment: Specimen collection should occur prior to Sulfasalazine administration due to the potential for falsely depressed results. Alkaline Phosphatase 139 (*) 34 - 104 U/L Final    Total Protein 6.2 (*) 6.4 - 8.4 g/dL Final    Albumin 3.1 (*) 3.5 - 5.0 g/dL Final    Total Bilirubin 0.92  0.20 - 1.00 mg/dL Final    Comment: Use of this assay is not recommended for patients undergoing treatment with eltrombopag due to the potential for falsely elevated results. N-acetyl-p-benzoquinone imine (metabolite of Acetaminophen) will generate erroneously low results in samples for patients that have taken an overdose of Acetaminophen.     eGFR 54  ml/min/1.73sq m Final    Narrative:     [place] guidelines for Chronic Kidney Disease (CKD): •  Stage 1 with normal or high GFR (GFR > 90 mL/min/1.73 square meters)  •  Stage 2 Mild CKD (GFR = 60-89 mL/min/1.73 square meters)  •  Stage 3A Moderate CKD (GFR = 45-59 mL/min/1.73 square meters)  •  Stage 3B Moderate CKD (GFR = 30-44 mL/min/1.73 square meters)  •  Stage 4 Severe CKD (GFR = 15-29 mL/min/1.73 square meters)  •  Stage 5 End Stage CKD (GFR <15 mL/min/1.73 square meters)  Note: GFR calculation is accurate only with a steady state creatinine   LIPASE - Abnormal    Lipase 7 (*) 11 - 82 u/L Final   MAGNESIUM - Abnormal    Magnesium 1.8 (*) 1.9 - 2.7 mg/dL Final   UA W REFLEX TO MICROSCOPIC WITH REFLEX TO CULTURE - Abnormal    Color, UA Yellow   Final    Clarity, UA Clear   Final    Specific Gravity, UA 1.015  1.003 - 1.030 Final    pH, UA 5.5  4.5, 5.0, 5.5, 6.0, 6.5, 7.0, 7.5, 8.0 Final    Leukocytes, UA Trace (*) Negative Final    Nitrite, UA Negative  Negative Final    Protein, UA 30 (1+) (*) Negative mg/dl Final    Glucose, UA Negative  Negative mg/dl Final    Ketones, UA Trace (*) Negative mg/dl Final    Urobilinogen, UA 1.0  0.2, 1.0 E.U./dl E.U./dl Final    Bilirubin, UA Small (*) Negative Final    Occult Blood, UA Moderate (*) Negative Final   PROCALCITONIN TEST - Abnormal    Procalcitonin 0.51 (*) <=0.25 ng/ml Final    Comment: Suspected Lower Respiratory Tract Infection (LRTI):  - LESS than or EQUAL to 0.25 ng/mL:   low likelihood for bacterial LRTI; antibiotics DISCOURAGED.  - GREATER than 0.25 ng/mL:   increased likelihood for bacterial LRTI; antibiotics ENCOURAGED. Suspected Sepsis:  - Strongly consider initiating antibiotics in ALL UNSTABLE patients. - LESS than or EQUAL to 0.5 ng/mL:   low likelihood for bacterial sepsis; antibiotics DISCOURAGED.  - GREATER than 0.5 ng/mL:   increased likelihood for bacterial sepsis; antibiotics ENCOURAGED.  - GREATER than 2 ng/mL:   high risk for severe sepsis / septic shock; antibiotics strongly ENCOURAGED.     Decisions on antibiotic use should not be based solely on Procalcitonin (PCT) levels. If PCT is low but uncertainty exists with stopping antibiotics, repeat PCT in 6-24 hours to confirm the low level. If antibiotics are administered (regardless if initial PCT was high or low), repeat PCT every 1-2 days to consider early antibiotic cessation (when GREATER than 80% decrease from the peak OR when PCT drops below designated cutoffs, whichever comes first), so long as the infection is NOT one that typically requires prolonged treatment durations (e.g., bone/joint infections, endocarditis, Staph. aureus bacteremia).     Situations of FALSE-POSITIVE Procalcitonin values:  1) Newborns < 67 hours old  2) Massive stress from severe trauma / burns, major surgery, acute pancreatitis, cardiogenic / hemorrhagic shock, sickle cell crisis, or other organ perfusion abnormalities  3) Malaria and some Candidal infections  4) Treatment with agents that stimulate cytokines (e.g., OKT3, anti-lymphocyte globulins, alemtuzumab, IL-2, granulocyte transfusion [NOT GCSFs])  5) Chronic renal disease causes elevated baseline levels (consider GREATER than 0.75 ng/mL as an abnormal cut-off); initiating HD/CRRT may cause transient decreases  6) Paraneoplastic syndromes from medullary thyroid or SCLC, some forms of vasculitis, and acute [id number] disease    Situations of FALSE-NEGATIVE Procalcitonin values:  1) Too early in clinical course for PCT to have reached its peak (may repeat in 6-24 hours to confirm low level)  2) Localized infection WITHOUT systemic (SIRS / sepsis) response (e.g., an abscess, osteomyelitis, cystitis)  3) Mycobacteria (e.g., Tuberculosis, MAC)  4) Cystic fibrosis exacerbations     BASIC METABOLIC PANEL - Abnormal    Sodium 121 (*) 135 - 147 mmol/L Final    Potassium 4.0  3.5 - 5.3 mmol/L Final    Chloride 88 (*) 96 - 108 mmol/L Final    CO2 24  21 - 32 mmol/L Final    ANION GAP 9  mmol/L Final    BUN 31 (*) 5 - 25 mg/dL Final    Creatinine 0.92 0.60 - 1.30 mg/dL Final    Comment: Standardized to IDMS reference method    Glucose 145 (*) 65 - 140 mg/dL Final    Comment: If the patient is fasting, the ADA then defines impaired fasting glucose as > 100 mg/dL and diabetes as > or equal to 123 mg/dL. Calcium 8.5  8.4 - 10.2 mg/dL Final    eGFR 64  ml/min/1.73sq m Final    Narrative:     [facility] guidelines for Chronic Kidney Disease (CKD):   •  Stage 1 with normal or high GFR (GFR > 90 mL/min/1.73 square meters)  •  Stage 2 Mild CKD (GFR = 60-89 mL/min/1.73 square meters)  •  Stage 3A Moderate CKD (GFR = 45-59 mL/min/1.73 square meters)  •  Stage 3B Moderate CKD (GFR = 30-44 mL/min/1.73 square meters)  •  Stage 4 Severe CKD (GFR = 15-29 mL/min/1.73 square meters)  •  Stage 5 End Stage CKD (GFR <15 mL/min/1.73 square meters)  Note: GFR calculation is accurate only with a steady state creatinine   URINE MICROSCOPIC - Abnormal    RBC, UA 4-10 (*) None Seen, 0-1, 1-2, 2-4, 0-5 /hpf Final    WBC, UA 20-30 (*) None Seen, 0-1, 1-2, 0-5, 2-4 /hpf Final    Epithelial Cells Innumerable (*) None Seen, Occasional /hpf Final    Bacteria, UA Moderate (*) None Seen, Occasional /hpf Final   COVID19, INFLUENZA A/B, RSV PCR, [facility] - Normal    SARS-CoV-2 Negative  Negative Final    INFLUENZA A PCR Negative  Negative Final    INFLUENZA B PCR Negative  Negative Final    RSV PCR Negative  Negative Final    Narrative:     FOR PEDIATRIC PATIENTS - copy/paste COVID Guidelines URL to browser: [email or web address]    SARS-CoV-2 assay is a Nucleic Acid Amplification assay intended for the  qualitative detection of nucleic acid from SARS-CoV-2 in nasopharyngeal  swabs. Results are for the presumptive identification of SARS-CoV-2 RNA. Positive results are indicative of infection with SARS-CoV-2, the virus  causing COVID-19, but do not rule out bacterial infection or co-infection  with other viruses. Laboratories within the [facility] and its  territories are required to report all positive results to the appropriate  public health authorities. Negative results do not preclude SARS-CoV-2  infection and should not be used as the sole basis for treatment or other  patient management decisions. Negative results must be combined with  clinical observations, patient history, and epidemiological information. This test has not been FDA cleared or approved. This test has been authorized by FDA under an Emergency Use Authorization  (EUA). This test is only authorized for the duration of time the  declaration that circumstances exist justifying the authorization of the  emergency use of an in vitro diagnostic tests for detection of SARS-CoV-2  virus and/or diagnosis of COVID-19 infection under section 564(b)(1) of  the Act, 21 U. S.C. 759ASS-2(I)(2), unless the authorization is terminated  or revoked sooner. The test has been validated but independent review by FDA  and CLIA is pending. Test performed using [employer] GeneXpert: This RT-PCR assay targets N2,  a region unique to SARS-CoV-2. A conserved region in the E-gene was chosen  for pan-Sarbecovirus detection which includes SARS-CoV-2. According to CMS-2020-01-R, this platform meets the definition of high-throughput technology. LACTIC ACID, PLASMA (W/REFLEX IF RESULT > 2.0) - Normal    LACTIC ACID 2.0  0.5 - 2.0 mmol/L Final    Narrative:     Result may be elevated if tourniquet was used during collection. BLOOD CULTURE   BLOOD CULTURE   URINE CULTURE   TSH, 3RD GENERATION WITH FREE T4 REFLEX   OSMOLALITY   OSMOLALITY, URINE         Final Diagnosis:  1. Hyponatremia    2. Pelvic ascites        P:  -Patient presents for worsening abdominal pain, weakness. She could be dehydrated secondary to her GI losses. Could also be progression of malignancy.   Will evaluate for signs of obstruction, worsening disease, other acute pathology in the abdomen at this time with CT imaging. Provide analgesia. Evaluate for obvious signs of infection.  -Patient updated on results. Significant hyponatremia, ascites likely leading to her abdominal pain. Will bring to hospital for further management of electrolytes, possible paracentesis. Unless otherwise noted the patient's medications were reviewed and they should continue as directed. Medications   HYDROmorphone HCl (DILAUDID) injection 0.2 mg (0 mg Intravenous Hold 9/18/23 1905)   sodium chloride 0.9 % bolus 1,000 mL (0 mL Intravenous Stopped 9/18/23 1703)   HYDROmorphone HCl (DILAUDID) injection 0.2 mg (0.2 mg Intravenous Given 9/18/23 1604)   cefepime (MAXIPIME) IVPB (premix in dextrose) 2,000 mg 50 mL (0 mg Intravenous Stopped 9/18/23 1720)   iohexol (OMNIPAQUE) 350 MG/ML injection (SINGLE-DOSE) 100 mL (100 mL Intravenous Given 9/18/23 1805)     Time reflects when diagnosis was documented in both MDM as applicable and the Disposition within this note     Time User Action Codes Description Comment    9/18/2023  5:36 PM Sherry Evangelista Add [E87.1] Hyponatremia     9/18/2023  7:26 PM Sherry Evangelista Add [R18.8] Pelvic ascites       ED Disposition     ED Disposition   Admit    Condition   Stable    Date/Time   Mon Sep 18, 2023  7:26 PM    Comment   Case was discussed with ANGUS and the patient's admission status was agreed to be Admission Status: inpatient status to the service of Dr. Db Liang . Follow-up Information    None       Patient's Medications   Discharge Prescriptions    No medications on file     No discharge procedures on file. Prior to Admission Medications   Prescriptions Last Dose Informant Patient Reported? Taking?    Diclofenac Sodium (VOLTAREN) 1 %  Self No No   Sig: Apply 2 g topically 4 (four) times a day   LORazepam (ATIVAN) 1 mg tablet   No No   Sig: Take 1 tablet (1 mg total) by mouth every 8 (eight) hours as needed for anxiety   albuterol (PROVENTIL HFA,VENTOLIN HFA) 90 mcg/act inhaler Self No No   Sig: INHALE 2 PUFFS EVERY 4 HOURS AS NEEDED FOR SHORTNESS OF BREATH. buPROPion (WELLBUTRIN XL) 300 mg 24 hr tablet  Self No No   Sig: TAKE 1 TABLET BY MOUTH EVERY DAY   Patient taking differently: every morning   gabapentin (NEURONTIN) 400 mg capsule  Self No No   Sig: Take 1 capsule (400 mg total) by mouth 3 (three) times a day To reduce nerve pain   lidocaine-prilocaine (EMLA) cream  Self No No   Sig: Apply to PORT site 30 min prior to labs or chemo   methylPREDNISolone 4 MG tablet therapy pack  Self No No   Sig: Use as directed on package   naloxegol oxalate (MOVANTIK) 25 MG tablet   No No   Sig: Take 1 tablet (25 mg total) by mouth in the morning   omeprazole (PriLOSEC) 20 mg delayed release capsule   No No   Sig: Take 1 capsule (20 mg total) by mouth every morning   ondansetron (ZOFRAN-ODT) 4 mg disintegrating tablet   No No   Sig: Take 1 tablet (4 mg total) by mouth every 6 (six) hours as needed for nausea or vomiting   ondansetron (ZOFRAN-ODT) 8 mg disintegrating tablet   No No   Sig: Take 1 tablet (8 mg total) by mouth every 8 (eight) hours as needed for nausea or vomiting   oxyCODONE-acetaminophen (PERCOCET)  mg per tablet  Self Yes No   Sig: Take 1 tablet by mouth every 4 (four) hours as needed for moderate pain   polyethylene glycol (MIRALAX) 17 g packet  Self No No   Sig: Take 17 g by mouth daily   promethazine (PHENERGAN) 25 mg tablet  Self No No   Sig: Take 1 tablet (25 mg total) by mouth every 6 (six) hours as needed for nausea or vomiting   senna (SENOKOT) 8.6 MG tablet  Self No No   Sig: Take 1 tablet (8.6 mg total) by mouth 2 (two) times a day Take to prevent constipation from pain meds. Hold one dose for loose stool   Patient taking differently: Take 1 tablet by mouth if needed Take to prevent constipation from pain meds.   Hold one dose for loose stool   traZODone (DESYREL) 50 mg tablet  Self No No   Sig: TAKE 1-2 AT BEDTIME      Facility-Administered Medications: None Portions of the record may have been created with voice recognition software. Occasional wrong word or "sound a like" substitutions may have occurred due to the inherent limitations of voice recognition software. Read the chart carefully and recognize, using context, where substitutions have occurred.     Electronically signed by:  MD Diane Fernandez MD  09/18/23 4525

## 2023-09-19 PROBLEM — I81 PORTAL VEIN THROMBOSIS: Status: ACTIVE | Noted: 2023-01-01

## 2023-09-19 NOTE — H&P
[address]  H&P  Name: Shannan Carmona 76 y.o. female I MRN: [id number]  Unit/Bed#: 449-42 I Date of Admission: 9/18/2023   Date of Service: 9/18/2023 I Hospital Day: 0      Assessment/Plan   * Dehydration with hyponatremia  Assessment & Plan  Moderate hyponatremia most likely due to hypovolemia from recent GI fluid loss-related dehydration. Patient received a liter of normal saline IV bolus at the ED. We will continue cautious IV fluid resuscitation with frequent BMP checks over the next 24 hours to avoid rapid correction of hyponatremia. Malignant ascites  Assessment & Plan  Most likely peritoneal carcinomatosis from stage IV cervical cancer deemed no longer responsive to any sort of treatment according to the patient. Besides transient relief, the utility of abd paracentesis is doubtful at this point. We will defer this to the patient's hematologist.    Complicated UTI (urinary tract infection)  Assessment & Plan  We will give empiric IV Zosyn while awaiting final urine c+s. Anemia, chronic disease  Assessment & Plan  This appears stable. Will monitor CBC indicis especially while administering DVT prophylaxis with Lovenox. Generalized weakness  Assessment & Plan  Check orthostatic vital signs and continue cautious IV fluid resuscitation is positive. PT/OT eval.  Patient ultimately may benefit from short-term rehab. Malignant neoplasm of endocervix [facility])  Assessment & Plan  We will consult her oncologist in a.m. In line with the patient's verbal instruction to me tonight for CPR, intubation and defibrillation to be withheld, her CODE STATUS was made DNR/DNI. VTE Prophylaxis: Enoxaparin (Lovenox)  / sequential compression device   Code Status: DNR  POLST: POLST information provided but not completed    Anticipated Length of Stay:  Patient will be admitted on an Inpatient basis with an anticipated length of stay of  > 2 midnights.    Justification for DOROTHEA GRIGSBY - JAQUELINE Stay: IV fluid    Total Time for Visit, including Counseling / Coordination of Care: 45 minutes. Greater than 50% of this total time spent on direct patient counseling and coordination of care. Chief Complaint:   Generalized weakness    History of Present Illness:    Marca Homans is a 76 y.o. female whose past medical history is remarkable for stage IV cervical cancer. Patient reports no longer being on any other sort of cancer treatment such as chemotherapy due to failure of all attempted cancer treatment modalities. She is opioid dependent due to chronic cancer pain. She reported to this facility ED complaining of generalized weakness over the past week associated with orthostatic lightheadedness. She also thinks she has a UTI and has been experiencing dysuria and urinary frequency over the past week or so. No fever, chills, headache, neck stiffness or acute confusion. She however experienced a week duration of nausea, vomiting and diarrhea that began 2 weeks ago & has since resolved. Her ED vital signs upon arrival included /67, heart rate 107, temperature 96.8 °F.  Pertinent ED labs included serum sodium 120, BUN 34, creatinine 1.05, serum lactate 2.0, procalcitonin 0.51, WBC 15.78, hemoglobin 10.3, INR 1.31, UA remarkable for trace leukocytes, negative nitrites, 20-30 WBCs, moderate bacteria. CT abdomen/pelvis with IV contrast was remarkable for worsening metastatic disease and development of extensive abdominal pelvic ascites likely due to peritoneal carcinomatosis. Patient received a liter of normal saline IV bolus at the ED. Her hospitalization was consequently sought for further management of moderate hyponatremia and generalized weakness. Review of Systems:    A 10+ point review of systems was obtained & is as above, otherwise negative.   Review of Systems    Past Medical and Surgical History:     Past Medical History:   Diagnosis Date   • Acne    • Anxiety    • Anxiety and depression 10/02/2019   • Arthritis    • Breathing difficulty     pt does not recall any breathing problem but did have neck pain after procedure" Gave her an incentive spirometer to use   • Cancer (HCC)     cervical/ and "lymph nodes" (GYN)   • Cervical cancer (HCC)    • Chronic kidney disease     stage 3   • Chronic pain disorder     Neck pain   • Claustrophobia    • Colon polyp    • GERD (gastroesophageal reflux disease)     not now   • Headache    • History of radiation therapy    • History of shingles    • Irritable bowel syndrome    • Joint pain following chemotherapy     and stiffness   • Liver disease     "cyst on liver"   • Lung nodule     "left"   • Motion sickness    • Muscle weakness     after chemo"   • Neck pain     "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"has had neck pain since childhood"   • Peptic ulceration 2016   • Port-A-Cath in place     right chest//for chemotherapy q 3 weeks and also receives IV hydration   • Shortness of breath     "started with cancer, when too much exertion like cleaning/guera doing stairs"-pt states it is much better   • Thumb injury 10/2019    right   • Thyroid nodule    • Tinnitus of left ear 02/19/2020   • Urinary problem    • Wears contact lenses    • Wrist arthritis 03/17/2020       Past Surgical History:   Procedure Laterality Date   • COLONOSCOPY     • CYSTOSCOPY W/ RETROGRADES Right 06/03/2021    Procedure: Victorina Crandall W/RETROGRADE;  Surgeon: Clarissa Mata MD;  Location[facility];  Service: Urology   • CYSTOSCOPY W/ RETROGRADES Right 10/18/2021    Procedure: CYSTOSCOPY WITH ureteral stent exchange;  Surgeon: Evonnie Ahumada, MD;  Location: [facility];  Service: Urology   • EPIDURAL BLOCK INJECTION Right 8/2/2023    Procedure: BLOCK / INJECTION EPIDURAL STEROID LUMBAR RIGHT L2-3 TFESI;  Surgeon: Tova Holley MD;  Location: [facility];  Service: Pain Management    • FL RETROGRADE PYELOGRAM  02/11/2021   • FL RETROGRADE PYELOGRAM  06/03/2021   • FOOT SURGERY     • HYSTERECTOMY     • IR BIOPSY LYMPH NODE  02/19/2021   • IR PORT PLACEMENT  03/09/2021   • LYMPH NODE BIOPSY  2021   • MI CYSTO BLADDER W/URETERAL CATHETERIZATION Right 02/11/2021    Procedure: CYSTOSCOPY RETROGRADE PYELOGRAM WITH INSERTION STENT URETERAL;  Surgeon: Dominic Browning MD;  [facility];  Service: Urology   • MI CYSTO BLADDER W/URETERAL CATHETERIZATION Right 04/04/2022    Procedure: CYSTOSCOPY  WITH INSERTION STENT URETERAL Right;  Surgeon: Dominic Browning MD;  [facility];  Service: Urology   • MI CYSTO BLADDER W/URETERAL CATHETERIZATION Right 10/27/2022    Procedure: Cystoscopy, right ureteral stent exchange;  Surgeon: Dominic Browning MD;  [facility];  Service: Urology   • MI CYSTO BLADDER W/URETERAL CATHETERIZATION Right 5/1/2023    Procedure: CYSTOSCOPY with right ureteral stent exchange;  Surgeon: Dominic Browning MD;  [facility];  Service: Urology   • MI EXCISION INTERDIGITAL KESSLER NEUROMA SINGLE EACH Left 07/12/2018    Procedure: FOOT NEUROMA EXCISION;  Surgeon: Jaime Gonzáles DPM;  Location: [address] [place] [address] MAIN OR;  Service: Podiatry   • MI REMOVE & REPLACE INDWELL URETERAL STENT [address] Right 06/03/2021    Procedure: EXCHANGE STENT;  Surgeon: Marla Wolf MD;  [facility];  Service: Urology   • TUBAL LIGATION     • UPPER GASTROINTESTINAL ENDOSCOPY  2016       Meds/Allergies:    Prior to Admission medications    Medication Sig Start Date End Date Taking?  Authorizing Provider   albuterol (PROVENTIL HFA,VENTOLIN HFA) 90 mcg/act inhaler INHALE 2 PUFFS EVERY 4 HOURS AS NEEDED FOR SHORTNESS OF BREATH. 3/14/23  Yes Geronimo Mitchell, DO   buPROPion (WELLBUTRIN XL) 300 mg 24 hr tablet TAKE 1 TABLET BY MOUTH EVERY DAY  Patient taking differently: every morning 1/9/23  Yes Jeromy Rogers, DO   Diclofenac Sodium (VOLTAREN) 1 % Apply 2 g topically 4 (four) times a day 11/9/21  Yes Jeromy Rogers, DO   naloxegol oxalate (MOVANTIK) 25 MG tablet Take 1 tablet (25 mg total) by mouth in the morning 7/13/23  Yes Jaegerkim Mccrary, DO   omeprazole (PriLOSEC) 20 mg delayed release capsule Take 1 capsule (20 mg total) by mouth every morning 9/12/23  Yes JACE Rhodes   ondansetron (ZOFRAN-ODT) 4 mg disintegrating tablet Take 1 tablet (4 mg total) by mouth every 6 (six) hours as needed for nausea or vomiting 9/12/23  Yes JACE Rhodes   ondansetron (ZOFRAN-ODT) 8 mg disintegrating tablet Take 1 tablet (8 mg total) by mouth every 8 (eight) hours as needed for nausea or vomiting 7/13/23  Yes Jaeger Distad, DO   oxyCODONE-acetaminophen (PERCOCET)  mg per tablet Take 1 tablet by mouth every 4 (four) hours as needed for moderate pain   Yes Historical Provider, MD   polyethylene glycol (MIRALAX) 17 g packet Take 17 g by mouth daily 12/16/22  Yes Mil Mccrary DO   traZODone (DESYREL) 50 mg tablet TAKE 1-2 AT BEDTIME 10/8/22  Yes Jeromy Rogers DO   gabapentin (NEURONTIN) 400 mg capsule Take 1 capsule (400 mg total) by mouth 3 (three) times a day To reduce nerve pain 7/3/23 8/2/23  Alea Combs MD   lidocaine-prilocaine (EMLA) cream Apply to PORT site 30 min prior to labs or chemo 1/11/23   Steven Cruz PA-C   LORazepam (ATIVAN) 1 mg tablet Take 1 tablet (1 mg total) by mouth every 8 (eight) hours as needed for anxiety 7/13/23   Mil DacostaadDO   methylPREDNISolone 4 MG tablet therapy pack Use as directed on package  Patient not taking: Reported on 9/18/2023 7/3/23   Alea Combs MD   promethazine (PHENERGAN) 25 mg tablet Take 1 tablet (25 mg total) by mouth every 6 (six) hours as needed for nausea or vomiting 1/9/23   Steven Cruz PA-C   senna (SENOKOT) 8.6 MG tablet Take 1 tablet (8.6 mg total) by mouth 2 (two) times a day Take to prevent constipation from pain meds. Hold one dose for loose stool  Patient taking differently: Take 1 tablet by mouth if needed Take to prevent constipation from pain meds.   Hold one dose for loose stool 9/22/22   Ramiro Somers MD famotidine (PEPCID) 20 mg tablet Take 20 mg by mouth daily    22  Historical Provider, MD     I have reviewed home medications with patient personally. Allergies:    Allergies   Allergen Reactions   • Medical Tape Itching     And redness from adhesive    And "skin raised up" especially longer on skin   • Aspirin GI Intolerance   • Codeine GI Intolerance   • Dust Mite Extract    • Pollen Extract Sneezing       Social History:     Marital Status:    Patient Pre-hospital Living Situation: home  Patient Pre-hospital Level of Mobility: independent  Substance Use History:   Social History     Substance and Sexual Activity   Alcohol Use Not Currently    Comment: Occasional     Social History     Tobacco Use   Smoking Status Former   • Packs/day: 3.00   • Years: 25.00   • Total pack years: 75.00   • Types: Cigarettes   • Start date:    • Quit date: 2003   • Years since quittin.2   Smokeless Tobacco Never     Social History     Substance and Sexual Activity   Drug Use No   • Types: Marijuana    Comment: As teenager       Family History:    non-contributory    Physical Exam:   General: in no overt respiratory distress  HEENT: oral mucosa dry, appears pale but not jaundiced  Neck: supple, no JVD  Resp: clear lungs, no crackles or wheeze  Cardiovascular: S1 S2 audible, regular  GI: soft abdomen, distended with ascites, periumbilical tenderness without rebound or guarding, bowel sounds present  Musculoskeletal: no pedal edema or cyanosis  Skin: no petechiae or suspicious rash  Psych: appropriately oriented in all spheres  Neuro: Awake, alert, verbally responsive and follows commands, moves all extremities equally, essentially nonfocal      Vitals:   Blood Pressure: 125/73 (23)  Pulse: (!) 113 (23)  Temperature: 97.7 °F (36.5 °C) (23)  Temp Source: Axillary (23)  Respirations: 19 (23)  Height: 4' 11" (149.9 cm) (23)  Weight - Scale: 53.8 kg (118 lb 9.7 oz) (09/18/23 2211)  SpO2: 99 % (09/18/23 2003)      Additional Data:     Lab Results: I have personally reviewed pertinent reports. Results from last 7 days   Lab Units 09/18/23  1551   WBC Thousand/uL 15.78*   HEMOGLOBIN g/dL 10.3*   HEMATOCRIT % 32.8*   PLATELETS Thousands/uL 168   NEUTROS PCT % 88*   LYMPHS PCT % 4*   MONOS PCT % 7   EOS PCT % 0     Results from last 7 days   Lab Units 09/18/23  2203 09/18/23  1702 09/18/23  1551   POTASSIUM mmol/L 4.0   < > 4.0   CHLORIDE mmol/L 88*   < > 84*   CO2 mmol/L 23   < > 25   BUN mg/dL 32*   < > 34*   CREATININE mg/dL 0.88   < > 1.05   CALCIUM mg/dL 8.7   < > 9.1   ALK PHOS U/L  --   --  139*   ALT U/L  --   --  11   AST U/L  --   --  23    < > = values in this interval not displayed. Results from last 7 days   Lab Units 09/18/23  1551   INR  1.31*     Invalid input(s): "TROIP"    Imaging: I have personally reviewed pertinent reports. CT abdomen pelvis with contrast    Result Date: 9/18/2023  Narrative: CT ABDOMEN AND PELVIS WITH IV CONTRAST INDICATION:   Nausea and vomiting, worsening abdominal pain with possible distention. History of metastatic cervical cancer. COMPARISON: CT scan 3/16/2023. TECHNIQUE:  CT examination of the abdomen and pelvis was performed. Multiplanar 2D reformatted images were created from the source data. This examination, like all CT scans performed in the [facility], was performed utilizing techniques to minimize radiation dose exposure, including the use of iterative reconstruction and automated exposure control. Radiation dose length product (DLP) for this visit:  917.03 mGy-cm IV Contrast:  100 mL of iohexol (OMNIPAQUE) Enteric Contrast:  Enteric contrast was not administered. FINDINGS: ABDOMEN LOWER CHEST: Lung bases are clear. Stable hiatal hernia. LIVER/BILIARY TREE: Stable right lobe hemangioma measuring 1.5 cm.  New mass in the caudate lobe of the liver consistent with metastatic disease measuring 4.0 x 2.5 cm. GALLBLADDER: No gallstones. No pericholecystic fluid SPLEEN:  Unremarkable. PANCREAS:  Unremarkable. ADRENAL GLANDS: Enlarging bilateral adrenal gland metastases measuring 4.7 cm on the right (previous 2.9 cm) and 4.4 cm on the left (previous 2.6 cm). KIDNEYS/URETERS: Right double-J stent in place without hydronephrosis. Stable right renal atrophy. STOMACH AND BOWEL: There is colonic diverticulosis without evidence of acute diverticulitis. Interval development of serosal implants along the ascending colon image 2/74 with severe wall thickening and near complete obstruction. Additional serosal implants are noted along the mesentery for example on image 2/78 in the right lower quadrant. APPENDIX:  No findings to suggest appendicitis. ABDOMINOPELVIC CAVITY: Interval development of extensive abdominal pelvic ascites. Stable omental caking in the anterior left abdomen image 2/80. Significant worsening of retroperitoneal adenopathy and soft tissue particularly at the level of the hasmukh hepatis and celiac axis consistent with worsening metastatic disease. 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 New saccular aneurysm of the distal abdominal aorta measuring 7 mm on image 2/84 possibly related to tumor invasion. PELVIS REPRODUCTIVE ORGANS: Surgical changes of prior hysterectomy. URINARY BLADDER: Distal aspect of the right double-J catheter stent noted. ABDOMINAL WALL/INGUINAL REGIONS:  Unremarkable. OSSEOUS STRUCTURES:  No acute fracture or destructive osseous lesion. Impression: Worsening metastatic disease with new caudate lobe liver mass measuring 4.0 x 2.5 cm. Enlarging bilateral adrenal gland metastases as described. Interval development of extensive abdominal pelvic ascites likely due to peritoneal carcinomatosis with multiple serosal implants noted as well as omental caking.  Extensive soft tissue in the upper abdomen adjacent to the celiac axis in the hasmukh hepatis consistent with progression of metastatic disease with portal vein thrombosis and cavernous transformation. 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 Workstation performed: [id number]     XR chest pa & lateral    Result Date: 9/12/2023  Narrative: CHEST INDICATION:   Midsternal chest pain with a sternal "lump" and shortness of breath. COMPARISON: CT chest 03/16/2023 EXAM PERFORMED/VIEWS:  XR CHEST PA & LATERAL Images: 2 FINDINGS: Implanted port in the right anterior chest wall with catheter tip terminating in the superior vena cava. Cardiomediastinal silhouette appears unremarkable. The lungs are clear. No pneumothorax or pleural effusion. Multilevel thoracic spine degenerative changes. Impression: No acute cardiopulmonary disease. No appreciable sternal mass. Resident: Vicenta Coulter, the attending radiologist, have reviewed the images and agree with the final report above. Workstation performed: [id number]           ** Please Note: Dragon 360 Dictation voice to text software may have been used in the creation of this document.

## 2023-09-19 NOTE — ASSESSMENT & PLAN NOTE
We will consult her oncologist in a.m. In line with the patient's verbal instruction to me tonight for CPR, intubation and defibrillation to be withheld, her CODE STATUS was made DNR/DNI.

## 2023-09-19 NOTE — PLAN OF CARE
Problem: PHYSICAL THERAPY ADULT  Goal: Performs mobility at highest level of function for planned discharge setting. See evaluation for individualized goals. Description: Treatment/Interventions: Functional transfer training, LE strengthening/ROM, Therapeutic exercise, Elevations, Endurance training, Bed mobility, Gait training          See flowsheet documentation for full assessment, interventions and recommendations. Note: Prognosis: Good  Problem List: Decreased strength, Decreased endurance, Impaired balance, Decreased mobility, Pain  Assessment: Patient is a 76 y.o. female evaluated by Physical Therapy s/p admit to [address] [place] [address] on 9/18/2023 with admitting diagnosis of: Hyponatremia, Weakness, Pelvic ascites, and principal problem of: Dehydration with hyponatremia. PT was consulted to assess patient's functional mobility and discharge needs. Ordered are PT Evaluation and treatment with activity level of: up and OOB as tolerated. Comorbidities affecting patient's physical performance at time of assessment include: IBS, arthritis, cancer, hx of chemo and radiation, GERD, chronic pain disorder, CKD. Personal factors affecting the patient at time of IE include: ambulating with assistive device, step(s) to enter home, inability to navigate community distances, anxiety, depression, inability/difficulty performing IADLs and inability/difficulty performing ADLs. Please locate objective findings from PT assessment regarding body systems outlined above. Upon evaluation, pt able to perform all functional mobility with SUP-Isela x 1, RW, and increased time. Occasional verbal cuing provided for safety awareness and sequencing. Frequent rest breaks taken throughout session d/t fatigue and SOB. Despite symptoms, HR and SpO2 remained WFL on RA throughout. At baseline, pt uses SPC to ambulate, however this date is requiring RW use for increased stability.  Moderate postural sway demonstrated with mobility but no true LOB experienced. The patient's AM-PAC Basic Mobility Inpatient Short Form Raw Score is 18. A Raw score of greater than 16 suggests the patient may benefit from discharge to home. Please also refer to the recommendation of the Physical Therapist for safe discharge planning. Co treatment with OT secondary to complex medical condition of pt, possible A of 2 required to achieve and maintain transitional movements, requiring the need of skilled therapeutic intervention of 2 therapists to achieve delivery of services. Pt will benefit from continued PT intervention during LOS to address current deficits, increase LOF, and facilitate safe d/c to next level of care when medically appropriate. D/c recommendation at this time is home with home health rehabilitation. PT Discharge Recommendation: Home with home health rehabilitation    See flowsheet documentation for full assessment.

## 2023-09-19 NOTE — PLAN OF CARE
Problem: Potential for Falls  Goal: Patient will remain free of falls  Description: INTERVENTIONS:  - Educate patient/family on patient safety including physical limitations  - Instruct patient to call for assistance with activity   - Consult OT/PT to assist with strengthening/mobility   - Keep Call bell within reach  - Keep bed low and locked with side rails adjusted as appropriate  - Keep care items and personal belongings within reach  - Initiate and maintain comfort rounds  - Make Fall Risk Sign visible to staff  - Offer Toileting every 2 Hours, in advance of need  - Initiate/Maintain fall alarm  - Obtain necessary fall risk management equipment: non-skid footwear, call bell  - Apply yellow socks and bracelet for high fall risk patients  - Consider moving patient to room near nurses station  Outcome: Progressing     Problem: Nutrition/Hydration-ADULT  Goal: Nutrient/Hydration intake appropriate for improving, restoring or maintaining nutritional needs  Description: Monitor and assess patient's nutrition/hydration status for malnutrition. Collaborate with interdisciplinary team and initiate plan and interventions as ordered. Monitor patient's weight and dietary intake as ordered or per policy. Utilize nutrition screening tool and intervene as necessary. Determine patient's food preferences and provide high-protein, high-caloric foods as appropriate.      INTERVENTIONS:  - Monitor oral intake, urinary output, labs, and treatment plans  - Assess nutrition and hydration status and recommend course of action  - Evaluate amount of meals eaten  - Assist patient with eating if necessary   - Allow adequate time for meals  - Recommend/ encourage appropriate diets, oral nutritional supplements, and vitamin/mineral supplements  - Order, calculate, and assess calorie counts as needed  - Recommend, monitor, and adjust tube feedings and TPN/PPN based on assessed needs  - Assess need for intravenous fluids  - Provide specific nutrition/hydration education as appropriate  - Include patient/family/caregiver in decisions related to nutrition  Outcome: Progressing     Problem: MOBILITY - ADULT  Goal: Maintain or return to baseline ADL function  Description: INTERVENTIONS:  -  Assess patient's ability to carry out ADLs; assess patient's baseline for ADL function and identify physical deficits which impact ability to perform ADLs (bathing, care of mouth/teeth, toileting, grooming, dressing, etc.)  - Assess/evaluate cause of self-care deficits   - Assess range of motion  - Assess patient's mobility; develop plan if impaired  - Assess patient's need for assistive devices and provide as appropriate  - Encourage maximum independence but intervene and supervise when necessary  - Involve family in performance of ADLs  - Assess for home care needs following discharge   - Consider OT consult to assist with ADL evaluation and planning for discharge  - Provide patient education as appropriate  Outcome: Progressing  Goal: Maintains/Returns to pre admission functional level  Description: INTERVENTIONS:  - Perform BMAT or MOVE assessment daily.   - Set and communicate daily mobility goal to care team and patient/family/caregiver. - Collaborate with rehabilitation services on mobility goals if consulted  - Perform Range of Motion 3 times a day. - Reposition patient every 2 hours.   - Dangle patient 3 times a day  - Stand patient 3 times a day  - Ambulate patient 3 times a day  - Out of bed to chair 3 times a day   - Out of bed for meals 3 times a day  - Out of bed for toileting  - Record patient progress and toleration of activity level   Outcome: Progressing     Problem: Prexisting or High Potential for Compromised Skin Integrity  Goal: Skin integrity is maintained or improved  Description: INTERVENTIONS:  - Identify patients at risk for skin breakdown  - Assess and monitor skin integrity  - Assess and monitor nutrition and hydration status  - Monitor labs   - Assess for incontinence   - Turn and reposition patient  - Assist with mobility/ambulation  - Relieve pressure over bony prominences  - Avoid friction and shearing  - Provide appropriate hygiene as needed including keeping skin clean and dry  - Evaluate need for skin moisturizer/barrier cream  - Collaborate with interdisciplinary team   - Patient/family teaching  - Consider wound care consult   Outcome: Progressing     Problem: PAIN - ADULT  Goal: Verbalizes/displays adequate comfort level or baseline comfort level  Description: Interventions:  - Encourage patient to monitor pain and request assistance  - Assess pain using appropriate pain scale  - Administer analgesics based on type and severity of pain and evaluate response  - Implement non-pharmacological measures as appropriate and evaluate response  - Consider cultural and social influences on pain and pain management  - Notify physician/advanced practitioner if interventions unsuccessful or patient reports new pain  Outcome: Progressing     Problem: INFECTION - ADULT  Goal: Absence or prevention of progression during hospitalization  Description: INTERVENTIONS:  - Assess and monitor for signs and symptoms of infection  - Monitor lab/diagnostic results  - Monitor all insertion sites, i.e. indwelling lines, tubes, and drains  - Monitor endotracheal if appropriate and nasal secretions for changes in amount and color  - [place] appropriate cooling/warming therapies per order  - Administer medications as ordered  - Instruct and encourage patient and family to use good hand hygiene technique  - Identify and instruct in appropriate isolation precautions for identified infection/condition  Outcome: Progressing  Goal: Absence of fever/infection during neutropenic period  Description: INTERVENTIONS:  - Monitor WBC    Outcome: Progressing     Problem: SAFETY ADULT  Goal: Patient will remain free of falls  Description: INTERVENTIONS:  - Educate patient/family on patient safety including physical limitations  - Instruct patient to call for assistance with activity   - Consult OT/PT to assist with strengthening/mobility   - Keep Call bell within reach  - Keep bed low and locked with side rails adjusted as appropriate  - Keep care items and personal belongings within reach  - Initiate and maintain comfort rounds  - Make Fall Risk Sign visible to staff  - Offer Toileting every 2 Hours, in advance of need  - Initiate/Maintain fall alarm  - Obtain necessary fall risk management equipment: non-skid footwear, call bell  - Apply yellow socks and bracelet for high fall risk patients  - Consider moving patient to room near nurses station  Outcome: Progressing  Goal: Maintain or return to baseline ADL function  Description: INTERVENTIONS:  -  Assess patient's ability to carry out ADLs; assess patient's baseline for ADL function and identify physical deficits which impact ability to perform ADLs (bathing, care of mouth/teeth, toileting, grooming, dressing, etc.)  - Assess/evaluate cause of self-care deficits   - Assess range of motion  - Assess patient's mobility; develop plan if impaired  - Assess patient's need for assistive devices and provide as appropriate  - Encourage maximum independence but intervene and supervise when necessary  - Involve family in performance of ADLs  - Assess for home care needs following discharge   - Consider OT consult to assist with ADL evaluation and planning for discharge  - Provide patient education as appropriate  Outcome: Progressing  Goal: Maintains/Returns to pre admission functional level  Description: INTERVENTIONS:  - Perform BMAT or MOVE assessment daily.   - Set and communicate daily mobility goal to care team and patient/family/caregiver. - Collaborate with rehabilitation services on mobility goals if consulted  - Perform Range of Motion 3 times a day. - Reposition patient every 2 hours.   - Dangle patient 3 times a day  - Stand patient 3 times a day  - Ambulate patient 3 times a day  - Out of bed to chair 3 times a day   - Out of bed for meals 3 times a day  - Out of bed for toileting  - Record patient progress and toleration of activity level   Outcome: Progressing     Problem: DISCHARGE PLANNING  Goal: Discharge to home or other facility with appropriate resources  Description: INTERVENTIONS:  - Identify barriers to discharge w/patient and caregiver  - Arrange for needed discharge resources and transportation as appropriate  - Identify discharge learning needs (meds, wound care, etc.)  - Arrange for interpretive services to assist at discharge as needed  - Refer to Case Management Department for coordinating discharge planning if the patient needs post-hospital services based on physician/advanced practitioner order or complex needs related to functional status, cognitive ability, or social support system  Outcome: Progressing     Problem: Knowledge Deficit  Goal: Patient/family/caregiver demonstrates understanding of disease process, treatment plan, medications, and discharge instructions  Description: Complete learning assessment and assess knowledge base.   Interventions:  - Provide teaching at level of understanding  - Provide teaching via preferred learning methods  Outcome: Progressing     Problem: CARDIOVASCULAR - ADULT  Goal: Maintains optimal cardiac output and hemodynamic stability  Description: INTERVENTIONS:  - Monitor I/O, vital signs and rhythm  - Monitor for S/S and trends of decreased cardiac output  - Administer and titrate ordered vasoactive medications to optimize hemodynamic stability  - Assess quality of pulses, skin color and temperature  - Assess for signs of decreased coronary artery perfusion  - Instruct patient to report change in severity of symptoms  Outcome: Progressing  Goal: Absence of cardiac dysrhythmias or at baseline rhythm  Description: INTERVENTIONS:  - Continuous cardiac monitoring, vital signs, obtain 12 lead EKG if ordered  - Administer antiarrhythmic and heart rate control medications as ordered  - Monitor electrolytes and administer replacement therapy as ordered  Outcome: Progressing     Problem: GASTROINTESTINAL - ADULT  Goal: Minimal or absence of nausea and/or vomiting  Description: INTERVENTIONS:  - Administer IV fluids if ordered to ensure adequate hydration  - Maintain NPO status until nausea and vomiting are resolved  - Nasogastric tube if ordered  - Administer ordered antiemetic medications as needed  - Provide nonpharmacologic comfort measures as appropriate  - Advance diet as tolerated, if ordered  - Consider nutrition services referral to assist patient with adequate nutrition and appropriate food choices  Outcome: Progressing  Goal: Maintains or returns to baseline bowel function  Description: INTERVENTIONS:  - Assess bowel function  - Encourage oral fluids to ensure adequate hydration  - Administer IV fluids if ordered to ensure adequate hydration  - Administer ordered medications as needed  - Encourage mobilization and activity  - Consider nutritional services referral to assist patient with adequate nutrition and appropriate food choices  Outcome: Progressing  Goal: Maintains adequate nutritional intake  Description: INTERVENTIONS:  - Monitor percentage of each meal consumed  - Identify factors contributing to decreased intake, treat as appropriate  - Assist with meals as needed  - Monitor I&O, weight, and lab values if indicated  - Obtain nutrition services referral as needed  Outcome: Progressing  Goal: Oral mucous membranes remain intact  Description: INTERVENTIONS  - Assess oral mucosa and hygiene practices  - Implement preventative oral hygiene regimen  - Implement oral medicated treatments as ordered  - Initiate Nutrition services referral as needed  Outcome: Progressing     Problem: METABOLIC, FLUID AND ELECTROLYTES - ADULT  Goal: Electrolytes maintained within normal limits  Description: INTERVENTIONS:  - Monitor labs and assess patient for signs and symptoms of electrolyte imbalances  - Administer electrolyte replacement as ordered  - Monitor response to electrolyte replacements, including repeat lab results as appropriate  - Instruct patient on fluid and nutrition as appropriate  Outcome: Progressing  Goal: Fluid balance maintained  Description: INTERVENTIONS:  - Monitor labs   - Monitor I/O and WT  - Instruct patient on fluid and nutrition as appropriate  - Assess for signs & symptoms of volume excess or deficit  Outcome: Progressing  Goal: Glucose maintained within target range  Description: INTERVENTIONS:  - Monitor Blood Glucose as ordered  - Assess for signs and symptoms of hyperglycemia and hypoglycemia  - Administer ordered medications to maintain glucose within target range  - Assess nutritional intake and initiate nutrition service referral as needed  Outcome: Progressing     Problem: HEMATOLOGIC - ADULT  Goal: Maintains hematologic stability  Description: INTERVENTIONS  - Assess for signs and symptoms of bleeding or hemorrhage  - Monitor labs  - Administer supportive blood products/factors as ordered and appropriate  Outcome: Progressing     Problem: MUSCULOSKELETAL - ADULT  Goal: Maintain or return mobility to safest level of function  Description: INTERVENTIONS:  - Assess patient's ability to carry out ADLs; assess patient's baseline for ADL function and identify physical deficits which impact ability to perform ADLs (bathing, care of mouth/teeth, toileting, grooming, dressing, etc.)  - Assess/evaluate cause of self-care deficits   - Assess range of motion  - Assess patient's mobility  - Assess patient's need for assistive devices and provide as appropriate  - Encourage maximum independence but intervene and supervise when necessary  - Involve family in performance of ADLs  - Assess for home care needs following discharge   - Consider OT consult to assist with ADL evaluation and planning for discharge  - Provide patient education as appropriate  Outcome: Progressing  Goal: Maintain proper alignment of affected body part  Description: INTERVENTIONS:  - Support, maintain and protect limb and body alignment  - Provide patient/ family with appropriate education  Outcome: Progressing

## 2023-09-19 NOTE — SEDATION DOCUMENTATION
Right sided paracentesis complete. 3250ml of clear del fluid removed. Patient offering no complaints. Bandage applied.

## 2023-09-19 NOTE — PLAN OF CARE
Problem: Potential for Falls  Goal: Patient will remain free of falls  Description: INTERVENTIONS:  - Educate patient/family on patient safety including physical limitations  - Instruct patient to call for assistance with activity   - Consult OT/PT to assist with strengthening/mobility   - Keep Call bell within reach  - Keep bed low and locked with side rails adjusted as appropriate  - Keep care items and personal belongings within reach  - Initiate and maintain comfort rounds  - Make Fall Risk Sign visible to staff  - Offer Toileting every 2 Hours, in advance of need  - Initiate/Maintain fall alarm  - Obtain necessary fall risk management equipment: non-skid footwear, call bell  - Apply yellow socks and bracelet for high fall risk patients  - Consider moving patient to room near nurses station  Outcome: Progressing     Problem: Nutrition/Hydration-ADULT  Goal: Nutrient/Hydration intake appropriate for improving, restoring or maintaining nutritional needs  Description: Monitor and assess patient's nutrition/hydration status for malnutrition. Collaborate with interdisciplinary team and initiate plan and interventions as ordered. Monitor patient's weight and dietary intake as ordered or per policy. Utilize nutrition screening tool and intervene as necessary. Determine patient's food preferences and provide high-protein, high-caloric foods as appropriate.      INTERVENTIONS:  - Monitor oral intake, urinary output, labs, and treatment plans  - Assess nutrition and hydration status and recommend course of action  - Evaluate amount of meals eaten  - Assist patient with eating if necessary   - Allow adequate time for meals  - Recommend/ encourage appropriate diets, oral nutritional supplements, and vitamin/mineral supplements  - Order, calculate, and assess calorie counts as needed  - Recommend, monitor, and adjust tube feedings and TPN/PPN based on assessed needs  - Assess need for intravenous fluids  - Provide specific nutrition/hydration education as appropriate  - Include patient/family/caregiver in decisions related to nutrition  Outcome: Progressing     Problem: MOBILITY - ADULT  Goal: Maintain or return to baseline ADL function  Description: INTERVENTIONS:  -  Assess patient's ability to carry out ADLs; assess patient's baseline for ADL function and identify physical deficits which impact ability to perform ADLs (bathing, care of mouth/teeth, toileting, grooming, dressing, etc.)  - Assess/evaluate cause of self-care deficits   - Assess range of motion  - Assess patient's mobility; develop plan if impaired  - Assess patient's need for assistive devices and provide as appropriate  - Encourage maximum independence but intervene and supervise when necessary  - Involve family in performance of ADLs  - Assess for home care needs following discharge   - Consider OT consult to assist with ADL evaluation and planning for discharge  - Provide patient education as appropriate  Outcome: Progressing  Goal: Maintains/Returns to pre admission functional level  Description: INTERVENTIONS:  - Perform BMAT or MOVE assessment daily.   - Set and communicate daily mobility goal to care team and patient/family/caregiver. - Collaborate with rehabilitation services on mobility goals if consulted  - Perform Range of Motion 3 times a day. - Reposition patient every 2 hours.   - Dangle patient 3 times a day  - Stand patient 3 times a day  - Ambulate patient 3 times a day  - Out of bed to chair 3 times a day   - Out of bed for meals 3 times a day  - Out of bed for toileting  - Record patient progress and toleration of activity level   Outcome: Progressing     Problem: Prexisting or High Potential for Compromised Skin Integrity  Goal: Skin integrity is maintained or improved  Description: INTERVENTIONS:  - Identify patients at risk for skin breakdown  - Assess and monitor skin integrity  - Assess and monitor nutrition and hydration status  - Monitor labs   - Assess for incontinence   - Turn and reposition patient  - Assist with mobility/ambulation  - Relieve pressure over bony prominences  - Avoid friction and shearing  - Provide appropriate hygiene as needed including keeping skin clean and dry  - Evaluate need for skin moisturizer/barrier cream  - Collaborate with interdisciplinary team   - Patient/family teaching  - Consider wound care consult   Outcome: Progressing     Problem: PAIN - ADULT  Goal: Verbalizes/displays adequate comfort level or baseline comfort level  Description: Interventions:  - Encourage patient to monitor pain and request assistance  - Assess pain using appropriate pain scale  - Administer analgesics based on type and severity of pain and evaluate response  - Implement non-pharmacological measures as appropriate and evaluate response  - Consider cultural and social influences on pain and pain management  - Notify physician/advanced practitioner if interventions unsuccessful or patient reports new pain  Outcome: Progressing     Problem: INFECTION - ADULT  Goal: Absence or prevention of progression during hospitalization  Description: INTERVENTIONS:  - Assess and monitor for signs and symptoms of infection  - Monitor lab/diagnostic results  - Monitor all insertion sites, i.e. indwelling lines, tubes, and drains  - Monitor endotracheal if appropriate and nasal secretions for changes in amount and color  - [place] appropriate cooling/warming therapies per order  - Administer medications as ordered  - Instruct and encourage patient and family to use good hand hygiene technique  - Identify and instruct in appropriate isolation precautions for identified infection/condition  Outcome: Progressing  Goal: Absence of fever/infection during neutropenic period  Description: INTERVENTIONS:  - Monitor WBC    Outcome: Progressing     Problem: SAFETY ADULT  Goal: Patient will remain free of falls  Description: INTERVENTIONS:  - Educate patient/family on patient safety including physical limitations  - Instruct patient to call for assistance with activity   - Consult OT/PT to assist with strengthening/mobility   - Keep Call bell within reach  - Keep bed low and locked with side rails adjusted as appropriate  - Keep care items and personal belongings within reach  - Initiate and maintain comfort rounds  - Make Fall Risk Sign visible to staff  - Offer Toileting every 2 Hours, in advance of need  - Initiate/Maintain fall alarm  - Obtain necessary fall risk management equipment: non-skid footwear, call bell  - Apply yellow socks and bracelet for high fall risk patients  - Consider moving patient to room near nurses station  Outcome: Progressing  Goal: Maintain or return to baseline ADL function  Description: INTERVENTIONS:  -  Assess patient's ability to carry out ADLs; assess patient's baseline for ADL function and identify physical deficits which impact ability to perform ADLs (bathing, care of mouth/teeth, toileting, grooming, dressing, etc.)  - Assess/evaluate cause of self-care deficits   - Assess range of motion  - Assess patient's mobility; develop plan if impaired  - Assess patient's need for assistive devices and provide as appropriate  - Encourage maximum independence but intervene and supervise when necessary  - Involve family in performance of ADLs  - Assess for home care needs following discharge   - Consider OT consult to assist with ADL evaluation and planning for discharge  - Provide patient education as appropriate  Outcome: Progressing  Goal: Maintains/Returns to pre admission functional level  Description: INTERVENTIONS:  - Perform BMAT or MOVE assessment daily.   - Set and communicate daily mobility goal to care team and patient/family/caregiver. - Collaborate with rehabilitation services on mobility goals if consulted  - Perform Range of Motion 3 times a day. - Reposition patient every 2 hours.   - Dangle patient 3 times a day  - Stand patient 3 times a day  - Ambulate patient 3 times a day  - Out of bed to chair 3 times a day   - Out of bed for meals 3 times a day  - Out of bed for toileting  - Record patient progress and toleration of activity level   Outcome: Progressing     Problem: DISCHARGE PLANNING  Goal: Discharge to home or other facility with appropriate resources  Description: INTERVENTIONS:  - Identify barriers to discharge w/patient and caregiver  - Arrange for needed discharge resources and transportation as appropriate  - Identify discharge learning needs (meds, wound care, etc.)  - Arrange for interpretive services to assist at discharge as needed  - Refer to Case Management Department for coordinating discharge planning if the patient needs post-hospital services based on physician/advanced practitioner order or complex needs related to functional status, cognitive ability, or social support system  Outcome: Progressing     Problem: Knowledge Deficit  Goal: Patient/family/caregiver demonstrates understanding of disease process, treatment plan, medications, and discharge instructions  Description: Complete learning assessment and assess knowledge base.   Interventions:  - Provide teaching at level of understanding  - Provide teaching via preferred learning methods  Outcome: Progressing     Problem: CARDIOVASCULAR - ADULT  Goal: Maintains optimal cardiac output and hemodynamic stability  Description: INTERVENTIONS:  - Monitor I/O, vital signs and rhythm  - Monitor for S/S and trends of decreased cardiac output  - Administer and titrate ordered vasoactive medications to optimize hemodynamic stability  - Assess quality of pulses, skin color and temperature  - Assess for signs of decreased coronary artery perfusion  - Instruct patient to report change in severity of symptoms  Outcome: Progressing  Goal: Absence of cardiac dysrhythmias or at baseline rhythm  Description: INTERVENTIONS:  - Continuous cardiac monitoring, vital signs, obtain 12 lead EKG if ordered  - Administer antiarrhythmic and heart rate control medications as ordered  - Monitor electrolytes and administer replacement therapy as ordered  Outcome: Progressing     Problem: GASTROINTESTINAL - ADULT  Goal: Minimal or absence of nausea and/or vomiting  Description: INTERVENTIONS:  - Administer IV fluids if ordered to ensure adequate hydration  - Maintain NPO status until nausea and vomiting are resolved  - Nasogastric tube if ordered  - Administer ordered antiemetic medications as needed  - Provide nonpharmacologic comfort measures as appropriate  - Advance diet as tolerated, if ordered  - Consider nutrition services referral to assist patient with adequate nutrition and appropriate food choices  Outcome: Progressing  Goal: Maintains or returns to baseline bowel function  Description: INTERVENTIONS:  - Assess bowel function  - Encourage oral fluids to ensure adequate hydration  - Administer IV fluids if ordered to ensure adequate hydration  - Administer ordered medications as needed  - Encourage mobilization and activity  - Consider nutritional services referral to assist patient with adequate nutrition and appropriate food choices  Outcome: Progressing  Goal: Maintains adequate nutritional intake  Description: INTERVENTIONS:  - Monitor percentage of each meal consumed  - Identify factors contributing to decreased intake, treat as appropriate  - Assist with meals as needed  - Monitor I&O, weight, and lab values if indicated  - Obtain nutrition services referral as needed  Outcome: Progressing  Goal: Oral mucous membranes remain intact  Description: INTERVENTIONS  - Assess oral mucosa and hygiene practices  - Implement preventative oral hygiene regimen  - Implement oral medicated treatments as ordered  - Initiate Nutrition services referral as needed  Outcome: Progressing     Problem: METABOLIC, FLUID AND ELECTROLYTES - ADULT  Goal: Electrolytes maintained within normal limits  Description: INTERVENTIONS:  - Monitor labs and assess patient for signs and symptoms of electrolyte imbalances  - Administer electrolyte replacement as ordered  - Monitor response to electrolyte replacements, including repeat lab results as appropriate  - Instruct patient on fluid and nutrition as appropriate  Outcome: Progressing  Goal: Fluid balance maintained  Description: INTERVENTIONS:  - Monitor labs   - Monitor I/O and WT  - Instruct patient on fluid and nutrition as appropriate  - Assess for signs & symptoms of volume excess or deficit  Outcome: Progressing  Goal: Glucose maintained within target range  Description: INTERVENTIONS:  - Monitor Blood Glucose as ordered  - Assess for signs and symptoms of hyperglycemia and hypoglycemia  - Administer ordered medications to maintain glucose within target range  - Assess nutritional intake and initiate nutrition service referral as needed  Outcome: Progressing     Problem: HEMATOLOGIC - ADULT  Goal: Maintains hematologic stability  Description: INTERVENTIONS  - Assess for signs and symptoms of bleeding or hemorrhage  - Monitor labs  - Administer supportive blood products/factors as ordered and appropriate  Outcome: Progressing     Problem: MUSCULOSKELETAL - ADULT  Goal: Maintain or return mobility to safest level of function  Description: INTERVENTIONS:  - Assess patient's ability to carry out ADLs; assess patient's baseline for ADL function and identify physical deficits which impact ability to perform ADLs (bathing, care of mouth/teeth, toileting, grooming, dressing, etc.)  - Assess/evaluate cause of self-care deficits   - Assess range of motion  - Assess patient's mobility  - Assess patient's need for assistive devices and provide as appropriate  - Encourage maximum independence but intervene and supervise when necessary  - Involve family in performance of ADLs  - Assess for home care needs following discharge   - Consider OT consult to assist with ADL evaluation and planning for discharge  - Provide patient education as appropriate  Outcome: Progressing  Goal: Maintain proper alignment of affected body part  Description: INTERVENTIONS:  - Support, maintain and protect limb and body alignment  - Provide patient/ family with appropriate education  Outcome: Progressing

## 2023-09-19 NOTE — MALNUTRITION/BMI
This medical record reflects one or more clinical indicators suggestive of malnutrition. Malnutrition Findings:   Adult Malnutrition type: Chronic illness  Adult Degree of Malnutrition: Malnutrition of moderate degree  Malnutrition Characteristics: Inadequate energy, Weight loss                360 Statement: Chronic, moderate protein calorie malnutrition related to catabolic illness as evidenced by 10% (13#) weight loss x 6 months and less than 75% of estimated energy needs for greater than 1 month. Treating with oral diet and nutrition supplements. BMI Findings: Body mass index is 23.96 kg/m². See Nutrition note dated 9/19/2023 for additional details. Completed nutrition assessment is viewable in the nutrition documentation.

## 2023-09-19 NOTE — ASSESSMENT & PLAN NOTE
Moderate hyponatremia most likely due to hypovolemia from recent GI fluid loss-related dehydration. Patient received a liter of normal saline IV bolus at the ED. We will continue cautious IV fluid resuscitation with frequent BMP checks over the next 24 hours to avoid rapid correction of hyponatremia.

## 2023-09-19 NOTE — OCCUPATIONAL THERAPY NOTE
Occupational Therapy Evaluation     Patient Name: Chalo Martin  NCCHQ'[id number] Date: 9/19/2023  Problem List  Principal Problem:    Dehydration with hyponatremia  Active Problems:    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HCC)    Generalized weakness    Complicated UTI (urinary tract infection)    Anemia, chronic disease    Malignant ascites    Portal vein thrombosis    Past Medical History  Past Medical History:   Diagnosis Date    Acne     Anxiety     Anxiety and depression 10/02/2019    Arthritis     Breathing difficulty     pt does not recall any breathing problem but did have neck pain after procedure" Gave her an incentive spirometer to use    Cancer ([address])     cervical/ and "lymph nodes" (GYN)    Cervical cancer (HCC)     Chronic kidney disease     stage 3    Chronic pain disorder     Neck pain    Claustrophobia     Colon polyp     GERD (gastroesophageal reflux disease)     not now    Headache     History of radiation therapy     History of shingles     Irritable bowel syndrome     Joint pain following chemotherapy     and stiffness    Liver disease     "cyst on liver"    Lung nodule     "left"    Motion sickness     Muscle weakness     after chemo"    Neck pain     "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"has had neck pain since childhood"    Peptic ulceration 2016    Port-A-Cath in place     right chest//for chemotherapy q 3 weeks and also receives IV hydration    Shortness of breath     "started with cancer, when too much exertion like cleaning/guera doing stairs"-pt states it is much better    Thumb injury 10/2019    right    Thyroid nodule     Tinnitus of left ear 02/19/2020    Urinary problem     Wears contact lenses     Wrist arthritis 03/17/2020     Past Surgical History  Past Surgical History:   Procedure Laterality Date    COLONOSCOPY      CYSTOSCOPY W/ RETROGRADES Right 06/03/2021    Procedure: Victorina Crandall W/RETROGRADE;  Surgeon: Clarissa Mata MD;  Location: [facility];  Service: Urology    CYSTOSCOPY W/ RETROGRADES Right 10/18/2021    Procedure: CYSTOSCOPY WITH ureteral stent exchange;  Surgeon: Jailene Kaye MD;  [facility]: [facility];  Service: Urology    EPIDURAL BLOCK INJECTION Right 8/2/2023    Procedure: BLOCK / INJECTION EPIDURAL STEROID LUMBAR RIGHT L2-3 TFESI;  Surgeon: Carlee Thomas MD;  [facility]: [facility];  Service: Pain Management     FL RETROGRADE PYELOGRAM  02/11/2021    FL RETROGRADE PYELOGRAM  06/03/2021    FOOT SURGERY      HYSTERECTOMY      IR BIOPSY LYMPH NODE  02/19/2021    IR PORT PLACEMENT  03/09/2021    LYMPH NODE BIOPSY  2021    WY CYSTO BLADDER W/URETERAL CATHETERIZATION Right 02/11/2021    Procedure: CYSTOSCOPY RETROGRADE PYELOGRAM WITH INSERTION STENT URETERAL;  Surgeon: Jailene Kyae MD;  [facility];  Service: Urology    WY CYSTO BLADDER W/URETERAL CATHETERIZATION Right 04/04/2022    Procedure: CYSTOSCOPY  WITH INSERTION STENT URETERAL Right;  Surgeon: Jailene Kaye MD;  [facility];  Service: Urology    WY CYSTO BLADDER W/URETERAL CATHETERIZATION Right 10/27/2022    Procedure: Cystoscopy, right ureteral stent exchange;  Surgeon: Jailene Kaye MD;  [facility];  Service: Urology    WY CYSTO BLADDER W/URETERAL CATHETERIZATION Right 5/1/2023    Procedure: CYSTOSCOPY with right ureteral stent exchange;  Surgeon: Jailene Kaye MD;  [facility];  Service: Urology    WY EXCISION INTERDIGITAL Harika Wallace SINGLE EACH Left 07/12/2018    Procedure: FOOT NEUROMA EXCISION;  Surgeon: Ankit Salgado DPM;  Location: [address] [place] [address] MAIN OR;  Service: [address] Right 06/03/2021    Procedure: EXCHANGE STENT;  Surgeon: Kayla Blanc MD;  [facility];  Service: Urology    TUBAL LIGATION      UPPER GASTROINTESTINAL ENDOSCOPY  2016 09/19/23 1007   OT Last Visit   OT Visit Date 09/19/23   Note Type   Note type Evaluation   Pain Assessment   Pain Assessment Tool 0-10   Pain Score 6   Pain Location/Orientation Orientation: Bilateral;Location: Leg   Restrictions/Precautions   Weight Bearing Precautions Per Order No   Other Precautions Multiple lines;Telemetry; Fall Risk;Pain   Home Living   Type of [address] One level;Performs ADLs on one level; Able to live on main level with bedroom/bathroom;Stairs to enter with rails; Other (Comment)  (5 DAO c HR)   Bathroom Shower/Tub Tub/shower unit   Bathroom Toilet Standard   Bathroom Equipment Grab bars around toilet   [address] [place];Grab bars   Additional Comments pt reports use of SPC during mobility at baseline; pt with difficulties in her home due to no (A), daughter resides in [place], and pt reports "a lot of stuff I am trying to pack"   Prior Function   Level of [place] Independent with ADLs; Independent with functional mobility; Independent with IADLS   Lives With Alone   IADLs Family/Friend/Other provides transportation   Falls in the last 6 months 0   Vocational Retired   Subjective   Subjective "I just have a hard time at home"   ADL   Where Assessed Edge of bed   LB Dressing Assistance 5  Supervision/Setup   Toileting Assistance  5  Supervision/Setup   Additional Comments pt performs bathroom tasks with (S) level and increased time; no significant LOB or instability   Bed Mobility   Supine to Sit 4  Minimal assistance   Additional items Assist x 1; Increased time required;Verbal cues   Sit to Supine 5  Supervision   Additional items Increased time required;Verbal cues   Additional Comments pt on RA during session; SpO2 WFL with no complaints of SOB; pt with + orthostatics in supine to sitting with residual dizziness   Transfers   Sit to Stand 5  Supervision   Additional items Increased time required;Verbal cues  (RW)   Stand to Sit 5  Supervision   Additional items Increased time required;Verbal cues  (RW)   Toilet transfer 5  Supervision   Additional items Increased time required;Verbal cues;Standard toilet  (RW) Additional Comments pt utilized RW during functional transfers this session, relaying more support than [facility]   Functional Mobility   Functional Mobility 5  Supervision   Additional Comments pt performed functional mobility in room ~40ft; no significant LOB, progresses slowly   Additional items Rolling walker   Balance   Static Sitting Good   Dynamic Sitting Fair +   Static Standing Fair   Dynamic Standing Fair -   Ambulatory Fair -   Activity Tolerance   Activity Tolerance Patient limited by fatigue   RUE Assessment   RUE Assessment WFL   LUE Assessment   LUE Assessment WFL   Hand Function   Gross Motor Coordination Functional   Fine Motor Coordination Functional   Sensation   Light Touch No apparent deficits   Sharp/Dull No apparent deficits   Cognition   Overall Cognitive Status WFL   Arousal/Participation Alert   Attention Within functional limits   Orientation Level Oriented X4   Memory Within functional limits   Following Commands Follows all commands and directions without difficulty   Assessment   Limitation Decreased ADL status; Decreased UE strength;Decreased Safe judgement during ADL;Decreased endurance;Decreased self-care trans;Decreased high-level ADLs   Assessment Pt is a 76 y.o. female seen for OT evaluation s/p admit to [facility] on 9/18/2023 w/ Dehydration with hyponatremia. Comorbidities affecting pt's functional performance at time of assessment include: IBS, anxiety, arthritis, port-A cath, GERD, shingles, motion sickness, cervical CA, thyroid nodule. Personal factors affecting pt at time of IE include:steps to enter environment, difficulty performing ADLS, difficulty performing IADLS , limited insight into deficits, decreased initiation and engagement  and health management . Prior to admission, pt was (I) with ADLs and IADLs with use of RW during mobility.  Upon evaluation: Pt requires (S) level with use of RW during mobility 2* the following deficits impacting occupational performance: weakness, decreased strength, decreased balance, decreased tolerance, impaired initiation, decreased safety awareness and increased pain. Pt to benefit from continued skilled OT tx while in the hospital to address deficits as defined above and maximize level of functional independence w ADL's and functional mobility. Occupational Performance areas to address include: grooming, bathing/shower, toilet hygiene, dressing, functional mobility, community mobility and clothing management. The patient's raw score on the AM-PAC Daily Activity Inpatient Short Form is 21. A raw score of greater than or equal to 19 suggests the patient may benefit from discharge to home. Please refer to the recommendation of the Occupational Therapist for safe discharge planning. Pt benefited from co-evaluation of skilled OT and PT therapists in order to most appropriately address functional deficits d/t extensive assistance required for safe functional mobility, decreased activity tolerance, and regression from functioning level prior to admission and/or onset of present illness. OT/PT objectives were addressed separately; please see PT note for specific goal areas targeted. Goals   Patient Goals to go home   Short Term Goal  pt will perform UE strengthening exercises   Long Term Goal #1 pt will perform toilet transfers and hygiene at (I) level   Long Term Goal #2 pt will demonstrate functional mobility with RW at mod (I) level   Long Term Goal pt will demonstrate UB/LB bathing and grooming tasks at (I) level   Plan   Treatment Interventions ADL retraining;Functional transfer training;UE strengthening/ROM; Endurance training;Patient/family training;Equipment evaluation/education; Activityengagement   Goal Expiration Date 10/03/23   OT Frequency 3-5x/wk   Recommendation   OT Discharge Recommendation Home with home health rehabilitation   [facility] Daily Activity Inpatient   Lower Body Dressing 3   Bathing 3   Toileting 3   Upper Body Dressing 4   Grooming 4   Eating 4   Daily Activity Raw Score 21   Daily Activity Standardized Score (Calc for Raw Score >=11) 44.27   AM-PAC Applied Cognition Inpatient   Following a Speech/Presentation 4   Understanding Ordinary Conversation 4   Taking Medications 4   Remembering Where Things Are Placed or Put Away 4   Remembering List of 4-5 Errands 4   Taking Care of Complicated Tasks 4   Applied Cognition Raw Score 24   Applied Cognition Standardized Score 62.21

## 2023-09-19 NOTE — CONSULTS
Consultation - Nephrology   [facility] 76 y.o. female MRN: [id number]  Unit/Bed#: 407-01 Encounter: [id number]      A/P:  1. Acute hyponatremia   Multifactorial, I agree that the patient appears to have been volume depleted at presentation, she is now status post volume resuscitation, and will discontinue additional IV fluids at this time. We will place the patient on a 1.2 L fluid restriction and continue to monitor blood work. We will follow-up patient's urine osmolality and urine sodium/creatinine. Continue supportive care otherwise, encourage protein intake. 2.  Probable SIADH   As noted above, will place the patient on 1.2 L fluid restriction today, will do to ease up to 1.5 L by tomorrow, or even more relaxation depending on how she progresses clinically. She will likely need to maintain fluid restriction in the outpatient setting and avoid over drinking all fluids. We will need to follow-up urine studies. I will also check morning cortisol level given bilateral adrenal metastatic disease. 3.  Volume depletion   Patient appears to be volume resuscitated this morning, IV fluids were discontinued as noted above. Creatinine is now down to 0.9 mg/dL from 1.05 mg/dL presentation. 4.  Hypomagnesemia   Patient given 2 g of IV magnesium sulfate. Reassess labs in the morning. 5.  Metastatic ovarian cancer with peritoneal carcinomatosis associated with malignant ascites   Patient appears to no longer be a candidate for further chemotherapeutics, unclear if he has the appropriate time to proceed with palliative or hospice evaluation to continue to assist the patient going forward. 6.  New portal vein thrombosis   Patient most likely not a candidate for anticoagulation, will defer to hospitalist and oncology. 7.  Chronic pain associated with metastatic cancer   Continues opioid as indicated to assist with the patient's severe pain.        Thank you for allowing us to participate in the care of your patient. Please feel free to contact us regarding the care of this patient, or any other questions/concerns that may be applicable.     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)   • Chemotherapy-induced nausea   • Urgency of urination   • Pain in thoracic spine   • Chemotherapy induced neutropenia (HCC)   • Thyroid lesion   • Dyspnea on exertion   • Encounter for venous access device care   • Multiple lung nodules   • Primary generalized (osteo)arthritis   • Xerostomia   • Encounter for immunization   • Sensorineural hearing loss (SNHL) of both ears   • Chronic obstructive pulmonary disease, unspecified COPD type ([address])   • Hematuria   • Calculus of gallbladder without cholecystitis without obstruction   • Chronic constipation   • Continuous opioid dependence (HCC)   • Lumbar disc disease with radiculopathy   • Cervical spondylosis   • Stage 3 chronic kidney disease, unspecified whether stage 3a or 3b CKD (HCC)   • Meralgia paraesthetica, right   • Dyslipidemia   • Post-menopause   • Major depressive disorder, single episode, mild (HCC)   • Insomnia due to other mental disorder   • Chemotherapy-induced thrombocytopenia   • DNR (do not resuscitate) discussion   • Neoplasm of uncertain behavior of bone and articular cartilage   • Right leg pain   • Chronic pain syndrome   • Drug induced constipation   • Generalized weakness   • Complicated UTI (urinary tract infection)   • Anemia, chronic disease   • Malignant ascites       History of Present Illness   Physician Requesting Consult: Andrea Razo MD  Reason for Consult / Principal Problem: Hyponatremia  Hx and PE limited by:   HPI: Collin Shafer is a 76y.o. year old female who presented to the emergency department yesterday due to complaints of weakness. Patient unfortunately has advanced cervical cancer which has metastasized, and a CT scan obtained at the time of presentation noted progression of her disease which now includes newly diagnosed malignant ascites most likely due to peritoneal carcinomatosis, as well as new masses in the lungs and liver. Patient in the interim may have developed portal vein thrombosis due to obstructing mass. Patient's main complaint is weakness which has been progressively worsening over the last few weeks. This is also been associated with nausea vomiting and loose bowel movements. Patient's bowel movements did not include blood or mucus. Patient was also complaining of symptoms of dysuria and urinary frequency. Patient was diagnosed with urinary tract infection, and also noted to have hyponatremia with a serum sodium level of 120 mmol/L. She was placed on antibiotics for the former then placed on IV fluids for the latter. Patient felt to be volume depleted due to her clinical history. Further from the serum sodium standpoint, patient is also on opioids at home for pain control as well as lorazepam for anxiety and insomnia. She has been trying to drink water, but overall has poor oral intake of solutes. At presentation the patient's serum sodium level was 120 mmol/L, after 1 L normal saline volume expansion, and IV fluids running continuously since admission, serum sodium level has been going back and forth between 122 mmol/L and 120 mmol/L. This morning she is 122 mmol/L. Urine studies remain this time. History obtained from chart review and the patient    Constitutional ROS-positive for fatigue and weakness  HEENT ROS- Denies history of eye surgeries, glaucoma, headaches or history of trauma, blurred vision. .  Endocrine ROS- No history diabetes mellitus or thyroid disease. Cardiovascular ROS- Denies chest pain, palpitation, dyspnea exertion, orthopnea, claudication. Pulmonary ROS-mild dyspnea with exertion, denies cough  GI ROS-for abdominal distention, and mild pain  Hematological ROS- Denies history of easy bruising, blood clots, bleeding or blood transfusions. Genitourinary ROS-positive for dysuria and increased urinary frequency  Lymphatic ROS- Denies lymphadenopathy. Musculoskeletal ROS- Denies history of muscle weakness, joint pain. Dermatological ROS- Denies rash, wounds, ulcers, itching, jaundice. Psychiatric ROS- Denies anxiety, depression, hallucinations, disorientation. Neurological ROS- No stroke or TIA symptoms.  .    Historical Information   Past Medical History:   Diagnosis Date   • Acne    • Anxiety    • Anxiety and depression 10/02/2019   • Arthritis    • Breathing difficulty     pt does not recall any breathing problem but did have neck pain after procedure" Gave her an incentive spirometer to use   • Cancer (HCC)     cervical/ and "lymph nodes" (GYN)   • Cervical cancer (HCC)    • Chronic kidney disease     stage 3   • Chronic pain disorder     Neck pain   • Claustrophobia    • Colon polyp    • GERD (gastroesophageal reflux disease)     not now   • Headache    • History of radiation therapy    • History of shingles    • Irritable bowel syndrome    • Joint pain following chemotherapy     and stiffness   • Liver disease     "cyst on liver"   • Lung nodule     "left"   • Motion sickness    • Muscle weakness     after chemo"   • Neck pain     "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"has had neck pain since childhood"   • Peptic ulceration 2016   • Port-A-Cath in place     right chest//for chemotherapy q 3 weeks and also receives IV hydration   • Shortness of breath     "started with cancer, when too much exertion like cleaning/guera doing stairs"-pt states it is much better   • Thumb injury 10/2019    right   • Thyroid nodule    • Tinnitus of left ear 02/19/2020   • Urinary problem    • Wears contact lenses    • Wrist arthritis 03/17/2020     Past Surgical History:   Procedure Laterality Date   • COLONOSCOPY     • CYSTOSCOPY W/ RETROGRADES Right 06/03/2021    Procedure: Rodriguez Crutch W/RETROGRADE;  Surgeon: Aamir Cagle MD;  Location: [facility];  Service: Urology   • CYSTOSCOPY W/ RETROGRADES Right 10/18/2021    Procedure: CYSTOSCOPY WITH ureteral stent exchange;  Surgeon: Gt Doan MD;  Location: [facility];  Service: Urology   • EPIDURAL BLOCK INJECTION Right 8/2/2023    Procedure: BLOCK / INJECTION EPIDURAL STEROID LUMBAR RIGHT L2-3 TFESI;  Surgeon: Priya Nguyen MD;  Location: [facility];  Service: Pain Management    • FL RETROGRADE PYELOGRAM  02/11/2021   • FL RETROGRADE PYELOGRAM  06/03/2021   • FOOT SURGERY     • HYSTERECTOMY     • IR BIOPSY LYMPH NODE  02/19/2021   • IR PORT PLACEMENT  03/09/2021   • LYMPH NODE BIOPSY  2021   • KS CYSTO BLADDER W/URETERAL CATHETERIZATION Right 02/11/2021    Procedure: CYSTOSCOPY RETROGRADE PYELOGRAM WITH INSERTION STENT URETERAL;  Surgeon: Gt Doan MD;  [facility]: [facility];  Service: Urology   • KS CYSTO BLADDER W/URETERAL CATHETERIZATION Right 04/04/2022    Procedure: CYSTOSCOPY  WITH INSERTION STENT URETERAL Right;  Surgeon: Gt Doan MD;  Location: [facility];  Service: Urology   • KS CYSTO BLADDER W/URETERAL CATHETERIZATION Right 10/27/2022    Procedure: Cystoscopy, right ureteral stent exchange;  Surgeon: Gt Doan MD;  Location: [facility];  Service: Urology   • KS CYSTO BLADDER W/URETERAL CATHETERIZATION Right 5/1/2023    Procedure: CYSTOSCOPY with right ureteral stent exchange;  Surgeon: Gt Doan MD;  Location: [facility];  Service: Urology   • KS EXCISION INTERDIGITAL KESSLER NEUROMA SINGLE EACH Left 07/12/2018    Procedure: FOOT NEUROMA EXCISION;  Surgeon: Niyah Aldana DPM;  Location: [address] MAIN OR;  Service: Podiatry   • [address] Right 2021    Procedure: EXCHANGE STENT;  Surgeon: Gilford Bott, MD;  Location: [facility];  Service: Urology   • TUBAL LIGATION     • UPPER GASTROINTESTINAL ENDOSCOPY  2016     Social History   Social History     Substance and Sexual Activity   Alcohol Use Not Currently    Comment: Occasional     Social History     Substance and Sexual Activity   Drug Use No   • Types: Marijuana    Comment: As teenager     Social History     Tobacco Use   Smoking Status Former   • Packs/day: 3.00   • Years: 25.00   • Total pack years: 75.00   • Types: Cigarettes   • Start date:    • Quit date: 2003   • Years since quittin.2   Smokeless Tobacco Never     Family History   Problem Relation Age of Onset   • Stomach cancer Mother    • Cancer Mother         Stomach Cancer   • Diabetes Mother    • Alcohol abuse Father    • No Known Problems Sister    • No Known Problems Daughter    • No Known Problems Maternal Grandmother    • No Known Problems Maternal Grandfather    • No Known Problems Paternal Grandmother    • No Known Problems Paternal Grandfather    • No Known Problems Sister    • No Known Problems Sister    • No Known Problems Sister    • No Known Problems Sister    • No Known Problems Daughter    • No Known Problems Daughter    • No Known Problems Maternal Aunt    • No Known Problems Maternal Aunt    • No Known Problems Paternal Aunt    • No Known Problems Paternal Aunt    • No Known Problems Paternal Aunt    • No Known Problems Paternal Aunt    • No Known Problems Paternal Aunt    • No Known Problems Paternal Aunt    • No Known Problems Paternal Aunt    • No Known Problems Paternal Aunt    • No Known Problems Paternal Aunt    • No Known Problems Paternal Aunt        Meds/Allergies   all current active meds have been reviewed, current meds:   Current Facility-Administered Medications   Medication Dose Route Frequency   • albuterol inhalation solution 2.5 mg  2.5 mg Nebulization Q4H PRN   • aluminum-magnesium hydroxide-simethicone (MAALOX) oral suspension 30 mL  30 mL Oral Q6H PRN   • bisacodyl (DULCOLAX) rectal suppository 10 mg  10 mg Rectal Daily PRN   • cefTRIAXone (ROCEPHIN) IVPB (premix in dextrose) 1,000 mg 50 mL  1,000 mg Intravenous Q24H   • Diclofenac Sodium (VOLTAREN) 1 % topical gel 2 g  2 g Topical 4x Daily   • enoxaparin (LOVENOX) subcutaneous injection 40 mg  40 mg Subcutaneous Daily   • gabapentin (NEURONTIN) capsule 200 mg  200 mg Oral TID   • HYDROmorphone (DILAUDID) injection 0.5 mg  0.5 mg Intravenous Q3H PRN   • LORazepam (ATIVAN) tablet 0.5 mg  0.5 mg Oral BID PRN   • magnesium sulfate 2 g/50 mL IVPB (premix) 2 g  2 g Intravenous Once   • ondansetron (ZOFRAN) injection 4 mg  4 mg Intravenous Q6H PRN   • oxyCODONE-acetaminophen (PERCOCET) 5-325 mg per tablet 2 tablet  2 tablet Oral Q4H PRN   • pantoprazole (PROTONIX) EC tablet 40 mg  40 mg Oral Early Morning   • polyethylene glycol (MIRALAX) packet 17 g  17 g Oral Daily    and PTA meds:    Medications Prior to Admission   Medication   • albuterol (PROVENTIL HFA,VENTOLIN HFA) 90 mcg/act inhaler   • buPROPion (WELLBUTRIN XL) 300 mg 24 hr tablet   • Diclofenac Sodium (VOLTAREN) 1 %   • naloxegol oxalate (MOVANTIK) 25 MG tablet   • omeprazole (PriLOSEC) 20 mg delayed release capsule   • ondansetron (ZOFRAN-ODT) 4 mg disintegrating tablet   • ondansetron (ZOFRAN-ODT) 8 mg disintegrating tablet   • oxyCODONE-acetaminophen (PERCOCET)  mg per tablet   • polyethylene glycol (MIRALAX) 17 g packet   • traZODone (DESYREL) 50 mg tablet   • gabapentin (NEURONTIN) 400 mg capsule   • lidocaine-prilocaine (EMLA) cream   • LORazepam (ATIVAN) 1 mg tablet   • methylPREDNISolone 4 MG tablet therapy pack   • promethazine (PHENERGAN) 25 mg tablet   • senna (SENOKOT) 8.6 MG tablet         Allergies   Allergen Reactions   • Medical Tape Itching     And redness from adhesive    And "skin raised up" especially longer on skin   • Aspirin GI Intolerance • Codeine GI Intolerance   • Dust Mite Extract    • Pollen Extract Sneezing       Objective     Intake/Output Summary (Last 24 hours) at 9/19/2023 0822  Last data filed at 9/19/2023 0701  Gross per 24 hour   Intake 1530 ml   Output 125 ml   Net 1405 ml       Invasive Devices:        Physical Exam      I/O last 3 completed shifts: In: 1410 [P.O.:360; IV Piggyback:1050]  Out: 125 [Urine:125]    Vitals:    09/19/23 0821   BP: 90/64   Pulse:    Resp: 16   Temp: 97.5 °F (36.4 °C)   SpO2:        General Appearance:    No acute distress. Cooperative. Appears stated age. Head:    Normocephalic. Atraumatic. Normal jaw occlusion. Eyes:    Lids, conjunctiva normal. No scleral icterus. Ears:    Normal external ears. Nose:   Nares normal. No drainage. Mouth:   Lips, tongue normal. Mucosa normal. Phonation normal.   Neck:   Supple. Symmetrical.   Back:     Symmetric. No CVA tenderness. Lungs:     Normal respiratory effort. Clear to auscultation bilaterally. Chest wall:    No tenderness or deformity. Heart:    Regular rate and rhythm. Normal S1 and S2. No murmur. No JVD. Mild bilateral lower extremity edema. Abdomen:     Soft. Mild generalized tenderness, moderate distention   Genitourinary:   No Hatch catheter present. Extremities:   Extremities normal. Atraumatic. No cyanosis. Skin:   Warm and dry. No pallor, jaundice, rash, ecchymoses. Neurologic:   Alert and oriented to person, place, time. No focal deficit. Current Weight: Weight - Scale: 53.8 kg (118 lb 9.7 oz)  First Weight: Weight - Scale: 53.8 kg (118 lb 9.7 oz)    Lab Results:  I have personally reviewed pertinent labs.     CBC:   Lab Results   Component Value Date    WBC 15.78 (H) 09/18/2023    HGB 10.3 (L) 09/18/2023    HCT 32.8 (L) 09/18/2023    MCV 83 09/18/2023     09/18/2023    RBC 3.94 09/18/2023    MCH 26.1 (L) 09/18/2023    MCHC 31.4 09/18/2023    RDW 17.4 (H) 09/18/2023    MPV 9.8 09/18/2023    NRBC 0 09/18/2023 CMP:   Lab Results   Component Value Date    K 4.0 09/19/2023    CL 89 (L) 09/19/2023    CO2 24 09/19/2023    BUN 30 (H) 09/19/2023    CREATININE 0.91 09/19/2023    CALCIUM 8.7 09/19/2023    AST 21 09/19/2023    ALT 10 09/19/2023    ALKPHOS 131 (H) 09/19/2023    EGFR 65 09/19/2023     Phosphorus:   Lab Results   Component Value Date    PHOS 3.0 09/19/2023     Magnesium:   Lab Results   Component Value Date    MG 1.7 (L) 09/19/2023     Urinalysis:   Lab Results   Component Value Date    COLORU Yellow 09/18/2023    CLARITYU Clear 09/18/2023    SPECGRAV 1.015 09/18/2023    PHUR 5.5 09/18/2023    LEUKOCYTESUR Trace (A) 09/18/2023    NITRITE Negative 09/18/2023    GLUCOSEU Negative 09/18/2023    KETONESU Trace (A) 09/18/2023    BILIRUBINUR Small (A) 09/18/2023    BLOODU Moderate (A) 09/18/2023     Ionized Calcium: No results found for: "CAION"  Coagulation:   Lab Results   Component Value Date    INR 1.31 (H) 09/18/2023     Troponin: No results found for: "TROPONINI"  ABG: No results found for: "PHART", "TEE8JNG", "PO2ART", "DFU1LLF", "C9QFZBNR", "BEART", "SOURCE"    Results from last 7 days   Lab Units 09/19/23  0514 09/19/23  0216 09/18/23  2203 09/18/23  1702 09/18/23  1551   POTASSIUM mmol/L 4.0 4.1 4.0   < > 4.0   CHLORIDE mmol/L 89* 87* 88*   < > 84*   CO2 mmol/L 24 24 23   < > 25   BUN mg/dL 30* 30* 32*   < > 34*   CREATININE mg/dL 0.91 0.94 0.88   < > 1.05   CALCIUM mg/dL 8.7 8.5 8.7   < > 9.1   ALK PHOS U/L 131*  --   --   --  139*   ALT U/L 10  --   --   --  11   AST U/L 21  --   --   --  23    < > = values in this interval not displayed. Radiology review:  Procedure: CT abdomen pelvis with contrast    Result Date: 9/18/2023  Narrative: CT ABDOMEN AND PELVIS WITH IV CONTRAST INDICATION:   Nausea and vomiting, worsening abdominal pain with possible distention. History of metastatic cervical cancer. COMPARISON: CT scan 3/16/2023. TECHNIQUE:  CT examination of the abdomen and pelvis was performed. Multiplanar 2D reformatted images were created from the source data. This examination, like all CT scans performed in the [facility], was performed utilizing techniques to minimize radiation dose exposure, including the use of iterative reconstruction and automated exposure control. Radiation dose length product (DLP) for this visit:  917.03 mGy-cm IV Contrast:  100 mL of iohexol (OMNIPAQUE) Enteric Contrast:  Enteric contrast was not administered. FINDINGS: ABDOMEN LOWER CHEST: Lung bases are clear. Stable hiatal hernia. LIVER/BILIARY TREE: Stable right lobe hemangioma measuring 1.5 cm. New mass in the caudate lobe of the liver consistent with metastatic disease measuring 4.0 x 2.5 cm. GALLBLADDER: No gallstones. No pericholecystic fluid SPLEEN:  Unremarkable. PANCREAS:  Unremarkable. ADRENAL GLANDS: Enlarging bilateral adrenal gland metastases measuring 4.7 cm on the right (previous 2.9 cm) and 4.4 cm on the left (previous 2.6 cm). KIDNEYS/URETERS: Right double-J stent in place without hydronephrosis. Stable right renal atrophy. STOMACH AND BOWEL: There is colonic diverticulosis without evidence of acute diverticulitis. Interval development of serosal implants along the ascending colon image 2/74 with severe wall thickening and near complete obstruction. Additional serosal implants are noted along the mesentery for example on image 2/78 in the right lower quadrant. APPENDIX:  No findings to suggest appendicitis. ABDOMINOPELVIC CAVITY: Interval development of extensive abdominal pelvic ascites. Stable omental caking in the anterior left abdomen image 2/80. Significant worsening of retroperitoneal adenopathy and soft tissue particularly at the level of the hasmukh hepatis and celiac axis consistent with worsening metastatic disease. 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  New saccular aneurysm of the distal abdominal aorta measuring 7 mm on image 2/84 possibly related to tumor invasion. PELVIS REPRODUCTIVE ORGANS: Surgical changes of prior hysterectomy. URINARY BLADDER: Distal aspect of the right double-J catheter stent noted. ABDOMINAL WALL/INGUINAL REGIONS:  Unremarkable. OSSEOUS STRUCTURES:  No acute fracture or destructive osseous lesion. Impression: Worsening metastatic disease with new caudate lobe liver mass measuring 4.0 x 2.5 cm. Enlarging bilateral adrenal gland metastases as described. Interval development of extensive abdominal pelvic ascites likely due to peritoneal carcinomatosis with multiple serosal implants noted as well as omental caking. Extensive soft tissue in the upper abdomen adjacent to the celiac axis in the hasmukh hepatis consistent with progression of metastatic disease with portal vein thrombosis and cavernous transformation. 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 Workstation performed: [id number]         Jl Kwon DO      This consultation note was produced in part using a dictation device which may document imprecise wording from author's original intent.

## 2023-09-19 NOTE — CASE MANAGEMENT
Case Management Assessment & Discharge Planning Note    Patient name Chalino Kendrick  Location [facility] [id number] MRN [id number]  : 1955 Date 2023       Current Admission Date: 2023  Current Admission Diagnosis:Dehydration with hyponatremia   Patient Active Problem List    Diagnosis Date Noted   • Portal vein thrombosis 2023   • Generalized weakness    • Complicated UTI (urinary tract infection) 2023   • Anemia, chronic disease 2023   • Malignant ascites 2023   • Drug induced constipation 2023   • Chronic pain syndrome 2023   • Neoplasm of uncertain behavior of bone and articular cartilage 2023   • Right leg pain 2023   • DNR (do not resuscitate) discussion 2023   • Chemotherapy-induced thrombocytopenia 2023   • Insomnia due to other mental disorder 09/15/2022   • Stage 3 chronic kidney disease, unspecified whether stage 3a or 3b CKD ([address]) 2022   • Meralgia paraesthetica, right 2022   • Dyslipidemia 2022   • Post-menopause 2022   • Major depressive disorder, single episode, mild ([address]) 2022   • Cervical spondylosis 2022   • Continuous opioid dependence ([address]) 2022   • Lumbar disc disease with radiculopathy 2022   • Chronic obstructive pulmonary disease, unspecified COPD type ([address]) 2022   • Hematuria 2022   • Calculus of gallbladder without cholecystitis without obstruction 2022   • Chronic constipation 2022   • Primary generalized (osteo)arthritis 2021   • Xerostomia 2021   • Encounter for immunization 2021   • Sensorineural hearing loss (SNHL) of both ears 2021   • Multiple lung nodules 2021   • Encounter for venous access device care 2021   • Dyspnea on exertion 2021   • Thyroid lesion 2021   • Chemotherapy induced neutropenia ([address]) 06/10/2021   • Pain in thoracic spine 2021   • Urgency of urination 05/20/2021   • Chemotherapy-induced nausea 05/04/2021   • 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)    • Hypokalemia 03/02/2021   • Dehydration with hyponatremia 02/26/2021   • Encounter for screening involving social determinants of health (SDoH) 02/24/2021   • Hydronephrosis 02/04/2021   • BMI 29.0-29.9,adult 01/05/2021   • Nausea 01/05/2021   • Right hip pain 01/05/2021   • Tinnitus of left ear 02/19/2020   • Left ear pain 02/19/2020   • Anxiety and depression 10/02/2019   • Zaldivar's neuroma, left 07/12/2018   • 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[address]) 04/11/2017      LOS (days): 1  Geometric Mean LOS (GMLOS) (days): 2.60  Days to GMLOS:1.8     OBJECTIVE:    Risk of Unplanned Readmission Score: 32.01         Current admission status: Inpatient  Referral Reason:  (discharge planning)    Preferred Pharmacy:   [facility]/pharmacy #[id number]- [address] [place] [address] S, [address]  [address] [facility]  [address] [place] [address]  Phone: [phone] Fax: [address], [place] - [address]  Phone: [phone] Fax: [address], [address]01 [facility],  [address] [place] [address]  Phone: [phone] Fax: [phone]    Primary Care Provider: Yarelis Laurent DO    Primary Insurance: MEDICARE  Secondary Insurance:     ASSESSMENT:    CM met with patient at the bedside,baseline information  was obtained. CM discussed the role of CM in helping the patient develop a discharge plan and assist the patient in carry out their plan. Patient lives in apt building 2 nd floor. Lives alone . 5 steps into home. Patient stated she is sleeping on couch due to her bed is to low to ground and had problems getting out of it when she was well. CM offered Hospital bed patient declining due to small apt.     Stated uses [employer] transport to her chemo appts in past.  Daughter transports to physician appts. Patient stated she finished her chemo treatments at [facility] for  Malignant neoplasm of endocervix this past March 2023            [address], [address] - Daughter   Primary Phone: [phone] (Mobile)               Advance Directives  Does patient have a [address]?: Yes  Does patient have Advance Directives?: Yes  Advance Directives: Living will, Power of  for health care  Primary Contact: daughter  Patricia Machado   [phone]         Readmission Root Cause  30 Day Readmission: No    Patient Information  Admitted from[de-identified] Home  Mental Status: Alert  During Assessment patient was accompanied by: Not accompanied during assessment  Assessment information provided by[de-identified] Patient  Primary Caregiver: Family  Caregiver's Name[de-identified] Jax Miranda Livings Relationship to Patient[de-identified] Family Member  Caregiver's Telephone Number[de-identified] [phone]  Support Systems: Daughter Steven Peña)  [facility]: [facility] do you live in?: [facility] entry access options.  Select all that apply.: Stairs  Number of steps to enter home.: 5  Do the steps have railings?: Yes  Type of Current Residence: Apartment  Floor Level: 2  Upon entering residence, is there a bathroom on the main floor (no further steps)?: Yes  In the last 12 months, was there a time when you were not able to pay the mortgage or rent on time?: No  In the last 12 months, how many places have you lived?: 1  In the last 12 months, was there a time when you did not have a steady place to sleep or slept in a shelter (including now)?: No  Homeless/housing insecurity resource given?: N/A  Living Arrangements: Lives Alone  Is patient a ?: No    Activities of Daily Living Prior to Admission  Functional Status: Independent  Completes ADLs independently?: Yes  Ambulates independently?: Yes  Does patient use assisted devices?: Yes  Assisted Devices (DME) used: Straight Cane  Does patient currently own DME?: Yes  What DME does the patient currently own?: Straight Cane  Does patient have a history of Outpatient Therapy (PT/OT)?: No  Does the patient have a history of Short-Term Rehab?: No  Does patient have a history of HHC?: No  Does patient currently have [address]?: No         Patient Information Continued  Income Source: Pension/intermediate  Does patient have prescription coverage?: Yes  Within the past 12 months, you worried that your food would run out before you got the money to buy more.: Never true  Within the past 12 months, the food you bought just didn't last and you didn't have money to get more.: Never true  Food insecurity resource given?: N/A  Does patient receive dialysis treatments?: No  Does patient have a history of Mental Health Diagnosis?: No         Means of Transportation  Means of Transport to Appts[de-identified] Family transport  In the past 12 months, has lack of transportation kept you from medical appointments or from getting medications?: No  In the past 12 months, has lack of transportation kept you from meetings, work, or from getting things needed for daily living?: No  Was application for public transport provided?: N/A (patient active with transport [facility] loop)        DISCHARGE DETAILS:    Discharge planning discussed with[de-identified] patient                  Patient discussed in huddle. Consult to Pallative care. Pt/Ot recommendations Home with home health. CM discussed  FOC with patient  at bedside. Patient agreeable with blanket referrals in Aidin for local agencies to determine availability according to your medical needs and insurance coverage. Patient and caretaker has no preference     Referrals placed in aidin     CM discussed Hospital bed with patient she stated there is no room in apt. Sleeping on couch. Patient provided 211 information for community resources to assist as needed/qualifies.     CM to follow for discharge planning

## 2023-09-19 NOTE — PROGRESS NOTES
[address]  Progress Note  Name: Cedric Bryan  MRN: [id number]  Unit/Bed#: 058-32 I Date of Admission: 9/18/2023   Date of Service: 9/19/2023 I Hospital Day: 1    Assessment/Plan   * Dehydration with hyponatremia  Assessment & Plan  Renal function improved, DC IVF and fluid restrict   Check urine osmo and urine Na  nephro consult    Portal vein thrombosis  Assessment & Plan  Will likely require anticoagulation , but will defer to hematology     Complicated UTI (urinary tract infection)  Assessment & Plan  DC zosyn and utilize ceftriaxone   Follow up urine and blood culture     Malignant ascites  Assessment & Plan  IR consult for paracentesis     Malignant neoplasm of endocervix [facility])  Assessment & Plan  Metastatic with evidence of peritoneal carcinomatosis, patient was told there are no further treatment options. initiated goals of care discussion, will complete ACP note  Oncology + palliative medicine consult              Progress Note - Carlos Eduardo Vivar 76 y.o. female MRN: [id number]    Unit/Bed#: 407-01 Encounter: [id number]        Subjective:     Patient seen and examined, she states that over the past few months she's has increasing abdominal and back pain and decreased PO intake associated with emesis    She is interested in discussing goals of care with palliative medicine     Objective:     Vitals:   Vitals:    09/19/23 0821   BP: 90/64   Pulse:    Resp: 16   Temp: 97.5 °F (36.4 °C)   SpO2:      Body mass index is 23.96 kg/m².     Intake/Output Summary (Last 24 hours) at 9/19/2023 0919  Last data filed at 9/19/2023 0701  Gross per 24 hour   Intake 1530 ml   Output 125 ml   Net 1405 ml       Physical Exam:   BP 90/64   Pulse (!) 120   Temp 97.5 °F (36.4 °C)   Resp 16   Ht 4' 11" (1.499 m)   Wt 53.8 kg (118 lb 9.7 oz)   LMP  (LMP Unknown)   SpO2 99%   BMI 23.96 kg/m²   General appearance: alert and oriented, in no acute distress  Head: Normocephalic, without obvious abnormality, atraumatic  Lungs: diminished breath sounds  Heart: regular rate and rhythm, S1, S2 normal, no murmur, click, rub or gallop  Abdomen: abnormal findings:  ascites, distended and guarding  Extremities: edema 1  Pulses: 2+ and symmetric  Neurologic: Grossly normal     Invasive Devices     Central Venous Catheter Line  Duration           Port A Cath 05/18/22 Right Chest 489 days          Drain  Duration           Ureteral Internal Stent Right ureter 6 Fr. 141 days                Results from last 7 days   Lab Units 09/18/23  1551   WBC Thousand/uL 15.78*   HEMOGLOBIN g/dL 10.3*   HEMATOCRIT % 32.8*   PLATELETS Thousands/uL 168       Results from last 7 days   Lab Units 09/19/23  0514 09/19/23  0216 09/18/23  2203 09/18/23  1702 09/18/23  1551   POTASSIUM mmol/L 4.0 4.1 4.0   < > 4.0   CHLORIDE mmol/L 89* 87* 88*   < > 84*   CO2 mmol/L 24 24 23   < > 25   BUN mg/dL 30* 30* 32*   < > 34*   CREATININE mg/dL 0.91 0.94 0.88   < > 1.05   CALCIUM mg/dL 8.7 8.5 8.7   < > 9.1   ALK PHOS U/L 131*  --   --   --  139*   ALT U/L 10  --   --   --  11   AST U/L 21  --   --   --  23    < > = values in this interval not displayed.        Medication Administration - last 24 hours from 09/18/2023 0919 to 09/19/2023 0919       Date/Time Order Dose Route Action Action by     09/18/2023 1703 EDT sodium chloride 0.9 % bolus 1,000 mL 0 mL Intravenous Stopped Ora Schaffer     09/18/2023 1603 EDT sodium chloride 0.9 % bolus 1,000 mL 1,000 mL Intravenous [address] Fred Harrell RN     09/18/2023 1604 EDT HYDROmorphone HCl (DILAUDID) injection 0.2 mg 0.2 mg Intravenous Given Fred Harrell RN     09/18/2023 1720 EDT cefepime (MAXIPIME) IVPB (premix in dextrose) 2,000 mg 50 mL 0 mg Intravenous Stopped Ora Schaffer     09/18/2023 1650 EDT cefepime (MAXIPIME) IVPB (premix in dextrose) 2,000 mg 50 mL 2,000 mg Intravenous [address] Fred Harrell RN     09/18/2023 1807 EDT iohexol (OMNIPAQUE) 350 MG/ML injection (SINGLE-DOSE) 100 mL 100 mL Intravenous Given Anthony Neel     09/18/2023 1948 EDT HYDROmorphone HCl (DILAUDID) injection 0.2 mg 0.2 mg Intravenous Given Darcie Mitchell RN     09/18/2023 1905 EDT HYDROmorphone HCl (DILAUDID) injection 0.2 mg 0 mg Intravenous Hold Darcie Mitchell RN     09/18/2023 2306 EDT HYDROmorphone (DILAUDID) injection 0.5 mg 0.5 mg Intravenous Given Stef Veras RN     09/19/2023 0447 EDT piperacillin-tazobactam (ZOSYN) 3.375 g in sodium chloride 0.9 % 100 mL IVPB 3.375 g Intravenous [address] Mari Eddy     09/18/2023 2243 EDT piperacillin-tazobactam (ZOSYN) 3.375 g in sodium chloride 0.9 % 100 mL IVPB 3.375 g Intravenous [address]  Meg Jennings LPN     85/57/5813 1410 EDT Diclofenac Sodium (VOLTAREN) 1 % topical gel 2 g 2 g Topical Not Given Meg Jennings LPN     52/24/3427 0628 EDT gabapentin (NEURONTIN) capsule 200 mg 200 mg Oral Not Given Meg Jennings LPN     08/54/9910 4297 EDT pantoprazole (PROTONIX) EC tablet 40 mg 40 mg Oral Given Chayo Ramos RN     09/18/2023 2306 EDT ondansetron (ZOFRAN) injection 4 mg 4 mg Intravenous Given Stef Veras RN     09/19/2023 0720 EDT sodium chloride 0.9 % with KCl 20 mEq/L infusion (premix) 60 mL/hr Intravenous Rate/Dose Change Chayo Ramos RN     09/18/2023 2248 EDT sodium chloride 0.9 % with KCl 20 mEq/L infusion (premix) 50 mL/hr Intravenous Nguyeniris Wilson LPN     91/45/7818 1049 EDT magnesium sulfate 2 g/50 mL IVPB (premix) 2 g 2 g Intravenous New Bag Chayo Ramos RN            Lab, Imaging and other studies: I have personally reviewed pertinent reports.     VTE Pharmacologic Prophylaxis: Enoxaparin (Lovenox)  VTE Mechanical Prophylaxis: sequential compression device     Francois Severino MD  9/19/2023,9:19 AM

## 2023-09-19 NOTE — PLAN OF CARE
Problem: OCCUPATIONAL THERAPY ADULT  Goal: Performs self-care activities at highest level of function for planned discharge setting. See evaluation for individualized goals. Description: Treatment Interventions: ADL retraining, Functional transfer training, UE strengthening/ROM, Endurance training, Patient/family training, Equipment evaluation/education, Activityengagement          See flowsheet documentation for full assessment, interventions and recommendations. Note: Limitation: Decreased ADL status, Decreased UE strength, Decreased Safe judgement during ADL, Decreased endurance, Decreased self-care trans, Decreased high-level ADLs     Assessment: Pt is a 76 y.o. female seen for OT evaluation s/p admit to [facility] on [facility]/18/2023 w/ Dehydration with hyponatremia. Comorbidities affecting pt's functional performance at time of assessment include: IBS, anxiety, arthritis, port-A cath, GERD, shingles, motion sickness, cervical CA, thyroid nodule. Personal factors affecting pt at time of IE include:steps to enter environment, difficulty performing ADLS, difficulty performing IADLS , limited insight into deficits, decreased initiation and engagement  and health management . Prior to admission, pt was (I) with ADLs and IADLs with use of RW during mobility. Upon evaluation: Pt requires (S) level with use of RW during mobility 2* the following deficits impacting occupational performance: weakness, decreased strength, decreased balance, decreased tolerance, impaired initiation, decreased safety awareness and increased pain. Pt to benefit from continued skilled OT tx while in the hospital to address deficits as defined above and maximize level of functional independence w ADL's and functional mobility. Occupational Performance areas to address include: grooming, bathing/shower, toilet hygiene, dressing, functional mobility, community mobility and clothing management.  The patient's raw score on the AM-PAC Daily Activity Inpatient Short Form is 21. A raw score of greater than or equal to 19 suggests the patient may benefit from discharge to home. Please refer to the recommendation of the Occupational Therapist for safe discharge planning. Pt benefited from co-evaluation of skilled OT and PT therapists in order to most appropriately address functional deficits d/t extensive assistance required for safe functional mobility, decreased activity tolerance, and regression from functioning level prior to admission and/or onset of present illness. OT/PT objectives were addressed separately; please see PT note for specific goal areas targeted.      OT Discharge Recommendation: Home with home health rehabilitation

## 2023-09-19 NOTE — PHYSICAL THERAPY NOTE
Physical Therapy Evaluation    Patient Name: Leatha Louise    KVDCZ'X Date: 9/19/2023     Problem List  Principal Problem:    Dehydration with hyponatremia  Active Problems:    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HCC)    Generalized weakness    Complicated UTI (urinary tract infection)    Anemia, chronic disease    Malignant ascites    Portal vein thrombosis       Past Medical History  Past Medical History:   Diagnosis Date    Acne     Anxiety     Anxiety and depression 10/02/2019    Arthritis     Breathing difficulty     pt does not recall any breathing problem but did have neck pain after procedure" Gave her an incentive spirometer to use    Cancer ([address])     cervical/ and "lymph nodes" (GYN)    Cervical cancer (HCC)     Chronic kidney disease     stage 3    Chronic pain disorder     Neck pain    Claustrophobia     Colon polyp     GERD (gastroesophageal reflux disease)     not now    Headache     History of radiation therapy     History of shingles     Irritable bowel syndrome     Joint pain following chemotherapy     and stiffness    Liver disease     "cyst on liver"    Lung nodule     "left"    Motion sickness     Muscle weakness     after chemo"    Neck pain     "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"has had neck pain since childhood"    Peptic ulceration 2016    Port-A-Cath in place     right chest//for chemotherapy q 3 weeks and also receives IV hydration    Shortness of breath     "started with cancer, when too much exertion like cleaning/guera doing stairs"-pt states it is much better    Thumb injury 10/2019    right    Thyroid nodule     Tinnitus of left ear 02/19/2020    Urinary problem     Wears contact lenses     Wrist arthritis 03/17/2020        Past Surgical History  Past Surgical History:   Procedure Laterality Date    COLONOSCOPY      CYSTOSCOPY W/ RETROGRADES Right 06/03/2021    Procedure: Gabby Preciado W/RETROGRADE;  Surgeon: Laura Huff MD;  Location: [facility];  Service: Urology    CYSTOSCOPY W/ RETROGRADES Right 10/18/2021    Procedure: CYSTOSCOPY WITH ureteral stent exchange;  Surgeon: Fred Richmond MD;  Location: [facility];  Service: Urology    EPIDURAL BLOCK INJECTION Right 8/2/2023    Procedure: BLOCK / INJECTION EPIDURAL STEROID LUMBAR RIGHT L2-3 TFESI;  Surgeon: Trinh Nunes MD;  Location: [facility];  Service: Pain Management     FL RETROGRADE PYELOGRAM  02/11/2021    FL RETROGRADE PYELOGRAM  06/03/2021    FOOT SURGERY      HYSTERECTOMY      IR BIOPSY LYMPH NODE  02/19/2021    IR PORT PLACEMENT  03/09/2021    LYMPH NODE BIOPSY  2021    KY CYSTO BLADDER W/URETERAL CATHETERIZATION Right 02/11/2021    Procedure: CYSTOSCOPY RETROGRADE PYELOGRAM WITH INSERTION STENT URETERAL;  Surgeon: Fred Richmond MD;  [facility];  Service: Urology    KY CYSTO BLADDER W/URETERAL CATHETERIZATION Right 04/04/2022    Procedure: CYSTOSCOPY  WITH INSERTION STENT URETERAL Right;  Surgeon: Fred Richmond MD;  Location: [facility];  Service: Urology    KY CYSTO BLADDER W/URETERAL CATHETERIZATION Right 10/27/2022    Procedure: Cystoscopy, right ureteral stent exchange;  Surgeon: Fred Richmond MD;  [facility]: [facility];  Service: Urology    KY CYSTO BLADDER W/URETERAL CATHETERIZATION Right 5/1/2023    Procedure: CYSTOSCOPY with right ureteral stent exchange;  Surgeon: Fred Richmond MD;  Location: [facility];  Service: Urology    KY EXCISION INTERDIGITAL Tura Dori SINGLE EACH Left 07/12/2018    Procedure: FOOT NEUROMA EXCISION;  Surgeon: Tiffanie Hills DPM;  Location: [address] [place] [address] MAIN OR;  Service: [address] Right 06/03/2021    Procedure: EXCHANGE STENT;  Surgeon: Alfred Chaudhry MD;  Location: [facility];  Service: Urology    TUBAL LIGATION      UPPER GASTROINTESTINAL ENDOSCOPY  2016 09/19/23 1008   PT Last Visit   PT Visit Date 09/19/23   Note Type   Note type Evaluation   Pain Assessment   Pain Assessment Tool 0-10   Pain Score 7   Pain Location/Orientation Orientation: Bilateral;Location: Leg   Restrictions/Precautions   Weight Bearing Precautions Per Order No   Other Precautions Pain; Fall Risk;Multiple lines;Telemetry; Bed Alarm   Home Living   Type of [address] One level;Stairs to enter with rails  (5 DAO c HR)   Bathroom Shower/Tub Tub/shower unit   Bathroom Toilet Standard   Bathroom Equipment Grab bars around toilet   [address] Cane;Grab bars   Additional Comments pt reports use of cane at baseline   Prior Function   Level of [place] Independent with ADLs; Independent with functional mobility; Needs assistance with IADLS   Lives With Alone   IADLs Family/Friend/Other provides transportation   Falls in the last 6 months 0   Vocational Retired   General   Additional Pertinent History pt's daughter currently visiting from out of state, however will be leaving soon. Pt does not have other family able to assist her in the area   Family/Caregiver Present No   Cognition   Overall Cognitive Status WFL   Arousal/Participation Alert   Orientation Level Oriented X4   Following Commands Follows all commands and directions without difficulty   Subjective   Subjective pt reports her apartment is small and somewhat cluttered, making it difficult to use a walker or make room for a hospital bed   RLE Assessment   RLE Assessment X  (4-/5 grossly)   LLE Assessment   LLE Assessment X  (4-/5 grossly)   Bed Mobility   Supine to Sit 4  Minimal assistance   Additional items Assist x 1; Increased time required;Verbal cues   Sit to Supine 5  Supervision   Additional items Increased time required;Verbal cues   Transfers   Sit to Stand 5  Supervision   Additional items Increased time required;Verbal cues   Stand to Sit 5  Supervision   Additional items Increased time required;Verbal cues   Toilet transfer 5  Supervision   Additional items Increased time required;Verbal cues;Standard toilet   Additional Comments RW used   Ambulation/Elevation   Gait pattern Excessively slow; Short stride; Foward flexed;Decreased foot clearance;Narrow EMERY   Gait Assistance 5  Supervision   Additional items Verbal cues   Assistive Device Rolling walker   Distance 15', 10'   Balance   Static Sitting Good   Dynamic Sitting Fair +   Static Standing Fair   Dynamic Standing [address] [facility] -  (with RW)   Endurance Deficit   Endurance Deficit Yes   Endurance Deficit Description pt very fatigued with minimal exertion   Activity Tolerance   Activity Tolerance Patient limited by fatigue;Patient limited by pain   Assessment   Prognosis Good   Problem List Decreased strength;Decreased endurance; Impaired balance;Decreased mobility;Pain   Assessment Patient is a 76 y.o. female evaluated by Physical Therapy s/p admit to [address] [place] [address] on 9/18/2023 with admitting diagnosis of: Hyponatremia, Weakness, Pelvic ascites, and principal problem of: Dehydration with hyponatremia. PT was consulted to assess patient's functional mobility and discharge needs. Ordered are PT Evaluation and treatment with activity level of: up and OOB as tolerated. Comorbidities affecting patient's physical performance at time of assessment include: IBS, arthritis, cancer, hx of chemo and radiation, GERD, chronic pain disorder, CKD. Personal factors affecting the patient at time of IE include: ambulating with assistive device, step(s) to enter home, inability to navigate community distances, anxiety, depression, inability/difficulty performing IADLs and inability/difficulty performing ADLs. Please locate objective findings from PT assessment regarding body systems outlined above. Upon evaluation, pt able to perform all functional mobility with SUP-Isela x 1, RW, and increased time. Occasional verbal cuing provided for safety awareness and sequencing. Frequent rest breaks taken throughout session d/t fatigue and SOB.  Despite symptoms, HR and SpO2 remained WFL on RA throughout. At baseline, pt uses SPC to ambulate, however this date is requiring RW use for increased stability. Moderate postural sway demonstrated with mobility but no true LOB experienced. The patient's [facility] Basic Mobility Inpatient Short Form Raw Score is 18. A Raw score of greater than 16 suggests the patient may benefit from discharge to home. Please also refer to the recommendation of the Physical Therapist for safe discharge planning. Co treatment with OT secondary to complex medical condition of pt, possible A of 2 required to achieve and maintain transitional movements, requiring the need of skilled therapeutic intervention of 2 therapists to achieve delivery of services. Pt will benefit from continued PT intervention during LOS to address current deficits, increase LOF, and facilitate safe d/c to next level of care when medically appropriate. D/c recommendation at this time is home with home health rehabilitation. Goals   Patient Goals to feel better   LTG Expiration Date 10/03/23   Long Term Goal #1 Pt will participate in B LE strengthening exercises to facilitate improved functional activity tolerance. Pt will perform all functional transfers and bed mobility mod(I) with good safety awareness. Pt will ambulate 250' mod(I) with LRAD while maintaining good functional dynamic balance. Pt will ascend/descend 5 stairs with HR and SUP to reflect the ability to safely navigate the home. Plan   Treatment/Interventions Functional transfer training;LE strengthening/ROM; Therapeutic exercise;Elevations; Endurance training;Bed mobility;Gait training   PT Frequency 3-5x/wk   Recommendation   PT Discharge Recommendation Home with home health rehabilitation   [facility] Basic Mobility Inpatient   Turning in Flat Bed Without Bedrails 3   Lying on Back to Sitting on Edge of Flat Bed Without Bedrails 3   Moving Bed to Chair 3   Standing Up From Chair Using Arms 3   Walk in Room 3 Climb 3-5 Stairs With Railing 3   Basic Mobility Inpatient Raw Score 18   Basic Mobility Standardized Score 41.05   Highest Level Of Mobility   -HLM Goal 6: Walk 10 steps or more   -HLM Achieved 6: Walk 10 steps or more   End of Consult   Patient Position at End of Consult Supine; All needs within reach

## 2023-09-19 NOTE — ASSESSMENT & PLAN NOTE
This appears stable. Will monitor CBC indicis especially while administering DVT prophylaxis with Lovenox.

## 2023-09-19 NOTE — ASSESSMENT & PLAN NOTE
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 Besides transient relief, the utility of abd paracentesis is doubtful at this point.   We will defer this to the patient's hematologist.

## 2023-09-19 NOTE — PROCEDURES
Interventional Radiology Procedure Note    PATIENT NAME: Michael Charles  : 1955  MRN: [id number]     Pre-op Diagnosis:   1. Hyponatremia    2. Pelvic ascites    3. Neoplasm of uncertain behavior of bone and articular cartilage    4. Malignant ascites      2. Abdomen distention and discomfort    Post-op Diagnosis:   1. Hyponatremia    2. Pelvic ascites    3. Neoplasm of uncertain behavior of bone and articular cartilage    4. Malignant ascites      2.    Same    Procedure: Paracentesis via the right upper quadrant    Surgeon:   Alma Delia Garcia MD  Assistants:     No qualified resident was available, Resident is only observing    Estimated Blood Loss: Minimal     Findings: Transparent appearing, del colored fluid    Specimens: Sent for lab    Complications:  None    Anesthesia: local    Alma Delia Garcia MD     Date: 2023  Time: 12:47 PM

## 2023-09-20 ENCOUNTER — HOME CARE VISIT (OUTPATIENT)
Dept: HOME HEALTH SERVICES | Facility: HOME HEALTHCARE | Age: 68
End: 2023-09-20
Payer: MEDICARE

## 2023-09-20 NOTE — PLAN OF CARE
Problem: Potential for Falls  Goal: Patient will remain free of falls  Description: INTERVENTIONS:  - Educate patient/family on patient safety including physical limitations  - Instruct patient to call for assistance with activity   - Consult OT/PT to assist with strengthening/mobility   - Keep Call bell within reach  - Keep bed low and locked with side rails adjusted as appropriate  - Keep care items and personal belongings within reach  - Initiate and maintain comfort rounds  - Make Fall Risk Sign visible to staff  - Offer Toileting every 2 Hours, in advance of need  - Initiate/Maintain fall alarm  - Obtain necessary fall risk management equipment: non-skid footwear, call bell  - Apply yellow socks and bracelet for high fall risk patients  - Consider moving patient to room near nurses station  Outcome: Progressing     Problem: Nutrition/Hydration-ADULT  Goal: Nutrient/Hydration intake appropriate for improving, restoring or maintaining nutritional needs  Description: Monitor and assess patient's nutrition/hydration status for malnutrition. Collaborate with interdisciplinary team and initiate plan and interventions as ordered. Monitor patient's weight and dietary intake as ordered or per policy. Utilize nutrition screening tool and intervene as necessary. Determine patient's food preferences and provide high-protein, high-caloric foods as appropriate.      INTERVENTIONS:  - Monitor oral intake, urinary output, labs, and treatment plans  - Assess nutrition and hydration status and recommend course of action  - Evaluate amount of meals eaten  - Assist patient with eating if necessary   - Allow adequate time for meals  - Recommend/ encourage appropriate diets, oral nutritional supplements, and vitamin/mineral supplements  - Order, calculate, and assess calorie counts as needed  - Recommend, monitor, and adjust tube feedings and TPN/PPN based on assessed needs  - Assess need for intravenous fluids  - Provide specific nutrition/hydration education as appropriate  - Include patient/family/caregiver in decisions related to nutrition  Outcome: Progressing     Problem: MOBILITY - ADULT  Goal: Maintain or return to baseline ADL function  Description: INTERVENTIONS:  -  Assess patient's ability to carry out ADLs; assess patient's baseline for ADL function and identify physical deficits which impact ability to perform ADLs (bathing, care of mouth/teeth, toileting, grooming, dressing, etc.)  - Assess/evaluate cause of self-care deficits   - Assess range of motion  - Assess patient's mobility; develop plan if impaired  - Assess patient's need for assistive devices and provide as appropriate  - Encourage maximum independence but intervene and supervise when necessary  - Involve family in performance of ADLs  - Assess for home care needs following discharge   - Consider OT consult to assist with ADL evaluation and planning for discharge  - Provide patient education as appropriate  Outcome: Progressing  Goal: Maintains/Returns to pre admission functional level  Description: INTERVENTIONS:  - Perform BMAT or MOVE assessment daily.   - Set and communicate daily mobility goal to care team and patient/family/caregiver. - Collaborate with rehabilitation services on mobility goals if consulted  - Perform Range of Motion 3 times a day. - Reposition patient every 2 hours.   - Dangle patient 3 times a day  - Stand patient 3 times a day  - Ambulate patient 3 times a day  - Out of bed to chair 3 times a day   - Out of bed for meals 3 times a day  - Out of bed for toileting  - Record patient progress and toleration of activity level   Outcome: Progressing     Problem: Prexisting or High Potential for Compromised Skin Integrity  Goal: Skin integrity is maintained or improved  Description: INTERVENTIONS:  - Identify patients at risk for skin breakdown  - Assess and monitor skin integrity  - Assess and monitor nutrition and hydration status  - Monitor labs   - Assess for incontinence   - Turn and reposition patient  - Assist with mobility/ambulation  - Relieve pressure over bony prominences  - Avoid friction and shearing  - Provide appropriate hygiene as needed including keeping skin clean and dry  - Evaluate need for skin moisturizer/barrier cream  - Collaborate with interdisciplinary team   - Patient/family teaching  - Consider wound care consult   Outcome: Progressing     Problem: PAIN - ADULT  Goal: Verbalizes/displays adequate comfort level or baseline comfort level  Description: Interventions:  - Encourage patient to monitor pain and request assistance  - Assess pain using appropriate pain scale  - Administer analgesics based on type and severity of pain and evaluate response  - Implement non-pharmacological measures as appropriate and evaluate response  - Consider cultural and social influences on pain and pain management  - Notify physician/advanced practitioner if interventions unsuccessful or patient reports new pain  Outcome: Progressing     Problem: INFECTION - ADULT  Goal: Absence or prevention of progression during hospitalization  Description: INTERVENTIONS:  - Assess and monitor for signs and symptoms of infection  - Monitor lab/diagnostic results  - Monitor all insertion sites, i.e. indwelling lines, tubes, and drains  - Monitor endotracheal if appropriate and nasal secretions for changes in amount and color  - [place] appropriate cooling/warming therapies per order  - Administer medications as ordered  - Instruct and encourage patient and family to use good hand hygiene technique  - Identify and instruct in appropriate isolation precautions for identified infection/condition  Outcome: Progressing  Goal: Absence of fever/infection during neutropenic period  Description: INTERVENTIONS:  - Monitor WBC    Outcome: Progressing     Problem: SAFETY ADULT  Goal: Patient will remain free of falls  Description: INTERVENTIONS:  - Educate patient/family on patient safety including physical limitations  - Instruct patient to call for assistance with activity   - Consult OT/PT to assist with strengthening/mobility   - Keep Call bell within reach  - Keep bed low and locked with side rails adjusted as appropriate  - Keep care items and personal belongings within reach  - Initiate and maintain comfort rounds  - Make Fall Risk Sign visible to staff  - Offer Toileting every 2 Hours, in advance of need  - Initiate/Maintain fall alarm  - Obtain necessary fall risk management equipment: non-skid footwear, call bell  - Apply yellow socks and bracelet for high fall risk patients  - Consider moving patient to room near nurses station  Outcome: Progressing  Goal: Maintain or return to baseline ADL function  Description: INTERVENTIONS:  -  Assess patient's ability to carry out ADLs; assess patient's baseline for ADL function and identify physical deficits which impact ability to perform ADLs (bathing, care of mouth/teeth, toileting, grooming, dressing, etc.)  - Assess/evaluate cause of self-care deficits   - Assess range of motion  - Assess patient's mobility; develop plan if impaired  - Assess patient's need for assistive devices and provide as appropriate  - Encourage maximum independence but intervene and supervise when necessary  - Involve family in performance of ADLs  - Assess for home care needs following discharge   - Consider OT consult to assist with ADL evaluation and planning for discharge  - Provide patient education as appropriate  Outcome: Progressing  Goal: Maintains/Returns to pre admission functional level  Description: INTERVENTIONS:  - Perform BMAT or MOVE assessment daily.   - Set and communicate daily mobility goal to care team and patient/family/caregiver. - Collaborate with rehabilitation services on mobility goals if consulted  - Perform Range of Motion 3 times a day. - Reposition patient every 2 hours.   - Dangle patient 3 times a day  - Stand patient 3 times a day  - Ambulate patient 3 times a day  - Out of bed to chair 3 times a day   - Out of bed for meals 3 times a day  - Out of bed for toileting  - Record patient progress and toleration of activity level   Outcome: Progressing     Problem: DISCHARGE PLANNING  Goal: Discharge to home or other facility with appropriate resources  Description: INTERVENTIONS:  - Identify barriers to discharge w/patient and caregiver  - Arrange for needed discharge resources and transportation as appropriate  - Identify discharge learning needs (meds, wound care, etc.)  - Arrange for interpretive services to assist at discharge as needed  - Refer to Case Management Department for coordinating discharge planning if the patient needs post-hospital services based on physician/advanced practitioner order or complex needs related to functional status, cognitive ability, or social support system  Outcome: Progressing     Problem: Knowledge Deficit  Goal: Patient/family/caregiver demonstrates understanding of disease process, treatment plan, medications, and discharge instructions  Description: Complete learning assessment and assess knowledge base.   Interventions:  - Provide teaching at level of understanding  - Provide teaching via preferred learning methods  Outcome: Progressing     Problem: CARDIOVASCULAR - ADULT  Goal: Maintains optimal cardiac output and hemodynamic stability  Description: INTERVENTIONS:  - Monitor I/O, vital signs and rhythm  - Monitor for S/S and trends of decreased cardiac output  - Administer and titrate ordered vasoactive medications to optimize hemodynamic stability  - Assess quality of pulses, skin color and temperature  - Assess for signs of decreased coronary artery perfusion  - Instruct patient to report change in severity of symptoms  Outcome: Progressing  Goal: Absence of cardiac dysrhythmias or at baseline rhythm  Description: INTERVENTIONS:  - Continuous cardiac monitoring, vital signs, obtain 12 lead EKG if ordered  - Administer antiarrhythmic and heart rate control medications as ordered  - Monitor electrolytes and administer replacement therapy as ordered  Outcome: Progressing     Problem: GASTROINTESTINAL - ADULT  Goal: Minimal or absence of nausea and/or vomiting  Description: INTERVENTIONS:  - Administer IV fluids if ordered to ensure adequate hydration  - Maintain NPO status until nausea and vomiting are resolved  - Nasogastric tube if ordered  - Administer ordered antiemetic medications as needed  - Provide nonpharmacologic comfort measures as appropriate  - Advance diet as tolerated, if ordered  - Consider nutrition services referral to assist patient with adequate nutrition and appropriate food choices  Outcome: Progressing  Goal: Maintains or returns to baseline bowel function  Description: INTERVENTIONS:  - Assess bowel function  - Encourage oral fluids to ensure adequate hydration  - Administer IV fluids if ordered to ensure adequate hydration  - Administer ordered medications as needed  - Encourage mobilization and activity  - Consider nutritional services referral to assist patient with adequate nutrition and appropriate food choices  Outcome: Progressing  Goal: Maintains adequate nutritional intake  Description: INTERVENTIONS:  - Monitor percentage of each meal consumed  - Identify factors contributing to decreased intake, treat as appropriate  - Assist with meals as needed  - Monitor I&O, weight, and lab values if indicated  - Obtain nutrition services referral as needed  Outcome: Progressing  Goal: Oral mucous membranes remain intact  Description: INTERVENTIONS  - Assess oral mucosa and hygiene practices  - Implement preventative oral hygiene regimen  - Implement oral medicated treatments as ordered  - Initiate Nutrition services referral as needed  Outcome: Progressing     Problem: METABOLIC, FLUID AND ELECTROLYTES - ADULT  Goal: Electrolytes maintained within normal limits  Description: INTERVENTIONS:  - Monitor labs and assess patient for signs and symptoms of electrolyte imbalances  - Administer electrolyte replacement as ordered  - Monitor response to electrolyte replacements, including repeat lab results as appropriate  - Instruct patient on fluid and nutrition as appropriate  Outcome: Progressing  Goal: Fluid balance maintained  Description: INTERVENTIONS:  - Monitor labs   - Monitor I/O and WT  - Instruct patient on fluid and nutrition as appropriate  - Assess for signs & symptoms of volume excess or deficit  Outcome: Progressing  Goal: Glucose maintained within target range  Description: INTERVENTIONS:  - Monitor Blood Glucose as ordered  - Assess for signs and symptoms of hyperglycemia and hypoglycemia  - Administer ordered medications to maintain glucose within target range  - Assess nutritional intake and initiate nutrition service referral as needed  Outcome: Progressing     Problem: HEMATOLOGIC - ADULT  Goal: Maintains hematologic stability  Description: INTERVENTIONS  - Assess for signs and symptoms of bleeding or hemorrhage  - Monitor labs  - Administer supportive blood products/factors as ordered and appropriate  Outcome: Progressing     Problem: MUSCULOSKELETAL - ADULT  Goal: Maintain or return mobility to safest level of function  Description: INTERVENTIONS:  - Assess patient's ability to carry out ADLs; assess patient's baseline for ADL function and identify physical deficits which impact ability to perform ADLs (bathing, care of mouth/teeth, toileting, grooming, dressing, etc.)  - Assess/evaluate cause of self-care deficits   - Assess range of motion  - Assess patient's mobility  - Assess patient's need for assistive devices and provide as appropriate  - Encourage maximum independence but intervene and supervise when necessary  - Involve family in performance of ADLs  - Assess for home care needs following discharge   - Consider OT consult to assist with ADL evaluation and planning for discharge  - Provide patient education as appropriate  Outcome: Progressing  Goal: Maintain proper alignment of affected body part  Description: INTERVENTIONS:  - Support, maintain and protect limb and body alignment  - Provide patient/ family with appropriate education  Outcome: Progressing

## 2023-09-20 NOTE — PHYSICAL THERAPY NOTE
PHYSICAL THERAPY NOTE          Patient Name: Vira Liu  MTLGE'X Date: 9/20/2023 09/20/23 1431   PT Last Visit   PT Visit Date 09/20/23   Note Type   Note Type Treatment   Pain Assessment   Pain Assessment Tool 0-10   Pain Score 8   Pain Location/Orientation Location: Back   Restrictions/Precautions   Weight Bearing Precautions Per Order No   Other Precautions   (Telemetry; Fall Risk; Pain)   General   Family/Caregiver Present No   Cognition   Overall Cognitive Status WFL   Arousal/Participation Alert; Cooperative   Attention Within functional limits   Orientation Level Oriented X4   Following Commands Follows all commands and directions without difficulty   Subjective   Subjective Reports she feels steadier with a RW. Bed Mobility   Supine to Sit 5  Supervision   Additional items HOB elevated; Bedrails; Increased time required   Additional Comments Increased time to transition to EOB. Sat EOB with good sitting balance. No c/o SOB or dizziness. Transfers   Sit to Stand 5  Supervision   Additional items Increased time required   Stand to Sit 5  Supervision   Additional items Increased time required   Additional Comments RW used   Ambulation/Elevation   Gait pattern   (Excessively slow; Short stride; Foward flexed; Decreased foot clearance; Narrow EMERY)   Gait Assistance 5  Supervision   Additional items Verbal cues   Assistive Device Rolling walker   Distance 60' x 1, 25' x 1  (seated rest break)   Balance   Static Sitting Good   Dynamic Sitting Fair +   Static Standing Fair   Dynamic Standing Fair   Ambulatory Fair -  (RW)   Endurance Deficit   Endurance Deficit Yes   Activity Tolerance   Activity Tolerance Patient limited by fatigue;Patient limited by pain   Assessment   Prognosis Good   Problem List   (Decreased strength; Decreased endurance;  Impaired balance; Decreased mobility; Pain)   Assessment Pt. seen for PT treatment session this date with interventions consisting of  bed mobility, transfers and  gait training w/ emphasis on improving pt's ability to ambulate. Pt. Requiring occasional cues for sequence and safety. In comparison to previous session, Pt. With improvements in activity tolerance. Recommend RW for home. Pt is in need of continued activity in PT to improve strength balance endurance mobility transfers and ambulation with return to maximize LOF. From PT/mobility standpoint, recommendation at time of d/c would be HHPT in order to promote return to PLOF and independence. The patient's AM-PAC Basic Mobility Inpatient Short Form Raw Score is 20. A Raw score of greater than 16 suggests the patient may benefit from discharge to home. Please also refer to physical therapy recommendation for safe DC planning. Goals   LTG Expiration Date 10/03/23   PT Treatment Day 1   Plan   Treatment/Interventions   (Functional transfer training; LE strengthening/ROM; Therapeutic exercise; Elevations; Endurance training; Bed mobility; Gait training)   Progress Progressing toward goals   PT Frequency 3-5x/wk   Recommendation   PT Discharge Recommendation Home with home health rehabilitation   [address] in Flat Bed Without Bedrails 4   Lying on Back to Sitting on Edge of Flat Bed Without Bedrails 3   Moving Bed to Chair 3   Standing Up From Chair Using Arms 4   Walk in Room 3   Climb 3-5 Stairs With Railing 3   Basic Mobility Inpatient Raw Score 20   Basic Mobility Standardized Score 43.99   Highest Level Of Mobility   JH-HLM Goal 6: Walk 10 steps or more   JH-HLM Achieved 7: Walk 25 feet or more   Education   Education Provided Mobility training   Patient Demonstrates verbal understanding   End of Consult   Patient Position at End of Consult Bedside chair; All needs within reach   End of Consult Comments discussed POC with PT

## 2023-09-20 NOTE — PROGRESS NOTES
[address]  Progress Note  Name: Bria Fernández  MRN: [id number]  Unit/Bed#: 514-12 I Date of Admission: 9/18/2023   Date of Service: 9/20/2023 I Hospital Day: 2    Assessment/Plan   * Dehydration with hyponatremia  Assessment & Plan  Renal function improved, DC IVF and fluid restrict   Check urine osmo and urine Na  nephro consult appreciated   Na level improved and regressed, will continue fluid restriction and monitor labs closely     Portal vein thrombosis  Assessment & Plan  Will likely require anticoagulation , but will defer to hematology     Complicated UTI (urinary tract infection)  Assessment & Plan  DC zosyn and utilize ceftriaxone   Follow up urine and blood culture     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 female MRN: [id number]    Unit/Bed#: 407-01 Encounter: [id number]        Subjective:   Patient seen and examined, feeling better in terms of abdominal distention, able to tolerate more PO solids     Objective:     Vitals:   Vitals:    09/20/23 0623   BP: 114/60   Pulse: 99   Resp: 15   Temp: 97.8 °F (36.6 °C)   SpO2: 99%     Body mass index is 23.33 kg/m².     Intake/Output Summary (Last 24 hours) at 9/20/2023 0842  Last data filed at 9/19/2023 1300  Gross per 24 hour   Intake 60 ml   Output --   Net 60 ml       Physical Exam:   /60 (BP Location: Right arm)   Pulse 99   Temp 97.8 °F (36.6 °C) (Oral)   Resp 15   Ht 4' 11" (1.499 m)   Wt 52.4 kg (115 lb 8.3 oz)   LMP  (LMP Unknown)   SpO2 99%   BMI 23.33 kg/m²   General appearance: alert and oriented, in no acute distress  Head: Normocephalic, without obvious abnormality, atraumatic  Lungs: clear to auscultation bilaterally  Heart: regular rate and rhythm, S1, S2 normal, no murmur, click, rub or gallop  Abdomen: mild tenderness, improving ascites  Extremities: extremities normal, warm and well-perfused; no cyanosis, clubbing, or edema  Pulses: 2+ and symmetric  Neurologic: Grossly normal     Invasive Devices     Central Venous Catheter Line  Duration           Port A Cath 05/18/22 Right Chest 490 days          Drain  Duration           Ureteral Internal Stent Right ureter 6 Fr. 142 days                Results from last 7 days   Lab Units 09/20/23  0511 09/18/23  1551   WBC Thousand/uL 12.55* 15.78*   HEMOGLOBIN g/dL 8.7* 10.3*   HEMATOCRIT % 27.7* 32.8*   PLATELETS Thousands/uL 143* 168       Results from last 7 days   Lab Units 09/20/23  0511 09/20/23  0039 09/19/23  1624 09/19/23  1433 09/19/23  0514 09/18/23  1702 09/18/23  1551   POTASSIUM mmol/L 4.0 3.1* 4.2   < > 4.0   < > 4.0   CHLORIDE mmol/L 88* 98 88*   < > 89*   < > 84*   CO2 mmol/L 26 20* 23   < > 24   < > 25   BUN mg/dL 22 21 28*   < > 30*   < > 34*   CREATININE mg/dL 0.66 0.59* 0.96   < > 0.91   < > 1.05   CALCIUM mg/dL 8.7 6.7* 8.6   < > 8.7   < > 9.1   ALK PHOS U/L 188*  --   --   --  131*  --  139*   ALT U/L 15  --   --   --  10  --  11   AST U/L 39  --   --   --  21  --  23    < > = values in this interval not displayed.        Medication Administration - last 24 hours from 09/19/2023 0842 to 09/20/2023 5972       Date/Time Order Dose Route Action Action by     09/19/2023 2131 EDT Diclofenac Sodium (VOLTAREN) 1 % topical gel 2 g 2 g Topical Given Tiffany Walker, RN     09/19/2023 1800 EDT Diclofenac Sodium (VOLTAREN) 1 % topical gel 2 g 2 g Topical Given Leeta Lantariq, PERI     09/19/2023 1238 EDT Diclofenac Sodium (VOLTAREN) 1 % topical gel 2 g 2 g Topical Given Tiffany Walker, PERI     09/19/2023 1021 EDT Diclofenac Sodium (VOLTAREN) 1 % topical gel 2 g 2 g Topical Given Viji Jefferson RN     09/19/2023 2130 EDT gabapentin (NEURONTIN) capsule 200 mg 200 mg Oral Given Tiffany Walker RN     09/19/2023 1606 EDT gabapentin (NEURONTIN) capsule 200 mg -- Oral Not Given Tiffany Walker RN     09/19/2023 0900 EDT gabapentin (NEURONTIN) capsule 200 mg 200 mg Oral Not Given Clifton Solorzano RN     09/20/2023 0505 EDT pantoprazole (PROTONIX) EC tablet 40 mg 40 mg Oral Given Betsy Nichols RN     09/19/2023 0900 EDT polyethylene glycol (MIRALAX) packet 17 g 17 g Oral Not Given Clifton Solorzano RN     09/19/2023 0900 EDT enoxaparin (LOVENOX) subcutaneous injection 40 mg 40 mg Subcutaneous Not Given Clifton Solorzano RN     09/19/2023 1022 EDT cefTRIAXone (ROCEPHIN) IVPB (premix in dextrose) 1,000 mg 50 mL 1,000 mg Intravenous [address] [facility], [address]     09/20/2023 0040 EDT HYDROmorphone (DILAUDID) injection 1 mg 1 mg Intravenous Given Nat Lr RN     09/19/2023 2131 EDT oxyCODONE (ROXICODONE) immediate release tablet 10 mg 10 mg Oral Given Hiwot Cedeño RN     09/20/2023 0040 EDT albumin human (FLEXBUMIN) 25 % injection 25 g 0 g Intravenous Stopped Nat Lr RN     09/19/2023 1307 EDT albumin human (FLEXBUMIN) 25 % injection 25 g 25 g Intravenous New Bag Hiwot Cedeño RN            Lab, Imaging and other studies: I have personally reviewed pertinent reports.     VTE Pharmacologic Prophylaxis: lovenox  VTE Mechanical Prophylaxis: sequential compression device     Janet Khan MD  9/20/2023,8:42 AM

## 2023-09-20 NOTE — CONSULTS
9/20/2023 2:30 PM -  Late entry for consult consideration on 9/19. Discussed case with attending provider Dr. Kim Camilo. Pt well known to me from my clinic. She has been medically appropriate for hospice for several months, and was referred for this from my clinic. Pt now hospitalized with multiple elements consistent with disease progression, and is agreeable to hospice conversation. Primary team and CM have referred to [facility] hospice liaison. Will await their advice and outcomes of their conversation. Will defer Palliative and Supportive Care consultation at this moment. Our team available by [employer] for urgent symptom needs. Gary Carlin MD  Palliative and Supportive Care  Clinic/Answering Service: [phone]  You can find me on [employer]!

## 2023-09-20 NOTE — CASE MANAGEMENT
Case Management Discharge Planning Note    Patient name Rolando Howe  Location [facility] [id number] MRN [id number]  : 1955 Date 2023       Current Admission Date: 2023  Current Admission Diagnosis:Dehydration with hyponatremia   Patient Active Problem List    Diagnosis Date Noted   • Portal vein thrombosis 2023   • Generalized weakness    • Complicated UTI (urinary tract infection) 2023   • Anemia, chronic disease 2023   • Malignant ascites 2023   • Drug induced constipation 2023   • Chronic pain syndrome 2023   • Neoplasm of uncertain behavior of bone and articular cartilage 2023   • Right leg pain 2023   • DNR (do not resuscitate) discussion 2023   • Chemotherapy-induced thrombocytopenia 2023   • Insomnia due to other mental disorder 09/15/2022   • Stage 3 chronic kidney disease, unspecified whether stage 3a or 3b CKD ([address]) 2022   • Meralgia paraesthetica, right 2022   • Dyslipidemia 2022   • Post-menopause 2022   • Major depressive disorder, single episode, mild ([address]) 2022   • Cervical spondylosis 2022   • Continuous opioid dependence ([address]) 2022   • Lumbar disc disease with radiculopathy 2022   • Chronic obstructive pulmonary disease, unspecified COPD type ([address]) 2022   • Hematuria 2022   • Calculus of gallbladder without cholecystitis without obstruction 2022   • Chronic constipation 2022   • Primary generalized (osteo)arthritis 2021   • Xerostomia 2021   • Encounter for immunization 2021   • Sensorineural hearing loss (SNHL) of both ears 2021   • Multiple lung nodules 2021   • Encounter for venous access device care 2021   • Dyspnea on exertion 2021   • Thyroid lesion 2021   • Chemotherapy induced neutropenia ([address]) 06/10/2021   • Pain in thoracic spine 2021   • Urgency of urination 2021   • Chemotherapy-induced nausea 05/04/2021   • 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)    • Hypokalemia 03/02/2021   • Dehydration with hyponatremia 02/26/2021   • Encounter for screening involving social determinants of health (SDoH) 02/24/2021   • Hydronephrosis 02/04/2021   • BMI 29.0-29.9,adult 01/05/2021   • Nausea 01/05/2021   • Right hip pain 01/05/2021   • Tinnitus of left ear 02/19/2020   • Left ear pain 02/19/2020   • Anxiety and depression 10/02/2019   • Zaldivar's neuroma, left 07/12/2018   • 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[address]) 04/11/2017      LOS (days): 2  Geometric Mean LOS (GMLOS) (days): 2.60  Days to GMLOS:0.8     OBJECTIVE:  Risk of Unplanned Readmission Score: 28.06         Current admission status: Inpatient   Preferred Pharmacy:   [id number] [place] [address] [address] [facility]  [address] [facility] [address]  Phone: [phone] Fax: [address], [place] - [address]  Phone: [phone] Fax: [phone]    [address] [place], [place] - [address] [facility],  [address] [facility],  [address] [address]  Phone: [phone] Fax: [phone]    Primary Care Provider: Rosanne Land DO    Primary Insurance: MEDICARE  Secondary Insurance:     DISCHARGE DETAILS:  Brett Nieves with [address] [facility] [address] will be over tomorrow around 10am for hospice chat.

## 2023-09-20 NOTE — HOSPICE NOTE
Received referral, patient was approved for [facility] hospice (at home or SNF). I will be meeting with patient tomorrow at 10am to review services with her. Dillon Carballo updated.

## 2023-09-20 NOTE — Clinical Note
Chalo Martin 75 yo female  4.26. currently at [facility]. Hx Stage IV cervical cancer whore presented to ED with weakness. CT abdomen/pelvis with IV contrast was remarkable for worsening metastatic disease and development of extensive abdominal pelvic ascites likely due to peritoneal carcinomatosis. Alk phos 188. . albumin 2.9. Tolerating PO Oxycodone for pain control. PPS 40-50. Approve RLOC?

## 2023-09-20 NOTE — ASSESSMENT & PLAN NOTE
Renal function improved, DC IVF and fluid restrict   Check urine osmo and urine Na  nephro consult appreciated   Na level improved and regressed, will continue fluid restriction and monitor labs closely

## 2023-09-20 NOTE — PLAN OF CARE
Problem: PHYSICAL THERAPY ADULT  Goal: Performs mobility at highest level of function for planned discharge setting. See evaluation for individualized goals. Description: Treatment/Interventions: Functional transfer training, LE strengthening/ROM, Therapeutic exercise, Elevations, Endurance training, Bed mobility, Gait training          See flowsheet documentation for full assessment, interventions and recommendations. Outcome: Progressing  Note: Prognosis: Good  Problem List:  (Decreased strength; Decreased endurance; Impaired balance; Decreased mobility; Pain)  Assessment: Pt. seen for PT treatment session this date with interventions consisting of  bed mobility, transfers and  gait training w/ emphasis on improving pt's ability to ambulate. Pt. Requiring occasional cues for sequence and safety. In comparison to previous session, Pt. With improvements in activity tolerance. Recommend RW for home. Pt is in need of continued activity in PT to improve strength balance endurance mobility transfers and ambulation with return to maximize LOF. From PT/mobility standpoint, recommendation at time of d/c would be HHPT in order to promote return to PLOF and independence. The patient's AM-PAC Basic Mobility Inpatient Short Form Raw Score is 20. A Raw score of greater than 16 suggests the patient may benefit from discharge to home. Please also refer to physical therapy recommendation for safe DC planning. PT Discharge Recommendation: Home with home health rehabilitation    See flowsheet documentation for full assessment.

## 2023-09-20 NOTE — ACP (ADVANCE CARE PLANNING)
Advanced Care Planning Progress Note    Serious Illness Conversation    1. What is your understanding now of where you are with your illness? Prognostic Understanding: appropriate understanding of prognosis     2. How much information about what is likely to be ahead with your illness would you like to have? Information: patient wants to be fully informed  Wishes to discuss only with lisa (lisa updated)     3. What did you (clinician) communicate to the patient? Prognostic Communication: Time - I wish we were not in this situation, but I am worried that time may be as short as 1-2 months     4. If your health situation worsens, what are your most important goals? Goals: be at home, not be a burden     5. What are the biggest fears and worries about the future and your health? Fears/Worries: emotional concerns, loss of control     6. What abilities are so critical to your life that you cannot imagine living without them? Unacceptable Function: being in chronic severe pain, being in pain or very uncomfortable     7. What gives you strength as you think about the future with your illness? Patient gets tearful when asked this, she understands she's dying and at times feels alone      8. If you become sicker, how much are you willing to go through for the possibility of gaining more time? Be in the hospital: No Have a feeding tube: No   Be in the ICU: No Live in a nursing home: No   Be on a ventilator: No Be uncomfortable: No   Be on dialysis: No Undergo aggressive test and/or procedures: No   9. How much does your proxy and family know about your priorities and wishes? Discussion Discussion: extensive discussion with family about goals and wishes     Bernda Ferreira heard you say that being at home, and being at peace is really important to you. Keeping that in mind, and what we know about your illness, I recommend that we meet with palliative medicine and hospice.  This will help us make sure that your treatment plans reflect what’s important to you. How does this plan sound to you? I will do everything I can to help you through this.   Patient verbalized understanding of the plan     I have spent 35 minutes speaking with my patient (and her daughter) on advanced care planning today or during this visit     Advanced directives  Five Wishes: Patient has Five Wishes, not in chart         Bharath Mandel MD

## 2023-09-20 NOTE — PROGRESS NOTES
Progress Note - Nephrology   Ghazala White 76 y.o. female MRN: [id number]  Unit/Bed#: 407-01 Encounter: [id number]    A/P:  1. Acute hyponatremia              Patient's urine osmolality noted to be 655 mmol/KG, urine sodium next day was at 14 mmol/L with a urine creatinine of 81 mg/dL. As noted yesterday, the patient received volume expansion initially at presentation which helped to improve creatinine, as well as slightly improved serum sodium level which remained at 122-120 mmol/L. Overnight, there was improvement in serum sodium with a fluid restriction of 125 mmol/L, unfortunately, this morning it is back down to 120 mmol/L. Repeat blood work is currently pending, however, we will most likely initiate tolvaptan, low-dose at 7.5 mg and see how this assists with water excretion. I do not believe the patient at this point is volume depleted. 2.  Probable SIADH              As noted above, will initiate low-dose tolvaptan at 7.5 mg one-time dose, continue to monitor serum sodium levels, will relax fluid restriction up to 1.8 L for now. 3.   Acute kidney injury due to volume depletion, present on admission   Creatinine appears to be about her baseline between 0.6-0.7 mg/dL. Continue to monitor for now. 4.  Volume depletion              Creatinine is now down to 0.66 mg/dL, as noted above she was noted to have kidney injury at presentation due to volume depletion. Patient now appropriately resuscitated, and clinically appears to be euvolemic. 5.  Hypomagnesemia              Continue to monitor magnesium levels, supplement as indicated. 6.  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 7.  New portal vein thrombosis              Follow recommendations from hematology, unclear if patient is a candidate for anticoagulation at this time.   8.  Chronic pain associated with metastatic cancer   Continue care according to hospitalist.    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)   • Chemotherapy-induced nausea   • Urgency of urination   • Pain in thoracic spine   • Chemotherapy induced neutropenia (HCC)   • Thyroid lesion   • Dyspnea on exertion   • Encounter for venous access device care   • Multiple lung nodules   • Primary generalized (osteo)arthritis   • Xerostomia   • Encounter for immunization   • Sensorineural hearing loss (SNHL) of both ears   • Chronic obstructive pulmonary disease, unspecified COPD type ([address])   • Hematuria   • Calculus of gallbladder without cholecystitis without obstruction   • Chronic constipation   • Continuous opioid dependence (HCC)   • Lumbar disc disease with radiculopathy   • Cervical spondylosis   • Stage 3 chronic kidney disease, unspecified whether stage 3a or 3b CKD (HCC)   • Meralgia paraesthetica, right   • Dyslipidemia   • Post-menopause   • Major depressive disorder, single episode, mild (HCC)   • Insomnia due to other mental disorder   • Chemotherapy-induced thrombocytopenia   • DNR (do not resuscitate) discussion   • Neoplasm of uncertain behavior of bone and articular cartilage   • Right leg pain   • Chronic pain syndrome   • Drug induced constipation   • Generalized weakness   • Complicated UTI (urinary tract infection)   • Anemia, chronic disease   • Malignant ascites   • Portal vein thrombosis         Subjective:   No acute issues, feeling relief s/p paracentesis yesterday. Objective:     Vitals: Blood pressure 114/60, pulse 99, temperature 97.8 °F (36.6 °C), resp. rate 15, height 4' 11" (1.499 m), weight 52.4 kg (115 lb 8.3 oz), SpO2 99 %, not currently breastfeeding. ,Body mass index is 23.33 kg/m². Weight (last 2 days)     Date/Time Weight    09/20/23 0700 52.4 (115.52)    09/19/23 0600 53.8 (118.61)    09/18/23 2211 53.8 (118.61)    09/18/23 20:03:53 53.8 (118.61)            Intake/Output Summary (Last 24 hours) at 9/20/2023 0822  Last data filed at 9/19/2023 1300  Gross per 24 hour   Intake 60 ml   Output --   Net 60 ml     I/O last 3 completed shifts: In: 5 [P.O.:540]  Out: 125 [Urine:125]         Physical Exam: /60   Pulse 99   Temp 97.8 °F (36.6 °C)   Resp 15   Ht 4' 11" (1.499 m)   Wt 52.4 kg (115 lb 8.3 oz)   LMP  (LMP Unknown)   SpO2 99%   BMI 23.33 kg/m²     General Appearance:    Alert, cooperative, no distress, appears stated age   Head:    Normocephalic, without obvious abnormality, atraumatic   Eyes:    Conjunctiva/corneas clear   Ears:    Normal external ears   Nose:   Nares normal, septum midline, mucosa normal, no drainage    or sinus tenderness   Throat:   Lips, mucosa, and tongue normal; teeth and gums normal   Neck:   Supple   Back:     Symmetric, no curvature, ROM normal, no CVA tenderness   Lungs:     Clear to auscultation bilaterally, respirations unlabored   Chest wall:    No tenderness or deformity   Heart:    Regular rate and rhythm, S1 and S2 normal, no murmur, rub   or gallop   Abdomen:     Soft, non-tender, bowel sounds active   Extremities:   Extremities normal, atraumatic, no cyanosis or edema   Skin:   Skin color, texture, turgor normal, no rashes or lesions   Lymph nodes:   Cervical normal   Neurologic:   CNII-XII intact            Lab, Imaging and other studies: I have personally reviewed pertinent labs.   CBC:   Lab Results   Component Value Date    WBC 12.55 (H) 09/20/2023    HGB 8.7 (L) 09/20/2023    HCT 27.7 (L) 09/20/2023    MCV 85 09/20/2023     (L) 09/20/2023    RBC 3.27 (L) 09/20/2023    MCH 26.6 (L) 09/20/2023    MCHC 31.4 09/20/2023    RDW 17.5 (H) 09/20/2023    MPV 10.1 09/20/2023    NRBC 0 09/20/2023     CMP:   Lab Results   Component Value Date    K 4.0 09/20/2023    CL 88 (L) 09/20/2023    CO2 26 09/20/2023    BUN 22 09/20/2023    CREATININE 0.66 09/20/2023    CALCIUM 8.7 09/20/2023    AST 39 09/20/2023    ALT 15 09/20/2023    ALKPHOS 188 (H) 09/20/2023    EGFR 91 09/20/2023       . Results from last 7 days   Lab Units 09/20/23  0511 09/20/23  0039 09/19/23  1624 09/19/23  1433 09/19/23  0514 09/18/23  1702 09/18/23  1551   POTASSIUM mmol/L 4.0 3.1* 4.2   < > 4.0   < > 4.0   CHLORIDE mmol/L 88* 98 88*   < > 89*   < > 84*   CO2 mmol/L 26 20* 23   < > 24   < > 25   BUN mg/dL 22 21 28*   < > 30*   < > 34*   CREATININE mg/dL 0.66 0.59* 0.96   < > 0.91   < > 1.05   CALCIUM mg/dL 8.7 6.7* 8.6   < > 8.7   < > 9.1   ALK PHOS U/L 188*  --   --   --  131*  --  139*   ALT U/L 15  --   --   --  10  --  11   AST U/L 39  --   --   --  21  --  23    < > = values in this interval not displayed. Phosphorus: No results found for: "PHOS"  Magnesium:   Lab Results   Component Value Date    MG 2.1 09/20/2023     Urinalysis: No results found for: "COLORU", "CLARITYU", "Jerlean Crease", "PHUR", "LEUKOCYTESUR", "NITRITE", "Nyra Colbert", "GLUCOSEU", "Danton Held", "Avalon Rily", "BLOODU"  Ionized Calcium: No results found for: "CAION"  Coagulation:   Lab Results   Component Value Date    INR 1.32 (H) 09/20/2023     Troponin: No results found for: "TROPONINI"  ABG: No results found for: "PHART", "ADO6FMT", "PO2ART", "ZPE4DZP", "N3JPDXMQ", "BEART", "SOURCE"  Radiology review:     IMAGING  Procedure: CT abdomen pelvis with contrast    Result Date: 9/18/2023  Narrative: CT ABDOMEN AND PELVIS WITH IV CONTRAST INDICATION:   Nausea and vomiting, worsening abdominal pain with possible distention.   History of metastatic cervical cancer. COMPARISON: CT scan 3/16/2023. TECHNIQUE:  CT examination of the abdomen and pelvis was performed. Multiplanar 2D reformatted images were created from the source data. This examination, like all CT scans performed in the [facility], was performed utilizing techniques to minimize radiation dose exposure, including the use of iterative reconstruction and automated exposure control. Radiation dose length product (DLP) for this visit:  917.03 mGy-cm IV Contrast:  100 mL of iohexol (OMNIPAQUE) Enteric Contrast:  Enteric contrast was not administered. FINDINGS: ABDOMEN LOWER CHEST: Lung bases are clear. Stable hiatal hernia. LIVER/BILIARY TREE: Stable right lobe hemangioma measuring 1.5 cm. New mass in the caudate lobe of the liver consistent with metastatic disease measuring 4.0 x 2.5 cm. GALLBLADDER: No gallstones. No pericholecystic fluid SPLEEN:  Unremarkable. PANCREAS:  Unremarkable. ADRENAL GLANDS: Enlarging bilateral adrenal gland metastases measuring 4.7 cm on the right (previous 2.9 cm) and 4.4 cm on the left (previous 2.6 cm). KIDNEYS/URETERS: Right double-J stent in place without hydronephrosis. Stable right renal atrophy. STOMACH AND BOWEL: There is colonic diverticulosis without evidence of acute diverticulitis. Interval development of serosal implants along the ascending colon image 2/74 with severe wall thickening and near complete obstruction. Additional serosal implants are noted along the mesentery for example on image 2/78 in the right lower quadrant. APPENDIX:  No findings to suggest appendicitis. ABDOMINOPELVIC CAVITY: Interval development of extensive abdominal pelvic ascites. Stable omental caking in the anterior left abdomen image 2/80. Significant worsening of retroperitoneal adenopathy and soft tissue particularly at the level of the hasmukh hepatis and celiac axis consistent with worsening metastatic disease.  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 New saccular aneurysm of the distal abdominal aorta measuring 7 mm on image 2/84 possibly related to tumor invasion. PELVIS REPRODUCTIVE ORGANS: Surgical changes of prior hysterectomy. URINARY BLADDER: Distal aspect of the right double-J catheter stent noted. ABDOMINAL WALL/INGUINAL REGIONS:  Unremarkable. OSSEOUS STRUCTURES:  No acute fracture or destructive osseous lesion. Impression: Worsening metastatic disease with new caudate lobe liver mass measuring 4.0 x 2.5 cm. Enlarging bilateral adrenal gland metastases as described. Interval development of extensive abdominal pelvic ascites likely due to peritoneal carcinomatosis with multiple serosal implants noted as well as omental caking. Extensive soft tissue in the upper abdomen adjacent to the celiac axis in the hasmukh hepatis consistent with progression of metastatic disease with portal vein thrombosis and cavernous transformation. 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  Workstation performed: [id number]       Current Facility-Administered Medications   Medication Dose Route Frequency   • albuterol inhalation solution 2.5 mg  2.5 mg Nebulization Q4H PRN   • aluminum-magnesium hydroxide-simethicone (MAALOX) oral suspension 30 mL  30 mL Oral Q6H PRN   • bisacodyl (DULCOLAX) rectal suppository 10 mg  10 mg Rectal Daily PRN   • cefTRIAXone (ROCEPHIN) IVPB (premix in dextrose) 1,000 mg 50 mL  1,000 mg Intravenous Q24H   • Diclofenac Sodium (VOLTAREN) 1 % topical gel 2 g  2 g Topical 4x Daily   • enoxaparin (LOVENOX) subcutaneous injection 40 mg  40 mg Subcutaneous Daily   • gabapentin (NEURONTIN) capsule 200 mg  200 mg Oral TID   • HYDROmorphone (DILAUDID) injection 1 mg  1 mg Intravenous Q3H PRN   • LORazepam (ATIVAN) tablet 0.5 mg  0.5 mg Oral BID PRN   • ondansetron (ZOFRAN) injection 4 mg  4 mg Intravenous Q6H PRN   • oxyCODONE (ROXICODONE) immediate release tablet 10 mg  10 mg Oral Q4H PRN   • pantoprazole (PROTONIX) EC tablet 40 mg  40 mg Oral Early Morning   • polyethylene glycol (MIRALAX) packet 17 g  17 g Oral Daily     Medications Discontinued During This Encounter   Medication Reason   • piperacillin-tazobactam (ZOSYN) 3.375 g in sodium chloride 0.9 % 100 mL IVPB    • sodium chloride 0.9 % with KCl 20 mEq/L infusion (premix)    • oxyCODONE-acetaminophen (PERCOCET) 5-325 mg per tablet 2 tablet    • HYDROmorphone (DILAUDID) injection 0.5 mg        Nyla Draper, DO      This progress note was produced in part using a dictation device which may document imprecise wording from author's original intent.

## 2023-09-21 NOTE — PROGRESS NOTES
[address]  Progress Note  Name: Savi Julien  MRN: [id number]  Unit/Bed#: 455-07 I Date of Admission: 9/18/2023   Date of Service: 9/21/2023 I Hospital Day: 3    Assessment/Plan   * Dehydration with hyponatremia  Assessment & Plan  Renal function improved, DC IVF and fluid restrict   nephro consult appreciated  Patient received tolvaptan, will check a BMP now and tomorrow      Complicated UTI (urinary tract infection)  Assessment & Plan  DC zosyn and utilize ceftriaxone to complete 3 days    Malignant ascites  Assessment & Plan  Status post 3.5 L paracentesis    Malignant neoplasm of endocervix [facility])  Assessment & Plan  Patient will have a hospice evaluation today             Progress Note - Brett Mitchell 76 y.o. female MRN: [id number]    Unit/Bed#: 407-01 Encounter: [id number]        Subjective:   Patient seen and examined, states abdomen is more sore today  No other  Complaints   She's agreeable to hospice evaluation    Objective:     Vitals:   Vitals:    09/21/23 0641   BP: 109/65   Pulse: (!) 110   Resp: 15   Temp: 97.9 °F (36.6 °C)   SpO2: 99%     Body mass index is 22.53 kg/m².     Intake/Output Summary (Last 24 hours) at 9/21/2023 0907  Last data filed at 9/21/2023 0851  Gross per 24 hour   Intake 360 ml   Output --   Net 360 ml       Physical Exam:   /65   Pulse (!) 110   Temp 97.9 °F (36.6 °C)   Resp 15   Ht 4' 11" (1.499 m)   Wt 50.6 kg (111 lb 8.8 oz)   LMP  (LMP Unknown)   SpO2 99%   BMI 22.53 kg/m²   General appearance: alert and oriented, in no acute distress  Head: Normocephalic, without obvious abnormality, atraumatic  Lungs: clear to auscultation bilaterally  Heart: regular rate and rhythm, S1, S2 normal, no murmur, click, rub or gallop  Abdomen: mild distention  Extremities: extremities normal, warm and well-perfused; no cyanosis, clubbing, or edema  Pulses: 2+ and symmetric  Neurologic: Grossly normal     Invasive Devices     Central Venous Catheter Line Duration           Port A Cath 05/18/22 Right Chest 491 days          Drain  Duration           Ureteral Internal Stent Right ureter 6 Fr. 143 days                Results from last 7 days   Lab Units 09/21/23  0511 09/20/23  0511 09/18/23  1551   WBC Thousand/uL 11.35* 12.55* 15.78*   HEMOGLOBIN g/dL 9.3* 8.7* 10.3*   HEMATOCRIT % 29.4* 27.7* 32.8*   PLATELETS Thousands/uL 142* 143* 168       Results from last 7 days   Lab Units 09/20/23  2113 09/20/23  1711 09/20/23  1208 09/20/23  0511 09/19/23  1433 09/19/23  0514 09/18/23  1702 09/18/23  1551   POTASSIUM mmol/L 4.1 3.9 3.7 4.0   < > 4.0   < > 4.0   CHLORIDE mmol/L 88* 89* 86* 88*   < > 89*   < > 84*   CO2 mmol/L 23 23 23 26   < > 24   < > 25   BUN mg/dL 22 22 22 22   < > 30*   < > 34*   CREATININE mg/dL 0.80 0.83 0.68 0.66   < > 0.91   < > 1.05   CALCIUM mg/dL 8.5 9.0 8.8 8.7   < > 8.7   < > 9.1   ALK PHOS U/L  --   --   --  188*  --  131*  --  139*   ALT U/L  --   --   --  15  --  10  --  11   AST U/L  --   --   --  39  --  21  --  23    < > = values in this interval not displayed.        Medication Administration - last 24 hours from 09/20/2023 0907 to 09/21/2023 0174       Date/Time Order Dose Route Action Action by     09/21/2023 0820 EDT Diclofenac Sodium (VOLTAREN) 1 % topical gel 2 g 2 g Topical Given Juan Jose Yi, PERI     09/20/2023 2255 EDT Diclofenac Sodium (VOLTAREN) 1 % topical gel 2 g 2 g Topical Given Abigail Alcantar, PERI     09/20/2023 1737 EDT Diclofenac Sodium (VOLTAREN) 1 % topical gel 2 g 2 g Topical Given Marvin Mirza, LPN     57/72/0119 0164 EDT Diclofenac Sodium (VOLTAREN) 1 % topical gel 2 g 2 g Topical Not Given Marvin Section, LPN     52/65/0136 0703 EDT Diclofenac Sodium (VOLTAREN) 1 % topical gel 2 g 2 g Topical Not Given Marvin Section, LPN     54/36/1087 8565 EDT gabapentin (NEURONTIN) capsule 200 mg 200 mg Oral Given Juan Jose Yi RN     09/20/2023 2046 EDT gabapentin (NEURONTIN) capsule 200 mg 200 mg Oral Not Given Emely Turner, PERI     09/20/2023 1736 EDT gabapentin (NEURONTIN) capsule 200 mg 200 mg Oral Given Jammiejose Cornejo, REJI     05/14/0369 7850 EDT gabapentin (NEURONTIN) capsule 200 mg 200 mg Oral Given [facility], REJI     20/83/0355 5583 EDT pantoprazole (PROTONIX) EC tablet 40 mg 40 mg Oral Given Emely Turner RN     09/21/2023 0827 EDT polyethylene glycol (MIRALAX) packet 17 g 17 g Oral Not Given Sara Patton, PERI     09/20/2023 8630 EDT polyethylene glycol (MIRALAX) packet 17 g 17 g Oral Given Jammie Cornejo, REJI     90/38/0864 8543 EDT ondansetron (ZOFRAN) injection 4 mg 4 mg Intravenous Given Emely Turner RN     09/21/2023 0818 EDT enoxaparin (LOVENOX) subcutaneous injection 40 mg 40 mg Subcutaneous Given Sara Patton RN     09/20/2023 1624 EDT enoxaparin (LOVENOX) subcutaneous injection 40 mg 40 mg Subcutaneous Given Jammie Cornejo LPN     92/36/5605 1242 EDT cefTRIAXone (ROCEPHIN) IVPB (premix in dextrose) 1,000 mg 50 mL 1,000 mg Intravenous 150 [facility], PERI     09/20/2023 0401 EDT cefTRIAXone (ROCEPHIN) IVPB (premix in dextrose) 1,000 mg 50 mL 1,000 mg Intravenous [address] [place], [place]     65/45/6203 0534 EDT HYDROmorphone (DILAUDID) injection 1 mg 1 mg Intravenous Given Emely Turner RN     09/20/2023 6396 EDT tolvaptan (Samsca) split tablet 7.5 mg 7.5 mg Oral Given Jammie Cornejo LPN            Lab, Imaging and other studies: I have personally reviewed pertinent reports.     VTE Pharmacologic Prophylaxis: Enoxaparin (Lovenox)  VTE Mechanical Prophylaxis: sequential compression device     Naresh Otero MD  9/21/2023,9:07 AM

## 2023-09-21 NOTE — PROGRESS NOTES
Progress Note - Nephrology   Meenu Betancourt 76 y.o. female MRN: [id number]  Unit/Bed#: 407-01 Encounter: [id number]    A/P:  1. Hyponatremia, potentially acute on chronic. Sodium trends unchanged despite tolvaptan dose 0.5 mg on 9/20. Effective arterial volume is unclear. She was felt to be euvolemic yesterday so tolvaptan was prescribed. 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 She is tachycardic and reports dizziness. Recommend to recheck urine sodium and orthostatic vital signs to evaluate for volume depletion. If orthostatics were positive, would hold on further tolvaptan. Hypertonic saline could be considered. May also need a bolus of normal saline if orthostatics positive. 2.  Metastatic ovarian cancer with peritoneal carcinomatosis associate with malignant ascites. Hospice following patient. Patient is DNR. Continue palliative care follow-up. 3.  New portal vein thrombosis, recommendations per hematology. Unclear if patient is a anticoagulation candidate. 4. Volume Depletion, acute kidney injury, present on admission, resolved. Baseline creatinine 0.6 to 0.7 mg/dL. Check orthostatic vital signs. Follow up reason for today's visit:     Hyponatremia   Subjective:   Patient seen and examined today. Reported feeling dizzy. She reports diarrhea as well and was frustrated by her inability to get to the bathroom on her own. She was given MiraLAX and this caused the diarrhea. A complete 10 point review of systems was performed and is otherwise negative. Objective:     Vitals: Blood pressure 109/65, pulse (!) 110, temperature 97.9 °F (36.6 °C), resp. rate 15, height 4' 11" (1.499 m), weight 50.6 kg (111 lb 8.8 oz), SpO2 99 %, not currently breastfeeding. ,Body mass index is 22.53 kg/m².     Weight (last 2 days)     Date/Time Weight    09/21/23 0537 50.6 (111.55)    09/20/23 0700 52.4 (115.52) 09/19/23 0600 53.8 (118.61)            Intake/Output Summary (Last 24 hours) at 9/21/2023 1126  Last data filed at 9/21/2023 0851  Gross per 24 hour   Intake 360 ml   Output --   Net 360 ml     I/O last 3 completed shifts: In: 5 [P.O.:540]  Out: -          Physical Exam: /65   Pulse (!) 110   Temp 97.9 °F (36.6 °C)   Resp 15   Ht 4' 11" (1.499 m)   Wt 50.6 kg (111 lb 8.8 oz)   LMP  (LMP Unknown)   SpO2 99%   BMI 22.53 kg/m²     General Appearance:    Alert, cooperative, no distress, appears stated age   Head:    Normocephalic, without obvious abnormality, atraumatic   Eyes:    Conjunctiva/corneas clear   Ears:    Normal external ears   Nose:   Nares normal, septum midline, mucosa normal, no drainage    or sinus tenderness   Throat:   Lips, mucosa, and tongue normal; teeth and gums normal   Neck:    Back:      Lungs:     Clear to auscultation bilaterally, respirations unlabored   Chest wall:    No tenderness or deformity   Heart:    Regular rate and rhythm, S1 and S2 normal, no murmur, rub   or gallop   Abdomen:     Soft, non-tender, bowel sounds active   Extremities:   Mild edema BL LE    Skin:   Skin color, texture, turgor normal, no rashes or lesions   Lymph nodes:   Cervical normal   Neurologic:   CNII-XII intact            Lab, Imaging and other studies: I have personally reviewed pertinent labs. CBC:   Lab Results   Component Value Date    WBC 11.35 (H) 09/21/2023    HGB 9.3 (L) 09/21/2023    HCT 29.4 (L) 09/21/2023    MCV 84 09/21/2023     (L) 09/21/2023    RBC 3.49 (L) 09/21/2023    MCH 26.6 (L) 09/21/2023    MCHC 31.6 09/21/2023    RDW 17.5 (H) 09/21/2023    MPV 10.0 09/21/2023    NRBC 0 09/21/2023     CMP:   Lab Results   Component Value Date    K 4.6 09/21/2023    CL 87 (L) 09/21/2023    CO2 24 09/21/2023    BUN 22 09/21/2023    CREATININE 0.75 09/21/2023    CALCIUM 8.7 09/21/2023    EGFR 82 09/21/2023       .   Results from last 7 days   Lab Units 09/21/23  1047 09/20/23  2081 09/20/23  1711 09/20/23  1208 09/20/23  0511 09/19/23  1433 09/19/23  0514 09/18/23  1702 09/18/23  1551   POTASSIUM mmol/L 4.6 4.1 3.9   < > 4.0   < > 4.0   < > 4.0   CHLORIDE mmol/L 87* 88* 89*   < > 88*   < > 89*   < > 84*   CO2 mmol/L 24 23 23   < > 26   < > 24   < > 25   BUN mg/dL 22 22 22   < > 22   < > 30*   < > 34*   CREATININE mg/dL 0.75 0.80 0.83   < > 0.66   < > 0.91   < > 1.05   CALCIUM mg/dL 8.7 8.5 9.0   < > 8.7   < > 8.7   < > 9.1   ALK PHOS U/L  --   --   --   --  188*  --  131*  --  139*   ALT U/L  --   --   --   --  15  --  10  --  11   AST U/L  --   --   --   --  39  --  21  --  23    < > = values in this interval not displayed. Phosphorus: No results found for: "PHOS"  Magnesium: No results found for: "MG"  Urinalysis: No results found for: "COLORU", "CLARITYU", "[place]", "PHUR", "LEUKOCYTESUR", "NITRITE", "Luevenia ", "GLUCOSEU", "Soundra Null", "Belita Jenny", "BLOODU"  Ionized Calcium: No results found for: "CAION"  Coagulation: No results found for: "PT", "INR", "APTT"  Troponin: No results found for: "TROPONINI"  ABG: No results found for: "PHART", "XRA5TAZ", "PO2ART", "IES4XFM", "O2CLCXYW", "BEART", "SOURCE"  Radiology review:     IMAGING  Procedure: CT abdomen pelvis with contrast    Result Date: 9/18/2023  Narrative: CT ABDOMEN AND PELVIS WITH IV CONTRAST INDICATION:   Nausea and vomiting, worsening abdominal pain with possible distention. History of metastatic cervical cancer. COMPARISON: CT scan 3/16/2023. TECHNIQUE:  CT examination of the abdomen and pelvis was performed. Multiplanar 2D reformatted images were created from the source data. This examination, like all CT scans performed in the [facility], was performed utilizing techniques to minimize radiation dose exposure, including the use of iterative reconstruction and automated exposure control.  Radiation dose length product (DLP) for this visit:  917.03 mGy-cm IV Contrast:  100 mL of iohexol (OMNIPAQUE) Enteric Contrast:  Enteric contrast was not administered. FINDINGS: ABDOMEN LOWER CHEST: Lung bases are clear. Stable hiatal hernia. LIVER/BILIARY TREE: Stable right lobe hemangioma measuring 1.5 cm. New mass in the caudate lobe of the liver consistent with metastatic disease measuring 4.0 x 2.5 cm. GALLBLADDER: No gallstones. No pericholecystic fluid SPLEEN:  Unremarkable. PANCREAS:  Unremarkable. ADRENAL GLANDS: Enlarging bilateral adrenal gland metastases measuring 4.7 cm on the right (previous 2.9 cm) and 4.4 cm on the left (previous 2.6 cm). KIDNEYS/URETERS: Right double-J stent in place without hydronephrosis. Stable right renal atrophy. STOMACH AND BOWEL: There is colonic diverticulosis without evidence of acute diverticulitis. Interval development of serosal implants along the ascending colon image 2/74 with severe wall thickening and near complete obstruction. Additional serosal implants are noted along the mesentery for example on image 2/78 in the right lower quadrant. APPENDIX:  No findings to suggest appendicitis. ABDOMINOPELVIC CAVITY: Interval development of extensive abdominal pelvic ascites. Stable omental caking in the anterior left abdomen image 2/80. Significant worsening of retroperitoneal adenopathy and soft tissue particularly at the level of the hasmukh hepatis and celiac axis consistent with worsening metastatic disease. 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 New saccular aneurysm of the distal abdominal aorta measuring 7 mm on image 2/84 possibly related to tumor invasion. PELVIS REPRODUCTIVE ORGANS: Surgical changes of prior hysterectomy. URINARY BLADDER: Distal aspect of the right double-J catheter stent noted. ABDOMINAL WALL/INGUINAL REGIONS:  Unremarkable. OSSEOUS STRUCTURES:  No acute fracture or destructive osseous lesion. Impression: Worsening metastatic disease with new caudate lobe liver mass measuring 4.0 x 2.5 cm.  Enlarging bilateral adrenal gland metastases as described. Interval development of extensive abdominal pelvic ascites likely due to peritoneal carcinomatosis with multiple serosal implants noted as well as omental caking. Extensive soft tissue in the upper abdomen adjacent to the celiac axis in the hasmukh hepatis consistent with progression of metastatic disease with portal vein thrombosis and cavernous transformation. 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  Workstation performed: [id number]       Current Facility-Administered Medications   Medication Dose Route Frequency   • albuterol inhalation solution 2.5 mg  2.5 mg Nebulization Q4H PRN   • aluminum-magnesium hydroxide-simethicone (MAALOX) oral suspension 30 mL  30 mL Oral Q6H PRN   • bisacodyl (DULCOLAX) rectal suppository 10 mg  10 mg Rectal Daily PRN   • cefTRIAXone (ROCEPHIN) IVPB (premix in dextrose) 1,000 mg 50 mL  1,000 mg Intravenous Q24H   • Diclofenac Sodium (VOLTAREN) 1 % topical gel 2 g  2 g Topical 4x Daily   • enoxaparin (LOVENOX) subcutaneous injection 40 mg  40 mg Subcutaneous Daily   • gabapentin (NEURONTIN) capsule 200 mg  200 mg Oral TID   • HYDROmorphone (DILAUDID) injection 1 mg  1 mg Intravenous Q3H PRN   • LORazepam (ATIVAN) tablet 0.5 mg  0.5 mg Oral BID PRN   • ondansetron (ZOFRAN) injection 4 mg  4 mg Intravenous Q6H PRN   • oxyCODONE (ROXICODONE) immediate release tablet 10 mg  10 mg Oral Q4H PRN   • pantoprazole (PROTONIX) EC tablet 40 mg  40 mg Oral Early Morning   • polyethylene glycol (MIRALAX) packet 17 g  17 g Oral Daily     Medications Discontinued During This Encounter   Medication Reason   • piperacillin-tazobactam (ZOSYN) 3.375 g in sodium chloride 0.9 % 100 mL IVPB    • sodium chloride 0.9 % with KCl 20 mEq/L infusion (premix)    • oxyCODONE-acetaminophen (PERCOCET) 5-325 mg per tablet 2 tablet    • HYDROmorphone (DILAUDID) injection 0.5 mg        Reinier Talamantes, DO      This progress note was produced in part using a dictation device which may document imprecise wording from author's original intent.

## 2023-09-21 NOTE — CASE MANAGEMENT
Case Management Discharge Planning Note    Patient name Chalino Kendrick  Location [facility] 106/546-02 MRN [id number]  : 1955 Date 2023       Current Admission Date: 2023  Current Admission Diagnosis:Dehydration with hyponatremia   Patient Active Problem List    Diagnosis Date Noted   • Portal vein thrombosis 2023   • Generalized weakness    • Complicated UTI (urinary tract infection) 2023   • Anemia, chronic disease 2023   • Malignant ascites 2023   • Drug induced constipation 2023   • Chronic pain syndrome 2023   • Neoplasm of uncertain behavior of bone and articular cartilage 2023   • Right leg pain 2023   • DNR (do not resuscitate) discussion 2023   • Chemotherapy-induced thrombocytopenia 2023   • Insomnia due to other mental disorder 09/15/2022   • Stage 3 chronic kidney disease, unspecified whether stage 3a or 3b CKD ([address]) 2022   • Meralgia paraesthetica, right 2022   • Dyslipidemia 2022   • Post-menopause 2022   • Major depressive disorder, single episode, mild ([address]) 2022   • Cervical spondylosis 2022   • Continuous opioid dependence ([address]) 2022   • Lumbar disc disease with radiculopathy 2022   • Chronic obstructive pulmonary disease, unspecified COPD type ([address]) 2022   • Hematuria 2022   • Calculus of gallbladder without cholecystitis without obstruction 2022   • Chronic constipation 2022   • Primary generalized (osteo)arthritis 2021   • Xerostomia 2021   • Encounter for immunization 2021   • Sensorineural hearing loss (SNHL) of both ears 2021   • Multiple lung nodules 2021   • Encounter for venous access device care 2021   • Dyspnea on exertion 2021   • Thyroid lesion 2021   • Chemotherapy induced neutropenia ([address]) 06/10/2021   • Pain in thoracic spine 2021   • Urgency of urination 2021   • Chemotherapy-induced nausea 05/04/2021   • 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)    • Hypokalemia 03/02/2021   • Dehydration with hyponatremia 02/26/2021   • Encounter for screening involving social determinants of health (SDoH) 02/24/2021   • Hydronephrosis 02/04/2021   • BMI 29.0-29.9,adult 01/05/2021   • Nausea 01/05/2021   • Right hip pain 01/05/2021   • Tinnitus of left ear 02/19/2020   • Left ear pain 02/19/2020   • Anxiety and depression 10/02/2019   • Zaldivar's neuroma, left 07/12/2018   • 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[address]) 04/11/2017      LOS (days): 3  Geometric Mean LOS (GMLOS) (days): 3.80  Days to GMLOS:1.1     OBJECTIVE:  Risk of Unplanned Readmission Score: 28.16         Current admission status: Inpatient   Preferred Pharmacy:   [id number] [id number] [place] [address], [address] [place] [address]  Phone: [phone] Fax: [address], [place] - [address]  Phone: [phone] Fax: [phone] [address] [place], [place] - [address] [facility],  [address] [facility],  [facility] [address] [address]  Phone: [phone] Fax: [phone]    Primary Care Provider: Maryjo Groves DO    Primary Insurance: MEDICARE  Secondary Insurance:     DISCHARGE DETAILS:        Patient spoke with Hospice liaison and wants to go to inpatient facility for hospice care. CM discussed  FOC with patient  at bedside. Patient agreeable with blanket referrals in [place] for local facilities  to determine bed availability according to your medical needs and insurance coverage. Patient  Preference is  [facility] or [address] [place] [address] and rehab for Hospice care. Referrals placed in aidin.     Cm to follow

## 2023-09-21 NOTE — QUICK NOTE
Discussed care with RN, orthostatics reviewed and were impressively positive. She is dizzy and lightheaded and weak. Blood pressure 101/60 lying, 87/60 sitting, 75/49 standing. Would absolutely hold on further tolvaptan as this is contraindicated with orthostatic hypotension concerning for volume depletion. As I feel she is volume depleted, I will give her normal saline bolus 500 mL x 1. Can give isotonic saline along with other measures if patient also has concerns for volume depletion. She reported diarrhea this morning. We will need to follow chemistry closely with normal saline administration. We will follow every 6 hours for now until stabilized. Further recommendations ongoing, if her sodium drops low 120 will recommend hypertonic saline.

## 2023-09-21 NOTE — PLAN OF CARE
Problem: Potential for Falls  Goal: Patient will remain free of falls  Description: INTERVENTIONS:  - Educate patient/family on patient safety including physical limitations  - Instruct patient to call for assistance with activity   - Consult OT/PT to assist with strengthening/mobility   - Keep Call bell within reach  - Keep bed low and locked with side rails adjusted as appropriate  - Keep care items and personal belongings within reach  - Initiate and maintain comfort rounds  - Make Fall Risk Sign visible to staff  - Offer Toileting every 2 Hours, in advance of need  - Initiate/Maintain fall alarm  - Obtain necessary fall risk management equipment: non-skid footwear, call bell  - Apply yellow socks and bracelet for high fall risk patients  - Consider moving patient to room near nurses station  Outcome: Progressing     Problem: Nutrition/Hydration-ADULT  Goal: Nutrient/Hydration intake appropriate for improving, restoring or maintaining nutritional needs  Description: Monitor and assess patient's nutrition/hydration status for malnutrition. Collaborate with interdisciplinary team and initiate plan and interventions as ordered. Monitor patient's weight and dietary intake as ordered or per policy. Utilize nutrition screening tool and intervene as necessary. Determine patient's food preferences and provide high-protein, high-caloric foods as appropriate.      INTERVENTIONS:  - Monitor oral intake, urinary output, labs, and treatment plans  - Assess nutrition and hydration status and recommend course of action  - Evaluate amount of meals eaten  - Assist patient with eating if necessary   - Allow adequate time for meals  - Recommend/ encourage appropriate diets, oral nutritional supplements, and vitamin/mineral supplements  - Order, calculate, and assess calorie counts as needed  - Recommend, monitor, and adjust tube feedings and TPN/PPN based on assessed needs  - Assess need for intravenous fluids  - Provide specific nutrition/hydration education as appropriate  - Include patient/family/caregiver in decisions related to nutrition  Outcome: Progressing     Problem: MOBILITY - ADULT  Goal: Maintain or return to baseline ADL function  Description: INTERVENTIONS:  -  Assess patient's ability to carry out ADLs; assess patient's baseline for ADL function and identify physical deficits which impact ability to perform ADLs (bathing, care of mouth/teeth, toileting, grooming, dressing, etc.)  - Assess/evaluate cause of self-care deficits   - Assess range of motion  - Assess patient's mobility; develop plan if impaired  - Assess patient's need for assistive devices and provide as appropriate  - Encourage maximum independence but intervene and supervise when necessary  - Involve family in performance of ADLs  - Assess for home care needs following discharge   - Consider OT consult to assist with ADL evaluation and planning for discharge  - Provide patient education as appropriate  Outcome: Progressing  Goal: Maintains/Returns to pre admission functional level  Description: INTERVENTIONS:  - Perform BMAT or MOVE assessment daily.   - Set and communicate daily mobility goal to care team and patient/family/caregiver. - Collaborate with rehabilitation services on mobility goals if consulted  - Perform Range of Motion 3 times a day. - Reposition patient every 2 hours.   - Dangle patient 3 times a day  - Stand patient 3 times a day  - Ambulate patient 3 times a day  - Out of bed to chair 3 times a day   - Out of bed for meals 3 times a day  - Out of bed for toileting  - Record patient progress and toleration of activity level   Outcome: Progressing     Problem: Prexisting or High Potential for Compromised Skin Integrity  Goal: Skin integrity is maintained or improved  Description: INTERVENTIONS:  - Identify patients at risk for skin breakdown  - Assess and monitor skin integrity  - Assess and monitor nutrition and hydration status  - Monitor labs   - Assess for incontinence   - Turn and reposition patient  - Assist with mobility/ambulation  - Relieve pressure over bony prominences  - Avoid friction and shearing  - Provide appropriate hygiene as needed including keeping skin clean and dry  - Evaluate need for skin moisturizer/barrier cream  - Collaborate with interdisciplinary team   - Patient/family teaching  - Consider wound care consult   Outcome: Progressing     Problem: PAIN - ADULT  Goal: Verbalizes/displays adequate comfort level or baseline comfort level  Description: Interventions:  - Encourage patient to monitor pain and request assistance  - Assess pain using appropriate pain scale  - Administer analgesics based on type and severity of pain and evaluate response  - Implement non-pharmacological measures as appropriate and evaluate response  - Consider cultural and social influences on pain and pain management  - Notify physician/advanced practitioner if interventions unsuccessful or patient reports new pain  Outcome: Progressing     Problem: INFECTION - ADULT  Goal: Absence or prevention of progression during hospitalization  Description: INTERVENTIONS:  - Assess and monitor for signs and symptoms of infection  - Monitor lab/diagnostic results  - Monitor all insertion sites, i.e. indwelling lines, tubes, and drains  - Monitor endotracheal if appropriate and nasal secretions for changes in amount and color  - [employer] appropriate cooling/warming therapies per order  - Administer medications as ordered  - Instruct and encourage patient and family to use good hand hygiene technique  - Identify and instruct in appropriate isolation precautions for identified infection/condition  Outcome: Progressing  Goal: Absence of fever/infection during neutropenic period  Description: INTERVENTIONS:  - Monitor WBC    Outcome: Progressing     Problem: SAFETY ADULT  Goal: Patient will remain free of falls  Description: INTERVENTIONS:  - Educate patient/family on patient safety including physical limitations  - Instruct patient to call for assistance with activity   - Consult OT/PT to assist with strengthening/mobility   - Keep Call bell within reach  - Keep bed low and locked with side rails adjusted as appropriate  - Keep care items and personal belongings within reach  - Initiate and maintain comfort rounds  - Make Fall Risk Sign visible to staff  - Offer Toileting every 2 Hours, in advance of need  - Initiate/Maintain fall alarm  - Obtain necessary fall risk management equipment: non-skid footwear, call bell  - Apply yellow socks and bracelet for high fall risk patients  - Consider moving patient to room near nurses station  Outcome: Progressing  Goal: Maintain or return to baseline ADL function  Description: INTERVENTIONS:  -  Assess patient's ability to carry out ADLs; assess patient's baseline for ADL function and identify physical deficits which impact ability to perform ADLs (bathing, care of mouth/teeth, toileting, grooming, dressing, etc.)  - Assess/evaluate cause of self-care deficits   - Assess range of motion  - Assess patient's mobility; develop plan if impaired  - Assess patient's need for assistive devices and provide as appropriate  - Encourage maximum independence but intervene and supervise when necessary  - Involve family in performance of ADLs  - Assess for home care needs following discharge   - Consider OT consult to assist with ADL evaluation and planning for discharge  - Provide patient education as appropriate  Outcome: Progressing  Goal: Maintains/Returns to pre admission functional level  Description: INTERVENTIONS:  - Perform BMAT or MOVE assessment daily.   - Set and communicate daily mobility goal to care team and patient/family/caregiver. - Collaborate with rehabilitation services on mobility goals if consulted  - Perform Range of Motion 3 times a day. - Reposition patient every 2 hours.   - Dangle patient 3 times a day  - Stand patient 3 times a day  - Ambulate patient 3 times a day  - Out of bed to chair 3 times a day   - Out of bed for meals 3 times a day  - Out of bed for toileting  - Record patient progress and toleration of activity level   Outcome: Progressing     Problem: DISCHARGE PLANNING  Goal: Discharge to home or other facility with appropriate resources  Description: INTERVENTIONS:  - Identify barriers to discharge w/patient and caregiver  - Arrange for needed discharge resources and transportation as appropriate  - Identify discharge learning needs (meds, wound care, etc.)  - Arrange for interpretive services to assist at discharge as needed  - Refer to Case Management Department for coordinating discharge planning if the patient needs post-hospital services based on physician/advanced practitioner order or complex needs related to functional status, cognitive ability, or social support system  Outcome: Progressing     Problem: Knowledge Deficit  Goal: Patient/family/caregiver demonstrates understanding of disease process, treatment plan, medications, and discharge instructions  Description: Complete learning assessment and assess knowledge base.   Interventions:  - Provide teaching at level of understanding  - Provide teaching via preferred learning methods  Outcome: Progressing     Problem: CARDIOVASCULAR - ADULT  Goal: Maintains optimal cardiac output and hemodynamic stability  Description: INTERVENTIONS:  - Monitor I/O, vital signs and rhythm  - Monitor for S/S and trends of decreased cardiac output  - Administer and titrate ordered vasoactive medications to optimize hemodynamic stability  - Assess quality of pulses, skin color and temperature  - Assess for signs of decreased coronary artery perfusion  - Instruct patient to report change in severity of symptoms  Outcome: Progressing  Goal: Absence of cardiac dysrhythmias or at baseline rhythm  Description: INTERVENTIONS:  - Continuous cardiac monitoring, vital signs, obtain 12 lead EKG if ordered  - Administer antiarrhythmic and heart rate control medications as ordered  - Monitor electrolytes and administer replacement therapy as ordered  Outcome: Progressing     Problem: GASTROINTESTINAL - ADULT  Goal: Minimal or absence of nausea and/or vomiting  Description: INTERVENTIONS:  - Administer IV fluids if ordered to ensure adequate hydration  - Maintain NPO status until nausea and vomiting are resolved  - Nasogastric tube if ordered  - Administer ordered antiemetic medications as needed  - Provide nonpharmacologic comfort measures as appropriate  - Advance diet as tolerated, if ordered  - Consider nutrition services referral to assist patient with adequate nutrition and appropriate food choices  Outcome: Progressing  Goal: Maintains or returns to baseline bowel function  Description: INTERVENTIONS:  - Assess bowel function  - Encourage oral fluids to ensure adequate hydration  - Administer IV fluids if ordered to ensure adequate hydration  - Administer ordered medications as needed  - Encourage mobilization and activity  - Consider nutritional services referral to assist patient with adequate nutrition and appropriate food choices  Outcome: Progressing  Goal: Maintains adequate nutritional intake  Description: INTERVENTIONS:  - Monitor percentage of each meal consumed  - Identify factors contributing to decreased intake, treat as appropriate  - Assist with meals as needed  - Monitor I&O, weight, and lab values if indicated  - Obtain nutrition services referral as needed  Outcome: Progressing  Goal: Oral mucous membranes remain intact  Description: INTERVENTIONS  - Assess oral mucosa and hygiene practices  - Implement preventative oral hygiene regimen  - Implement oral medicated treatments as ordered  - Initiate Nutrition services referral as needed  Outcome: Progressing     Problem: METABOLIC, FLUID AND ELECTROLYTES - ADULT  Goal: Electrolytes maintained within normal limits  Description: INTERVENTIONS:  - Monitor labs and assess patient for signs and symptoms of electrolyte imbalances  - Administer electrolyte replacement as ordered  - Monitor response to electrolyte replacements, including repeat lab results as appropriate  - Instruct patient on fluid and nutrition as appropriate  Outcome: Progressing  Goal: Fluid balance maintained  Description: INTERVENTIONS:  - Monitor labs   - Monitor I/O and WT  - Instruct patient on fluid and nutrition as appropriate  - Assess for signs & symptoms of volume excess or deficit  Outcome: Progressing  Goal: Glucose maintained within target range  Description: INTERVENTIONS:  - Monitor Blood Glucose as ordered  - Assess for signs and symptoms of hyperglycemia and hypoglycemia  - Administer ordered medications to maintain glucose within target range  - Assess nutritional intake and initiate nutrition service referral as needed  Outcome: Progressing     Problem: HEMATOLOGIC - ADULT  Goal: Maintains hematologic stability  Description: INTERVENTIONS  - Assess for signs and symptoms of bleeding or hemorrhage  - Monitor labs  - Administer supportive blood products/factors as ordered and appropriate  Outcome: Progressing     Problem: MUSCULOSKELETAL - ADULT  Goal: Maintain or return mobility to safest level of function  Description: INTERVENTIONS:  - Assess patient's ability to carry out ADLs; assess patient's baseline for ADL function and identify physical deficits which impact ability to perform ADLs (bathing, care of mouth/teeth, toileting, grooming, dressing, etc.)  - Assess/evaluate cause of self-care deficits   - Assess range of motion  - Assess patient's mobility  - Assess patient's need for assistive devices and provide as appropriate  - Encourage maximum independence but intervene and supervise when necessary  - Involve family in performance of ADLs  - Assess for home care needs following discharge   - Consider OT consult to assist with ADL evaluation and planning for discharge  - Provide patient education as appropriate  Outcome: Progressing  Goal: Maintain proper alignment of affected body part  Description: INTERVENTIONS:  - Support, maintain and protect limb and body alignment  - Provide patient/ family with appropriate education  Outcome: Progressing

## 2023-09-21 NOTE — PLAN OF CARE
Problem: Potential for Falls  Goal: Patient will remain free of falls  Description: INTERVENTIONS:  - Educate patient/family on patient safety including physical limitations  - Instruct patient to call for assistance with activity   - Consult OT/PT to assist with strengthening/mobility   - Keep Call bell within reach  - Keep bed low and locked with side rails adjusted as appropriate  - Keep care items and personal belongings within reach  - Initiate and maintain comfort rounds  - Make Fall Risk Sign visible to staff  - Offer Toileting every 2 Hours, in advance of need  - Initiate/Maintain fall alarm  - Obtain necessary fall risk management equipment: non-skid footwear, call bell  - Apply yellow socks and bracelet for high fall risk patients  - Consider moving patient to room near nurses station  Outcome: Progressing     Problem: Nutrition/Hydration-ADULT  Goal: Nutrient/Hydration intake appropriate for improving, restoring or maintaining nutritional needs  Description: Monitor and assess patient's nutrition/hydration status for malnutrition. Collaborate with interdisciplinary team and initiate plan and interventions as ordered. Monitor patient's weight and dietary intake as ordered or per policy. Utilize nutrition screening tool and intervene as necessary. Determine patient's food preferences and provide high-protein, high-caloric foods as appropriate.      INTERVENTIONS:  - Monitor oral intake, urinary output, labs, and treatment plans  - Assess nutrition and hydration status and recommend course of action  - Evaluate amount of meals eaten  - Assist patient with eating if necessary   - Allow adequate time for meals  - Recommend/ encourage appropriate diets, oral nutritional supplements, and vitamin/mineral supplements  - Order, calculate, and assess calorie counts as needed  - Recommend, monitor, and adjust tube feedings and TPN/PPN based on assessed needs  - Assess need for intravenous fluids  - Provide specific nutrition/hydration education as appropriate  - Include patient/family/caregiver in decisions related to nutrition  Outcome: Progressing     Problem: MOBILITY - ADULT  Goal: Maintain or return to baseline ADL function  Description: INTERVENTIONS:  -  Assess patient's ability to carry out ADLs; assess patient's baseline for ADL function and identify physical deficits which impact ability to perform ADLs (bathing, care of mouth/teeth, toileting, grooming, dressing, etc.)  - Assess/evaluate cause of self-care deficits   - Assess range of motion  - Assess patient's mobility; develop plan if impaired  - Assess patient's need for assistive devices and provide as appropriate  - Encourage maximum independence but intervene and supervise when necessary  - Involve family in performance of ADLs  - Assess for home care needs following discharge   - Consider OT consult to assist with ADL evaluation and planning for discharge  - Provide patient education as appropriate  Outcome: Progressing  Goal: Maintains/Returns to pre admission functional level  Description: INTERVENTIONS:  - Perform BMAT or MOVE assessment daily.   - Set and communicate daily mobility goal to care team and patient/family/caregiver. - Collaborate with rehabilitation services on mobility goals if consulted  - Perform Range of Motion 3 times a day. - Reposition patient every 2 hours.   - Dangle patient 3 times a day  - Stand patient 3 times a day  - Ambulate patient 3 times a day  - Out of bed to chair 3 times a day   - Out of bed for meals 3 times a day  - Out of bed for toileting  - Record patient progress and toleration of activity level   Outcome: Progressing     Problem: Prexisting or High Potential for Compromised Skin Integrity  Goal: Skin integrity is maintained or improved  Description: INTERVENTIONS:  - Identify patients at risk for skin breakdown  - Assess and monitor skin integrity  - Assess and monitor nutrition and hydration status  - Monitor labs   - Assess for incontinence   - Turn and reposition patient  - Assist with mobility/ambulation  - Relieve pressure over bony prominences  - Avoid friction and shearing  - Provide appropriate hygiene as needed including keeping skin clean and dry  - Evaluate need for skin moisturizer/barrier cream  - Collaborate with interdisciplinary team   - Patient/family teaching  - Consider wound care consult   Outcome: Progressing     Problem: PAIN - ADULT  Goal: Verbalizes/displays adequate comfort level or baseline comfort level  Description: Interventions:  - Encourage patient to monitor pain and request assistance  - Assess pain using appropriate pain scale  - Administer analgesics based on type and severity of pain and evaluate response  - Implement non-pharmacological measures as appropriate and evaluate response  - Consider cultural and social influences on pain and pain management  - Notify physician/advanced practitioner if interventions unsuccessful or patient reports new pain  Outcome: Progressing     Problem: INFECTION - ADULT  Goal: Absence or prevention of progression during hospitalization  Description: INTERVENTIONS:  - Assess and monitor for signs and symptoms of infection  - Monitor lab/diagnostic results  - Monitor all insertion sites, i.e. indwelling lines, tubes, and drains  - Monitor endotracheal if appropriate and nasal secretions for changes in amount and color  - [place] appropriate cooling/warming therapies per order  - Administer medications as ordered  - Instruct and encourage patient and family to use good hand hygiene technique  - Identify and instruct in appropriate isolation precautions for identified infection/condition  Outcome: Progressing  Goal: Absence of fever/infection during neutropenic period  Description: INTERVENTIONS:  - Monitor WBC    Outcome: Progressing     Problem: SAFETY ADULT  Goal: Patient will remain free of falls  Description: INTERVENTIONS:  - Educate patient/family on patient safety including physical limitations  - Instruct patient to call for assistance with activity   - Consult OT/PT to assist with strengthening/mobility   - Keep Call bell within reach  - Keep bed low and locked with side rails adjusted as appropriate  - Keep care items and personal belongings within reach  - Initiate and maintain comfort rounds  - Make Fall Risk Sign visible to staff  - Offer Toileting every 2 Hours, in advance of need  - Initiate/Maintain fall alarm  - Obtain necessary fall risk management equipment: non-skid footwear, call bell  - Apply yellow socks and bracelet for high fall risk patients  - Consider moving patient to room near nurses station  Outcome: Progressing  Goal: Maintain or return to baseline ADL function  Description: INTERVENTIONS:  -  Assess patient's ability to carry out ADLs; assess patient's baseline for ADL function and identify physical deficits which impact ability to perform ADLs (bathing, care of mouth/teeth, toileting, grooming, dressing, etc.)  - Assess/evaluate cause of self-care deficits   - Assess range of motion  - Assess patient's mobility; develop plan if impaired  - Assess patient's need for assistive devices and provide as appropriate  - Encourage maximum independence but intervene and supervise when necessary  - Involve family in performance of ADLs  - Assess for home care needs following discharge   - Consider OT consult to assist with ADL evaluation and planning for discharge  - Provide patient education as appropriate  Outcome: Progressing  Goal: Maintains/Returns to pre admission functional level  Description: INTERVENTIONS:  - Perform BMAT or MOVE assessment daily.   - Set and communicate daily mobility goal to care team and patient/family/caregiver. - Collaborate with rehabilitation services on mobility goals if consulted  - Perform Range of Motion 3 times a day. - Reposition patient every 2 hours.   - Dangle patient 3 times a day  - Stand patient 3 times a day  - Ambulate patient 3 times a day  - Out of bed to chair 3 times a day   - Out of bed for meals 3 times a day  - Out of bed for toileting  - Record patient progress and toleration of activity level   Outcome: Progressing     Problem: DISCHARGE PLANNING  Goal: Discharge to home or other facility with appropriate resources  Description: INTERVENTIONS:  - Identify barriers to discharge w/patient and caregiver  - Arrange for needed discharge resources and transportation as appropriate  - Identify discharge learning needs (meds, wound care, etc.)  - Arrange for interpretive services to assist at discharge as needed  - Refer to Case Management Department for coordinating discharge planning if the patient needs post-hospital services based on physician/advanced practitioner order or complex needs related to functional status, cognitive ability, or social support system  Outcome: Progressing     Problem: Knowledge Deficit  Goal: Patient/family/caregiver demonstrates understanding of disease process, treatment plan, medications, and discharge instructions  Description: Complete learning assessment and assess knowledge base.   Interventions:  - Provide teaching at level of understanding  - Provide teaching via preferred learning methods  Outcome: Progressing     Problem: CARDIOVASCULAR - ADULT  Goal: Maintains optimal cardiac output and hemodynamic stability  Description: INTERVENTIONS:  - Monitor I/O, vital signs and rhythm  - Monitor for S/S and trends of decreased cardiac output  - Administer and titrate ordered vasoactive medications to optimize hemodynamic stability  - Assess quality of pulses, skin color and temperature  - Assess for signs of decreased coronary artery perfusion  - Instruct patient to report change in severity of symptoms  Outcome: Progressing  Goal: Absence of cardiac dysrhythmias or at baseline rhythm  Description: INTERVENTIONS:  - Continuous cardiac monitoring, vital signs, obtain 12 lead EKG if ordered  - Administer antiarrhythmic and heart rate control medications as ordered  - Monitor electrolytes and administer replacement therapy as ordered  Outcome: Progressing     Problem: GASTROINTESTINAL - ADULT  Goal: Minimal or absence of nausea and/or vomiting  Description: INTERVENTIONS:  - Administer IV fluids if ordered to ensure adequate hydration  - Maintain NPO status until nausea and vomiting are resolved  - Nasogastric tube if ordered  - Administer ordered antiemetic medications as needed  - Provide nonpharmacologic comfort measures as appropriate  - Advance diet as tolerated, if ordered  - Consider nutrition services referral to assist patient with adequate nutrition and appropriate food choices  Outcome: Progressing  Goal: Maintains or returns to baseline bowel function  Description: INTERVENTIONS:  - Assess bowel function  - Encourage oral fluids to ensure adequate hydration  - Administer IV fluids if ordered to ensure adequate hydration  - Administer ordered medications as needed  - Encourage mobilization and activity  - Consider nutritional services referral to assist patient with adequate nutrition and appropriate food choices  Outcome: Progressing  Goal: Maintains adequate nutritional intake  Description: INTERVENTIONS:  - Monitor percentage of each meal consumed  - Identify factors contributing to decreased intake, treat as appropriate  - Assist with meals as needed  - Monitor I&O, weight, and lab values if indicated  - Obtain nutrition services referral as needed  Outcome: Progressing  Goal: Oral mucous membranes remain intact  Description: INTERVENTIONS  - Assess oral mucosa and hygiene practices  - Implement preventative oral hygiene regimen  - Implement oral medicated treatments as ordered  - Initiate Nutrition services referral as needed  Outcome: Progressing     Problem: METABOLIC, FLUID AND ELECTROLYTES - ADULT  Goal: Electrolytes maintained within normal limits  Description: INTERVENTIONS:  - Monitor labs and assess patient for signs and symptoms of electrolyte imbalances  - Administer electrolyte replacement as ordered  - Monitor response to electrolyte replacements, including repeat lab results as appropriate  - Instruct patient on fluid and nutrition as appropriate  Outcome: Progressing  Goal: Fluid balance maintained  Description: INTERVENTIONS:  - Monitor labs   - Monitor I/O and WT  - Instruct patient on fluid and nutrition as appropriate  - Assess for signs & symptoms of volume excess or deficit  Outcome: Progressing  Goal: Glucose maintained within target range  Description: INTERVENTIONS:  - Monitor Blood Glucose as ordered  - Assess for signs and symptoms of hyperglycemia and hypoglycemia  - Administer ordered medications to maintain glucose within target range  - Assess nutritional intake and initiate nutrition service referral as needed  Outcome: Progressing     Problem: HEMATOLOGIC - ADULT  Goal: Maintains hematologic stability  Description: INTERVENTIONS  - Assess for signs and symptoms of bleeding or hemorrhage  - Monitor labs  - Administer supportive blood products/factors as ordered and appropriate  Outcome: Progressing     Problem: MUSCULOSKELETAL - ADULT  Goal: Maintain or return mobility to safest level of function  Description: INTERVENTIONS:  - Assess patient's ability to carry out ADLs; assess patient's baseline for ADL function and identify physical deficits which impact ability to perform ADLs (bathing, care of mouth/teeth, toileting, grooming, dressing, etc.)  - Assess/evaluate cause of self-care deficits   - Assess range of motion  - Assess patient's mobility  - Assess patient's need for assistive devices and provide as appropriate  - Encourage maximum independence but intervene and supervise when necessary  - Involve family in performance of ADLs  - Assess for home care needs following discharge   - Consider OT consult to assist with ADL evaluation and planning for discharge  - Provide patient education as appropriate  Outcome: Progressing  Goal: Maintain proper alignment of affected body part  Description: INTERVENTIONS:  - Support, maintain and protect limb and body alignment  - Provide patient/ family with appropriate education  Outcome: Progressing

## 2023-09-21 NOTE — ASSESSMENT & PLAN NOTE
Renal function improved, DC IVF and fluid restrict   nephro consult appreciated  Patient received tolvaptan, will check a BMP now and tomorrow

## 2023-09-21 NOTE — HOSPICE NOTE
I met with patient at bedside and reviewed hospice services, she was agreeable. She stated she would need placement, lives at home alone and will need help. I updated SIXTO Cai. Will continue to follow.

## 2023-09-22 PROBLEM — E44.0 MODERATE PROTEIN-CALORIE MALNUTRITION (HCC): Status: ACTIVE | Noted: 2023-09-22

## 2023-09-22 NOTE — QUICK NOTE
Chart reviewed. I attempted to call pharmacy shortly after 7 PM and left a voicemail, staff left for the day. I placed an order for 1.8% for severe hyponatremia, but after discussion with RN cannot be administered as pharmacy is closed. Will not be available until AM.    Only 3% available. Repeat chemistry will be processed shortly. If declining, will need to consider 3% solution which will need CLOSE lab monitoring. Tolvaptan was not effective today and pt had orthostatic hypotension which suggested volume depletion and was not a candidate for further tolvptan dose. If Na stabilizes above 120, would start on salt tablets overnight given lack of ability to administer other medications. If Na 119 or less with glucose correction, 3% may be best option. Discussed with RN that I would cancel 1.8% as this is NOT available at this time. ---------------------------------------------------------  Addendum--Na improved with IVF to 122. NO need for HTS/1.8% with Na>120. Add sodium chloride tablet 2 gram BID, first dose now. Titrate down when Na > 125. Suspect pt was volume deplete in part.

## 2023-09-22 NOTE — PROGRESS NOTES
[address]  Progress Note  Name: Farooq Loya  MRN: [id number]  Unit/Bed#: 350-56 I Date of Admission: 9/18/2023   Date of Service: 9/22/2023 I Hospital Day: 4    Assessment/Plan   * Dehydration with hyponatremia  Assessment & Plan  Renal function improved, DC IVF and fluid restrict   nephro consult appreciated  Possible SIADH, patient to receive additional doses of tolvaptan today  Nephrology input and assistance is appreciated      Complicated UTI (urinary tract infection)  Assessment & Plan  DC zosyn and utilize ceftriaxone to complete 3 days  Discontinue ceftriaxone    Malignant ascites  Assessment & Plan  Status post 3.5 L paracentesis    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 female MRN: [id number]    Unit/Bed#: 407-01 Encounter: [id number]        Subjective:   Patient seen and examined, feels abdomen is less distended, appetite is better     Objective:     Vitals:   Vitals:    09/22/23 0800   BP:    Pulse:    Resp:    Temp:    SpO2: 98%     Body mass index is 23.02 kg/m².     Intake/Output Summary (Last 24 hours) at 9/22/2023 1217  Last data filed at 9/22/2023 0552  Gross per 24 hour   Intake 60 ml   Output 200 ml   Net -140 ml       Physical Exam:   /64   Pulse (!) 110   Temp 98.4 °F (36.9 °C)   Resp 15   Ht 4' 11" (1.499 m)   Wt 51.7 kg (113 lb 15.7 oz)   LMP  (LMP Unknown)   SpO2 98%   BMI 23.02 kg/m²   General appearance: alert and oriented, in no acute distress  Head: Normocephalic, without obvious abnormality, atraumatic  Eyes: no sclearl icterus  Lungs: clear to auscultation bilaterally  Heart: regular rate and rhythm, S1, S2 normal, no murmur, click, rub or gallop  Abdomen: mild distention, no rebound  Extremities: extremities normal, warm and well-perfused; no cyanosis, clubbing, or edema  Pulses: 2+ and symmetric  Neurologic: Grossly normal     Invasive Devices     Central Venous Catheter Line  Duration           Port A Cath 05/18/22 Right Chest 492 days          Drain  Duration           Ureteral Internal Stent Right ureter 6 Fr. 144 days                Results from last 7 days   Lab Units 09/22/23  0634 09/21/23  0511 09/20/23  0511   WBC Thousand/uL 11.64* 11.35* 12.55*   HEMOGLOBIN g/dL 8.6* 9.3* 8.7*   HEMATOCRIT % 27.3* 29.4* 27.7*   PLATELETS Thousands/uL 118* 142* 143*       Results from last 7 days   Lab Units 09/22/23  0509 09/22/23  0000 09/21/23  2029 09/20/23  1208 09/20/23  0511 09/19/23  1433 09/19/23  0514 09/18/23  1702 09/18/23  1551   POTASSIUM mmol/L 4.1 4.4 4.5   < > 4.0   < > 4.0   < > 4.0   CHLORIDE mmol/L 93* 94* 90*   < > 88*   < > 89*   < > 84*   CO2 mmol/L 21 21 23   < > 26   < > 24   < > 25   BUN mg/dL 18 19 20   < > 22   < > 30*   < > 34*   CREATININE mg/dL 0.67 0.66 0.71   < > 0.66   < > 0.91   < > 1.05   CALCIUM mg/dL 7.7* 7.6* 8.3*   < > 8.7   < > 8.7   < > 9.1   ALK PHOS U/L  --   --   --   --  188*  --  131*  --  139*   ALT U/L  --   --   --   --  15  --  10  --  11   AST U/L  --   --   --   --  39  --  21  --  23    < > = values in this interval not displayed.        Medication Administration - last 24 hours from 09/21/2023 1217 to 09/22/2023 1217       Date/Time Order Dose Route Action Action by     09/22/2023 0816 EDT Diclofenac Sodium (VOLTAREN) 1 % topical gel 2 g 2 g Topical Given Bruce Jewell     09/21/2023 2101 EDT Diclofenac Sodium (VOLTAREN) 1 % topical gel 2 g 2 g Topical Given Sammy Velazquez RN     09/21/2023 1704 EDT Diclofenac Sodium (VOLTAREN) 1 % topical gel 2 g 2 g Topical Given Fredo Veras RN     09/22/2023 5765 EDT gabapentin (NEURONTIN) capsule 200 mg 200 mg Oral Given Bruce Jewell     09/21/2023 2049 EDT gabapentin (NEURONTIN) capsule 200 mg 200 mg Oral Given Sammy Velazquez RN     09/21/2023 1703 EDT gabapentin (NEURONTIN) capsule 200 mg 200 mg Oral Given eBrtha Patino RN     09/22/2023 0535 EDT pantoprazole (PROTONIX) EC tablet 40 mg 40 mg Oral Given Chapo Harp RN     09/22/2023 0851 EDT polyethylene glycol (MIRALAX) packet 17 g 17 g Oral Not Given Kaleta Standing     09/21/2023 2054 EDT ondansetron (ZOFRAN) injection 4 mg 4 mg Intravenous Given Chapo Harp RN     09/22/2023 8823 EDT enoxaparin (LOVENOX) subcutaneous injection 40 mg 40 mg Subcutaneous Given [facility]     09/22/2023 0815 EDT cefTRIAXone (ROCEPHIN) IVPB (premix in dextrose) 1,000 mg 50 mL 1,000 mg Intravenous New Bag [facility]     09/22/2023 0014 EDT oxyCODONE (ROXICODONE) immediate release tablet 10 mg 10 mg Oral Given Chapo Harp RN     09/21/2023 2025 EDT sodium chloride (HYPERTONIC) infusion 1.8% 30 mL/hr Intravenous Not Given Bertha Patino RN     09/22/2023 6393 EDT sodium chloride tablet 2 g 2 g Oral Given Kaleta Standing     09/21/2023 2122 EDT sodium chloride tablet 2 g 2 g Oral Given Chapo Harp RN     09/22/2023 0815 EDT tolvaptan (Samsca) tablet 15 mg 15 mg Oral Given [facility]            Lab, Imaging and other studies: I have personally reviewed pertinent reports.     VTE Pharmacologic Prophylaxis: Enoxaparin (Lovenox)  VTE Mechanical Prophylaxis: sequential compression device     Fe Delgado MD  9/22/2023,12:17 PM

## 2023-09-22 NOTE — CONSULTS
Medical Oncology/Hematology Consult Note  Bang Phillips, female, 76 y.o., 1955,  [facility] [id number], [id number]     Assessment and Plan  1. 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 Portal vein thrombus  3. Stage IV cervical cancer   Patient has an extensive history of cervical cancer status post surgical resection in 2017, progression in 2021 requiring chemotherapy. She had progression in February 2022 and was started on Keytruda immunotherapy. In September 2022 progression was noted on CT sca, treatment regimen was changed once again. Per note on 3/21/2023 from gynecology oncology patient was not interested in further tumor directed therapies therefore she was referred to palliative care. She has been following with palliative care   Since April 2023. She was not yet ready for hospice. Per discussion with primary team  Patient agreeable to hospice given further metastatic evidence of peritoneal carcinomatosis noted on CT imaging. Patient is planned to go to an inpatient hospice facility as she does not have support at home. On CT imaging a portal vein thrombosis was noted, patient reports upper abdominal pain likely secondary to thrombosis. Recommend starting anticoagulation preferably Eliquis however if unaffordable Coumadin could be considered. I reached out to hospice team.  Eliquis is not formulary so would not be covered. Patient would need to pay out-of-pocket. I offered a 2-month supply of samples for Eliquis unclear if patient can have outside medications. If unable to have outside medications then Coumadin would likely be preferred however need to be managed by hospice team.  Hospice nurse Hernan Ramirez RN aware. I discussed the above with patient and with primary team.         Addendum   Eliquis is not formulary for hospice, and nursing facility does not accept outside medications. Due to timing we will order Coumadin 5 mg p.o. once daily.   Patient is currently on Lovenox 40 mg subcu daily. I recommend continuing while starting Coumadin for bridge while Coumadin is loading. I discussed with primary team, case management, and hospice liaison. All are in agreement. Reason for consultation: 1. 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 Portal vein thrombus     History of present illness: Patient is a 49-year-old female with a past medical history of stage IV cervical cancer who reported to the ED on 9/18/2023 with complaints of generalized weakness, orthostatic lightheadedness, possible UTI as experiencing dysuria and urinary frequency. No fever chills headache or confusion noted on admission. She reported nausea vomiting and diarrhea. In the ER patient had a sodium level of 120, creatinine 1.05, serum lactate 2.0, procalcitonin 0.51, WBC 15.78, hemoglobin 10.3, UA showed moderate bacteria, CT scan of the abdomen and pelvis showed worsening metastatic disease and development of extensive abdominal pelvic ascites. There was also extensive soft tissue in the upper abdomen adjacent to the celiac access and the hasmukh hepatis consistent with progression of metastatic disease with portal vein thrombus and cavernous transformation. New saccular aneurysm in the distal abdominal aorta possibly related to tumor invasion. Ascites was felt to be secondary to peritoneal carcinomatosis from stage IV cervical cancer. She has been deemed no longer responsive to any sort of treatment according to the patient. Hematology was consulted for further evaluation and management of portal vein thrombus. Review of Systems:   Review of Systems   Constitutional: Positive for fatigue. Negative for activity change, appetite change, fever and unexpected weight change. Respiratory: Negative for cough and shortness of breath. Cardiovascular: Negative for chest pain and leg swelling. Gastrointestinal: Negative for abdominal pain, constipation, diarrhea and nausea. Endocrine: Negative for cold intolerance and heat intolerance. Musculoskeletal: Negative for arthralgias and myalgias. Skin: Negative. Neurological: Positive for weakness. Negative for dizziness and headaches. Hematological: Negative for adenopathy. Does not bruise/bleed easily. Physical Exam:    /64   Pulse (!) 110   Temp 98.4 °F (36.9 °C)   Resp 15   Ht 4' 11" (1.499 m)   Wt 51.7 kg (113 lb 15.7 oz)   LMP  (LMP Unknown)   SpO2 98%   BMI 23.02 kg/m²     Physical Exam  Vitals reviewed. Constitutional:       Appearance: She is well-developed. She is ill-appearing. HENT:      Head: Normocephalic and atraumatic. Eyes:      Conjunctiva/sclera: Conjunctivae normal.      Pupils: Pupils are equal, round, and reactive to light. Pulmonary:      Effort: Pulmonary effort is normal. No respiratory distress. Musculoskeletal:         General: Normal range of motion. Cervical back: Normal range of motion. Lymphadenopathy:      Cervical: No cervical adenopathy. Skin:     General: Skin is dry. Neurological:      Mental Status: She is alert and oriented to person, place, and time. Psychiatric:         Mood and Affect: Affect is tearful. Behavior: Behavior normal.       Recent Results (from the past 24 hour(s))   Basic metabolic panel    Collection Time: 09/21/23  2:16 PM   Result Value Ref Range    Sodium 119 (L) 135 - 147 mmol/L    Potassium 4.4 3.5 - 5.3 mmol/L    Chloride 88 (L) 96 - 108 mmol/L    CO2 22 21 - 32 mmol/L    ANION GAP 9 mmol/L    BUN 21 5 - 25 mg/dL    Creatinine 0.73 0.60 - 1.30 mg/dL    Glucose 216 (H) 65 - 140 mg/dL    Calcium 8.6 8.4 - 10.2 mg/dL    eGFR 84 ml/min/1.73sq m   Sodium, urine, random    Collection Time: 09/21/23  5:19 PM   Result Value Ref Range    Sodium, Ur <10 Reference range not established.    Basic metabolic panel    Collection Time: 09/21/23  8:29 PM   Result Value Ref Range    Sodium 122 (L) 135 - 147 mmol/L    Potassium 4.5 3.5 - 5.3 mmol/L    Chloride 90 (L) 96 - 108 mmol/L    CO2 23 21 - 32 mmol/L    ANION GAP 9 mmol/L    BUN 20 5 - 25 mg/dL    Creatinine 0.71 0.60 - 1.30 mg/dL    Glucose 142 (H) 65 - 140 mg/dL    Calcium 8.3 (L) 8.4 - 10.2 mg/dL    eGFR 87 ml/min/1.73sq m   Basic metabolic panel    Collection Time: 09/22/23 12:00 AM   Result Value Ref Range    Sodium 122 (L) 135 - 147 mmol/L    Potassium 4.4 3.5 - 5.3 mmol/L    Chloride 94 (L) 96 - 108 mmol/L    CO2 21 21 - 32 mmol/L    ANION GAP 7 mmol/L    BUN 19 5 - 25 mg/dL    Creatinine 0.66 0.60 - 1.30 mg/dL    Glucose 117 65 - 140 mg/dL    Calcium 7.6 (L) 8.4 - 10.2 mg/dL    eGFR 91 ml/min/1.73sq m   Basic metabolic panel    Collection Time: 09/22/23  5:09 AM   Result Value Ref Range    Sodium 121 (L) 135 - 147 mmol/L    Potassium 4.1 3.5 - 5.3 mmol/L    Chloride 93 (L) 96 - 108 mmol/L    CO2 21 21 - 32 mmol/L    ANION GAP 7 mmol/L    BUN 18 5 - 25 mg/dL    Creatinine 0.67 0.60 - 1.30 mg/dL    Glucose 110 65 - 140 mg/dL    Calcium 7.7 (L) 8.4 - 10.2 mg/dL    eGFR 90 ml/min/1.73sq m   CBC and differential    Collection Time: 09/22/23  6:34 AM   Result Value Ref Range    WBC 11.64 (H) 4.31 - 10.16 Thousand/uL    RBC 3.18 (L) 3.81 - 5.12 Million/uL    Hemoglobin 8.6 (L) 11.5 - 15.4 g/dL    Hematocrit 27.3 (L) 34.8 - 46.1 %    MCV 86 82 - 98 fL    MCH 27.0 26.8 - 34.3 pg    MCHC 31.5 31.4 - 37.4 g/dL    RDW 17.6 (H) 11.6 - 15.1 %    MPV 9.8 8.9 - 12.7 fL    Platelets 698 (L) 736 - 390 Thousands/uL    nRBC 0 /100 WBCs    Neutrophils Relative 82 (H) 43 - 75 %    Immat GRANS % 1 0 - 2 %    Lymphocytes Relative 6 (L) 14 - 44 %    Monocytes Relative 11 4 - 12 %    Eosinophils Relative 0 0 - 6 %    Basophils Relative 0 0 - 1 %    Neutrophils Absolute 9.59 (H) 1.85 - 7.62 Thousands/µL    Immature Grans Absolute 0.06 0.00 - 0.20 Thousand/uL    Lymphocytes Absolute 0.68 0.60 - 4.47 Thousands/µL    Monocytes Absolute 1.24 (H) 0.17 - 1.22 Thousand/µL    Eosinophils Absolute 0.05 0.00 - 0.61 Thousand/µL    Basophils Absolute 0.02 0.00 - 0.10 Thousands/µL     CT abdomen pelvis with contrast    Result Date: 9/18/2023  Narrative: CT ABDOMEN AND PELVIS WITH IV CONTRAST INDICATION:   Nausea and vomiting, worsening abdominal pain with possible distention. History of metastatic cervical cancer. COMPARISON: CT scan 3/16/2023. TECHNIQUE:  CT examination of the abdomen and pelvis was performed. Multiplanar 2D reformatted images were created from the source data. This examination, like all CT scans performed in the [facility], was performed utilizing techniques to minimize radiation dose exposure, including the use of iterative reconstruction and automated exposure control. Radiation dose length product (DLP) for this visit:  917.03 mGy-cm IV Contrast:  100 mL of iohexol (OMNIPAQUE) Enteric Contrast:  Enteric contrast was not administered. FINDINGS: ABDOMEN LOWER CHEST: Lung bases are clear. Stable hiatal hernia. LIVER/BILIARY TREE: Stable right lobe hemangioma measuring 1.5 cm. New mass in the caudate lobe of the liver consistent with metastatic disease measuring 4.0 x 2.5 cm. GALLBLADDER: No gallstones. No pericholecystic fluid SPLEEN:  Unremarkable. PANCREAS:  Unremarkable. ADRENAL GLANDS: Enlarging bilateral adrenal gland metastases measuring 4.7 cm on the right (previous 2.9 cm) and 4.4 cm on the left (previous 2.6 cm). KIDNEYS/URETERS: Right double-J stent in place without hydronephrosis. Stable right renal atrophy. STOMACH AND BOWEL: There is colonic diverticulosis without evidence of acute diverticulitis. Interval development of serosal implants along the ascending colon image 2/74 with severe wall thickening and near complete obstruction. Additional serosal implants are noted along the mesentery for example on image 2/78 in the right lower quadrant. APPENDIX:  No findings to suggest appendicitis. ABDOMINOPELVIC CAVITY: Interval development of extensive abdominal pelvic ascites.  Stable omental caking in the anterior left abdomen image 2/80. Significant worsening of retroperitoneal adenopathy and soft tissue particularly at the level of the hasmukh hepatis and celiac axis consistent with worsening metastatic disease. 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 New saccular aneurysm of the distal abdominal aorta measuring 7 mm on image 2/84 possibly related to tumor invasion. PELVIS REPRODUCTIVE ORGANS: Surgical changes of prior hysterectomy. URINARY BLADDER: Distal aspect of the right double-J catheter stent noted. ABDOMINAL WALL/INGUINAL REGIONS:  Unremarkable. OSSEOUS STRUCTURES:  No acute fracture or destructive osseous lesion. Impression: Worsening metastatic disease with new caudate lobe liver mass measuring 4.0 x 2.5 cm. Enlarging bilateral adrenal gland metastases as described. Interval development of extensive abdominal pelvic ascites likely due to peritoneal carcinomatosis with multiple serosal implants noted as well as omental caking. Extensive soft tissue in the upper abdomen adjacent to the celiac axis in the hasmukh hepatis consistent with progression of metastatic disease with portal vein thrombosis and cavernous transformation. 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 Labs and pertinent reports reviewed. This note has been generated by voice recognition software system. Therefore, there may be spelling, grammar, and or syntax errors. Please contact if questions arise.

## 2023-09-22 NOTE — CASE MANAGEMENT
Case Management Discharge Planning Note    Patient name Marca Homans  Location [facility] [id number] MRN [id number]  : 1955 Date 2023       Current Admission Date: 2023  Current Admission Diagnosis:Dehydration with hyponatremia   Patient Active Problem List    Diagnosis Date Noted   • Moderate protein-calorie malnutrition ([address]) 2023   • Portal vein thrombosis 2023   • Generalized weakness    • Complicated UTI (urinary tract infection) 2023   • Anemia, chronic disease 2023   • Malignant ascites 2023   • Drug induced constipation 2023   • Chronic pain syndrome 2023   • Neoplasm of uncertain behavior of bone and articular cartilage 2023   • Right leg pain 2023   • DNR (do not resuscitate) discussion 2023   • Chemotherapy-induced thrombocytopenia 2023   • Insomnia due to other mental disorder 09/15/2022   • Stage 3 chronic kidney disease, unspecified whether stage 3a or 3b CKD ([address]) 2022   • Meralgia paraesthetica, right 2022   • Dyslipidemia 2022   • Post-menopause 2022   • Major depressive disorder, single episode, mild ([address]) 2022   • Cervical spondylosis 2022   • Continuous opioid dependence ([address]) 2022   • Lumbar disc disease with radiculopathy 2022   • Chronic obstructive pulmonary disease, unspecified COPD type ([address]) 2022   • Hematuria 2022   • Calculus of gallbladder without cholecystitis without obstruction 2022   • Chronic constipation 2022   • Primary generalized (osteo)arthritis 2021   • Xerostomia 2021   • Encounter for immunization 2021   • Sensorineural hearing loss (SNHL) of both ears 2021   • Multiple lung nodules 2021   • Encounter for venous access device care 2021   • Dyspnea on exertion 2021   • Thyroid lesion 2021   • Chemotherapy induced neutropenia ([address]) 06/10/2021   • Pain in thoracic spine 06/03/2021   • Urgency of urination 05/20/2021   • Chemotherapy-induced nausea 05/04/2021   • 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)    • Hypokalemia 03/02/2021   • Dehydration with hyponatremia 02/26/2021   • Encounter for screening involving social determinants of health (SDoH) 02/24/2021   • Hydronephrosis 02/04/2021   • BMI 29.0-29.9,adult 01/05/2021   • Nausea 01/05/2021   • Right hip pain 01/05/2021   • Tinnitus of left ear 02/19/2020   • Left ear pain 02/19/2020   • Anxiety and depression 10/02/2019   • Zaldivar's neuroma, left 07/12/2018   • 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[address] [facility]) 04/11/2017      LOS (days): 4  Geometric Mean LOS (GMLOS) (days): 5.10  Days to GMLOS:1.4     OBJECTIVE:  Risk of Unplanned Readmission Score: 29.97         Current admission status: Inpatient   Preferred Pharmacy:   [id number] [place] [address], [address]  [address] [facility] [address]  Phone: [phone] Fax: [address] - [address]  Phone: [phone] Fax: [phone]    [address] - [address] [facility],  [facility] [address]  Phone: [phone] Fax: [phone]    Primary Care Provider: Lucila Yi DO    Primary Insurance: MEDICARE  Secondary Insurance:     DISCHARGE DETAILS:    Discharge planning discussed with[de-identified] patient  Freedom of Choice: Yes  Comments - Freedom of Choice: patient provided choices for LTC with hospice her preference is [facility]. bed secured. referral placed to [facility] for care at facility.   CM contacted family/caregiver?: No- see comments (patient declined handles her medical needs)  Were Treatment Team discharge recommendations reviewed with patient/caregiver?: Yes  Did patient/caregiver verbalize understanding of patient care needs?: Yes  Were patient/caregiver advised of the risks associated with not following Treatment Team discharge recommendations?: Yes    Contacts  Relationship to Patient[de-identified]  (patient)  Contact Method: In Person  Reason/Outcome: Discharge [address]         Is the patient interested in [facility] at discharge?: No    DME Referral Provided  Referral made for DME?: No         Would you like to participate in our [address] [facility] service program?  : No - Declined    Treatment Team Recommendation: SNF (with hospice)  Discharge Destination Plan[de-identified] SNF (with hospice)              Patient discussed in huddle sodium remains low     Patient provided options for  Long term care hospice, her preference is  [facility] [place] [facility] nsg and rehab in [facility]. The at [facility] and rehab secured in [facility]. [facility] patient for [facility] and rehab.       CM to follow

## 2023-09-22 NOTE — PROGRESS NOTES
Progress Note - Nephrology   Bang Phillips 76 y.o. female MRN: [id number]  Unit/Bed#: 407-01 Encounter: [id number]    A/P:  1.  Acute hyponatremia              Serum sodium remains unchanged currently at 121 mmol/L. We will reduce the patient's fluid restriction from 1.8 L down to 1.5 L, will reinitiate tolvaptan dosing which she was not responsive with at the 7.5 mg up to the 15 mg dose. Continue to monitor urine output, caution against additional volume expansion at this time given patient's significant bilateral lower extremity edema, and probable reaccumulation of abdominal ascites. With respect to the use of diuretics, can consider loop diuretic if clinically indicated but would avoid at this time given complicated clinical issues. 2.  Probable SIADH              As mentioned above, reduce fluid restriction down to 1.5 L daily, tolvaptan 15 mg provided as a one-time dose, continue to closely monitor serum sodium levels. Typically we significantly relax the patient's fluid restriction but given the patient's overall clinical status, will simply continue to monitor at its current restriction, and relax at restriction if appropriate. 3.   Acute kidney injury due to volume depletion, present on admission              Remained stable at typical baseline, continue to monitor. 4.  Volume depletion              Patient no longer appears to be clinically depleted at this time. With respect to orthostatics, patient has several potential etiologies including autonomic dysfunction in the setting of abnormal physiology with peritoneal carcinomatosis, as well as other potential factors which can cause vasodilation and mimic intravascular depletion.   After reviewing clinical results of interventions from yesterday which included volume expansion providing additional volume expansion and sodium chloride supplementation, it seems that there was a temporary assistance with the patient's orthostatics, however I fear that this will simply place us back into the [place] of requiring paracentesis for volume accumulation, and potentially diuretics due to significant bilateral lower extremity edema. I do not believe the patient is any clinical situation where she will be able to not feel orthostatic, however I believe it is appropriate for us to manage her volume status as best as possible to allow for ambulation as tolerated, without placing the patient in a position where she will require repeat paracenteses, if possible. 5.  Hypomagnesemia              Continue to monitor magnesium levels, supplement as indicated. 6.  Metastatic ovarian cancer with peritoneal carcinomatosis associated with malignant ascites              This unfortunately is most likely underlying cause for the patient's above clinical issues including hyponatremia as well as orthostatic hypotension. Ultimately she will go to hospice, however the patient does not feel at this time it is indicated or indicated. 7.  New portal vein thrombosis              Continue with recommendations according to hematology  8.  Chronic pain associated with metastatic cancer   Continue with pain management as indicated.      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)   • Chemotherapy-induced nausea   • Urgency of urination   • Pain in thoracic spine   • Chemotherapy induced neutropenia (HCC)   • Thyroid lesion   • Dyspnea on exertion   • Encounter for venous access device care • Multiple lung nodules   • Primary generalized (osteo)arthritis   • Xerostomia   • Encounter for immunization   • Sensorineural hearing loss (SNHL) of both ears   • Chronic obstructive pulmonary disease, unspecified COPD type ([address])   • Hematuria   • Calculus of gallbladder without cholecystitis without obstruction   • Chronic constipation   • Continuous opioid dependence (HCC)   • Lumbar disc disease with radiculopathy   • Cervical spondylosis   • Stage 3 chronic kidney disease, unspecified whether stage 3a or 3b CKD (HCC)   • Meralgia paraesthetica, right   • Dyslipidemia   • Post-menopause   • Major depressive disorder, single episode, mild (HCC)   • Insomnia due to other mental disorder   • Chemotherapy-induced thrombocytopenia   • DNR (do not resuscitate) discussion   • Neoplasm of uncertain behavior of bone and articular cartilage   • Right leg pain   • Chronic pain syndrome   • Drug induced constipation   • Generalized weakness   • Complicated UTI (urinary tract infection)   • Anemia, chronic disease   • Malignant ascites   • Portal vein thrombosis   • Moderate protein-calorie malnutrition (HCC)         Subjective:   Patient continues to feel unsteady on her legs otherwise has no acute complaints at this time. She is trying to eat and drink as best as possible. Objective:     Vitals: Blood pressure 119/64, pulse (!) 110, temperature 98.4 °F (36.9 °C), resp. rate 15, height 4' 11" (1.499 m), weight 51.7 kg (113 lb 15.7 oz), SpO2 98 %, not currently breastfeeding. ,Body mass index is 23.02 kg/m². Weight (last 2 days)     Date/Time Weight    09/22/23 0552 51.7 (113.98)    09/21/23 0537 50.6 (111.55)    09/20/23 0700 52.4 (115.52)            Intake/Output Summary (Last 24 hours) at 9/22/2023 0808  Last data filed at 9/22/2023 0552  Gross per 24 hour   Intake 180 ml   Output 400 ml   Net -220 ml     I/O last 3 completed shifts:   In: 180 [P.O.:180]  Out: 400 [Urine:400]         Physical Exam: /64 Pulse (!) 110   Temp 98.4 °F (36.9 °C)   Resp 15   Ht 4' 11" (1.499 m)   Wt 51.7 kg (113 lb 15.7 oz)   LMP  (LMP Unknown)   SpO2 98%   BMI 23.02 kg/m²     General Appearance:    Alert, cooperative, no distress, appears stated age   Head:    Normocephalic, without obvious abnormality, atraumatic   Eyes:    Conjunctiva/corneas clear   Ears:    Normal external ears   Nose:   Nares normal, septum midline, mucosa normal, no drainage    or sinus tenderness   Throat:   Lips, mucosa, and tongue normal; teeth and gums normal   Neck:   Supple   Back:     Symmetric, no curvature, ROM normal, no CVA tenderness   Lungs:      Reduced bibasilarly   Chest wall:    No tenderness or deformity   Heart:    Regular rate and rhythm, S1 and S2 normal, no murmur, rub   or gallop   Abdomen:     Soft, non-tender, bowel sounds active   Extremities:   Extremities normal, atraumatic, no cyanosis, +2 to +3 bilateral lower extremity edema up to the presacral region   Skin:   Skin color, texture, turgor normal, no rashes or lesions   Lymph nodes:   Cervical normal   Neurologic:   CNII-XII intact            Lab, Imaging and other studies: I have personally reviewed pertinent labs. CBC:   Lab Results   Component Value Date    WBC 11.64 (H) 09/22/2023    HGB 8.6 (L) 09/22/2023    HCT 27.3 (L) 09/22/2023    MCV 86 09/22/2023     (L) 09/22/2023    RBC 3.18 (L) 09/22/2023    MCH 27.0 09/22/2023    MCHC 31.5 09/22/2023    RDW 17.6 (H) 09/22/2023    MPV 9.8 09/22/2023    NRBC 0 09/22/2023     CMP:   Lab Results   Component Value Date    K 4.1 09/22/2023    CL 93 (L) 09/22/2023    CO2 21 09/22/2023    BUN 18 09/22/2023    CREATININE 0.67 09/22/2023    CALCIUM 7.7 (L) 09/22/2023    EGFR 90 09/22/2023       .   Results from last 7 days   Lab Units 09/22/23  0509 09/22/23  0000 09/21/23 2029 09/20/23  1208 09/20/23  0511 09/19/23  1433 09/19/23  0514 09/18/23  1702 09/18/23  1551   POTASSIUM mmol/L 4.1 4.4 4.5   < > 4.0   < > 4.0   < > 4.0 CHLORIDE mmol/L 93* 94* 90*   < > 88*   < > 89*   < > 84*   CO2 mmol/L 21 21 23   < > 26   < > 24   < > 25   BUN mg/dL 18 19 20   < > 22   < > 30*   < > 34*   CREATININE mg/dL 0.67 0.66 0.71   < > 0.66   < > 0.91   < > 1.05   CALCIUM mg/dL 7.7* 7.6* 8.3*   < > 8.7   < > 8.7   < > 9.1   ALK PHOS U/L  --   --   --   --  188*  --  131*  --  139*   ALT U/L  --   --   --   --  15  --  10  --  11   AST U/L  --   --   --   --  39  --  21  --  23    < > = values in this interval not displayed. Phosphorus: No results found for: "PHOS"  Magnesium: No results found for: "MG"  Urinalysis: No results found for: "COLORU", "CLARITYU", "Sendy Large", "PHUR", "LEUKOCYTESUR", "NITRITE", "Juan Garsia", "GLUCOSEU", "Rushie Abler", "Lennette Alamin", "BLOODU"  Ionized Calcium: No results found for: "CAION"  Coagulation: No results found for: "PT", "INR", "APTT"  Troponin: No results found for: "TROPONINI"  ABG: No results found for: "PHART", "TWI1VNF", "PO2ART", "TCJ1PAU", "C5QSIYJX", "BEART", "SOURCE"  Radiology review:     IMAGING  No results found.     Current Facility-Administered Medications   Medication Dose Route Frequency   • albuterol inhalation solution 2.5 mg  2.5 mg Nebulization Q4H PRN   • aluminum-magnesium hydroxide-simethicone (MAALOX) oral suspension 30 mL  30 mL Oral Q6H PRN   • bisacodyl (DULCOLAX) rectal suppository 10 mg  10 mg Rectal Daily PRN   • cefTRIAXone (ROCEPHIN) IVPB (premix in dextrose) 1,000 mg 50 mL  1,000 mg Intravenous Q24H   • Diclofenac Sodium (VOLTAREN) 1 % topical gel 2 g  2 g Topical 4x Daily   • enoxaparin (LOVENOX) subcutaneous injection 40 mg  40 mg Subcutaneous Daily   • gabapentin (NEURONTIN) capsule 200 mg  200 mg Oral TID   • HYDROmorphone (DILAUDID) injection 1 mg  1 mg Intravenous Q3H PRN   • LORazepam (ATIVAN) tablet 0.5 mg  0.5 mg Oral BID PRN   • ondansetron (ZOFRAN) injection 4 mg  4 mg Intravenous Q6H PRN   • oxyCODONE (ROXICODONE) immediate release tablet 10 mg  10 mg Oral Q4H PRN   • pantoprazole (PROTONIX) EC tablet 40 mg  40 mg Oral Early Morning   • polyethylene glycol (MIRALAX) packet 17 g  17 g Oral Daily   • sodium chloride tablet 2 g  2 g Oral BID With Meals   • tolvaptan ([place]) tablet 15 mg  15 mg Oral Once     Medications Discontinued During This Encounter   Medication Reason   • piperacillin-tazobactam (ZOSYN) 3.375 g in sodium chloride 0.9 % 100 mL IVPB    • sodium chloride 0.9 % with KCl 20 mEq/L infusion (premix)    • oxyCODONE-acetaminophen (PERCOCET) 5-325 mg per tablet 2 tablet    • HYDROmorphone (DILAUDID) injection 0.5 mg    • sodium chloride (HYPERTONIC) infusion 1.8%        Viet Poe, DO      This progress note was produced in part using a dictation device which may document imprecise wording from author's original intent.

## 2023-09-22 NOTE — ASSESSMENT & PLAN NOTE
Renal function improved, DC IVF and fluid restrict   nephro consult appreciated  Possible SIADH, patient to receive additional doses of tolvaptan today  Nephrology input and assistance is appreciated

## 2023-09-22 NOTE — ASSESSMENT & PLAN NOTE
Patient is agreeable to hospice, will need placement, she is currently deciding on which facility she would like to go to

## 2023-09-22 NOTE — PLAN OF CARE
Problem: Potential for Falls  Goal: Patient will remain free of falls  Description: INTERVENTIONS:  - Educate patient/family on patient safety including physical limitations  - Instruct patient to call for assistance with activity   - Consult OT/PT to assist with strengthening/mobility   - Keep Call bell within reach  - Keep bed low and locked with side rails adjusted as appropriate  - Keep care items and personal belongings within reach  - Initiate and maintain comfort rounds  - Make Fall Risk Sign visible to staff  - Offer Toileting every 2 Hours, in advance of need  - Initiate/Maintain fall alarm  - Obtain necessary fall risk management equipment: non-skid footwear, call bell  - Apply yellow socks and bracelet for high fall risk patients  - Consider moving patient to room near nurses station  Outcome: Progressing     Problem: Nutrition/Hydration-ADULT  Goal: Nutrient/Hydration intake appropriate for improving, restoring or maintaining nutritional needs  Description: Monitor and assess patient's nutrition/hydration status for malnutrition. Collaborate with interdisciplinary team and initiate plan and interventions as ordered. Monitor patient's weight and dietary intake as ordered or per policy. Utilize nutrition screening tool and intervene as necessary. Determine patient's food preferences and provide high-protein, high-caloric foods as appropriate.      INTERVENTIONS:  - Monitor oral intake, urinary output, labs, and treatment plans  - Assess nutrition and hydration status and recommend course of action  - Evaluate amount of meals eaten  - Assist patient with eating if necessary   - Allow adequate time for meals  - Recommend/ encourage appropriate diets, oral nutritional supplements, and vitamin/mineral supplements  - Order, calculate, and assess calorie counts as needed  - Recommend, monitor, and adjust tube feedings and TPN/PPN based on assessed needs  - Assess need for intravenous fluids  - Provide specific nutrition/hydration education as appropriate  - Include patient/family/caregiver in decisions related to nutrition  Outcome: Progressing     Problem: MOBILITY - ADULT  Goal: Maintain or return to baseline ADL function  Description: INTERVENTIONS:  -  Assess patient's ability to carry out ADLs; assess patient's baseline for ADL function and identify physical deficits which impact ability to perform ADLs (bathing, care of mouth/teeth, toileting, grooming, dressing, etc.)  - Assess/evaluate cause of self-care deficits   - Assess range of motion  - Assess patient's mobility; develop plan if impaired  - Assess patient's need for assistive devices and provide as appropriate  - Encourage maximum independence but intervene and supervise when necessary  - Involve family in performance of ADLs  - Assess for home care needs following discharge   - Consider OT consult to assist with ADL evaluation and planning for discharge  - Provide patient education as appropriate  Outcome: Progressing  Goal: Maintains/Returns to pre admission functional level  Description: INTERVENTIONS:  - Perform BMAT or MOVE assessment daily.   - Set and communicate daily mobility goal to care team and patient/family/caregiver. - Collaborate with rehabilitation services on mobility goals if consulted  - Perform Range of Motion 3 times a day. - Reposition patient every 2 hours.   - Dangle patient 3 times a day  - Stand patient 3 times a day  - Ambulate patient 3 times a day  - Out of bed to chair 3 times a day   - Out of bed for meals 3 times a day  - Out of bed for toileting  - Record patient progress and toleration of activity level   Outcome: Progressing     Problem: Prexisting or High Potential for Compromised Skin Integrity  Goal: Skin integrity is maintained or improved  Description: INTERVENTIONS:  - Identify patients at risk for skin breakdown  - Assess and monitor skin integrity  - Assess and monitor nutrition and hydration status  - Monitor labs   - Assess for incontinence   - Turn and reposition patient  - Assist with mobility/ambulation  - Relieve pressure over bony prominences  - Avoid friction and shearing  - Provide appropriate hygiene as needed including keeping skin clean and dry  - Evaluate need for skin moisturizer/barrier cream  - Collaborate with interdisciplinary team   - Patient/family teaching  - Consider wound care consult   Outcome: Progressing     Problem: PAIN - ADULT  Goal: Verbalizes/displays adequate comfort level or baseline comfort level  Description: Interventions:  - Encourage patient to monitor pain and request assistance  - Assess pain using appropriate pain scale  - Administer analgesics based on type and severity of pain and evaluate response  - Implement non-pharmacological measures as appropriate and evaluate response  - Consider cultural and social influences on pain and pain management  - Notify physician/advanced practitioner if interventions unsuccessful or patient reports new pain  Outcome: Progressing     Problem: INFECTION - ADULT  Goal: Absence or prevention of progression during hospitalization  Description: INTERVENTIONS:  - Assess and monitor for signs and symptoms of infection  - Monitor lab/diagnostic results  - Monitor all insertion sites, i.e. indwelling lines, tubes, and drains  - Monitor endotracheal if appropriate and nasal secretions for changes in amount and color  - [place] appropriate cooling/warming therapies per order  - Administer medications as ordered  - Instruct and encourage patient and family to use good hand hygiene technique  - Identify and instruct in appropriate isolation precautions for identified infection/condition  Outcome: Progressing  Goal: Absence of fever/infection during neutropenic period  Description: INTERVENTIONS:  - Monitor WBC    Outcome: Progressing     Problem: SAFETY ADULT  Goal: Patient will remain free of falls  Description: INTERVENTIONS:  - Educate patient/family on patient safety including physical limitations  - Instruct patient to call for assistance with activity   - Consult OT/PT to assist with strengthening/mobility   - Keep Call bell within reach  - Keep bed low and locked with side rails adjusted as appropriate  - Keep care items and personal belongings within reach  - Initiate and maintain comfort rounds  - Make Fall Risk Sign visible to staff  - Offer Toileting every 2 Hours, in advance of need  - Initiate/Maintain fall alarm  - Obtain necessary fall risk management equipment: non-skid footwear, call bell  - Apply yellow socks and bracelet for high fall risk patients  - Consider moving patient to room near nurses station  Outcome: Progressing  Goal: Maintain or return to baseline ADL function  Description: INTERVENTIONS:  -  Assess patient's ability to carry out ADLs; assess patient's baseline for ADL function and identify physical deficits which impact ability to perform ADLs (bathing, care of mouth/teeth, toileting, grooming, dressing, etc.)  - Assess/evaluate cause of self-care deficits   - Assess range of motion  - Assess patient's mobility; develop plan if impaired  - Assess patient's need for assistive devices and provide as appropriate  - Encourage maximum independence but intervene and supervise when necessary  - Involve family in performance of ADLs  - Assess for home care needs following discharge   - Consider OT consult to assist with ADL evaluation and planning for discharge  - Provide patient education as appropriate  Outcome: Progressing  Goal: Maintains/Returns to pre admission functional level  Description: INTERVENTIONS:  - Perform BMAT or MOVE assessment daily.   - Set and communicate daily mobility goal to care team and patient/family/caregiver. - Collaborate with rehabilitation services on mobility goals if consulted  - Perform Range of Motion 3 times a day. - Reposition patient every 2 hours.   - Dangle patient 3 times a day  - Stand patient 3 times a day  - Ambulate patient 3 times a day  - Out of bed to chair 3 times a day   - Out of bed for meals 3 times a day  - Out of bed for toileting  - Record patient progress and toleration of activity level   Outcome: Progressing     Problem: DISCHARGE PLANNING  Goal: Discharge to home or other facility with appropriate resources  Description: INTERVENTIONS:  - Identify barriers to discharge w/patient and caregiver  - Arrange for needed discharge resources and transportation as appropriate  - Identify discharge learning needs (meds, wound care, etc.)  - Arrange for interpretive services to assist at discharge as needed  - Refer to Case Management Department for coordinating discharge planning if the patient needs post-hospital services based on physician/advanced practitioner order or complex needs related to functional status, cognitive ability, or social support system  Outcome: Progressing     Problem: Knowledge Deficit  Goal: Patient/family/caregiver demonstrates understanding of disease process, treatment plan, medications, and discharge instructions  Description: Complete learning assessment and assess knowledge base.   Interventions:  - Provide teaching at level of understanding  - Provide teaching via preferred learning methods  Outcome: Progressing     Problem: CARDIOVASCULAR - ADULT  Goal: Maintains optimal cardiac output and hemodynamic stability  Description: INTERVENTIONS:  - Monitor I/O, vital signs and rhythm  - Monitor for S/S and trends of decreased cardiac output  - Administer and titrate ordered vasoactive medications to optimize hemodynamic stability  - Assess quality of pulses, skin color and temperature  - Assess for signs of decreased coronary artery perfusion  - Instruct patient to report change in severity of symptoms  Outcome: Progressing  Goal: Absence of cardiac dysrhythmias or at baseline rhythm  Description: INTERVENTIONS:  - Continuous cardiac monitoring, vital signs, obtain 12 lead EKG if ordered  - Administer antiarrhythmic and heart rate control medications as ordered  - Monitor electrolytes and administer replacement therapy as ordered  Outcome: Progressing     Problem: GASTROINTESTINAL - ADULT  Goal: Minimal or absence of nausea and/or vomiting  Description: INTERVENTIONS:  - Administer IV fluids if ordered to ensure adequate hydration  - Maintain NPO status until nausea and vomiting are resolved  - Nasogastric tube if ordered  - Administer ordered antiemetic medications as needed  - Provide nonpharmacologic comfort measures as appropriate  - Advance diet as tolerated, if ordered  - Consider nutrition services referral to assist patient with adequate nutrition and appropriate food choices  Outcome: Progressing  Goal: Maintains or returns to baseline bowel function  Description: INTERVENTIONS:  - Assess bowel function  - Encourage oral fluids to ensure adequate hydration  - Administer IV fluids if ordered to ensure adequate hydration  - Administer ordered medications as needed  - Encourage mobilization and activity  - Consider nutritional services referral to assist patient with adequate nutrition and appropriate food choices  Outcome: Progressing  Goal: Maintains adequate nutritional intake  Description: INTERVENTIONS:  - Monitor percentage of each meal consumed  - Identify factors contributing to decreased intake, treat as appropriate  - Assist with meals as needed  - Monitor I&O, weight, and lab values if indicated  - Obtain nutrition services referral as needed  Outcome: Progressing  Goal: Oral mucous membranes remain intact  Description: INTERVENTIONS  - Assess oral mucosa and hygiene practices  - Implement preventative oral hygiene regimen  - Implement oral medicated treatments as ordered  - Initiate Nutrition services referral as needed  Outcome: Progressing     Problem: METABOLIC, FLUID AND ELECTROLYTES - ADULT  Goal: Electrolytes maintained within normal limits  Description: INTERVENTIONS:  - Monitor labs and assess patient for signs and symptoms of electrolyte imbalances  - Administer electrolyte replacement as ordered  - Monitor response to electrolyte replacements, including repeat lab results as appropriate  - Instruct patient on fluid and nutrition as appropriate  Outcome: Progressing  Goal: Fluid balance maintained  Description: INTERVENTIONS:  - Monitor labs   - Monitor I/O and WT  - Instruct patient on fluid and nutrition as appropriate  - Assess for signs & symptoms of volume excess or deficit  Outcome: Progressing  Goal: Glucose maintained within target range  Description: INTERVENTIONS:  - Monitor Blood Glucose as ordered  - Assess for signs and symptoms of hyperglycemia and hypoglycemia  - Administer ordered medications to maintain glucose within target range  - Assess nutritional intake and initiate nutrition service referral as needed  Outcome: Progressing     Problem: HEMATOLOGIC - ADULT  Goal: Maintains hematologic stability  Description: INTERVENTIONS  - Assess for signs and symptoms of bleeding or hemorrhage  - Monitor labs  - Administer supportive blood products/factors as ordered and appropriate  Outcome: Progressing     Problem: MUSCULOSKELETAL - ADULT  Goal: Maintain or return mobility to safest level of function  Description: INTERVENTIONS:  - Assess patient's ability to carry out ADLs; assess patient's baseline for ADL function and identify physical deficits which impact ability to perform ADLs (bathing, care of mouth/teeth, toileting, grooming, dressing, etc.)  - Assess/evaluate cause of self-care deficits   - Assess range of motion  - Assess patient's mobility  - Assess patient's need for assistive devices and provide as appropriate  - Encourage maximum independence but intervene and supervise when necessary  - Involve family in performance of ADLs  - Assess for home care needs following discharge   - Consider OT consult to assist with ADL evaluation and planning for discharge  - Provide patient education as appropriate  Outcome: Progressing  Goal: Maintain proper alignment of affected body part  Description: INTERVENTIONS:  - Support, maintain and protect limb and body alignment  - Provide patient/ family with appropriate education  Outcome: Progressing

## 2023-09-23 NOTE — PLAN OF CARE
Problem: Potential for Falls  Goal: Patient will remain free of falls  Description: INTERVENTIONS:  - Educate patient/family on patient safety including physical limitations  - Instruct patient to call for assistance with activity   - Consult OT/PT to assist with strengthening/mobility   - Keep Call bell within reach  - Keep bed low and locked with side rails adjusted as appropriate  - Keep care items and personal belongings within reach  - Initiate and maintain comfort rounds  - Make Fall Risk Sign visible to staff  - Offer Toileting every 2 Hours, in advance of need  - Initiate/Maintain fall alarm  - Obtain necessary fall risk management equipment: non-skid footwear, call bell  - Apply yellow socks and bracelet for high fall risk patients  - Consider moving patient to room near nurses station  Outcome: Progressing     Problem: Nutrition/Hydration-ADULT  Goal: Nutrient/Hydration intake appropriate for improving, restoring or maintaining nutritional needs  Description: Monitor and assess patient's nutrition/hydration status for malnutrition. Collaborate with interdisciplinary team and initiate plan and interventions as ordered. Monitor patient's weight and dietary intake as ordered or per policy. Utilize nutrition screening tool and intervene as necessary. Determine patient's food preferences and provide high-protein, high-caloric foods as appropriate.      INTERVENTIONS:  - Monitor oral intake, urinary output, labs, and treatment plans  - Assess nutrition and hydration status and recommend course of action  - Evaluate amount of meals eaten  - Assist patient with eating if necessary   - Allow adequate time for meals  - Recommend/ encourage appropriate diets, oral nutritional supplements, and vitamin/mineral supplements  - Order, calculate, and assess calorie counts as needed  - Recommend, monitor, and adjust tube feedings and TPN/PPN based on assessed needs  - Assess need for intravenous fluids  - Provide specific nutrition/hydration education as appropriate  - Include patient/family/caregiver in decisions related to nutrition  Outcome: Progressing     Problem: MOBILITY - ADULT  Goal: Maintain or return to baseline ADL function  Description: INTERVENTIONS:  -  Assess patient's ability to carry out ADLs; assess patient's baseline for ADL function and identify physical deficits which impact ability to perform ADLs (bathing, care of mouth/teeth, toileting, grooming, dressing, etc.)  - Assess/evaluate cause of self-care deficits   - Assess range of motion  - Assess patient's mobility; develop plan if impaired  - Assess patient's need for assistive devices and provide as appropriate  - Encourage maximum independence but intervene and supervise when necessary  - Involve family in performance of ADLs  - Assess for home care needs following discharge   - Consider OT consult to assist with ADL evaluation and planning for discharge  - Provide patient education as appropriate  Outcome: Progressing  Goal: Maintains/Returns to pre admission functional level  Description: INTERVENTIONS:  - Perform BMAT or MOVE assessment daily.   - Set and communicate daily mobility goal to care team and patient/family/caregiver. - Collaborate with rehabilitation services on mobility goals if consulted  - Perform Range of Motion 3 times a day. - Reposition patient every 2 hours.   - Dangle patient 3 times a day  - Stand patient 3 times a day  - Ambulate patient 3 times a day  - Out of bed to chair 3 times a day   - Out of bed for meals 3 times a day  - Out of bed for toileting  - Record patient progress and toleration of activity level   Outcome: Progressing     Problem: Prexisting or High Potential for Compromised Skin Integrity  Goal: Skin integrity is maintained or improved  Description: INTERVENTIONS:  - Identify patients at risk for skin breakdown  - Assess and monitor skin integrity  - Assess and monitor nutrition and hydration status  - Monitor labs   - Assess for incontinence   - Turn and reposition patient  - Assist with mobility/ambulation  - Relieve pressure over bony prominences  - Avoid friction and shearing  - Provide appropriate hygiene as needed including keeping skin clean and dry  - Evaluate need for skin moisturizer/barrier cream  - Collaborate with interdisciplinary team   - Patient/family teaching  - Consider wound care consult   Outcome: Progressing     Problem: PAIN - ADULT  Goal: Verbalizes/displays adequate comfort level or baseline comfort level  Description: Interventions:  - Encourage patient to monitor pain and request assistance  - Assess pain using appropriate pain scale  - Administer analgesics based on type and severity of pain and evaluate response  - Implement non-pharmacological measures as appropriate and evaluate response  - Consider cultural and social influences on pain and pain management  - Notify physician/advanced practitioner if interventions unsuccessful or patient reports new pain  Outcome: Progressing     Problem: INFECTION - ADULT  Goal: Absence or prevention of progression during hospitalization  Description: INTERVENTIONS:  - Assess and monitor for signs and symptoms of infection  - Monitor lab/diagnostic results  - Monitor all insertion sites, i.e. indwelling lines, tubes, and drains  - Monitor endotracheal if appropriate and nasal secretions for changes in amount and color  - Leary appropriate cooling/warming therapies per order  - Administer medications as ordered  - Instruct and encourage patient and family to use good hand hygiene technique  - Identify and instruct in appropriate isolation precautions for identified infection/condition  Outcome: Progressing  Goal: Absence of fever/infection during neutropenic period  Description: INTERVENTIONS:  - Monitor WBC    Outcome: Progressing     Problem: SAFETY ADULT  Goal: Patient will remain free of falls  Description: INTERVENTIONS:  - Educate patient/family on patient safety including physical limitations  - Instruct patient to call for assistance with activity   - Consult OT/PT to assist with strengthening/mobility   - Keep Call bell within reach  - Keep bed low and locked with side rails adjusted as appropriate  - Keep care items and personal belongings within reach  - Initiate and maintain comfort rounds  - Make Fall Risk Sign visible to staff  - Offer Toileting every 2 Hours, in advance of need  - Initiate/Maintain fall alarm  - Obtain necessary fall risk management equipment: non-skid footwear, call bell  - Apply yellow socks and bracelet for high fall risk patients  - Consider moving patient to room near nurses station  Outcome: Progressing  Goal: Maintain or return to baseline ADL function  Description: INTERVENTIONS:  -  Assess patient's ability to carry out ADLs; assess patient's baseline for ADL function and identify physical deficits which impact ability to perform ADLs (bathing, care of mouth/teeth, toileting, grooming, dressing, etc.)  - Assess/evaluate cause of self-care deficits   - Assess range of motion  - Assess patient's mobility; develop plan if impaired  - Assess patient's need for assistive devices and provide as appropriate  - Encourage maximum independence but intervene and supervise when necessary  - Involve family in performance of ADLs  - Assess for home care needs following discharge   - Consider OT consult to assist with ADL evaluation and planning for discharge  - Provide patient education as appropriate  Outcome: Progressing  Goal: Maintains/Returns to pre admission functional level  Description: INTERVENTIONS:  - Perform BMAT or MOVE assessment daily.   - Set and communicate daily mobility goal to care team and patient/family/caregiver. - Collaborate with rehabilitation services on mobility goals if consulted  - Perform Range of Motion 3 times a day. - Reposition patient every 2 hours.   - Dangle patient 3 times a day  - Stand patient 3 times a day  - Ambulate patient 3 times a day  - Out of bed to chair 3 times a day   - Out of bed for meals 3 times a day  - Out of bed for toileting  - Record patient progress and toleration of activity level   Outcome: Progressing     Problem: DISCHARGE PLANNING  Goal: Discharge to home or other facility with appropriate resources  Description: INTERVENTIONS:  - Identify barriers to discharge w/patient and caregiver  - Arrange for needed discharge resources and transportation as appropriate  - Identify discharge learning needs (meds, wound care, etc.)  - Arrange for interpretive services to assist at discharge as needed  - Refer to Case Management Department for coordinating discharge planning if the patient needs post-hospital services based on physician/advanced practitioner order or complex needs related to functional status, cognitive ability, or social support system  Outcome: Progressing     Problem: Knowledge Deficit  Goal: Patient/family/caregiver demonstrates understanding of disease process, treatment plan, medications, and discharge instructions  Description: Complete learning assessment and assess knowledge base.   Interventions:  - Provide teaching at level of understanding  - Provide teaching via preferred learning methods  Outcome: Progressing     Problem: CARDIOVASCULAR - ADULT  Goal: Maintains optimal cardiac output and hemodynamic stability  Description: INTERVENTIONS:  - Monitor I/O, vital signs and rhythm  - Monitor for S/S and trends of decreased cardiac output  - Administer and titrate ordered vasoactive medications to optimize hemodynamic stability  - Assess quality of pulses, skin color and temperature  - Assess for signs of decreased coronary artery perfusion  - Instruct patient to report change in severity of symptoms  Outcome: Progressing  Goal: Absence of cardiac dysrhythmias or at baseline rhythm  Description: INTERVENTIONS:  - Continuous cardiac monitoring, vital signs, obtain 12 lead EKG if ordered  - Administer antiarrhythmic and heart rate control medications as ordered  - Monitor electrolytes and administer replacement therapy as ordered  Outcome: Progressing     Problem: GASTROINTESTINAL - ADULT  Goal: Minimal or absence of nausea and/or vomiting  Description: INTERVENTIONS:  - Administer IV fluids if ordered to ensure adequate hydration  - Maintain NPO status until nausea and vomiting are resolved  - Nasogastric tube if ordered  - Administer ordered antiemetic medications as needed  - Provide nonpharmacologic comfort measures as appropriate  - Advance diet as tolerated, if ordered  - Consider nutrition services referral to assist patient with adequate nutrition and appropriate food choices  Outcome: Progressing  Goal: Maintains or returns to baseline bowel function  Description: INTERVENTIONS:  - Assess bowel function  - Encourage oral fluids to ensure adequate hydration  - Administer IV fluids if ordered to ensure adequate hydration  - Administer ordered medications as needed  - Encourage mobilization and activity  - Consider nutritional services referral to assist patient with adequate nutrition and appropriate food choices  Outcome: Progressing  Goal: Maintains adequate nutritional intake  Description: INTERVENTIONS:  - Monitor percentage of each meal consumed  - Identify factors contributing to decreased intake, treat as appropriate  - Assist with meals as needed  - Monitor I&O, weight, and lab values if indicated  - Obtain nutrition services referral as needed  Outcome: Progressing  Goal: Oral mucous membranes remain intact  Description: INTERVENTIONS  - Assess oral mucosa and hygiene practices  - Implement preventative oral hygiene regimen  - Implement oral medicated treatments as ordered  - Initiate Nutrition services referral as needed  Outcome: Progressing     Problem: METABOLIC, FLUID AND ELECTROLYTES - ADULT  Goal: Electrolytes maintained within normal limits  Description: INTERVENTIONS:  - Monitor labs and assess patient for signs and symptoms of electrolyte imbalances  - Administer electrolyte replacement as ordered  - Monitor response to electrolyte replacements, including repeat lab results as appropriate  - Instruct patient on fluid and nutrition as appropriate  Outcome: Progressing  Goal: Fluid balance maintained  Description: INTERVENTIONS:  - Monitor labs   - Monitor I/O and WT  - Instruct patient on fluid and nutrition as appropriate  - Assess for signs & symptoms of volume excess or deficit  Outcome: Progressing  Goal: Glucose maintained within target range  Description: INTERVENTIONS:  - Monitor Blood Glucose as ordered  - Assess for signs and symptoms of hyperglycemia and hypoglycemia  - Administer ordered medications to maintain glucose within target range  - Assess nutritional intake and initiate nutrition service referral as needed  Outcome: Progressing     Problem: HEMATOLOGIC - ADULT  Goal: Maintains hematologic stability  Description: INTERVENTIONS  - Assess for signs and symptoms of bleeding or hemorrhage  - Monitor labs  - Administer supportive blood products/factors as ordered and appropriate  Outcome: Progressing     Problem: MUSCULOSKELETAL - ADULT  Goal: Maintain or return mobility to safest level of function  Description: INTERVENTIONS:  - Assess patient's ability to carry out ADLs; assess patient's baseline for ADL function and identify physical deficits which impact ability to perform ADLs (bathing, care of mouth/teeth, toileting, grooming, dressing, etc.)  - Assess/evaluate cause of self-care deficits   - Assess range of motion  - Assess patient's mobility  - Assess patient's need for assistive devices and provide as appropriate  - Encourage maximum independence but intervene and supervise when necessary  - Involve family in performance of ADLs  - Assess for home care needs following discharge   - Consider OT consult to assist with ADL evaluation and planning for discharge  - Provide patient education as appropriate  Outcome: Progressing  Goal: Maintain proper alignment of affected body part  Description: INTERVENTIONS:  - Support, maintain and protect limb and body alignment  - Provide patient/ family with appropriate education  Outcome: Progressing

## 2023-09-23 NOTE — PROGRESS NOTES
Progress Note - Nephrology   Amos Head 76 y.o. female MRN: [id number]  Unit/Bed#: 407-01 Encounter: [id number]    A/P:  1. Acute hyponatremia  Moderate, potentially symptomatic, hypoosmolar. Volume status appears hypervolemic with worsening abdominal distention and edema. Urine specific gravity 1.015. Urine Na <10   urine osm 655 suggesting ADH release. Sodium unchanged at 121 mmol/L despite escalation in tolvptan doses. .  Tolvaptan redosed on 9/22 however, due to late timing was not able to receive from pharmacy. She is a difficult patient given her hypotension and comorbidities. Suspected extravascular volume overload with intravascular volume depletion and poor effective circulating volume with cirrhosis resulting in a sodium avid state. Additionally may have ADH release due to malignancy. Her urine osmolality was not maximally dilute. Recommend:  Received 500 mL x 3 normal saline with symptomatic hypotension. We will try to limit isotonic fluid as her ADH release may cause her sodium to decline. Her volume status is complicated due to potential intravascular volume depletion. Her abdominal distention and edema are worsening. Her weight is trending up in the bed scale to 121 pounds if accurate. Would avoid diuresis with loop diuretics due to her tendency for hypotension. Tolvaptan and study showed similar hypotension rates to placebo. Theoretically could cause hypotension although it has not been shown in studies to do so. Will redose tolvaptan 15 mg x 1. Recheck chemistry at 4 PM.  If further tolvaptan is needed, need to get this dose in before 7 PM due to pharmacy logistics. IF tolvaptan fails to improve situation, another option may be 1.8% hypertonic saline. Would avoid this as the same day as OVERcorrection would be a concern. Concentrated salt solutions would limit volume administration.     2.  Volume depletion, suspected to have intravascular volume depletion potentially with extra vascular volume overload. Attempt to limit isotonic fluid as best we can as this is leading to signs of worsening volume overload. Midodrine may be an ideal option to assist with hypotension and management  . 3. Hypomagnesemia, continue to supplement magnesium levels as indicated. 4.  Potential SIADH as evidenced by elevated urine osmolality. Isotonic sodium may cause fluid reabsorption. 5.metastatic ovarian cancer with peritoneal carciomatosis. Worsening ascites can increase intrathoracic pressure and decrease preload contributing to hypotension. Code status DNR. Follow up reason for today's visit:     Dehydration with hyponatremia      Subjective:   Patient seen and examined today at approximately 830 AM, documentation delayed due to patient care. She was hypotensive this morning requiring 500 mL normal saline bolus. BP trends have stabilized. She reports feeling very fatigued. Denies any other complaints at present. A complete 10 point review of systems was performed and is otherwise negative. Objective:     Vitals: Blood pressure (!) 89/52, pulse (!) 114, temperature 98.2 °F (36.8 °C), resp. rate 20, height 4' 11" (1.499 m), weight 55 kg (121 lb 4.1 oz), SpO2 97 %, not currently breastfeeding. ,Body mass index is 24.49 kg/m². Weight (last 2 days)     Date/Time Weight    09/23/23 0700 55 (121.25)    09/23/23 0543 54.2 (119.4)    09/23/23 0524 54.2 (119.4)    09/23/23 0345 55 (121.25)    09/22/23 0552 51.7 (113.98)    09/21/23 0537 50.6 (111.55)            Intake/Output Summary (Last 24 hours) at 9/23/2023 1635  Last data filed at 9/23/2023 1624  Gross per 24 hour   Intake 920 ml   Output 1500 ml   Net -580 ml     I/O last 3 completed shifts:   In: 560 [P.O.:560]  Out: 950 [Urine:950]         Physical Exam: BP (!) 89/52   Pulse (!) 114   Temp 98.2 °F (36.8 °C)   Resp 20   Ht 4' 11" (1.499 m)   Wt 55 kg (121 lb 4.1 oz)   LMP  (LMP Unknown)   SpO2 97%   BMI 24.49 kg/m²     General Appearance:    Alert, cooperative, no distress, appears stated age   Head:    Normocephalic, without obvious abnormality, atraumatic   Eyes:    Conjunctiva/corneas clear   Ears:    Normal external ears   Nose:   Nares normal, septum midline, mucosa normal, no drainage    or sinus tenderness   Throat:    Neck:    Back:     Symmetric, no curvature, ROM normal, no CVA tenderness   Lungs:     Clear to auscultation bilaterally, respirations unlabored   Chest wall:    No tenderness or deformity   Heart:    Regular rate and rhythm, S1 and S2 normal, no murmur, rub   or gallop   Abdomen:     Soft,distention    Extremities:   +2 BL LE edema   Skin:   Skin color, texture, turgor normal, no rashes or lesions   Lymph nodes:   Cervical normal   Neurologic:   CNII-XII intact            Lab, Imaging and other studies: I have personally reviewed pertinent labs. CBC:   Lab Results   Component Value Date    WBC 10.52 (H) 09/23/2023    HGB 8.0 (L) 09/23/2023    HCT 25.6 (L) 09/23/2023    MCV 86 09/23/2023     (L) 09/23/2023    RBC 2.99 (L) 09/23/2023    MCH 26.8 09/23/2023    MCHC 31.3 (L) 09/23/2023    RDW 17.7 (H) 09/23/2023    MPV 10.8 09/23/2023    NRBC 0 09/23/2023     CMP:   Lab Results   Component Value Date    K 4.2 09/23/2023    CL 92 (L) 09/23/2023    CO2 21 09/23/2023    BUN 17 09/23/2023    CREATININE 0.65 09/23/2023    CALCIUM 8.2 (L) 09/23/2023    AST 35 09/23/2023    ALT 20 09/23/2023    ALKPHOS 157 (H) 09/23/2023    EGFR 91 09/23/2023       .   Results from last 7 days   Lab Units 09/23/23  1037 09/23/23  0412 09/23/23  0411 09/23/23  0020 09/20/23  1208 09/20/23  0511 09/19/23  1433 09/19/23  0514   POTASSIUM mmol/L 4.2  --  4.2 4.5   < > 4.0   < > 4.0   CHLORIDE mmol/L 92*  --  91* 90*   < > 88*   < > 89*   CO2 mmol/L 21  --  21 23   < > 26   < > 24   BUN mg/dL 17  --  19 19   < > 22   < > 30*   CREATININE mg/dL 0.65  --  0.77 0.76   < > 0.66   < > 0.91   CALCIUM mg/dL 8.2*  --  8.1* 8.4   < > 8.7   < > 8.7   ALK PHOS U/L  --  157*  --   --   --  188*  --  131*   ALT U/L  --  20  --   --   --  15  --  10   AST U/L  --  35  --   --   --  39  --  21    < > = values in this interval not displayed. Phosphorus:   Lab Results   Component Value Date    PHOS 2.6 09/23/2023     Magnesium:   Lab Results   Component Value Date    MG 1.6 (L) 09/23/2023     Urinalysis: No results found for: "COLORU", "CLARITYU", "Ashley Genie", "PHUR", "LEUKOCYTESUR", "NITRITE", "PROTEINUA", "GLUCOSEU", "Maritza Schultz", "Jillyn Liner", "BLOODU"  Ionized Calcium: No results found for: "CAION"  Coagulation:   Lab Results   Component Value Date    INR 1.23 (H) 09/23/2023     Troponin: No results found for: "TROPONINI"  ABG: No results found for: "PHART", "AEF5CNY", "PO2ART", "BAK1ZMA", "Z8QJBXWI", "BEART", "SOURCE"  Radiology review:     IMAGING  No results found.     Current Facility-Administered Medications   Medication Dose Route Frequency   • albuterol inhalation solution 2.5 mg  2.5 mg Nebulization Q4H PRN   • aluminum-magnesium hydroxide-simethicone (MAALOX) oral suspension 30 mL  30 mL Oral Q6H PRN   • bisacodyl (DULCOLAX) rectal suppository 10 mg  10 mg Rectal Daily PRN   • Diclofenac Sodium (VOLTAREN) 1 % topical gel 2 g  2 g Topical 4x Daily   • enoxaparin (LOVENOX) subcutaneous injection 40 mg  40 mg Subcutaneous Daily   • gabapentin (NEURONTIN) capsule 200 mg  200 mg Oral TID   • HYDROmorphone (DILAUDID) injection 1 mg  1 mg Intravenous Q3H PRN   • LORazepam (ATIVAN) tablet 0.5 mg  0.5 mg Oral BID PRN   • ondansetron (ZOFRAN) injection 4 mg  4 mg Intravenous Q6H PRN   • oxyCODONE (ROXICODONE) immediate release tablet 10 mg  10 mg Oral Q4H PRN   • pantoprazole (PROTONIX) EC tablet 40 mg  40 mg Oral Early Morning   • polyethylene glycol (MIRALAX) packet 17 g  17 g Oral Daily   • tolvaptan (Samsca) tablet 15 mg  15 mg Oral Once   • warfarin (COUMADIN) tablet 5 mg  5 mg Oral Daily (warfarin)     Medications Discontinued During This Encounter   Medication Reason   • piperacillin-tazobactam (ZOSYN) 3.375 g in sodium chloride 0.9 % 100 mL IVPB    • sodium chloride 0.9 % with KCl 20 mEq/L infusion (premix)    • oxyCODONE-acetaminophen (PERCOCET) 5-325 mg per tablet 2 tablet    • HYDROmorphone (DILAUDID) injection 0.5 mg    • sodium chloride (HYPERTONIC) infusion 1.8%    • sodium chloride tablet 2 g    • cefTRIAXone (ROCEPHIN) IVPB (premix in dextrose) 1,000 mg 50 mL        Shawna Hobbs and Eric, DO      This progress note was produced in part using a dictation device which may document imprecise wording from author's original intent.

## 2023-09-23 NOTE — PLAN OF CARE
Problem: Potential for Falls  Goal: Patient will remain free of falls  Description: INTERVENTIONS:  - Educate patient/family on patient safety including physical limitations  - Instruct patient to call for assistance with activity   - Consult OT/PT to assist with strengthening/mobility   - Keep Call bell within reach  - Keep bed low and locked with side rails adjusted as appropriate  - Keep care items and personal belongings within reach  - Initiate and maintain comfort rounds  - Make Fall Risk Sign visible to staff  - Offer Toileting every 2 Hours, in advance of need  - Initiate/Maintain fall alarm  - Obtain necessary fall risk management equipment: non-skid footwear, call bell  - Apply yellow socks and bracelet for high fall risk patients  - Consider moving patient to room near nurses station  Outcome: Progressing     Problem: Nutrition/Hydration-ADULT  Goal: Nutrient/Hydration intake appropriate for improving, restoring or maintaining nutritional needs  Description: Monitor and assess patient's nutrition/hydration status for malnutrition. Collaborate with interdisciplinary team and initiate plan and interventions as ordered. Monitor patient's weight and dietary intake as ordered or per policy. Utilize nutrition screening tool and intervene as necessary. Determine patient's food preferences and provide high-protein, high-caloric foods as appropriate.      INTERVENTIONS:  - Monitor oral intake, urinary output, labs, and treatment plans  - Assess nutrition and hydration status and recommend course of action  - Evaluate amount of meals eaten  - Assist patient with eating if necessary   - Allow adequate time for meals  - Recommend/ encourage appropriate diets, oral nutritional supplements, and vitamin/mineral supplements  - Order, calculate, and assess calorie counts as needed  - Recommend, monitor, and adjust tube feedings and TPN/PPN based on assessed needs  - Assess need for intravenous fluids  - Provide specific nutrition/hydration education as appropriate  - Include patient/family/caregiver in decisions related to nutrition  Outcome: Progressing     Problem: MOBILITY - ADULT  Goal: Maintain or return to baseline ADL function  Description: INTERVENTIONS:  -  Assess patient's ability to carry out ADLs; assess patient's baseline for ADL function and identify physical deficits which impact ability to perform ADLs (bathing, care of mouth/teeth, toileting, grooming, dressing, etc.)  - Assess/evaluate cause of self-care deficits   - Assess range of motion  - Assess patient's mobility; develop plan if impaired  - Assess patient's need for assistive devices and provide as appropriate  - Encourage maximum independence but intervene and supervise when necessary  - Involve family in performance of ADLs  - Assess for home care needs following discharge   - Consider OT consult to assist with ADL evaluation and planning for discharge  - Provide patient education as appropriate  Outcome: Progressing  Goal: Maintains/Returns to pre admission functional level  Description: INTERVENTIONS:  - Perform BMAT or MOVE assessment daily.   - Set and communicate daily mobility goal to care team and patient/family/caregiver. - Collaborate with rehabilitation services on mobility goals if consulted  - Perform Range of Motion 3 times a day. - Reposition patient every 2 hours.   - Dangle patient 3 times a day  - Stand patient 3 times a day  - Ambulate patient 3 times a day  - Out of bed to chair 3 times a day   - Out of bed for meals 3 times a day  - Out of bed for toileting  - Record patient progress and toleration of activity level   Outcome: Progressing     Problem: Prexisting or High Potential for Compromised Skin Integrity  Goal: Skin integrity is maintained or improved  Description: INTERVENTIONS:  - Identify patients at risk for skin breakdown  - Assess and monitor skin integrity  - Assess and monitor nutrition and hydration status  - Monitor labs   - Assess for incontinence   - Turn and reposition patient  - Assist with mobility/ambulation  - Relieve pressure over bony prominences  - Avoid friction and shearing  - Provide appropriate hygiene as needed including keeping skin clean and dry  - Evaluate need for skin moisturizer/barrier cream  - Collaborate with interdisciplinary team   - Patient/family teaching  - Consider wound care consult   Outcome: Progressing     Problem: PAIN - ADULT  Goal: Verbalizes/displays adequate comfort level or baseline comfort level  Description: Interventions:  - Encourage patient to monitor pain and request assistance  - Assess pain using appropriate pain scale  - Administer analgesics based on type and severity of pain and evaluate response  - Implement non-pharmacological measures as appropriate and evaluate response  - Consider cultural and social influences on pain and pain management  - Notify physician/advanced practitioner if interventions unsuccessful or patient reports new pain  Outcome: Progressing     Problem: INFECTION - ADULT  Goal: Absence or prevention of progression during hospitalization  Description: INTERVENTIONS:  - Assess and monitor for signs and symptoms of infection  - Monitor lab/diagnostic results  - Monitor all insertion sites, i.e. indwelling lines, tubes, and drains  - Monitor endotracheal if appropriate and nasal secretions for changes in amount and color  - [place] appropriate cooling/warming therapies per order  - Administer medications as ordered  - Instruct and encourage patient and family to use good hand hygiene technique  - Identify and instruct in appropriate isolation precautions for identified infection/condition  Outcome: Progressing  Goal: Absence of fever/infection during neutropenic period  Description: INTERVENTIONS:  - Monitor WBC    Outcome: Progressing     Problem: SAFETY ADULT  Goal: Patient will remain free of falls  Description: INTERVENTIONS:  - Educate patient/family on patient safety including physical limitations  - Instruct patient to call for assistance with activity   - Consult OT/PT to assist with strengthening/mobility   - Keep Call bell within reach  - Keep bed low and locked with side rails adjusted as appropriate  - Keep care items and personal belongings within reach  - Initiate and maintain comfort rounds  - Make Fall Risk Sign visible to staff  - Offer Toileting every 2 Hours, in advance of need  - Initiate/Maintain fall alarm  - Obtain necessary fall risk management equipment: non-skid footwear, call bell  - Apply yellow socks and bracelet for high fall risk patients  - Consider moving patient to room near nurses station  Outcome: Progressing  Goal: Maintain or return to baseline ADL function  Description: INTERVENTIONS:  -  Assess patient's ability to carry out ADLs; assess patient's baseline for ADL function and identify physical deficits which impact ability to perform ADLs (bathing, care of mouth/teeth, toileting, grooming, dressing, etc.)  - Assess/evaluate cause of self-care deficits   - Assess range of motion  - Assess patient's mobility; develop plan if impaired  - Assess patient's need for assistive devices and provide as appropriate  - Encourage maximum independence but intervene and supervise when necessary  - Involve family in performance of ADLs  - Assess for home care needs following discharge   - Consider OT consult to assist with ADL evaluation and planning for discharge  - Provide patient education as appropriate  Outcome: Progressing  Goal: Maintains/Returns to pre admission functional level  Description: INTERVENTIONS:  - Perform BMAT or MOVE assessment daily.   - Set and communicate daily mobility goal to care team and patient/family/caregiver. - Collaborate with rehabilitation services on mobility goals if consulted  - Perform Range of Motion 3 times a day. - Reposition patient every 2 hours.   - Dangle patient 3 times a day  - Stand patient 3 times a day  - Ambulate patient 3 times a day  - Out of bed to chair 3 times a day   - Out of bed for meals 3 times a day  - Out of bed for toileting  - Record patient progress and toleration of activity level   Outcome: Progressing     Problem: DISCHARGE PLANNING  Goal: Discharge to home or other facility with appropriate resources  Description: INTERVENTIONS:  - Identify barriers to discharge w/patient and caregiver  - Arrange for needed discharge resources and transportation as appropriate  - Identify discharge learning needs (meds, wound care, etc.)  - Arrange for interpretive services to assist at discharge as needed  - Refer to Case Management Department for coordinating discharge planning if the patient needs post-hospital services based on physician/advanced practitioner order or complex needs related to functional status, cognitive ability, or social support system  Outcome: Progressing     Problem: Knowledge Deficit  Goal: Patient/family/caregiver demonstrates understanding of disease process, treatment plan, medications, and discharge instructions  Description: Complete learning assessment and assess knowledge base.   Interventions:  - Provide teaching at level of understanding  - Provide teaching via preferred learning methods  Outcome: Progressing     Problem: CARDIOVASCULAR - ADULT  Goal: Maintains optimal cardiac output and hemodynamic stability  Description: INTERVENTIONS:  - Monitor I/O, vital signs and rhythm  - Monitor for S/S and trends of decreased cardiac output  - Administer and titrate ordered vasoactive medications to optimize hemodynamic stability  - Assess quality of pulses, skin color and temperature  - Assess for signs of decreased coronary artery perfusion  - Instruct patient to report change in severity of symptoms  Outcome: Progressing  Goal: Absence of cardiac dysrhythmias or at baseline rhythm  Description: INTERVENTIONS:  - Continuous cardiac monitoring, vital signs, obtain 12 lead EKG if ordered  - Administer antiarrhythmic and heart rate control medications as ordered  - Monitor electrolytes and administer replacement therapy as ordered  Outcome: Progressing     Problem: GASTROINTESTINAL - ADULT  Goal: Minimal or absence of nausea and/or vomiting  Description: INTERVENTIONS:  - Administer IV fluids if ordered to ensure adequate hydration  - Maintain NPO status until nausea and vomiting are resolved  - Nasogastric tube if ordered  - Administer ordered antiemetic medications as needed  - Provide nonpharmacologic comfort measures as appropriate  - Advance diet as tolerated, if ordered  - Consider nutrition services referral to assist patient with adequate nutrition and appropriate food choices  Outcome: Progressing  Goal: Maintains or returns to baseline bowel function  Description: INTERVENTIONS:  - Assess bowel function  - Encourage oral fluids to ensure adequate hydration  - Administer IV fluids if ordered to ensure adequate hydration  - Administer ordered medications as needed  - Encourage mobilization and activity  - Consider nutritional services referral to assist patient with adequate nutrition and appropriate food choices  Outcome: Progressing  Goal: Maintains adequate nutritional intake  Description: INTERVENTIONS:  - Monitor percentage of each meal consumed  - Identify factors contributing to decreased intake, treat as appropriate  - Assist with meals as needed  - Monitor I&O, weight, and lab values if indicated  - Obtain nutrition services referral as needed  Outcome: Progressing  Goal: Oral mucous membranes remain intact  Description: INTERVENTIONS  - Assess oral mucosa and hygiene practices  - Implement preventative oral hygiene regimen  - Implement oral medicated treatments as ordered  - Initiate Nutrition services referral as needed  Outcome: Progressing     Problem: METABOLIC, FLUID AND ELECTROLYTES - ADULT  Goal: Electrolytes maintained within normal limits  Description: INTERVENTIONS:  - Monitor labs and assess patient for signs and symptoms of electrolyte imbalances  - Administer electrolyte replacement as ordered  - Monitor response to electrolyte replacements, including repeat lab results as appropriate  - Instruct patient on fluid and nutrition as appropriate  Outcome: Progressing  Goal: Fluid balance maintained  Description: INTERVENTIONS:  - Monitor labs   - Monitor I/O and WT  - Instruct patient on fluid and nutrition as appropriate  - Assess for signs & symptoms of volume excess or deficit  Outcome: Progressing  Goal: Glucose maintained within target range  Description: INTERVENTIONS:  - Monitor Blood Glucose as ordered  - Assess for signs and symptoms of hyperglycemia and hypoglycemia  - Administer ordered medications to maintain glucose within target range  - Assess nutritional intake and initiate nutrition service referral as needed  Outcome: Progressing     Problem: HEMATOLOGIC - ADULT  Goal: Maintains hematologic stability  Description: INTERVENTIONS  - Assess for signs and symptoms of bleeding or hemorrhage  - Monitor labs  - Administer supportive blood products/factors as ordered and appropriate  Outcome: Progressing     Problem: MUSCULOSKELETAL - ADULT  Goal: Maintain or return mobility to safest level of function  Description: INTERVENTIONS:  - Assess patient's ability to carry out ADLs; assess patient's baseline for ADL function and identify physical deficits which impact ability to perform ADLs (bathing, care of mouth/teeth, toileting, grooming, dressing, etc.)  - Assess/evaluate cause of self-care deficits   - Assess range of motion  - Assess patient's mobility  - Assess patient's need for assistive devices and provide as appropriate  - Encourage maximum independence but intervene and supervise when necessary  - Involve family in performance of ADLs  - Assess for home care needs following discharge   - Consider OT consult to assist with ADL evaluation and planning for discharge  - Provide patient education as appropriate  Outcome: Progressing  Goal: Maintain proper alignment of affected body part  Description: INTERVENTIONS:  - Support, maintain and protect limb and body alignment  - Provide patient/ family with appropriate education  Outcome: Progressing

## 2023-09-23 NOTE — ASSESSMENT & PLAN NOTE
Renal function improved, DC IVF and fluid restrict   nephro consult appreciated  Possible SIADH, patient to receive additional doses of tolvaptan today 9/22  Developed further hypotension prompting IVF bolus, sodium remains stable around 120-122  Nephrology input and assistance is appreciated

## 2023-09-23 NOTE — NURSING NOTE
Current manual BP 88/52. Pt asymptomatic, appears pressures have been soft since admission. Made provider Aruna Young aware of bp over [employer]text. Bolus ordered. Running now, will keep a close eye on BP. Bolus finished- new manual BP lower than before- 78/50. Checked twice, same both times. Pt had been sleeping before pressure checked. Pt still asymptomatic. HR tachy- 113. Made provider Aruna Young aware of new pressure as well. Another bolus ordered, running. Will continue to keep close eye on BP. Bolus finished, new manual 90/40. Made provider Aruna Young aware via [employer]. Per provider instruction, will recheck manual bp- if accurate or MAP still <65, run ordered 1/2 NS bolus. Rechecked manual BP lower than original-running ordered bolus now.

## 2023-09-23 NOTE — PLAN OF CARE
Problem: Potential for Falls  Goal: Patient will remain free of falls  Description: INTERVENTIONS:  - Educate patient/family on patient safety including physical limitations  - Instruct patient to call for assistance with activity   - Consult OT/PT to assist with strengthening/mobility   - Keep Call bell within reach  - Keep bed low and locked with side rails adjusted as appropriate  - Keep care items and personal belongings within reach  - Initiate and maintain comfort rounds  - Make Fall Risk Sign visible to staff  - Offer Toileting every 2 Hours, in advance of need  - Initiate/Maintain fall alarm  - Obtain necessary fall risk management equipment: non-skid footwear, call bell  - Apply yellow socks and bracelet for high fall risk patients  - Consider moving patient to room near nurses station  Outcome: Progressing     Problem: Nutrition/Hydration-ADULT  Goal: Nutrient/Hydration intake appropriate for improving, restoring or maintaining nutritional needs  Description: Monitor and assess patient's nutrition/hydration status for malnutrition. Collaborate with interdisciplinary team and initiate plan and interventions as ordered. Monitor patient's weight and dietary intake as ordered or per policy. Utilize nutrition screening tool and intervene as necessary. Determine patient's food preferences and provide high-protein, high-caloric foods as appropriate.      INTERVENTIONS:  - Monitor oral intake, urinary output, labs, and treatment plans  - Assess nutrition and hydration status and recommend course of action  - Evaluate amount of meals eaten  - Assist patient with eating if necessary   - Allow adequate time for meals  - Recommend/ encourage appropriate diets, oral nutritional supplements, and vitamin/mineral supplements  - Order, calculate, and assess calorie counts as needed  - Recommend, monitor, and adjust tube feedings and TPN/PPN based on assessed needs  - Assess need for intravenous fluids  - Provide specific nutrition/hydration education as appropriate  - Include patient/family/caregiver in decisions related to nutrition  Outcome: Progressing     Problem: MOBILITY - ADULT  Goal: Maintain or return to baseline ADL function  Description: INTERVENTIONS:  -  Assess patient's ability to carry out ADLs; assess patient's baseline for ADL function and identify physical deficits which impact ability to perform ADLs (bathing, care of mouth/teeth, toileting, grooming, dressing, etc.)  - Assess/evaluate cause of self-care deficits   - Assess range of motion  - Assess patient's mobility; develop plan if impaired  - Assess patient's need for assistive devices and provide as appropriate  - Encourage maximum independence but intervene and supervise when necessary  - Involve family in performance of ADLs  - Assess for home care needs following discharge   - Consider OT consult to assist with ADL evaluation and planning for discharge  - Provide patient education as appropriate  Outcome: Progressing  Goal: Maintains/Returns to pre admission functional level  Description: INTERVENTIONS:  - Perform BMAT or MOVE assessment daily.   - Set and communicate daily mobility goal to care team and patient/family/caregiver. - Collaborate with rehabilitation services on mobility goals if consulted  - Perform Range of Motion 3 times a day. - Reposition patient every 2 hours.   - Dangle patient 3 times a day  - Stand patient 3 times a day  - Ambulate patient 3 times a day  - Out of bed to chair 3 times a day   - Out of bed for meals 3 times a day  - Out of bed for toileting  - Record patient progress and toleration of activity level   Outcome: Progressing     Problem: Prexisting or High Potential for Compromised Skin Integrity  Goal: Skin integrity is maintained or improved  Description: INTERVENTIONS:  - Identify patients at risk for skin breakdown  - Assess and monitor skin integrity  - Assess and monitor nutrition and hydration status  - Monitor labs   - Assess for incontinence   - Turn and reposition patient  - Assist with mobility/ambulation  - Relieve pressure over bony prominences  - Avoid friction and shearing  - Provide appropriate hygiene as needed including keeping skin clean and dry  - Evaluate need for skin moisturizer/barrier cream  - Collaborate with interdisciplinary team   - Patient/family teaching  - Consider wound care consult   Outcome: Progressing     Problem: PAIN - ADULT  Goal: Verbalizes/displays adequate comfort level or baseline comfort level  Description: Interventions:  - Encourage patient to monitor pain and request assistance  - Assess pain using appropriate pain scale  - Administer analgesics based on type and severity of pain and evaluate response  - Implement non-pharmacological measures as appropriate and evaluate response  - Consider cultural and social influences on pain and pain management  - Notify physician/advanced practitioner if interventions unsuccessful or patient reports new pain  Outcome: Progressing     Problem: INFECTION - ADULT  Goal: Absence or prevention of progression during hospitalization  Description: INTERVENTIONS:  - Assess and monitor for signs and symptoms of infection  - Monitor lab/diagnostic results  - Monitor all insertion sites, i.e. indwelling lines, tubes, and drains  - Monitor endotracheal if appropriate and nasal secretions for changes in amount and color  - [place] appropriate cooling/warming therapies per order  - Administer medications as ordered  - Instruct and encourage patient and family to use good hand hygiene technique  - Identify and instruct in appropriate isolation precautions for identified infection/condition  Outcome: Progressing  Goal: Absence of fever/infection during neutropenic period  Description: INTERVENTIONS:  - Monitor WBC    Outcome: Progressing     Problem: SAFETY ADULT  Goal: Patient will remain free of falls  Description: INTERVENTIONS:  - Educate patient/family on patient safety including physical limitations  - Instruct patient to call for assistance with activity   - Consult OT/PT to assist with strengthening/mobility   - Keep Call bell within reach  - Keep bed low and locked with side rails adjusted as appropriate  - Keep care items and personal belongings within reach  - Initiate and maintain comfort rounds  - Make Fall Risk Sign visible to staff  - Offer Toileting every 2 Hours, in advance of need  - Initiate/Maintain fall alarm  - Obtain necessary fall risk management equipment: non-skid footwear, call bell  - Apply yellow socks and bracelet for high fall risk patients  - Consider moving patient to room near nurses station  Outcome: Progressing  Goal: Maintain or return to baseline ADL function  Description: INTERVENTIONS:  -  Assess patient's ability to carry out ADLs; assess patient's baseline for ADL function and identify physical deficits which impact ability to perform ADLs (bathing, care of mouth/teeth, toileting, grooming, dressing, etc.)  - Assess/evaluate cause of self-care deficits   - Assess range of motion  - Assess patient's mobility; develop plan if impaired  - Assess patient's need for assistive devices and provide as appropriate  - Encourage maximum independence but intervene and supervise when necessary  - Involve family in performance of ADLs  - Assess for home care needs following discharge   - Consider OT consult to assist with ADL evaluation and planning for discharge  - Provide patient education as appropriate  Outcome: Progressing  Goal: Maintains/Returns to pre admission functional level  Description: INTERVENTIONS:  - Perform BMAT or MOVE assessment daily.   - Set and communicate daily mobility goal to care team and patient/family/caregiver. - Collaborate with rehabilitation services on mobility goals if consulted  - Perform Range of Motion 3 times a day. - Reposition patient every 2 hours.   - Dangle patient 3 times a day  - Stand patient 3 times a day  - Ambulate patient 3 times a day  - Out of bed to chair 3 times a day   - Out of bed for meals 3 times a day  - Out of bed for toileting  - Record patient progress and toleration of activity level   Outcome: Progressing     Problem: DISCHARGE PLANNING  Goal: Discharge to home or other facility with appropriate resources  Description: INTERVENTIONS:  - Identify barriers to discharge w/patient and caregiver  - Arrange for needed discharge resources and transportation as appropriate  - Identify discharge learning needs (meds, wound care, etc.)  - Arrange for interpretive services to assist at discharge as needed  - Refer to Case Management Department for coordinating discharge planning if the patient needs post-hospital services based on physician/advanced practitioner order or complex needs related to functional status, cognitive ability, or social support system  Outcome: Progressing     Problem: Knowledge Deficit  Goal: Patient/family/caregiver demonstrates understanding of disease process, treatment plan, medications, and discharge instructions  Description: Complete learning assessment and assess knowledge base.   Interventions:  - Provide teaching at level of understanding  - Provide teaching via preferred learning methods  Outcome: Progressing     Problem: CARDIOVASCULAR - ADULT  Goal: Maintains optimal cardiac output and hemodynamic stability  Description: INTERVENTIONS:  - Monitor I/O, vital signs and rhythm  - Monitor for S/S and trends of decreased cardiac output  - Administer and titrate ordered vasoactive medications to optimize hemodynamic stability  - Assess quality of pulses, skin color and temperature  - Assess for signs of decreased coronary artery perfusion  - Instruct patient to report change in severity of symptoms  Outcome: Progressing  Goal: Absence of cardiac dysrhythmias or at baseline rhythm  Description: INTERVENTIONS:  - Continuous cardiac monitoring, vital signs, obtain 12 lead EKG if ordered  - Administer antiarrhythmic and heart rate control medications as ordered  - Monitor electrolytes and administer replacement therapy as ordered  Outcome: Progressing     Problem: GASTROINTESTINAL - ADULT  Goal: Minimal or absence of nausea and/or vomiting  Description: INTERVENTIONS:  - Administer IV fluids if ordered to ensure adequate hydration  - Maintain NPO status until nausea and vomiting are resolved  - Nasogastric tube if ordered  - Administer ordered antiemetic medications as needed  - Provide nonpharmacologic comfort measures as appropriate  - Advance diet as tolerated, if ordered  - Consider nutrition services referral to assist patient with adequate nutrition and appropriate food choices  Outcome: Progressing  Goal: Maintains or returns to baseline bowel function  Description: INTERVENTIONS:  - Assess bowel function  - Encourage oral fluids to ensure adequate hydration  - Administer IV fluids if ordered to ensure adequate hydration  - Administer ordered medications as needed  - Encourage mobilization and activity  - Consider nutritional services referral to assist patient with adequate nutrition and appropriate food choices  Outcome: Progressing  Goal: Maintains adequate nutritional intake  Description: INTERVENTIONS:  - Monitor percentage of each meal consumed  - Identify factors contributing to decreased intake, treat as appropriate  - Assist with meals as needed  - Monitor I&O, weight, and lab values if indicated  - Obtain nutrition services referral as needed  Outcome: Progressing  Goal: Oral mucous membranes remain intact  Description: INTERVENTIONS  - Assess oral mucosa and hygiene practices  - Implement preventative oral hygiene regimen  - Implement oral medicated treatments as ordered  - Initiate Nutrition services referral as needed  Outcome: Progressing     Problem: METABOLIC, FLUID AND ELECTROLYTES - ADULT  Goal: Electrolytes maintained within normal limits  Description: INTERVENTIONS:  - Monitor labs and assess patient for signs and symptoms of electrolyte imbalances  - Administer electrolyte replacement as ordered  - Monitor response to electrolyte replacements, including repeat lab results as appropriate  - Instruct patient on fluid and nutrition as appropriate  Outcome: Progressing  Goal: Fluid balance maintained  Description: INTERVENTIONS:  - Monitor labs   - Monitor I/O and WT  - Instruct patient on fluid and nutrition as appropriate  - Assess for signs & symptoms of volume excess or deficit  Outcome: Progressing  Goal: Glucose maintained within target range  Description: INTERVENTIONS:  - Monitor Blood Glucose as ordered  - Assess for signs and symptoms of hyperglycemia and hypoglycemia  - Administer ordered medications to maintain glucose within target range  - Assess nutritional intake and initiate nutrition service referral as needed  Outcome: Progressing     Problem: HEMATOLOGIC - ADULT  Goal: Maintains hematologic stability  Description: INTERVENTIONS  - Assess for signs and symptoms of bleeding or hemorrhage  - Monitor labs  - Administer supportive blood products/factors as ordered and appropriate  Outcome: Progressing     Problem: MUSCULOSKELETAL - ADULT  Goal: Maintain or return mobility to safest level of function  Description: INTERVENTIONS:  - Assess patient's ability to carry out ADLs; assess patient's baseline for ADL function and identify physical deficits which impact ability to perform ADLs (bathing, care of mouth/teeth, toileting, grooming, dressing, etc.)  - Assess/evaluate cause of self-care deficits   - Assess range of motion  - Assess patient's mobility  - Assess patient's need for assistive devices and provide as appropriate  - Encourage maximum independence but intervene and supervise when necessary  - Involve family in performance of ADLs  - Assess for home care needs following discharge   - Consider OT consult to assist with ADL evaluation and planning for discharge  - Provide patient education as appropriate  Outcome: Progressing  Goal: Maintain proper alignment of affected body part  Description: INTERVENTIONS:  - Support, maintain and protect limb and body alignment  - Provide patient/ family with appropriate education  Outcome: Progressing

## 2023-09-23 NOTE — QUICK NOTE
Sodium remain unchanged at 121. Will plan to redose 15mg for total dose 30mg today. Order placed prior to pharmacy closure at 7 PM.  IF tolvaptan unsuccessful on 9/24, plan for 1.8% saline. RN aware of plan to notify pharmacy. Continue q 6 bmp and monitor for correction. Goal Na>125 for DC.

## 2023-09-23 NOTE — PROGRESS NOTES
[address]  Progress Note  Name: Luiza Merlos  MRN: [id number]  Unit/Bed#: 505-13 I Date of Admission: 9/18/2023   Date of Service: 9/23/2023 I Hospital Day: 5       Assessment/Plan   * Dehydration with hyponatremia  Assessment & Plan  Renal function improved, DC IVF and fluid restrict   nephro consult appreciated  Possible SIADH, patient to receive additional doses of tolvaptan today 9/22  Continue  1500cc fluid restriction  Developed further hypotension prompting IVF bolus, sodium remains stable around 120-122  Nephrology input and assistance is appreciated  BMP Q6    Portal vein thrombosis  Assessment & Plan  Seen by Hem/Onc started on warfarin / lovenox bridge     Complicated UTI (urinary tract infection)  Assessment & Plan  DC zosyn and utilize ceftriaxone to complete 3 days  Discontinue ceftriaxone    Malignant ascites  Assessment & Plan  Status post 3.5 L paracentesis    Moderate protein-calorie malnutrition (HCC)  Assessment & Plan  Malnutrition Findings:   Adult Malnutrition type: Chronic illness  Adult Degree of Malnutrition: Malnutrition of moderate degree  Malnutrition Characteristics: Inadequate energy, Weight loss                  360 Statement: Chronic, moderate protein calorie malnutrition related to catabolic illness as evidenced by 10% (13#) weight loss x 6 months and less than 75% of estimated energy needs for greater than 1 month. Treating with oral diet and nutrition supplements. BMI Findings: Body mass index is 24.49 kg/m².        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 female MRN: [id number]    Unit/Bed#: 407-01 Encounter: [id number]        Subjective:     Patient seen and examined, she feels her abdomen is more distended but appetite is good , feels more tired today     Objective:     Vitals:   Vitals:    09/23/23 0855   BP:    Pulse: 81   Resp: 18   Temp:    SpO2: 94%     Body mass index is 24.49 kg/m².     Intake/Output Summary (Last 24 hours) at 9/23/2023 1039  Last data filed at 9/23/2023 0900  Gross per 24 hour   Intake 680 ml   Output 950 ml   Net -270 ml       Physical Exam:   /78 (BP Location: Right arm)   Pulse 81   Temp 99.3 °F (37.4 °C)   Resp 18   Ht 4' 11" (1.499 m)   Wt 55 kg (121 lb 4.1 oz)   LMP  (LMP Unknown)   SpO2 94%   BMI 24.49 kg/m²   General appearance: alert and oriented, in no acute distress  Head: Normocephalic, without obvious abnormality, atraumatic  Lungs: clear to auscultation bilaterally  Heart: regular rate and rhythm, S1, S2 normal, no murmur, click, rub or gallop  Abdomen: more distention, slight tenderness , no rebound/guarding  Extremities: extremities normal, warm and well-perfused; no cyanosis, clubbing, or edema  Pulses: 2+ and symmetric  Neurologic: Grossly normal     Invasive Devices     Central Venous Catheter Line  Duration           Port A Cath 05/18/22 Right Chest 493 days          Drain  Duration           Ureteral Internal Stent Right ureter 6 Fr. 145 days                Results from last 7 days   Lab Units 09/23/23  0412 09/22/23  0634 09/21/23  0511   WBC Thousand/uL 10.52* 11.64* 11.35*   HEMOGLOBIN g/dL 8.0* 8.6* 9.3*   HEMATOCRIT % 25.6* 27.3* 29.4*   PLATELETS Thousands/uL 128* 118* 142*       Results from last 7 days   Lab Units 09/23/23  0412 09/23/23  0411 09/23/23  0020 09/22/23  2051 09/20/23  1208 09/20/23  0511 09/19/23  1433 09/19/23  0514   POTASSIUM mmol/L  --  4.2 4.5 4.4   < > 4.0   < > 4.0   CHLORIDE mmol/L  --  91* 90* 90*   < > 88*   < > 89*   CO2 mmol/L  --  21 23 22   < > 26   < > 24   BUN mg/dL  --  19 19 19   < > 22   < > 30*   CREATININE mg/dL  --  0.77 0.76 0.77   < > 0.66   < > 0.91   CALCIUM mg/dL  --  8.1* 8.4 8.5   < > 8.7   < > 8.7   ALK PHOS U/L 157*  --   --   --   --  188*  -- 131*   ALT U/L 20  --   --   --   --  15  --  10   AST U/L 35  --   --   --   --  39  --  21    < > = values in this interval not displayed.        Medication Administration - last 24 hours from 09/22/2023 1039 to 09/23/2023 1039       Date/Time Order Dose Route Action Action by     09/23/2023 0904 EDT Diclofenac Sodium (VOLTAREN) 1 % topical gel 2 g 2 g Topical Given Florinda Mcdaniel RN     09/22/2023 2205 EDT Diclofenac Sodium (VOLTAREN) 1 % topical gel 2 g 2 g Topical Given Gustavo Arora RN     09/22/2023 1741 EDT Diclofenac Sodium (VOLTAREN) 1 % topical gel 2 g 2 g Topical Not Given Juan Collins     09/22/2023 1410 EDT Diclofenac Sodium (VOLTAREN) 1 % topical gel 2 g 2 g Topical Given Juan Collins     09/23/2023 7314 EDT gabapentin (NEURONTIN) capsule 200 mg 200 mg Oral Given Florinda Mcdaniel RN     09/22/2023 2205 EDT gabapentin (NEURONTIN) capsule 200 mg 200 mg Oral Given Gustavo Arora RN     09/22/2023 1741 EDT gabapentin (NEURONTIN) capsule 200 mg 200 mg Oral Given Juan Collins     09/23/2023 0524 EDT pantoprazole (PROTONIX) EC tablet 40 mg 40 mg Oral Given Gustavo Arora RN     09/23/2023 0902 EDT polyethylene glycol (MIRALAX) packet 17 g 17 g Oral Not Given Florinda Mcdaniel RN     09/22/2023 2013 EDT ondansetron (ZOFRAN) injection 4 mg 4 mg Intravenous Given Gustavo Arora RN     09/23/2023 9739 EDT enoxaparin (LOVENOX) subcutaneous injection 40 mg 40 mg Subcutaneous Given Florinda Mcdaniel RN     09/22/2023 2013 EDT oxyCODONE (ROXICODONE) immediate release tablet 10 mg 10 mg Oral Given Gustavo Arora RN     09/22/2023 1912 EDT warfarin (COUMADIN) tablet 5 mg 5 mg Oral Given Juan Collins     09/22/2023 2226 EDT tolvaptan ([place]) tablet 15 mg 15 mg Oral Not Given Gustavo Arora RN     09/23/2023 0300 EDT sodium chloride 0.9 % bolus 500 mL 0 mL Intravenous Stopped Gustavo Arora RN     09/23/2023 0057 EDT sodium chloride 0.9 % bolus 500 mL 500 mL Intravenous New Bag Gustavo Arroa, RN 09/23/2023 0550 EDT sodium chloride 0.9 % bolus 500 mL 0 mL Intravenous Stopped Gabby Garcia RN     09/23/2023 0344 EDT sodium chloride 0.9 % bolus 500 mL 500 mL Intravenous [address], [place]     09/23/2023 9899 EDT sodium chloride 0.9 % bolus 500 mL 500 mL Intravenous New Bag Gabby Garcia RN            Lab, Imaging and other studies: I have personally reviewed pertinent reports.     VTE Pharmacologic Prophylaxis: Enoxaparin (Lovenox)  VTE Mechanical Prophylaxis: sequential compression device     Jane Johnson MD  9/23/2023,10:39 AM

## 2023-09-23 NOTE — ASSESSMENT & PLAN NOTE
Malnutrition Findings:   Adult Malnutrition type: Chronic illness  Adult Degree of Malnutrition: Malnutrition of moderate degree  Malnutrition Characteristics: Inadequate energy, Weight loss                  360 Statement: Chronic, moderate protein calorie malnutrition related to catabolic illness as evidenced by 10% (13#) weight loss x 6 months and less than 75% of estimated energy needs for greater than 1 month. Treating with oral diet and nutrition supplements. BMI Findings: Body mass index is 24.49 kg/m².

## 2023-09-24 NOTE — QUICK NOTE
BP remain low, agree with albumin as ordered as opposed to normal saline administration. Start standing midodrine for hypotension. Increase 1.8% to 50ml/hr x 5 hr and continue to assess q 4 bmp. Cr rising and patient with ISABEL due to hypotension. Which is multifacotrial.  Etiology of ISABEL due to intravascular volume depletion with extravascular volume overload. Sp albumin 25 gram x 2. NSS 500ml x1. Receiving increasing rate of HTS so being cautious with further volume expansion. Add midodrine to help with renal perfusion 5mg TID with hold parameters. Her ISABEL is a poor sign given comorbid conditions. Her Cr was stable on 9/23 when tolvaptan ordered and BP had stabilized at that time when ordered. Again she has multifactorial hypotension.       Will check magnesium as well today as this was marginal.

## 2023-09-24 NOTE — PROGRESS NOTES
[address]  Progress Note  Name: Cynthia Lyon  MRN: [id number]  Unit/Bed#: 482-53 I Date of Admission: 9/18/2023   Date of Service: 9/24/2023 I Hospital Day: 6    Assessment/Plan   * Dehydration with hyponatremia  Assessment & Plan  Renal function improved, DC IVF and fluid restrict   nephro consult appreciated  Possible SIADH, patient to receive additional doses of tolvaptan today 9/22  Nephro plans on providing 1.8% saline infusion , BMP Q4, Utilize midodrine or albumin for hypotension 9/24  Developed further hypotension prompting IVF bolus, sodium remains stable around 120-122  Nephrology input and assistance is appreciated      Portal vein thrombosis  Assessment & Plan  Seen by Hem/Onc started on warfarin / lovenox bridge     Malignant ascites  Assessment & Plan  Status post 3.5 L paracentesis 9/20/23  Patient with increasing abdominal distention, will need repeat IR eval with possible placement of permanent drainage catheter (Rebecca)    Malignant neoplasm of endocervix [facility])  Assessment & Plan  Patient is agreeable to hospice, will need placement, she is currently deciding on which facility she would like to go to             Progress Note - Marca Homans 76 y.o. female MRN: [id number]    Unit/Bed#: 407-01 Encounter: [id number]        Subjective:     Patient seen and examined at bedside, she's more fatigued today, complains of increasing abdominal pain     Objective:     Vitals:   Vitals:    09/24/23 1033   BP: (!) 86/52   Pulse:    Resp:    Temp:    SpO2:      Body mass index is 24.76 kg/m².     Intake/Output Summary (Last 24 hours) at 9/24/2023 1122  Last data filed at 9/23/2023 1624  Gross per 24 hour   Intake 240 ml   Output 550 ml   Net -310 ml       Physical Exam:   BP (!) 86/52 (BP Location: Right arm)   Pulse (!) 116   Temp 98 °F (36.7 °C) (Oral)   Resp 20   Ht 4' 11" (1.499 m)   Wt 55.6 kg (122 lb 9.6 oz)   LMP  (LMP Unknown)   SpO2 96%   BMI 24.76 kg/m² General appearance: alert and oriented, in no acute distress  Head: Normocephalic, without obvious abnormality, atraumatic  Lungs: clear to auscultation bilaterally  Heart: regular rate and rhythm, S1, S2 normal, no murmur, click, rub or gallop  Abdomen: distention with + fluid shift  Extremities: extremities normal, warm and well-perfused; no cyanosis, clubbing, or edema  Pulses: 2+ and symmetric  Neurologic: Grossly normal     Invasive Devices     Central Venous Catheter Line  Duration           Port A Cath 05/18/22 Right Chest 494 days          Drain  Duration           Ureteral Internal Stent Right ureter 6 Fr. 146 days                Results from last 7 days   Lab Units 09/24/23  0521 09/23/23  0412 09/22/23  0634   WBC Thousand/uL 12.29* 10.52* 11.64*   HEMOGLOBIN g/dL 8.3* 8.0* 8.6*   HEMATOCRIT % 26.8* 25.6* 27.3*   PLATELETS Thousands/uL 138* 128* 118*       Results from last 7 days   Lab Units 09/24/23  0521 09/23/23  2159 09/23/23  1641 09/23/23  1037 09/23/23  0412 09/20/23  1208 09/20/23  0511 09/19/23  1433 09/19/23  0514   POTASSIUM mmol/L 4.2 4.2 4.1   < >  --    < > 4.0   < > 4.0   CHLORIDE mmol/L 91* 91* 92*   < >  --    < > 88*   < > 89*   CO2 mmol/L 20* 20* 21   < >  --    < > 26   < > 24   BUN mg/dL 24 21 19   < >  --    < > 22   < > 30*   CREATININE mg/dL 0.92 0.75 0.71   < >  --    < > 0.66   < > 0.91   CALCIUM mg/dL 8.4 8.3* 8.4   < >  --    < > 8.7   < > 8.7   ALK PHOS U/L  --   --   --   --  157*  --  188*  --  131*   ALT U/L  --   --   --   --  20  --  15  --  10   AST U/L  --   --   --   --  35  --  39  --  21    < > = values in this interval not displayed.        Medication Administration - last 24 hours from 09/23/2023 1122 to 09/24/2023 1122       Date/Time Order Dose Route Action Action by     09/24/2023 0934 EDT Diclofenac Sodium (VOLTAREN) 1 % topical gel 2 g 2 g Topical Given John Valle RN     09/23/2023 2152 EDT Diclofenac Sodium (VOLTAREN) 1 % topical gel 2 g 2 g Topical Given Ludmila Aranda, RN     09/23/2023 1807 EDT Diclofenac Sodium (VOLTAREN) 1 % topical gel 2 g 2 g Topical Not Given Abi Armstrong, RN     09/23/2023 1227 EDT Diclofenac Sodium (VOLTAREN) 1 % topical gel 2 g 2 g Topical Not Given Abi Armstrong, RN     09/24/2023 9019 EDT gabapentin (NEURONTIN) capsule 200 mg 200 mg Oral Given Abi Armstrong, RN     09/23/2023 2152 EDT gabapentin (NEURONTIN) capsule 200 mg 200 mg Oral Given Ludmila Aranda, RN     09/23/2023 1632 EDT gabapentin (NEURONTIN) capsule 200 mg 200 mg Oral Given Abi Armstrong, RN     09/24/2023 3098 EDT pantoprazole (PROTONIX) EC tablet 40 mg 40 mg Oral Given Ludmila Aranda, RN     09/24/2023 6050 EDT polyethylene glycol (MIRALAX) packet 17 g 17 g Oral Not Given Abi Armstrong, RN     09/23/2023 1819 EDT ondansetron (ZOFRAN) injection 4 mg 4 mg Intravenous Given Abi Armstrong, RN     09/24/2023 9717 EDT enoxaparin (LOVENOX) subcutaneous injection 40 mg 40 mg Subcutaneous Given Abi Armstrong, RN     09/24/2023 0930 EDT HYDROmorphone (DILAUDID) injection 1 mg 1 mg Intravenous Given Abi Armstrong, RN     09/24/2023 9991 EDT HYDROmorphone (DILAUDID) injection 1 mg 1 mg Intravenous Given Ludmila Aranda, RN     09/23/2023 1233 EDT HYDROmorphone (DILAUDID) injection 1 mg 1 mg Intravenous Given Abi Armstrong, RN     09/23/2023 1813 EDT warfarin (COUMADIN) tablet 5 mg 5 mg Oral Given Abi Armstrong, RN     09/23/2023 1229 EDT tolvaptan (Samsca) tablet 15 mg 15 mg Oral Given Abi Armstrong, RN     09/23/2023 1813 EDT tolvaptan (Samsca) tablet 15 mg 15 mg Oral Given Abi Armstrong, RN     09/23/2023 1851 EDT diphenhydrAMINE (BENADRYL) tablet 25 mg 25 mg Oral Given Abi Armstrong, PERI     09/24/2023 0758 EDT sodium chloride 0.9 % bolus 500 mL 500 mL [facility]venous [id number]  [facility]     09/24/2023 0257 EDT midodrine (PROAMATINE) tablet 5 mg 5 mg Oral Given Abi Armstrong RN            Lab, Imaging and other studies: I have personally reviewed pertinent reports.     VTE Pharmacologic Prophylaxis: warfarin/lovenox  VTE Mechanical Prophylaxis: sequential compression device     Janet Hall MD  9/24/2023,11:22 AM

## 2023-09-24 NOTE — ASSESSMENT & PLAN NOTE
Renal function improved, DC IVF and fluid restrict   nephro consult appreciated  Possible SIADH, patient to receive additional doses of tolvaptan today 9/22  Nephro plans on providing 1.8% saline infusion , BMP Q4, Utilize midodrine or albumin for hypotension 9/24  Developed further hypotension prompting IVF bolus, sodium remains stable around 120-122  Nephrology input and assistance is appreciated

## 2023-09-24 NOTE — PROGRESS NOTES
Progress Note - Nephrology   Laina Reyes 76 y.o. female MRN: [id number]  Unit/Bed#: 407-01 Encounter: [id number]    A/P:  1. Hyponatremia suspected to be acute on presentation. volume status: extravascular volume overload with intravascular volume depletion. moderate/severe. Hypoosmolar. Urine sodium <10 suggesting sodium avid state. Urine osm 655 suggesting ADH release. Likely symptomatic given degree of hyponatremia, although symptoms difficult to parse from hypotension. Unfortunately, patient has a complex situation with multifactorial hypotension relating to peritoneal carcinomatosis on top of extravascular volume overload with ascites/ lower extremity edema. Tolvaptan would be ideal agent but is currently ineffective and may be contributing factor to hypotension as well. She has received escalating doses without effect. Recommend to hold on further doses at this time until her BP has stabilized. Recommend: To optimize her for discharge, recommend sodium greater than 125. Start 1.8% saline at 30ml/hr with q 4hr monitoring. Volume will be limited with administration of concentrated salt solutions and can be used in cirrhosis and CHF settings. Central access with port. Can stay in current level of care, confirmed with pharmacy. For hypotension, prefer 25% 25 gram albumin and midodrine administration. Minimize as best we can, isotonic solutions. Discussed plan with primary. Addendum-- Na dropped to 118 which is severe. This is likely due to NSS administration. Goal Na increase 4-6 meq/24 hr.   May need increased rate of 1.8%, starting low and will increase based on Na trends. 2.  Symptomatic hypotension, may be multifactorial including concern for autonomic dysfunction with peritoneal carcinomatosis and worsening ascites. She also received IV Dilaudid x2 which can contribute to hypotension.      Ascites may increase intrathoracic pressure which decreases preload to the heart.  Prefer to minimize normal saline administration. Utilize albumin solutions or midodrine to help with hypotension. Midodrine 5 mg given x1.    3. NAGMA, potentially due to worsening renal function. Monitor. 4. Renal insufficiency, Cr 0.6->0.7-> 0.9. Likely related to ATN from hypotension. Monitor Cr trends closely. Avoiding tolvptan. Goal MAP>55       Follow up reason for today's visit:     Dehydration with hyponatremia      Subjective:   Patient with hypotension on 9/23. Hypotensive this AM.  Ordered 500ml this AM by primary, ordered 5mg of midodrine as well. A complete 10 point review of systems was performed and is otherwise negative. Objective:     Vitals: Blood pressure (!) 88/68, pulse (!) 116, temperature 98 °F (36.7 °C), temperature source Oral, resp. rate 20, height 4' 11" (1.499 m), weight 55.6 kg (122 lb 9.6 oz), SpO2 96 %, not currently breastfeeding. ,Body mass index is 24.76 kg/m². Weight (last 2 days)     Date/Time Weight    09/24/23 0600 55.6 (122.6)    09/23/23 0700 55 (121.25)    09/23/23 0543 54.2 (119.4)    09/23/23 0524 54.2 (119.4)    09/23/23 0345 55 (121.25)    09/22/23 0552 51.7 (113.98)            Intake/Output Summary (Last 24 hours) at 9/24/2023 1031  Last data filed at 9/23/2023 1624  Gross per 24 hour   Intake 240 ml   Output 850 ml   Net -610 ml     I/O last 3 completed shifts:   In: 620 [P.O.:620]  Out: 1500 [Urine:1500]         Physical Exam: BP (!) 88/68 (BP Location: Right arm)   Pulse (!) 116   Temp 98 °F (36.7 °C) (Oral)   Resp 20   Ht 4' 11" (1.499 m)   Wt 55.6 kg (122 lb 9.6 oz)   LMP  (LMP Unknown)   SpO2 96%   BMI 24.76 kg/m²     General Appearance:    Alert, cooperative, ill appearing   Head:    Normocephalic, without obvious abnormality, atraumatic   Eyes:    Conjunctiva/corneas clear   Ears:    Normal external ears   Nose:   Nares normal, septum midline, mucosa normal, no drainage    or sinus tenderness   Throat:   Lips, mucosa, and tongue normal; teeth and gums normal   Neck:    Back:      Lungs:     Decrease BS bilaterally   Chest wall:    No tenderness or deformity   Heart:    Tachycardic    Abdomen:     Distended, nontender    Extremities:   Moderate LE edema    Skin:   Skin color, texture, turgor normal, no rashes or lesions   Lymph nodes:     Neurologic:   CNII-XII intact            Lab, Imaging and other studies: I have personally reviewed pertinent labs. CBC:   Lab Results   Component Value Date    WBC 12.29 (H) 09/24/2023    HGB 8.3 (L) 09/24/2023    HCT 26.8 (L) 09/24/2023    MCV 87 09/24/2023     (L) 09/24/2023    RBC 3.10 (L) 09/24/2023    MCH 26.8 09/24/2023    MCHC 31.0 (L) 09/24/2023    RDW 17.6 (H) 09/24/2023    MPV 10.1 09/24/2023    NRBC 0 09/24/2023     CMP:   Lab Results   Component Value Date    K 4.2 09/24/2023    CL 91 (L) 09/24/2023    CO2 20 (L) 09/24/2023    BUN 24 09/24/2023    CREATININE 0.92 09/24/2023    CALCIUM 8.4 09/24/2023    EGFR 64 09/24/2023       . Results from last 7 days   Lab Units 09/24/23  0521 09/23/23  2159 09/23/23  1641 09/23/23  1037 09/23/23  0412 09/20/23  1208 09/20/23  0511 09/19/23  1433 09/19/23  0514   POTASSIUM mmol/L 4.2 4.2 4.1   < >  --    < > 4.0   < > 4.0   CHLORIDE mmol/L 91* 91* 92*   < >  --    < > 88*   < > 89*   CO2 mmol/L 20* 20* 21   < >  --    < > 26   < > 24   BUN mg/dL 24 21 19   < >  --    < > 22   < > 30*   CREATININE mg/dL 0.92 0.75 0.71   < >  --    < > 0.66   < > 0.91   CALCIUM mg/dL 8.4 8.3* 8.4   < >  --    < > 8.7   < > 8.7   ALK PHOS U/L  --   --   --   --  157*  --  188*  --  131*   ALT U/L  --   --   --   --  20  --  15  --  10   AST U/L  --   --   --   --  35  --  39  --  21    < > = values in this interval not displayed.          Phosphorus: No results found for: "PHOS"  Magnesium: No results found for: "MG"  Urinalysis: No results found for: "COLORU", "CLARITYU", "Baxter Dickey", "PHUR", "LEUKOCYTESUR", "NITRITE", "PROTEINUA", "GLUCOSEU", "[place]", "Billye Newer", "BLOODU"  Ionized Calcium: No results found for: "CAION"  Coagulation:   Lab Results   Component Value Date    INR 1.39 (H) 09/24/2023     Troponin: No results found for: "TROPONINI"  ABG: No results found for: "PHART", "XRZ5VLW", "PO2ART", "FDA4HUG", "C9JWBCYD", "BEART", "SOURCE"  Radiology review:     IMAGING  No results found.     Current Facility-Administered Medications   Medication Dose Route Frequency   • albuterol inhalation solution 2.5 mg  2.5 mg Nebulization Q4H PRN   • aluminum-magnesium hydroxide-simethicone (MAALOX) oral suspension 30 mL  30 mL Oral Q6H PRN   • bisacodyl (DULCOLAX) rectal suppository 10 mg  10 mg Rectal Daily PRN   • Diclofenac Sodium (VOLTAREN) 1 % topical gel 2 g  2 g Topical 4x Daily   • diphenhydrAMINE (BENADRYL) tablet 25 mg  25 mg Oral Q8H PRN   • enoxaparin (LOVENOX) subcutaneous injection 40 mg  40 mg Subcutaneous Daily   • gabapentin (NEURONTIN) capsule 200 mg  200 mg Oral TID   • HYDROmorphone (DILAUDID) injection 1 mg  1 mg Intravenous Q3H PRN   • LORazepam (ATIVAN) tablet 0.5 mg  0.5 mg Oral BID PRN   • ondansetron (ZOFRAN) injection 4 mg  4 mg Intravenous Q6H PRN   • oxyCODONE (ROXICODONE) immediate release tablet 10 mg  10 mg Oral Q4H PRN   • pantoprazole (PROTONIX) EC tablet 40 mg  40 mg Oral Early Morning   • polyethylene glycol (MIRALAX) packet 17 g  17 g Oral Daily   • sodium chloride (HYPERTONIC) infusion 1.8%  30 mL/hr Intravenous Continuous   • tolvaptan (Samsca) tablet 15 mg  15 mg Oral Once   • warfarin (COUMADIN) tablet 5 mg  5 mg Oral Daily (warfarin)     Medications Discontinued During This Encounter   Medication Reason   • piperacillin-tazobactam (ZOSYN) 3.375 g in sodium chloride 0.9 % 100 mL IVPB    • sodium chloride 0.9 % with KCl 20 mEq/L infusion (premix)    • oxyCODONE-acetaminophen (PERCOCET) 5-325 mg per tablet 2 tablet    • HYDROmorphone (DILAUDID) injection 0.5 mg    • sodium chloride (HYPERTONIC) infusion 1.8%    • sodium chloride tablet 2 g    • cefTRIAXone (ROCEPHIN) IVPB (premix in dextrose) 1,000 mg 50 mL        Shawna Hobbs and Eric, DO      This progress note was produced in part using a dictation device which may document imprecise wording from author's original intent.

## 2023-09-24 NOTE — ASSESSMENT & PLAN NOTE
Status post 3.5 L paracentesis 9/20/23  Patient with increasing abdominal distention, will need repeat IR eval with possible placement of permanent drainage catheter (Rebecca)

## 2023-09-24 NOTE — PLAN OF CARE
Problem: Potential for Falls  Goal: Patient will remain free of falls  Description: INTERVENTIONS:  - Educate patient/family on patient safety including physical limitations  - Instruct patient to call for assistance with activity   - Consult OT/PT to assist with strengthening/mobility   - Keep Call bell within reach  - Keep bed low and locked with side rails adjusted as appropriate  - Keep care items and personal belongings within reach  - Initiate and maintain comfort rounds  - Make Fall Risk Sign visible to staff  - Offer Toileting every 2 Hours, in advance of need  - Initiate/Maintain fall alarm  - Obtain necessary fall risk management equipment: non-skid footwear, call bell  - Apply yellow socks and bracelet for high fall risk patients  - Consider moving patient to room near nurses station  Outcome: Progressing     Problem: Nutrition/Hydration-ADULT  Goal: Nutrient/Hydration intake appropriate for improving, restoring or maintaining nutritional needs  Description: Monitor and assess patient's nutrition/hydration status for malnutrition. Collaborate with interdisciplinary team and initiate plan and interventions as ordered. Monitor patient's weight and dietary intake as ordered or per policy. Utilize nutrition screening tool and intervene as necessary. Determine patient's food preferences and provide high-protein, high-caloric foods as appropriate.      INTERVENTIONS:  - Monitor oral intake, urinary output, labs, and treatment plans  - Assess nutrition and hydration status and recommend course of action  - Evaluate amount of meals eaten  - Assist patient with eating if necessary   - Allow adequate time for meals  - Recommend/ encourage appropriate diets, oral nutritional supplements, and vitamin/mineral supplements  - Order, calculate, and assess calorie counts as needed  - Recommend, monitor, and adjust tube feedings and TPN/PPN based on assessed needs  - Assess need for intravenous fluids  - Provide specific nutrition/hydration education as appropriate  - Include patient/family/caregiver in decisions related to nutrition  Outcome: Progressing     Problem: MOBILITY - ADULT  Goal: Maintain or return to baseline ADL function  Description: INTERVENTIONS:  -  Assess patient's ability to carry out ADLs; assess patient's baseline for ADL function and identify physical deficits which impact ability to perform ADLs (bathing, care of mouth/teeth, toileting, grooming, dressing, etc.)  - Assess/evaluate cause of self-care deficits   - Assess range of motion  - Assess patient's mobility; develop plan if impaired  - Assess patient's need for assistive devices and provide as appropriate  - Encourage maximum independence but intervene and supervise when necessary  - Involve family in performance of ADLs  - Assess for home care needs following discharge   - Consider OT consult to assist with ADL evaluation and planning for discharge  - Provide patient education as appropriate  Outcome: Progressing  Goal: Maintains/Returns to pre admission functional level  Description: INTERVENTIONS:  - Perform BMAT or MOVE assessment daily.   - Set and communicate daily mobility goal to care team and patient/family/caregiver. - Collaborate with rehabilitation services on mobility goals if consulted  - Perform Range of Motion 3 times a day. - Reposition patient every 2 hours.   - Dangle patient 3 times a day  - Stand patient 3 times a day  - Ambulate patient 3 times a day  - Out of bed to chair 3 times a day   - Out of bed for meals 3 times a day  - Out of bed for toileting  - Record patient progress and toleration of activity level   Outcome: Progressing     Problem: Prexisting or High Potential for Compromised Skin Integrity  Goal: Skin integrity is maintained or improved  Description: INTERVENTIONS:  - Identify patients at risk for skin breakdown  - Assess and monitor skin integrity  - Assess and monitor nutrition and hydration status  - Monitor labs   - Assess for incontinence   - Turn and reposition patient  - Assist with mobility/ambulation  - Relieve pressure over bony prominences  - Avoid friction and shearing  - Provide appropriate hygiene as needed including keeping skin clean and dry  - Evaluate need for skin moisturizer/barrier cream  - Collaborate with interdisciplinary team   - Patient/family teaching  - Consider wound care consult   Outcome: Progressing     Problem: PAIN - ADULT  Goal: Verbalizes/displays adequate comfort level or baseline comfort level  Description: Interventions:  - Encourage patient to monitor pain and request assistance  - Assess pain using appropriate pain scale  - Administer analgesics based on type and severity of pain and evaluate response  - Implement non-pharmacological measures as appropriate and evaluate response  - Consider cultural and social influences on pain and pain management  - Notify physician/advanced practitioner if interventions unsuccessful or patient reports new pain  Outcome: Progressing     Problem: INFECTION - ADULT  Goal: Absence or prevention of progression during hospitalization  Description: INTERVENTIONS:  - Assess and monitor for signs and symptoms of infection  - Monitor lab/diagnostic results  - Monitor all insertion sites, i.e. indwelling lines, tubes, and drains  - Monitor endotracheal if appropriate and nasal secretions for changes in amount and color  - [place] appropriate cooling/warming therapies per order  - Administer medications as ordered  - Instruct and encourage patient and family to use good hand hygiene technique  - Identify and instruct in appropriate isolation precautions for identified infection/condition  Outcome: Progressing  Goal: Absence of fever/infection during neutropenic period  Description: INTERVENTIONS:  - Monitor WBC    Outcome: Progressing     Problem: SAFETY ADULT  Goal: Patient will remain free of falls  Description: INTERVENTIONS:  - Educate patient/family on patient safety including physical limitations  - Instruct patient to call for assistance with activity   - Consult OT/PT to assist with strengthening/mobility   - Keep Call bell within reach  - Keep bed low and locked with side rails adjusted as appropriate  - Keep care items and personal belongings within reach  - Initiate and maintain comfort rounds  - Make Fall Risk Sign visible to staff  - Offer Toileting every 2 Hours, in advance of need  - Initiate/Maintain fall alarm  - Obtain necessary fall risk management equipment: non-skid footwear, call bell  - Apply yellow socks and bracelet for high fall risk patients  - Consider moving patient to room near nurses station  Outcome: Progressing  Goal: Maintain or return to baseline ADL function  Description: INTERVENTIONS:  -  Assess patient's ability to carry out ADLs; assess patient's baseline for ADL function and identify physical deficits which impact ability to perform ADLs (bathing, care of mouth/teeth, toileting, grooming, dressing, etc.)  - Assess/evaluate cause of self-care deficits   - Assess range of motion  - Assess patient's mobility; develop plan if impaired  - Assess patient's need for assistive devices and provide as appropriate  - Encourage maximum independence but intervene and supervise when necessary  - Involve family in performance of ADLs  - Assess for home care needs following discharge   - Consider OT consult to assist with ADL evaluation and planning for discharge  - Provide patient education as appropriate  Outcome: Progressing  Goal: Maintains/Returns to pre admission functional level  Description: INTERVENTIONS:  - Perform BMAT or MOVE assessment daily.   - Set and communicate daily mobility goal to care team and patient/family/caregiver. - Collaborate with rehabilitation services on mobility goals if consulted  - Perform Range of Motion 3 times a day. - Reposition patient every 2 hours.   - Dangle patient 3 times a day  - Stand patient 3 times a day  - Ambulate patient 3 times a day  - Out of bed to chair 3 times a day   - Out of bed for meals 3 times a day  - Out of bed for toileting  - Record patient progress and toleration of activity level   Outcome: Progressing     Problem: DISCHARGE PLANNING  Goal: Discharge to home or other facility with appropriate resources  Description: INTERVENTIONS:  - Identify barriers to discharge w/patient and caregiver  - Arrange for needed discharge resources and transportation as appropriate  - Identify discharge learning needs (meds, wound care, etc.)  - Arrange for interpretive services to assist at discharge as needed  - Refer to Case Management Department for coordinating discharge planning if the patient needs post-hospital services based on physician/advanced practitioner order or complex needs related to functional status, cognitive ability, or social support system  Outcome: Progressing     Problem: Knowledge Deficit  Goal: Patient/family/caregiver demonstrates understanding of disease process, treatment plan, medications, and discharge instructions  Description: Complete learning assessment and assess knowledge base.   Interventions:  - Provide teaching at level of understanding  - Provide teaching via preferred learning methods  Outcome: Progressing     Problem: CARDIOVASCULAR - ADULT  Goal: Maintains optimal cardiac output and hemodynamic stability  Description: INTERVENTIONS:  - Monitor I/O, vital signs and rhythm  - Monitor for S/S and trends of decreased cardiac output  - Administer and titrate ordered vasoactive medications to optimize hemodynamic stability  - Assess quality of pulses, skin color and temperature  - Assess for signs of decreased coronary artery perfusion  - Instruct patient to report change in severity of symptoms  Outcome: Progressing  Goal: Absence of cardiac dysrhythmias or at baseline rhythm  Description: INTERVENTIONS:  - Continuous cardiac monitoring, vital signs, obtain 12 lead EKG if ordered  - Administer antiarrhythmic and heart rate control medications as ordered  - Monitor electrolytes and administer replacement therapy as ordered  Outcome: Progressing     Problem: GASTROINTESTINAL - ADULT  Goal: Minimal or absence of nausea and/or vomiting  Description: INTERVENTIONS:  - Administer IV fluids if ordered to ensure adequate hydration  - Maintain NPO status until nausea and vomiting are resolved  - Nasogastric tube if ordered  - Administer ordered antiemetic medications as needed  - Provide nonpharmacologic comfort measures as appropriate  - Advance diet as tolerated, if ordered  - Consider nutrition services referral to assist patient with adequate nutrition and appropriate food choices  Outcome: Progressing  Goal: Maintains or returns to baseline bowel function  Description: INTERVENTIONS:  - Assess bowel function  - Encourage oral fluids to ensure adequate hydration  - Administer IV fluids if ordered to ensure adequate hydration  - Administer ordered medications as needed  - Encourage mobilization and activity  - Consider nutritional services referral to assist patient with adequate nutrition and appropriate food choices  Outcome: Progressing  Goal: Maintains adequate nutritional intake  Description: INTERVENTIONS:  - Monitor percentage of each meal consumed  - Identify factors contributing to decreased intake, treat as appropriate  - Assist with meals as needed  - Monitor I&O, weight, and lab values if indicated  - Obtain nutrition services referral as needed  Outcome: Progressing  Goal: Oral mucous membranes remain intact  Description: INTERVENTIONS  - Assess oral mucosa and hygiene practices  - Implement preventative oral hygiene regimen  - Implement oral medicated treatments as ordered  - Initiate Nutrition services referral as needed  Outcome: Progressing     Problem: METABOLIC, FLUID AND ELECTROLYTES - ADULT  Goal: Electrolytes maintained within normal limits  Description: INTERVENTIONS:  - Monitor labs and assess patient for signs and symptoms of electrolyte imbalances  - Administer electrolyte replacement as ordered  - Monitor response to electrolyte replacements, including repeat lab results as appropriate  - Instruct patient on fluid and nutrition as appropriate  Outcome: Progressing  Goal: Fluid balance maintained  Description: INTERVENTIONS:  - Monitor labs   - Monitor I/O and WT  - Instruct patient on fluid and nutrition as appropriate  - Assess for signs & symptoms of volume excess or deficit  Outcome: Progressing  Goal: Glucose maintained within target range  Description: INTERVENTIONS:  - Monitor Blood Glucose as ordered  - Assess for signs and symptoms of hyperglycemia and hypoglycemia  - Administer ordered medications to maintain glucose within target range  - Assess nutritional intake and initiate nutrition service referral as needed  Outcome: Progressing     Problem: HEMATOLOGIC - ADULT  Goal: Maintains hematologic stability  Description: INTERVENTIONS  - Assess for signs and symptoms of bleeding or hemorrhage  - Monitor labs  - Administer supportive blood products/factors as ordered and appropriate  Outcome: Progressing     Problem: MUSCULOSKELETAL - ADULT  Goal: Maintain or return mobility to safest level of function  Description: INTERVENTIONS:  - Assess patient's ability to carry out ADLs; assess patient's baseline for ADL function and identify physical deficits which impact ability to perform ADLs (bathing, care of mouth/teeth, toileting, grooming, dressing, etc.)  - Assess/evaluate cause of self-care deficits   - Assess range of motion  - Assess patient's mobility  - Assess patient's need for assistive devices and provide as appropriate  - Encourage maximum independence but intervene and supervise when necessary  - Involve family in performance of ADLs  - Assess for home care needs following discharge   - Consider OT consult to assist with ADL evaluation and planning for discharge  - Provide patient education as appropriate  Outcome: Progressing  Goal: Maintain proper alignment of affected body part  Description: INTERVENTIONS:  - Support, maintain and protect limb and body alignment  - Provide patient/ family with appropriate education  Outcome: Progressing

## 2023-09-25 PROBLEM — R18.0 MALIGNANT ASCITES: Status: ACTIVE | Noted: 2023-01-01

## 2023-09-25 NOTE — PROGRESS NOTES
[address]  Progress Note  Name: Lacy Nyhan  MRN: [id number]  Unit/Bed#: 319-96 I Date of Admission: 9/18/2023   Date of Service: 9/25/2023 I Hospital Day: 7    Assessment/Plan   * Dehydration with hyponatremia  Assessment & Plan  Case discussed with nephrology today, further care of volume status and hyponatremia per nephrology    Overall poor prognosis, unlikely to improve significantly    Malignant neoplasm of endocervix [facility])  Assessment & Plan  Patient is agreeable to hospice, will need placement, she is currently deciding on which facility she would like to go to    Malignant ascites  Assessment & Plan  Status post 3.5 L paracentesis 9/20/23  Patient with increasing abdominal distention, will need repeat IR eval with possible placement of permanent drainage catheter (Rebecca)    IR consult ordered    Generalized weakness  Assessment & Plan  Overall poor functional status, need short-term rehab placement versus permanent nursing facility placement    Anemia, chronic disease  Assessment & Plan  Hemoglobin stable    Portal vein thrombosis  Assessment & Plan  Continue Coumadin, monitor PT/INR    Complicated UTI (urinary tract infection)  Assessment & Plan  DC zosyn and utilize ceftriaxone to complete 3 days  Completed course of antibiotics    Moderate protein-calorie malnutrition ([address])  Assessment & Plan  Malnutrition Findings:   Adult Malnutrition type: Chronic illness  Adult Degree of Malnutrition: Malnutrition of moderate degree  Malnutrition Characteristics: Inadequate energy, Weight loss                  360 Statement: Chronic, moderate protein calorie malnutrition related to catabolic illness as evidenced by 10% (13#) weight loss x 6 months and less than 75% of estimated energy needs for greater than 1 month. Treating with oral diet and nutrition supplements. BMI Findings: Body mass index is 24.98 kg/m².               Other updated with plan of care, anticipate discharge Wednesday or Thursday to short-term nursing facility. Code Status: Level 3 - DNAR and DNI    Subjective:   No current pain, severe fatigue, generalized weakness, does have abdominal distention, had some diarrhea today. Objective:     Vitals:   Temp (24hrs), Av.1 °F (37.3 °C), Min:98.2 °F (36.8 °C), Max:99.9 °F (37.7 °C)    Temp:  [98.2 °F (36.8 °C)-99.9 °F (37.7 °C)] 98.2 °F (36.8 °C)  HR:  [] 90  Resp:  [18-19] 19  BP: ()/(50-62) 104/61  SpO2:  [96 %-100 %] 100 %  Body mass index is 24.98 kg/m². Input and Output Summary (last 24 hours):   No intake or output data in the 24 hours ending 23    Physical Exam:   Physical Exam  Vitals and nursing note reviewed. Constitutional:       Comments: Chronically ill-appearing frail female   HENT:      Head: Normocephalic and atraumatic. Cardiovascular:      Rate and Rhythm: Normal rate. Pulses: Normal pulses. Pulmonary:      Effort: Pulmonary effort is normal.   Abdominal:      General: There is distension. Tenderness: There is no abdominal tenderness. There is no guarding or rebound. Musculoskeletal:      Cervical back: Normal range of motion. Neurological:      Mental Status: She is alert and oriented to person, place, and time. Mental status is at baseline.    Psychiatric:         Mood and Affect: Mood normal.          Additional Data:     Labs:  Results from last 7 days   Lab Units 23  0421   WBC Thousand/uL 15.03*   HEMOGLOBIN g/dL 8.5*   HEMATOCRIT % 26.9*   PLATELETS Thousands/uL 157   NEUTROS PCT % 85*   LYMPHS PCT % 5*   MONOS PCT % 9   EOS PCT % 0     Results from last 7 days   Lab Units 23  1424 23  1037 23  0412   SODIUM mmol/L 121*   < >  --    POTASSIUM mmol/L 4.8   < >  --    CHLORIDE mmol/L 93*   < >  --    CO2 mmol/L 18*   < >  --    BUN mg/dL 37*   < >  --    CREATININE mg/dL 2.03*   < >  --    ANION GAP mmol/L 10   < >  --    CALCIUM mg/dL 8.6   < >  --    ALBUMIN g/dL --   --  2.6*   TOTAL BILIRUBIN mg/dL  --   --  0.42   ALK PHOS U/L  --   --  157*   ALT U/L  --   --  20   AST U/L  --   --  35   GLUCOSE RANDOM mg/dL 126   < >  --     < > = values in this interval not displayed. Results from last 7 days   Lab Units 09/25/23  0421   INR  2.25*             Results from last 7 days   Lab Units 09/23/23  0411   PROCALCITONIN ng/ml 0.23       Lines/Drains:  Invasive Devices     Central Venous Catheter Line  Duration           Port A Cath 05/18/22 Right Chest 495 days          Drain  Duration           Ureteral Internal Stent Right ureter 6 Fr. 147 days                Central Line:  Goal for removal: Will discontinue when meds requiring line are completed. Imaging: No pertinent imaging reviewed.     Recent Cultures (last 7 days):   Results from last 7 days   Lab Units 09/19/23  1138 09/18/23  1830   GRAM STAIN RESULT  2+ Polys  No bacteria seen  --    URINE CULTURE   --  20,000-29,000 cfu/ml   BODY FLUID CULTURE, STERILE  No growth  --        Last 24 Hours Medication List:   Current Facility-Administered Medications   Medication Dose Route Frequency Provider Last Rate   • albuterol  2.5 mg Nebulization Q4H PRN Abhishek Case MD     • aluminum-magnesium hydroxide-simethicone  30 mL Oral Q6H PRN Abhishek Case MD     • bisacodyl  10 mg Rectal Daily PRN Abhishek Case MD     • Diclofenac Sodium  2 g Topical 4x Daily Abhishek Caes MD     • diphenhydrAMINE  25 mg Oral Q8H PRN Janet Worthy MD     • furosemide  40 mg Intravenous BID (diuretic) Lexx Laboy DO     • gabapentin  200 mg Oral TID Abhishek Case MD     • HYDROmorphone  1 mg Intravenous Q3H PRN Janet Worthy MD     • LORazepam  0.5 mg Oral BID PRN Abhishek Case MD     • midodrine  10 mg Oral TID AC Isiah Becker DO     • ondansetron  4 mg Intravenous Q6H PRN Abhishek Case MD     • oxyCODONE  10 mg Oral Q4H PRN Janet Worthy MD     • pantoprazole  40 mg Oral Early Morning Deo Amaya MD     • polyethylene glycol  17 g Oral Daily Deo Amaya MD          Today, Patient Was Seen By: Becki Diggs DO    **Please Note: This note may have been constructed using a voice recognition system. **

## 2023-09-25 NOTE — PLAN OF CARE
Problem: Potential for Falls  Goal: Patient will remain free of falls  Description: INTERVENTIONS:  - Educate patient/family on patient safety including physical limitations  - Instruct patient to call for assistance with activity   - Consult OT/PT to assist with strengthening/mobility   - Keep Call bell within reach  - Keep bed low and locked with side rails adjusted as appropriate  - Keep care items and personal belongings within reach  - Initiate and maintain comfort rounds  - Make Fall Risk Sign visible to staff  - Offer Toileting every 2 Hours, in advance of need  - Initiate/Maintain fall alarm  - Obtain necessary fall risk management equipment: non-skid footwear, call bell  - Apply yellow socks and bracelet for high fall risk patients  - Consider moving patient to room near nurses station  Outcome: Progressing     Problem: Nutrition/Hydration-ADULT  Goal: Nutrient/Hydration intake appropriate for improving, restoring or maintaining nutritional needs  Description: Monitor and assess patient's nutrition/hydration status for malnutrition. Collaborate with interdisciplinary team and initiate plan and interventions as ordered. Monitor patient's weight and dietary intake as ordered or per policy. Utilize nutrition screening tool and intervene as necessary. Determine patient's food preferences and provide high-protein, high-caloric foods as appropriate.      INTERVENTIONS:  - Monitor oral intake, urinary output, labs, and treatment plans  - Assess nutrition and hydration status and recommend course of action  - Evaluate amount of meals eaten  - Assist patient with eating if necessary   - Allow adequate time for meals  - Recommend/ encourage appropriate diets, oral nutritional supplements, and vitamin/mineral supplements  - Order, calculate, and assess calorie counts as needed  - Recommend, monitor, and adjust tube feedings and TPN/PPN based on assessed needs  - Assess need for intravenous fluids  - Provide specific nutrition/hydration education as appropriate  - Include patient/family/caregiver in decisions related to nutrition  Outcome: Progressing     Problem: MOBILITY - ADULT  Goal: Maintain or return to baseline ADL function  Description: INTERVENTIONS:  -  Assess patient's ability to carry out ADLs; assess patient's baseline for ADL function and identify physical deficits which impact ability to perform ADLs (bathing, care of mouth/teeth, toileting, grooming, dressing, etc.)  - Assess/evaluate cause of self-care deficits   - Assess range of motion  - Assess patient's mobility; develop plan if impaired  - Assess patient's need for assistive devices and provide as appropriate  - Encourage maximum independence but intervene and supervise when necessary  - Involve family in performance of ADLs  - Assess for home care needs following discharge   - Consider OT consult to assist with ADL evaluation and planning for discharge  - Provide patient education as appropriate  Outcome: Progressing  Goal: Maintains/Returns to pre admission functional level  Description: INTERVENTIONS:  - Perform BMAT or MOVE assessment daily.   - Set and communicate daily mobility goal to care team and patient/family/caregiver. - Collaborate with rehabilitation services on mobility goals if consulted  - Perform Range of Motion 3 times a day. - Reposition patient every 2 hours.   - Dangle patient 3 times a day  - Stand patient 3 times a day  - Ambulate patient 3 times a day  - Out of bed to chair 3 times a day   - Out of bed for meals 3 times a day  - Out of bed for toileting  - Record patient progress and toleration of activity level   Outcome: Progressing     Problem: Prexisting or High Potential for Compromised Skin Integrity  Goal: Skin integrity is maintained or improved  Description: INTERVENTIONS:  - Identify patients at risk for skin breakdown  - Assess and monitor skin integrity  - Assess and monitor nutrition and hydration status  - Monitor labs   - Assess for incontinence   - Turn and reposition patient  - Assist with mobility/ambulation  - Relieve pressure over bony prominences  - Avoid friction and shearing  - Provide appropriate hygiene as needed including keeping skin clean and dry  - Evaluate need for skin moisturizer/barrier cream  - Collaborate with interdisciplinary team   - Patient/family teaching  - Consider wound care consult   Outcome: Progressing     Problem: PAIN - ADULT  Goal: Verbalizes/displays adequate comfort level or baseline comfort level  Description: Interventions:  - Encourage patient to monitor pain and request assistance  - Assess pain using appropriate pain scale  - Administer analgesics based on type and severity of pain and evaluate response  - Implement non-pharmacological measures as appropriate and evaluate response  - Consider cultural and social influences on pain and pain management  - Notify physician/advanced practitioner if interventions unsuccessful or patient reports new pain  Outcome: Progressing     Problem: INFECTION - ADULT  Goal: Absence or prevention of progression during hospitalization  Description: INTERVENTIONS:  - Assess and monitor for signs and symptoms of infection  - Monitor lab/diagnostic results  - Monitor all insertion sites, i.e. indwelling lines, tubes, and drains  - Monitor endotracheal if appropriate and nasal secretions for changes in amount and color  - [place] appropriate cooling/warming therapies per order  - Administer medications as ordered  - Instruct and encourage patient and family to use good hand hygiene technique  - Identify and instruct in appropriate isolation precautions for identified infection/condition  Outcome: Progressing  Goal: Absence of fever/infection during neutropenic period  Description: INTERVENTIONS:  - Monitor WBC    Outcome: Progressing     Problem: SAFETY ADULT  Goal: Patient will remain free of falls  Description: INTERVENTIONS:  - Educate patient/family on patient safety including physical limitations  - Instruct patient to call for assistance with activity   - Consult OT/PT to assist with strengthening/mobility   - Keep Call bell within reach  - Keep bed low and locked with side rails adjusted as appropriate  - Keep care items and personal belongings within reach  - Initiate and maintain comfort rounds  - Make Fall Risk Sign visible to staff  - Offer Toileting every 2 Hours, in advance of need  - Initiate/Maintain fall alarm  - Obtain necessary fall risk management equipment: non-skid footwear, call bell  - Apply yellow socks and bracelet for high fall risk patients  - Consider moving patient to room near nurses station  Outcome: Progressing  Goal: Maintain or return to baseline ADL function  Description: INTERVENTIONS:  -  Assess patient's ability to carry out ADLs; assess patient's baseline for ADL function and identify physical deficits which impact ability to perform ADLs (bathing, care of mouth/teeth, toileting, grooming, dressing, etc.)  - Assess/evaluate cause of self-care deficits   - Assess range of motion  - Assess patient's mobility; develop plan if impaired  - Assess patient's need for assistive devices and provide as appropriate  - Encourage maximum independence but intervene and supervise when necessary  - Involve family in performance of ADLs  - Assess for home care needs following discharge   - Consider OT consult to assist with ADL evaluation and planning for discharge  - Provide patient education as appropriate  Outcome: Progressing  Goal: Maintains/Returns to pre admission functional level  Description: INTERVENTIONS:  - Perform BMAT or MOVE assessment daily.   - Set and communicate daily mobility goal to care team and patient/family/caregiver. - Collaborate with rehabilitation services on mobility goals if consulted  - Perform Range of Motion 3 times a day. - Reposition patient every 2 hours.   - Dangle patient 3 times a day  - Stand patient 3 times a day  - Ambulate patient 3 times a day  - Out of bed to chair 3 times a day   - Out of bed for meals 3 times a day  - Out of bed for toileting  - Record patient progress and toleration of activity level   Outcome: Progressing     Problem: DISCHARGE PLANNING  Goal: Discharge to home or other facility with appropriate resources  Description: INTERVENTIONS:  - Identify barriers to discharge w/patient and caregiver  - Arrange for needed discharge resources and transportation as appropriate  - Identify discharge learning needs (meds, wound care, etc.)  - Arrange for interpretive services to assist at discharge as needed  - Refer to Case Management Department for coordinating discharge planning if the patient needs post-hospital services based on physician/advanced practitioner order or complex needs related to functional status, cognitive ability, or social support system  Outcome: Progressing     Problem: Knowledge Deficit  Goal: Patient/family/caregiver demonstrates understanding of disease process, treatment plan, medications, and discharge instructions  Description: Complete learning assessment and assess knowledge base.   Interventions:  - Provide teaching at level of understanding  - Provide teaching via preferred learning methods  Outcome: Progressing     Problem: CARDIOVASCULAR - ADULT  Goal: Maintains optimal cardiac output and hemodynamic stability  Description: INTERVENTIONS:  - Monitor I/O, vital signs and rhythm  - Monitor for S/S and trends of decreased cardiac output  - Administer and titrate ordered vasoactive medications to optimize hemodynamic stability  - Assess quality of pulses, skin color and temperature  - Assess for signs of decreased coronary artery perfusion  - Instruct patient to report change in severity of symptoms  Outcome: Progressing  Goal: Absence of cardiac dysrhythmias or at baseline rhythm  Description: INTERVENTIONS:  - Continuous cardiac monitoring, vital signs, obtain 12 lead EKG if ordered  - Administer antiarrhythmic and heart rate control medications as ordered  - Monitor electrolytes and administer replacement therapy as ordered  Outcome: Progressing     Problem: GASTROINTESTINAL - ADULT  Goal: Minimal or absence of nausea and/or vomiting  Description: INTERVENTIONS:  - Administer IV fluids if ordered to ensure adequate hydration  - Maintain NPO status until nausea and vomiting are resolved  - Nasogastric tube if ordered  - Administer ordered antiemetic medications as needed  - Provide nonpharmacologic comfort measures as appropriate  - Advance diet as tolerated, if ordered  - Consider nutrition services referral to assist patient with adequate nutrition and appropriate food choices  Outcome: Progressing  Goal: Maintains or returns to baseline bowel function  Description: INTERVENTIONS:  - Assess bowel function  - Encourage oral fluids to ensure adequate hydration  - Administer IV fluids if ordered to ensure adequate hydration  - Administer ordered medications as needed  - Encourage mobilization and activity  - Consider nutritional services referral to assist patient with adequate nutrition and appropriate food choices  Outcome: Progressing  Goal: Maintains adequate nutritional intake  Description: INTERVENTIONS:  - Monitor percentage of each meal consumed  - Identify factors contributing to decreased intake, treat as appropriate  - Assist with meals as needed  - Monitor I&O, weight, and lab values if indicated  - Obtain nutrition services referral as needed  Outcome: Progressing  Goal: Oral mucous membranes remain intact  Description: INTERVENTIONS  - Assess oral mucosa and hygiene practices  - Implement preventative oral hygiene regimen  - Implement oral medicated treatments as ordered  - Initiate Nutrition services referral as needed  Outcome: Progressing     Problem: METABOLIC, FLUID AND ELECTROLYTES - ADULT  Goal: Electrolytes maintained within normal limits  Description: INTERVENTIONS:  - Monitor labs and assess patient for signs and symptoms of electrolyte imbalances  - Administer electrolyte replacement as ordered  - Monitor response to electrolyte replacements, including repeat lab results as appropriate  - Instruct patient on fluid and nutrition as appropriate  Outcome: Progressing  Goal: Fluid balance maintained  Description: INTERVENTIONS:  - Monitor labs   - Monitor I/O and WT  - Instruct patient on fluid and nutrition as appropriate  - Assess for signs & symptoms of volume excess or deficit  Outcome: Progressing  Goal: Glucose maintained within target range  Description: INTERVENTIONS:  - Monitor Blood Glucose as ordered  - Assess for signs and symptoms of hyperglycemia and hypoglycemia  - Administer ordered medications to maintain glucose within target range  - Assess nutritional intake and initiate nutrition service referral as needed  Outcome: Progressing     Problem: HEMATOLOGIC - ADULT  Goal: Maintains hematologic stability  Description: INTERVENTIONS  - Assess for signs and symptoms of bleeding or hemorrhage  - Monitor labs  - Administer supportive blood products/factors as ordered and appropriate  Outcome: Progressing     Problem: MUSCULOSKELETAL - ADULT  Goal: Maintain or return mobility to safest level of function  Description: INTERVENTIONS:  - Assess patient's ability to carry out ADLs; assess patient's baseline for ADL function and identify physical deficits which impact ability to perform ADLs (bathing, care of mouth/teeth, toileting, grooming, dressing, etc.)  - Assess/evaluate cause of self-care deficits   - Assess range of motion  - Assess patient's mobility  - Assess patient's need for assistive devices and provide as appropriate  - Encourage maximum independence but intervene and supervise when necessary  - Involve family in performance of ADLs  - Assess for home care needs following discharge   - Consider OT consult to assist with ADL evaluation and planning for discharge  - Provide patient education as appropriate  Outcome: Progressing  Goal: Maintain proper alignment of affected body part  Description: INTERVENTIONS:  - Support, maintain and protect limb and body alignment  - Provide patient/ family with appropriate education  Outcome: Progressing

## 2023-09-25 NOTE — CASE MANAGEMENT
Case Management Discharge Planning Note    Patient name Brett Mitchell  Location [facility] [id number] MRN [id number]  : 1955 Date 2023       Current Admission Date: 2023  Current Admission Diagnosis:Dehydration with hyponatremia   Patient Active Problem List    Diagnosis Date Noted   • Moderate protein-calorie malnutrition ([address]) 2023   • Portal vein thrombosis 2023   • Generalized weakness    • Complicated UTI (urinary tract infection) 2023   • Anemia, chronic disease 2023   • Malignant ascites 2023   • Drug induced constipation 2023   • Chronic pain syndrome 2023   • Neoplasm of uncertain behavior of bone and articular cartilage 2023   • Right leg pain 2023   • DNR (do not resuscitate) discussion 2023   • Chemotherapy-induced thrombocytopenia 2023   • Insomnia due to other mental disorder 09/15/2022   • Stage 3 chronic kidney disease, unspecified whether stage 3a or 3b CKD ([address]) 2022   • Meralgia paraesthetica, right 2022   • Dyslipidemia 2022   • Post-menopause 2022   • Major depressive disorder, single episode, mild ([address]) 2022   • Cervical spondylosis 2022   • Continuous opioid dependence ([address]) 2022   • Lumbar disc disease with radiculopathy 2022   • Chronic obstructive pulmonary disease, unspecified COPD type ([address]) 2022   • Hematuria 2022   • Calculus of gallbladder without cholecystitis without obstruction 2022   • Chronic constipation 2022   • Primary generalized (osteo)arthritis 2021   • Xerostomia 2021   • Encounter for immunization 2021   • Sensorineural hearing loss (SNHL) of both ears 2021   • Multiple lung nodules 2021   • Encounter for venous access device care 2021   • Dyspnea on exertion 2021   • Thyroid lesion 2021   • Chemotherapy induced neutropenia ([address]) 06/10/2021   • Pain in thoracic spine 06/03/2021   • Urgency of urination 05/20/2021   • Chemotherapy-induced nausea 05/04/2021   • 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)    • Hypokalemia 03/02/2021   • Dehydration with hyponatremia 02/26/2021   • Encounter for screening involving social determinants of health (SDoH) 02/24/2021   • Hydronephrosis 02/04/2021   • BMI 29.0-29.9,adult 01/05/2021   • Nausea 01/05/2021   • Right hip pain 01/05/2021   • Tinnitus of left ear 02/19/2020   • Left ear pain 02/19/2020   • Anxiety and depression 10/02/2019   • Zaldivar's neuroma, left 07/12/2018   • 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[address] [facility]) 04/11/2017      LOS (days): 7  Geometric Mean LOS (GMLOS) (days): 5.10  Days to GMLOS:-1.7     OBJECTIVE:  Risk of Unplanned Readmission Score: 35.83         Current admission status: Inpatient   Preferred Pharmacy:   [id number] [facility], [address] [place] [id number]  Phone: [phone] Fax: [address], [place] - [address] [facility],[address] [place] [address]  Phone: [phone] Fax: [address] - [address] [facility],  [facility],  [facility] [place] [address]  Phone: [phone] Fax: [phone]    Primary Care Provider: Rebecca Lowe DO    Primary Insurance: MEDICARE  Secondary Insurance:     DISCHARGE DETAILS:           STR reopened on aidin due to [facility] at [address] [facility] and rehab stated they can not take patient  at this point. Will relook at referral closer to discharge and pending IR eval.    STR options reopened on aidin.

## 2023-09-25 NOTE — ASSESSMENT & PLAN NOTE
Status post 3.5 L paracentesis 9/20/23  Patient with increasing abdominal distention, will need repeat IR eval with possible placement of permanent drainage catheter (Tenckhoff)    IR consult ordered

## 2023-09-25 NOTE — ASSESSMENT & PLAN NOTE
Malnutrition Findings:   Adult Malnutrition type: Chronic illness  Adult Degree of Malnutrition: Malnutrition of moderate degree  Malnutrition Characteristics: Inadequate energy, Weight loss                  360 Statement: Chronic, moderate protein calorie malnutrition related to catabolic illness as evidenced by 10% (13#) weight loss x 6 months and less than 75% of estimated energy needs for greater than 1 month. Treating with oral diet and nutrition supplements. BMI Findings: Body mass index is 24.98 kg/m².

## 2023-09-25 NOTE — QUICK NOTE
Labs reviewed-- Na slight increase back to 121. Now with ISABEL, consider prerenal /vasomotor state or ischemic ATN in differential.  Focusing on MAP improvement with midodrine. Receiving hypertonic saline, reordered 1.8% at 75 ml/hr x 4 with limited response to 50ml/hr, continue q 4 bmp on HTS.

## 2023-09-25 NOTE — PROGRESS NOTES
Progress Note - Nephrology   Collin Shafer 76 y.o. female MRN: [id number]  Unit/Bed#: 407-01 Encounter: [id number]    A/P:  1.  Acute hyponatremia              Patient serum sodium level persists at 121 mmol/L despite being provided with 1.8% saline, as well as having volume expansion with albumin. At the same time, the patient has now slipped into acute kidney injury. We will stop volume expansion, check urine studies, and initiate diuresis. Check urine studies prior to providing diuresis. 2.  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 Patient significantly volume overloaded with probable reduced effective circulation which is likely contributing to the patient's 86 patient this time. 3.   Acute kidney injury due to volume depletion, present on admission              Patient's creatinine has been significantly increasing over the last 48-72 hours. Creatinine has gone from 0.75 mg/dL to 1.3-1.5 and is currently 1.7 mg/dL. This is occurred in the setting of volume expansion, and etiology may be due to volume overloaded state with the patient now off of her Starling curve and reduced effective circulation. Please refer below. Continue to avoid nephrotoxins, optimize hemodynamics as best as possible going forward. 4.  Volume overload state   Will initiate furosemide 40 mg IV twice daily, first dose to be held until urine studies are reviewed to be obtained. 5.  Volume depletion              Resolved  6.  Hypomagnesemia             Continue to monitor supplement as indicated. 7.  Metastatic ovarian cancer with peritoneal carcinomatosis associated with malignant ascites              Continue with supportive care, patient with probable transition to hospice given overall clinical circumstances.   8.  Hypotension   Patient's blood pressures have been low reduced despite midodrine 5 mg 3 times daily, will increase to 10 mg 3 times daily and continue to monitor clinical parameters. 9.  New portal vein thrombosis              Continue with supportive care at this time.   10.  Chronic pain associated with metastatic cancer   Continue pain management according to hospitalist.    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)   • Chemotherapy-induced nausea   • Urgency of urination   • Pain in thoracic spine   • Chemotherapy induced neutropenia (HCC)   • Thyroid lesion   • Dyspnea on exertion   • Encounter for venous access device care   • Multiple lung nodules   • Primary generalized (osteo)arthritis   • Xerostomia   • Encounter for immunization   • Sensorineural hearing loss (SNHL) of both ears   • Chronic obstructive pulmonary disease, unspecified COPD type ([address])   • Hematuria   • Calculus of gallbladder without cholecystitis without obstruction   • Chronic constipation   • Continuous opioid dependence (HCC)   • Lumbar disc disease with radiculopathy   • Cervical spondylosis   • Stage 3 chronic kidney disease, unspecified whether stage 3a or 3b CKD (HCC)   • Meralgia paraesthetica, right   • Dyslipidemia   • Post-menopause   • Major depressive disorder, single episode, mild (HCC)   • Insomnia due to other mental disorder   • Chemotherapy-induced thrombocytopenia   • DNR (do not resuscitate) discussion   • Neoplasm of uncertain behavior of bone and articular cartilage   • Right leg pain   • Chronic pain syndrome   • Drug induced constipation   • Generalized weakness   • Complicated UTI (urinary tract infection)   • Anemia, chronic disease   • Malignant ascites   • Portal vein thrombosis   • Moderate protein-calorie malnutrition (HCC)         Subjective:   Patient is complaining of new diarrhea this morning. Objective:     Vitals: Blood pressure (!) 86/58, pulse (!) 107, temperature 99.9 °F (37.7 °C), resp. rate 18, height 4' 11" (1.499 m), weight 56.1 kg (123 lb 10.9 oz), SpO2 100 %, not currently breastfeeding. ,Body mass index is 24.98 kg/m². Weight (last 2 days)     Date/Time Weight    09/25/23 0600 56.1 (123.68)    09/24/23 0600 55.6 (122.6)    09/23/23 0700 55 (121.25)    09/23/23 0543 54.2 (119.4)    09/23/23 0524 54.2 (119.4)    09/23/23 0345 55 (121.25)            Intake/Output Summary (Last 24 hours) at 9/25/2023 1027  Last data filed at 9/24/2023 1711  Gross per 24 hour   Intake 480 ml   Output --   Net 480 ml     I/O last 3 completed shifts:   In: 720 [P.O.:720]  Out: -          Physical Exam: BP (!) 86/58   Pulse (!) 107   Temp 99.9 °F (37.7 °C)   Resp 18   Ht 4' 11" (1.499 m)   Wt 56.1 kg (123 lb 10.9 oz)   LMP  (LMP Unknown)   SpO2 100%   BMI 24.98 kg/m²     General Appearance:    Alert, cooperative, no distress, appears stated age   Head:    Normocephalic, without obvious abnormality, atraumatic   Eyes:    Conjunctiva/corneas clear   Ears:    Normal external ears   Nose:   Nares normal, septum midline, mucosa normal, no drainage    or sinus tenderness   Throat:   Lips, mucosa, and tongue normal; teeth and gums normal   Neck:   Supple   Back:     Symmetric, no curvature, ROM normal, no CVA tenderness   Lungs:    Reduced bilaterally   Chest wall:    No tenderness or deformity   Heart:    Regular rate and rhythm, S1 and S2 normal, no murmur, rub   or gallop   Abdomen:     Soft, non-tender, bowel sounds active, moderate distention   Extremities:   Extremities normal, atraumatic, no cyanosis, +3 bilateral lower extremity edema up to the presacral region   Skin:   Skin color, texture, turgor normal, no rashes or lesions   Lymph nodes:   Cervical normal   Neurologic:   CNII-XII intact            Lab, Imaging and other studies: I have personally reviewed pertinent labs. CBC:   Lab Results   Component Value Date    WBC 15.03 (H) 09/25/2023    HGB 8.5 (L) 09/25/2023    HCT 26.9 (L) 09/25/2023    MCV 85 09/25/2023     09/25/2023    RBC 3.17 (L) 09/25/2023    MCH 26.8 09/25/2023    MCHC 31.6 09/25/2023    RDW 17.5 (H) 09/25/2023    MPV 10.3 09/25/2023    NRBC 0 09/25/2023     CMP:   Lab Results   Component Value Date    K 4.8 09/25/2023    CL 92 (L) 09/25/2023    CO2 18 (L) 09/25/2023    BUN 33 (H) 09/25/2023    CREATININE 1.71 (H) 09/25/2023    CALCIUM 8.6 09/25/2023    EGFR 30 09/25/2023       . Results from last 7 days   Lab Units 09/25/23  0421 09/25/23  0036 09/24/23  1955/23  1037 09/23/23  0412 09/20/23  1208 09/20/23  0511 09/19/23  1433 09/19/23  0514   POTASSIUM mmol/L 4.8 4.3 3.7   < >  --    < > 4.0   < > 4.0   CHLORIDE mmol/L 92* 93* 92*   < >  --    < > 88*   < > 89*   CO2 mmol/L 18* 18* 18*   < >  --    < > 26   < > 24   BUN mg/dL 33* 31* 29*   < >  --    < > 22   < > 30*   CREATININE mg/dL 1.71* 1.51* 1.30   < >  --    < > 0.66   < > 0.91   CALCIUM mg/dL 8.6 8.4 8.1*   < >  --    < > 8.7   < > 8.7   ALK PHOS U/L  --   --   --   --  157*  --  188*  --  131*   ALT U/L  --   --   --   --  20  --  15  --  10   AST U/L  --   --   --   --  35  --  39  --  21    < > = values in this interval not displayed.          Phosphorus:   Lab Results   Component Value Date    PHOS 3.9 09/25/2023     Magnesium:   Lab Results   Component Value Date    MG 1.7 (L) 09/25/2023     Urinalysis: No results found for: "COLORU", "CLARITYU", "SPECGRAV", "PHUR", "LEUKOCYTESUR", "NITRITE", "PROTEINUA", "GLUCOSEU", "Jacqulin Mary", "BILIRUBINUR", "BLOODU"  Ionized Calcium: No results found for: "CAION"  Coagulation:   Lab Results Component Value Date    INR 2.25 (H) 09/25/2023     Troponin: No results found for: "TROPONINI"  ABG: No results found for: "PHART", "GFI2ING", "PO2ART", "NUP6NMO", "H9EQRVKJ", "BEART", "SOURCE"  Radiology review:     IMAGING  No results found.     Current Facility-Administered Medications   Medication Dose Route Frequency   • albuterol inhalation solution 2.5 mg  2.5 mg Nebulization Q4H PRN   • aluminum-magnesium hydroxide-simethicone (MAALOX) oral suspension 30 mL  30 mL Oral Q6H PRN   • bisacodyl (DULCOLAX) rectal suppository 10 mg  10 mg Rectal Daily PRN   • Diclofenac Sodium (VOLTAREN) 1 % topical gel 2 g  2 g Topical 4x Daily   • diphenhydrAMINE (BENADRYL) tablet 25 mg  25 mg Oral Q8H PRN   • enoxaparin (LOVENOX) subcutaneous injection 40 mg  40 mg Subcutaneous Daily   • gabapentin (NEURONTIN) capsule 200 mg  200 mg Oral TID   • HYDROmorphone (DILAUDID) injection 1 mg  1 mg Intravenous Q3H PRN   • LORazepam (ATIVAN) tablet 0.5 mg  0.5 mg Oral BID PRN   • magnesium sulfate 2 g/50 mL IVPB (premix) 2 g  2 g Intravenous Once   • midodrine (PROAMATINE) tablet 5 mg  5 mg Oral TID AC   • ondansetron (ZOFRAN) injection 4 mg  4 mg Intravenous Q6H PRN   • oxyCODONE (ROXICODONE) immediate release tablet 10 mg  10 mg Oral Q4H PRN   • pantoprazole (PROTONIX) EC tablet 40 mg  40 mg Oral Early Morning   • polyethylene glycol (MIRALAX) packet 17 g  17 g Oral Daily   • warfarin (COUMADIN) tablet 5 mg  5 mg Oral Daily (warfarin)     Medications Discontinued During This Encounter   Medication Reason   • piperacillin-tazobactam (ZOSYN) 3.375 g in sodium chloride 0.9 % 100 mL IVPB    • sodium chloride 0.9 % with KCl 20 mEq/L infusion (premix)    • oxyCODONE-acetaminophen (PERCOCET) 5-325 mg per tablet 2 tablet    • HYDROmorphone (DILAUDID) injection 0.5 mg    • sodium chloride (HYPERTONIC) infusion 1.8%    • sodium chloride tablet 2 g    • cefTRIAXone (ROCEPHIN) IVPB (premix in dextrose) 1,000 mg 50 mL    • sodium chloride (HYPERTONIC) infusion 1.8%    • tolvaptan (Samsca) tablet 15 mg    • sodium chloride (HYPERTONIC) infusion 1.8%        Cirilo Givens, DO      This progress note was produced in part using a dictation device which may document imprecise wording from author's original intent.

## 2023-09-25 NOTE — ASSESSMENT & PLAN NOTE
Case discussed with nephrology today, further care of volume status and hyponatremia per nephrology    Overall poor prognosis, unlikely to improve significantly

## 2023-09-25 NOTE — ASSESSMENT & PLAN NOTE
Overall poor functional status, need short-term rehab placement versus permanent nursing facility placement

## 2023-09-26 NOTE — CONSULTS
Interventional Radiology  Consultation 9/26/2023     Consults  Ethan Arely   1955   [id number]      Assessment/Plan:  Advance cervical cancer and portal vein thrombosis. Recent paracentesis. Worsening abdomen distention. Will check CT. If significant ascites has reaccumulated, then we will plan placement of tunneled peritoneal catheter today.     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)    Chemotherapy-induced nausea    Urgency of urination    Pain in thoracic spine    Chemotherapy induced neutropenia (HCC)    Thyroid lesion    Dyspnea on exertion    Encounter for venous access device care    Multiple lung nodules    Primary generalized (osteo)arthritis    Xerostomia    Encounter for immunization    Sensorineural hearing loss (SNHL) of both ears    Chronic obstructive pulmonary disease, unspecified COPD type ([address])    Hematuria    Calculus of gallbladder without cholecystitis without obstruction    Chronic constipation    Continuous opioid dependence (HCC)    Lumbar disc disease with radiculopathy    Cervical spondylosis    Stage 3 chronic kidney disease, unspecified whether stage 3a or 3b CKD ([address])    Lab Results   Component Value Date    EGFR 18 09/26/2023    EGFR 19 09/26/2023    EGFR 19 09/26/2023    CREATININE 2.59 (H) 09/26/2023    CREATININE 2.47 (H) 09/26/2023    CREATININE 2.44 (H) 09/26/2023         Meralgia paraesthetica, right    Dyslipidemia    Post-menopause    Major depressive disorder, single episode, mild (HCC)    Insomnia due to other mental disorder    Chemotherapy-induced thrombocytopenia    DNR (do not resuscitate) discussion    Neoplasm of uncertain behavior of bone and articular cartilage    Right leg pain    Chronic pain syndrome    Drug induced constipation    Generalized weakness    Complicated UTI (urinary tract infection)    Anemia, chronic disease    Malignant ascites    Portal vein thrombosis    Moderate protein-calorie malnutrition (HCC)    Malnutrition Findings:   Adult Malnutrition type: Chronic illness  Adult Degree of Malnutrition: Malnutrition of moderate degree  Malnutrition Characteristics: Inadequate energy, Weight loss                  360 Statement: Chronic, moderate protein calorie malnutrition related to catabolic illness as evidenced by 10% (13#) weight loss x 6 months and less than 75% of estimated energy needs for greater than 1 month. Treating with oral diet and nutrition supplements. BMI Findings: Body mass index is 27.16 kg/m². Subjective:     Patient ID: Kavitha Banda is a 76 y.o. female. History of Present Illness  Cervical cancer and portal vein thrombosis. Recent onset of ascites.     Review of Systems      Past Medical History:   Diagnosis Date   • Acne    • Anxiety    • Anxiety and depression 10/02/2019   • Arthritis    • Breathing difficulty     pt does not recall any breathing problem but did have neck pain after procedure" Gave her an incentive spirometer to use   • Cancer (HCC)     cervical/ and "lymph nodes" (GYN)   • Cervical cancer (HCC)    • Chronic kidney disease     stage 3   • Chronic pain disorder     Neck pain   • Claustrophobia    • Colon polyp    • GERD (gastroesophageal reflux disease)     not now   • Headache    • History of radiation therapy    • History of shingles    • Irritable bowel syndrome    • Joint pain following chemotherapy     and stiffness   • Liver disease     "cyst on liver"   • Lung nodule     "left"   • Motion sickness    • Muscle weakness     after chemo"   • Neck pain     "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"has had neck pain since childhood"   • Peptic ulceration 2016   • Port-A-Cath in place     right chest//for chemotherapy q 3 weeks and also receives IV hydration   • Shortness of breath     "started with cancer, when too much exertion like cleaning/guera doing stairs"-pt states it is much better   • Thumb injury 10/2019    right   • Thyroid nodule    • Tinnitus of left ear 02/19/2020   • Urinary problem    • Wears contact lenses    • Wrist arthritis 03/17/2020        Past Surgical History:   Procedure Laterality Date   • COLONOSCOPY     • CYSTOSCOPY W/ RETROGRADES Right 06/03/2021    Procedure: Estella Sanchez W/RETROGRADE;  Surgeon: Anson Toro MD;  Location: [facility];  Service: Urology   • CYSTOSCOPY W/ RETROGRADES Right 10/18/2021    Procedure: CYSTOSCOPY WITH ureteral stent exchange;  Surgeon: Bong Clements MD;  Location: [facility];  Service: Urology   • EPIDURAL BLOCK INJECTION Right 8/2/2023    Procedure: BLOCK / INJECTION EPIDURAL STEROID LUMBAR RIGHT L2-3 TFESI;  Surgeon: Beth Zayas MD;  Location: [facility];  Service: Pain Management    • FL RETROGRADE PYELOGRAM  02/11/2021   • FL RETROGRADE PYELOGRAM  06/03/2021   • FOOT SURGERY     • HYSTERECTOMY     • IR BIOPSY LYMPH NODE  02/19/2021   • IR PARACENTESIS  9/19/2023   • IR PORT PLACEMENT  03/09/2021   • LYMPH NODE BIOPSY  2021   • [place] CYSTO BLADDER W/URETERAL CATHETERIZATION Right 02/11/2021    Procedure: CYSTOSCOPY RETROGRADE PYELOGRAM WITH INSERTION STENT URETERAL;  Surgeon: Bong Clements MD;  Location:  MAIN OR;  Service: Urology   • [place] CYSTO BLADDER W/URETERAL CATHETERIZATION Right 04/04/2022    Procedure: CYSTOSCOPY  WITH INSERTION STENT URETERAL Right;  Surgeon: Bong Clements MD;  Location: [facility];  Service: Urology   • [place] CYSTO BLADDER W/URETERAL CATHETERIZATION Right 10/27/2022    Procedure: Cystoscopy, right ureteral stent exchange;  Surgeon: Bong Clements MD;  Location: [facility];  Service: Urology   • [place] CYSTO BLADDER W/URETERAL CATHETERIZATION Right 2023    Procedure: CYSTOSCOPY with right ureteral stent exchange;  Surgeon: Neptali Colby MD;  Location: [facility];  Service: Urology   • OR EXCISION INTERDIGITAL [address] SINGLE EACH Left 2018    Procedure: FOOT NEUROMA EXCISION;  Surgeon: John Keating DPM;  Location[facility];  Service: Podiatry   • [address] Right 2021    Procedure: EXCHANGE STENT;  Surgeon: José Escalona MD;  Location: [facility];  Service: Urology   • TUBAL LIGATION     • UPPER GASTROINTESTINAL ENDOSCOPY          Social History     Tobacco Use   Smoking Status Former   • Packs/day: 3.00   • Years: 25.00   • Total pack years: 75.00   • Types: Cigarettes   • Start date:    • Quit date: 2003   • Years since quittin.2   Smokeless Tobacco Never        Social History     Substance and Sexual Activity   Alcohol Use Not Currently    Comment: Occasional        Social History     Substance and Sexual Activity   Drug Use No   • Types: Marijuana    Comment: As teenager        Allergies   Allergen Reactions   • Medical Tape Itching     And redness from adhesive    And "skin raised up" especially longer on skin   • Aspirin GI Intolerance   • Codeine GI Intolerance   • Dust Mite Extract    • Pollen Extract Sneezing       Current Facility-Administered Medications   Medication Dose Route Frequency Provider Last Rate Last Admin   • albuterol inhalation solution 2.5 mg  2.5 mg Nebulization Q4H PRN Yaya Bahena MD       • aluminum-magnesium hydroxide-simethicone (MAALOX) oral suspension 30 mL  30 mL Oral Q6H PRN Yaya Bahena MD   30 mL at 23 1542   • bisacodyl (DULCOLAX) rectal suppository 10 mg  10 mg Rectal Daily PRN Yaya Bahena MD       • Diclofenac Sodium (VOLTAREN) 1 % topical gel 2 g  2 g Topical 4x Daily Yaya Bahena MD   2 g at 23 2130   • diphenhydrAMINE (BENADRYL) tablet 25 mg  25 mg Oral Q8H PRN Janet Hall MD   25 mg at 09/23/23 1851   • furosemide (LASIX) injection 40 mg  40 mg Intravenous BID (diuretic) Alex Vicente, DO   40 mg at 09/25/23 1715   • gabapentin (NEURONTIN) capsule 200 mg  200 mg Oral TID Isidoro Carrillo MD   200 mg at 09/26/23 0825   • HYDROmorphone (DILAUDID) injection 1 mg  1 mg Intravenous Q3H PRN Janet Castrejon MD   1 mg at 09/25/23 0238   • LORazepam (ATIVAN) tablet 0.5 mg  0.5 mg Oral BID PRN Isidoro Carrillo MD       • midodrine (PROAMATINE) tablet 10 mg  10 mg Oral TID AC Isiah Varvarelis, DO   10 mg at 09/26/23 9114   • ondansetron (ZOFRAN) injection 4 mg  4 mg Intravenous Q6H PRN Isidoro Carrillo MD   4 mg at 09/25/23 2034   • oxyCODONE (ROXICODONE) immediate release tablet 10 mg  10 mg Oral Q4H PRN Janet Castrejon MD   10 mg at 09/25/23 0104   • pantoprazole (PROTONIX) EC tablet 40 mg  40 mg Oral Early Morning Isidoro Carrillo MD   40 mg at 09/26/23 2883   • polyethylene glycol (MIRALAX) packet 17 g  17 g Oral Daily Isidoro Carrillo MD   17 g at 09/20/23 0933   • sodium bicarbonate tablet 650 mg  650 mg Oral Daily Glovelier, DO   650 mg at 09/26/23 1105          Objective:    Vitals:    09/26/23 0600 09/26/23 0605 09/26/23 0817 09/26/23 1249   BP:  100/64 92/58 104/60   BP Location:   Right arm Right arm   Pulse:   86 92   Resp:       Temp:   (!) 97.2 °F (36.2 °C)    TempSrc:   Oral    SpO2:   100% 99%   Weight: 61 kg (134 lb 7.7 oz)      Height:            Physical Exam      No results found for: "BNP"   Lab Results   Component Value Date    WBC 11.72 (H) 09/26/2023    HGB 8.1 (L) 09/26/2023    HCT 25.9 (L) 09/26/2023    MCV 87 09/26/2023     09/26/2023     Lab Results   Component Value Date    INR 4.96 (H) 09/26/2023    INR 2.25 (H) 09/25/2023    INR 1.39 (H) 09/24/2023    PROTIME 44.9 (H) 09/26/2023    PROTIME 24.4 (H) 09/25/2023    PROTIME 16.8 (H) 09/24/2023     Lab Results   Component Value Date    PTT 49 (H) 09/18/2023         I have personally reviewed pertinent imaging and laboratory results. Code Status: Level 3 - DNAR and DNI  Advance Directive and Living Will:      Power of :    POLST:      IR has been consulted to evaluate the patient, determine the appropriate procedure, and whether or not a procedure can and should be performed. Thank you for allowing me to participate in the care of [facility]. Please don't hesitate to call, text, email, or TigerText with any questions. This text is generated with voice recognition software. There may be translation, syntax,  or grammatical errors. If you have any questions, please contact the dictating provider.

## 2023-09-26 NOTE — PROGRESS NOTES
Progress Note - Nephrology   Cristel Moore 76 y.o. female MRN: [id number]  Unit/Bed#: 407-01 Encounter: [id number]    A/P:  1. Acute hyponatremia  Sodium levels unchanged at 120 mmol/L. Patient started on a trial of IV diuresis with Lasix 40 mg twice daily on 9/25 with concerns for worsening volume overload. Hypertonic saline and trial of albumin were not effective to help with acute kidney injury or hyponatremia. Objective data to guide response is limited. Her weights are probably inaccurate and on bed scale and urine output is not recorded. Awaiting decision for hospice. IR has been consulted for potential Tenckhoff catheter placement. This would be helpful to manage patient's ascites. She is off the hypertonic saline so we will space out chemistries to every 8 labs for now. It will be hard to optimize patient for discharge as her sodium has been refractory to treatment thus far. Her acute kidney injury that is worsening is very concerning. 2.  Acute kidney injury, present on admission, worsening. Cr previously trending at 0.6-0.7 and progressively worsened over the weekend. Creatinine continues to decline despite current therapy. Creatinine 2.4 mg/dL this AM.  Urine output not recorded and weights are not reliable. Suspected to be due to volume overloaded state with congestion. Effective circulating volume can be difficult to ascertain in this patient. She is being treated with a course of diuresis. We will space out labs to every 8 hours. Her creatinine likely overestimates her renal function due to poor muscle mass. Potassium trends are borderline at this time, may need to consider potassium restriction if trends worsen. 3.  Hypotension  Continue midodrine 10 mg 3 times daily. If blood pressure worsens, can increase dose further to 12.5 mg 3 times daily. 4.  Metastatic ovarian cancer with peritoneal carcinomatosis associate with malignant ascites.   Continuing with supportive care. Ongoing hospice conversation/placement per primary care provider and case management. 5.  Metabolic acidosis due to acute kidney injury. recommend sodium bicarbonate 650 mg/day. Follow up reason for today's visit:     Dehydration with hyponatremia      Subjective:   Patient seen and examined this AM.  She was resting and gave limited responses. I was not able to obtain a thorough review of systems. Objective:     Vitals: Blood pressure 92/58, pulse 86, temperature (!) 97.2 °F (36.2 °C), temperature source Oral, resp. rate 19, height 4' 11" (1.499 m), weight 61 kg (134 lb 7.7 oz), SpO2 100 %, not currently breastfeeding. ,Body mass index is 27.16 kg/m². Weight (last 2 days)     Date/Time Weight    09/26/23 0600 61 (134.48)    09/25/23 0600 56.1 (123.68)    09/24/23 0600 55.6 (122.6)          No intake or output data in the 24 hours ending 09/26/23 0949  No intake/output data recorded.          Physical Exam: BP 92/58 (BP Location: Right arm)   Pulse 86   Temp (!) 97.2 °F (36.2 °C) (Oral)   Resp 19   Ht 4' 11" (1.499 m)   Wt 61 kg (134 lb 7.7 oz)   LMP  (LMP Unknown)   SpO2 100%   BMI 27.16 kg/m²     General Appearance:    cooperative, no distress, appears stated age   Head:    Normocephalic, without obvious abnormality, atraumatic   Eyes:    Conjunctiva/corneas clear   Ears:    Normal external ears   Nose:   Nares normal, septum midline, mucosa normal, no drainage    or sinus tenderness   Throat:   Lips, mucosa, and tongue normal; teeth and gums normal   Neck:    Back:      Lungs:     Clear to auscultation bilaterally, respirations unlabored   Chest wall:    No tenderness or deformity   Heart:    Regular rate and rhythm, S1 and S2 normal, no murmur, rub   or gallop   Abdomen:     Soft, distention    Extremities:   Severe BL LE edema    Skin:   Skin color, texture, turgor normal, no rashes or lesions   Lymph nodes:    Neurologic:             Lab, Imaging and other studies: I have personally reviewed pertinent labs. CBC:   Lab Results   Component Value Date    WBC 11.72 (H) 09/26/2023    HGB 8.1 (L) 09/26/2023    HCT 25.9 (L) 09/26/2023    MCV 87 09/26/2023     09/26/2023    RBC 2.99 (L) 09/26/2023    MCH 27.1 09/26/2023    MCHC 31.3 (L) 09/26/2023    RDW 17.8 (H) 09/26/2023    MPV 10.4 09/26/2023    NRBC 0 09/26/2023     CMP:   Lab Results   Component Value Date    K 5.3 09/26/2023    CL 91 (L) 09/26/2023    CO2 17 (L) 09/26/2023    BUN 44 (H) 09/26/2023    CREATININE 2.59 (H) 09/26/2023    CALCIUM 8.7 09/26/2023     (H) 09/26/2023    ALT 65 (H) 09/26/2023    ALKPHOS 146 (H) 09/26/2023    EGFR 18 09/26/2023       . Results from last 7 days   Lab Units 09/26/23  0825 09/26/23  0426 09/25/23  2351 09/23/23  1037 09/23/23  0412 09/20/23  1208 09/20/23  0511   POTASSIUM mmol/L 5.3 5.1  5.1 5.0   < >  --    < > 4.0   CHLORIDE mmol/L 91* 92*  92* 93*   < >  --    < > 88*   CO2 mmol/L 17* 17*  17* 16*   < >  --    < > 26   BUN mg/dL 44* 37*  40* 40*   < >  --    < > 22   CREATININE mg/dL 2.59* 2.44*  2.47* 2.23*   < >  --    < > 0.66   CALCIUM mg/dL 8.7 8.5  8.5 8.4   < >  --    < > 8.7   ALK PHOS U/L  --  146*  --   --  157*  --  188*   ALT U/L  --  65*  --   --  20  --  15   AST U/L  --  191*  --   --  35  --  39    < > = values in this interval not displayed.          Phosphorus:   Lab Results   Component Value Date    PHOS 4.8 (H) 09/26/2023     Magnesium:   Lab Results   Component Value Date    MG 2.4 09/26/2023     Urinalysis: No results found for: "COLORU", "CLARITYU", "Jd Mauro", "PHUR", "LEUKOCYTESUR", "NITRITE", "Brendolyn Barrio", "GLUCOSEU", "[facility]", "Brianna ", "BLOODU"  Ionized Calcium: No results found for: "CAION"  Coagulation:   Lab Results   Component Value Date    INR 4.96 (H) 09/26/2023     Troponin: No results found for: "TROPONINI"  ABG: No results found for: "PHART", "NQD1GZE", "PO2ART", "UJO0WDP", "V4QWYLCQ", "BEART", "SOURCE"  Radiology review: IMAGING  No results found.     Current Facility-Administered Medications   Medication Dose Route Frequency   • albuterol inhalation solution 2.5 mg  2.5 mg Nebulization Q4H PRN   • aluminum-magnesium hydroxide-simethicone (MAALOX) oral suspension 30 mL  30 mL Oral Q6H PRN   • bisacodyl (DULCOLAX) rectal suppository 10 mg  10 mg Rectal Daily PRN   • Diclofenac Sodium (VOLTAREN) 1 % topical gel 2 g  2 g Topical 4x Daily   • diphenhydrAMINE (BENADRYL) tablet 25 mg  25 mg Oral Q8H PRN   • furosemide (LASIX) injection 40 mg  40 mg Intravenous BID (diuretic)   • gabapentin (NEURONTIN) capsule 200 mg  200 mg Oral TID   • HYDROmorphone (DILAUDID) injection 1 mg  1 mg Intravenous Q3H PRN   • LORazepam (ATIVAN) tablet 0.5 mg  0.5 mg Oral BID PRN   • midodrine (PROAMATINE) tablet 10 mg  10 mg Oral TID AC   • ondansetron (ZOFRAN) injection 4 mg  4 mg Intravenous Q6H PRN   • oxyCODONE (ROXICODONE) immediate release tablet 10 mg  10 mg Oral Q4H PRN   • pantoprazole (PROTONIX) EC tablet 40 mg  40 mg Oral Early Morning   • polyethylene glycol (MIRALAX) packet 17 g  17 g Oral Daily     Medications Discontinued During This Encounter   Medication Reason   • piperacillin-tazobactam (ZOSYN) 3.375 g in sodium chloride 0.9 % 100 mL IVPB    • sodium chloride 0.9 % with KCl 20 mEq/L infusion (premix)    • oxyCODONE-acetaminophen (PERCOCET) 5-325 mg per tablet 2 tablet    • HYDROmorphone (DILAUDID) injection 0.5 mg    • sodium chloride (HYPERTONIC) infusion 1.8%    • sodium chloride tablet 2 g    • cefTRIAXone (ROCEPHIN) IVPB (premix in dextrose) 1,000 mg 50 mL    • sodium chloride (HYPERTONIC) infusion 1.8%    • tolvaptan ([place]) tablet 15 mg    • sodium chloride (HYPERTONIC) infusion 1.8%    • sodium chloride (HYPERTONIC) infusion 1.8%    • midodrine (PROAMATINE) tablet 5 mg    • furosemide (LASIX) injection 40 mg    • warfarin (COUMADIN) tablet 5 mg    • enoxaparin (LOVENOX) subcutaneous injection 40 mg    • warfarin (COUMADIN) tablet 2 mg        Shawna Recinos, DO      This progress note was produced in part using a dictation device which may document imprecise wording from author's original intent.

## 2023-09-26 NOTE — PHYSICAL THERAPY NOTE
Physical Therapy Cancellation Note             09/26/23 1201   PT Last Visit   PT Visit Date 09/26/23   Note Type   Note Type Cancelled Session   Cancel Reasons Refusal

## 2023-09-26 NOTE — ASSESSMENT & PLAN NOTE
· Status post 3.5 L paracentesis 9/20/23  · Patient with increasing abdominal distention,and a repeat IR eval with possible placement of permanent drainage catheter (Tenckhoff) was ordered.    · This was cancelled due to patient's deterioration and plan for CMO

## 2023-09-26 NOTE — PLAN OF CARE
Problem: Potential for Falls  Goal: Patient will remain free of falls  Description: INTERVENTIONS:  - Educate patient/family on patient safety including physical limitations  - Instruct patient to call for assistance with activity   - Consult OT/PT to assist with strengthening/mobility   - Keep Call bell within reach  - Keep bed low and locked with side rails adjusted as appropriate  - Keep care items and personal belongings within reach  - Initiate and maintain comfort rounds  - Make Fall Risk Sign visible to staff  - Offer Toileting every 2 Hours, in advance of need  - Initiate/Maintain fall alarm  - Obtain necessary fall risk management equipment: non-skid footwear, call bell  - Apply yellow socks and bracelet for high fall risk patients  - Consider moving patient to room near nurses station  Outcome: Progressing     Problem: Nutrition/Hydration-ADULT  Goal: Nutrient/Hydration intake appropriate for improving, restoring or maintaining nutritional needs  Description: Monitor and assess patient's nutrition/hydration status for malnutrition. Collaborate with interdisciplinary team and initiate plan and interventions as ordered. Monitor patient's weight and dietary intake as ordered or per policy. Utilize nutrition screening tool and intervene as necessary. Determine patient's food preferences and provide high-protein, high-caloric foods as appropriate.      INTERVENTIONS:  - Monitor oral intake, urinary output, labs, and treatment plans  - Assess nutrition and hydration status and recommend course of action  - Evaluate amount of meals eaten  - Assist patient with eating if necessary   - Allow adequate time for meals  - Recommend/ encourage appropriate diets, oral nutritional supplements, and vitamin/mineral supplements  - Order, calculate, and assess calorie counts as needed  - Recommend, monitor, and adjust tube feedings and TPN/PPN based on assessed needs  - Assess need for intravenous fluids  - Provide specific nutrition/hydration education as appropriate  - Include patient/family/caregiver in decisions related to nutrition  Outcome: Progressing     Problem: MOBILITY - ADULT  Goal: Maintain or return to baseline ADL function  Description: INTERVENTIONS:  -  Assess patient's ability to carry out ADLs; assess patient's baseline for ADL function and identify physical deficits which impact ability to perform ADLs (bathing, care of mouth/teeth, toileting, grooming, dressing, etc.)  - Assess/evaluate cause of self-care deficits   - Assess range of motion  - Assess patient's mobility; develop plan if impaired  - Assess patient's need for assistive devices and provide as appropriate  - Encourage maximum independence but intervene and supervise when necessary  - Involve family in performance of ADLs  - Assess for home care needs following discharge   - Consider OT consult to assist with ADL evaluation and planning for discharge  - Provide patient education as appropriate  Outcome: Progressing  Goal: Maintains/Returns to pre admission functional level  Description: INTERVENTIONS:  - Perform BMAT or MOVE assessment daily.   - Set and communicate daily mobility goal to care team and patient/family/caregiver. - Collaborate with rehabilitation services on mobility goals if consulted  - Perform Range of Motion 3 times a day. - Reposition patient every 2 hours.   - Dangle patient 3 times a day  - Stand patient 3 times a day  - Ambulate patient 3 times a day  - Out of bed to chair 3 times a day   - Out of bed for meals 3 times a day  - Out of bed for toileting  - Record patient progress and toleration of activity level   Outcome: Progressing     Problem: Prexisting or High Potential for Compromised Skin Integrity  Goal: Skin integrity is maintained or improved  Description: INTERVENTIONS:  - Identify patients at risk for skin breakdown  - Assess and monitor skin integrity  - Assess and monitor nutrition and hydration status  - Monitor labs   - Assess for incontinence   - Turn and reposition patient  - Assist with mobility/ambulation  - Relieve pressure over bony prominences  - Avoid friction and shearing  - Provide appropriate hygiene as needed including keeping skin clean and dry  - Evaluate need for skin moisturizer/barrier cream  - Collaborate with interdisciplinary team   - Patient/family teaching  - Consider wound care consult   Outcome: Progressing     Problem: PAIN - ADULT  Goal: Verbalizes/displays adequate comfort level or baseline comfort level  Description: Interventions:  - Encourage patient to monitor pain and request assistance  - Assess pain using appropriate pain scale  - Administer analgesics based on type and severity of pain and evaluate response  - Implement non-pharmacological measures as appropriate and evaluate response  - Consider cultural and social influences on pain and pain management  - Notify physician/advanced practitioner if interventions unsuccessful or patient reports new pain  Outcome: Progressing     Problem: INFECTION - ADULT  Goal: Absence or prevention of progression during hospitalization  Description: INTERVENTIONS:  - Assess and monitor for signs and symptoms of infection  - Monitor lab/diagnostic results  - Monitor all insertion sites, i.e. indwelling lines, tubes, and drains  - Monitor endotracheal if appropriate and nasal secretions for changes in amount and color  - [place] appropriate cooling/warming therapies per order  - Administer medications as ordered  - Instruct and encourage patient and family to use good hand hygiene technique  - Identify and instruct in appropriate isolation precautions for identified infection/condition  Outcome: Progressing  Goal: Absence of fever/infection during neutropenic period  Description: INTERVENTIONS:  - Monitor WBC    Outcome: Progressing     Problem: SAFETY ADULT  Goal: Patient will remain free of falls  Description: INTERVENTIONS:  - Educate patient/family on patient safety including physical limitations  - Instruct patient to call for assistance with activity   - Consult OT/PT to assist with strengthening/mobility   - Keep Call bell within reach  - Keep bed low and locked with side rails adjusted as appropriate  - Keep care items and personal belongings within reach  - Initiate and maintain comfort rounds  - Make Fall Risk Sign visible to staff  - Offer Toileting every 2 Hours, in advance of need  - Initiate/Maintain fall alarm  - Obtain necessary fall risk management equipment: non-skid footwear, call bell  - Apply yellow socks and bracelet for high fall risk patients  - Consider moving patient to room near nurses station  Outcome: Progressing  Goal: Maintain or return to baseline ADL function  Description: INTERVENTIONS:  -  Assess patient's ability to carry out ADLs; assess patient's baseline for ADL function and identify physical deficits which impact ability to perform ADLs (bathing, care of mouth/teeth, toileting, grooming, dressing, etc.)  - Assess/evaluate cause of self-care deficits   - Assess range of motion  - Assess patient's mobility; develop plan if impaired  - Assess patient's need for assistive devices and provide as appropriate  - Encourage maximum independence but intervene and supervise when necessary  - Involve family in performance of ADLs  - Assess for home care needs following discharge   - Consider OT consult to assist with ADL evaluation and planning for discharge  - Provide patient education as appropriate  Outcome: Progressing  Goal: Maintains/Returns to pre admission functional level  Description: INTERVENTIONS:  - Perform BMAT or MOVE assessment daily.   - Set and communicate daily mobility goal to care team and patient/family/caregiver. - Collaborate with rehabilitation services on mobility goals if consulted  - Perform Range of Motion 3 times a day. - Reposition patient every 2 hours.   - Dangle patient 3 times a day  - Stand patient 3 times a day  - Ambulate patient 3 times a day  - Out of bed to chair 3 times a day   - Out of bed for meals 3 times a day  - Out of bed for toileting  - Record patient progress and toleration of activity level   Outcome: Progressing     Problem: DISCHARGE PLANNING  Goal: Discharge to home or other facility with appropriate resources  Description: INTERVENTIONS:  - Identify barriers to discharge w/patient and caregiver  - Arrange for needed discharge resources and transportation as appropriate  - Identify discharge learning needs (meds, wound care, etc.)  - Arrange for interpretive services to assist at discharge as needed  - Refer to Case Management Department for coordinating discharge planning if the patient needs post-hospital services based on physician/advanced practitioner order or complex needs related to functional status, cognitive ability, or social support system  Outcome: Progressing     Problem: Knowledge Deficit  Goal: Patient/family/caregiver demonstrates understanding of disease process, treatment plan, medications, and discharge instructions  Description: Complete learning assessment and assess knowledge base.   Interventions:  - Provide teaching at level of understanding  - Provide teaching via preferred learning methods  Outcome: Progressing     Problem: CARDIOVASCULAR - ADULT  Goal: Maintains optimal cardiac output and hemodynamic stability  Description: INTERVENTIONS:  - Monitor I/O, vital signs and rhythm  - Monitor for S/S and trends of decreased cardiac output  - Administer and titrate ordered vasoactive medications to optimize hemodynamic stability  - Assess quality of pulses, skin color and temperature  - Assess for signs of decreased coronary artery perfusion  - Instruct patient to report change in severity of symptoms  Outcome: Progressing  Goal: Absence of cardiac dysrhythmias or at baseline rhythm  Description: INTERVENTIONS:  - Continuous cardiac monitoring, vital signs, obtain 12 lead EKG if ordered  - Administer antiarrhythmic and heart rate control medications as ordered  - Monitor electrolytes and administer replacement therapy as ordered  Outcome: Progressing     Problem: GASTROINTESTINAL - ADULT  Goal: Minimal or absence of nausea and/or vomiting  Description: INTERVENTIONS:  - Administer IV fluids if ordered to ensure adequate hydration  - Maintain NPO status until nausea and vomiting are resolved  - Nasogastric tube if ordered  - Administer ordered antiemetic medications as needed  - Provide nonpharmacologic comfort measures as appropriate  - Advance diet as tolerated, if ordered  - Consider nutrition services referral to assist patient with adequate nutrition and appropriate food choices  Outcome: Progressing  Goal: Maintains or returns to baseline bowel function  Description: INTERVENTIONS:  - Assess bowel function  - Encourage oral fluids to ensure adequate hydration  - Administer IV fluids if ordered to ensure adequate hydration  - Administer ordered medications as needed  - Encourage mobilization and activity  - Consider nutritional services referral to assist patient with adequate nutrition and appropriate food choices  Outcome: Progressing  Goal: Maintains adequate nutritional intake  Description: INTERVENTIONS:  - Monitor percentage of each meal consumed  - Identify factors contributing to decreased intake, treat as appropriate  - Assist with meals as needed  - Monitor I&O, weight, and lab values if indicated  - Obtain nutrition services referral as needed  Outcome: Progressing  Goal: Oral mucous membranes remain intact  Description: INTERVENTIONS  - Assess oral mucosa and hygiene practices  - Implement preventative oral hygiene regimen  - Implement oral medicated treatments as ordered  - Initiate Nutrition services referral as needed  Outcome: Progressing     Problem: METABOLIC, FLUID AND ELECTROLYTES - ADULT  Goal: Electrolytes maintained within normal limits  Description: INTERVENTIONS:  - Monitor labs and assess patient for signs and symptoms of electrolyte imbalances  - Administer electrolyte replacement as ordered  - Monitor response to electrolyte replacements, including repeat lab results as appropriate  - Instruct patient on fluid and nutrition as appropriate  Outcome: Progressing  Goal: Fluid balance maintained  Description: INTERVENTIONS:  - Monitor labs   - Monitor I/O and WT  - Instruct patient on fluid and nutrition as appropriate  - Assess for signs & symptoms of volume excess or deficit  Outcome: Progressing  Goal: Glucose maintained within target range  Description: INTERVENTIONS:  - Monitor Blood Glucose as ordered  - Assess for signs and symptoms of hyperglycemia and hypoglycemia  - Administer ordered medications to maintain glucose within target range  - Assess nutritional intake and initiate nutrition service referral as needed  Outcome: Progressing     Problem: HEMATOLOGIC - ADULT  Goal: Maintains hematologic stability  Description: INTERVENTIONS  - Assess for signs and symptoms of bleeding or hemorrhage  - Monitor labs  - Administer supportive blood products/factors as ordered and appropriate  Outcome: Progressing     Problem: MUSCULOSKELETAL - ADULT  Goal: Maintain or return mobility to safest level of function  Description: INTERVENTIONS:  - Assess patient's ability to carry out ADLs; assess patient's baseline for ADL function and identify physical deficits which impact ability to perform ADLs (bathing, care of mouth/teeth, toileting, grooming, dressing, etc.)  - Assess/evaluate cause of self-care deficits   - Assess range of motion  - Assess patient's mobility  - Assess patient's need for assistive devices and provide as appropriate  - Encourage maximum independence but intervene and supervise when necessary  - Involve family in performance of ADLs  - Assess for home care needs following discharge   - Consider OT consult to assist with ADL evaluation and planning for discharge  - Provide patient education as appropriate  Outcome: Progressing  Goal: Maintain proper alignment of affected body part  Description: INTERVENTIONS:  - Support, maintain and protect limb and body alignment  - Provide patient/ family with appropriate education  Outcome: Progressing

## 2023-09-26 NOTE — ASSESSMENT & PLAN NOTE
· Sodium of 120 without improvement despite nephrology guided treatment. · Patient with rapid deterioration today. Patient not answering questions or following commands. I spoke with the patient's daughter regarding her decline. The patient will now be CMO. · Orders placed through [facility] order set.    · No labs will be checked

## 2023-09-26 NOTE — ASSESSMENT & PLAN NOTE
· Patient was agreeable to hospice on 9/25/23 and was deciding on which facility she would like to go to. · Today (9/26) the patient is confused and not following directions. I spoke to the patient's daughter about her rapid deterioration.  The patient will now be CMO

## 2023-09-26 NOTE — PROGRESS NOTES
-- Patient:  -- MRN: [id number]  -- Aidin Request ID: [id number]  -- Level of care reserved: [address]  -- Partner Reserved: [address] Israchilo Arvizu, [address] [phone]  -- Clinical needs requested:  -- Geography searched: 20 miles around [phone]  -- Start of Service:  -- Request sent: 12:48pm EDT on 9/21/2023 by Laurel Raza  -- Partner reserved: 3:59pm EDT on 9/26/2023 by Laurel Raza  -- Choice list shared: 9:20am EDT on 9/26/2023 by Laurel Raza

## 2023-09-26 NOTE — PROGRESS NOTES
[address]  Progress Note  Name: Saad Stringer  MRN: [id number]  Unit/Bed#: 462-51 I Date of Admission: 9/18/2023   Date of Service: 9/26/2023 I Hospital Day: 8    Assessment/Plan   * Dehydration with hyponatremia  Assessment & Plan  · Sodium of 120 without improvement despite nephrology guided treatment. · Patient with rapid deterioration today. Patient not answering questions or following commands. I spoke with the patient's daughter regarding her decline. The patient will now be CMO. · Orders placed through [facility] order set. 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 · Today (9/26) the patient is confused and not following directions. I spoke to the patient's daughter about her rapid deterioration. The patient will now be CMO     Malignant ascites  Assessment & Plan  · Status post 3.5 L paracentesis 9/20/23  · Patient with increasing abdominal distention,and a repeat IR eval with possible placement of permanent drainage catheter (Rossyff) was ordered. · This was cancelled due to patient's deterioration and plan for CMO               VTE Pharmacologic Prophylaxis:   contraindicated- patient CMO    Patient Centered Rounds: I performed bedside rounds with nursing staff today. Discussions with Specialists or Other Care Team Provider: nursing, Nephrology, CM    Education and Discussions with Family / Patient: Updated  (daughter) via phone. Total Time Spent on Date of Encounter in care of patient: 25 mins.  This time was spent on one or more of the following: performing physical exam; counseling and coordination of care; obtaining or reviewing history; documenting in the medical record; reviewing/ordering tests, medications or procedures; communicating with other healthcare professionals and discussing with patient's family/caregivers. Current Length of Stay: 8 day(s)  Current Patient Status: Inpatient   Certification Statement: The patient will continue to require additional inpatient hospital stay due to transition to [facility] with discharge planning for hospice  Discharge Plan: Anticipate discharge in 24-48 hrs to SNF for hospice vs remain hospitalized on CMO    Code Status: Level 4 - Comfort Care    Subjective: The patient was seen and examined. The patient is lying in bed. She was initially unresponsive. She then opened her eyes but would only answer "yes" to every question. Patient not following commands. Objective:     Vitals:   Temp (24hrs), Av.2 °F (36.2 °C), Min:97.2 °F (36.2 °C), Max:97.2 °F (36.2 °C)    Temp:  [97.2 °F (36.2 °C)] 97.2 °F (36.2 °C)  HR:  [86-95] 92  BP: ()/(58-64) 104/60  SpO2:  [99 %-100 %] 99 %  Body mass index is 27.16 kg/m². Input and Output Summary (last 24 hours): Intake/Output Summary (Last 24 hours) at 2023 1623  Last data filed at 2023 1202  Gross per 24 hour   Intake 480 ml   Output --   Net 480 ml       Physical Exam:   Physical Exam  Vitals and nursing note reviewed. Constitutional:       Appearance: She is ill-appearing. HENT:      Head: Normocephalic and atraumatic. Cardiovascular:      Rate and Rhythm: Normal rate and regular rhythm. Heart sounds: Normal heart sounds. Pulmonary:      Effort: Pulmonary effort is normal.      Breath sounds: Normal breath sounds. Abdominal:      General: There is distension. Tenderness: There is no abdominal tenderness. Skin:     General: Skin is warm and dry. Neurological:      Mental Status: She is lethargic and confused. Comments: Patient only answering "yes" to every question    Psychiatric:         Attention and Perception: She is inattentive. Speech: She is noncommunicative.           Additional Data:     Labs:  Results from last 7 days   Lab Units 23  0426   WBC Thousand/uL 11.72*   HEMOGLOBIN g/dL 8.1*   HEMATOCRIT % 25.9*   PLATELETS Thousands/uL 167   NEUTROS PCT % 80*   LYMPHS PCT % 7*   MONOS PCT % 11   EOS PCT % 1     Results from last 7 days   Lab Units 09/26/23  0825 09/26/23  0426   SODIUM mmol/L 120* 120*  120*   POTASSIUM mmol/L 5.3 5.1  5.1   CHLORIDE mmol/L 91* 92*  92*   CO2 mmol/L 17* 17*  17*   BUN mg/dL 44* 37*  40*   CREATININE mg/dL 2.59* 2.44*  2.47*   ANION GAP mmol/L 12 11  11   CALCIUM mg/dL 8.7 8.5  8.5   ALBUMIN g/dL  --  2.8*   TOTAL BILIRUBIN mg/dL  --  0.59   ALK PHOS U/L  --  146*   ALT U/L  --  65*   AST U/L  --  191*   GLUCOSE RANDOM mg/dL 86 93  92     Results from last 7 days   Lab Units 09/26/23  0426   INR  4.96*             Results from last 7 days   Lab Units 09/26/23  0426 09/23/23  0411   PROCALCITONIN ng/ml 1.47* 0.23       Lines/Drains:  Invasive Devices     Central Venous Catheter Line  Duration           Port A Cath 05/18/22 Right Chest 496 days          Drain  Duration           Ureteral Internal Stent Right ureter 6 Fr. 148 days    Urethral Catheter Non-latex 16 Fr. <1 day              Urinary Catheter:  Goal for removal: CMO         Central Line:  Goal for removal: Port accessed. Will de-access as appropriate. Imaging: No pertinent imaging reviewed.     Recent Cultures (last 7 days):         Last 24 Hours Medication List:   Current Facility-Administered Medications   Medication Dose Route Frequency Provider Last Rate   • Diclofenac Sodium  2 g Topical 4x Daily Yaya Bahena MD     • diphenhydrAMINE  25 mg Oral Q8H PRN Janet Hall MD     • haloperidol lactate  0.5 mg Intravenous Q2H PRN Clayton Vazquez PA-C     • HYDROmorphone  1 mg Intravenous Q3H PRN Janet Hall MD     • LORazepam  1 mg Intravenous Q10 Min PRN Clayton Vazquez PA-C     • ondansetron  4 mg Intravenous Q6H PRN Yaya Bahena MD          Today, Patient Was Seen By: Clayton Vazquez PA-C    **Please Note: This note may have been constructed using a voice recognition system. **

## 2023-09-27 NOTE — ASSESSMENT & PLAN NOTE
· Patient was agreeable to hospice on 9/25/23 and was deciding on which facility she would like to go to. · Today (9/26) the patient is confused and not following directions. I spoke to the patient's daughter about her rapid deterioration.   As above comfort care measures only

## 2023-09-27 NOTE — PLAN OF CARE
Problem: Potential for Falls  Goal: Patient will remain free of falls  Description: INTERVENTIONS:  - Educate patient/family on patient safety including physical limitations  - Instruct patient to call for assistance with activity   - Consult OT/PT to assist with strengthening/mobility   - Keep Call bell within reach  - Keep bed low and locked with side rails adjusted as appropriate  - Keep care items and personal belongings within reach  - Initiate and maintain comfort rounds  - Make Fall Risk Sign visible to staff  - Offer Toileting every 2 Hours, in advance of need  - Initiate/Maintain fall alarm  - Obtain necessary fall risk management equipment: non-skid footwear, call bell  - Apply yellow socks and bracelet for high fall risk patients  - Consider moving patient to room near nurses station  Outcome: Progressing     Problem: Nutrition/Hydration-ADULT  Goal: Nutrient/Hydration intake appropriate for improving, restoring or maintaining nutritional needs  Description: Monitor and assess patient's nutrition/hydration status for malnutrition. Collaborate with interdisciplinary team and initiate plan and interventions as ordered. Monitor patient's weight and dietary intake as ordered or per policy. Utilize nutrition screening tool and intervene as necessary. Determine patient's food preferences and provide high-protein, high-caloric foods as appropriate.      INTERVENTIONS:  - Monitor oral intake, urinary output, labs, and treatment plans  - Assess nutrition and hydration status and recommend course of action  - Evaluate amount of meals eaten  - Assist patient with eating if necessary   - Allow adequate time for meals  - Recommend/ encourage appropriate diets, oral nutritional supplements, and vitamin/mineral supplements  - Order, calculate, and assess calorie counts as needed  - Recommend, monitor, and adjust tube feedings and TPN/PPN based on assessed needs  - Assess need for intravenous fluids  - Provide specific nutrition/hydration education as appropriate  - Include patient/family/caregiver in decisions related to nutrition  Outcome: Progressing     Problem: MOBILITY - ADULT  Goal: Maintain or return to baseline ADL function  Description: INTERVENTIONS:  -  Assess patient's ability to carry out ADLs; assess patient's baseline for ADL function and identify physical deficits which impact ability to perform ADLs (bathing, care of mouth/teeth, toileting, grooming, dressing, etc.)  - Assess/evaluate cause of self-care deficits   - Assess range of motion  - Assess patient's mobility; develop plan if impaired  - Assess patient's need for assistive devices and provide as appropriate  - Encourage maximum independence but intervene and supervise when necessary  - Involve family in performance of ADLs  - Assess for home care needs following discharge   - Consider OT consult to assist with ADL evaluation and planning for discharge  - Provide patient education as appropriate  Outcome: Progressing  Goal: Maintains/Returns to pre admission functional level  Description: INTERVENTIONS:  - Perform BMAT or MOVE assessment daily.   - Set and communicate daily mobility goal to care team and patient/family/caregiver. - Collaborate with rehabilitation services on mobility goals if consulted  - Perform Range of Motion 3 times a day. - Reposition patient every 2 hours.   - Dangle patient 3 times a day  - Stand patient 3 times a day  - Ambulate patient 3 times a day  - Out of bed to chair 3 times a day   - Out of bed for meals 3 times a day  - Out of bed for toileting  - Record patient progress and toleration of activity level   Outcome: Progressing     Problem: Prexisting or High Potential for Compromised Skin Integrity  Goal: Skin integrity is maintained or improved  Description: INTERVENTIONS:  - Identify patients at risk for skin breakdown  - Assess and monitor skin integrity  - Assess and monitor nutrition and hydration status  - Monitor labs   - Assess for incontinence   - Turn and reposition patient  - Assist with mobility/ambulation  - Relieve pressure over bony prominences  - Avoid friction and shearing  - Provide appropriate hygiene as needed including keeping skin clean and dry  - Evaluate need for skin moisturizer/barrier cream  - Collaborate with interdisciplinary team   - Patient/family teaching  - Consider wound care consult   Outcome: Progressing     Problem: PAIN - ADULT  Goal: Verbalizes/displays adequate comfort level or baseline comfort level  Description: Interventions:  - Encourage patient to monitor pain and request assistance  - Assess pain using appropriate pain scale  - Administer analgesics based on type and severity of pain and evaluate response  - Implement non-pharmacological measures as appropriate and evaluate response  - Consider cultural and social influences on pain and pain management  - Notify physician/advanced practitioner if interventions unsuccessful or patient reports new pain  Outcome: Progressing     Problem: INFECTION - ADULT  Goal: Absence or prevention of progression during hospitalization  Description: INTERVENTIONS:  - Assess and monitor for signs and symptoms of infection  - Monitor lab/diagnostic results  - Monitor all insertion sites, i.e. indwelling lines, tubes, and drains  - Monitor endotracheal if appropriate and nasal secretions for changes in amount and color  - Pollock appropriate cooling/warming therapies per order  - Administer medications as ordered  - Instruct and encourage patient and family to use good hand hygiene technique  - Identify and instruct in appropriate isolation precautions for identified infection/condition  Outcome: Progressing  Goal: Absence of fever/infection during neutropenic period  Description: INTERVENTIONS:  - Monitor WBC    Outcome: Progressing     Problem: SAFETY ADULT  Goal: Patient will remain free of falls  Description: INTERVENTIONS:  - Educate patient/family on patient safety including physical limitations  - Instruct patient to call for assistance with activity   - Consult OT/PT to assist with strengthening/mobility   - Keep Call bell within reach  - Keep bed low and locked with side rails adjusted as appropriate  - Keep care items and personal belongings within reach  - Initiate and maintain comfort rounds  - Make Fall Risk Sign visible to staff  - Offer Toileting every 2 Hours, in advance of need  - Initiate/Maintain fall alarm  - Obtain necessary fall risk management equipment: non-skid footwear, call bell  - Apply yellow socks and bracelet for high fall risk patients  - Consider moving patient to room near nurses station  Outcome: Progressing  Goal: Maintain or return to baseline ADL function  Description: INTERVENTIONS:  -  Assess patient's ability to carry out ADLs; assess patient's baseline for ADL function and identify physical deficits which impact ability to perform ADLs (bathing, care of mouth/teeth, toileting, grooming, dressing, etc.)  - Assess/evaluate cause of self-care deficits   - Assess range of motion  - Assess patient's mobility; develop plan if impaired  - Assess patient's need for assistive devices and provide as appropriate  - Encourage maximum independence but intervene and supervise when necessary  - Involve family in performance of ADLs  - Assess for home care needs following discharge   - Consider OT consult to assist with ADL evaluation and planning for discharge  - Provide patient education as appropriate  Outcome: Progressing  Goal: Maintains/Returns to pre admission functional level  Description: INTERVENTIONS:  - Perform BMAT or MOVE assessment daily.   - Set and communicate daily mobility goal to care team and patient/family/caregiver. - Collaborate with rehabilitation services on mobility goals if consulted  - Perform Range of Motion 3 times a day. - Reposition patient every 2 hours.   - Dangle patient 3 times a day  - Stand patient 3 times a day  - Ambulate patient 3 times a day  - Out of bed to chair 3 times a day   - Out of bed for meals 3 times a day  - Out of bed for toileting  - Record patient progress and toleration of activity level   Outcome: Progressing     Problem: DISCHARGE PLANNING  Goal: Discharge to home or other facility with appropriate resources  Description: INTERVENTIONS:  - Identify barriers to discharge w/patient and caregiver  - Arrange for needed discharge resources and transportation as appropriate  - Identify discharge learning needs (meds, wound care, etc.)  - Arrange for interpretive services to assist at discharge as needed  - Refer to Case Management Department for coordinating discharge planning if the patient needs post-hospital services based on physician/advanced practitioner order or complex needs related to functional status, cognitive ability, or social support system  Outcome: Progressing     Problem: CARDIOVASCULAR - ADULT  Goal: Maintains optimal cardiac output and hemodynamic stability  Description: INTERVENTIONS:  - Monitor I/O, vital signs and rhythm  - Monitor for S/S and trends of decreased cardiac output  - Administer and titrate ordered vasoactive medications to optimize hemodynamic stability  - Assess quality of pulses, skin color and temperature  - Assess for signs of decreased coronary artery perfusion  - Instruct patient to report change in severity of symptoms  Outcome: Progressing  Goal: Absence of cardiac dysrhythmias or at baseline rhythm  Description: INTERVENTIONS:  - Continuous cardiac monitoring, vital signs, obtain 12 lead EKG if ordered  - Administer antiarrhythmic and heart rate control medications as ordered  - Monitor electrolytes and administer replacement therapy as ordered  Outcome: Progressing     Problem: Knowledge Deficit  Goal: Patient/family/caregiver demonstrates understanding of disease process, treatment plan, medications, and discharge instructions  Description: Complete learning assessment and assess knowledge base.   Interventions:  - Provide teaching at level of understanding  - Provide teaching via preferred learning methods  Outcome: Progressing     Problem: GASTROINTESTINAL - ADULT  Goal: Minimal or absence of nausea and/or vomiting  Description: INTERVENTIONS:  - Administer IV fluids if ordered to ensure adequate hydration  - Maintain NPO status until nausea and vomiting are resolved  - Nasogastric tube if ordered  - Administer ordered antiemetic medications as needed  - Provide nonpharmacologic comfort measures as appropriate  - Advance diet as tolerated, if ordered  - Consider nutrition services referral to assist patient with adequate nutrition and appropriate food choices  Outcome: Progressing  Goal: Maintains or returns to baseline bowel function  Description: INTERVENTIONS:  - Assess bowel function  - Encourage oral fluids to ensure adequate hydration  - Administer IV fluids if ordered to ensure adequate hydration  - Administer ordered medications as needed  - Encourage mobilization and activity  - Consider nutritional services referral to assist patient with adequate nutrition and appropriate food choices  Outcome: Progressing  Goal: Maintains adequate nutritional intake  Description: INTERVENTIONS:  - Monitor percentage of each meal consumed  - Identify factors contributing to decreased intake, treat as appropriate  - Assist with meals as needed  - Monitor I&O, weight, and lab values if indicated  - Obtain nutrition services referral as needed  Outcome: Progressing  Goal: Oral mucous membranes remain intact  Description: INTERVENTIONS  - Assess oral mucosa and hygiene practices  - Implement preventative oral hygiene regimen  - Implement oral medicated treatments as ordered  - Initiate Nutrition services referral as needed  Outcome: Progressing     Problem: METABOLIC, FLUID AND ELECTROLYTES - ADULT  Goal: Electrolytes maintained within normal limits  Description: INTERVENTIONS:  - Monitor labs and assess patient for signs and symptoms of electrolyte imbalances  - Administer electrolyte replacement as ordered  - Monitor response to electrolyte replacements, including repeat lab results as appropriate  - Instruct patient on fluid and nutrition as appropriate  Outcome: Progressing  Goal: Fluid balance maintained  Description: INTERVENTIONS:  - Monitor labs   - Monitor I/O and WT  - Instruct patient on fluid and nutrition as appropriate  - Assess for signs & symptoms of volume excess or deficit  Outcome: Progressing  Goal: Glucose maintained within target range  Description: INTERVENTIONS:  - Monitor Blood Glucose as ordered  - Assess for signs and symptoms of hyperglycemia and hypoglycemia  - Administer ordered medications to maintain glucose within target range  - Assess nutritional intake and initiate nutrition service referral as needed  Outcome: Progressing     Problem: HEMATOLOGIC - ADULT  Goal: Maintains hematologic stability  Description: INTERVENTIONS  - Assess for signs and symptoms of bleeding or hemorrhage  - Monitor labs  - Administer supportive blood products/factors as ordered and appropriate  Outcome: Progressing     Problem: MUSCULOSKELETAL - ADULT  Goal: Maintain or return mobility to safest level of function  Description: INTERVENTIONS:  - Assess patient's ability to carry out ADLs; assess patient's baseline for ADL function and identify physical deficits which impact ability to perform ADLs (bathing, care of mouth/teeth, toileting, grooming, dressing, etc.)  - Assess/evaluate cause of self-care deficits   - Assess range of motion  - Assess patient's mobility  - Assess patient's need for assistive devices and provide as appropriate  - Encourage maximum independence but intervene and supervise when necessary  - Involve family in performance of ADLs  - Assess for home care needs following discharge   - Consider OT consult to assist with ADL evaluation and planning for discharge  - Provide patient education as appropriate  Outcome: Progressing  Goal: Maintain proper alignment of affected body part  Description: INTERVENTIONS:  - Support, maintain and protect limb and body alignment  - Provide patient/ family with appropriate education  Outcome: Progressing

## 2023-09-27 NOTE — ASSESSMENT & PLAN NOTE
· Status post 3.5 L paracentesis 9/20/23  · Patient with increasing abdominal distention,and a repeat IR eval with possible placement of permanent drainage catheter (Sterilnghoff) was ordered.    · This was cancelled due to patient's deterioration

## 2023-09-27 NOTE — PROGRESS NOTES
[address]  Progress Note  Name: Breanna Hearn  MRN: [id number]  Unit/Bed#: 519-16 I Date of Admission: 9/18/2023   Date of Service: 9/27/2023 I Hospital Day: 9    Assessment/Plan   * Dehydration with hyponatremia  Assessment & Plan  · Patient developed worsening ascites, increasing lethargy, no clinical improvement, transition to comfort care only on 9/26/2023. Overall patient is declining rapidly    Malignant neoplasm of endocervix [facility])  Assessment & Plan  · Patient was agreeable to hospice on 9/25/23 and was deciding on which facility she would like to go to. · Today (9/26) the patient is confused and not following directions. I spoke to the patient's daughter about her rapid deterioration. As above comfort care measures only    Malignant ascites  Assessment & Plan  · Status post 3.5 L paracentesis 9/20/23  · Patient with increasing abdominal distention,and a repeat IR eval with possible placement of permanent drainage catheter (Rossyff) was ordered. · This was cancelled due to patient's deterioration         Generalized weakness  Assessment & Plan  Worsening functional status, no p.o. intake    Anemia, chronic disease  Assessment & Plan  Hemoglobin stable    Portal vein thrombosis  Assessment & Plan  Discontinue anticoagulation    Complicated UTI (urinary tract infection)  Assessment & Plan    Completed course of antibiotics    Moderate protein-calorie malnutrition (HCC)  Assessment & Plan  Malnutrition Findings:   Adult Malnutrition type: Chronic illness  Adult Degree of Malnutrition: Malnutrition of moderate degree  Malnutrition Characteristics: Inadequate energy, Weight loss                  360 Statement: Chronic, moderate protein calorie malnutrition related to catabolic illness as evidenced by 10% (13#) weight loss x 6 months and less than 75% of estimated energy needs for greater than 1 month. Treating with oral diet and nutrition supplements.     BMI Findings: Body mass index is 27.16 kg/m². I will contact the patient's daughter to update her with current status. Code Status: Level 4 - Comfort Care    Subjective:   Patient does not follow verbal commands. Objective:     Vitals:   No data recorded. HR:  [101-104] 104  Resp:  [16-17] 17  SpO2:  [98 %] 98 %  Body mass index is 27.16 kg/m². Input and Output Summary (last 24 hours): Intake/Output Summary (Last 24 hours) at 9/27/2023 1806  Last data filed at 9/27/2023 1739  Gross per 24 hour   Intake 0 ml   Output 51 ml   Net -51 ml       Physical Exam:   Physical Exam  Vitals and nursing note reviewed. Constitutional:       Appearance: She is ill-appearing. HENT:      Head: Normocephalic and atraumatic. Pulmonary:      Effort: Pulmonary effort is normal. No respiratory distress. Breath sounds: Normal breath sounds. No wheezing. Skin:     Coloration: Skin is pale. Neurological:      Mental Status: She is disoriented. Motor: Weakness present.           Additional Data:     Labs:  Results from last 7 days   Lab Units 09/26/23 0426   WBC Thousand/uL 11.72*   HEMOGLOBIN g/dL 8.1*   HEMATOCRIT % 25.9*   PLATELETS Thousands/uL 167   NEUTROS PCT % 80*   LYMPHS PCT % 7*   MONOS PCT % 11   EOS PCT % 1     Results from last 7 days   Lab Units 09/26/23  0825 09/26/23 0426   SODIUM mmol/L 120* 120*  120*   POTASSIUM mmol/L 5.3 5.1  5.1   CHLORIDE mmol/L 91* 92*  92*   CO2 mmol/L 17* 17*  17*   BUN mg/dL 44* 37*  40*   CREATININE mg/dL 2.59* 2.44*  2.47*   ANION GAP mmol/L 12 11  11   CALCIUM mg/dL 8.7 8.5  8.5   ALBUMIN g/dL  --  2.8*   TOTAL BILIRUBIN mg/dL  --  0.59   ALK PHOS U/L  --  146*   ALT U/L  --  65*   AST U/L  --  191*   GLUCOSE RANDOM mg/dL 86 93  92     Results from last 7 days   Lab Units 09/26/23 0426   INR  4.96*             Results from last 7 days   Lab Units 09/26/23  0426 09/23/23  0411   PROCALCITONIN ng/ml 1.47* 0.23       Lines/Drains:  Invasive Devices     Central Venous Catheter Line  Duration           Port A Cath 05/18/22 Right Chest 497 days          Drain  Duration           Ureteral Internal Stent Right ureter 6 Fr. 149 days    Urethral Catheter Non-latex 16 Fr. 1 day              Urinary Catheter:  Goal for removal: Continue for comfort care only         Central Line:  Goal for removal: No longer needed. Will place order to discontinue. Imaging: No pertinent imaging reviewed. Recent Cultures (last 7 days):         Last 24 Hours Medication List:   Current Facility-Administered Medications   Medication Dose Route Frequency Provider Last Rate   • Diclofenac Sodium  2 g Topical 4x Daily Deo Amaya MD     • diphenhydrAMINE  25 mg Oral Q8H PRN Janet Schilling MD     • haloperidol lactate  0.5 mg Intravenous Q2H PRN Filemon Bravo PA-C     • HYDROmorphone  1 mg Intravenous Q3H PRN Janet Schilling MD     • LORazepam  1 mg Intravenous Q10 Min PRN Filemon Bravo PA-C     • ondansetron  4 mg Intravenous Q6H PRN Deo Amaya MD          Today, Patient Was Seen By: Becki Diggs DO    **Please Note: This note may have been constructed using a voice recognition system. **

## 2023-09-27 NOTE — QUICK NOTE
Daughter updated with plan of care, patient's daughter does not live in the area, she understands that her mother is comfort care only, patient is likely to pass soon, I will plan on updating the daughter tomorrow, will continue to discuss discharge planning with case management.

## 2023-09-27 NOTE — ASSESSMENT & PLAN NOTE
· Patient developed worsening ascites, increasing lethargy, no clinical improvement, transition to comfort care only on 9/26/2023.     Overall patient is declining rapidly

## 2023-09-27 NOTE — ASSESSMENT & PLAN NOTE
Malnutrition Findings:   Adult Malnutrition type: Chronic illness  Adult Degree of Malnutrition: Malnutrition of moderate degree  Malnutrition Characteristics: Inadequate energy, Weight loss                  360 Statement: Chronic, moderate protein calorie malnutrition related to catabolic illness as evidenced by 10% (13#) weight loss x 6 months and less than 75% of estimated energy needs for greater than 1 month. Treating with oral diet and nutrition supplements. BMI Findings: Body mass index is 27.16 kg/m².

## 2023-09-27 NOTE — ASSESSMENT & PLAN NOTE
Worsening functional status, no p.o. intake Note Text (......Xxx Chief Complaint.): This diagnosis correlates with the Other (Free Text): Recommended that pt have skin tags removed during the fall Detail Level: Detailed

## 2023-09-27 NOTE — CASE MANAGEMENT
Case Management Discharge Planning Note    Patient name Dian Chaudhry  Location [facility] [id number] MRN [id number]  : 1955 Date 2023       Current Admission Date: 2023  Current Admission Diagnosis:Dehydration with hyponatremia   Patient Active Problem List    Diagnosis Date Noted   • Moderate protein-calorie malnutrition ([address]) 2023   • Portal vein thrombosis 2023   • Generalized weakness    • Complicated UTI (urinary tract infection) 2023   • Anemia, chronic disease 2023   • Malignant ascites 2023   • Drug induced constipation 2023   • Chronic pain syndrome 2023   • Neoplasm of uncertain behavior of bone and articular cartilage 2023   • Right leg pain 2023   • DNR (do not resuscitate) discussion 2023   • Chemotherapy-induced thrombocytopenia 2023   • Insomnia due to other mental disorder 09/15/2022   • Stage 3 chronic kidney disease, unspecified whether stage 3a or 3b CKD ([address]) 2022   • Meralgia paraesthetica, right 2022   • Dyslipidemia 2022   • Post-menopause 2022   • Major depressive disorder, single episode, mild ([address]) 2022   • Cervical spondylosis 2022   • Continuous opioid dependence ([address]) 2022   • Lumbar disc disease with radiculopathy 2022   • Chronic obstructive pulmonary disease, unspecified COPD type ([address]) 2022   • Hematuria 2022   • Calculus of gallbladder without cholecystitis without obstruction 2022   • Chronic constipation 2022   • Primary generalized (osteo)arthritis 2021   • Xerostomia 2021   • Encounter for immunization 2021   • Sensorineural hearing loss (SNHL) of both ears 2021   • Multiple lung nodules 2021   • Encounter for venous access device care 2021   • Dyspnea on exertion 2021   • Thyroid lesion 2021   • Chemotherapy induced neutropenia ([address]) 06/10/2021   • Pain in thoracic spine 06/03/2021   • Urgency of urination 05/20/2021   • Chemotherapy-induced nausea 05/04/2021   • 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)    • Hypokalemia 03/02/2021   • Dehydration with hyponatremia 02/26/2021   • Encounter for screening involving social determinants of health (SDoH) 02/24/2021   • Hydronephrosis 02/04/2021   • BMI 29.0-29.9,adult 01/05/2021   • Nausea 01/05/2021   • Right hip pain 01/05/2021   • Tinnitus of left ear 02/19/2020   • Left ear pain 02/19/2020   • Anxiety and depression 10/02/2019   • Zaldivar's neuroma, left 07/12/2018   • 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[address] [facility]) 04/11/2017      LOS (days): 9  Geometric Mean LOS (GMLOS) (days): 5.10  Days to GMLOS:-3.7     OBJECTIVE:  Risk of Unplanned Readmission Score: 34.99         Current admission status: Inpatient   Preferred Pharmacy:   [id number] [place] [address] [place] [id number]  Phone: [phone] Fax: [address], [place] - [address]  Phone: [phone] Fax: [address], [address] [place] [address]  Phone: [phone] Fax: [phone]    Primary Care Provider: Paige Chance DO    Primary Insurance: MEDICARE  Secondary Insurance:     DISCHARGE DETAILS:       As per physician patient changed to comfort measures  And now is medically ready for discharge. I called patient's daughter Sergio Cevallos that since patient is now on comfort measures and is unable to do PT and OT she would financially be responsible tor the cost of room and board, [facility] would charge $340 /day. Steven Gonsalez could accept her on Saturday . [facility] said they could possibly accept her but they would need to talk to daughter about medicaid Paper work.  Davon Garcia said her mother gets $1000 a month and would not be able to afford room and board. jonny Linder is going to reach out to [employer] to discuss providing them with MA paperwork .

## 2023-09-28 NOTE — ASSESSMENT & PLAN NOTE
· Patient developed worsening ascites, increasing lethargy, no clinical improvement, transition to comfort care only on 9/26/2023. Patient appears comfortable, has not received any medicine p.o. or IV in 3 days.

## 2023-09-28 NOTE — PLAN OF CARE
Problem: Potential for Falls  Goal: Patient will remain free of falls  Description: INTERVENTIONS:  - Educate patient/family on patient safety including physical limitations  - Instruct patient to call for assistance with activity   - Consult OT/PT to assist with strengthening/mobility   - Keep Call bell within reach  - Keep bed low and locked with side rails adjusted as appropriate  - Keep care items and personal belongings within reach  - Initiate and maintain comfort rounds  - Make Fall Risk Sign visible to staff  - Offer Toileting every 2 Hours, in advance of need  - Initiate/Maintain fall alarm  - Obtain necessary fall risk management equipment: non-skid footwear, call bell  - Apply yellow socks and bracelet for high fall risk patients  - Consider moving patient to room near nurses station  9/28/2023 1342 by Alfreda Zuñiga RN  Outcome: Adequate for Discharge  9/28/2023 0736 by Alfreda Zuñiga RN  Outcome: Progressing     Problem: Nutrition/Hydration-ADULT  Goal: Nutrient/Hydration intake appropriate for improving, restoring or maintaining nutritional needs  Description: Monitor and assess patient's nutrition/hydration status for malnutrition. Collaborate with interdisciplinary team and initiate plan and interventions as ordered. Monitor patient's weight and dietary intake as ordered or per policy. Utilize nutrition screening tool and intervene as necessary. Determine patient's food preferences and provide high-protein, high-caloric foods as appropriate.      INTERVENTIONS:  - Monitor oral intake, urinary output, labs, and treatment plans  - Assess nutrition and hydration status and recommend course of action  - Evaluate amount of meals eaten  - Assist patient with eating if necessary   - Allow adequate time for meals  - Recommend/ encourage appropriate diets, oral nutritional supplements, and vitamin/mineral supplements  - Order, calculate, and assess calorie counts as needed  - Recommend, monitor, and adjust tube feedings and TPN/PPN based on assessed needs  - Assess need for intravenous fluids  - Provide specific nutrition/hydration education as appropriate  - Include patient/family/caregiver in decisions related to nutrition  9/28/2023 1342 by Hansel Kawasaki, RN  Outcome: Adequate for Discharge  9/28/2023 0736 by Hansel Kawasaki, RN  Outcome: Progressing     Problem: MOBILITY - ADULT  Goal: Maintain or return to baseline ADL function  Description: INTERVENTIONS:  -  Assess patient's ability to carry out ADLs; assess patient's baseline for ADL function and identify physical deficits which impact ability to perform ADLs (bathing, care of mouth/teeth, toileting, grooming, dressing, etc.)  - Assess/evaluate cause of self-care deficits   - Assess range of motion  - Assess patient's mobility; develop plan if impaired  - Assess patient's need for assistive devices and provide as appropriate  - Encourage maximum independence but intervene and supervise when necessary  - Involve family in performance of ADLs  - Assess for home care needs following discharge   - Consider OT consult to assist with ADL evaluation and planning for discharge  - Provide patient education as appropriate  9/28/2023 1342 by Hansel Kawasaki, RN  Outcome: Adequate for Discharge  9/28/2023 0736 by Hansel Kawasaki, RN  Outcome: Progressing  Goal: Maintains/Returns to pre admission functional level  Description: INTERVENTIONS:  - Perform BMAT or MOVE assessment daily.   - Set and communicate daily mobility goal to care team and patient/family/caregiver. - Collaborate with rehabilitation services on mobility goals if consulted  - Perform Range of Motion 3 times a day. - Reposition patient every 2 hours.   - Dangle patient 3 times a day  - Stand patient 3 times a day  - Ambulate patient 3 times a day  - Out of bed to chair 3 times a day   - Out of bed for meals 3 times a day  - Out of bed for toileting  - Record patient progress and toleration of activity level 9/28/2023 1342 by Paris Arteaga RN  Outcome: Adequate for Discharge  9/28/2023 3825 by Paris Arteaga RN  Outcome: Progressing     Problem: Prexisting or High Potential for Compromised Skin Integrity  Goal: Skin integrity is maintained or improved  Description: INTERVENTIONS:  - Identify patients at risk for skin breakdown  - Assess and monitor skin integrity  - Assess and monitor nutrition and hydration status  - Monitor labs   - Assess for incontinence   - Turn and reposition patient  - Assist with mobility/ambulation  - Relieve pressure over bony prominences  - Avoid friction and shearing  - Provide appropriate hygiene as needed including keeping skin clean and dry  - Evaluate need for skin moisturizer/barrier cream  - Collaborate with interdisciplinary team   - Patient/family teaching  - Consider wound care consult   9/28/2023 1342 by Paris Arteaga RN  Outcome: Adequate for Discharge  9/28/2023 0736 by Paris Arteaga RN  Outcome: Progressing     Problem: PAIN - ADULT  Goal: Verbalizes/displays adequate comfort level or baseline comfort level  Description: Interventions:  - Encourage patient to monitor pain and request assistance  - Assess pain using appropriate pain scale  - Administer analgesics based on type and severity of pain and evaluate response  - Implement non-pharmacological measures as appropriate and evaluate response  - Consider cultural and social influences on pain and pain management  - Notify physician/advanced practitioner if interventions unsuccessful or patient reports new pain  9/28/2023 1342 by Paris Arteaga RN  Outcome: Adequate for Discharge  9/28/2023 0736 by Paris Arteaga RN  Outcome: Progressing     Problem: INFECTION - ADULT  Goal: Absence or prevention of progression during hospitalization  Description: INTERVENTIONS:  - Assess and monitor for signs and symptoms of infection  - Monitor lab/diagnostic results  - Monitor all insertion sites, i.e. indwelling lines, tubes, and drains  - Monitor endotracheal if appropriate and nasal secretions for changes in amount and color  - [place] appropriate cooling/warming therapies per order  - Administer medications as ordered  - Instruct and encourage patient and family to use good hand hygiene technique  - Identify and instruct in appropriate isolation precautions for identified infection/condition  9/28/2023 1342 by Luis Zaragoza RN  Outcome: Adequate for Discharge  9/28/2023 0736 by Luis Zaragoza RN  Outcome: Progressing  Goal: Absence of fever/infection during neutropenic period  Description: INTERVENTIONS:  - Monitor WBC    9/28/2023 1342 by Luis Zaragoza RN  Outcome: Adequate for Discharge  9/28/2023 0736 by Luis Zaragoza RN  Outcome: Progressing     Problem: SAFETY ADULT  Goal: Patient will remain free of falls  Description: INTERVENTIONS:  - Educate patient/family on patient safety including physical limitations  - Instruct patient to call for assistance with activity   - Consult OT/PT to assist with strengthening/mobility   - Keep Call bell within reach  - Keep bed low and locked with side rails adjusted as appropriate  - Keep care items and personal belongings within reach  - Initiate and maintain comfort rounds  - Make Fall Risk Sign visible to staff  - Offer Toileting every 2 Hours, in advance of need  - Initiate/Maintain fall alarm  - Obtain necessary fall risk management equipment: non-skid footwear, call bell  - Apply yellow socks and bracelet for high fall risk patients  - Consider moving patient to room near nurses station  9/28/2023 1342 by Luis Zaragoza RN  Outcome: Adequate for Discharge  9/28/2023 0736 by Luis Zaragoza RN  Outcome: Progressing  Goal: Maintain or return to baseline ADL function  Description: INTERVENTIONS:  -  Assess patient's ability to carry out ADLs; assess patient's baseline for ADL function and identify physical deficits which impact ability to perform ADLs (bathing, care of mouth/teeth, toileting, grooming, dressing, etc.)  - Assess/evaluate cause of self-care deficits   - Assess range of motion  - Assess patient's mobility; develop plan if impaired  - Assess patient's need for assistive devices and provide as appropriate  - Encourage maximum independence but intervene and supervise when necessary  - Involve family in performance of ADLs  - Assess for home care needs following discharge   - Consider OT consult to assist with ADL evaluation and planning for discharge  - Provide patient education as appropriate  9/28/2023 1342 by Saran Alegria RN  Outcome: Adequate for Discharge  9/28/2023 0736 by Saran Alegria RN  Outcome: Progressing  Goal: Maintains/Returns to pre admission functional level  Description: INTERVENTIONS:  - Perform BMAT or MOVE assessment daily.   - Set and communicate daily mobility goal to care team and patient/family/caregiver. - Collaborate with rehabilitation services on mobility goals if consulted  - Perform Range of Motion 3 times a day. - Reposition patient every 2 hours.   - Dangle patient 3 times a day  - Stand patient 3 times a day  - Ambulate patient 3 times a day  - Out of bed to chair 3 times a day   - Out of bed for meals 3 times a day  - Out of bed for toileting  - Record patient progress and toleration of activity level   9/28/2023 1342 by Saran Alegria RN  Outcome: Adequate for Discharge  9/28/2023 0736 by Saran Alegria RN  Outcome: Progressing     Problem: DISCHARGE PLANNING  Goal: Discharge to home or other facility with appropriate resources  Description: INTERVENTIONS:  - Identify barriers to discharge w/patient and caregiver  - Arrange for needed discharge resources and transportation as appropriate  - Identify discharge learning needs (meds, wound care, etc.)  - Arrange for interpretive services to assist at discharge as needed  - Refer to Case Management Department for coordinating discharge planning if the patient needs post-hospital services based on physician/advanced practitioner order or complex needs related to functional status, cognitive ability, or social support system  9/28/2023 1342 by Angel Armendariz RN  Outcome: Adequate for Discharge  9/28/2023 0736 by Angel Armendariz RN  Outcome: Progressing     Problem: Knowledge Deficit  Goal: Patient/family/caregiver demonstrates understanding of disease process, treatment plan, medications, and discharge instructions  Description: Complete learning assessment and assess knowledge base.   Interventions:  - Provide teaching at level of understanding  - Provide teaching via preferred learning methods  9/28/2023 1342 by Angel Armendariz RN  Outcome: Adequate for Discharge  9/28/2023 0736 by Angel Armendariz RN  Outcome: Progressing     Problem: CARDIOVASCULAR - ADULT  Goal: Maintains optimal cardiac output and hemodynamic stability  Description: INTERVENTIONS:  - Monitor I/O, vital signs and rhythm  - Monitor for S/S and trends of decreased cardiac output  - Administer and titrate ordered vasoactive medications to optimize hemodynamic stability  - Assess quality of pulses, skin color and temperature  - Assess for signs of decreased coronary artery perfusion  - Instruct patient to report change in severity of symptoms  9/28/2023 1342 by Angel Armendariz RN  Outcome: Adequate for Discharge  9/28/2023 0736 by Angel Armendariz RN  Outcome: Progressing  Goal: Absence of cardiac dysrhythmias or at baseline rhythm  Description: INTERVENTIONS:  - Continuous cardiac monitoring, vital signs, obtain 12 lead EKG if ordered  - Administer antiarrhythmic and heart rate control medications as ordered  - Monitor electrolytes and administer replacement therapy as ordered  9/28/2023 1342 by Angel Armendariz RN  Outcome: Adequate for Discharge  9/28/2023 0736 by Angel Armendariz RN  Outcome: Progressing     Problem: GASTROINTESTINAL - ADULT  Goal: Minimal or absence of nausea and/or vomiting  Description: INTERVENTIONS:  - Administer IV fluids if ordered to ensure adequate hydration  - Maintain NPO status until nausea and vomiting are resolved  - Nasogastric tube if ordered  - Administer ordered antiemetic medications as needed  - Provide nonpharmacologic comfort measures as appropriate  - Advance diet as tolerated, if ordered  - Consider nutrition services referral to assist patient with adequate nutrition and appropriate food choices  9/28/2023 1342 by Lashae Alanis RN  Outcome: Adequate for Discharge  9/28/2023 0736 by Lsahae Alanis RN  Outcome: Progressing  Goal: Maintains or returns to baseline bowel function  Description: INTERVENTIONS:  - Assess bowel function  - Encourage oral fluids to ensure adequate hydration  - Administer IV fluids if ordered to ensure adequate hydration  - Administer ordered medications as needed  - Encourage mobilization and activity  - Consider nutritional services referral to assist patient with adequate nutrition and appropriate food choices  9/28/2023 1342 by Lashae Alanis RN  Outcome: Adequate for Discharge  9/28/2023 0736 by Lashae Alanis RN  Outcome: Progressing  Goal: Maintains adequate nutritional intake  Description: INTERVENTIONS:  - Monitor percentage of each meal consumed  - Identify factors contributing to decreased intake, treat as appropriate  - Assist with meals as needed  - Monitor I&O, weight, and lab values if indicated  - Obtain nutrition services referral as needed  9/28/2023 1342 by Lashae Alanis RN  Outcome: Adequate for Discharge  9/28/2023 0736 by Lashae Alanis RN  Outcome: Progressing  Goal: Oral mucous membranes remain intact  Description: INTERVENTIONS  - Assess oral mucosa and hygiene practices  - Implement preventative oral hygiene regimen  - Implement oral medicated treatments as ordered  - Initiate Nutrition services referral as needed  9/28/2023 1342 by Lashae Alanis RN  Outcome: Adequate for Discharge  9/28/2023 0736 by Lashae Alanis RN  Outcome: Progressing     Problem: METABOLIC, FLUID AND ELECTROLYTES - ADULT  Goal: Electrolytes maintained within normal limits  Description: INTERVENTIONS:  - Monitor labs and assess patient for signs and symptoms of electrolyte imbalances  - Administer electrolyte replacement as ordered  - Monitor response to electrolyte replacements, including repeat lab results as appropriate  - Instruct patient on fluid and nutrition as appropriate  9/28/2023 1342 by Seldon Krabbe, RN  Outcome: Adequate for Discharge  9/28/2023 0736 by Seldon Krabbe, RN  Outcome: Progressing  Goal: Fluid balance maintained  Description: INTERVENTIONS:  - Monitor labs   - Monitor I/O and WT  - Instruct patient on fluid and nutrition as appropriate  - Assess for signs & symptoms of volume excess or deficit  9/28/2023 1342 by Seldon Krabbe, RN  Outcome: Adequate for Discharge  9/28/2023 0736 by Seldon Krabbe, RN  Outcome: Progressing  Goal: Glucose maintained within target range  Description: INTERVENTIONS:  - Monitor Blood Glucose as ordered  - Assess for signs and symptoms of hyperglycemia and hypoglycemia  - Administer ordered medications to maintain glucose within target range  - Assess nutritional intake and initiate nutrition service referral as needed  9/28/2023 1342 by Seldon Krabbe, RN  Outcome: Adequate for Discharge  9/28/2023 0736 by Seldon Krabbe, RN  Outcome: Progressing     Problem: HEMATOLOGIC - ADULT  Goal: Maintains hematologic stability  Description: INTERVENTIONS  - Assess for signs and symptoms of bleeding or hemorrhage  - Monitor labs  - Administer supportive blood products/factors as ordered and appropriate  9/28/2023 1342 by Seldon Krabbe, RN  Outcome: Adequate for Discharge  9/28/2023 0736 by Seldon Krabbe, RN  Outcome: Progressing     Problem: MUSCULOSKELETAL - ADULT  Goal: Maintain or return mobility to safest level of function  Description: INTERVENTIONS:  - Assess patient's ability to carry out ADLs; assess patient's baseline for ADL function and identify physical deficits which impact ability to perform ADLs (bathing, care of mouth/teeth, toileting, grooming, dressing, etc.)  - Assess/evaluate cause of self-care deficits   - Assess range of motion  - Assess patient's mobility  - Assess patient's need for assistive devices and provide as appropriate  - Encourage maximum independence but intervene and supervise when necessary  - Involve family in performance of ADLs  - Assess for home care needs following discharge   - Consider OT consult to assist with ADL evaluation and planning for discharge  - Provide patient education as appropriate  9/28/2023 1342 by Camille Molina RN  Outcome: Adequate for Discharge  9/28/2023 0736 by Camille Molina RN  Outcome: Progressing  Goal: Maintain proper alignment of affected body part  Description: INTERVENTIONS:  - Support, maintain and protect limb and body alignment  - Provide patient/ family with appropriate education  9/28/2023 1342 by Camille Molina RN  Outcome: Adequate for Discharge  9/28/2023 0736 by Camille Molina RN  Outcome: Progressing

## 2023-09-28 NOTE — DISCHARGE SUMMARY
[address]  Progress Note  Name: Js Cadena  MRN: [id number]  Unit/Bed#: 791-32 I Date of Admission: 9/18/2023   Date of Service: 9/28/2023 I Hospital Day: 10        Assessment/Plan   * Dehydration with hyponatremia  Assessment & Plan  • Patient developed worsening ascites, increasing lethargy, no clinical improvement, transition to comfort care only on 9/26/2023.     Patient appears comfortable, has not received any medicine p.o. or IV in 3 days.     Malignant neoplasm of endocervix [facility])  Assessment & Plan  • Patient was agreeable to hospice on 9/25/23 and was deciding on which facility she would like to go to. • As of (9/26) the patient is confused and not following directions. I spoke to the patient's daughter about her rapid deterioration.   As above comfort care measures only, patient's daughter agreeable has been updated daily     Patient's daughter is traveling back from out of state sometime later today 9/28/2023                 Please refer to admitting history and physical daily progress note for detailed hospital course.     Patient's condition is terminal, patient not able to communicate previously expressed her wishes to be DNR/DNI, has elected for outpatient hospice care.     Family has been updated daily with plan of care, they are in agreement.        Physical Exam   Patient resting bed comfortably, no acute distress, no respiratory distress lungs are clear.         Vitals:     09/28/23 1002   BP:     Pulse: 101   Resp:     Temp:     SpO2: 99%      Discharge time today 35 minutes, the majority of this time was spent coordinating care with case management to arrange discharge.     Plan of care was discussed with patient's daughter via telephone.

## 2023-09-28 NOTE — ASSESSMENT & PLAN NOTE
· Patient was agreeable to hospice on 9/25/23 and was deciding on which facility she would like to go to. · As of (9/26) the patient is confused and not following directions. I spoke to the patient's daughter about her rapid deterioration.   As above comfort care measures only, patient's daughter agreeable has been updated daily    Patient's daughter is traveling back from out of state sometime later today 9/28/2023

## 2023-09-28 NOTE — PROGRESS NOTES
[address]  Progress Note  Name: Fatoumata Crowe  MRN: [id number]  Unit/Bed#: 813-05 I Date of Admission: 9/18/2023   Date of Service: 9/28/2023 I Hospital Day: 10    Assessment/Plan   * Dehydration with hyponatremia  Assessment & Plan  · Patient developed worsening ascites, increasing lethargy, no clinical improvement, transition to comfort care only on 9/26/2023. Patient appears comfortable, has not received any medicine p.o. or IV in 3 days. Malignant neoplasm of endocervix [facility])  Assessment & Plan  · Patient was agreeable to hospice on 9/25/23 and was deciding on which facility she would like to go to. · As of (9/26) the patient is confused and not following directions. I spoke to the patient's daughter about her rapid deterioration. As above comfort care measures only, patient's daughter agreeable has been updated daily    Patient's daughter is traveling back from out of state sometime later today 9/28/2023           Please refer to admitting history and physical daily progress note for detailed hospital course. Patient's condition is terminal, patient not able to communicate previously expressed her wishes to be DNR/DNI, has elected for outpatient hospice care. Family has been updated daily with plan of care, they are in agreement. Physical Exam   Patient resting bed comfortably, no acute distress, no respiratory distress lungs are clear. Vitals:    09/28/23 1002   BP:    Pulse: 101   Resp:    Temp:    SpO2: 99%     Discharge time today 35 minutes, the majority of this time was spent coordinating care with case management to arrange discharge. Plan of care was discussed with patient's daughter via telephone.

## 2023-09-28 NOTE — CASE MANAGEMENT
Case Management Discharge Planning Note    Patient name Collin Shafer  Location [facility] [id number] MRN [id number]  : 1955 Date 2023       Current Admission Date: 2023  Current Admission Diagnosis:Dehydration with hyponatremia   Patient Active Problem List    Diagnosis Date Noted   • Moderate protein-calorie malnutrition ([address]) 2023   • Portal vein thrombosis 2023   • Generalized weakness    • Complicated UTI (urinary tract infection) 2023   • Anemia, chronic disease 2023   • Malignant ascites 2023   • Drug induced constipation 2023   • Chronic pain syndrome 2023   • Neoplasm of uncertain behavior of bone and articular cartilage 2023   • Right leg pain 2023   • DNR (do not resuscitate) discussion 2023   • Chemotherapy-induced thrombocytopenia 2023   • Insomnia due to other mental disorder 09/15/2022   • Stage 3 chronic kidney disease, unspecified whether stage 3a or 3b CKD ([address]) 2022   • Meralgia paraesthetica, right 2022   • Dyslipidemia 2022   • Post-menopause 2022   • Major depressive disorder, single episode, mild ([address]) 2022   • Cervical spondylosis 2022   • Continuous opioid dependence ([address]) 2022   • Lumbar disc disease with radiculopathy 2022   • Chronic obstructive pulmonary disease, unspecified COPD type ([address]) 2022   • Hematuria 2022   • Calculus of gallbladder without cholecystitis without obstruction 2022   • Chronic constipation 2022   • Primary generalized (osteo)arthritis 2021   • Xerostomia 2021   • Encounter for immunization 2021   • Sensorineural hearing loss (SNHL) of both ears 2021   • Multiple lung nodules 2021   • Encounter for venous access device care 2021   • Dyspnea on exertion 2021   • Thyroid lesion 2021   • Chemotherapy induced neutropenia ([address]) 06/10/2021   • Pain in thoracic spine 06/03/2021   • Urgency of urination 05/20/2021   • Chemotherapy-induced nausea 05/04/2021   • 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)    • Hypokalemia 03/02/2021   • Dehydration with hyponatremia 02/26/2021   • Encounter for screening involving social determinants of health (SDoH) 02/24/2021   • Hydronephrosis 02/04/2021   • BMI 29.0-29.9,adult 01/05/2021   • Nausea 01/05/2021   • Right hip pain 01/05/2021   • Tinnitus of left ear 02/19/2020   • Left ear pain 02/19/2020   • Anxiety and depression 10/02/2019   • Zaldivar's neuroma, left 07/12/2018   • 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[address] [facility]) 04/11/2017      LOS (days): 10  Geometric Mean LOS (GMLOS) (days): 5.10  Days to GMLOS:-4.6     OBJECTIVE:  Risk of Unplanned Readmission Score: 34.75         Current admission status: Inpatient   Preferred Pharmacy:   [id number] [address] [place] [address], [address] [place] [address]  Phone: [phone] Fax: [address] - [address]  Phone: [phone] Fax: [phone]    [facility] [place] [address] - [address] [facility],  [address] [facility],  [address] [place], [place] [id number]  Phone: [phone] Fax: [phone]    Primary Care Provider: Geronimo Mitchell DO    Primary Insurance: MEDICARE  Secondary Insurance:     DISCHARGE DETAILS:  Kranthi Diaz [facility] accepted pt, pt to be dc'd today to the SNF with hospice. Sri Carolina with  at the SNF will be reaching out to the daughter re: consents and Kristin with [address] will also be reaching out to daughter re: consents. CM spoke with pt's daughter Fletcher Bosworth [phone] [facility] and [facility] to same.

## 2023-09-28 NOTE — CASE MANAGEMENT
Case Management Discharge Planning Note    Patient name Lauren   Location [facility] [id number] MRN [id number]  : 1955 Date 2023       Current Admission Date: 2023  Current Admission Diagnosis:Dehydration with hyponatremia   Patient Active Problem List    Diagnosis Date Noted   • Moderate protein-calorie malnutrition ([address]) 2023   • Portal vein thrombosis 2023   • Generalized weakness    • Complicated UTI (urinary tract infection) 2023   • Anemia, chronic disease 2023   • Malignant ascites 2023   • Drug induced constipation 2023   • Chronic pain syndrome 2023   • Neoplasm of uncertain behavior of bone and articular cartilage 2023   • Right leg pain 2023   • DNR (do not resuscitate) discussion 2023   • Chemotherapy-induced thrombocytopenia 2023   • Insomnia due to other mental disorder 09/15/2022   • Stage 3 chronic kidney disease, unspecified whether stage 3a or 3b CKD ([address]) 2022   • Meralgia paraesthetica, right 2022   • Dyslipidemia 2022   • Post-menopause 2022   • Major depressive disorder, single episode, mild ([address]) 2022   • Cervical spondylosis 2022   • Continuous opioid dependence ([address]) 2022   • Lumbar disc disease with radiculopathy 2022   • Chronic obstructive pulmonary disease, unspecified COPD type ([address]) 2022   • Hematuria 2022   • Calculus of gallbladder without cholecystitis without obstruction 2022   • Chronic constipation 2022   • Primary generalized (osteo)arthritis 2021   • Xerostomia 2021   • Encounter for immunization 2021   • Sensorineural hearing loss (SNHL) of both ears 2021   • Multiple lung nodules 2021   • Encounter for venous access device care 2021   • Dyspnea on exertion 2021   • Thyroid lesion 2021   • Chemotherapy induced neutropenia ([address]) 06/10/2021   • Pain in thoracic spine 06/03/2021   • Urgency of urination 05/20/2021   • Chemotherapy-induced nausea 05/04/2021   • 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)    • Hypokalemia 03/02/2021   • Dehydration with hyponatremia 02/26/2021   • Encounter for screening involving social determinants of health (SDoH) 02/24/2021   • Hydronephrosis 02/04/2021   • BMI 29.0-29.9,adult 01/05/2021   • Nausea 01/05/2021   • Right hip pain 01/05/2021   • Tinnitus of left ear 02/19/2020   • Left ear pain 02/19/2020   • Anxiety and depression 10/02/2019   • Zaldivar's neuroma, left 07/12/2018   • 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[address] [facility]) 04/11/2017      LOS (days): 10  Geometric Mean LOS (GMLOS) (days): 5.10  Days to GMLOS:-4.5     OBJECTIVE:  Risk of Unplanned Readmission Score: 34.75         Current admission status: Inpatient   Preferred Pharmacy:   [id number] [address] [place] [address], [address] [place] [address]  Phone: [phone] Fax: [address] [place], [place] - [address] DAO Holder  Phone: [phone] Fax: [phone] [address] [place], [place] - [facility] [place],  [facility] [place] [address]  Phone: [phone] Fax: [phone]    Primary Care Provider: Silvia Valdovinos DO    Primary Insurance: MEDICARE  Secondary Insurance:     DISCHARGE DETAILS:  Requested that Bong Tenorio [facility] reconsider pt for admission under hospice. Awaiting a decision.

## 2023-09-28 NOTE — PLAN OF CARE
Problem: Potential for Falls  Goal: Patient will remain free of falls  Description: INTERVENTIONS:  - Educate patient/family on patient safety including physical limitations  - Instruct patient to call for assistance with activity   - Consult OT/PT to assist with strengthening/mobility   - Keep Call bell within reach  - Keep bed low and locked with side rails adjusted as appropriate  - Keep care items and personal belongings within reach  - Initiate and maintain comfort rounds  - Make Fall Risk Sign visible to staff  - Offer Toileting every 2 Hours, in advance of need  - Initiate/Maintain fall alarm  - Obtain necessary fall risk management equipment: non-skid footwear, call bell  - Apply yellow socks and bracelet for high fall risk patients  - Consider moving patient to room near nurses station  Outcome: Progressing     Problem: Nutrition/Hydration-ADULT  Goal: Nutrient/Hydration intake appropriate for improving, restoring or maintaining nutritional needs  Description: Monitor and assess patient's nutrition/hydration status for malnutrition. Collaborate with interdisciplinary team and initiate plan and interventions as ordered. Monitor patient's weight and dietary intake as ordered or per policy. Utilize nutrition screening tool and intervene as necessary. Determine patient's food preferences and provide high-protein, high-caloric foods as appropriate.      INTERVENTIONS:  - Monitor oral intake, urinary output, labs, and treatment plans  - Assess nutrition and hydration status and recommend course of action  - Evaluate amount of meals eaten  - Assist patient with eating if necessary   - Allow adequate time for meals  - Recommend/ encourage appropriate diets, oral nutritional supplements, and vitamin/mineral supplements  - Order, calculate, and assess calorie counts as needed  - Recommend, monitor, and adjust tube feedings and TPN/PPN based on assessed needs  - Assess need for intravenous fluids  - Provide specific nutrition/hydration education as appropriate  - Include patient/family/caregiver in decisions related to nutrition  Outcome: Progressing     Problem: MOBILITY - ADULT  Goal: Maintain or return to baseline ADL function  Description: INTERVENTIONS:  -  Assess patient's ability to carry out ADLs; assess patient's baseline for ADL function and identify physical deficits which impact ability to perform ADLs (bathing, care of mouth/teeth, toileting, grooming, dressing, etc.)  - Assess/evaluate cause of self-care deficits   - Assess range of motion  - Assess patient's mobility; develop plan if impaired  - Assess patient's need for assistive devices and provide as appropriate  - Encourage maximum independence but intervene and supervise when necessary  - Involve family in performance of ADLs  - Assess for home care needs following discharge   - Consider OT consult to assist with ADL evaluation and planning for discharge  - Provide patient education as appropriate  Outcome: Progressing  Goal: Maintains/Returns to pre admission functional level  Description: INTERVENTIONS:  - Perform BMAT or MOVE assessment daily.   - Set and communicate daily mobility goal to care team and patient/family/caregiver. - Collaborate with rehabilitation services on mobility goals if consulted  - Perform Range of Motion 3 times a day. - Reposition patient every 2 hours.   - Dangle patient 3 times a day  - Stand patient 3 times a day  - Ambulate patient 3 times a day  - Out of bed to chair 3 times a day   - Out of bed for meals 3 times a day  - Out of bed for toileting  - Record patient progress and toleration of activity level   Outcome: Progressing     Problem: Prexisting or High Potential for Compromised Skin Integrity  Goal: Skin integrity is maintained or improved  Description: INTERVENTIONS:  - Identify patients at risk for skin breakdown  - Assess and monitor skin integrity  - Assess and monitor nutrition and hydration status  - Monitor labs   - Assess for incontinence   - Turn and reposition patient  - Assist with mobility/ambulation  - Relieve pressure over bony prominences  - Avoid friction and shearing  - Provide appropriate hygiene as needed including keeping skin clean and dry  - Evaluate need for skin moisturizer/barrier cream  - Collaborate with interdisciplinary team   - Patient/family teaching  - Consider wound care consult   Outcome: Progressing     Problem: PAIN - ADULT  Goal: Verbalizes/displays adequate comfort level or baseline comfort level  Description: Interventions:  - Encourage patient to monitor pain and request assistance  - Assess pain using appropriate pain scale  - Administer analgesics based on type and severity of pain and evaluate response  - Implement non-pharmacological measures as appropriate and evaluate response  - Consider cultural and social influences on pain and pain management  - Notify physician/advanced practitioner if interventions unsuccessful or patient reports new pain  Outcome: Progressing     Problem: INFECTION - ADULT  Goal: Absence or prevention of progression during hospitalization  Description: INTERVENTIONS:  - Assess and monitor for signs and symptoms of infection  - Monitor lab/diagnostic results  - Monitor all insertion sites, i.e. indwelling lines, tubes, and drains  - Monitor endotracheal if appropriate and nasal secretions for changes in amount and color  - [place] appropriate cooling/warming therapies per order  - Administer medications as ordered  - Instruct and encourage patient and family to use good hand hygiene technique  - Identify and instruct in appropriate isolation precautions for identified infection/condition  Outcome: Progressing  Goal: Absence of fever/infection during neutropenic period  Description: INTERVENTIONS:  - Monitor WBC    Outcome: Progressing     Problem: SAFETY ADULT  Goal: Patient will remain free of falls  Description: INTERVENTIONS:  - Educate patient/family on patient safety including physical limitations  - Instruct patient to call for assistance with activity   - Consult OT/PT to assist with strengthening/mobility   - Keep Call bell within reach  - Keep bed low and locked with side rails adjusted as appropriate  - Keep care items and personal belongings within reach  - Initiate and maintain comfort rounds  - Make Fall Risk Sign visible to staff  - Offer Toileting every 2 Hours, in advance of need  - Initiate/Maintain fall alarm  - Obtain necessary fall risk management equipment: non-skid footwear, call bell  - Apply yellow socks and bracelet for high fall risk patients  - Consider moving patient to room near nurses station  Outcome: Progressing  Goal: Maintain or return to baseline ADL function  Description: INTERVENTIONS:  -  Assess patient's ability to carry out ADLs; assess patient's baseline for ADL function and identify physical deficits which impact ability to perform ADLs (bathing, care of mouth/teeth, toileting, grooming, dressing, etc.)  - Assess/evaluate cause of self-care deficits   - Assess range of motion  - Assess patient's mobility; develop plan if impaired  - Assess patient's need for assistive devices and provide as appropriate  - Encourage maximum independence but intervene and supervise when necessary  - Involve family in performance of ADLs  - Assess for home care needs following discharge   - Consider OT consult to assist with ADL evaluation and planning for discharge  - Provide patient education as appropriate  Outcome: Progressing  Goal: Maintains/Returns to pre admission functional level  Description: INTERVENTIONS:  - Perform BMAT or MOVE assessment daily.   - Set and communicate daily mobility goal to care team and patient/family/caregiver. - Collaborate with rehabilitation services on mobility goals if consulted  - Perform Range of Motion 3 times a day. - Reposition patient every 2 hours.   - Dangle patient 3 times a day  - Stand patient 3 times a day  - Ambulate patient 3 times a day  - Out of bed to chair 3 times a day   - Out of bed for meals 3 times a day  - Out of bed for toileting  - Record patient progress and toleration of activity level   Outcome: Progressing     Problem: DISCHARGE PLANNING  Goal: Discharge to home or other facility with appropriate resources  Description: INTERVENTIONS:  - Identify barriers to discharge w/patient and caregiver  - Arrange for needed discharge resources and transportation as appropriate  - Identify discharge learning needs (meds, wound care, etc.)  - Arrange for interpretive services to assist at discharge as needed  - Refer to Case Management Department for coordinating discharge planning if the patient needs post-hospital services based on physician/advanced practitioner order or complex needs related to functional status, cognitive ability, or social support system  Outcome: Progressing     Problem: Knowledge Deficit  Goal: Patient/family/caregiver demonstrates understanding of disease process, treatment plan, medications, and discharge instructions  Description: Complete learning assessment and assess knowledge base.   Interventions:  - Provide teaching at level of understanding  - Provide teaching via preferred learning methods  Outcome: Progressing     Problem: CARDIOVASCULAR - ADULT  Goal: Maintains optimal cardiac output and hemodynamic stability  Description: INTERVENTIONS:  - Monitor I/O, vital signs and rhythm  - Monitor for S/S and trends of decreased cardiac output  - Administer and titrate ordered vasoactive medications to optimize hemodynamic stability  - Assess quality of pulses, skin color and temperature  - Assess for signs of decreased coronary artery perfusion  - Instruct patient to report change in severity of symptoms  Outcome: Progressing  Goal: Absence of cardiac dysrhythmias or at baseline rhythm  Description: INTERVENTIONS:  - Continuous cardiac monitoring, vital signs, obtain 12 lead EKG if ordered  - Administer antiarrhythmic and heart rate control medications as ordered  - Monitor electrolytes and administer replacement therapy as ordered  Outcome: Progressing     Problem: GASTROINTESTINAL - ADULT  Goal: Minimal or absence of nausea and/or vomiting  Description: INTERVENTIONS:  - Administer IV fluids if ordered to ensure adequate hydration  - Maintain NPO status until nausea and vomiting are resolved  - Nasogastric tube if ordered  - Administer ordered antiemetic medications as needed  - Provide nonpharmacologic comfort measures as appropriate  - Advance diet as tolerated, if ordered  - Consider nutrition services referral to assist patient with adequate nutrition and appropriate food choices  Outcome: Progressing  Goal: Maintains or returns to baseline bowel function  Description: INTERVENTIONS:  - Assess bowel function  - Encourage oral fluids to ensure adequate hydration  - Administer IV fluids if ordered to ensure adequate hydration  - Administer ordered medications as needed  - Encourage mobilization and activity  - Consider nutritional services referral to assist patient with adequate nutrition and appropriate food choices  Outcome: Progressing  Goal: Maintains adequate nutritional intake  Description: INTERVENTIONS:  - Monitor percentage of each meal consumed  - Identify factors contributing to decreased intake, treat as appropriate  - Assist with meals as needed  - Monitor I&O, weight, and lab values if indicated  - Obtain nutrition services referral as needed  Outcome: Progressing  Goal: Oral mucous membranes remain intact  Description: INTERVENTIONS  - Assess oral mucosa and hygiene practices  - Implement preventative oral hygiene regimen  - Implement oral medicated treatments as ordered  - Initiate Nutrition services referral as needed  Outcome: Progressing     Problem: METABOLIC, FLUID AND ELECTROLYTES - ADULT  Goal: Electrolytes maintained within normal limits  Description: INTERVENTIONS:  - Monitor labs and assess patient for signs and symptoms of electrolyte imbalances  - Administer electrolyte replacement as ordered  - Monitor response to electrolyte replacements, including repeat lab results as appropriate  - Instruct patient on fluid and nutrition as appropriate  Outcome: Progressing  Goal: Fluid balance maintained  Description: INTERVENTIONS:  - Monitor labs   - Monitor I/O and WT  - Instruct patient on fluid and nutrition as appropriate  - Assess for signs & symptoms of volume excess or deficit  Outcome: Progressing  Goal: Glucose maintained within target range  Description: INTERVENTIONS:  - Monitor Blood Glucose as ordered  - Assess for signs and symptoms of hyperglycemia and hypoglycemia  - Administer ordered medications to maintain glucose within target range  - Assess nutritional intake and initiate nutrition service referral as needed  Outcome: Progressing     Problem: HEMATOLOGIC - ADULT  Goal: Maintains hematologic stability  Description: INTERVENTIONS  - Assess for signs and symptoms of bleeding or hemorrhage  - Monitor labs  - Administer supportive blood products/factors as ordered and appropriate  Outcome: Progressing     Problem: MUSCULOSKELETAL - ADULT  Goal: Maintain or return mobility to safest level of function  Description: INTERVENTIONS:  - Assess patient's ability to carry out ADLs; assess patient's baseline for ADL function and identify physical deficits which impact ability to perform ADLs (bathing, care of mouth/teeth, toileting, grooming, dressing, etc.)  - Assess/evaluate cause of self-care deficits   - Assess range of motion  - Assess patient's mobility  - Assess patient's need for assistive devices and provide as appropriate  - Encourage maximum independence but intervene and supervise when necessary  - Involve family in performance of ADLs  - Assess for home care needs following discharge   - Consider OT consult to assist with ADL evaluation and planning for discharge  - Provide patient education as appropriate  Outcome: Progressing  Goal: Maintain proper alignment of affected body part  Description: INTERVENTIONS:  - Support, maintain and protect limb and body alignment  - Provide patient/ family with appropriate education  Outcome: Progressing

## 2023-10-02 ENCOUNTER — HOME CARE VISIT (OUTPATIENT)
Dept: HOME HOSPICE | Facility: HOSPICE | Age: 68
End: 2023-10-02
Payer: MEDICARE

## 2023-10-14 ENCOUNTER — HOME CARE VISIT (OUTPATIENT)
Dept: HOME HEALTH SERVICES | Facility: HOME HEALTHCARE | Age: 68
End: 2023-10-14
Payer: MEDICARE

## 2023-10-20 ENCOUNTER — HOME CARE VISIT (OUTPATIENT)
Dept: HOME HOSPICE | Facility: HOSPICE | Age: 68
End: 2023-10-20
Payer: MEDICARE

## 2023-12-25 NOTE — PROGRESS NOTES
Oncology LSW attempted to outreach PT to let her know that she had not yet heard back from Derrick Lock with [facility] [employer], but would continue to keep PT abreast to knew information as it comes about  LSW left this information on PT's voicemail and requested that she reach out should any other questions or areas of concern arise prior to LSW's next outreach  Patient was straight cath. Output charted.

## 2024-05-13 NOTE — TELEPHONE ENCOUNTER
Patient called in to report issues with transportation via [facility] to today's appointment in [facility]  I called [facility] and spoke with Dion Johnson who reports multiple patients have been stranded in [facility] due to [facility] not picking up patient  to transport them home and they are hesitant to deliver pt to that location for this reason  Todays appointment cancelled  Message sent to [facility] pool [facility] to r/s appointments to [facility] location  Patient reports right sided pain she blames on right sided stent which has been bothering her  Reports stent was recently exchanged  She has taken one Aleve with no effect and is hesitant to take anything due to complaints the medication causes constipation  All of this relayed to [address] who ordered a Pet CT  Message sent to [employer] pool [facility] to schedule Pet at [facility] and patient will need transportation set up  Patient instructed to alternate Ibuprofen 600 mg Q6hrs with Tylenol and to take Miralax daily to prevent constipation  Suggested pain medication be taken with food to prevent GI upset 
How Severe Is Your Skin Lesion?: mild
Have Your Skin Lesions Been Treated?: not been treated
Is This A New Presentation, Or A Follow-Up?: Skin Lesions

## (undated) DEVICE — SYRINGE 5ML LL

## (undated) DEVICE — 3M™ STERI-STRIP™ COMPOUND BENZOIN TINCTURE 40 BAGS/CARTON 4 CARTONS/CASE C1544: Brand: 3M™ STERI-STRIP™

## (undated) DEVICE — INVIEW CLEAR LEGGINGS: Brand: CONVERTORS

## (undated) DEVICE — MAT FLOOR STEP DRI 24 X 36 IN N-STRL

## (undated) DEVICE — GLOVE INDICATOR UNDERGLOVE SZ 7.5 GREEN

## (undated) DEVICE — GLOVE SRG BIOGEL 7.5

## (undated) DEVICE — STERILE SURGICAL LUBRICANT,  TUBE: Brand: SURGILUBE

## (undated) DEVICE — GLOVE SRG BIOGEL 8

## (undated) DEVICE — URO CATCHER BAG STERILE 0-UC32

## (undated) DEVICE — TUBING SUCTION 5MM X 12 FT

## (undated) DEVICE — BETHLEHEM UNIVERSAL  MIONR EXT: Brand: CARDINAL HEALTH

## (undated) DEVICE — GAUZE SPONGES,16 PLY: Brand: CURITY

## (undated) DEVICE — GUIDEWIRE STRGHT TIP 0.035 IN  SOLO PLUS

## (undated) DEVICE — TRANSPOSAL ULTRAFLEX DUO/QUAD ULTRA CART MANIFOLD

## (undated) DEVICE — SWABSTCK, BENZOIN TINCTURE, 1/PK, STRL: Brand: APLICARE

## (undated) DEVICE — SPINAL NEEDLE QUINCKE 25 G X 4.69

## (undated) DEVICE — CURITY NON-ADHERENT STRIPS: Brand: CURITY

## (undated) DEVICE — CHLORAPREP HI-LITE 26ML ORANGE

## (undated) DEVICE — CHLORHEXIDINE 4PCT 4 OZ

## (undated) DEVICE — PACK TUR

## (undated) DEVICE — GLOVE SRG BIOGEL 7

## (undated) DEVICE — SCD SEQUENTIAL COMPRESSION COMFORT SLEEVE MEDIUM KNEE LENGTH: Brand: KENDALL SCD

## (undated) DEVICE — CURITY STRETCH BANDAGE: Brand: CURITY

## (undated) DEVICE — COBAN 6 IN STERILE

## (undated) DEVICE — FLEXIBLE ADHESIVE BANDAGE,X-LARGE: Brand: CURITY

## (undated) DEVICE — STRETCH BANDAGE: Brand: CURITY

## (undated) DEVICE — POV-IOD SOLUTION 4OZ BT

## (undated) DEVICE — SPECIMEN CONTAINER STERILE PEEL PACK

## (undated) DEVICE — CHLORAPREP HI-LITE 10.5ML ORANGE

## (undated) DEVICE — SYRINGE EPI 8ML LUER SLIP LOSS OF RESISTANCE PLASTIC PERFIX

## (undated) DEVICE — ZIMMER® STERILE DISPOSABLE TOURNIQUET CUFF, DUAL PORT, SINGLE BLADDER, 18 IN. (46 CM)

## (undated) DEVICE — ALL PURPOSE SPONGES,NON-WOVEN, 4 PLY: Brand: CURITY

## (undated) DEVICE — UROCATCH BAG

## (undated) DEVICE — TRAY EPIDURAL PERIFIX 20GA X 3.5IN TUOHY 8ML

## (undated) DEVICE — ASTOUND IMPERVIOUS SURGICAL GOWN: Brand: CONVERTORS

## (undated) DEVICE — FRAZIER SUCTION INSTRUMENT 18 FR W/OBTURATOR, NO CONTROL VENT: Brand: FRAZIER

## (undated) DEVICE — GLOVE INDICATOR PI UNDERGLOVE SZ 8.5 BLUE

## (undated) DEVICE — NEEDLE SPINAL 22G X 3.5IN  QUINCKE

## (undated) DEVICE — SYRINGE 10ML LL

## (undated) DEVICE — SUT ETHILON 3-0 PS-1 18 IN 1663H